# Patient Record
Sex: FEMALE | Race: WHITE | NOT HISPANIC OR LATINO | Employment: OTHER | URBAN - NONMETROPOLITAN AREA
[De-identification: names, ages, dates, MRNs, and addresses within clinical notes are randomized per-mention and may not be internally consistent; named-entity substitution may affect disease eponyms.]

---

## 2018-05-18 ENCOUNTER — LAB (OUTPATIENT)
Dept: LAB | Facility: HOSPITAL | Age: 53
End: 2018-05-18

## 2018-05-18 ENCOUNTER — TRANSCRIBE ORDERS (OUTPATIENT)
Dept: ADMINISTRATIVE | Facility: HOSPITAL | Age: 53
End: 2018-05-18

## 2018-05-18 DIAGNOSIS — R06.02 SHORTNESS OF BREATH: ICD-10-CM

## 2018-05-18 DIAGNOSIS — R06.02 SHORTNESS OF BREATH: Primary | ICD-10-CM

## 2018-05-18 LAB — BNP SERPL-MCNC: 60 PG/ML (ref 0–100)

## 2018-05-18 PROCEDURE — 36415 COLL VENOUS BLD VENIPUNCTURE: CPT

## 2018-05-18 PROCEDURE — 83880 ASSAY OF NATRIURETIC PEPTIDE: CPT

## 2018-12-28 ENCOUNTER — HOSPITAL ENCOUNTER (EMERGENCY)
Facility: HOSPITAL | Age: 53
Discharge: HOME | End: 2018-12-28
Admitting: FAMILY MEDICINE

## 2018-12-28 ENCOUNTER — APPOINTMENT (OUTPATIENT)
Dept: GENERAL RADIOLOGY | Facility: HOSPITAL | Age: 53
End: 2018-12-28

## 2018-12-28 VITALS
OXYGEN SATURATION: 95 % | RESPIRATION RATE: 16 BRPM | HEIGHT: 59 IN | BODY MASS INDEX: 43.34 KG/M2 | WEIGHT: 215 LBS | TEMPERATURE: 98.1 F | HEART RATE: 74 BPM | SYSTOLIC BLOOD PRESSURE: 105 MMHG | DIASTOLIC BLOOD PRESSURE: 73 MMHG

## 2018-12-28 DIAGNOSIS — J06.9 UPPER RESPIRATORY TRACT INFECTION, UNSPECIFIED TYPE: Primary | ICD-10-CM

## 2018-12-28 LAB
FLUAV AG NPH QL: NEGATIVE
FLUBV AG NPH QL IA: NEGATIVE

## 2018-12-28 PROCEDURE — 99283 EMERGENCY DEPT VISIT LOW MDM: CPT

## 2018-12-28 PROCEDURE — 87804 INFLUENZA ASSAY W/OPTIC: CPT | Performed by: NURSE PRACTITIONER

## 2018-12-28 PROCEDURE — 71046 X-RAY EXAM CHEST 2 VIEWS: CPT | Performed by: RADIOLOGY

## 2018-12-28 PROCEDURE — 71046 X-RAY EXAM CHEST 2 VIEWS: CPT

## 2018-12-28 RX ORDER — LISINOPRIL 20 MG/1
20 TABLET ORAL DAILY
Status: ON HOLD | COMMUNITY
End: 2021-07-17

## 2018-12-28 RX ORDER — QUETIAPINE FUMARATE 50 MG/1
50 TABLET, FILM COATED ORAL NIGHTLY
COMMUNITY
End: 2021-08-05 | Stop reason: HOSPADM

## 2018-12-28 RX ORDER — POTASSIUM CHLORIDE 750 MG/1
10 TABLET, FILM COATED, EXTENDED RELEASE ORAL 4 TIMES DAILY
Status: ON HOLD | COMMUNITY
End: 2021-07-18 | Stop reason: DRUGHIGH

## 2018-12-28 RX ORDER — FUROSEMIDE 40 MG/1
40 TABLET ORAL DAILY
COMMUNITY
End: 2023-02-08 | Stop reason: ALTCHOICE

## 2018-12-28 RX ORDER — BENZONATATE 200 MG/1
200 CAPSULE ORAL 3 TIMES DAILY PRN
Qty: 20 CAPSULE | Refills: 0 | OUTPATIENT
Start: 2018-12-28 | End: 2021-04-26

## 2018-12-28 RX ORDER — DOXYCYCLINE 100 MG/1
100 CAPSULE ORAL 2 TIMES DAILY
Qty: 20 CAPSULE | Refills: 0 | Status: ON HOLD | OUTPATIENT
Start: 2018-12-28 | End: 2021-07-17

## 2018-12-28 RX ORDER — SUCRALFATE 1 G/1
1 TABLET ORAL 4 TIMES DAILY
Status: ON HOLD | COMMUNITY
End: 2021-07-17

## 2018-12-28 RX ORDER — ASPIRIN 325 MG
325 TABLET ORAL DAILY
Status: ON HOLD | COMMUNITY
End: 2021-07-17

## 2018-12-28 RX ORDER — CHLORAL HYDRATE 500 MG
CAPSULE ORAL
Status: ON HOLD | COMMUNITY
End: 2021-07-17

## 2019-03-23 ENCOUNTER — HOSPITAL ENCOUNTER (EMERGENCY)
Facility: HOSPITAL | Age: 54
Discharge: HOME | End: 2019-03-24
Attending: FAMILY MEDICINE | Admitting: FAMILY MEDICINE

## 2019-03-23 ENCOUNTER — APPOINTMENT (OUTPATIENT)
Dept: GENERAL RADIOLOGY | Facility: HOSPITAL | Age: 54
End: 2019-03-23

## 2019-03-23 DIAGNOSIS — M25.562 ARTHRALGIA OF LEFT KNEE: Primary | ICD-10-CM

## 2019-03-23 PROCEDURE — 96372 THER/PROPH/DIAG INJ SC/IM: CPT

## 2019-03-23 PROCEDURE — 73562 X-RAY EXAM OF KNEE 3: CPT

## 2019-03-23 PROCEDURE — 25010000002 KETOROLAC TROMETHAMINE PER 15 MG: Performed by: PHYSICIAN ASSISTANT

## 2019-03-23 PROCEDURE — 99283 EMERGENCY DEPT VISIT LOW MDM: CPT

## 2019-03-23 PROCEDURE — 73562 X-RAY EXAM OF KNEE 3: CPT | Performed by: RADIOLOGY

## 2019-03-23 RX ORDER — LEVOTHYROXINE SODIUM 0.03 MG/1
25 TABLET ORAL DAILY
Status: ON HOLD | COMMUNITY
End: 2021-11-01

## 2019-03-23 RX ORDER — KETOROLAC TROMETHAMINE 30 MG/ML
30 INJECTION, SOLUTION INTRAMUSCULAR; INTRAVENOUS ONCE
Status: COMPLETED | OUTPATIENT
Start: 2019-03-23 | End: 2019-03-23

## 2019-03-23 RX ADMIN — KETOROLAC TROMETHAMINE 30 MG: 30 INJECTION, SOLUTION INTRAMUSCULAR; INTRAVENOUS at 23:50

## 2019-03-24 VITALS
SYSTOLIC BLOOD PRESSURE: 128 MMHG | HEIGHT: 59 IN | OXYGEN SATURATION: 97 % | BODY MASS INDEX: 44.35 KG/M2 | TEMPERATURE: 97.4 F | DIASTOLIC BLOOD PRESSURE: 70 MMHG | RESPIRATION RATE: 16 BRPM | WEIGHT: 220 LBS | HEART RATE: 79 BPM

## 2019-12-02 ENCOUNTER — HOSPITAL ENCOUNTER (EMERGENCY)
Facility: HOSPITAL | Age: 54
Discharge: HOME | End: 2019-12-02
Attending: EMERGENCY MEDICINE | Admitting: EMERGENCY MEDICINE

## 2019-12-02 VITALS
WEIGHT: 220 LBS | BODY MASS INDEX: 44.35 KG/M2 | DIASTOLIC BLOOD PRESSURE: 72 MMHG | OXYGEN SATURATION: 97 % | SYSTOLIC BLOOD PRESSURE: 163 MMHG | RESPIRATION RATE: 18 BRPM | HEART RATE: 71 BPM | TEMPERATURE: 98 F | HEIGHT: 59 IN

## 2019-12-02 DIAGNOSIS — N39.0 ACUTE UTI: Primary | ICD-10-CM

## 2019-12-02 LAB
BACTERIA UR QL AUTO: ABNORMAL /HPF
BILIRUB UR QL STRIP: NEGATIVE
CLARITY UR: CLEAR
COLOR UR: ABNORMAL
GLUCOSE UR STRIP-MCNC: NEGATIVE MG/DL
HGB UR QL STRIP.AUTO: NEGATIVE
HYALINE CASTS UR QL AUTO: ABNORMAL /LPF
KETONES UR QL STRIP: NEGATIVE
LEUKOCYTE ESTERASE UR QL STRIP.AUTO: ABNORMAL
NITRITE UR QL STRIP: NEGATIVE
PH UR STRIP.AUTO: <=5 [PH] (ref 5–8)
PROT UR QL STRIP: NEGATIVE
RBC # UR: ABNORMAL /HPF
REF LAB TEST METHOD: ABNORMAL
SP GR UR STRIP: 1.03 (ref 1–1.03)
SQUAMOUS #/AREA URNS HPF: ABNORMAL /HPF
UROBILINOGEN UR QL STRIP: ABNORMAL
WBC UR QL AUTO: ABNORMAL /HPF

## 2019-12-02 PROCEDURE — 87086 URINE CULTURE/COLONY COUNT: CPT | Performed by: PHYSICIAN ASSISTANT

## 2019-12-02 PROCEDURE — 99283 EMERGENCY DEPT VISIT LOW MDM: CPT

## 2019-12-02 PROCEDURE — 81001 URINALYSIS AUTO W/SCOPE: CPT | Performed by: PHYSICIAN ASSISTANT

## 2019-12-02 PROCEDURE — 96372 THER/PROPH/DIAG INJ SC/IM: CPT

## 2019-12-02 PROCEDURE — 25010000002 CEFTRIAXONE PER 250 MG: Performed by: PHYSICIAN ASSISTANT

## 2019-12-02 RX ORDER — CEFTRIAXONE 1 G/1
1000 INJECTION, POWDER, FOR SOLUTION INTRAMUSCULAR; INTRAVENOUS ONCE
Status: COMPLETED | OUTPATIENT
Start: 2019-12-02 | End: 2019-12-02

## 2019-12-02 RX ORDER — CEPHALEXIN 500 MG/1
500 CAPSULE ORAL 2 TIMES DAILY
Qty: 20 CAPSULE | Refills: 0 | Status: ON HOLD | OUTPATIENT
Start: 2019-12-02 | End: 2021-07-17

## 2019-12-02 RX ORDER — LIDOCAINE HYDROCHLORIDE 10 MG/ML
2.1 INJECTION, SOLUTION EPIDURAL; INFILTRATION; INTRACAUDAL; PERINEURAL ONCE
Status: COMPLETED | OUTPATIENT
Start: 2019-12-02 | End: 2019-12-02

## 2019-12-02 RX ORDER — PHENAZOPYRIDINE HYDROCHLORIDE 200 MG/1
200 TABLET, FILM COATED ORAL ONCE
Status: COMPLETED | OUTPATIENT
Start: 2019-12-02 | End: 2019-12-02

## 2019-12-02 RX ORDER — PHENAZOPYRIDINE HYDROCHLORIDE 200 MG/1
200 TABLET, FILM COATED ORAL 3 TIMES DAILY PRN
Qty: 21 TABLET | Refills: 0 | Status: ON HOLD | OUTPATIENT
Start: 2019-12-02 | End: 2021-07-17

## 2019-12-02 RX ADMIN — CEFTRIAXONE 1000 MG: 1 INJECTION, POWDER, FOR SOLUTION INTRAMUSCULAR; INTRAVENOUS at 22:56

## 2019-12-02 RX ADMIN — LIDOCAINE HYDROCHLORIDE 2.1 ML: 10 INJECTION, SOLUTION EPIDURAL; INFILTRATION; INTRACAUDAL; PERINEURAL at 22:56

## 2019-12-02 RX ADMIN — PHENAZOPYRIDINE 200 MG: 200 TABLET ORAL at 22:18

## 2019-12-03 NOTE — ED PROVIDER NOTES
Subjective   54-year-old female presents to the ER with complaints of dysuria and frequency.  Patient states symptoms have been present for the last 4 days.  Patient does admit to some suprapubic pain.            Review of Systems   Constitutional: Negative.  Negative for fever.   HENT: Negative.    Respiratory: Negative.    Cardiovascular: Negative.  Negative for chest pain.   Gastrointestinal: Negative.  Negative for abdominal pain.   Endocrine: Negative.    Genitourinary: Positive for dysuria and frequency.   Musculoskeletal: Positive for gait problem (recent knee surgery).   Skin: Negative.    Psychiatric/Behavioral: Negative.    All other systems reviewed and are negative.      Past Medical History:   Diagnosis Date   • Arthritis    • Back pain    • Coronary artery disease    • Depression    • Diabetes mellitus (CMS/HCC)    • Fatty liver    • Hypertension        Allergies   Allergen Reactions   • Codeine Itching   • Hydrocodone Hives   • Nuts Other (See Comments)     peanuts   • Latex Rash       Past Surgical History:   Procedure Laterality Date   • CARPAL TUNNEL RELEASE     •  SECTION     • CHOLECYSTECTOMY     • CORONARY ANGIOPLASTY WITH STENT PLACEMENT     • REPLACEMENT TOTAL KNEE Right    • TUBAL ABDOMINAL LIGATION         No family history on file.    Social History     Socioeconomic History   • Marital status: Legally      Spouse name: Not on file   • Number of children: Not on file   • Years of education: Not on file   • Highest education level: Not on file   Tobacco Use   • Smoking status: Never Smoker   Substance and Sexual Activity   • Alcohol use: Yes     Comment: occas   • Drug use: No           Objective   Physical Exam   Constitutional: She is oriented to person, place, and time. She appears well-developed and well-nourished. No distress.   HENT:   Head: Normocephalic and atraumatic.   Right Ear: External ear normal.   Left Ear: External ear normal.   Nose: Nose normal.   Eyes:  Conjunctivae are normal.   Neck: Normal range of motion. Neck supple. No JVD present. No tracheal deviation present.   Cardiovascular: Normal rate, regular rhythm and normal heart sounds.   No murmur heard.  Pulmonary/Chest: Effort normal and breath sounds normal. No respiratory distress. She has no wheezes.   Abdominal: Soft. Bowel sounds are normal. There is tenderness (suprapubic).   Musculoskeletal: Normal range of motion. She exhibits no edema or deformity.   Neurological: She is alert and oriented to person, place, and time. No cranial nerve deficit.   Skin: Skin is warm and dry. No rash noted. She is not diaphoretic. No erythema. No pallor.   Psychiatric: She has a normal mood and affect. Her behavior is normal. Thought content normal.   Nursing note and vitals reviewed.      Procedures           ED Course                  MDM  Number of Diagnoses or Management Options  Acute UTI: new and requires workup     Amount and/or Complexity of Data Reviewed  Clinical lab tests: ordered and reviewed    Risk of Complications, Morbidity, and/or Mortality  Presenting problems: low  Diagnostic procedures: low  Management options: low    Patient Progress  Patient progress: stable      Final diagnoses:   Acute UTI              Kermit Santizo PA  12/02/19 8136

## 2019-12-04 LAB — BACTERIA SPEC AEROBE CULT: NORMAL

## 2020-02-28 ENCOUNTER — APPOINTMENT (OUTPATIENT)
Dept: GENERAL RADIOLOGY | Facility: HOSPITAL | Age: 55
End: 2020-02-28

## 2020-02-28 ENCOUNTER — APPOINTMENT (OUTPATIENT)
Dept: CT IMAGING | Facility: HOSPITAL | Age: 55
End: 2020-02-28

## 2020-02-28 ENCOUNTER — HOSPITAL ENCOUNTER (EMERGENCY)
Facility: HOSPITAL | Age: 55
Discharge: HOME | End: 2020-02-29
Attending: EMERGENCY MEDICINE | Admitting: EMERGENCY MEDICINE

## 2020-02-28 DIAGNOSIS — R10.10 PAIN OF UPPER ABDOMEN: Primary | ICD-10-CM

## 2020-02-28 LAB
6-ACETYL MORPHINE: NEGATIVE
ALBUMIN SERPL-MCNC: 3.68 G/DL (ref 3.5–5.2)
ALBUMIN/GLOB SERPL: 0.9 G/DL
ALP SERPL-CCNC: 170 U/L (ref 39–117)
ALT SERPL W P-5'-P-CCNC: 30 U/L (ref 1–33)
AMPHET+METHAMPHET UR QL: NEGATIVE
AMYLASE SERPL-CCNC: 43 U/L (ref 28–100)
ANION GAP SERPL CALCULATED.3IONS-SCNC: 12.9 MMOL/L (ref 5–15)
AST SERPL-CCNC: 59 U/L (ref 1–32)
BACTERIA UR QL AUTO: ABNORMAL /HPF
BARBITURATES UR QL SCN: NEGATIVE
BASOPHILS # BLD AUTO: 0.06 10*3/MM3 (ref 0–0.2)
BASOPHILS NFR BLD AUTO: 0.8 % (ref 0–1.5)
BENZODIAZ UR QL SCN: NEGATIVE
BILIRUB SERPL-MCNC: 0.5 MG/DL (ref 0.2–1.2)
BILIRUB UR QL STRIP: NEGATIVE
BUN BLD-MCNC: 8 MG/DL (ref 6–20)
BUN/CREAT SERPL: 12.7 (ref 7–25)
BUPRENORPHINE SERPL-MCNC: NEGATIVE NG/ML
CALCIUM SPEC-SCNC: 9.7 MG/DL (ref 8.6–10.5)
CANNABINOIDS SERPL QL: NEGATIVE
CHLORIDE SERPL-SCNC: 101 MMOL/L (ref 98–107)
CLARITY UR: CLEAR
CO2 SERPL-SCNC: 26.1 MMOL/L (ref 22–29)
COCAINE UR QL: NEGATIVE
COLOR UR: YELLOW
CREAT BLD-MCNC: 0.63 MG/DL (ref 0.57–1)
D DIMER PPP FEU-MCNC: 1.98 MCGFEU/ML (ref 0–0.5)
DEPRECATED RDW RBC AUTO: 48.8 FL (ref 37–54)
EOSINOPHIL # BLD AUTO: 0.37 10*3/MM3 (ref 0–0.4)
EOSINOPHIL NFR BLD AUTO: 5 % (ref 0.3–6.2)
ERYTHROCYTE [DISTWIDTH] IN BLOOD BY AUTOMATED COUNT: 16 % (ref 12.3–15.4)
FLUAV AG NPH QL: NEGATIVE
FLUBV AG NPH QL IA: NEGATIVE
GFR SERPL CREATININE-BSD FRML MDRD: 98 ML/MIN/1.73
GLOBULIN UR ELPH-MCNC: 4.1 GM/DL
GLUCOSE BLD-MCNC: 144 MG/DL (ref 65–99)
GLUCOSE UR STRIP-MCNC: NEGATIVE MG/DL
HCT VFR BLD AUTO: 38.8 % (ref 34–46.6)
HGB BLD-MCNC: 12 G/DL (ref 12–15.9)
HGB UR QL STRIP.AUTO: NEGATIVE
HYALINE CASTS UR QL AUTO: ABNORMAL /LPF
IMM GRANULOCYTES # BLD AUTO: 0.02 10*3/MM3 (ref 0–0.05)
IMM GRANULOCYTES NFR BLD AUTO: 0.3 % (ref 0–0.5)
KETONES UR QL STRIP: NEGATIVE
LEUKOCYTE ESTERASE UR QL STRIP.AUTO: ABNORMAL
LIPASE SERPL-CCNC: 80 U/L (ref 13–60)
LYMPHOCYTES # BLD AUTO: 2.5 10*3/MM3 (ref 0.7–3.1)
LYMPHOCYTES NFR BLD AUTO: 33.7 % (ref 19.6–45.3)
MCH RBC QN AUTO: 26.1 PG (ref 26.6–33)
MCHC RBC AUTO-ENTMCNC: 30.9 G/DL (ref 31.5–35.7)
MCV RBC AUTO: 84.3 FL (ref 79–97)
METHADONE UR QL SCN: NEGATIVE
MONOCYTES # BLD AUTO: 0.54 10*3/MM3 (ref 0.1–0.9)
MONOCYTES NFR BLD AUTO: 7.3 % (ref 5–12)
NEUTROPHILS # BLD AUTO: 3.92 10*3/MM3 (ref 1.7–7)
NEUTROPHILS NFR BLD AUTO: 52.9 % (ref 42.7–76)
NITRITE UR QL STRIP: NEGATIVE
NRBC BLD AUTO-RTO: 0 /100 WBC (ref 0–0.2)
NT-PROBNP SERPL-MCNC: 205.9 PG/ML (ref 5–900)
OPIATES UR QL: NEGATIVE
OXYCODONE UR QL SCN: NEGATIVE
PCP UR QL SCN: NEGATIVE
PH UR STRIP.AUTO: 6 [PH] (ref 5–8)
PLATELET # BLD AUTO: 174 10*3/MM3 (ref 140–450)
PMV BLD AUTO: 11.4 FL (ref 6–12)
POTASSIUM BLD-SCNC: 3.4 MMOL/L (ref 3.5–5.2)
PROT SERPL-MCNC: 7.8 G/DL (ref 6–8.5)
PROT UR QL STRIP: NEGATIVE
RBC # BLD AUTO: 4.6 10*6/MM3 (ref 3.77–5.28)
RBC # UR: ABNORMAL /HPF
REF LAB TEST METHOD: ABNORMAL
SODIUM BLD-SCNC: 140 MMOL/L (ref 136–145)
SP GR UR STRIP: 1.01 (ref 1–1.03)
SQUAMOUS #/AREA URNS HPF: ABNORMAL /HPF
TROPONIN T SERPL-MCNC: <0.01 NG/ML (ref 0–0.03)
UROBILINOGEN UR QL STRIP: ABNORMAL
WBC NRBC COR # BLD: 7.41 10*3/MM3 (ref 3.4–10.8)
WBC UR QL AUTO: ABNORMAL /HPF

## 2020-02-28 PROCEDURE — 99284 EMERGENCY DEPT VISIT MOD MDM: CPT

## 2020-02-28 PROCEDURE — 82150 ASSAY OF AMYLASE: CPT | Performed by: PHYSICIAN ASSISTANT

## 2020-02-28 PROCEDURE — 71045 X-RAY EXAM CHEST 1 VIEW: CPT

## 2020-02-28 PROCEDURE — 80307 DRUG TEST PRSMV CHEM ANLYZR: CPT | Performed by: PHYSICIAN ASSISTANT

## 2020-02-28 PROCEDURE — 85025 COMPLETE CBC W/AUTO DIFF WBC: CPT | Performed by: PHYSICIAN ASSISTANT

## 2020-02-28 PROCEDURE — 71275 CT ANGIOGRAPHY CHEST: CPT

## 2020-02-28 PROCEDURE — 96374 THER/PROPH/DIAG INJ IV PUSH: CPT

## 2020-02-28 PROCEDURE — 36415 COLL VENOUS BLD VENIPUNCTURE: CPT

## 2020-02-28 PROCEDURE — 93005 ELECTROCARDIOGRAM TRACING: CPT | Performed by: PHYSICIAN ASSISTANT

## 2020-02-28 PROCEDURE — 84484 ASSAY OF TROPONIN QUANT: CPT | Performed by: PHYSICIAN ASSISTANT

## 2020-02-28 PROCEDURE — 83690 ASSAY OF LIPASE: CPT | Performed by: PHYSICIAN ASSISTANT

## 2020-02-28 PROCEDURE — 25010000002 ONDANSETRON PER 1 MG: Performed by: PHYSICIAN ASSISTANT

## 2020-02-28 PROCEDURE — 81001 URINALYSIS AUTO W/SCOPE: CPT | Performed by: PHYSICIAN ASSISTANT

## 2020-02-28 PROCEDURE — 0 IOPAMIDOL PER 1 ML: Performed by: EMERGENCY MEDICINE

## 2020-02-28 PROCEDURE — 25010000002 MORPHINE PER 10 MG: Performed by: EMERGENCY MEDICINE

## 2020-02-28 PROCEDURE — 87804 INFLUENZA ASSAY W/OPTIC: CPT | Performed by: PHYSICIAN ASSISTANT

## 2020-02-28 PROCEDURE — 96375 TX/PRO/DX INJ NEW DRUG ADDON: CPT

## 2020-02-28 PROCEDURE — 80053 COMPREHEN METABOLIC PANEL: CPT | Performed by: PHYSICIAN ASSISTANT

## 2020-02-28 PROCEDURE — 93010 ELECTROCARDIOGRAM REPORT: CPT | Performed by: INTERNAL MEDICINE

## 2020-02-28 PROCEDURE — 85379 FIBRIN DEGRADATION QUANT: CPT | Performed by: PHYSICIAN ASSISTANT

## 2020-02-28 PROCEDURE — 83880 ASSAY OF NATRIURETIC PEPTIDE: CPT | Performed by: PHYSICIAN ASSISTANT

## 2020-02-28 PROCEDURE — 74177 CT ABD & PELVIS W/CONTRAST: CPT

## 2020-02-28 RX ORDER — MORPHINE SULFATE 2 MG/ML
2 INJECTION, SOLUTION INTRAMUSCULAR; INTRAVENOUS ONCE
Status: COMPLETED | OUTPATIENT
Start: 2020-02-28 | End: 2020-02-28

## 2020-02-28 RX ORDER — SODIUM CHLORIDE 0.9 % (FLUSH) 0.9 %
10 SYRINGE (ML) INJECTION AS NEEDED
Status: DISCONTINUED | OUTPATIENT
Start: 2020-02-28 | End: 2020-02-29 | Stop reason: HOSPADM

## 2020-02-28 RX ORDER — ONDANSETRON 2 MG/ML
4 INJECTION INTRAMUSCULAR; INTRAVENOUS ONCE
Status: COMPLETED | OUTPATIENT
Start: 2020-02-28 | End: 2020-02-28

## 2020-02-28 RX ADMIN — IOPAMIDOL 90 ML: 755 INJECTION, SOLUTION INTRAVENOUS at 23:26

## 2020-02-28 RX ADMIN — MORPHINE SULFATE 2 MG: 2 INJECTION, SOLUTION INTRAMUSCULAR; INTRAVENOUS at 21:47

## 2020-02-28 RX ADMIN — ONDANSETRON 4 MG: 2 INJECTION INTRAMUSCULAR; INTRAVENOUS at 23:09

## 2020-02-29 VITALS
SYSTOLIC BLOOD PRESSURE: 172 MMHG | DIASTOLIC BLOOD PRESSURE: 83 MMHG | BODY MASS INDEX: 43.14 KG/M2 | RESPIRATION RATE: 18 BRPM | WEIGHT: 214 LBS | OXYGEN SATURATION: 92 % | HEART RATE: 89 BPM | HEIGHT: 59 IN | TEMPERATURE: 98.4 F

## 2020-02-29 LAB — TROPONIN T SERPL-MCNC: <0.01 NG/ML (ref 0–0.03)

## 2021-01-14 ENCOUNTER — TRANSCRIBE ORDERS (OUTPATIENT)
Dept: ADMINISTRATIVE | Facility: HOSPITAL | Age: 56
End: 2021-01-14

## 2021-01-14 DIAGNOSIS — R60.9 EDEMA, UNSPECIFIED TYPE: Primary | ICD-10-CM

## 2021-01-21 ENCOUNTER — HOSPITAL ENCOUNTER (OUTPATIENT)
Dept: ULTRASOUND IMAGING | Facility: HOSPITAL | Age: 56
Discharge: HOME | End: 2021-01-21
Admitting: NURSE PRACTITIONER

## 2021-01-21 DIAGNOSIS — R60.9 EDEMA, UNSPECIFIED TYPE: ICD-10-CM

## 2021-01-21 PROCEDURE — 93922 UPR/L XTREMITY ART 2 LEVELS: CPT

## 2021-01-21 PROCEDURE — 93922 UPR/L XTREMITY ART 2 LEVELS: CPT | Performed by: RADIOLOGY

## 2021-03-15 ENCOUNTER — BULK ORDERING (OUTPATIENT)
Dept: CASE MANAGEMENT | Facility: OTHER | Age: 56
End: 2021-03-15

## 2021-03-15 DIAGNOSIS — Z23 IMMUNIZATION DUE: ICD-10-CM

## 2021-04-26 ENCOUNTER — APPOINTMENT (OUTPATIENT)
Dept: GENERAL RADIOLOGY | Facility: HOSPITAL | Age: 56
End: 2021-04-26

## 2021-04-26 ENCOUNTER — HOSPITAL ENCOUNTER (EMERGENCY)
Facility: HOSPITAL | Age: 56
Discharge: HOME | End: 2021-04-26
Attending: FAMILY MEDICINE | Admitting: FAMILY MEDICINE

## 2021-04-26 VITALS
HEIGHT: 59 IN | SYSTOLIC BLOOD PRESSURE: 184 MMHG | OXYGEN SATURATION: 95 % | TEMPERATURE: 97.3 F | HEART RATE: 78 BPM | DIASTOLIC BLOOD PRESSURE: 96 MMHG | WEIGHT: 237 LBS | BODY MASS INDEX: 47.78 KG/M2 | RESPIRATION RATE: 20 BRPM

## 2021-04-26 DIAGNOSIS — S82.891A CLOSED FRACTURE OF RIGHT ANKLE, INITIAL ENCOUNTER: Primary | ICD-10-CM

## 2021-04-26 PROCEDURE — 73630 X-RAY EXAM OF FOOT: CPT | Performed by: RADIOLOGY

## 2021-04-26 PROCEDURE — 73610 X-RAY EXAM OF ANKLE: CPT | Performed by: RADIOLOGY

## 2021-04-26 PROCEDURE — 73562 X-RAY EXAM OF KNEE 3: CPT | Performed by: RADIOLOGY

## 2021-04-26 PROCEDURE — 73630 X-RAY EXAM OF FOOT: CPT

## 2021-04-26 PROCEDURE — 73562 X-RAY EXAM OF KNEE 3: CPT

## 2021-04-26 PROCEDURE — 99283 EMERGENCY DEPT VISIT LOW MDM: CPT

## 2021-04-26 PROCEDURE — 73610 X-RAY EXAM OF ANKLE: CPT

## 2021-04-26 RX ORDER — HYDROCODONE BITARTRATE AND ACETAMINOPHEN 10; 325 MG/1; MG/1
1 TABLET ORAL ONCE
Status: COMPLETED | OUTPATIENT
Start: 2021-04-26 | End: 2021-04-26

## 2021-04-26 RX ADMIN — HYDROCODONE BITARTRATE AND ACETAMINOPHEN 1 TABLET: 10; 325 TABLET ORAL at 13:18

## 2021-04-26 NOTE — ED PROVIDER NOTES
Subjective   This is a 55 year old female patient who presents to the ER with chief complaint of right leg injury. Patient said that earlier today, her right knee gave out on her and she tripped falling on the right leg. She is now having pain in the right knee, right ankle and right foot. Swelling noted in right ankle and foot. No numbness or tingling noted. Patient does have a history of a right TKA.           Review of Systems   Constitutional: Negative.  Negative for fever.   HENT: Negative.    Respiratory: Negative.    Cardiovascular: Negative.  Negative for chest pain.   Gastrointestinal: Negative.  Negative for abdominal pain.   Endocrine: Negative.    Genitourinary: Negative.  Negative for dysuria.   Musculoskeletal: Negative for arthralgias, back pain, gait problem, joint swelling, myalgias, neck pain and neck stiffness.        Right knee, ankle and foot pain    Skin: Negative.    Neurological: Negative.    Psychiatric/Behavioral: Negative.    All other systems reviewed and are negative.      Past Medical History:   Diagnosis Date   • Arthritis    • Back pain    • Coronary artery disease    • Depression    • Diabetes mellitus (CMS/HCC)    • Fatty liver    • Hypertension        Allergies   Allergen Reactions   • Codeine Itching   • Nuts Other (See Comments)     peanuts   • Latex Rash       Past Surgical History:   Procedure Laterality Date   • CARPAL TUNNEL RELEASE     •  SECTION     • CHOLECYSTECTOMY     • CORONARY ANGIOPLASTY WITH STENT PLACEMENT     • REPLACEMENT TOTAL KNEE Right    • TUBAL ABDOMINAL LIGATION         No family history on file.    Social History     Socioeconomic History   • Marital status:      Spouse name: Not on file   • Number of children: Not on file   • Years of education: Not on file   • Highest education level: Not on file   Tobacco Use   • Smoking status: Never Smoker   Substance and Sexual Activity   • Alcohol use: Yes     Comment: occas   • Drug use: No            Objective   Physical Exam  Vitals and nursing note reviewed.   Constitutional:       General: She is not in acute distress.     Appearance: She is well-developed. She is not diaphoretic.   HENT:      Head: Normocephalic and atraumatic.      Right Ear: External ear normal.      Left Ear: External ear normal.      Nose: Nose normal.   Eyes:      Conjunctiva/sclera: Conjunctivae normal.      Pupils: Pupils are equal, round, and reactive to light.   Neck:      Vascular: No JVD.      Trachea: No tracheal deviation.   Cardiovascular:      Rate and Rhythm: Normal rate and regular rhythm.      Heart sounds: Normal heart sounds. No murmur heard.     Pulmonary:      Effort: Pulmonary effort is normal. No respiratory distress.      Breath sounds: Normal breath sounds. No wheezing.   Abdominal:      General: Bowel sounds are normal.      Palpations: Abdomen is soft.      Tenderness: There is no abdominal tenderness.   Musculoskeletal:         General: Swelling, tenderness and signs of injury present. No deformity. Normal range of motion.      Cervical back: Normal range of motion and neck supple.      Comments: Right knee, ankle and foot skin intact with no bruising or abrasion. Edema and tenderness to palpation noted to the right lateral ankle. ROM of RLE is full. Neurovascular status and sensation RLE intact.    Skin:     General: Skin is warm and dry.      Coloration: Skin is not pale.      Findings: No erythema or rash.   Neurological:      Mental Status: She is alert and oriented to person, place, and time.      Cranial Nerves: No cranial nerve deficit.   Psychiatric:         Behavior: Behavior normal.         Thought Content: Thought content normal.         Splint - Cast - Strapping    Date/Time: 4/26/2021 2:20 PM  Performed by: Shantelle Oh PA  Authorized by: Pb Garcia MD     Consent:     Consent obtained:  Verbal    Consent given by:  Patient    Risks discussed:  Discoloration, pain, swelling  and numbness    Alternatives discussed:  No treatment, alternative treatment, delayed treatment, observation and referral  Pre-procedure details:     Sensation:  Normal    Skin color:  Normal   Procedure details:     Laterality:  Right    Location:  Ankle    Ankle:  R ankle    Strapping: no      Splint type:  CAM walker boot    Supplies:  Prefabricated splint  Post-procedure details:     Pain:  Improved    Sensation:  Normal    Skin color:  Normal     Patient tolerance of procedure:  Tolerated well, no immediate complications               ED Course  ED Course as of Apr 26 1420   Mon Apr 26, 2021   1347 IMPRESSION:    No acute findings in the right foot.     This report was finalized on 4/26/2021 1:42 PM by Dr. Christiano Aceves MD.   XR Foot 3+ View Right [MM]   1347 IMPRESSION:  1.  Lateral soft tissue swelling noted.  2.  Tiny fragmentation adjacent to the lateral malleolus that could  represent avulsion injury.     This report was finalized on 4/26/2021 1:42 PM by Dr. Christiano Aceves MD.   XR Ankle 3+ View Right [MM]   1352 IMPRESSION:    No acute findings in the right knee.     This report was finalized on 4/26/2021 1:43 PM by Dr. Christiano Aceves MD.   XR Knee 3 View Right [MM]   1417 Patient diagnosed with right ankle fracture. Will be d/c home in boot and on crutches and instructed for rest, no weight bearing and to take her pain medication that she is already prescribed. Will f/u with ortho or return to ER if symptoms worsen.     [MM]      ED Course User Index  [MM] Shantelle Oh PA                                           MDM  Number of Diagnoses or Management Options     Amount and/or Complexity of Data Reviewed  Tests in the radiology section of CPT®: ordered and reviewed  Discuss the patient with other providers: yes        Final diagnoses:   Closed fracture of right ankle, initial encounter       ED Disposition  ED Disposition     ED Disposition Condition Comment    Discharge Stable           Johann Wright  MD Dewayne  [address] [place] [address] [address] [place] [address]  [phone]    In 2 days           Medication List      Stop    benzonatate 200 MG capsule  Commonly known as: Shantelle Sanchez PA  04/26/21 1423

## 2021-04-26 NOTE — DISCHARGE INSTRUCTIONS
Please utilize your boot, crutches, rest, ice, elevation and your pain medication. Please don't weight bear on the right boot. Please follow up with ortho or return to ER if symptoms worsen.

## 2021-04-26 NOTE — ED NOTES
Escorted patient to results pending area at this time, instructed patient on split flow process and patient's plan of care moving forward. Patient verbalized understanding, advised to notify staff of any further needs, oriented patient to call bell in results waiting area.       Collette, Ashley N, RN  04/26/21 3676

## 2021-05-11 ENCOUNTER — HOSPITAL ENCOUNTER (OUTPATIENT)
Dept: HOSPITAL 79 - KOH-I | Age: 56
End: 2021-05-11
Attending: PODIATRIST
Payer: COMMERCIAL

## 2021-05-11 DIAGNOSIS — S82.892A: Primary | ICD-10-CM

## 2021-05-11 DIAGNOSIS — S82.891A: ICD-10-CM

## 2021-05-17 ENCOUNTER — TREATMENT (OUTPATIENT)
Dept: PHYSICAL THERAPY | Facility: CLINIC | Age: 56
End: 2021-05-17

## 2021-05-17 DIAGNOSIS — S82.891D CLOSED AVULSION FRACTURE OF RIGHT ANKLE WITH ROUTINE HEALING, SUBSEQUENT ENCOUNTER: ICD-10-CM

## 2021-05-17 DIAGNOSIS — M65.271 CALCIFIC TENDONITIS OF RIGHT FOOT: Primary | ICD-10-CM

## 2021-05-17 PROCEDURE — 97162 PT EVAL MOD COMPLEX 30 MIN: CPT | Performed by: PHYSICAL THERAPIST

## 2021-05-18 ENCOUNTER — TRANSCRIBE ORDERS (OUTPATIENT)
Dept: PHYSICAL THERAPY | Facility: CLINIC | Age: 56
End: 2021-05-18

## 2021-05-18 DIAGNOSIS — M77.9 TENDONITIS: Primary | ICD-10-CM

## 2021-05-18 DIAGNOSIS — S82.891S: ICD-10-CM

## 2021-05-19 ENCOUNTER — TREATMENT (OUTPATIENT)
Dept: PHYSICAL THERAPY | Facility: CLINIC | Age: 56
End: 2021-05-19

## 2021-05-19 DIAGNOSIS — M65.271 CALCIFIC TENDONITIS OF RIGHT FOOT: Primary | ICD-10-CM

## 2021-05-19 DIAGNOSIS — S82.891D CLOSED AVULSION FRACTURE OF RIGHT ANKLE WITH ROUTINE HEALING, SUBSEQUENT ENCOUNTER: ICD-10-CM

## 2021-05-19 PROCEDURE — 97140 MANUAL THERAPY 1/> REGIONS: CPT | Performed by: PHYSICAL THERAPIST

## 2021-05-19 PROCEDURE — 97110 THERAPEUTIC EXERCISES: CPT | Performed by: PHYSICAL THERAPIST

## 2021-05-19 NOTE — PROGRESS NOTES
Physical Therapy Daily Treatment Note      Patient: Aidee Trinidad   : 1965  Referring practitioner: Rico Knight MD  Date of Initial Visit: Type: THERAPY  Noted: 2021  Today's Date: 2021  Patient seen for 2 sessions         Subjective:  Patient arrives to therapy w/ reports of 5/10 foot and right ankle pain.  Pt states she feels as though her ankle is swollen today.  Pt verbalizes compliance of initiating home program w/ no complaints.     Objective   See Exercise, Manual, and Modality Logs for complete treatment.       Assessment/Plan:  Patient tolerated treatment well today w/ reports of slight decrease in pain following, 2/10.  Session initiated w/ therex as listed for improved right ankle range of motion, improved LE strength, and intrinsic strength, as per protocol.  Pt required cues and demonstration while performing exercise for proper form and for max benefit w/ activities.  Manual STM/ retrograde performed to R) ankle to assist w/ reduced edema and improved mobility. Cryotherapy applied at conclusion.  Pt continues to benefit from therapy services and will be progressed as tolerated.  No distress or adverse reactions observed during and/ or following tx. Continue w/ PT's POC.     Visit Diagnoses:    ICD-10-CM ICD-9-CM   1. Calcific tendonitis of right foot  M65.271 727.82   2. Closed avulsion fracture of right ankle with routine healing, subsequent encounter  S82.891D V54.19       Progress per Plan of Care and Progress strengthening /stabilization /functional activity           Timed:  Manual Therapy:    13     mins  03986;  Therapeutic Exercise:     29    mins  01320;     Neuromuscular Daisy:        mins  19454;    Therapeutic Activity:          mins  85226;     Gait Training:           mins  12300;     Ultrasound:          mins  18283;    Electrical Stimulation:         mins  86128 ( );    Untimed:  Electrical Stimulation:         mins  96914 ( );  Mechanical  Traction:         mins  26056;     Timed Treatment:   42   mins   Total Treatment:    47    mins  Saadia Crump. DELMY Enriquez  Physical Therapist

## 2021-05-21 ENCOUNTER — TREATMENT (OUTPATIENT)
Dept: PHYSICAL THERAPY | Facility: CLINIC | Age: 56
End: 2021-05-21

## 2021-05-21 DIAGNOSIS — S82.891D CLOSED AVULSION FRACTURE OF RIGHT ANKLE WITH ROUTINE HEALING, SUBSEQUENT ENCOUNTER: ICD-10-CM

## 2021-05-21 DIAGNOSIS — M65.271 CALCIFIC TENDONITIS OF RIGHT FOOT: Primary | ICD-10-CM

## 2021-05-21 PROCEDURE — 97110 THERAPEUTIC EXERCISES: CPT | Performed by: PHYSICAL THERAPIST

## 2021-05-21 PROCEDURE — 97140 MANUAL THERAPY 1/> REGIONS: CPT | Performed by: PHYSICAL THERAPIST

## 2021-05-21 NOTE — PROGRESS NOTES
Physical Therapy Daily Treatment Note      Patient: Aidee Trinidad   : 1965  Referring practitioner: Rico Knight MD  Date of Initial Visit: Type: THERAPY  Noted: 2021  Today's Date: 2021  Patient seen for 3 sessions         Aidee Trinidad reports that she is having 4/10 pain in her right foot. Patient states that she is working on her home exercises. Patient reports that her right ankle is real weak and it doesn't have good stability.      Objective   See Exercise, Manual, and Modality Logs for complete treatment.       Assessment/Plan   Patient tolerated treatment session well with rest breaks taken as needed by the patient. Educated patient to perform therex per her tolerance, patient verbalized understanding. New therex added (SLR and seated AP) per the patient's tolerance, patient demonstrated and understood new therex with no increase in pain noted. Swelling noted in the right ankle especially at the lateral malleoli, retrograde massage performed to help decrease swelling. Called patient's MD in regards to awaiting MD recomdation on weight bearing status, MD is out of office until Monday. Will call MD on Monday. Mod A needed from supine to sit on edge of mat table. Continue per PT's POC, progress exercises per the patient's tolerance.       Visit Diagnoses:    ICD-10-CM ICD-9-CM   1. Calcific tendonitis of right foot  M65.271 727.82   2. Closed avulsion fracture of right ankle with routine healing, subsequent encounter  S82.891D V54.19       Progress strengthening /stabilization /functional activity           Timed:  Manual Therapy:    13     mins  61025;  Therapeutic Exercise:    30     mins  96902;     Neuromuscular Daisy:        mins  12665;    Therapeutic Activity:          mins  10250;     Gait Training:           mins  24084;     Ultrasound:          mins  94815;    Electrical Stimulation:         mins  19615 ( );    Untimed:  Electrical Stimulation:         mins  28015 (  );  Mechanical Traction:         mins  18268;     Timed Treatment:   43   mins   Total Treatment:     49   mins  Janet Plaza, DELMY

## 2021-05-24 ENCOUNTER — TREATMENT (OUTPATIENT)
Dept: PHYSICAL THERAPY | Facility: CLINIC | Age: 56
End: 2021-05-24

## 2021-05-24 DIAGNOSIS — M65.271 CALCIFIC TENDONITIS OF RIGHT FOOT: Primary | ICD-10-CM

## 2021-05-24 DIAGNOSIS — S82.891D CLOSED AVULSION FRACTURE OF RIGHT ANKLE WITH ROUTINE HEALING, SUBSEQUENT ENCOUNTER: ICD-10-CM

## 2021-05-24 PROCEDURE — 97110 THERAPEUTIC EXERCISES: CPT | Performed by: PHYSICAL THERAPIST

## 2021-05-24 PROCEDURE — 97140 MANUAL THERAPY 1/> REGIONS: CPT | Performed by: PHYSICAL THERAPIST

## 2021-05-24 NOTE — PROGRESS NOTES
"   Physical Therapy Daily Treatment Note      Patient: Aidee Trinidad   : 1965  Referring practitioner: Rico Knight MD  Date of Initial Visit: Type: THERAPY  Noted: 2021  Today's Date: 2021  Patient seen for 4 sessions           Subjective Evaluation    History of Present Illness    Subjective comment: Pt reports 3-4/10 right foot pain prior to today's session. She states, \"no certain thing makes it worse\".       Objective   See Exercise, Manual, and Modality Logs for complete treatment.       Assessment & Plan     Assessment  Assessment details: Messages were left by Saadia Enriquez PTA with the patient's MD regarding weight bearing status. We are currently awaiting a return call.  Retrograde massage was performed on the right ankle to address inflammation. No tenderness was reported with massage. Therapeutic exercises were performed to address impaired ROM and strength. Rest breaks were provided as needed, with no increase in pain reported.    Plan  Plan details: Progress as tolerated for improved function.        Visit Diagnoses:    ICD-10-CM ICD-9-CM   1. Calcific tendonitis of right foot  M65.271 727.82   2. Closed avulsion fracture of right ankle with routine healing, subsequent encounter  S82.891D V54.19       Progress per Plan of Care           Timed:  Manual Therapy:  14    mins  88309;  Therapeutic Exercise:   26   mins  88261;     Neuromuscular Daisy:        mins  27604;    Therapeutic Activity:          mins  13819;     Gait Training:           mins  94952;     Ultrasound:          mins  53173;    Electrical Stimulation:         mins  92547 ( );    Untimed:  Electrical Stimulation:         mins  97146 ( );  Mechanical Traction:         mins  11386;     Timed Treatment:  40    mins   Total Treatment:     40   mins    Ashley Claudene Dalton, SUKH  Physical Therapist                  "

## 2021-05-28 ENCOUNTER — TREATMENT (OUTPATIENT)
Dept: PHYSICAL THERAPY | Facility: CLINIC | Age: 56
End: 2021-05-28

## 2021-05-28 DIAGNOSIS — S82.891D CLOSED AVULSION FRACTURE OF RIGHT ANKLE WITH ROUTINE HEALING, SUBSEQUENT ENCOUNTER: ICD-10-CM

## 2021-05-28 DIAGNOSIS — M65.271 CALCIFIC TENDONITIS OF RIGHT FOOT: Primary | ICD-10-CM

## 2021-05-28 PROCEDURE — 97110 THERAPEUTIC EXERCISES: CPT | Performed by: PHYSICAL THERAPIST

## 2021-05-28 PROCEDURE — 97140 MANUAL THERAPY 1/> REGIONS: CPT | Performed by: PHYSICAL THERAPIST

## 2021-05-28 NOTE — PROGRESS NOTES
Physical Therapy Daily Treatment Note      Patient: Aidee Trinidad   : 1965  Referring practitioner: Rico Knight MD  Date of Initial Visit: Type: THERAPY  Noted: 2021  Today's Date: 2021  Patient seen for 5 sessions         Aidee Trinidad reports that she was laying in the bed Wednesday when she had a pain happen in her foot that felt like a knife stabbing her. Patient states that she is having tingling in the bottom of her foot since the pain. Patient reports that a bruise came up on the top of her right foot following the pain.      Objective   See Exercise, Manual, and Modality Logs for complete treatment.       Assessment/Plan   Spoke with marcus from [facility] in regards to weight bearing status, patient's weight bearing is WBAT on right foot. Patient tolerated treatment session well. Secondary to patient's reports and noticeable bruise on the top of the right foot below the second and third toe. Supervising PT Kelley Matias, PT assessed patient's right foot; tenderness noted right at bruise and on the pad of the right foot. PT educated patient and PTA to continue with session and make session conservative. Patient educated if pain increases or worsens to seek medical attention immediately, patient verbalized understanding. Educated patient to follow up with orthopedic if pain continues, patient verbalized understanding. No adverse reactions with modalities or treatment session. Continue per PT's POC, progress exercises per the patient's tolerance.    Visit Diagnoses:    ICD-10-CM ICD-9-CM   1. Calcific tendonitis of right foot  M65.271 727.82   2. Closed avulsion fracture of right ankle with routine healing, subsequent encounter  S82.891D V54.19       Progress strengthening /stabilization /functional activity           Timed:  Manual Therapy:    14     mins  83905;  Therapeutic Exercise:    26     mins  49025;     Neuromuscular Daisy:        mins  60254;    Therapeutic  Activity:          mins  70712;     Gait Training:           mins  70095;     Ultrasound:          mins  63052;    Electrical Stimulation:         mins  03073 ( );    Untimed:  Electrical Stimulation:         mins  11843 ( );  Mechanical Traction:         mins  69434;     Timed Treatment:   40   mins   Total Treatment:     40   mins  Janet Plaza PTA

## 2021-06-07 ENCOUNTER — TREATMENT (OUTPATIENT)
Dept: PHYSICAL THERAPY | Facility: CLINIC | Age: 56
End: 2021-06-07

## 2021-06-07 DIAGNOSIS — M65.271 CALCIFIC TENDONITIS OF RIGHT FOOT: Primary | ICD-10-CM

## 2021-06-07 DIAGNOSIS — S82.891D CLOSED AVULSION FRACTURE OF RIGHT ANKLE WITH ROUTINE HEALING, SUBSEQUENT ENCOUNTER: ICD-10-CM

## 2021-06-07 PROCEDURE — 97110 THERAPEUTIC EXERCISES: CPT | Performed by: PHYSICAL THERAPIST

## 2021-06-07 PROCEDURE — 97140 MANUAL THERAPY 1/> REGIONS: CPT | Performed by: PHYSICAL THERAPIST

## 2021-06-07 NOTE — PROGRESS NOTES
Patient: Aidee Trinidad   : 1965  Referring practitioner: Rico Knight MD  Date of Initial Visit: Type: THERAPY  Noted: 2021  Today's Date: 2021  Patient seen for 6 sessions           Subjective Evaluation    History of Present Illness    Subjective comment: Pt reports having 6/10 right ankle pain.       Objective   See Exercise, Manual, and Modality Logs for complete treatment.       Assessment & Plan     Assessment  Assessment details: Tx today consisted of there ex with increased reps to 25 followed by stm to right ankle and foot and ice.  Pt demonstrated good effort today with no distress. Pt responded well to added HS and hip abd with no complaints.  Pt reported 4/10 post pain.    Plan  Plan details: Will follow progressing ankle mobility and improved stability.        Visit Diagnoses:    ICD-10-CM ICD-9-CM   1. Calcific tendonitis of right foot  M65.271 727.82   2. Closed avulsion fracture of right ankle with routine healing, subsequent encounter  S82.891D V54.19       Progress strengthening /stabilization /functional activity           Timed:  Manual Therapy:    14     mins  05565;  Therapeutic Exercise:    28     mins  65694;     Neuromuscular Daisy:        mins  01946;    Therapeutic Activity:          mins  67786;     Gait Training:           mins  74247;     Ultrasound:          mins  55555;    Electrical Stimulation:         mins  30150 ( );    Untimed:  Electrical Stimulation:         mins  18439 ( );  Mechanical Traction:         mins  33151;     Timed Treatment:   42   mins   Total Treatment:     48   mins  Zenon Hernandez PT  Physical Therapist

## 2021-06-14 ENCOUNTER — TREATMENT (OUTPATIENT)
Dept: PHYSICAL THERAPY | Facility: CLINIC | Age: 56
End: 2021-06-14

## 2021-06-14 DIAGNOSIS — S82.891D CLOSED AVULSION FRACTURE OF RIGHT ANKLE WITH ROUTINE HEALING, SUBSEQUENT ENCOUNTER: ICD-10-CM

## 2021-06-14 DIAGNOSIS — M65.271 CALCIFIC TENDONITIS OF RIGHT FOOT: Primary | ICD-10-CM

## 2021-06-14 PROCEDURE — 97110 THERAPEUTIC EXERCISES: CPT | Performed by: PHYSICAL THERAPIST

## 2021-06-14 PROCEDURE — 97140 MANUAL THERAPY 1/> REGIONS: CPT | Performed by: PHYSICAL THERAPIST

## 2021-06-14 NOTE — PROGRESS NOTES
Physical Therapy Daily Treatment Note      Patient: Aidee Trinidad   : 1965  Referring practitioner: No ref. provider found  Date of Initial Visit: Type: THERAPY  Noted: 2021  Today's Date: 2021  Patient seen for 7 sessions         Subjective:  Patient states her foot is sore and stiff this evening, w/ reports of 7/10 pain prior to tx. Pt reports no significant change in symptoms w/ therapy.      Objective   See Exercise, Manual, and Modality Logs for complete treatment.       Assessment/Plan:  Patient completed today's session w/ no changes in pain noted following.  Treatment initiated w/ therex as listed f/b manual rx and conclusion of cryotherapy.  Therex performed w/ continued focus on improved right ankle ROM, improved ankle strength, and stability.  Additional exercises and stretches added to program to include gentle gastroc stretch w/ belt, and ankle tband initiated.  Pt observed w/ good tolerance, w/ no complaints stated.  Manual STM performed to R) foot/ ankle to assist w/ reduced edema and improved mobility.  Cryotherapy applied at conclusion.  Pt will be progressed w/ therapy as tolerated.  Will discuss pt's progress w/ supervising therapist, Kelley Matias, PT, DPT.  Continue w/ PT's POC.     Visit Diagnoses:    ICD-10-CM ICD-9-CM   1. Calcific tendonitis of right foot  M65.271 727.82   2. Closed avulsion fracture of right ankle with routine healing, subsequent encounter  S82.891D V54.19       Progress per Plan of Care and Progress strengthening /stabilization /functional activity           Timed:  Manual Therapy:    12     mins  82482;  Therapeutic Exercise:     39    mins  07116;     Neuromuscular Daisy:        mins  88864;    Therapeutic Activity:          mins  94409;     Gait Training:           mins  22589;     Ultrasound:          mins  32902;    Electrical Stimulation:         mins  66325 ( );    Untimed:  Electrical Stimulation:         mins  72996 (  );  Mechanical Traction:         mins  46806;     Timed Treatment:   51   mins   Total Treatment:    57    mins  Saadia Crump. DELMY Enriquez  Physical Therapist

## 2021-06-18 ENCOUNTER — TREATMENT (OUTPATIENT)
Dept: PHYSICAL THERAPY | Facility: CLINIC | Age: 56
End: 2021-06-18

## 2021-06-18 DIAGNOSIS — M65.271 CALCIFIC TENDONITIS OF RIGHT FOOT: Primary | ICD-10-CM

## 2021-06-18 DIAGNOSIS — S82.891D CLOSED AVULSION FRACTURE OF RIGHT ANKLE WITH ROUTINE HEALING, SUBSEQUENT ENCOUNTER: ICD-10-CM

## 2021-06-18 PROCEDURE — 97140 MANUAL THERAPY 1/> REGIONS: CPT | Performed by: PHYSICAL THERAPIST

## 2021-06-18 PROCEDURE — 97110 THERAPEUTIC EXERCISES: CPT | Performed by: PHYSICAL THERAPIST

## 2021-06-18 NOTE — PROGRESS NOTES
Progress Note      Patient: Aidee Trinidad   : 1965  Diagnosis/ICD-10 Code:  Calcific tendonitis of right foot [M65.271]  Referring practitioner: Rico Knight MD  Date of Initial Visit: Type: THERAPY  Noted: 2021  Today's Date: 2021  Patient seen for 8 sessions      Subjective:   Subjective Questionnaire: LEFS: =90% impaired  Clinical Progress: unchanged  Home Program Compliance: Yes    Subjective Evaluation    History of Present Illness    Subjective comment: Patient reports that she feels like she has been gaining mobility in her foot with therapy; she also notes that she feels like she is getting stronger in her leg.  Patient states that her foot feels very stiff, noting it is worse in the morning.  She goes for an MD follow-up on .Pain  Current pain ratin  At best pain ratin  At worst pain ratin         Objective          Active Range of Motion   Left Ankle/Foot   Dorsiflexion (ke): 3 degrees   Plantar flexion: 55 degrees   Inversion: 26 degrees   Eversion: 14 degrees     Right Ankle/Foot   Plantar flexion: 50 degrees   Inversion: 16 degrees   Eversion: 8 degrees     Additional Active Range of Motion Details  Dorsiflexion-lacking 3 degrees from neutral position      Strength/Myotome Testing     Left Hip   Planes of Motion   Flexion: 4-    Right Hip   Planes of Motion   Flexion: 3+    Left Knee   Flexion: 4-  Extension: 4-    Right Knee   Flexion: 4-  Extension: 4-    Left Ankle/Foot   Dorsiflexion: 4+  Plantar flexion: 4+  Inversion: 4+  Eversion: 4    Right Ankle/Foot   Dorsiflexion: 3+  Plantar flexion: 4-  Inversion: 3+  Eversion: 3+      Assessment & Plan     Assessment  Impairments: abnormal gait, abnormal muscle tone, abnormal or restricted ROM, activity intolerance, impaired balance, impaired physical strength, lacks appropriate home exercise program, pain with function, safety issue and weight-bearing intolerance  Assessment details: Patient has been attending  physical therapy for right foot tendonitis and right ankle avulsion fracture; she has attended therapy for a total of 8 sessions.  Patient continues to display decreased right ankle ROM and decreased right ankle strength.  Patient notes elevated pain levels, noting 7/10 pain at worst and 4/10 pain at best.  Patient currently reports a 90% functional mobility impairment, based on the patient's response to the LEFS.  Patient will continue to benefit from skilled PT, so that patient can achieve maximum level of function.     Prognosis: good  Functional Limitations: sleeping, walking, pulling, pushing, uncomfortable because of pain, moving in bed, standing, stooping and unable to perform repetitive tasks  Goals  Plan Goals: SHORT TERM GOALS:     4 weeks  1. Patient will be independent/compliant with HEP.   Met  2.right ankle ROM will improve by at least 5 degrees to allow for greater ease with ADLs.  Ongoing  3. Patient will demonstrate ability to ambulate in the clinic with equal weight shift.  Ongoing  4. Patient will report right ankle pain no greater than 3/10 when performing self-care activities.  Ongoing, progressing-up to 7/10    LONG TERM GOALS:   8 weeks  1. Patient will achieve 5 degrees right ankle dorsiflexion for improved toe clearance during swing phase.  Ongoing  2. Patient to demonstrate ability to ambulate 10 minutes with normal gait pattern with pain no greater than 3/10 for improved community involvement.  Ongoing  3. Patient will report at least 20/80 on LEFS to show improved functional mobility.  Ongoing  4. Right ankle strength will improve to at least 4/5 to prevent reinjury.  Ongoing, progressing    Plan  Therapy options: will be seen for skilled physical therapy services  Planned modality interventions: cryotherapy, electrical stimulation/Russian stimulation, TENS, ultrasound, thermotherapy (hydrocollator packs), dry needling and iontophoresis  Planned therapy interventions: manual therapy, ADL  retraining, balance/weight-bearing training, neuromuscular re-education, body mechanics training, postural training, soft tissue mobilization, flexibility, functional ROM exercises, strengthening, gait training, stretching, home exercise program, therapeutic activities, IADL retraining, transfer training and joint mobilization  Duration in visits: 12  Duration in weeks: 4  Treatment plan discussed with: patient  Plan details: Re-evaluation   63428    Therapeutic exercise  15011  Therapeutic activity    43209  Neuromuscular re-education   35205  Manual therapy   70519  Gait training  90912    Unattended e-stim (Medicaid/Medicare)     Moist heat/cryotherapy 54283   Ultrasound   21665  Iontophoresis   32902            Visit Diagnoses:    ICD-10-CM ICD-9-CM   1. Calcific tendonitis of right foot  M65.271 727.82   2. Closed avulsion fracture of right ankle with routine healing, subsequent encounter  S82.891D V54.19       Progress toward previous goals: Ongoing, progressing      Recommendations: Continue as planned  Timeframe: 1 month  Prognosis to achieve goals: good    PT Signature: Kelley Matias, PT      Based upon review of the patient's progress and continued therapy plan, it is my medical opinion that Aidee Trinidad should continue physical therapy treatment at [facility] PHYSICAL TH[facility]AP[facility]  [address]  [facility] [place] [address] [phone].    Signature: __________________________________  Rico Knight MD    Timed:  Manual Therapy:    13     mins  02963;  Therapeutic Exercise:  36       mins  08276;     Neuromuscular Daisy:        mins  07942;    Therapeutic Activity:          mins  84483;     Gait Training:           mins  40539;     Ultrasound:          mins  72784;    Electrical Stimulation:         mins  50854 ( );    Untimed:  Electrical Stimulation:         mins  20295 ( );  Mechanical Traction:         mins  05680;     Timed Treatment:    49  mins   Total Treatment:     49   mins

## 2021-06-28 ENCOUNTER — HOSPITAL ENCOUNTER (OUTPATIENT)
Dept: HOSPITAL 79 - KOH-I | Age: 56
End: 2021-06-28
Attending: PODIATRIST
Payer: COMMERCIAL

## 2021-06-28 DIAGNOSIS — S82.891A: Primary | ICD-10-CM

## 2021-07-07 ENCOUNTER — TREATMENT (OUTPATIENT)
Dept: PHYSICAL THERAPY | Facility: CLINIC | Age: 56
End: 2021-07-07

## 2021-07-07 DIAGNOSIS — M65.271 CALCIFIC TENDONITIS OF RIGHT FOOT: Primary | ICD-10-CM

## 2021-07-07 DIAGNOSIS — S82.891D CLOSED AVULSION FRACTURE OF RIGHT ANKLE WITH ROUTINE HEALING, SUBSEQUENT ENCOUNTER: ICD-10-CM

## 2021-07-07 PROCEDURE — 97140 MANUAL THERAPY 1/> REGIONS: CPT | Performed by: PHYSICAL THERAPIST

## 2021-07-07 PROCEDURE — 97035 APP MDLTY 1+ULTRASOUND EA 15: CPT | Performed by: PHYSICAL THERAPIST

## 2021-07-07 PROCEDURE — 97110 THERAPEUTIC EXERCISES: CPT | Performed by: PHYSICAL THERAPIST

## 2021-07-07 NOTE — PROGRESS NOTES
Patient: Aidee Trinidad   : 1965  Referring practitioner: Rico Knight MD  Date of Initial Visit: Type: THERAPY  Noted: 2021  Today's Date: 2021  Patient seen for 9 sessions           Subjective Evaluation    History of Present Illness    Subjective comment: Pt reports having 7/10 pain today.  Pt reports her ankle has healed and her pain may be from gout.  The patient also received two cortisone shots to the foot.        Objective   See Exercise, Manual, and Modality Logs for complete treatment.       Assessment & Plan     Assessment  Assessment details: Tx today consisted of there ex for improved ankle mobility and stability followed by manual stm to foot for decreased edema and pain and ended with US for decreased edema.  Pt responded well to tx with reports of 5/10 post pain.    Plan  Plan details: Will follow progressing ankle mobility and decreased pain.        Visit Diagnoses:    ICD-10-CM ICD-9-CM   1. Calcific tendonitis of right foot  M65.271 727.82   2. Closed avulsion fracture of right ankle with routine healing, subsequent encounter  S82.891D V54.19       Progress strengthening /stabilization /functional activity           Timed:  Manual Therapy:    14     mins  16029;  Therapeutic Exercise:    16     mins  48877;     Neuromuscular Daisy:        mins  86623;    Therapeutic Activity:          mins  81315;     Gait Training:           mins  64209;     Ultrasound:     8     mins  46501;    Electrical Stimulation:         mins  07370 ( );    Untimed:  Electrical Stimulation:         mins  12704 ( );  Mechanical Traction:         mins  64612;     Timed Treatment:   38   mins   Total Treatment:     38   mins  Zenon Hernandez PT  Physical Therapist

## 2021-07-15 ENCOUNTER — TREATMENT (OUTPATIENT)
Dept: PHYSICAL THERAPY | Facility: CLINIC | Age: 56
End: 2021-07-15

## 2021-07-15 DIAGNOSIS — S82.891D CLOSED AVULSION FRACTURE OF RIGHT ANKLE WITH ROUTINE HEALING, SUBSEQUENT ENCOUNTER: ICD-10-CM

## 2021-07-15 DIAGNOSIS — M65.271 CALCIFIC TENDONITIS OF RIGHT FOOT: Primary | ICD-10-CM

## 2021-07-15 PROCEDURE — 97110 THERAPEUTIC EXERCISES: CPT | Performed by: PHYSICAL THERAPIST

## 2021-07-15 PROCEDURE — 97140 MANUAL THERAPY 1/> REGIONS: CPT | Performed by: PHYSICAL THERAPIST

## 2021-07-15 PROCEDURE — 97035 APP MDLTY 1+ULTRASOUND EA 15: CPT | Performed by: PHYSICAL THERAPIST

## 2021-07-15 NOTE — PROGRESS NOTES
Physical Therapy Daily Treatment Note      Patient: Aidee Trinidad   : 1965  Referring practitioner: Rico Knight MD  Date of Initial Visit: Type: THERAPY  Noted: 2021  Today's Date: 7/15/2021  Patient seen for 10 sessions         Aidee Trinidad reports that she is having 4/10 pain in her right ankle. Patient states that is working on her home exercises.      Objective   See Exercise, Manual, and Modality Logs for complete treatment.       Assessment/Plan  Patient tolerated treatment session well with rest breaks taken as needed by the patient. Educated patient to perform therex per her tolerance, patient verbalized understanding. No adverse reactions with modalities or treatment session. Swelling noted in the right ankle; STM/retrograde performed to help decrease swelling in the area. Continue per PT's POC, progress exercises per the patient's tolerance.    Visit Diagnoses:    ICD-10-CM ICD-9-CM   1. Calcific tendonitis of right foot  M65.271 727.82   2. Closed avulsion fracture of right ankle with routine healing, subsequent encounter  S82.891D V54.19       Progress strengthening /stabilization /functional activity           Timed:  Manual Therapy:    15     mins  21106;  Therapeutic Exercise:    17     mins  02702;     Neuromuscular Daisy:        mins  48657;    Therapeutic Activity:          mins  26611;     Gait Training:           mins  65220;     Ultrasound:     8     mins  09542;    Electrical Stimulation:         mins  19349 ( );    Untimed:  Electrical Stimulation:         mins  15084 ( );  Mechanical Traction:         mins  63739;     Timed Treatment:   40   mins   Total Treatment:     40   mins  Janet Plaza PTA

## 2021-07-17 ENCOUNTER — APPOINTMENT (OUTPATIENT)
Dept: GENERAL RADIOLOGY | Facility: HOSPITAL | Age: 56
End: 2021-07-17

## 2021-07-17 ENCOUNTER — HOSPITAL ENCOUNTER (INPATIENT)
Facility: HOSPITAL | Age: 56
LOS: 19 days | Discharge: HOME HEALTH | End: 2021-08-05
Attending: FAMILY MEDICINE | Admitting: INTERNAL MEDICINE

## 2021-07-17 DIAGNOSIS — S72.001A CLOSED FRACTURE OF RIGHT HIP, INITIAL ENCOUNTER (HCC): Primary | ICD-10-CM

## 2021-07-17 DIAGNOSIS — R53.81 DEBILITY: ICD-10-CM

## 2021-07-17 PROBLEM — K76.0 FATTY LIVER: Chronic | Status: ACTIVE | Noted: 2021-07-17

## 2021-07-17 PROBLEM — I10 ESSENTIAL HYPERTENSION: Chronic | Status: ACTIVE | Noted: 2021-07-17

## 2021-07-17 PROBLEM — I25.10 CAD (CORONARY ARTERY DISEASE): Chronic | Status: ACTIVE | Noted: 2021-07-17

## 2021-07-17 LAB
ALBUMIN SERPL-MCNC: 3.74 G/DL (ref 3.5–5.2)
ALBUMIN/GLOB SERPL: 1 G/DL
ALP SERPL-CCNC: 188 U/L (ref 39–117)
ALT SERPL W P-5'-P-CCNC: 53 U/L (ref 1–33)
ANION GAP SERPL CALCULATED.3IONS-SCNC: 7.2 MMOL/L (ref 5–15)
AST SERPL-CCNC: 63 U/L (ref 1–32)
BASOPHILS # BLD AUTO: 0.09 10*3/MM3 (ref 0–0.2)
BASOPHILS NFR BLD AUTO: 1.2 % (ref 0–1.5)
BILIRUB SERPL-MCNC: 1.1 MG/DL (ref 0–1.2)
BUN SERPL-MCNC: 8 MG/DL (ref 6–20)
BUN/CREAT SERPL: 10.8 (ref 7–25)
CALCIUM SPEC-SCNC: 9.5 MG/DL (ref 8.6–10.5)
CHLORIDE SERPL-SCNC: 104 MMOL/L (ref 98–107)
CO2 SERPL-SCNC: 28.8 MMOL/L (ref 22–29)
CREAT SERPL-MCNC: 0.74 MG/DL (ref 0.57–1)
DEPRECATED RDW RBC AUTO: 50.1 FL (ref 37–54)
EOSINOPHIL # BLD AUTO: 0.23 10*3/MM3 (ref 0–0.4)
EOSINOPHIL NFR BLD AUTO: 3.1 % (ref 0.3–6.2)
ERYTHROCYTE [DISTWIDTH] IN BLOOD BY AUTOMATED COUNT: 14.2 % (ref 12.3–15.4)
ETHANOL BLD-MCNC: <10 MG/DL (ref 0–10)
ETHANOL UR QL: <0.01 %
FLUAV RNA RESP QL NAA+PROBE: NOT DETECTED
FLUBV RNA RESP QL NAA+PROBE: NOT DETECTED
GFR SERPL CREATININE-BSD FRML MDRD: 81 ML/MIN/1.73
GLOBULIN UR ELPH-MCNC: 3.8 GM/DL
GLUCOSE BLDC GLUCOMTR-MCNC: 119 MG/DL (ref 70–130)
GLUCOSE SERPL-MCNC: 84 MG/DL (ref 65–99)
HCT VFR BLD AUTO: 42 % (ref 34–46.6)
HGB BLD-MCNC: 13.8 G/DL (ref 12–15.9)
HOLD SPECIMEN: NORMAL
HOLD SPECIMEN: NORMAL
IMM GRANULOCYTES # BLD AUTO: 0.04 10*3/MM3 (ref 0–0.05)
IMM GRANULOCYTES NFR BLD AUTO: 0.5 % (ref 0–0.5)
LYMPHOCYTES # BLD AUTO: 2.37 10*3/MM3 (ref 0.7–3.1)
LYMPHOCYTES NFR BLD AUTO: 31.7 % (ref 19.6–45.3)
MCH RBC QN AUTO: 31.6 PG (ref 26.6–33)
MCHC RBC AUTO-ENTMCNC: 32.9 G/DL (ref 31.5–35.7)
MCV RBC AUTO: 96.1 FL (ref 79–97)
MONOCYTES # BLD AUTO: 0.57 10*3/MM3 (ref 0.1–0.9)
MONOCYTES NFR BLD AUTO: 7.6 % (ref 5–12)
NEUTROPHILS NFR BLD AUTO: 4.18 10*3/MM3 (ref 1.7–7)
NEUTROPHILS NFR BLD AUTO: 55.9 % (ref 42.7–76)
NRBC BLD AUTO-RTO: 0 /100 WBC (ref 0–0.2)
NT-PROBNP SERPL-MCNC: 130.4 PG/ML (ref 0–900)
PLATELET # BLD AUTO: 149 10*3/MM3 (ref 140–450)
PMV BLD AUTO: 11.2 FL (ref 6–12)
POTASSIUM SERPL-SCNC: 4.3 MMOL/L (ref 3.5–5.2)
PROT SERPL-MCNC: 7.5 G/DL (ref 6–8.5)
RBC # BLD AUTO: 4.37 10*6/MM3 (ref 3.77–5.28)
SARS-COV-2 RNA RESP QL NAA+PROBE: NOT DETECTED
SODIUM SERPL-SCNC: 140 MMOL/L (ref 136–145)
TROPONIN T SERPL-MCNC: <0.01 NG/ML (ref 0–0.03)
WBC # BLD AUTO: 7.48 10*3/MM3 (ref 3.4–10.8)
WHOLE BLOOD HOLD SPECIMEN: NORMAL

## 2021-07-17 PROCEDURE — 94799 UNLISTED PULMONARY SVC/PX: CPT

## 2021-07-17 PROCEDURE — 73502 X-RAY EXAM HIP UNI 2-3 VIEWS: CPT

## 2021-07-17 PROCEDURE — 85025 COMPLETE CBC W/AUTO DIFF WBC: CPT | Performed by: PHYSICIAN ASSISTANT

## 2021-07-17 PROCEDURE — 83880 ASSAY OF NATRIURETIC PEPTIDE: CPT | Performed by: PHYSICIAN ASSISTANT

## 2021-07-17 PROCEDURE — 82077 ASSAY SPEC XCP UR&BREATH IA: CPT | Performed by: PHYSICIAN ASSISTANT

## 2021-07-17 PROCEDURE — 87636 SARSCOV2 & INF A&B AMP PRB: CPT | Performed by: PHYSICIAN ASSISTANT

## 2021-07-17 PROCEDURE — 80074 ACUTE HEPATITIS PANEL: CPT | Performed by: PHYSICIAN ASSISTANT

## 2021-07-17 PROCEDURE — 99223 1ST HOSP IP/OBS HIGH 75: CPT | Performed by: PHYSICIAN ASSISTANT

## 2021-07-17 PROCEDURE — 82962 GLUCOSE BLOOD TEST: CPT

## 2021-07-17 PROCEDURE — 93005 ELECTROCARDIOGRAM TRACING: CPT | Performed by: PHYSICIAN ASSISTANT

## 2021-07-17 PROCEDURE — 84484 ASSAY OF TROPONIN QUANT: CPT | Performed by: PHYSICIAN ASSISTANT

## 2021-07-17 PROCEDURE — 80053 COMPREHEN METABOLIC PANEL: CPT | Performed by: PHYSICIAN ASSISTANT

## 2021-07-17 PROCEDURE — 99284 EMERGENCY DEPT VISIT MOD MDM: CPT

## 2021-07-17 PROCEDURE — 93010 ELECTROCARDIOGRAM REPORT: CPT | Performed by: SPECIALIST

## 2021-07-17 PROCEDURE — 25010000002 MORPHINE PER 10 MG: Performed by: INTERNAL MEDICINE

## 2021-07-17 PROCEDURE — 25010000002 MORPHINE PER 10 MG: Performed by: STUDENT IN AN ORGANIZED HEALTH CARE EDUCATION/TRAINING PROGRAM

## 2021-07-17 PROCEDURE — 71045 X-RAY EXAM CHEST 1 VIEW: CPT

## 2021-07-17 PROCEDURE — 25010000002 ONDANSETRON PER 1 MG: Performed by: STUDENT IN AN ORGANIZED HEALTH CARE EDUCATION/TRAINING PROGRAM

## 2021-07-17 RX ORDER — SODIUM CHLORIDE 0.9 % (FLUSH) 0.9 %
10 SYRINGE (ML) INJECTION EVERY 12 HOURS SCHEDULED
Status: DISCONTINUED | OUTPATIENT
Start: 2021-07-17 | End: 2021-08-05 | Stop reason: HOSPADM

## 2021-07-17 RX ORDER — CHOLECALCIFEROL (VITAMIN D3) 125 MCG
5 CAPSULE ORAL NIGHTLY PRN
Status: DISCONTINUED | OUTPATIENT
Start: 2021-07-17 | End: 2021-07-26

## 2021-07-17 RX ORDER — DEXTROSE MONOHYDRATE 25 G/50ML
25 INJECTION, SOLUTION INTRAVENOUS
Status: DISCONTINUED | OUTPATIENT
Start: 2021-07-17 | End: 2021-08-05 | Stop reason: HOSPADM

## 2021-07-17 RX ORDER — HYDROXYZINE HYDROCHLORIDE 25 MG/1
25 TABLET, FILM COATED ORAL 3 TIMES DAILY PRN
Status: DISCONTINUED | OUTPATIENT
Start: 2021-07-17 | End: 2021-08-05 | Stop reason: HOSPADM

## 2021-07-17 RX ORDER — SODIUM CHLORIDE 0.9 % (FLUSH) 0.9 %
10 SYRINGE (ML) INJECTION AS NEEDED
Status: DISCONTINUED | OUTPATIENT
Start: 2021-07-17 | End: 2021-08-05 | Stop reason: HOSPADM

## 2021-07-17 RX ORDER — NICOTINE POLACRILEX 4 MG
15 LOZENGE BUCCAL
Status: DISCONTINUED | OUTPATIENT
Start: 2021-07-17 | End: 2021-07-29

## 2021-07-17 RX ORDER — ACETAMINOPHEN 325 MG/1
650 TABLET ORAL EVERY 6 HOURS PRN
Status: DISCONTINUED | OUTPATIENT
Start: 2021-07-17 | End: 2021-07-20

## 2021-07-17 RX ORDER — MORPHINE SULFATE 2 MG/ML
2 INJECTION, SOLUTION INTRAMUSCULAR; INTRAVENOUS
Status: DISPENSED | OUTPATIENT
Start: 2021-07-17 | End: 2021-07-24

## 2021-07-17 RX ORDER — PROCHLORPERAZINE EDISYLATE 5 MG/ML
5 INJECTION INTRAMUSCULAR; INTRAVENOUS EVERY 6 HOURS PRN
Status: DISCONTINUED | OUTPATIENT
Start: 2021-07-17 | End: 2021-08-05 | Stop reason: HOSPADM

## 2021-07-17 RX ORDER — PANTOPRAZOLE SODIUM 40 MG/1
40 TABLET, DELAYED RELEASE ORAL
Status: DISCONTINUED | OUTPATIENT
Start: 2021-07-18 | End: 2021-08-05 | Stop reason: HOSPADM

## 2021-07-17 RX ORDER — NITROGLYCERIN 0.4 MG/1
0.4 TABLET SUBLINGUAL
Status: DISCONTINUED | OUTPATIENT
Start: 2021-07-17 | End: 2021-08-05 | Stop reason: HOSPADM

## 2021-07-17 RX ORDER — SODIUM CHLORIDE 9 MG/ML
75 INJECTION, SOLUTION INTRAVENOUS CONTINUOUS
Status: DISCONTINUED | OUTPATIENT
Start: 2021-07-17 | End: 2021-07-21

## 2021-07-17 RX ORDER — ONDANSETRON 2 MG/ML
4 INJECTION INTRAMUSCULAR; INTRAVENOUS ONCE
Status: COMPLETED | OUTPATIENT
Start: 2021-07-17 | End: 2021-07-17

## 2021-07-17 RX ORDER — DEXTROSE MONOHYDRATE 25 G/50ML
25 INJECTION, SOLUTION INTRAVENOUS
Status: DISCONTINUED | OUTPATIENT
Start: 2021-07-17 | End: 2021-07-29

## 2021-07-17 RX ORDER — MORPHINE SULFATE 2 MG/ML
2 INJECTION, SOLUTION INTRAMUSCULAR; INTRAVENOUS ONCE
Status: COMPLETED | OUTPATIENT
Start: 2021-07-17 | End: 2021-07-17

## 2021-07-17 RX ORDER — CALCIUM CARBONATE 200(500)MG
2 TABLET,CHEWABLE ORAL 3 TIMES DAILY PRN
Status: DISCONTINUED | OUTPATIENT
Start: 2021-07-17 | End: 2021-08-05 | Stop reason: HOSPADM

## 2021-07-17 RX ORDER — NICOTINE POLACRILEX 4 MG
15 LOZENGE BUCCAL
Status: DISCONTINUED | OUTPATIENT
Start: 2021-07-17 | End: 2021-08-05 | Stop reason: HOSPADM

## 2021-07-17 RX ADMIN — ONDANSETRON 4 MG: 2 INJECTION INTRAMUSCULAR; INTRAVENOUS at 19:03

## 2021-07-17 RX ADMIN — MORPHINE SULFATE 2 MG: 2 INJECTION, SOLUTION INTRAMUSCULAR; INTRAVENOUS at 23:24

## 2021-07-17 RX ADMIN — SODIUM CHLORIDE 50 ML/HR: 9 INJECTION, SOLUTION INTRAVENOUS at 23:25

## 2021-07-17 RX ADMIN — MORPHINE SULFATE 2 MG: 2 INJECTION, SOLUTION INTRAMUSCULAR; INTRAVENOUS at 19:04

## 2021-07-17 RX ADMIN — SODIUM CHLORIDE, PRESERVATIVE FREE 10 ML: 5 INJECTION INTRAVENOUS at 23:26

## 2021-07-18 ENCOUNTER — APPOINTMENT (OUTPATIENT)
Dept: GENERAL RADIOLOGY | Facility: HOSPITAL | Age: 56
End: 2021-07-18

## 2021-07-18 ENCOUNTER — ANESTHESIA (OUTPATIENT)
Dept: PERIOP | Facility: HOSPITAL | Age: 56
End: 2021-07-18

## 2021-07-18 ENCOUNTER — ANESTHESIA EVENT (OUTPATIENT)
Dept: PERIOP | Facility: HOSPITAL | Age: 56
End: 2021-07-18

## 2021-07-18 LAB
25(OH)D3 SERPL-MCNC: 41.2 NG/ML (ref 30–100)
ABO GROUP BLD: NORMAL
ABO GROUP BLD: NORMAL
ALBUMIN SERPL-MCNC: 3.58 G/DL (ref 3.5–5.2)
ALBUMIN/GLOB SERPL: 1 G/DL
ALP SERPL-CCNC: 167 U/L (ref 39–117)
ALT SERPL W P-5'-P-CCNC: 50 U/L (ref 1–33)
ANION GAP SERPL CALCULATED.3IONS-SCNC: 11.1 MMOL/L (ref 5–15)
AST SERPL-CCNC: 64 U/L (ref 1–32)
BACTERIA UR QL AUTO: ABNORMAL /HPF
BASOPHILS # BLD AUTO: 0.09 10*3/MM3 (ref 0–0.2)
BASOPHILS NFR BLD AUTO: 1 % (ref 0–1.5)
BILIRUB SERPL-MCNC: 1.2 MG/DL (ref 0–1.2)
BILIRUB UR QL STRIP: ABNORMAL
BLD GP AB SCN SERPL QL: NEGATIVE
BUN SERPL-MCNC: 10 MG/DL (ref 6–20)
BUN/CREAT SERPL: 11.6 (ref 7–25)
CALCIUM SPEC-SCNC: 9.2 MG/DL (ref 8.6–10.5)
CHLORIDE SERPL-SCNC: 104 MMOL/L (ref 98–107)
CLARITY UR: CLEAR
CO2 SERPL-SCNC: 26.9 MMOL/L (ref 22–29)
COLOR UR: ABNORMAL
CREAT SERPL-MCNC: 0.86 MG/DL (ref 0.57–1)
DEPRECATED RDW RBC AUTO: 51.9 FL (ref 37–54)
EOSINOPHIL # BLD AUTO: 0.15 10*3/MM3 (ref 0–0.4)
EOSINOPHIL NFR BLD AUTO: 1.6 % (ref 0.3–6.2)
ERYTHROCYTE [DISTWIDTH] IN BLOOD BY AUTOMATED COUNT: 14.3 % (ref 12.3–15.4)
GFR SERPL CREATININE-BSD FRML MDRD: 68 ML/MIN/1.73
GLOBULIN UR ELPH-MCNC: 3.7 GM/DL
GLUCOSE BLDC GLUCOMTR-MCNC: 114 MG/DL (ref 70–130)
GLUCOSE BLDC GLUCOMTR-MCNC: 114 MG/DL (ref 70–130)
GLUCOSE BLDC GLUCOMTR-MCNC: 115 MG/DL (ref 70–130)
GLUCOSE BLDC GLUCOMTR-MCNC: 133 MG/DL (ref 70–130)
GLUCOSE BLDC GLUCOMTR-MCNC: 147 MG/DL (ref 70–130)
GLUCOSE BLDC GLUCOMTR-MCNC: 164 MG/DL (ref 70–130)
GLUCOSE SERPL-MCNC: 156 MG/DL (ref 65–99)
GLUCOSE UR STRIP-MCNC: NEGATIVE MG/DL
HAV IGM SERPL QL IA: ABNORMAL
HBA1C MFR BLD: 6.1 % (ref 4.8–5.6)
HBV CORE IGM SERPL QL IA: REACTIVE
HBV SURFACE AG SERPL QL IA: ABNORMAL
HBV SURFACE AG SERPL QL IA: NORMAL
HCT VFR BLD AUTO: 40.5 % (ref 34–46.6)
HCV AB SER DONR QL: ABNORMAL
HGB BLD-MCNC: 13.1 G/DL (ref 12–15.9)
HGB UR QL STRIP.AUTO: NEGATIVE
HYALINE CASTS UR QL AUTO: ABNORMAL /LPF
IMM GRANULOCYTES # BLD AUTO: 0.02 10*3/MM3 (ref 0–0.05)
IMM GRANULOCYTES NFR BLD AUTO: 0.2 % (ref 0–0.5)
KETONES UR QL STRIP: ABNORMAL
LEUKOCYTE ESTERASE UR QL STRIP.AUTO: ABNORMAL
LYMPHOCYTES # BLD AUTO: 2.3 10*3/MM3 (ref 0.7–3.1)
LYMPHOCYTES NFR BLD AUTO: 24.9 % (ref 19.6–45.3)
MCH RBC QN AUTO: 31.5 PG (ref 26.6–33)
MCHC RBC AUTO-ENTMCNC: 32.3 G/DL (ref 31.5–35.7)
MCV RBC AUTO: 97.4 FL (ref 79–97)
MONOCYTES # BLD AUTO: 0.68 10*3/MM3 (ref 0.1–0.9)
MONOCYTES NFR BLD AUTO: 7.4 % (ref 5–12)
NEUTROPHILS NFR BLD AUTO: 5.99 10*3/MM3 (ref 1.7–7)
NEUTROPHILS NFR BLD AUTO: 64.9 % (ref 42.7–76)
NITRITE UR QL STRIP: NEGATIVE
NRBC BLD AUTO-RTO: 0 /100 WBC (ref 0–0.2)
PH UR STRIP.AUTO: 5.5 [PH] (ref 5–8)
PLATELET # BLD AUTO: 143 10*3/MM3 (ref 140–450)
PMV BLD AUTO: 10.7 FL (ref 6–12)
POTASSIUM SERPL-SCNC: 4.1 MMOL/L (ref 3.5–5.2)
PROT SERPL-MCNC: 7.3 G/DL (ref 6–8.5)
PROT UR QL STRIP: ABNORMAL
QT INTERVAL: 406 MS
QTC INTERVAL: 450 MS
RBC # BLD AUTO: 4.16 10*6/MM3 (ref 3.77–5.28)
RBC # UR: ABNORMAL /HPF
REF LAB TEST METHOD: ABNORMAL
RH BLD: NEGATIVE
RH BLD: NEGATIVE
SODIUM SERPL-SCNC: 142 MMOL/L (ref 136–145)
SP GR UR STRIP: >=1.03 (ref 1–1.03)
SQUAMOUS #/AREA URNS HPF: ABNORMAL /HPF
T&S EXPIRATION DATE: NORMAL
TROPONIN T SERPL-MCNC: <0.01 NG/ML (ref 0–0.03)
TROPONIN T SERPL-MCNC: <0.01 NG/ML (ref 0–0.03)
TSH SERPL DL<=0.05 MIU/L-ACNC: 2.28 UIU/ML (ref 0.27–4.2)
UROBILINOGEN UR QL STRIP: ABNORMAL
VIT B12 BLD-MCNC: 591 PG/ML (ref 211–946)
WBC # BLD AUTO: 9.23 10*3/MM3 (ref 3.4–10.8)
WBC UR QL AUTO: ABNORMAL /HPF

## 2021-07-18 PROCEDURE — 25010000002 MORPHINE PER 10 MG: Performed by: INTERNAL MEDICINE

## 2021-07-18 PROCEDURE — 25010000002 ONDANSETRON PER 1 MG: Performed by: NURSE ANESTHETIST, CERTIFIED REGISTERED

## 2021-07-18 PROCEDURE — 84484 ASSAY OF TROPONIN QUANT: CPT | Performed by: PHYSICIAN ASSISTANT

## 2021-07-18 PROCEDURE — 86901 BLOOD TYPING SEROLOGIC RH(D): CPT

## 2021-07-18 PROCEDURE — 87340 HEPATITIS B SURFACE AG IA: CPT | Performed by: INTERNAL MEDICINE

## 2021-07-18 PROCEDURE — 86901 BLOOD TYPING SEROLOGIC RH(D): CPT | Performed by: NURSE PRACTITIONER

## 2021-07-18 PROCEDURE — 0QH606Z INSERTION OF INTRAMEDULLARY INTERNAL FIXATION DEVICE INTO RIGHT UPPER FEMUR, OPEN APPROACH: ICD-10-PCS | Performed by: ORTHOPAEDIC SURGERY

## 2021-07-18 PROCEDURE — 99232 SBSQ HOSP IP/OBS MODERATE 35: CPT | Performed by: STUDENT IN AN ORGANIZED HEALTH CARE EDUCATION/TRAINING PROGRAM

## 2021-07-18 PROCEDURE — C1713 ANCHOR/SCREW BN/BN,TIS/BN: HCPCS | Performed by: ORTHOPAEDIC SURGERY

## 2021-07-18 PROCEDURE — 25010000003 CEFAZOLIN SODIUM-DEXTROSE 2-3 GM-%(50ML) RECONSTITUTED SOLUTION: Performed by: NURSE ANESTHETIST, CERTIFIED REGISTERED

## 2021-07-18 PROCEDURE — 83036 HEMOGLOBIN GLYCOSYLATED A1C: CPT | Performed by: PHYSICIAN ASSISTANT

## 2021-07-18 PROCEDURE — 82962 GLUCOSE BLOOD TEST: CPT

## 2021-07-18 PROCEDURE — 87350 HEPATITIS BE AG IA: CPT | Performed by: INTERNAL MEDICINE

## 2021-07-18 PROCEDURE — 25010000002 PROPOFOL 10 MG/ML EMULSION: Performed by: NURSE ANESTHETIST, CERTIFIED REGISTERED

## 2021-07-18 PROCEDURE — 82306 VITAMIN D 25 HYDROXY: CPT | Performed by: INTERNAL MEDICINE

## 2021-07-18 PROCEDURE — 25010000002 PROCHLORPERAZINE 10 MG/2ML SOLUTION: Performed by: ORTHOPAEDIC SURGERY

## 2021-07-18 PROCEDURE — 25010000002 HYDROMORPHONE PER 4 MG: Performed by: ORTHOPAEDIC SURGERY

## 2021-07-18 PROCEDURE — 86850 RBC ANTIBODY SCREEN: CPT | Performed by: NURSE PRACTITIONER

## 2021-07-18 PROCEDURE — 25010000002 NEOSTIGMINE 10 MG/10ML SOLUTION: Performed by: NURSE ANESTHETIST, CERTIFIED REGISTERED

## 2021-07-18 PROCEDURE — 80053 COMPREHEN METABOLIC PANEL: CPT | Performed by: INTERNAL MEDICINE

## 2021-07-18 PROCEDURE — 86707 HEPATITIS BE ANTIBODY: CPT | Performed by: INTERNAL MEDICINE

## 2021-07-18 PROCEDURE — 25010000002 FENTANYL CITRATE (PF) 50 MCG/ML SOLUTION: Performed by: NURSE ANESTHETIST, CERTIFIED REGISTERED

## 2021-07-18 PROCEDURE — 76000 FLUOROSCOPY <1 HR PHYS/QHP: CPT

## 2021-07-18 PROCEDURE — 25010000003 CEFAZOLIN PER 500 MG: Performed by: ORTHOPAEDIC SURGERY

## 2021-07-18 PROCEDURE — 85025 COMPLETE CBC W/AUTO DIFF WBC: CPT | Performed by: INTERNAL MEDICINE

## 2021-07-18 PROCEDURE — 82607 VITAMIN B-12: CPT | Performed by: INTERNAL MEDICINE

## 2021-07-18 PROCEDURE — 86900 BLOOD TYPING SEROLOGIC ABO: CPT

## 2021-07-18 PROCEDURE — 84443 ASSAY THYROID STIM HORMONE: CPT | Performed by: PHYSICIAN ASSISTANT

## 2021-07-18 PROCEDURE — 25010000002 HYDROMORPHONE PER 4 MG: Performed by: STUDENT IN AN ORGANIZED HEALTH CARE EDUCATION/TRAINING PROGRAM

## 2021-07-18 PROCEDURE — 94799 UNLISTED PULMONARY SVC/PX: CPT

## 2021-07-18 PROCEDURE — 76000 FLUOROSCOPY <1 HR PHYS/QHP: CPT | Performed by: RADIOLOGY

## 2021-07-18 PROCEDURE — 86900 BLOOD TYPING SEROLOGIC ABO: CPT | Performed by: NURSE PRACTITIONER

## 2021-07-18 PROCEDURE — 81001 URINALYSIS AUTO W/SCOPE: CPT | Performed by: INTERNAL MEDICINE

## 2021-07-18 DEVICE — NAIL FEM TFN ADV PROX 125D SHT 10X170MM STRL: Type: IMPLANTABLE DEVICE | Site: TROCHANTER | Status: FUNCTIONAL

## 2021-07-18 DEVICE — BLD FEM FIX HELI TFN ADV PERF 85MM STRL: Type: IMPLANTABLE DEVICE | Site: TROCHANTER | Status: FUNCTIONAL

## 2021-07-18 DEVICE — SCRW LK STRDRV TI 5X34MM STRL: Type: IMPLANTABLE DEVICE | Status: FUNCTIONAL

## 2021-07-18 RX ORDER — POTASSIUM CHLORIDE 20 MEQ/1
20 TABLET, EXTENDED RELEASE ORAL 2 TIMES DAILY
Status: ON HOLD | COMMUNITY
End: 2021-08-05 | Stop reason: SDUPTHER

## 2021-07-18 RX ORDER — LEVOTHYROXINE SODIUM 0.03 MG/1
25 TABLET ORAL DAILY
Status: DISCONTINUED | OUTPATIENT
Start: 2021-07-18 | End: 2021-08-05 | Stop reason: HOSPADM

## 2021-07-18 RX ORDER — CEFAZOLIN SODIUM 2 G/50ML
2 SOLUTION INTRAVENOUS ONCE
Status: DISCONTINUED | OUTPATIENT
Start: 2021-07-18 | End: 2021-07-18 | Stop reason: HOSPADM

## 2021-07-18 RX ORDER — GABAPENTIN 400 MG/1
400 CAPSULE ORAL 2 TIMES DAILY PRN
Status: ON HOLD | COMMUNITY

## 2021-07-18 RX ORDER — IPRATROPIUM BROMIDE AND ALBUTEROL SULFATE 2.5; .5 MG/3ML; MG/3ML
3 SOLUTION RESPIRATORY (INHALATION) ONCE AS NEEDED
Status: DISCONTINUED | OUTPATIENT
Start: 2021-07-18 | End: 2021-07-18 | Stop reason: HOSPADM

## 2021-07-18 RX ORDER — HYDROCODONE BITARTRATE AND ACETAMINOPHEN 5; 325 MG/1; MG/1
1 TABLET ORAL 2 TIMES DAILY PRN
Status: DISCONTINUED | OUTPATIENT
Start: 2021-07-18 | End: 2021-08-05 | Stop reason: HOSPADM

## 2021-07-18 RX ORDER — NEOSTIGMINE METHYLSULFATE 1 MG/ML
INJECTION, SOLUTION INTRAVENOUS AS NEEDED
Status: DISCONTINUED | OUTPATIENT
Start: 2021-07-18 | End: 2021-07-18 | Stop reason: SURG

## 2021-07-18 RX ORDER — CETIRIZINE HYDROCHLORIDE 10 MG/1
10 TABLET ORAL DAILY
Status: DISCONTINUED | OUTPATIENT
Start: 2021-07-18 | End: 2021-08-05 | Stop reason: HOSPADM

## 2021-07-18 RX ORDER — FAMOTIDINE 10 MG/ML
INJECTION, SOLUTION INTRAVENOUS AS NEEDED
Status: DISCONTINUED | OUTPATIENT
Start: 2021-07-18 | End: 2021-07-18 | Stop reason: SURG

## 2021-07-18 RX ORDER — ATORVASTATIN CALCIUM 20 MG/1
20 TABLET, FILM COATED ORAL NIGHTLY
COMMUNITY
End: 2021-11-03 | Stop reason: HOSPADM

## 2021-07-18 RX ORDER — POTASSIUM CHLORIDE 20 MEQ/1
20 TABLET, EXTENDED RELEASE ORAL 2 TIMES DAILY WITH MEALS
Status: CANCELLED | OUTPATIENT
Start: 2021-07-18

## 2021-07-18 RX ORDER — CEFAZOLIN SODIUM 2 G/50ML
SOLUTION INTRAVENOUS AS NEEDED
Status: DISCONTINUED | OUTPATIENT
Start: 2021-07-18 | End: 2021-07-18 | Stop reason: SURG

## 2021-07-18 RX ORDER — ATORVASTATIN CALCIUM 20 MG/1
20 TABLET, FILM COATED ORAL NIGHTLY
Status: DISCONTINUED | OUTPATIENT
Start: 2021-07-18 | End: 2021-07-20

## 2021-07-18 RX ORDER — SODIUM CHLORIDE 0.9 % (FLUSH) 0.9 %
10 SYRINGE (ML) INJECTION AS NEEDED
Status: DISCONTINUED | OUTPATIENT
Start: 2021-07-18 | End: 2021-07-29

## 2021-07-18 RX ORDER — CEFDINIR 300 MG/1
300 CAPSULE ORAL EVERY 12 HOURS SCHEDULED
Status: CANCELLED | OUTPATIENT
Start: 2021-07-18 | End: 2021-07-30

## 2021-07-18 RX ORDER — FENTANYL CITRATE 50 UG/ML
INJECTION, SOLUTION INTRAMUSCULAR; INTRAVENOUS AS NEEDED
Status: DISCONTINUED | OUTPATIENT
Start: 2021-07-18 | End: 2021-07-18 | Stop reason: SURG

## 2021-07-18 RX ORDER — SODIUM CHLORIDE 0.9 % (FLUSH) 0.9 %
3 SYRINGE (ML) INJECTION EVERY 12 HOURS SCHEDULED
Status: DISCONTINUED | OUTPATIENT
Start: 2021-07-18 | End: 2021-08-05 | Stop reason: HOSPADM

## 2021-07-18 RX ORDER — FERROUS SULFATE 325(65) MG
325 TABLET ORAL
Status: DISCONTINUED | OUTPATIENT
Start: 2021-07-19 | End: 2021-08-05 | Stop reason: HOSPADM

## 2021-07-18 RX ORDER — MIDAZOLAM HYDROCHLORIDE 1 MG/ML
1 INJECTION INTRAMUSCULAR; INTRAVENOUS
Status: DISCONTINUED | OUTPATIENT
Start: 2021-07-18 | End: 2021-07-18 | Stop reason: HOSPADM

## 2021-07-18 RX ORDER — PROPOFOL 10 MG/ML
VIAL (ML) INTRAVENOUS AS NEEDED
Status: DISCONTINUED | OUTPATIENT
Start: 2021-07-18 | End: 2021-07-18 | Stop reason: SURG

## 2021-07-18 RX ORDER — GLYCOPYRROLATE 0.2 MG/ML
INJECTION INTRAMUSCULAR; INTRAVENOUS AS NEEDED
Status: DISCONTINUED | OUTPATIENT
Start: 2021-07-18 | End: 2021-07-18 | Stop reason: SURG

## 2021-07-18 RX ORDER — DOCUSATE SODIUM 100 MG/1
100 CAPSULE, LIQUID FILLED ORAL 2 TIMES DAILY PRN
Status: DISCONTINUED | OUTPATIENT
Start: 2021-07-18 | End: 2021-07-22

## 2021-07-18 RX ORDER — HYDROCODONE BITARTRATE AND ACETAMINOPHEN 5; 325 MG/1; MG/1
1 TABLET ORAL 2 TIMES DAILY PRN
Status: ON HOLD | COMMUNITY

## 2021-07-18 RX ORDER — GABAPENTIN 400 MG/1
400 CAPSULE ORAL EVERY 12 HOURS
Status: DISCONTINUED | OUTPATIENT
Start: 2021-07-18 | End: 2021-08-05 | Stop reason: HOSPADM

## 2021-07-18 RX ORDER — SODIUM CHLORIDE, SODIUM LACTATE, POTASSIUM CHLORIDE, CALCIUM CHLORIDE 600; 310; 30; 20 MG/100ML; MG/100ML; MG/100ML; MG/100ML
125 INJECTION, SOLUTION INTRAVENOUS ONCE
Status: DISCONTINUED | OUTPATIENT
Start: 2021-07-18 | End: 2021-07-18 | Stop reason: HOSPADM

## 2021-07-18 RX ORDER — ONDANSETRON 2 MG/ML
4 INJECTION INTRAMUSCULAR; INTRAVENOUS AS NEEDED
Status: DISCONTINUED | OUTPATIENT
Start: 2021-07-18 | End: 2021-07-18 | Stop reason: HOSPADM

## 2021-07-18 RX ORDER — ONDANSETRON 2 MG/ML
INJECTION INTRAMUSCULAR; INTRAVENOUS AS NEEDED
Status: DISCONTINUED | OUTPATIENT
Start: 2021-07-18 | End: 2021-07-18 | Stop reason: SURG

## 2021-07-18 RX ORDER — HYDROCODONE BITARTRATE AND ACETAMINOPHEN 7.5; 325 MG/1; MG/1
1 TABLET ORAL EVERY 4 HOURS PRN
Status: DISPENSED | OUTPATIENT
Start: 2021-07-18 | End: 2021-07-25

## 2021-07-18 RX ORDER — HYDROCODONE BITARTRATE AND ACETAMINOPHEN 10; 325 MG/1; MG/1
1 TABLET ORAL EVERY 4 HOURS PRN
Status: DISPENSED | OUTPATIENT
Start: 2021-07-18 | End: 2021-07-25

## 2021-07-18 RX ORDER — ROCURONIUM BROMIDE 10 MG/ML
INJECTION, SOLUTION INTRAVENOUS AS NEEDED
Status: DISCONTINUED | OUTPATIENT
Start: 2021-07-18 | End: 2021-07-18 | Stop reason: SURG

## 2021-07-18 RX ORDER — SODIUM CHLORIDE 0.9 % (FLUSH) 0.9 %
10 SYRINGE (ML) INJECTION EVERY 12 HOURS SCHEDULED
Status: DISCONTINUED | OUTPATIENT
Start: 2021-07-18 | End: 2021-07-18 | Stop reason: HOSPADM

## 2021-07-18 RX ORDER — HYDROMORPHONE HYDROCHLORIDE 1 MG/ML
0.25 INJECTION, SOLUTION INTRAMUSCULAR; INTRAVENOUS; SUBCUTANEOUS
Status: DISPENSED | OUTPATIENT
Start: 2021-07-18 | End: 2021-07-25

## 2021-07-18 RX ORDER — SODIUM CHLORIDE 0.9 % (FLUSH) 0.9 %
10 SYRINGE (ML) INJECTION AS NEEDED
Status: DISCONTINUED | OUTPATIENT
Start: 2021-07-18 | End: 2021-07-18 | Stop reason: HOSPADM

## 2021-07-18 RX ORDER — FUROSEMIDE 40 MG/1
40 TABLET ORAL DAILY
Status: CANCELLED | OUTPATIENT
Start: 2021-07-18

## 2021-07-18 RX ORDER — SODIUM CHLORIDE, SODIUM LACTATE, POTASSIUM CHLORIDE, CALCIUM CHLORIDE 600; 310; 30; 20 MG/100ML; MG/100ML; MG/100ML; MG/100ML
INJECTION, SOLUTION INTRAVENOUS CONTINUOUS PRN
Status: DISCONTINUED | OUTPATIENT
Start: 2021-07-18 | End: 2021-07-18 | Stop reason: SURG

## 2021-07-18 RX ORDER — NALOXONE HCL 0.4 MG/ML
0.1 VIAL (ML) INJECTION
Status: DISCONTINUED | OUTPATIENT
Start: 2021-07-18 | End: 2021-08-05 | Stop reason: HOSPADM

## 2021-07-18 RX ORDER — QUETIAPINE FUMARATE 25 MG/1
50 TABLET, FILM COATED ORAL NIGHTLY
Status: DISCONTINUED | OUTPATIENT
Start: 2021-07-18 | End: 2021-07-22

## 2021-07-18 RX ORDER — HYDROMORPHONE HYDROCHLORIDE 1 MG/ML
0.5 INJECTION, SOLUTION INTRAMUSCULAR; INTRAVENOUS; SUBCUTANEOUS ONCE
Status: COMPLETED | OUTPATIENT
Start: 2021-07-18 | End: 2021-07-18

## 2021-07-18 RX ORDER — SODIUM CHLORIDE, SODIUM LACTATE, POTASSIUM CHLORIDE, CALCIUM CHLORIDE 600; 310; 30; 20 MG/100ML; MG/100ML; MG/100ML; MG/100ML
100 INJECTION, SOLUTION INTRAVENOUS ONCE AS NEEDED
Status: DISCONTINUED | OUTPATIENT
Start: 2021-07-18 | End: 2021-07-18 | Stop reason: HOSPADM

## 2021-07-18 RX ORDER — FENTANYL CITRATE 50 UG/ML
50 INJECTION, SOLUTION INTRAMUSCULAR; INTRAVENOUS
Status: DISCONTINUED | OUTPATIENT
Start: 2021-07-18 | End: 2021-07-18 | Stop reason: HOSPADM

## 2021-07-18 RX ORDER — MAGNESIUM HYDROXIDE 1200 MG/15ML
LIQUID ORAL AS NEEDED
Status: DISCONTINUED | OUTPATIENT
Start: 2021-07-18 | End: 2021-07-18 | Stop reason: HOSPADM

## 2021-07-18 RX ORDER — CEFDINIR 300 MG/1
300 CAPSULE ORAL 2 TIMES DAILY
COMMUNITY
Start: 2021-07-01 | End: 2021-08-05 | Stop reason: HOSPADM

## 2021-07-18 RX ADMIN — MORPHINE SULFATE 2 MG: 2 INJECTION, SOLUTION INTRAMUSCULAR; INTRAVENOUS at 05:44

## 2021-07-18 RX ADMIN — QUETIAPINE FUMARATE 50 MG: 25 TABLET, FILM COATED ORAL at 23:09

## 2021-07-18 RX ADMIN — GLYCOPYRROLATE 0.4 MG: 0.2 INJECTION, SOLUTION INTRAMUSCULAR; INTRAVENOUS at 14:31

## 2021-07-18 RX ADMIN — METOPROLOL TARTRATE 25 MG: 25 TABLET, FILM COATED ORAL at 20:59

## 2021-07-18 RX ADMIN — SODIUM CHLORIDE, PRESERVATIVE FREE 10 ML: 5 INJECTION INTRAVENOUS at 15:53

## 2021-07-18 RX ADMIN — ONDANSETRON 4 MG: 2 INJECTION INTRAMUSCULAR; INTRAVENOUS at 13:24

## 2021-07-18 RX ADMIN — SODIUM CHLORIDE, PRESERVATIVE FREE 3 ML: 5 INJECTION INTRAVENOUS at 23:09

## 2021-07-18 RX ADMIN — PROPOFOL 150 MG: 10 INJECTION, EMULSION INTRAVENOUS at 13:04

## 2021-07-18 RX ADMIN — METOPROLOL TARTRATE 25 MG: 25 TABLET, FILM COATED ORAL at 08:16

## 2021-07-18 RX ADMIN — SERTRALINE 50 MG: 50 TABLET, FILM COATED ORAL at 08:16

## 2021-07-18 RX ADMIN — SODIUM CHLORIDE 50 ML/HR: 9 INJECTION, SOLUTION INTRAVENOUS at 15:53

## 2021-07-18 RX ADMIN — GABAPENTIN 400 MG: 400 CAPSULE ORAL at 08:16

## 2021-07-18 RX ADMIN — LEVOTHYROXINE SODIUM 25 MCG: 25 TABLET ORAL at 08:16

## 2021-07-18 RX ADMIN — ATORVASTATIN CALCIUM 20 MG: 20 TABLET, FILM COATED ORAL at 23:09

## 2021-07-18 RX ADMIN — PROCHLORPERAZINE EDISYLATE 5 MG: 5 INJECTION INTRAMUSCULAR; INTRAVENOUS at 15:53

## 2021-07-18 RX ADMIN — NEOSTIGMINE 3 MG: 1 INJECTION INTRAVENOUS at 14:31

## 2021-07-18 RX ADMIN — CETIRIZINE HYDROCHLORIDE 10 MG: 10 TABLET, FILM COATED ORAL at 08:16

## 2021-07-18 RX ADMIN — SODIUM CHLORIDE, POTASSIUM CHLORIDE, SODIUM LACTATE AND CALCIUM CHLORIDE: 600; 310; 30; 20 INJECTION, SOLUTION INTRAVENOUS at 12:14

## 2021-07-18 RX ADMIN — SODIUM CHLORIDE, PRESERVATIVE FREE 10 ML: 5 INJECTION INTRAVENOUS at 23:09

## 2021-07-18 RX ADMIN — HYDROMORPHONE HYDROCHLORIDE 0.5 MG: 1 INJECTION, SOLUTION INTRAMUSCULAR; INTRAVENOUS; SUBCUTANEOUS at 18:06

## 2021-07-18 RX ADMIN — CEFAZOLIN 2 G: 1 INJECTION, POWDER, FOR SOLUTION INTRAVENOUS at 21:49

## 2021-07-18 RX ADMIN — GABAPENTIN 400 MG: 400 CAPSULE ORAL at 23:08

## 2021-07-18 RX ADMIN — ROCURONIUM BROMIDE 40 MG: 10 SOLUTION INTRAVENOUS at 13:04

## 2021-07-18 RX ADMIN — HYDROCODONE BITARTRATE AND ACETAMINOPHEN 1 TABLET: 5; 325 TABLET ORAL at 15:53

## 2021-07-18 RX ADMIN — CEFAZOLIN SODIUM 2 G: 2 SOLUTION INTRAVENOUS at 13:01

## 2021-07-18 RX ADMIN — FAMOTIDINE 20 MG: 10 INJECTION INTRAVENOUS at 13:24

## 2021-07-18 RX ADMIN — HYDROMORPHONE HYDROCHLORIDE 0.25 MG: 1 INJECTION, SOLUTION INTRAMUSCULAR; INTRAVENOUS; SUBCUTANEOUS at 16:49

## 2021-07-18 RX ADMIN — HYDROMORPHONE HYDROCHLORIDE 0.25 MG: 1 INJECTION, SOLUTION INTRAMUSCULAR; INTRAVENOUS; SUBCUTANEOUS at 23:07

## 2021-07-18 RX ADMIN — FENTANYL CITRATE 50 MCG: 50 INJECTION INTRAMUSCULAR; INTRAVENOUS at 13:24

## 2021-07-18 RX ADMIN — FENTANYL CITRATE 50 MCG: 50 INJECTION INTRAMUSCULAR; INTRAVENOUS at 13:50

## 2021-07-18 NOTE — ANESTHESIA POSTPROCEDURE EVALUATION
Patient: Aidee Trinidad    Procedure Summary     Date: 07/18/21 Room / Location: [facility] OR  /  [facility] OR    Anesthesia Start: 1300 Anesthesia Stop: 1452    Procedure: HIP TROCHANTERIC NAILING LONG WITH INTRAMEDULLARY HIP SCREW (Right Hip) Diagnosis:       Closed fracture of right hip, initial encounter (CMS/[facility])      (Closed fracture of right hip, initial encounter (CMS/[facility]) [S72.001A])    Surgeons: Oracio Ponce DO Provider: Rogelio Guzman MD    Anesthesia Type: general ASA Status: 3          Anesthesia Type: general    Vitals  Vitals Value Taken Time   /92 07/18/21 1524   Temp 97.5 °F (36.4 °C) 07/18/21 1524   Pulse 83 07/18/21 1524   Resp 17 07/18/21 1524   SpO2 92 % 07/18/21 1524           Post Anesthesia Care and Evaluation    Patient location during evaluation: PHASE II  Patient participation: complete - patient participated  Level of consciousness: awake and alert  Pain score: 1  Pain management: adequate  Airway patency: patent  Anesthetic complications: No anesthetic complications  PONV Status: controlled  Cardiovascular status: acceptable  Respiratory status: acceptable  Hydration status: acceptable

## 2021-07-19 LAB
ALBUMIN SERPL-MCNC: 3.29 G/DL (ref 3.5–5.2)
ALBUMIN/GLOB SERPL: 1 G/DL
ALP SERPL-CCNC: 137 U/L (ref 39–117)
ALT SERPL W P-5'-P-CCNC: 41 U/L (ref 1–33)
ANION GAP SERPL CALCULATED.3IONS-SCNC: 7.8 MMOL/L (ref 5–15)
ANION GAP SERPL CALCULATED.3IONS-SCNC: 9.9 MMOL/L (ref 5–15)
AST SERPL-CCNC: 59 U/L (ref 1–32)
BASOPHILS # BLD AUTO: 0.1 10*3/MM3 (ref 0–0.2)
BASOPHILS NFR BLD AUTO: 0.8 % (ref 0–1.5)
BILIRUB SERPL-MCNC: 1 MG/DL (ref 0–1.2)
BUN SERPL-MCNC: 11 MG/DL (ref 6–20)
BUN SERPL-MCNC: 9 MG/DL (ref 6–20)
BUN/CREAT SERPL: 15.7 (ref 7–25)
BUN/CREAT SERPL: 16.7 (ref 7–25)
CALCIUM SPEC-SCNC: 8.6 MG/DL (ref 8.6–10.5)
CALCIUM SPEC-SCNC: 9 MG/DL (ref 8.6–10.5)
CHLORIDE SERPL-SCNC: 103 MMOL/L (ref 98–107)
CHLORIDE SERPL-SCNC: 106 MMOL/L (ref 98–107)
CO2 SERPL-SCNC: 23.1 MMOL/L (ref 22–29)
CO2 SERPL-SCNC: 25.2 MMOL/L (ref 22–29)
CREAT SERPL-MCNC: 0.54 MG/DL (ref 0.57–1)
CREAT SERPL-MCNC: 0.7 MG/DL (ref 0.57–1)
DEPRECATED RDW RBC AUTO: 52.9 FL (ref 37–54)
EOSINOPHIL # BLD AUTO: 0.11 10*3/MM3 (ref 0–0.4)
EOSINOPHIL NFR BLD AUTO: 0.9 % (ref 0.3–6.2)
ERYTHROCYTE [DISTWIDTH] IN BLOOD BY AUTOMATED COUNT: 14.4 % (ref 12.3–15.4)
GFR SERPL CREATININE-BSD FRML MDRD: 117 ML/MIN/1.73
GFR SERPL CREATININE-BSD FRML MDRD: 87 ML/MIN/1.73
GLOBULIN UR ELPH-MCNC: 3.3 GM/DL
GLUCOSE BLDC GLUCOMTR-MCNC: 142 MG/DL (ref 70–130)
GLUCOSE BLDC GLUCOMTR-MCNC: 151 MG/DL (ref 70–130)
GLUCOSE BLDC GLUCOMTR-MCNC: 152 MG/DL (ref 70–130)
GLUCOSE BLDC GLUCOMTR-MCNC: 173 MG/DL (ref 70–130)
GLUCOSE SERPL-MCNC: 142 MG/DL (ref 65–99)
GLUCOSE SERPL-MCNC: 143 MG/DL (ref 65–99)
HCT VFR BLD AUTO: 36.6 % (ref 34–46.6)
HGB BLD-MCNC: 11.6 G/DL (ref 12–15.9)
IMM GRANULOCYTES # BLD AUTO: 0.06 10*3/MM3 (ref 0–0.05)
IMM GRANULOCYTES NFR BLD AUTO: 0.5 % (ref 0–0.5)
LYMPHOCYTES # BLD AUTO: 3.09 10*3/MM3 (ref 0.7–3.1)
LYMPHOCYTES NFR BLD AUTO: 25.8 % (ref 19.6–45.3)
MCH RBC QN AUTO: 31.4 PG (ref 26.6–33)
MCHC RBC AUTO-ENTMCNC: 31.7 G/DL (ref 31.5–35.7)
MCV RBC AUTO: 98.9 FL (ref 79–97)
MONOCYTES # BLD AUTO: 0.97 10*3/MM3 (ref 0.1–0.9)
MONOCYTES NFR BLD AUTO: 8.1 % (ref 5–12)
NEUTROPHILS NFR BLD AUTO: 63.9 % (ref 42.7–76)
NEUTROPHILS NFR BLD AUTO: 7.64 10*3/MM3 (ref 1.7–7)
NRBC BLD AUTO-RTO: 0 /100 WBC (ref 0–0.2)
PLATELET # BLD AUTO: 199 10*3/MM3 (ref 140–450)
PMV BLD AUTO: 11.2 FL (ref 6–12)
POTASSIUM SERPL-SCNC: 4.5 MMOL/L (ref 3.5–5.2)
POTASSIUM SERPL-SCNC: 4.9 MMOL/L (ref 3.5–5.2)
PROT SERPL-MCNC: 6.6 G/DL (ref 6–8.5)
RBC # BLD AUTO: 3.7 10*6/MM3 (ref 3.77–5.28)
SODIUM SERPL-SCNC: 136 MMOL/L (ref 136–145)
SODIUM SERPL-SCNC: 139 MMOL/L (ref 136–145)
WBC # BLD AUTO: 11.97 10*3/MM3 (ref 3.4–10.8)

## 2021-07-19 PROCEDURE — 97163 PT EVAL HIGH COMPLEX 45 MIN: CPT

## 2021-07-19 PROCEDURE — 99232 SBSQ HOSP IP/OBS MODERATE 35: CPT | Performed by: INTERNAL MEDICINE

## 2021-07-19 PROCEDURE — 82962 GLUCOSE BLOOD TEST: CPT

## 2021-07-19 PROCEDURE — 25010000003 CEFAZOLIN PER 500 MG: Performed by: ORTHOPAEDIC SURGERY

## 2021-07-19 PROCEDURE — 25010000002 HYDROMORPHONE PER 4 MG: Performed by: ORTHOPAEDIC SURGERY

## 2021-07-19 PROCEDURE — 85025 COMPLETE CBC W/AUTO DIFF WBC: CPT | Performed by: STUDENT IN AN ORGANIZED HEALTH CARE EDUCATION/TRAINING PROGRAM

## 2021-07-19 PROCEDURE — 80053 COMPREHEN METABOLIC PANEL: CPT | Performed by: STUDENT IN AN ORGANIZED HEALTH CARE EDUCATION/TRAINING PROGRAM

## 2021-07-19 PROCEDURE — 63710000001 INSULIN ASPART PER 5 UNITS: Performed by: ORTHOPAEDIC SURGERY

## 2021-07-19 RX ADMIN — CEFAZOLIN 2 G: 1 INJECTION, POWDER, FOR SOLUTION INTRAVENOUS at 05:09

## 2021-07-19 RX ADMIN — GABAPENTIN 400 MG: 400 CAPSULE ORAL at 20:43

## 2021-07-19 RX ADMIN — HYDROMORPHONE HYDROCHLORIDE 0.25 MG: 1 INJECTION, SOLUTION INTRAMUSCULAR; INTRAVENOUS; SUBCUTANEOUS at 05:09

## 2021-07-19 RX ADMIN — HYDROMORPHONE HYDROCHLORIDE 0.25 MG: 1 INJECTION, SOLUTION INTRAMUSCULAR; INTRAVENOUS; SUBCUTANEOUS at 13:08

## 2021-07-19 RX ADMIN — METOPROLOL TARTRATE 25 MG: 25 TABLET, FILM COATED ORAL at 20:42

## 2021-07-19 RX ADMIN — CETIRIZINE HYDROCHLORIDE 10 MG: 10 TABLET, FILM COATED ORAL at 08:18

## 2021-07-19 RX ADMIN — HYDROCODONE BITARTRATE AND ACETAMINOPHEN 1 TABLET: 10; 325 TABLET ORAL at 17:06

## 2021-07-19 RX ADMIN — FERROUS SULFATE TAB 325 MG (65 MG ELEMENTAL FE) 325 MG: 325 (65 FE) TAB at 08:18

## 2021-07-19 RX ADMIN — APIXABAN 2.5 MG: 2.5 TABLET, FILM COATED ORAL at 20:43

## 2021-07-19 RX ADMIN — HYDROMORPHONE HYDROCHLORIDE 0.25 MG: 1 INJECTION, SOLUTION INTRAMUSCULAR; INTRAVENOUS; SUBCUTANEOUS at 09:46

## 2021-07-19 RX ADMIN — PANTOPRAZOLE SODIUM 40 MG: 40 TABLET, DELAYED RELEASE ORAL at 05:09

## 2021-07-19 RX ADMIN — APIXABAN 2.5 MG: 2.5 TABLET, FILM COATED ORAL at 08:18

## 2021-07-19 RX ADMIN — GABAPENTIN 400 MG: 400 CAPSULE ORAL at 08:19

## 2021-07-19 RX ADMIN — INSULIN ASPART 2 UNITS: 100 INJECTION, SOLUTION INTRAVENOUS; SUBCUTANEOUS at 11:09

## 2021-07-19 RX ADMIN — ATORVASTATIN CALCIUM 20 MG: 20 TABLET, FILM COATED ORAL at 20:42

## 2021-07-19 RX ADMIN — SODIUM CHLORIDE 50 ML/HR: 9 INJECTION, SOLUTION INTRAVENOUS at 14:56

## 2021-07-19 RX ADMIN — SODIUM CHLORIDE, PRESERVATIVE FREE 10 ML: 5 INJECTION INTRAVENOUS at 20:43

## 2021-07-19 RX ADMIN — SERTRALINE 50 MG: 50 TABLET, FILM COATED ORAL at 08:18

## 2021-07-19 RX ADMIN — INSULIN ASPART 2 UNITS: 100 INJECTION, SOLUTION INTRAVENOUS; SUBCUTANEOUS at 08:19

## 2021-07-19 RX ADMIN — SODIUM CHLORIDE 500 ML: 9 INJECTION, SOLUTION INTRAVENOUS at 17:06

## 2021-07-19 RX ADMIN — LEVOTHYROXINE SODIUM 25 MCG: 25 TABLET ORAL at 08:18

## 2021-07-19 RX ADMIN — QUETIAPINE FUMARATE 50 MG: 25 TABLET, FILM COATED ORAL at 20:43

## 2021-07-19 RX ADMIN — METOPROLOL TARTRATE 25 MG: 25 TABLET, FILM COATED ORAL at 08:18

## 2021-07-19 RX ADMIN — SODIUM CHLORIDE, PRESERVATIVE FREE 3 ML: 5 INJECTION INTRAVENOUS at 20:43

## 2021-07-19 RX ADMIN — SODIUM CHLORIDE, PRESERVATIVE FREE 3 ML: 5 INJECTION INTRAVENOUS at 08:18

## 2021-07-20 ENCOUNTER — APPOINTMENT (OUTPATIENT)
Dept: ULTRASOUND IMAGING | Facility: HOSPITAL | Age: 56
End: 2021-07-20

## 2021-07-20 LAB
A-A DO2: 43.7 MMHG (ref 0–300)
ALBUMIN SERPL-MCNC: 2.88 G/DL (ref 3.5–5.2)
ALBUMIN/GLOB SERPL: 0.9 G/DL
ALP SERPL-CCNC: 114 U/L (ref 39–117)
ALT SERPL W P-5'-P-CCNC: 43 U/L (ref 1–33)
ANION GAP SERPL CALCULATED.3IONS-SCNC: 7.8 MMOL/L (ref 5–15)
ARTERIAL PATENCY WRIST A: POSITIVE
AST SERPL-CCNC: 148 U/L (ref 1–32)
ATMOSPHERIC PRESS: 727 MMHG
BASE EXCESS BLDA CALC-SCNC: 6.3 MMOL/L (ref 0–2)
BASOPHILS # BLD AUTO: 0.07 10*3/MM3 (ref 0–0.2)
BASOPHILS NFR BLD AUTO: 0.7 % (ref 0–1.5)
BDY SITE: ABNORMAL
BILIRUB SERPL-MCNC: 1 MG/DL (ref 0–1.2)
BODY TEMPERATURE: 0 C
BUN SERPL-MCNC: 8 MG/DL (ref 6–20)
BUN/CREAT SERPL: 17.8 (ref 7–25)
CALCIUM SPEC-SCNC: 8.8 MG/DL (ref 8.6–10.5)
CHLORIDE SERPL-SCNC: 104 MMOL/L (ref 98–107)
CO2 BLDA-SCNC: 33.9 MMOL/L (ref 22–33)
CO2 SERPL-SCNC: 28.2 MMOL/L (ref 22–29)
COHGB MFR BLD: 1.5 % (ref 0–5)
CREAT SERPL-MCNC: 0.45 MG/DL (ref 0.57–1)
DEPRECATED RDW RBC AUTO: 51.9 FL (ref 37–54)
EOSINOPHIL # BLD AUTO: 0.22 10*3/MM3 (ref 0–0.4)
EOSINOPHIL NFR BLD AUTO: 2.1 % (ref 0.3–6.2)
ERYTHROCYTE [DISTWIDTH] IN BLOOD BY AUTOMATED COUNT: 14.4 % (ref 12.3–15.4)
GAS FLOW AIRWAY: 2 LPM
GFR SERPL CREATININE-BSD FRML MDRD: 144 ML/MIN/1.73
GLOBULIN UR ELPH-MCNC: 3.1 GM/DL
GLUCOSE BLDC GLUCOMTR-MCNC: 130 MG/DL (ref 70–130)
GLUCOSE BLDC GLUCOMTR-MCNC: 139 MG/DL (ref 70–130)
GLUCOSE BLDC GLUCOMTR-MCNC: 139 MG/DL (ref 70–130)
GLUCOSE BLDC GLUCOMTR-MCNC: 140 MG/DL (ref 70–130)
GLUCOSE SERPL-MCNC: 139 MG/DL (ref 65–99)
HAV IGM SERPL QL IA: NORMAL
HBV CORE IGM SERPL QL IA: NORMAL
HBV E AB SERPL QL IA: POSITIVE
HBV E AG SERPL QL IA: NEGATIVE
HBV SURFACE AG SERPL QL IA: NORMAL
HCO3 BLDA-SCNC: 32.3 MMOL/L (ref 20–26)
HCT VFR BLD AUTO: 28.1 % (ref 34–46.6)
HCT VFR BLD AUTO: 29.7 % (ref 34–46.6)
HCT VFR BLD CALC: 30.3 % (ref 38–51)
HCV AB SER DONR QL: NORMAL
HGB BLD-MCNC: 9.3 G/DL (ref 12–15.9)
HGB BLD-MCNC: 9.6 G/DL (ref 12–15.9)
HGB BLDA-MCNC: 9.9 G/DL (ref 13.5–17.5)
IMM GRANULOCYTES # BLD AUTO: 0.04 10*3/MM3 (ref 0–0.05)
IMM GRANULOCYTES NFR BLD AUTO: 0.4 % (ref 0–0.5)
INHALED O2 CONCENTRATION: 28 %
LYMPHOCYTES # BLD AUTO: 3 10*3/MM3 (ref 0.7–3.1)
LYMPHOCYTES NFR BLD AUTO: 28.2 % (ref 19.6–45.3)
Lab: ABNORMAL
MCH RBC QN AUTO: 31.7 PG (ref 26.6–33)
MCHC RBC AUTO-ENTMCNC: 32.3 G/DL (ref 31.5–35.7)
MCV RBC AUTO: 98 FL (ref 79–97)
METHGB BLD QL: <1 % (ref 0–3)
MODALITY: ABNORMAL
MONOCYTES # BLD AUTO: 0.94 10*3/MM3 (ref 0.1–0.9)
MONOCYTES NFR BLD AUTO: 8.8 % (ref 5–12)
NEUTROPHILS NFR BLD AUTO: 59.8 % (ref 42.7–76)
NEUTROPHILS NFR BLD AUTO: 6.37 10*3/MM3 (ref 1.7–7)
NOTE: ABNORMAL
NOTIFIED WHO: ABNORMAL
NRBC BLD AUTO-RTO: 0 /100 WBC (ref 0–0.2)
OXYHGB MFR BLDV: 95.7 % (ref 94–99)
PCO2 BLDA: 53.6 MM HG (ref 35–45)
PCO2 TEMP ADJ BLD: ABNORMAL MM[HG]
PH BLDA: 7.39 PH UNITS (ref 7.35–7.45)
PH, TEMP CORRECTED: ABNORMAL
PLATELET # BLD AUTO: 141 10*3/MM3 (ref 140–450)
PMV BLD AUTO: 11 FL (ref 6–12)
PO2 BLDA: 86.9 MM HG (ref 83–108)
PO2 TEMP ADJ BLD: ABNORMAL MM[HG]
POTASSIUM SERPL-SCNC: 4.3 MMOL/L (ref 3.5–5.2)
PROT SERPL-MCNC: 6 G/DL (ref 6–8.5)
RBC # BLD AUTO: 3.03 10*6/MM3 (ref 3.77–5.28)
SAO2 % BLDCOA: 97.4 % (ref 94–99)
SODIUM SERPL-SCNC: 140 MMOL/L (ref 136–145)
VENTILATOR MODE: ABNORMAL
WBC # BLD AUTO: 10.64 10*3/MM3 (ref 3.4–10.8)

## 2021-07-20 PROCEDURE — 97110 THERAPEUTIC EXERCISES: CPT

## 2021-07-20 PROCEDURE — 97116 GAIT TRAINING THERAPY: CPT

## 2021-07-20 PROCEDURE — 25010000002 HYDROMORPHONE PER 4 MG: Performed by: ORTHOPAEDIC SURGERY

## 2021-07-20 PROCEDURE — 99232 SBSQ HOSP IP/OBS MODERATE 35: CPT | Performed by: INTERNAL MEDICINE

## 2021-07-20 PROCEDURE — 82375 ASSAY CARBOXYHB QUANT: CPT

## 2021-07-20 PROCEDURE — 86704 HEP B CORE ANTIBODY TOTAL: CPT | Performed by: INTERNAL MEDICINE

## 2021-07-20 PROCEDURE — 36600 WITHDRAWAL OF ARTERIAL BLOOD: CPT

## 2021-07-20 PROCEDURE — 80074 ACUTE HEPATITIS PANEL: CPT | Performed by: INTERNAL MEDICINE

## 2021-07-20 PROCEDURE — 83050 HGB METHEMOGLOBIN QUAN: CPT

## 2021-07-20 PROCEDURE — 97167 OT EVAL HIGH COMPLEX 60 MIN: CPT

## 2021-07-20 PROCEDURE — 76705 ECHO EXAM OF ABDOMEN: CPT | Performed by: RADIOLOGY

## 2021-07-20 PROCEDURE — 86705 HEP B CORE ANTIBODY IGM: CPT | Performed by: INTERNAL MEDICINE

## 2021-07-20 PROCEDURE — 82962 GLUCOSE BLOOD TEST: CPT

## 2021-07-20 PROCEDURE — 80053 COMPREHEN METABOLIC PANEL: CPT | Performed by: INTERNAL MEDICINE

## 2021-07-20 PROCEDURE — 85018 HEMOGLOBIN: CPT | Performed by: INTERNAL MEDICINE

## 2021-07-20 PROCEDURE — 85025 COMPLETE CBC W/AUTO DIFF WBC: CPT | Performed by: INTERNAL MEDICINE

## 2021-07-20 PROCEDURE — 76705 ECHO EXAM OF ABDOMEN: CPT

## 2021-07-20 PROCEDURE — 94799 UNLISTED PULMONARY SVC/PX: CPT

## 2021-07-20 PROCEDURE — 85014 HEMATOCRIT: CPT | Performed by: INTERNAL MEDICINE

## 2021-07-20 PROCEDURE — 82805 BLOOD GASES W/O2 SATURATION: CPT

## 2021-07-20 RX ADMIN — QUETIAPINE FUMARATE 50 MG: 25 TABLET, FILM COATED ORAL at 21:11

## 2021-07-20 RX ADMIN — PANTOPRAZOLE SODIUM 40 MG: 40 TABLET, DELAYED RELEASE ORAL at 05:16

## 2021-07-20 RX ADMIN — CETIRIZINE HYDROCHLORIDE 10 MG: 10 TABLET, FILM COATED ORAL at 07:59

## 2021-07-20 RX ADMIN — SODIUM CHLORIDE, PRESERVATIVE FREE 10 ML: 5 INJECTION INTRAVENOUS at 21:11

## 2021-07-20 RX ADMIN — SERTRALINE 50 MG: 50 TABLET, FILM COATED ORAL at 07:59

## 2021-07-20 RX ADMIN — LEVOTHYROXINE SODIUM 25 MCG: 25 TABLET ORAL at 07:59

## 2021-07-20 RX ADMIN — HYDROMORPHONE HYDROCHLORIDE 0.25 MG: 1 INJECTION, SOLUTION INTRAMUSCULAR; INTRAVENOUS; SUBCUTANEOUS at 21:11

## 2021-07-20 RX ADMIN — METOPROLOL TARTRATE 25 MG: 25 TABLET, FILM COATED ORAL at 21:11

## 2021-07-20 RX ADMIN — GABAPENTIN 400 MG: 400 CAPSULE ORAL at 07:59

## 2021-07-20 RX ADMIN — FERROUS SULFATE TAB 325 MG (65 MG ELEMENTAL FE) 325 MG: 325 (65 FE) TAB at 07:59

## 2021-07-20 RX ADMIN — HYDROMORPHONE HYDROCHLORIDE 0.25 MG: 1 INJECTION, SOLUTION INTRAMUSCULAR; INTRAVENOUS; SUBCUTANEOUS at 00:24

## 2021-07-20 RX ADMIN — HYDROMORPHONE HYDROCHLORIDE 0.25 MG: 1 INJECTION, SOLUTION INTRAMUSCULAR; INTRAVENOUS; SUBCUTANEOUS at 05:25

## 2021-07-20 RX ADMIN — APIXABAN 2.5 MG: 2.5 TABLET, FILM COATED ORAL at 07:59

## 2021-07-20 RX ADMIN — APIXABAN 2.5 MG: 2.5 TABLET, FILM COATED ORAL at 21:11

## 2021-07-20 RX ADMIN — SODIUM CHLORIDE 75 ML/HR: 9 INJECTION, SOLUTION INTRAVENOUS at 01:46

## 2021-07-20 RX ADMIN — GABAPENTIN 400 MG: 400 CAPSULE ORAL at 21:11

## 2021-07-21 ENCOUNTER — APPOINTMENT (OUTPATIENT)
Dept: CT IMAGING | Facility: HOSPITAL | Age: 56
End: 2021-07-21

## 2021-07-21 LAB
ALBUMIN SERPL-MCNC: 3.09 G/DL (ref 3.5–5.2)
ALBUMIN/GLOB SERPL: 1.2 G/DL
ALP SERPL-CCNC: 109 U/L (ref 39–117)
ALT SERPL W P-5'-P-CCNC: 54 U/L (ref 1–33)
ANION GAP SERPL CALCULATED.3IONS-SCNC: 7.8 MMOL/L (ref 5–15)
AST SERPL-CCNC: 232 U/L (ref 1–32)
BASOPHILS # BLD AUTO: 0.05 10*3/MM3 (ref 0–0.2)
BASOPHILS NFR BLD AUTO: 0.6 % (ref 0–1.5)
BILIRUB SERPL-MCNC: 1 MG/DL (ref 0–1.2)
BUN SERPL-MCNC: 7 MG/DL (ref 6–20)
BUN/CREAT SERPL: 14 (ref 7–25)
CALCIUM SPEC-SCNC: 8.4 MG/DL (ref 8.6–10.5)
CHLORIDE SERPL-SCNC: 105 MMOL/L (ref 98–107)
CK SERPL-CCNC: 4954 U/L (ref 20–180)
CO2 SERPL-SCNC: 28.2 MMOL/L (ref 22–29)
CREAT SERPL-MCNC: 0.5 MG/DL (ref 0.57–1)
DEPRECATED RDW RBC AUTO: 47.3 FL (ref 37–54)
EOSINOPHIL # BLD AUTO: 0.26 10*3/MM3 (ref 0–0.4)
EOSINOPHIL NFR BLD AUTO: 3 % (ref 0.3–6.2)
ERYTHROCYTE [DISTWIDTH] IN BLOOD BY AUTOMATED COUNT: 14 % (ref 12.3–15.4)
GFR SERPL CREATININE-BSD FRML MDRD: 128 ML/MIN/1.73
GLOBULIN UR ELPH-MCNC: 2.5 GM/DL
GLUCOSE BLDC GLUCOMTR-MCNC: 121 MG/DL (ref 70–130)
GLUCOSE BLDC GLUCOMTR-MCNC: 142 MG/DL (ref 70–130)
GLUCOSE BLDC GLUCOMTR-MCNC: 143 MG/DL (ref 70–130)
GLUCOSE BLDC GLUCOMTR-MCNC: 179 MG/DL (ref 70–130)
GLUCOSE SERPL-MCNC: 123 MG/DL (ref 65–99)
HBV CORE AB SERPL QL IA: POSITIVE
HBV CORE IGM SERPL QL IA: POSITIVE
HCT VFR BLD AUTO: 28.5 % (ref 34–46.6)
HGB BLD-MCNC: 9.7 G/DL (ref 12–15.9)
IMM GRANULOCYTES # BLD AUTO: 0.06 10*3/MM3 (ref 0–0.05)
IMM GRANULOCYTES NFR BLD AUTO: 0.7 % (ref 0–0.5)
LYMPHOCYTES # BLD AUTO: 2.7 10*3/MM3 (ref 0.7–3.1)
LYMPHOCYTES NFR BLD AUTO: 31 % (ref 19.6–45.3)
MCH RBC QN AUTO: 31.9 PG (ref 26.6–33)
MCHC RBC AUTO-ENTMCNC: 34 G/DL (ref 31.5–35.7)
MCV RBC AUTO: 93.8 FL (ref 79–97)
MONOCYTES # BLD AUTO: 0.78 10*3/MM3 (ref 0.1–0.9)
MONOCYTES NFR BLD AUTO: 9 % (ref 5–12)
NEUTROPHILS NFR BLD AUTO: 4.85 10*3/MM3 (ref 1.7–7)
NEUTROPHILS NFR BLD AUTO: 55.7 % (ref 42.7–76)
NRBC BLD AUTO-RTO: 0 /100 WBC (ref 0–0.2)
PLATELET # BLD AUTO: 123 10*3/MM3 (ref 140–450)
PMV BLD AUTO: 11.5 FL (ref 6–12)
POTASSIUM SERPL-SCNC: 4.3 MMOL/L (ref 3.5–5.2)
PROT SERPL-MCNC: 5.6 G/DL (ref 6–8.5)
RBC # BLD AUTO: 3.04 10*6/MM3 (ref 3.77–5.28)
SODIUM SERPL-SCNC: 141 MMOL/L (ref 136–145)
WBC # BLD AUTO: 8.7 10*3/MM3 (ref 3.4–10.8)

## 2021-07-21 PROCEDURE — 63710000001 INSULIN ASPART PER 5 UNITS: Performed by: ORTHOPAEDIC SURGERY

## 2021-07-21 PROCEDURE — 25010000002 HYDROMORPHONE PER 4 MG: Performed by: ORTHOPAEDIC SURGERY

## 2021-07-21 PROCEDURE — 85025 COMPLETE CBC W/AUTO DIFF WBC: CPT | Performed by: INTERNAL MEDICINE

## 2021-07-21 PROCEDURE — 99232 SBSQ HOSP IP/OBS MODERATE 35: CPT | Performed by: INTERNAL MEDICINE

## 2021-07-21 PROCEDURE — 97116 GAIT TRAINING THERAPY: CPT

## 2021-07-21 PROCEDURE — 97110 THERAPEUTIC EXERCISES: CPT

## 2021-07-21 PROCEDURE — 70450 CT HEAD/BRAIN W/O DYE: CPT

## 2021-07-21 PROCEDURE — 80053 COMPREHEN METABOLIC PANEL: CPT | Performed by: INTERNAL MEDICINE

## 2021-07-21 PROCEDURE — 82962 GLUCOSE BLOOD TEST: CPT

## 2021-07-21 PROCEDURE — 70450 CT HEAD/BRAIN W/O DYE: CPT | Performed by: RADIOLOGY

## 2021-07-21 PROCEDURE — 82550 ASSAY OF CK (CPK): CPT | Performed by: INTERNAL MEDICINE

## 2021-07-21 RX ORDER — SODIUM CHLORIDE 9 MG/ML
75 INJECTION, SOLUTION INTRAVENOUS CONTINUOUS
Status: DISCONTINUED | OUTPATIENT
Start: 2021-07-21 | End: 2021-07-24

## 2021-07-21 RX ORDER — LACTULOSE 10 G/15ML
20 SOLUTION ORAL ONCE
Status: COMPLETED | OUTPATIENT
Start: 2021-07-21 | End: 2021-07-21

## 2021-07-21 RX ORDER — ASPIRIN 81 MG/1
81 TABLET ORAL DAILY
Status: DISCONTINUED | OUTPATIENT
Start: 2021-07-21 | End: 2021-08-05 | Stop reason: HOSPADM

## 2021-07-21 RX ADMIN — QUETIAPINE FUMARATE 50 MG: 25 TABLET, FILM COATED ORAL at 20:52

## 2021-07-21 RX ADMIN — GABAPENTIN 400 MG: 400 CAPSULE ORAL at 08:24

## 2021-07-21 RX ADMIN — GABAPENTIN 400 MG: 400 CAPSULE ORAL at 20:52

## 2021-07-21 RX ADMIN — HYDROCODONE BITARTRATE AND ACETAMINOPHEN 1 TABLET: 10; 325 TABLET ORAL at 20:52

## 2021-07-21 RX ADMIN — SERTRALINE 50 MG: 50 TABLET, FILM COATED ORAL at 08:24

## 2021-07-21 RX ADMIN — APIXABAN 2.5 MG: 2.5 TABLET, FILM COATED ORAL at 20:52

## 2021-07-21 RX ADMIN — LACTULOSE 20 G: 10 SOLUTION ORAL at 14:01

## 2021-07-21 RX ADMIN — ASPIRIN 81 MG: 81 TABLET, COATED ORAL at 14:01

## 2021-07-21 RX ADMIN — HYDROMORPHONE HYDROCHLORIDE 0.25 MG: 1 INJECTION, SOLUTION INTRAMUSCULAR; INTRAVENOUS; SUBCUTANEOUS at 03:48

## 2021-07-21 RX ADMIN — CETIRIZINE HYDROCHLORIDE 10 MG: 10 TABLET, FILM COATED ORAL at 08:24

## 2021-07-21 RX ADMIN — FERROUS SULFATE TAB 325 MG (65 MG ELEMENTAL FE) 325 MG: 325 (65 FE) TAB at 08:24

## 2021-07-21 RX ADMIN — METOPROLOL TARTRATE 25 MG: 25 TABLET, FILM COATED ORAL at 20:52

## 2021-07-21 RX ADMIN — INSULIN ASPART 2 UNITS: 100 INJECTION, SOLUTION INTRAVENOUS; SUBCUTANEOUS at 10:37

## 2021-07-21 RX ADMIN — PANTOPRAZOLE SODIUM 40 MG: 40 TABLET, DELAYED RELEASE ORAL at 05:12

## 2021-07-21 RX ADMIN — LEVOTHYROXINE SODIUM 25 MCG: 25 TABLET ORAL at 08:24

## 2021-07-21 RX ADMIN — SODIUM CHLORIDE 75 ML/HR: 9 INJECTION, SOLUTION INTRAVENOUS at 14:02

## 2021-07-21 RX ADMIN — METOPROLOL TARTRATE 25 MG: 25 TABLET, FILM COATED ORAL at 08:24

## 2021-07-21 RX ADMIN — HYDROCODONE BITARTRATE AND ACETAMINOPHEN 1 TABLET: 10; 325 TABLET ORAL at 12:31

## 2021-07-21 RX ADMIN — APIXABAN 2.5 MG: 2.5 TABLET, FILM COATED ORAL at 08:24

## 2021-07-22 ENCOUNTER — TRANSCRIBE ORDERS (OUTPATIENT)
Dept: ADMINISTRATIVE | Facility: HOSPITAL | Age: 56
End: 2021-07-22

## 2021-07-22 DIAGNOSIS — J32.4 CHRONIC PANSINUSITIS: Primary | ICD-10-CM

## 2021-07-22 LAB
ALBUMIN SERPL-MCNC: 2.47 G/DL (ref 3.5–5.2)
ALBUMIN/GLOB SERPL: 0.8 G/DL
ALP SERPL-CCNC: 104 U/L (ref 39–117)
ALT SERPL W P-5'-P-CCNC: 53 U/L (ref 1–33)
ANION GAP SERPL CALCULATED.3IONS-SCNC: 8.3 MMOL/L (ref 5–15)
AST SERPL-CCNC: 235 U/L (ref 1–32)
BASOPHILS # BLD AUTO: 0.03 10*3/MM3 (ref 0–0.2)
BASOPHILS NFR BLD AUTO: 0.5 % (ref 0–1.5)
BILIRUB SERPL-MCNC: 1.3 MG/DL (ref 0–1.2)
BUN SERPL-MCNC: 7 MG/DL (ref 6–20)
BUN/CREAT SERPL: 14.3 (ref 7–25)
CALCIUM SPEC-SCNC: 8 MG/DL (ref 8.6–10.5)
CHLORIDE SERPL-SCNC: 105 MMOL/L (ref 98–107)
CK SERPL-CCNC: 3888 U/L (ref 20–180)
CO2 SERPL-SCNC: 24.7 MMOL/L (ref 22–29)
CREAT SERPL-MCNC: 0.49 MG/DL (ref 0.57–1)
DEPRECATED RDW RBC AUTO: 48.3 FL (ref 37–54)
EOSINOPHIL # BLD AUTO: 0.2 10*3/MM3 (ref 0–0.4)
EOSINOPHIL NFR BLD AUTO: 3.3 % (ref 0.3–6.2)
ERYTHROCYTE [DISTWIDTH] IN BLOOD BY AUTOMATED COUNT: 14.1 % (ref 12.3–15.4)
GFR SERPL CREATININE-BSD FRML MDRD: 131 ML/MIN/1.73
GLOBULIN UR ELPH-MCNC: 2.9 GM/DL
GLUCOSE BLDC GLUCOMTR-MCNC: 108 MG/DL (ref 70–130)
GLUCOSE BLDC GLUCOMTR-MCNC: 109 MG/DL (ref 70–130)
GLUCOSE BLDC GLUCOMTR-MCNC: 134 MG/DL (ref 70–130)
GLUCOSE BLDC GLUCOMTR-MCNC: 159 MG/DL (ref 70–130)
GLUCOSE SERPL-MCNC: 128 MG/DL (ref 65–99)
HCT VFR BLD AUTO: 25.3 % (ref 34–46.6)
HCT VFR BLD AUTO: 25.5 % (ref 34–46.6)
HGB BLD-MCNC: 8.4 G/DL (ref 12–15.9)
HGB BLD-MCNC: 8.5 G/DL (ref 12–15.9)
IMM GRANULOCYTES # BLD AUTO: 0.04 10*3/MM3 (ref 0–0.05)
IMM GRANULOCYTES NFR BLD AUTO: 0.7 % (ref 0–0.5)
LYMPHOCYTES # BLD AUTO: 2.17 10*3/MM3 (ref 0.7–3.1)
LYMPHOCYTES NFR BLD AUTO: 35.3 % (ref 19.6–45.3)
MCH RBC QN AUTO: 32.1 PG (ref 26.6–33)
MCHC RBC AUTO-ENTMCNC: 33.6 G/DL (ref 31.5–35.7)
MCV RBC AUTO: 95.5 FL (ref 79–97)
MONOCYTES # BLD AUTO: 0.59 10*3/MM3 (ref 0.1–0.9)
MONOCYTES NFR BLD AUTO: 9.6 % (ref 5–12)
NEUTROPHILS NFR BLD AUTO: 3.12 10*3/MM3 (ref 1.7–7)
NEUTROPHILS NFR BLD AUTO: 50.6 % (ref 42.7–76)
NRBC BLD AUTO-RTO: 0 /100 WBC (ref 0–0.2)
PLATELET # BLD AUTO: 112 10*3/MM3 (ref 140–450)
PMV BLD AUTO: 11.2 FL (ref 6–12)
POTASSIUM SERPL-SCNC: 3.6 MMOL/L (ref 3.5–5.2)
PROT SERPL-MCNC: 5.4 G/DL (ref 6–8.5)
RBC # BLD AUTO: 2.65 10*6/MM3 (ref 3.77–5.28)
SODIUM SERPL-SCNC: 138 MMOL/L (ref 136–145)
TROPONIN T SERPL-MCNC: <0.01 NG/ML (ref 0–0.03)
WBC # BLD AUTO: 6.15 10*3/MM3 (ref 3.4–10.8)

## 2021-07-22 PROCEDURE — 97110 THERAPEUTIC EXERCISES: CPT

## 2021-07-22 PROCEDURE — 82962 GLUCOSE BLOOD TEST: CPT

## 2021-07-22 PROCEDURE — 63710000001 INSULIN ASPART PER 5 UNITS: Performed by: ORTHOPAEDIC SURGERY

## 2021-07-22 PROCEDURE — 85014 HEMATOCRIT: CPT | Performed by: PHYSICIAN ASSISTANT

## 2021-07-22 PROCEDURE — 25010000002 PROCHLORPERAZINE 10 MG/2ML SOLUTION: Performed by: ORTHOPAEDIC SURGERY

## 2021-07-22 PROCEDURE — 82550 ASSAY OF CK (CPK): CPT | Performed by: INTERNAL MEDICINE

## 2021-07-22 PROCEDURE — 80053 COMPREHEN METABOLIC PANEL: CPT | Performed by: INTERNAL MEDICINE

## 2021-07-22 PROCEDURE — 99233 SBSQ HOSP IP/OBS HIGH 50: CPT | Performed by: PHYSICIAN ASSISTANT

## 2021-07-22 PROCEDURE — 85025 COMPLETE CBC W/AUTO DIFF WBC: CPT | Performed by: INTERNAL MEDICINE

## 2021-07-22 PROCEDURE — 25010000002 HYDROMORPHONE PER 4 MG: Performed by: ORTHOPAEDIC SURGERY

## 2021-07-22 PROCEDURE — 97530 THERAPEUTIC ACTIVITIES: CPT

## 2021-07-22 PROCEDURE — 85018 HEMOGLOBIN: CPT | Performed by: PHYSICIAN ASSISTANT

## 2021-07-22 PROCEDURE — 84484 ASSAY OF TROPONIN QUANT: CPT | Performed by: INTERNAL MEDICINE

## 2021-07-22 PROCEDURE — 97116 GAIT TRAINING THERAPY: CPT

## 2021-07-22 RX ORDER — MAGNESIUM CARB/ALUMINUM HYDROX 105-160MG
150 TABLET,CHEWABLE ORAL ONCE
Status: COMPLETED | OUTPATIENT
Start: 2021-07-22 | End: 2021-07-22

## 2021-07-22 RX ORDER — QUETIAPINE FUMARATE 25 MG/1
25 TABLET, FILM COATED ORAL NIGHTLY
Status: DISCONTINUED | OUTPATIENT
Start: 2021-07-22 | End: 2021-08-05 | Stop reason: HOSPADM

## 2021-07-22 RX ORDER — DOCUSATE SODIUM 100 MG/1
100 CAPSULE, LIQUID FILLED ORAL 2 TIMES DAILY
Status: DISCONTINUED | OUTPATIENT
Start: 2021-07-22 | End: 2021-07-23

## 2021-07-22 RX ADMIN — CITROMA MAGNESIUM CITRATE 150 ML: 1.75 LIQUID ORAL at 12:22

## 2021-07-22 RX ADMIN — METOPROLOL TARTRATE 25 MG: 25 TABLET, FILM COATED ORAL at 20:15

## 2021-07-22 RX ADMIN — LEVOTHYROXINE SODIUM 25 MCG: 25 TABLET ORAL at 09:11

## 2021-07-22 RX ADMIN — SODIUM CHLORIDE, PRESERVATIVE FREE 10 ML: 5 INJECTION INTRAVENOUS at 20:15

## 2021-07-22 RX ADMIN — HYDROMORPHONE HYDROCHLORIDE 0.25 MG: 1 INJECTION, SOLUTION INTRAMUSCULAR; INTRAVENOUS; SUBCUTANEOUS at 09:39

## 2021-07-22 RX ADMIN — DOCUSATE SODIUM 100 MG: 100 CAPSULE, LIQUID FILLED ORAL at 20:14

## 2021-07-22 RX ADMIN — HYDROCODONE BITARTRATE AND ACETAMINOPHEN 1 TABLET: 10; 325 TABLET ORAL at 15:12

## 2021-07-22 RX ADMIN — APIXABAN 2.5 MG: 2.5 TABLET, FILM COATED ORAL at 09:11

## 2021-07-22 RX ADMIN — FERROUS SULFATE TAB 325 MG (65 MG ELEMENTAL FE) 325 MG: 325 (65 FE) TAB at 09:11

## 2021-07-22 RX ADMIN — QUETIAPINE FUMARATE 25 MG: 25 TABLET, FILM COATED ORAL at 20:14

## 2021-07-22 RX ADMIN — SERTRALINE 50 MG: 50 TABLET, FILM COATED ORAL at 09:10

## 2021-07-22 RX ADMIN — PROCHLORPERAZINE EDISYLATE 5 MG: 5 INJECTION INTRAMUSCULAR; INTRAVENOUS at 14:21

## 2021-07-22 RX ADMIN — INSULIN ASPART 2 UNITS: 100 INJECTION, SOLUTION INTRAVENOUS; SUBCUTANEOUS at 17:05

## 2021-07-22 RX ADMIN — HYDROCODONE BITARTRATE AND ACETAMINOPHEN 1 TABLET: 10; 325 TABLET ORAL at 21:39

## 2021-07-22 RX ADMIN — PANTOPRAZOLE SODIUM 40 MG: 40 TABLET, DELAYED RELEASE ORAL at 05:27

## 2021-07-22 RX ADMIN — CETIRIZINE HYDROCHLORIDE 10 MG: 10 TABLET, FILM COATED ORAL at 09:10

## 2021-07-22 RX ADMIN — ASPIRIN 81 MG: 81 TABLET, COATED ORAL at 09:10

## 2021-07-22 RX ADMIN — GABAPENTIN 400 MG: 400 CAPSULE ORAL at 09:15

## 2021-07-22 RX ADMIN — DOCUSATE SODIUM 100 MG: 100 CAPSULE, LIQUID FILLED ORAL at 12:22

## 2021-07-22 RX ADMIN — APIXABAN 2.5 MG: 2.5 TABLET, FILM COATED ORAL at 20:14

## 2021-07-22 RX ADMIN — GABAPENTIN 400 MG: 400 CAPSULE ORAL at 20:15

## 2021-07-23 ENCOUNTER — APPOINTMENT (OUTPATIENT)
Dept: CARDIOLOGY | Facility: HOSPITAL | Age: 56
End: 2021-07-23

## 2021-07-23 ENCOUNTER — APPOINTMENT (OUTPATIENT)
Dept: GENERAL RADIOLOGY | Facility: HOSPITAL | Age: 56
End: 2021-07-23

## 2021-07-23 ENCOUNTER — APPOINTMENT (OUTPATIENT)
Dept: ULTRASOUND IMAGING | Facility: HOSPITAL | Age: 56
End: 2021-07-23

## 2021-07-23 LAB
ALBUMIN SERPL-MCNC: 2.38 G/DL (ref 3.5–5.2)
ALBUMIN/GLOB SERPL: 0.8 G/DL
ALP SERPL-CCNC: 99 U/L (ref 39–117)
ALT SERPL W P-5'-P-CCNC: 54 U/L (ref 1–33)
ANION GAP SERPL CALCULATED.3IONS-SCNC: 7.2 MMOL/L (ref 5–15)
AST SERPL-CCNC: 186 U/L (ref 1–32)
BACTERIA UR QL AUTO: ABNORMAL /HPF
BASOPHILS # BLD AUTO: 0.04 10*3/MM3 (ref 0–0.2)
BASOPHILS # BLD AUTO: 0.04 10*3/MM3 (ref 0–0.2)
BASOPHILS NFR BLD AUTO: 0.7 % (ref 0–1.5)
BASOPHILS NFR BLD AUTO: 0.8 % (ref 0–1.5)
BH CV ECHO MEAS - ACS: 1.6 CM
BH CV ECHO MEAS - AO ROOT AREA (BSA CORRECTED): 1.2
BH CV ECHO MEAS - AO ROOT AREA: 4.2 CM^2
BH CV ECHO MEAS - AO ROOT DIAM: 2.3 CM
BH CV ECHO MEAS - BSA(HAYCOCK): 2.2 M^2
BH CV ECHO MEAS - BSA: 2 M^2
BH CV ECHO MEAS - BZI_BMI: 53 KILOGRAMS/M^2
BH CV ECHO MEAS - BZI_METRIC_HEIGHT: 144.8 CM
BH CV ECHO MEAS - BZI_METRIC_WEIGHT: 111.1 KG
BH CV ECHO MEAS - EDV(CUBED): 103.8 ML
BH CV ECHO MEAS - EDV(MOD-SP2): 57.6 ML
BH CV ECHO MEAS - EDV(MOD-SP4): 101 ML
BH CV ECHO MEAS - EDV(TEICH): 102.4 ML
BH CV ECHO MEAS - EF(CUBED): 68.4 %
BH CV ECHO MEAS - EF(MOD-SP2): 61.5 %
BH CV ECHO MEAS - EF(MOD-SP4): 64.3 %
BH CV ECHO MEAS - EF(TEICH): 60 %
BH CV ECHO MEAS - ESV(CUBED): 32.8 ML
BH CV ECHO MEAS - ESV(MOD-SP2): 22.2 ML
BH CV ECHO MEAS - ESV(MOD-SP4): 36.1 ML
BH CV ECHO MEAS - ESV(TEICH): 41 ML
BH CV ECHO MEAS - FS: 31.9 %
BH CV ECHO MEAS - IVS/LVPW: 0.91
BH CV ECHO MEAS - IVSD: 1 CM
BH CV ECHO MEAS - LA DIMENSION: 4.2 CM
BH CV ECHO MEAS - LA/AO: 1.8
BH CV ECHO MEAS - LV DIASTOLIC VOL/BSA (35-75): 51.5 ML/M^2
BH CV ECHO MEAS - LV MASS(C)D: 175.8 GRAMS
BH CV ECHO MEAS - LV MASS(C)DI: 89.7 GRAMS/M^2
BH CV ECHO MEAS - LV SYSTOLIC VOL/BSA (12-30): 18.4 ML/M^2
BH CV ECHO MEAS - LVIDD: 4.7 CM
BH CV ECHO MEAS - LVIDS: 3.2 CM
BH CV ECHO MEAS - LVLD AP2: 6.6 CM
BH CV ECHO MEAS - LVLD AP4: 7.6 CM
BH CV ECHO MEAS - LVLS AP2: 5.7 CM
BH CV ECHO MEAS - LVLS AP4: 6.4 CM
BH CV ECHO MEAS - LVOT AREA (M): 2.8 CM^2
BH CV ECHO MEAS - LVOT AREA: 2.8 CM^2
BH CV ECHO MEAS - LVOT DIAM: 1.9 CM
BH CV ECHO MEAS - LVPWD: 1.1 CM
BH CV ECHO MEAS - MV A MAX VEL: 77.1 CM/SEC
BH CV ECHO MEAS - MV E MAX VEL: 138 CM/SEC
BH CV ECHO MEAS - MV E/A: 1.8
BH CV ECHO MEAS - PA ACC TIME: 0.13 SEC
BH CV ECHO MEAS - PA PR(ACCEL): 21.9 MMHG
BH CV ECHO MEAS - RAP SYSTOLE: 10 MMHG
BH CV ECHO MEAS - RVSP: 51.7 MMHG
BH CV ECHO MEAS - SI(CUBED): 36.2 ML/M^2
BH CV ECHO MEAS - SI(MOD-SP2): 18.1 ML/M^2
BH CV ECHO MEAS - SI(MOD-SP4): 33.1 ML/M^2
BH CV ECHO MEAS - SI(TEICH): 31.3 ML/M^2
BH CV ECHO MEAS - SV(CUBED): 71.1 ML
BH CV ECHO MEAS - SV(MOD-SP2): 35.4 ML
BH CV ECHO MEAS - SV(MOD-SP4): 64.9 ML
BH CV ECHO MEAS - SV(TEICH): 61.4 ML
BH CV ECHO MEAS - TR MAX VEL: 323 CM/SEC
BILIRUB SERPL-MCNC: 1.5 MG/DL (ref 0–1.2)
BILIRUB UR QL STRIP: ABNORMAL
BUN SERPL-MCNC: 6 MG/DL (ref 6–20)
BUN/CREAT SERPL: 12 (ref 7–25)
CALCIUM SPEC-SCNC: 8.1 MG/DL (ref 8.6–10.5)
CHLORIDE SERPL-SCNC: 105 MMOL/L (ref 98–107)
CK SERPL-CCNC: 2170 U/L (ref 20–180)
CLARITY UR: CLEAR
CO2 SERPL-SCNC: 28.8 MMOL/L (ref 22–29)
COLOR UR: ABNORMAL
CREAT SERPL-MCNC: 0.5 MG/DL (ref 0.57–1)
DEPRECATED RDW RBC AUTO: 49.6 FL (ref 37–54)
DEPRECATED RDW RBC AUTO: 51.8 FL (ref 37–54)
EOSINOPHIL # BLD AUTO: 0.26 10*3/MM3 (ref 0–0.4)
EOSINOPHIL # BLD AUTO: 0.27 10*3/MM3 (ref 0–0.4)
EOSINOPHIL NFR BLD AUTO: 5 % (ref 0.3–6.2)
EOSINOPHIL NFR BLD AUTO: 5.2 % (ref 0.3–6.2)
ERYTHROCYTE [DISTWIDTH] IN BLOOD BY AUTOMATED COUNT: 14.3 % (ref 12.3–15.4)
ERYTHROCYTE [DISTWIDTH] IN BLOOD BY AUTOMATED COUNT: 14.5 % (ref 12.3–15.4)
GFR SERPL CREATININE-BSD FRML MDRD: 128 ML/MIN/1.73
GLOBULIN UR ELPH-MCNC: 2.9 GM/DL
GLUCOSE BLDC GLUCOMTR-MCNC: 114 MG/DL (ref 70–130)
GLUCOSE BLDC GLUCOMTR-MCNC: 117 MG/DL (ref 70–130)
GLUCOSE BLDC GLUCOMTR-MCNC: 137 MG/DL (ref 70–130)
GLUCOSE BLDC GLUCOMTR-MCNC: 151 MG/DL (ref 70–130)
GLUCOSE BLDC GLUCOMTR-MCNC: 179 MG/DL (ref 70–130)
GLUCOSE SERPL-MCNC: 105 MG/DL (ref 65–99)
GLUCOSE UR STRIP-MCNC: NEGATIVE MG/DL
HCT VFR BLD AUTO: 25.1 % (ref 34–46.6)
HCT VFR BLD AUTO: 25.8 % (ref 34–46.6)
HGB BLD-MCNC: 8.1 G/DL (ref 12–15.9)
HGB BLD-MCNC: 8.4 G/DL (ref 12–15.9)
HGB UR QL STRIP.AUTO: NEGATIVE
HYALINE CASTS UR QL AUTO: ABNORMAL /LPF
IMM GRANULOCYTES # BLD AUTO: 0.02 10*3/MM3 (ref 0–0.05)
IMM GRANULOCYTES # BLD AUTO: 0.03 10*3/MM3 (ref 0–0.05)
IMM GRANULOCYTES NFR BLD AUTO: 0.4 % (ref 0–0.5)
IMM GRANULOCYTES NFR BLD AUTO: 0.6 % (ref 0–0.5)
KETONES UR QL STRIP: NEGATIVE
LEUKOCYTE ESTERASE UR QL STRIP.AUTO: ABNORMAL
LYMPHOCYTES # BLD AUTO: 1.72 10*3/MM3 (ref 0.7–3.1)
LYMPHOCYTES # BLD AUTO: 2.13 10*3/MM3 (ref 0.7–3.1)
LYMPHOCYTES NFR BLD AUTO: 34.3 % (ref 19.6–45.3)
LYMPHOCYTES NFR BLD AUTO: 39.2 % (ref 19.6–45.3)
MAXIMAL PREDICTED HEART RATE: 164 BPM
MCH RBC QN AUTO: 31.2 PG (ref 26.6–33)
MCH RBC QN AUTO: 32.3 PG (ref 26.6–33)
MCHC RBC AUTO-ENTMCNC: 32.3 G/DL (ref 31.5–35.7)
MCHC RBC AUTO-ENTMCNC: 32.6 G/DL (ref 31.5–35.7)
MCV RBC AUTO: 96.5 FL (ref 79–97)
MCV RBC AUTO: 99.2 FL (ref 79–97)
MONOCYTES # BLD AUTO: 0.52 10*3/MM3 (ref 0.1–0.9)
MONOCYTES # BLD AUTO: 0.55 10*3/MM3 (ref 0.1–0.9)
MONOCYTES NFR BLD AUTO: 10.1 % (ref 5–12)
MONOCYTES NFR BLD AUTO: 10.4 % (ref 5–12)
NEUTROPHILS NFR BLD AUTO: 2.41 10*3/MM3 (ref 1.7–7)
NEUTROPHILS NFR BLD AUTO: 2.45 10*3/MM3 (ref 1.7–7)
NEUTROPHILS NFR BLD AUTO: 44.4 % (ref 42.7–76)
NEUTROPHILS NFR BLD AUTO: 48.9 % (ref 42.7–76)
NITRITE UR QL STRIP: NEGATIVE
NRBC BLD AUTO-RTO: 0 /100 WBC (ref 0–0.2)
NRBC BLD AUTO-RTO: 0.4 /100 WBC (ref 0–0.2)
PH UR STRIP.AUTO: 5.5 [PH] (ref 5–8)
PLATELET # BLD AUTO: 127 10*3/MM3 (ref 140–450)
PLATELET # BLD AUTO: 137 10*3/MM3 (ref 140–450)
PMV BLD AUTO: 10.8 FL (ref 6–12)
PMV BLD AUTO: 11 FL (ref 6–12)
POTASSIUM SERPL-SCNC: 3.5 MMOL/L (ref 3.5–5.2)
PROT SERPL-MCNC: 5.3 G/DL (ref 6–8.5)
PROT UR QL STRIP: NEGATIVE
RBC # BLD AUTO: 2.6 10*6/MM3 (ref 3.77–5.28)
RBC # BLD AUTO: 2.6 10*6/MM3 (ref 3.77–5.28)
RBC # UR: ABNORMAL /HPF
REF LAB TEST METHOD: ABNORMAL
SODIUM SERPL-SCNC: 141 MMOL/L (ref 136–145)
SP GR UR STRIP: 1.02 (ref 1–1.03)
SQUAMOUS #/AREA URNS HPF: ABNORMAL /HPF
STRESS TARGET HR: 139 BPM
UROBILINOGEN UR QL STRIP: ABNORMAL
WBC # BLD AUTO: 5.01 10*3/MM3 (ref 3.4–10.8)
WBC # BLD AUTO: 5.43 10*3/MM3 (ref 3.4–10.8)
WBC UR QL AUTO: ABNORMAL /HPF
YEAST URNS QL MICRO: ABNORMAL /HPF

## 2021-07-23 PROCEDURE — 63710000001 INSULIN ASPART PER 5 UNITS: Performed by: ORTHOPAEDIC SURGERY

## 2021-07-23 PROCEDURE — 99233 SBSQ HOSP IP/OBS HIGH 50: CPT | Performed by: PHYSICIAN ASSISTANT

## 2021-07-23 PROCEDURE — 85025 COMPLETE CBC W/AUTO DIFF WBC: CPT | Performed by: INTERNAL MEDICINE

## 2021-07-23 PROCEDURE — 85025 COMPLETE CBC W/AUTO DIFF WBC: CPT | Performed by: PHYSICIAN ASSISTANT

## 2021-07-23 PROCEDURE — 74018 RADEX ABDOMEN 1 VIEW: CPT | Performed by: RADIOLOGY

## 2021-07-23 PROCEDURE — 93306 TTE W/DOPPLER COMPLETE: CPT

## 2021-07-23 PROCEDURE — 76705 ECHO EXAM OF ABDOMEN: CPT

## 2021-07-23 PROCEDURE — 80053 COMPREHEN METABOLIC PANEL: CPT | Performed by: INTERNAL MEDICINE

## 2021-07-23 PROCEDURE — 82962 GLUCOSE BLOOD TEST: CPT

## 2021-07-23 PROCEDURE — 76705 ECHO EXAM OF ABDOMEN: CPT | Performed by: RADIOLOGY

## 2021-07-23 PROCEDURE — 74018 RADEX ABDOMEN 1 VIEW: CPT

## 2021-07-23 PROCEDURE — 81001 URINALYSIS AUTO W/SCOPE: CPT | Performed by: PHYSICIAN ASSISTANT

## 2021-07-23 PROCEDURE — 87086 URINE CULTURE/COLONY COUNT: CPT | Performed by: PHYSICIAN ASSISTANT

## 2021-07-23 PROCEDURE — 82550 ASSAY OF CK (CPK): CPT | Performed by: INTERNAL MEDICINE

## 2021-07-23 PROCEDURE — 94799 UNLISTED PULMONARY SVC/PX: CPT

## 2021-07-23 RX ORDER — POLYETHYLENE GLYCOL 3350 17 G/17G
17 POWDER, FOR SOLUTION ORAL DAILY
Status: DISCONTINUED | OUTPATIENT
Start: 2021-07-23 | End: 2021-08-03

## 2021-07-23 RX ORDER — LACTULOSE 10 G/15ML
30 SOLUTION ORAL ONCE
Status: COMPLETED | OUTPATIENT
Start: 2021-07-23 | End: 2021-07-23

## 2021-07-23 RX ORDER — AMOXICILLIN 250 MG
2 CAPSULE ORAL 2 TIMES DAILY
Status: DISCONTINUED | OUTPATIENT
Start: 2021-07-23 | End: 2021-08-03

## 2021-07-23 RX ADMIN — CETIRIZINE HYDROCHLORIDE 10 MG: 10 TABLET, FILM COATED ORAL at 08:43

## 2021-07-23 RX ADMIN — APIXABAN 2.5 MG: 2.5 TABLET, FILM COATED ORAL at 08:43

## 2021-07-23 RX ADMIN — SODIUM CHLORIDE 75 ML/HR: 9 INJECTION, SOLUTION INTRAVENOUS at 06:37

## 2021-07-23 RX ADMIN — SERTRALINE 50 MG: 50 TABLET, FILM COATED ORAL at 08:43

## 2021-07-23 RX ADMIN — METOPROLOL TARTRATE 25 MG: 25 TABLET, FILM COATED ORAL at 20:30

## 2021-07-23 RX ADMIN — HYDROCODONE BITARTRATE AND ACETAMINOPHEN 1 TABLET: 10; 325 TABLET ORAL at 13:56

## 2021-07-23 RX ADMIN — DOCUSATE SODIUM 50 MG AND SENNOSIDES 8.6 MG 2 TABLET: 8.6; 5 TABLET, FILM COATED ORAL at 11:24

## 2021-07-23 RX ADMIN — OFLOXACIN 50000 UNITS: 300 TABLET, COATED ORAL at 11:24

## 2021-07-23 RX ADMIN — DOCUSATE SODIUM 50 MG AND SENNOSIDES 8.6 MG 2 TABLET: 8.6; 5 TABLET, FILM COATED ORAL at 20:30

## 2021-07-23 RX ADMIN — LEVOTHYROXINE SODIUM 25 MCG: 25 TABLET ORAL at 08:43

## 2021-07-23 RX ADMIN — QUETIAPINE FUMARATE 25 MG: 25 TABLET, FILM COATED ORAL at 20:30

## 2021-07-23 RX ADMIN — APIXABAN 2.5 MG: 2.5 TABLET, FILM COATED ORAL at 20:30

## 2021-07-23 RX ADMIN — INSULIN ASPART 2 UNITS: 100 INJECTION, SOLUTION INTRAVENOUS; SUBCUTANEOUS at 11:23

## 2021-07-23 RX ADMIN — HYDROCODONE BITARTRATE AND ACETAMINOPHEN 1 TABLET: 5; 325 TABLET ORAL at 05:11

## 2021-07-23 RX ADMIN — SODIUM CHLORIDE 75 ML/HR: 9 INJECTION, SOLUTION INTRAVENOUS at 21:19

## 2021-07-23 RX ADMIN — GABAPENTIN 400 MG: 400 CAPSULE ORAL at 20:30

## 2021-07-23 RX ADMIN — FERROUS SULFATE TAB 325 MG (65 MG ELEMENTAL FE) 325 MG: 325 (65 FE) TAB at 08:43

## 2021-07-23 RX ADMIN — DOCUSATE SODIUM 100 MG: 100 CAPSULE, LIQUID FILLED ORAL at 08:43

## 2021-07-23 RX ADMIN — METOPROLOL TARTRATE 25 MG: 25 TABLET, FILM COATED ORAL at 08:43

## 2021-07-23 RX ADMIN — GABAPENTIN 400 MG: 400 CAPSULE ORAL at 08:49

## 2021-07-23 RX ADMIN — ASPIRIN 81 MG: 81 TABLET, COATED ORAL at 08:43

## 2021-07-23 RX ADMIN — POLYETHYLENE GLYCOL (3350) 17 G: 17 POWDER, FOR SOLUTION ORAL at 11:24

## 2021-07-23 RX ADMIN — LACTULOSE 30 G: 10 SOLUTION ORAL at 11:27

## 2021-07-23 RX ADMIN — PANTOPRAZOLE SODIUM 40 MG: 40 TABLET, DELAYED RELEASE ORAL at 05:11

## 2021-07-23 RX ADMIN — SODIUM CHLORIDE, PRESERVATIVE FREE 10 ML: 5 INJECTION INTRAVENOUS at 20:30

## 2021-07-24 LAB
ALBUMIN SERPL-MCNC: 2.49 G/DL (ref 3.5–5.2)
ALBUMIN/GLOB SERPL: 0.8 G/DL
ALP SERPL-CCNC: 120 U/L (ref 39–117)
ALT SERPL W P-5'-P-CCNC: 53 U/L (ref 1–33)
ANION GAP SERPL CALCULATED.3IONS-SCNC: 8.4 MMOL/L (ref 5–15)
AST SERPL-CCNC: 138 U/L (ref 1–32)
BACTERIA SPEC AEROBE CULT: ABNORMAL
BACTERIA SPEC AEROBE CULT: ABNORMAL
BASOPHILS # BLD AUTO: 0.05 10*3/MM3 (ref 0–0.2)
BASOPHILS NFR BLD AUTO: 0.7 % (ref 0–1.5)
BILIRUB SERPL-MCNC: 1.3 MG/DL (ref 0–1.2)
BUN SERPL-MCNC: 5 MG/DL (ref 6–20)
BUN/CREAT SERPL: 10.4 (ref 7–25)
CALCIUM SPEC-SCNC: 8.4 MG/DL (ref 8.6–10.5)
CHLORIDE SERPL-SCNC: 102 MMOL/L (ref 98–107)
CK SERPL-CCNC: 1112 U/L (ref 20–180)
CO2 SERPL-SCNC: 27.6 MMOL/L (ref 22–29)
CREAT SERPL-MCNC: 0.48 MG/DL (ref 0.57–1)
DEPRECATED RDW RBC AUTO: 51 FL (ref 37–54)
EOSINOPHIL # BLD AUTO: 0.3 10*3/MM3 (ref 0–0.4)
EOSINOPHIL NFR BLD AUTO: 4.4 % (ref 0.3–6.2)
ERYTHROCYTE [DISTWIDTH] IN BLOOD BY AUTOMATED COUNT: 14.6 % (ref 12.3–15.4)
GFR SERPL CREATININE-BSD FRML MDRD: 134 ML/MIN/1.73
GLOBULIN UR ELPH-MCNC: 3 GM/DL
GLUCOSE BLDC GLUCOMTR-MCNC: 110 MG/DL (ref 70–130)
GLUCOSE BLDC GLUCOMTR-MCNC: 133 MG/DL (ref 70–130)
GLUCOSE BLDC GLUCOMTR-MCNC: 140 MG/DL (ref 70–130)
GLUCOSE BLDC GLUCOMTR-MCNC: 162 MG/DL (ref 70–130)
GLUCOSE BLDC GLUCOMTR-MCNC: 174 MG/DL (ref 70–130)
GLUCOSE BLDC GLUCOMTR-MCNC: 96 MG/DL (ref 70–130)
GLUCOSE SERPL-MCNC: 92 MG/DL (ref 65–99)
HCT VFR BLD AUTO: 26.3 % (ref 34–46.6)
HGB BLD-MCNC: 8.6 G/DL (ref 12–15.9)
IMM GRANULOCYTES # BLD AUTO: 0.04 10*3/MM3 (ref 0–0.05)
IMM GRANULOCYTES NFR BLD AUTO: 0.6 % (ref 0–0.5)
LYMPHOCYTES # BLD AUTO: 2.24 10*3/MM3 (ref 0.7–3.1)
LYMPHOCYTES NFR BLD AUTO: 32.6 % (ref 19.6–45.3)
MCH RBC QN AUTO: 32 PG (ref 26.6–33)
MCHC RBC AUTO-ENTMCNC: 32.7 G/DL (ref 31.5–35.7)
MCV RBC AUTO: 97.8 FL (ref 79–97)
MONOCYTES # BLD AUTO: 0.77 10*3/MM3 (ref 0.1–0.9)
MONOCYTES NFR BLD AUTO: 11.2 % (ref 5–12)
NEUTROPHILS NFR BLD AUTO: 3.47 10*3/MM3 (ref 1.7–7)
NEUTROPHILS NFR BLD AUTO: 50.5 % (ref 42.7–76)
NRBC BLD AUTO-RTO: 0.6 /100 WBC (ref 0–0.2)
PLATELET # BLD AUTO: 156 10*3/MM3 (ref 140–450)
PMV BLD AUTO: 11 FL (ref 6–12)
POTASSIUM SERPL-SCNC: 3.2 MMOL/L (ref 3.5–5.2)
POTASSIUM SERPL-SCNC: 3.6 MMOL/L (ref 3.5–5.2)
PROT SERPL-MCNC: 5.5 G/DL (ref 6–8.5)
RBC # BLD AUTO: 2.69 10*6/MM3 (ref 3.77–5.28)
SODIUM SERPL-SCNC: 138 MMOL/L (ref 136–145)
WBC # BLD AUTO: 6.87 10*3/MM3 (ref 3.4–10.8)

## 2021-07-24 PROCEDURE — 85025 COMPLETE CBC W/AUTO DIFF WBC: CPT | Performed by: PHYSICIAN ASSISTANT

## 2021-07-24 PROCEDURE — 84132 ASSAY OF SERUM POTASSIUM: CPT | Performed by: INTERNAL MEDICINE

## 2021-07-24 PROCEDURE — 82550 ASSAY OF CK (CPK): CPT | Performed by: PHYSICIAN ASSISTANT

## 2021-07-24 PROCEDURE — 80053 COMPREHEN METABOLIC PANEL: CPT | Performed by: PHYSICIAN ASSISTANT

## 2021-07-24 PROCEDURE — 97530 THERAPEUTIC ACTIVITIES: CPT

## 2021-07-24 PROCEDURE — 94799 UNLISTED PULMONARY SVC/PX: CPT

## 2021-07-24 PROCEDURE — 82962 GLUCOSE BLOOD TEST: CPT

## 2021-07-24 PROCEDURE — 99233 SBSQ HOSP IP/OBS HIGH 50: CPT | Performed by: PHYSICIAN ASSISTANT

## 2021-07-24 RX ORDER — POTASSIUM CHLORIDE 20 MEQ/1
40 TABLET, EXTENDED RELEASE ORAL EVERY 4 HOURS
Status: COMPLETED | OUTPATIENT
Start: 2021-07-24 | End: 2021-07-24

## 2021-07-24 RX ORDER — LACTULOSE 10 G/15ML
30 SOLUTION ORAL ONCE
Status: COMPLETED | OUTPATIENT
Start: 2021-07-24 | End: 2021-07-24

## 2021-07-24 RX ORDER — MAGNESIUM SULFATE HEPTAHYDRATE 40 MG/ML
4 INJECTION, SOLUTION INTRAVENOUS AS NEEDED
Status: DISCONTINUED | OUTPATIENT
Start: 2021-07-24 | End: 2021-08-05 | Stop reason: HOSPADM

## 2021-07-24 RX ORDER — POTASSIUM CHLORIDE 7.45 MG/ML
10 INJECTION INTRAVENOUS
Status: DISCONTINUED | OUTPATIENT
Start: 2021-07-24 | End: 2021-08-05 | Stop reason: HOSPADM

## 2021-07-24 RX ORDER — POTASSIUM CHLORIDE 20 MEQ/1
40 TABLET, EXTENDED RELEASE ORAL AS NEEDED
Status: DISCONTINUED | OUTPATIENT
Start: 2021-07-24 | End: 2021-08-05 | Stop reason: HOSPADM

## 2021-07-24 RX ORDER — BISACODYL 10 MG
10 SUPPOSITORY, RECTAL RECTAL DAILY PRN
Status: DISCONTINUED | OUTPATIENT
Start: 2021-07-24 | End: 2021-08-05 | Stop reason: HOSPADM

## 2021-07-24 RX ORDER — MAGNESIUM SULFATE HEPTAHYDRATE 40 MG/ML
2 INJECTION, SOLUTION INTRAVENOUS AS NEEDED
Status: DISCONTINUED | OUTPATIENT
Start: 2021-07-24 | End: 2021-08-05 | Stop reason: HOSPADM

## 2021-07-24 RX ORDER — POTASSIUM CHLORIDE 1.5 G/1.77G
40 POWDER, FOR SOLUTION ORAL AS NEEDED
Status: DISCONTINUED | OUTPATIENT
Start: 2021-07-24 | End: 2021-08-05 | Stop reason: HOSPADM

## 2021-07-24 RX ADMIN — HYDROCODONE BITARTRATE AND ACETAMINOPHEN 1 TABLET: 5; 325 TABLET ORAL at 18:32

## 2021-07-24 RX ADMIN — LEVOTHYROXINE SODIUM 25 MCG: 25 TABLET ORAL at 08:46

## 2021-07-24 RX ADMIN — SODIUM CHLORIDE, PRESERVATIVE FREE 10 ML: 5 INJECTION INTRAVENOUS at 08:45

## 2021-07-24 RX ADMIN — DOCUSATE SODIUM 50 MG AND SENNOSIDES 8.6 MG 2 TABLET: 8.6; 5 TABLET, FILM COATED ORAL at 08:46

## 2021-07-24 RX ADMIN — GABAPENTIN 400 MG: 400 CAPSULE ORAL at 08:45

## 2021-07-24 RX ADMIN — BISACODYL 10 MG: 10 SUPPOSITORY RECTAL at 15:40

## 2021-07-24 RX ADMIN — POLYETHYLENE GLYCOL (3350) 17 G: 17 POWDER, FOR SOLUTION ORAL at 08:46

## 2021-07-24 RX ADMIN — APIXABAN 2.5 MG: 2.5 TABLET, FILM COATED ORAL at 22:22

## 2021-07-24 RX ADMIN — METOPROLOL TARTRATE 25 MG: 25 TABLET, FILM COATED ORAL at 22:22

## 2021-07-24 RX ADMIN — HYDROCODONE BITARTRATE AND ACETAMINOPHEN 1 TABLET: 7.5; 325 TABLET ORAL at 22:24

## 2021-07-24 RX ADMIN — FERROUS SULFATE TAB 325 MG (65 MG ELEMENTAL FE) 325 MG: 325 (65 FE) TAB at 08:46

## 2021-07-24 RX ADMIN — HYDROCODONE BITARTRATE AND ACETAMINOPHEN 1 TABLET: 7.5; 325 TABLET ORAL at 12:46

## 2021-07-24 RX ADMIN — POTASSIUM CHLORIDE 40 MEQ: 20 TABLET, EXTENDED RELEASE ORAL at 17:25

## 2021-07-24 RX ADMIN — SERTRALINE 50 MG: 50 TABLET, FILM COATED ORAL at 08:46

## 2021-07-24 RX ADMIN — METOPROLOL TARTRATE 25 MG: 25 TABLET, FILM COATED ORAL at 08:46

## 2021-07-24 RX ADMIN — PANTOPRAZOLE SODIUM 40 MG: 40 TABLET, DELAYED RELEASE ORAL at 05:37

## 2021-07-24 RX ADMIN — GABAPENTIN 400 MG: 400 CAPSULE ORAL at 22:22

## 2021-07-24 RX ADMIN — ASPIRIN 81 MG: 81 TABLET, COATED ORAL at 08:46

## 2021-07-24 RX ADMIN — APIXABAN 2.5 MG: 2.5 TABLET, FILM COATED ORAL at 08:46

## 2021-07-24 RX ADMIN — CETIRIZINE HYDROCHLORIDE 10 MG: 10 TABLET, FILM COATED ORAL at 08:46

## 2021-07-24 RX ADMIN — POTASSIUM CHLORIDE 40 MEQ: 20 TABLET, EXTENDED RELEASE ORAL at 11:43

## 2021-07-24 RX ADMIN — LACTULOSE 30 G: 10 SOLUTION ORAL at 11:42

## 2021-07-24 RX ADMIN — QUETIAPINE FUMARATE 25 MG: 25 TABLET, FILM COATED ORAL at 22:21

## 2021-07-24 RX ADMIN — HYDROCODONE BITARTRATE AND ACETAMINOPHEN 1 TABLET: 5; 325 TABLET ORAL at 03:55

## 2021-07-25 LAB
ANION GAP SERPL CALCULATED.3IONS-SCNC: 7.4 MMOL/L (ref 5–15)
BASOPHILS # BLD AUTO: 0.06 10*3/MM3 (ref 0–0.2)
BASOPHILS NFR BLD AUTO: 0.9 % (ref 0–1.5)
BUN SERPL-MCNC: 5 MG/DL (ref 6–20)
BUN/CREAT SERPL: 9.8 (ref 7–25)
CALCIUM SPEC-SCNC: 8.5 MG/DL (ref 8.6–10.5)
CHLORIDE SERPL-SCNC: 107 MMOL/L (ref 98–107)
CO2 SERPL-SCNC: 26.6 MMOL/L (ref 22–29)
CREAT SERPL-MCNC: 0.51 MG/DL (ref 0.57–1)
DEPRECATED RDW RBC AUTO: 53.2 FL (ref 37–54)
EOSINOPHIL # BLD AUTO: 0.38 10*3/MM3 (ref 0–0.4)
EOSINOPHIL NFR BLD AUTO: 5.4 % (ref 0.3–6.2)
ERYTHROCYTE [DISTWIDTH] IN BLOOD BY AUTOMATED COUNT: 14.7 % (ref 12.3–15.4)
GFR SERPL CREATININE-BSD FRML MDRD: 125 ML/MIN/1.73
GLUCOSE BLDC GLUCOMTR-MCNC: 100 MG/DL (ref 70–130)
GLUCOSE BLDC GLUCOMTR-MCNC: 144 MG/DL (ref 70–130)
GLUCOSE BLDC GLUCOMTR-MCNC: 195 MG/DL (ref 70–130)
GLUCOSE BLDC GLUCOMTR-MCNC: 204 MG/DL (ref 70–130)
GLUCOSE SERPL-MCNC: 95 MG/DL (ref 65–99)
HCT VFR BLD AUTO: 28.1 % (ref 34–46.6)
HGB BLD-MCNC: 9 G/DL (ref 12–15.9)
IMM GRANULOCYTES # BLD AUTO: 0.06 10*3/MM3 (ref 0–0.05)
IMM GRANULOCYTES NFR BLD AUTO: 0.9 % (ref 0–0.5)
LYMPHOCYTES # BLD AUTO: 2.58 10*3/MM3 (ref 0.7–3.1)
LYMPHOCYTES NFR BLD AUTO: 36.9 % (ref 19.6–45.3)
MCH RBC QN AUTO: 31.9 PG (ref 26.6–33)
MCHC RBC AUTO-ENTMCNC: 32 G/DL (ref 31.5–35.7)
MCV RBC AUTO: 99.6 FL (ref 79–97)
MONOCYTES # BLD AUTO: 0.76 10*3/MM3 (ref 0.1–0.9)
MONOCYTES NFR BLD AUTO: 10.9 % (ref 5–12)
NEUTROPHILS NFR BLD AUTO: 3.16 10*3/MM3 (ref 1.7–7)
NEUTROPHILS NFR BLD AUTO: 45 % (ref 42.7–76)
NRBC BLD AUTO-RTO: 0.4 /100 WBC (ref 0–0.2)
PLATELET # BLD AUTO: 171 10*3/MM3 (ref 140–450)
PMV BLD AUTO: 10.9 FL (ref 6–12)
POTASSIUM SERPL-SCNC: 3.7 MMOL/L (ref 3.5–5.2)
RBC # BLD AUTO: 2.82 10*6/MM3 (ref 3.77–5.28)
SODIUM SERPL-SCNC: 141 MMOL/L (ref 136–145)
WBC # BLD AUTO: 7 10*3/MM3 (ref 3.4–10.8)

## 2021-07-25 PROCEDURE — 85025 COMPLETE CBC W/AUTO DIFF WBC: CPT | Performed by: PHYSICIAN ASSISTANT

## 2021-07-25 PROCEDURE — 94799 UNLISTED PULMONARY SVC/PX: CPT

## 2021-07-25 PROCEDURE — 82962 GLUCOSE BLOOD TEST: CPT

## 2021-07-25 PROCEDURE — 99232 SBSQ HOSP IP/OBS MODERATE 35: CPT | Performed by: PHYSICIAN ASSISTANT

## 2021-07-25 PROCEDURE — 80048 BASIC METABOLIC PNL TOTAL CA: CPT | Performed by: PHYSICIAN ASSISTANT

## 2021-07-25 PROCEDURE — 63710000001 INSULIN ASPART PER 5 UNITS: Performed by: ORTHOPAEDIC SURGERY

## 2021-07-25 RX ORDER — POTASSIUM CHLORIDE 20 MEQ/1
40 TABLET, EXTENDED RELEASE ORAL EVERY 4 HOURS
Status: DISPENSED | OUTPATIENT
Start: 2021-07-25 | End: 2021-07-25

## 2021-07-25 RX ADMIN — METOPROLOL TARTRATE 25 MG: 25 TABLET, FILM COATED ORAL at 08:27

## 2021-07-25 RX ADMIN — INSULIN ASPART 3 UNITS: 100 INJECTION, SOLUTION INTRAVENOUS; SUBCUTANEOUS at 10:55

## 2021-07-25 RX ADMIN — HYDROCODONE BITARTRATE AND ACETAMINOPHEN 1 TABLET: 7.5; 325 TABLET ORAL at 10:55

## 2021-07-25 RX ADMIN — FERROUS SULFATE TAB 325 MG (65 MG ELEMENTAL FE) 325 MG: 325 (65 FE) TAB at 08:26

## 2021-07-25 RX ADMIN — APIXABAN 2.5 MG: 2.5 TABLET, FILM COATED ORAL at 20:26

## 2021-07-25 RX ADMIN — HYDROCODONE BITARTRATE AND ACETAMINOPHEN 1 TABLET: 5; 325 TABLET ORAL at 20:26

## 2021-07-25 RX ADMIN — QUETIAPINE FUMARATE 25 MG: 25 TABLET, FILM COATED ORAL at 20:27

## 2021-07-25 RX ADMIN — LEVOTHYROXINE SODIUM 25 MCG: 25 TABLET ORAL at 08:27

## 2021-07-25 RX ADMIN — SERTRALINE 50 MG: 50 TABLET, FILM COATED ORAL at 08:26

## 2021-07-25 RX ADMIN — DOCUSATE SODIUM 50 MG AND SENNOSIDES 8.6 MG 2 TABLET: 8.6; 5 TABLET, FILM COATED ORAL at 20:27

## 2021-07-25 RX ADMIN — APIXABAN 2.5 MG: 2.5 TABLET, FILM COATED ORAL at 20:28

## 2021-07-25 RX ADMIN — QUETIAPINE FUMARATE 25 MG: 25 TABLET, FILM COATED ORAL at 20:26

## 2021-07-25 RX ADMIN — DOCUSATE SODIUM 50 MG AND SENNOSIDES 8.6 MG 2 TABLET: 8.6; 5 TABLET, FILM COATED ORAL at 20:26

## 2021-07-25 RX ADMIN — POTASSIUM CHLORIDE 40 MEQ: 20 TABLET, EXTENDED RELEASE ORAL at 05:31

## 2021-07-25 RX ADMIN — PANTOPRAZOLE SODIUM 40 MG: 40 TABLET, DELAYED RELEASE ORAL at 05:31

## 2021-07-25 RX ADMIN — GABAPENTIN 400 MG: 400 CAPSULE ORAL at 20:26

## 2021-07-25 RX ADMIN — SODIUM CHLORIDE, PRESERVATIVE FREE 10 ML: 5 INJECTION INTRAVENOUS at 08:28

## 2021-07-25 RX ADMIN — CETIRIZINE HYDROCHLORIDE 10 MG: 10 TABLET, FILM COATED ORAL at 08:27

## 2021-07-25 RX ADMIN — METOPROLOL TARTRATE 25 MG: 25 TABLET, FILM COATED ORAL at 22:56

## 2021-07-25 RX ADMIN — GABAPENTIN 400 MG: 400 CAPSULE ORAL at 08:27

## 2021-07-25 RX ADMIN — APIXABAN 2.5 MG: 2.5 TABLET, FILM COATED ORAL at 08:27

## 2021-07-25 RX ADMIN — ASPIRIN 81 MG: 81 TABLET, COATED ORAL at 08:26

## 2021-07-26 ENCOUNTER — APPOINTMENT (OUTPATIENT)
Dept: MRI IMAGING | Facility: HOSPITAL | Age: 56
End: 2021-07-26

## 2021-07-26 LAB
ALBUMIN SERPL-MCNC: 2.51 G/DL (ref 3.5–5.2)
ALBUMIN/GLOB SERPL: 0.8 G/DL
ALP SERPL-CCNC: 136 U/L (ref 39–117)
ALT SERPL W P-5'-P-CCNC: 38 U/L (ref 1–33)
ANION GAP SERPL CALCULATED.3IONS-SCNC: 8.1 MMOL/L (ref 5–15)
AST SERPL-CCNC: 67 U/L (ref 1–32)
BASOPHILS # BLD AUTO: 0.07 10*3/MM3 (ref 0–0.2)
BASOPHILS NFR BLD AUTO: 1.1 % (ref 0–1.5)
BILIRUB SERPL-MCNC: 1.3 MG/DL (ref 0–1.2)
BUN SERPL-MCNC: 6 MG/DL (ref 6–20)
BUN/CREAT SERPL: 12 (ref 7–25)
CALCIUM SPEC-SCNC: 8.6 MG/DL (ref 8.6–10.5)
CHLORIDE SERPL-SCNC: 106 MMOL/L (ref 98–107)
CO2 SERPL-SCNC: 28.9 MMOL/L (ref 22–29)
CREAT SERPL-MCNC: 0.5 MG/DL (ref 0.57–1)
DEPRECATED RDW RBC AUTO: 53.8 FL (ref 37–54)
EOSINOPHIL # BLD AUTO: 0.38 10*3/MM3 (ref 0–0.4)
EOSINOPHIL NFR BLD AUTO: 5.9 % (ref 0.3–6.2)
ERYTHROCYTE [DISTWIDTH] IN BLOOD BY AUTOMATED COUNT: 15.2 % (ref 12.3–15.4)
GFR SERPL CREATININE-BSD FRML MDRD: 128 ML/MIN/1.73
GLOBULIN UR ELPH-MCNC: 3.1 GM/DL
GLUCOSE BLDC GLUCOMTR-MCNC: 107 MG/DL (ref 70–130)
GLUCOSE BLDC GLUCOMTR-MCNC: 110 MG/DL (ref 70–130)
GLUCOSE BLDC GLUCOMTR-MCNC: 160 MG/DL (ref 70–130)
GLUCOSE BLDC GLUCOMTR-MCNC: 184 MG/DL (ref 70–130)
GLUCOSE SERPL-MCNC: 111 MG/DL (ref 65–99)
HCT VFR BLD AUTO: 27.4 % (ref 34–46.6)
HGB BLD-MCNC: 8.7 G/DL (ref 12–15.9)
IMM GRANULOCYTES # BLD AUTO: 0.08 10*3/MM3 (ref 0–0.05)
IMM GRANULOCYTES NFR BLD AUTO: 1.2 % (ref 0–0.5)
LYMPHOCYTES # BLD AUTO: 2.25 10*3/MM3 (ref 0.7–3.1)
LYMPHOCYTES NFR BLD AUTO: 35 % (ref 19.6–45.3)
MCH RBC QN AUTO: 31.4 PG (ref 26.6–33)
MCHC RBC AUTO-ENTMCNC: 31.8 G/DL (ref 31.5–35.7)
MCV RBC AUTO: 98.9 FL (ref 79–97)
MONOCYTES # BLD AUTO: 0.71 10*3/MM3 (ref 0.1–0.9)
MONOCYTES NFR BLD AUTO: 11 % (ref 5–12)
NEUTROPHILS NFR BLD AUTO: 2.94 10*3/MM3 (ref 1.7–7)
NEUTROPHILS NFR BLD AUTO: 45.8 % (ref 42.7–76)
NRBC BLD AUTO-RTO: 0.3 /100 WBC (ref 0–0.2)
PLATELET # BLD AUTO: 192 10*3/MM3 (ref 140–450)
PMV BLD AUTO: 10.8 FL (ref 6–12)
POTASSIUM SERPL-SCNC: 4.1 MMOL/L (ref 3.5–5.2)
PROT SERPL-MCNC: 5.6 G/DL (ref 6–8.5)
RBC # BLD AUTO: 2.77 10*6/MM3 (ref 3.77–5.28)
SODIUM SERPL-SCNC: 143 MMOL/L (ref 136–145)
WBC # BLD AUTO: 6.43 10*3/MM3 (ref 3.4–10.8)

## 2021-07-26 PROCEDURE — 72148 MRI LUMBAR SPINE W/O DYE: CPT

## 2021-07-26 PROCEDURE — 85025 COMPLETE CBC W/AUTO DIFF WBC: CPT | Performed by: PHYSICIAN ASSISTANT

## 2021-07-26 PROCEDURE — 25010000002 FUROSEMIDE PER 20 MG: Performed by: PHYSICIAN ASSISTANT

## 2021-07-26 PROCEDURE — 63710000001 INSULIN ASPART PER 5 UNITS: Performed by: ORTHOPAEDIC SURGERY

## 2021-07-26 PROCEDURE — 82962 GLUCOSE BLOOD TEST: CPT

## 2021-07-26 PROCEDURE — 72148 MRI LUMBAR SPINE W/O DYE: CPT | Performed by: RADIOLOGY

## 2021-07-26 PROCEDURE — 97110 THERAPEUTIC EXERCISES: CPT

## 2021-07-26 PROCEDURE — 97116 GAIT TRAINING THERAPY: CPT

## 2021-07-26 PROCEDURE — 80053 COMPREHEN METABOLIC PANEL: CPT | Performed by: PHYSICIAN ASSISTANT

## 2021-07-26 PROCEDURE — 99233 SBSQ HOSP IP/OBS HIGH 50: CPT | Performed by: PHYSICIAN ASSISTANT

## 2021-07-26 PROCEDURE — 94799 UNLISTED PULMONARY SVC/PX: CPT

## 2021-07-26 RX ORDER — FUROSEMIDE 10 MG/ML
40 INJECTION INTRAMUSCULAR; INTRAVENOUS ONCE
Status: COMPLETED | OUTPATIENT
Start: 2021-07-26 | End: 2021-07-26

## 2021-07-26 RX ORDER — ACETAMINOPHEN 325 MG/1
650 TABLET ORAL EVERY 6 HOURS PRN
Status: DISCONTINUED | OUTPATIENT
Start: 2021-07-26 | End: 2021-08-05 | Stop reason: HOSPADM

## 2021-07-26 RX ADMIN — METOPROLOL TARTRATE 25 MG: 25 TABLET, FILM COATED ORAL at 20:26

## 2021-07-26 RX ADMIN — HYDROCODONE BITARTRATE AND ACETAMINOPHEN 1 TABLET: 5; 325 TABLET ORAL at 06:52

## 2021-07-26 RX ADMIN — ASPIRIN 81 MG: 81 TABLET, COATED ORAL at 08:27

## 2021-07-26 RX ADMIN — PANTOPRAZOLE SODIUM 40 MG: 40 TABLET, DELAYED RELEASE ORAL at 06:03

## 2021-07-26 RX ADMIN — GABAPENTIN 400 MG: 400 CAPSULE ORAL at 08:27

## 2021-07-26 RX ADMIN — ACETAMINOPHEN 650 MG: 325 TABLET ORAL at 13:44

## 2021-07-26 RX ADMIN — DOCUSATE SODIUM 50 MG AND SENNOSIDES 8.6 MG 2 TABLET: 8.6; 5 TABLET, FILM COATED ORAL at 20:26

## 2021-07-26 RX ADMIN — APIXABAN 2.5 MG: 2.5 TABLET, FILM COATED ORAL at 20:26

## 2021-07-26 RX ADMIN — SERTRALINE 50 MG: 50 TABLET, FILM COATED ORAL at 08:27

## 2021-07-26 RX ADMIN — METOPROLOL TARTRATE 25 MG: 25 TABLET, FILM COATED ORAL at 08:27

## 2021-07-26 RX ADMIN — LEVOTHYROXINE SODIUM 25 MCG: 25 TABLET ORAL at 08:27

## 2021-07-26 RX ADMIN — SODIUM CHLORIDE, PRESERVATIVE FREE 10 ML: 5 INJECTION INTRAVENOUS at 08:28

## 2021-07-26 RX ADMIN — APIXABAN 2.5 MG: 2.5 TABLET, FILM COATED ORAL at 08:27

## 2021-07-26 RX ADMIN — QUETIAPINE FUMARATE 25 MG: 25 TABLET, FILM COATED ORAL at 20:26

## 2021-07-26 RX ADMIN — FUROSEMIDE 40 MG: 10 INJECTION, SOLUTION INTRAMUSCULAR; INTRAVENOUS at 13:44

## 2021-07-26 RX ADMIN — INSULIN ASPART 2 UNITS: 100 INJECTION, SOLUTION INTRAVENOUS; SUBCUTANEOUS at 11:32

## 2021-07-26 RX ADMIN — GABAPENTIN 400 MG: 400 CAPSULE ORAL at 20:26

## 2021-07-26 RX ADMIN — FERROUS SULFATE TAB 325 MG (65 MG ELEMENTAL FE) 325 MG: 325 (65 FE) TAB at 08:27

## 2021-07-26 RX ADMIN — HYDROCODONE BITARTRATE AND ACETAMINOPHEN 1 TABLET: 5; 325 TABLET ORAL at 20:25

## 2021-07-26 RX ADMIN — CETIRIZINE HYDROCHLORIDE 10 MG: 10 TABLET, FILM COATED ORAL at 08:27

## 2021-07-27 LAB
ANION GAP SERPL CALCULATED.3IONS-SCNC: 9.8 MMOL/L (ref 5–15)
BASOPHILS # BLD AUTO: 0.06 10*3/MM3 (ref 0–0.2)
BASOPHILS NFR BLD AUTO: 0.9 % (ref 0–1.5)
BUN SERPL-MCNC: 7 MG/DL (ref 6–20)
BUN/CREAT SERPL: 14.9 (ref 7–25)
CALCIUM SPEC-SCNC: 8.7 MG/DL (ref 8.6–10.5)
CHLORIDE SERPL-SCNC: 102 MMOL/L (ref 98–107)
CO2 SERPL-SCNC: 28.2 MMOL/L (ref 22–29)
CREAT SERPL-MCNC: 0.47 MG/DL (ref 0.57–1)
DEPRECATED RDW RBC AUTO: 53.8 FL (ref 37–54)
EOSINOPHIL # BLD AUTO: 0.34 10*3/MM3 (ref 0–0.4)
EOSINOPHIL NFR BLD AUTO: 5 % (ref 0.3–6.2)
ERYTHROCYTE [DISTWIDTH] IN BLOOD BY AUTOMATED COUNT: 15.1 % (ref 12.3–15.4)
GFR SERPL CREATININE-BSD FRML MDRD: 137 ML/MIN/1.73
GLUCOSE BLDC GLUCOMTR-MCNC: 102 MG/DL (ref 70–130)
GLUCOSE BLDC GLUCOMTR-MCNC: 103 MG/DL (ref 70–130)
GLUCOSE BLDC GLUCOMTR-MCNC: 136 MG/DL (ref 70–130)
GLUCOSE BLDC GLUCOMTR-MCNC: 250 MG/DL (ref 70–130)
GLUCOSE SERPL-MCNC: 107 MG/DL (ref 65–99)
HCT VFR BLD AUTO: 27.7 % (ref 34–46.6)
HGB BLD-MCNC: 8.8 G/DL (ref 12–15.9)
IMM GRANULOCYTES # BLD AUTO: 0.07 10*3/MM3 (ref 0–0.05)
IMM GRANULOCYTES NFR BLD AUTO: 1 % (ref 0–0.5)
LYMPHOCYTES # BLD AUTO: 2.12 10*3/MM3 (ref 0.7–3.1)
LYMPHOCYTES NFR BLD AUTO: 31.1 % (ref 19.6–45.3)
MCH RBC QN AUTO: 31.2 PG (ref 26.6–33)
MCHC RBC AUTO-ENTMCNC: 31.8 G/DL (ref 31.5–35.7)
MCV RBC AUTO: 98.2 FL (ref 79–97)
MONOCYTES # BLD AUTO: 0.67 10*3/MM3 (ref 0.1–0.9)
MONOCYTES NFR BLD AUTO: 9.8 % (ref 5–12)
NEUTROPHILS NFR BLD AUTO: 3.55 10*3/MM3 (ref 1.7–7)
NEUTROPHILS NFR BLD AUTO: 52.2 % (ref 42.7–76)
NRBC BLD AUTO-RTO: 0.4 /100 WBC (ref 0–0.2)
PLATELET # BLD AUTO: 205 10*3/MM3 (ref 140–450)
PMV BLD AUTO: 10.6 FL (ref 6–12)
POTASSIUM SERPL-SCNC: 3.6 MMOL/L (ref 3.5–5.2)
RBC # BLD AUTO: 2.82 10*6/MM3 (ref 3.77–5.28)
SODIUM SERPL-SCNC: 140 MMOL/L (ref 136–145)
WBC # BLD AUTO: 6.81 10*3/MM3 (ref 3.4–10.8)

## 2021-07-27 PROCEDURE — 80048 BASIC METABOLIC PNL TOTAL CA: CPT | Performed by: PHYSICIAN ASSISTANT

## 2021-07-27 PROCEDURE — 85025 COMPLETE CBC W/AUTO DIFF WBC: CPT | Performed by: PHYSICIAN ASSISTANT

## 2021-07-27 PROCEDURE — 99232 SBSQ HOSP IP/OBS MODERATE 35: CPT | Performed by: PHYSICIAN ASSISTANT

## 2021-07-27 PROCEDURE — 82962 GLUCOSE BLOOD TEST: CPT

## 2021-07-27 PROCEDURE — 97530 THERAPEUTIC ACTIVITIES: CPT

## 2021-07-27 PROCEDURE — 25010000002 FUROSEMIDE PER 20 MG: Performed by: PHYSICIAN ASSISTANT

## 2021-07-27 PROCEDURE — 97110 THERAPEUTIC EXERCISES: CPT

## 2021-07-27 PROCEDURE — 63710000001 INSULIN ASPART PER 5 UNITS: Performed by: ORTHOPAEDIC SURGERY

## 2021-07-27 RX ORDER — FUROSEMIDE 10 MG/ML
40 INJECTION INTRAMUSCULAR; INTRAVENOUS ONCE
Status: COMPLETED | OUTPATIENT
Start: 2021-07-27 | End: 2021-07-27

## 2021-07-27 RX ADMIN — GABAPENTIN 400 MG: 400 CAPSULE ORAL at 08:41

## 2021-07-27 RX ADMIN — ACETAMINOPHEN 650 MG: 325 TABLET ORAL at 13:39

## 2021-07-27 RX ADMIN — PANTOPRAZOLE SODIUM 40 MG: 40 TABLET, DELAYED RELEASE ORAL at 05:47

## 2021-07-27 RX ADMIN — HYDROCODONE BITARTRATE AND ACETAMINOPHEN 1 TABLET: 5; 325 TABLET ORAL at 18:12

## 2021-07-27 RX ADMIN — APIXABAN 2.5 MG: 2.5 TABLET, FILM COATED ORAL at 08:38

## 2021-07-27 RX ADMIN — INSULIN ASPART 4 UNITS: 100 INJECTION, SOLUTION INTRAVENOUS; SUBCUTANEOUS at 11:23

## 2021-07-27 RX ADMIN — FUROSEMIDE 40 MG: 10 INJECTION, SOLUTION INTRAMUSCULAR; INTRAVENOUS at 10:49

## 2021-07-27 RX ADMIN — SODIUM CHLORIDE, PRESERVATIVE FREE 3 ML: 5 INJECTION INTRAVENOUS at 08:38

## 2021-07-27 RX ADMIN — DOCUSATE SODIUM 50 MG AND SENNOSIDES 8.6 MG 2 TABLET: 8.6; 5 TABLET, FILM COATED ORAL at 08:37

## 2021-07-27 RX ADMIN — METOPROLOL TARTRATE 25 MG: 25 TABLET, FILM COATED ORAL at 20:10

## 2021-07-27 RX ADMIN — CETIRIZINE HYDROCHLORIDE 10 MG: 10 TABLET, FILM COATED ORAL at 08:38

## 2021-07-27 RX ADMIN — SODIUM CHLORIDE, PRESERVATIVE FREE 10 ML: 5 INJECTION INTRAVENOUS at 08:38

## 2021-07-27 RX ADMIN — HYDROCODONE BITARTRATE AND ACETAMINOPHEN 1 TABLET: 5; 325 TABLET ORAL at 06:17

## 2021-07-27 RX ADMIN — SERTRALINE 50 MG: 50 TABLET, FILM COATED ORAL at 08:38

## 2021-07-27 RX ADMIN — FERROUS SULFATE TAB 325 MG (65 MG ELEMENTAL FE) 325 MG: 325 (65 FE) TAB at 08:37

## 2021-07-27 RX ADMIN — METOPROLOL TARTRATE 25 MG: 25 TABLET, FILM COATED ORAL at 08:38

## 2021-07-27 RX ADMIN — ASPIRIN 81 MG: 81 TABLET, COATED ORAL at 08:37

## 2021-07-27 RX ADMIN — DOCUSATE SODIUM 50 MG AND SENNOSIDES 8.6 MG 2 TABLET: 8.6; 5 TABLET, FILM COATED ORAL at 20:10

## 2021-07-27 RX ADMIN — QUETIAPINE FUMARATE 25 MG: 25 TABLET, FILM COATED ORAL at 20:10

## 2021-07-27 RX ADMIN — APIXABAN 2.5 MG: 2.5 TABLET, FILM COATED ORAL at 20:10

## 2021-07-27 RX ADMIN — GABAPENTIN 400 MG: 400 CAPSULE ORAL at 20:10

## 2021-07-27 RX ADMIN — LEVOTHYROXINE SODIUM 25 MCG: 25 TABLET ORAL at 08:38

## 2021-07-28 LAB
ANION GAP SERPL CALCULATED.3IONS-SCNC: 6.7 MMOL/L (ref 5–15)
BUN SERPL-MCNC: 6 MG/DL (ref 6–20)
BUN/CREAT SERPL: 10 (ref 7–25)
CALCIUM SPEC-SCNC: 8.7 MG/DL (ref 8.6–10.5)
CHLORIDE SERPL-SCNC: 101 MMOL/L (ref 98–107)
CK SERPL-CCNC: 205 U/L (ref 20–180)
CO2 SERPL-SCNC: 31.3 MMOL/L (ref 22–29)
CREAT SERPL-MCNC: 0.6 MG/DL (ref 0.57–1)
DEPRECATED RDW RBC AUTO: 54.4 FL (ref 37–54)
ERYTHROCYTE [DISTWIDTH] IN BLOOD BY AUTOMATED COUNT: 15.1 % (ref 12.3–15.4)
GFR SERPL CREATININE-BSD FRML MDRD: 103 ML/MIN/1.73
GLUCOSE BLDC GLUCOMTR-MCNC: 103 MG/DL (ref 70–130)
GLUCOSE BLDC GLUCOMTR-MCNC: 109 MG/DL (ref 70–130)
GLUCOSE BLDC GLUCOMTR-MCNC: 115 MG/DL (ref 70–130)
GLUCOSE BLDC GLUCOMTR-MCNC: 165 MG/DL (ref 70–130)
GLUCOSE SERPL-MCNC: 154 MG/DL (ref 65–99)
HCT VFR BLD AUTO: 28.9 % (ref 34–46.6)
HGB BLD-MCNC: 9 G/DL (ref 12–15.9)
MCH RBC QN AUTO: 30.8 PG (ref 26.6–33)
MCHC RBC AUTO-ENTMCNC: 31.1 G/DL (ref 31.5–35.7)
MCV RBC AUTO: 99 FL (ref 79–97)
PLATELET # BLD AUTO: 232 10*3/MM3 (ref 140–450)
PMV BLD AUTO: 10.6 FL (ref 6–12)
POTASSIUM SERPL-SCNC: 3.3 MMOL/L (ref 3.5–5.2)
RBC # BLD AUTO: 2.92 10*6/MM3 (ref 3.77–5.28)
SODIUM SERPL-SCNC: 139 MMOL/L (ref 136–145)
WBC # BLD AUTO: 6.78 10*3/MM3 (ref 3.4–10.8)

## 2021-07-28 PROCEDURE — 97110 THERAPEUTIC EXERCISES: CPT

## 2021-07-28 PROCEDURE — 85027 COMPLETE CBC AUTOMATED: CPT | Performed by: PHYSICIAN ASSISTANT

## 2021-07-28 PROCEDURE — 82550 ASSAY OF CK (CPK): CPT | Performed by: PHYSICIAN ASSISTANT

## 2021-07-28 PROCEDURE — 82962 GLUCOSE BLOOD TEST: CPT

## 2021-07-28 PROCEDURE — 99233 SBSQ HOSP IP/OBS HIGH 50: CPT | Performed by: PHYSICIAN ASSISTANT

## 2021-07-28 PROCEDURE — 80048 BASIC METABOLIC PNL TOTAL CA: CPT | Performed by: PHYSICIAN ASSISTANT

## 2021-07-28 PROCEDURE — 97530 THERAPEUTIC ACTIVITIES: CPT

## 2021-07-28 PROCEDURE — 63710000001 INSULIN ASPART PER 5 UNITS: Performed by: ORTHOPAEDIC SURGERY

## 2021-07-28 RX ORDER — POTASSIUM CHLORIDE 20 MEQ/1
40 TABLET, EXTENDED RELEASE ORAL EVERY 4 HOURS
Status: COMPLETED | OUTPATIENT
Start: 2021-07-28 | End: 2021-07-28

## 2021-07-28 RX ADMIN — SODIUM CHLORIDE, PRESERVATIVE FREE 10 ML: 5 INJECTION INTRAVENOUS at 08:17

## 2021-07-28 RX ADMIN — FERROUS SULFATE TAB 325 MG (65 MG ELEMENTAL FE) 325 MG: 325 (65 FE) TAB at 08:16

## 2021-07-28 RX ADMIN — SODIUM CHLORIDE, PRESERVATIVE FREE 10 ML: 5 INJECTION INTRAVENOUS at 22:43

## 2021-07-28 RX ADMIN — DOCUSATE SODIUM 50 MG AND SENNOSIDES 8.6 MG 2 TABLET: 8.6; 5 TABLET, FILM COATED ORAL at 08:16

## 2021-07-28 RX ADMIN — POTASSIUM CHLORIDE 40 MEQ: 20 TABLET, EXTENDED RELEASE ORAL at 16:15

## 2021-07-28 RX ADMIN — PANTOPRAZOLE SODIUM 40 MG: 40 TABLET, DELAYED RELEASE ORAL at 03:39

## 2021-07-28 RX ADMIN — HYDROCODONE BITARTRATE AND ACETAMINOPHEN 1 TABLET: 5; 325 TABLET ORAL at 18:04

## 2021-07-28 RX ADMIN — INSULIN ASPART 2 UNITS: 100 INJECTION, SOLUTION INTRAVENOUS; SUBCUTANEOUS at 11:17

## 2021-07-28 RX ADMIN — ASPIRIN 81 MG: 81 TABLET, COATED ORAL at 08:16

## 2021-07-28 RX ADMIN — HYDROCODONE BITARTRATE AND ACETAMINOPHEN 1 TABLET: 5; 325 TABLET ORAL at 03:39

## 2021-07-28 RX ADMIN — APIXABAN 2.5 MG: 2.5 TABLET, FILM COATED ORAL at 22:42

## 2021-07-28 RX ADMIN — GABAPENTIN 400 MG: 400 CAPSULE ORAL at 08:16

## 2021-07-28 RX ADMIN — LEVOTHYROXINE SODIUM 25 MCG: 25 TABLET ORAL at 08:16

## 2021-07-28 RX ADMIN — ACETAMINOPHEN 650 MG: 325 TABLET ORAL at 10:13

## 2021-07-28 RX ADMIN — SODIUM CHLORIDE, PRESERVATIVE FREE 3 ML: 5 INJECTION INTRAVENOUS at 22:52

## 2021-07-28 RX ADMIN — DOCUSATE SODIUM 50 MG AND SENNOSIDES 8.6 MG 2 TABLET: 8.6; 5 TABLET, FILM COATED ORAL at 22:42

## 2021-07-28 RX ADMIN — METOPROLOL TARTRATE 25 MG: 25 TABLET, FILM COATED ORAL at 22:42

## 2021-07-28 RX ADMIN — SODIUM CHLORIDE, PRESERVATIVE FREE 3 ML: 5 INJECTION INTRAVENOUS at 08:17

## 2021-07-28 RX ADMIN — APIXABAN 2.5 MG: 2.5 TABLET, FILM COATED ORAL at 08:16

## 2021-07-28 RX ADMIN — QUETIAPINE FUMARATE 25 MG: 25 TABLET, FILM COATED ORAL at 22:42

## 2021-07-28 RX ADMIN — GABAPENTIN 400 MG: 400 CAPSULE ORAL at 22:43

## 2021-07-28 RX ADMIN — SODIUM CHLORIDE, PRESERVATIVE FREE 10 ML: 5 INJECTION INTRAVENOUS at 22:36

## 2021-07-28 RX ADMIN — CETIRIZINE HYDROCHLORIDE 10 MG: 10 TABLET, FILM COATED ORAL at 08:16

## 2021-07-28 RX ADMIN — POTASSIUM CHLORIDE 40 MEQ: 20 TABLET, EXTENDED RELEASE ORAL at 18:04

## 2021-07-28 RX ADMIN — SERTRALINE 50 MG: 50 TABLET, FILM COATED ORAL at 08:16

## 2021-07-28 RX ADMIN — METOPROLOL TARTRATE 25 MG: 25 TABLET, FILM COATED ORAL at 08:16

## 2021-07-29 ENCOUNTER — APPOINTMENT (OUTPATIENT)
Dept: GENERAL RADIOLOGY | Facility: HOSPITAL | Age: 56
End: 2021-07-29

## 2021-07-29 LAB
ALBUMIN SERPL-MCNC: 2.59 G/DL (ref 3.5–5.2)
ALBUMIN/GLOB SERPL: 0.8 G/DL
ALP SERPL-CCNC: 175 U/L (ref 39–117)
ALT SERPL W P-5'-P-CCNC: 26 U/L (ref 1–33)
ANION GAP SERPL CALCULATED.3IONS-SCNC: 8.7 MMOL/L (ref 5–15)
AST SERPL-CCNC: 41 U/L (ref 1–32)
BILIRUB SERPL-MCNC: 1 MG/DL (ref 0–1.2)
BUN SERPL-MCNC: 7 MG/DL (ref 6–20)
BUN/CREAT SERPL: 11.7 (ref 7–25)
CALCIUM SPEC-SCNC: 8.8 MG/DL (ref 8.6–10.5)
CHLORIDE SERPL-SCNC: 105 MMOL/L (ref 98–107)
CO2 SERPL-SCNC: 27.3 MMOL/L (ref 22–29)
CREAT SERPL-MCNC: 0.6 MG/DL (ref 0.57–1)
FERRITIN SERPL-MCNC: 104.6 NG/ML (ref 13–150)
FOLATE SERPL-MCNC: 18.2 NG/ML (ref 4.78–24.2)
GFR SERPL CREATININE-BSD FRML MDRD: 103 ML/MIN/1.73
GLOBULIN UR ELPH-MCNC: 3.2 GM/DL
GLUCOSE BLDC GLUCOMTR-MCNC: 110 MG/DL (ref 70–130)
GLUCOSE BLDC GLUCOMTR-MCNC: 112 MG/DL (ref 70–130)
GLUCOSE BLDC GLUCOMTR-MCNC: 113 MG/DL (ref 70–130)
GLUCOSE BLDC GLUCOMTR-MCNC: 138 MG/DL (ref 70–130)
GLUCOSE BLDC GLUCOMTR-MCNC: 99 MG/DL (ref 70–130)
GLUCOSE SERPL-MCNC: 153 MG/DL (ref 65–99)
IRON 24H UR-MRATE: 32 MCG/DL (ref 37–145)
IRON SATN MFR SERPL: 9 % (ref 20–50)
POTASSIUM SERPL-SCNC: 4.3 MMOL/L (ref 3.5–5.2)
PROT SERPL-MCNC: 5.8 G/DL (ref 6–8.5)
SODIUM SERPL-SCNC: 141 MMOL/L (ref 136–145)
TIBC SERPL-MCNC: 347 MCG/DL (ref 298–536)
TRANSFERRIN SERPL-MCNC: 233 MG/DL (ref 200–360)

## 2021-07-29 PROCEDURE — 82962 GLUCOSE BLOOD TEST: CPT

## 2021-07-29 PROCEDURE — 97530 THERAPEUTIC ACTIVITIES: CPT

## 2021-07-29 PROCEDURE — 84466 ASSAY OF TRANSFERRIN: CPT | Performed by: PHYSICIAN ASSISTANT

## 2021-07-29 PROCEDURE — 73620 X-RAY EXAM OF FOOT: CPT

## 2021-07-29 PROCEDURE — 99233 SBSQ HOSP IP/OBS HIGH 50: CPT | Performed by: PHYSICIAN ASSISTANT

## 2021-07-29 PROCEDURE — 73620 X-RAY EXAM OF FOOT: CPT | Performed by: RADIOLOGY

## 2021-07-29 PROCEDURE — 82728 ASSAY OF FERRITIN: CPT | Performed by: PHYSICIAN ASSISTANT

## 2021-07-29 PROCEDURE — 82746 ASSAY OF FOLIC ACID SERUM: CPT | Performed by: PHYSICIAN ASSISTANT

## 2021-07-29 PROCEDURE — 80053 COMPREHEN METABOLIC PANEL: CPT | Performed by: PHYSICIAN ASSISTANT

## 2021-07-29 PROCEDURE — 83540 ASSAY OF IRON: CPT | Performed by: PHYSICIAN ASSISTANT

## 2021-07-29 RX ADMIN — METOPROLOL TARTRATE 25 MG: 25 TABLET, FILM COATED ORAL at 08:05

## 2021-07-29 RX ADMIN — LEVOTHYROXINE SODIUM 25 MCG: 25 TABLET ORAL at 08:05

## 2021-07-29 RX ADMIN — APIXABAN 2.5 MG: 2.5 TABLET, FILM COATED ORAL at 08:05

## 2021-07-29 RX ADMIN — APIXABAN 2.5 MG: 2.5 TABLET, FILM COATED ORAL at 20:33

## 2021-07-29 RX ADMIN — SODIUM CHLORIDE, PRESERVATIVE FREE 3 ML: 5 INJECTION INTRAVENOUS at 20:33

## 2021-07-29 RX ADMIN — DOCUSATE SODIUM 50 MG AND SENNOSIDES 8.6 MG 2 TABLET: 8.6; 5 TABLET, FILM COATED ORAL at 08:05

## 2021-07-29 RX ADMIN — METOPROLOL TARTRATE 25 MG: 25 TABLET, FILM COATED ORAL at 20:32

## 2021-07-29 RX ADMIN — QUETIAPINE FUMARATE 25 MG: 25 TABLET, FILM COATED ORAL at 20:33

## 2021-07-29 RX ADMIN — SODIUM CHLORIDE, PRESERVATIVE FREE 3 ML: 5 INJECTION INTRAVENOUS at 08:06

## 2021-07-29 RX ADMIN — HYDROCODONE BITARTRATE AND ACETAMINOPHEN 1 TABLET: 5; 325 TABLET ORAL at 06:15

## 2021-07-29 RX ADMIN — GABAPENTIN 400 MG: 400 CAPSULE ORAL at 08:05

## 2021-07-29 RX ADMIN — FERROUS SULFATE TAB 325 MG (65 MG ELEMENTAL FE) 325 MG: 325 (65 FE) TAB at 08:06

## 2021-07-29 RX ADMIN — CETIRIZINE HYDROCHLORIDE 10 MG: 10 TABLET, FILM COATED ORAL at 08:05

## 2021-07-29 RX ADMIN — PANTOPRAZOLE SODIUM 40 MG: 40 TABLET, DELAYED RELEASE ORAL at 06:10

## 2021-07-29 RX ADMIN — GABAPENTIN 400 MG: 400 CAPSULE ORAL at 20:32

## 2021-07-29 RX ADMIN — HYDROCODONE BITARTRATE AND ACETAMINOPHEN 1 TABLET: 5; 325 TABLET ORAL at 20:34

## 2021-07-29 RX ADMIN — DOCUSATE SODIUM 50 MG AND SENNOSIDES 8.6 MG 2 TABLET: 8.6; 5 TABLET, FILM COATED ORAL at 20:34

## 2021-07-29 RX ADMIN — ASPIRIN 81 MG: 81 TABLET, COATED ORAL at 08:05

## 2021-07-29 RX ADMIN — SERTRALINE 50 MG: 50 TABLET, FILM COATED ORAL at 08:05

## 2021-07-29 RX ADMIN — SODIUM CHLORIDE, PRESERVATIVE FREE 10 ML: 5 INJECTION INTRAVENOUS at 08:05

## 2021-07-30 LAB
ANION GAP SERPL CALCULATED.3IONS-SCNC: 9.1 MMOL/L (ref 5–15)
BUN SERPL-MCNC: 8 MG/DL (ref 6–20)
BUN/CREAT SERPL: 13.3 (ref 7–25)
CALCIUM SPEC-SCNC: 8.8 MG/DL (ref 8.6–10.5)
CHLORIDE SERPL-SCNC: 102 MMOL/L (ref 98–107)
CO2 SERPL-SCNC: 27.9 MMOL/L (ref 22–29)
CREAT SERPL-MCNC: 0.6 MG/DL (ref 0.57–1)
DEPRECATED RDW RBC AUTO: 56.6 FL (ref 37–54)
ERYTHROCYTE [DISTWIDTH] IN BLOOD BY AUTOMATED COUNT: 15.3 % (ref 12.3–15.4)
GFR SERPL CREATININE-BSD FRML MDRD: 103 ML/MIN/1.73
GLUCOSE BLDC GLUCOMTR-MCNC: 120 MG/DL (ref 70–130)
GLUCOSE BLDC GLUCOMTR-MCNC: 123 MG/DL (ref 70–130)
GLUCOSE BLDC GLUCOMTR-MCNC: 131 MG/DL (ref 70–130)
GLUCOSE BLDC GLUCOMTR-MCNC: 147 MG/DL (ref 70–130)
GLUCOSE SERPL-MCNC: 152 MG/DL (ref 65–99)
HCT VFR BLD AUTO: 27.9 % (ref 34–46.6)
HGB BLD-MCNC: 8.7 G/DL (ref 12–15.9)
MAGNESIUM SERPL-MCNC: 1.7 MG/DL (ref 1.6–2.6)
MCH RBC QN AUTO: 31.8 PG (ref 26.6–33)
MCHC RBC AUTO-ENTMCNC: 31.2 G/DL (ref 31.5–35.7)
MCV RBC AUTO: 101.8 FL (ref 79–97)
PLATELET # BLD AUTO: 232 10*3/MM3 (ref 140–450)
PMV BLD AUTO: 10.4 FL (ref 6–12)
POTASSIUM SERPL-SCNC: 4 MMOL/L (ref 3.5–5.2)
RBC # BLD AUTO: 2.74 10*6/MM3 (ref 3.77–5.28)
SODIUM SERPL-SCNC: 139 MMOL/L (ref 136–145)
URATE SERPL-MCNC: 4.2 MG/DL (ref 2.4–5.7)
WBC # BLD AUTO: 7.02 10*3/MM3 (ref 3.4–10.8)

## 2021-07-30 PROCEDURE — 97110 THERAPEUTIC EXERCISES: CPT

## 2021-07-30 PROCEDURE — 82962 GLUCOSE BLOOD TEST: CPT

## 2021-07-30 PROCEDURE — 99232 SBSQ HOSP IP/OBS MODERATE 35: CPT | Performed by: PHYSICIAN ASSISTANT

## 2021-07-30 PROCEDURE — 84550 ASSAY OF BLOOD/URIC ACID: CPT | Performed by: PHYSICIAN ASSISTANT

## 2021-07-30 PROCEDURE — 25010000003 MAGNESIUM SULFATE 4 GM/100ML SOLUTION: Performed by: PHYSICIAN ASSISTANT

## 2021-07-30 PROCEDURE — 80048 BASIC METABOLIC PNL TOTAL CA: CPT | Performed by: PHYSICIAN ASSISTANT

## 2021-07-30 PROCEDURE — 94799 UNLISTED PULMONARY SVC/PX: CPT

## 2021-07-30 PROCEDURE — 85027 COMPLETE CBC AUTOMATED: CPT | Performed by: PHYSICIAN ASSISTANT

## 2021-07-30 PROCEDURE — 97530 THERAPEUTIC ACTIVITIES: CPT

## 2021-07-30 PROCEDURE — 83735 ASSAY OF MAGNESIUM: CPT | Performed by: PHYSICIAN ASSISTANT

## 2021-07-30 RX ORDER — MAGNESIUM SULFATE HEPTAHYDRATE 40 MG/ML
4 INJECTION, SOLUTION INTRAVENOUS ONCE
Status: COMPLETED | OUTPATIENT
Start: 2021-07-30 | End: 2021-07-30

## 2021-07-30 RX ADMIN — METOPROLOL TARTRATE 25 MG: 25 TABLET, FILM COATED ORAL at 08:25

## 2021-07-30 RX ADMIN — CETIRIZINE HYDROCHLORIDE 10 MG: 10 TABLET, FILM COATED ORAL at 08:25

## 2021-07-30 RX ADMIN — APIXABAN 2.5 MG: 2.5 TABLET, FILM COATED ORAL at 20:59

## 2021-07-30 RX ADMIN — OFLOXACIN 50000 UNITS: 300 TABLET, COATED ORAL at 08:26

## 2021-07-30 RX ADMIN — LEVOTHYROXINE SODIUM 25 MCG: 25 TABLET ORAL at 08:26

## 2021-07-30 RX ADMIN — APIXABAN 2.5 MG: 2.5 TABLET, FILM COATED ORAL at 08:26

## 2021-07-30 RX ADMIN — DOCUSATE SODIUM 50 MG AND SENNOSIDES 8.6 MG 2 TABLET: 8.6; 5 TABLET, FILM COATED ORAL at 08:26

## 2021-07-30 RX ADMIN — SERTRALINE 50 MG: 50 TABLET, FILM COATED ORAL at 08:25

## 2021-07-30 RX ADMIN — QUETIAPINE FUMARATE 25 MG: 25 TABLET, FILM COATED ORAL at 20:59

## 2021-07-30 RX ADMIN — GABAPENTIN 400 MG: 400 CAPSULE ORAL at 20:59

## 2021-07-30 RX ADMIN — METOPROLOL TARTRATE 25 MG: 25 TABLET, FILM COATED ORAL at 21:08

## 2021-07-30 RX ADMIN — DOCUSATE SODIUM 50 MG AND SENNOSIDES 8.6 MG 2 TABLET: 8.6; 5 TABLET, FILM COATED ORAL at 20:58

## 2021-07-30 RX ADMIN — FERROUS SULFATE TAB 325 MG (65 MG ELEMENTAL FE) 325 MG: 325 (65 FE) TAB at 08:26

## 2021-07-30 RX ADMIN — ASPIRIN 81 MG: 81 TABLET, COATED ORAL at 08:25

## 2021-07-30 RX ADMIN — GABAPENTIN 400 MG: 400 CAPSULE ORAL at 08:25

## 2021-07-30 RX ADMIN — SODIUM CHLORIDE, PRESERVATIVE FREE 10 ML: 5 INJECTION INTRAVENOUS at 08:25

## 2021-07-30 RX ADMIN — HYDROCODONE BITARTRATE AND ACETAMINOPHEN 1 TABLET: 5; 325 TABLET ORAL at 23:25

## 2021-07-30 RX ADMIN — MAGNESIUM SULFATE HEPTAHYDRATE 4 G: 40 INJECTION, SOLUTION INTRAVENOUS at 10:59

## 2021-07-30 RX ADMIN — PANTOPRAZOLE SODIUM 40 MG: 40 TABLET, DELAYED RELEASE ORAL at 06:09

## 2021-07-30 RX ADMIN — SODIUM CHLORIDE, PRESERVATIVE FREE 3 ML: 5 INJECTION INTRAVENOUS at 08:25

## 2021-07-30 RX ADMIN — HYDROCODONE BITARTRATE AND ACETAMINOPHEN 1 TABLET: 5; 325 TABLET ORAL at 11:54

## 2021-07-31 LAB
ANION GAP SERPL CALCULATED.3IONS-SCNC: 7.4 MMOL/L (ref 5–15)
BUN SERPL-MCNC: 8 MG/DL (ref 6–20)
BUN/CREAT SERPL: 14.3 (ref 7–25)
CALCIUM SPEC-SCNC: 8.4 MG/DL (ref 8.6–10.5)
CHLORIDE SERPL-SCNC: 105 MMOL/L (ref 98–107)
CO2 SERPL-SCNC: 25.6 MMOL/L (ref 22–29)
CREAT SERPL-MCNC: 0.56 MG/DL (ref 0.57–1)
GFR SERPL CREATININE-BSD FRML MDRD: 112 ML/MIN/1.73
GLUCOSE BLDC GLUCOMTR-MCNC: 124 MG/DL (ref 70–130)
GLUCOSE BLDC GLUCOMTR-MCNC: 142 MG/DL (ref 70–130)
GLUCOSE BLDC GLUCOMTR-MCNC: 142 MG/DL (ref 70–130)
GLUCOSE BLDC GLUCOMTR-MCNC: 93 MG/DL (ref 70–130)
GLUCOSE SERPL-MCNC: 99 MG/DL (ref 65–99)
MAGNESIUM SERPL-MCNC: 1.8 MG/DL (ref 1.6–2.6)
POTASSIUM SERPL-SCNC: 4 MMOL/L (ref 3.5–5.2)
QT INTERVAL: 426 MS
QTC INTERVAL: 460 MS
SODIUM SERPL-SCNC: 138 MMOL/L (ref 136–145)

## 2021-07-31 PROCEDURE — 97530 THERAPEUTIC ACTIVITIES: CPT

## 2021-07-31 PROCEDURE — 80048 BASIC METABOLIC PNL TOTAL CA: CPT | Performed by: PHYSICIAN ASSISTANT

## 2021-07-31 PROCEDURE — 97110 THERAPEUTIC EXERCISES: CPT

## 2021-07-31 PROCEDURE — 83735 ASSAY OF MAGNESIUM: CPT | Performed by: PHYSICIAN ASSISTANT

## 2021-07-31 PROCEDURE — 82962 GLUCOSE BLOOD TEST: CPT

## 2021-07-31 PROCEDURE — 25010000002 PROCHLORPERAZINE 10 MG/2ML SOLUTION: Performed by: ORTHOPAEDIC SURGERY

## 2021-07-31 PROCEDURE — 93005 ELECTROCARDIOGRAM TRACING: CPT | Performed by: PHYSICIAN ASSISTANT

## 2021-07-31 PROCEDURE — 94799 UNLISTED PULMONARY SVC/PX: CPT

## 2021-07-31 PROCEDURE — 93010 ELECTROCARDIOGRAM REPORT: CPT | Performed by: INTERNAL MEDICINE

## 2021-07-31 PROCEDURE — 99232 SBSQ HOSP IP/OBS MODERATE 35: CPT | Performed by: PHYSICIAN ASSISTANT

## 2021-07-31 RX ADMIN — SODIUM CHLORIDE, PRESERVATIVE FREE 3 ML: 5 INJECTION INTRAVENOUS at 09:13

## 2021-07-31 RX ADMIN — APIXABAN 2.5 MG: 2.5 TABLET, FILM COATED ORAL at 21:32

## 2021-07-31 RX ADMIN — SODIUM CHLORIDE, PRESERVATIVE FREE 10 ML: 5 INJECTION INTRAVENOUS at 09:11

## 2021-07-31 RX ADMIN — QUETIAPINE FUMARATE 25 MG: 25 TABLET, FILM COATED ORAL at 21:32

## 2021-07-31 RX ADMIN — SERTRALINE 50 MG: 50 TABLET, FILM COATED ORAL at 09:12

## 2021-07-31 RX ADMIN — ACETAMINOPHEN 650 MG: 325 TABLET ORAL at 10:47

## 2021-07-31 RX ADMIN — DOCUSATE SODIUM 50 MG AND SENNOSIDES 8.6 MG 2 TABLET: 8.6; 5 TABLET, FILM COATED ORAL at 21:32

## 2021-07-31 RX ADMIN — DOCUSATE SODIUM 50 MG AND SENNOSIDES 8.6 MG 2 TABLET: 8.6; 5 TABLET, FILM COATED ORAL at 09:12

## 2021-07-31 RX ADMIN — GABAPENTIN 400 MG: 400 CAPSULE ORAL at 21:31

## 2021-07-31 RX ADMIN — LEVOTHYROXINE SODIUM 25 MCG: 25 TABLET ORAL at 09:12

## 2021-07-31 RX ADMIN — FERROUS SULFATE TAB 325 MG (65 MG ELEMENTAL FE) 325 MG: 325 (65 FE) TAB at 09:12

## 2021-07-31 RX ADMIN — ASPIRIN 81 MG: 81 TABLET, COATED ORAL at 09:12

## 2021-07-31 RX ADMIN — GABAPENTIN 400 MG: 400 CAPSULE ORAL at 09:11

## 2021-07-31 RX ADMIN — CETIRIZINE HYDROCHLORIDE 10 MG: 10 TABLET, FILM COATED ORAL at 09:12

## 2021-07-31 RX ADMIN — HYDROCODONE BITARTRATE AND ACETAMINOPHEN 1 TABLET: 5; 325 TABLET ORAL at 23:36

## 2021-07-31 RX ADMIN — METOPROLOL TARTRATE 25 MG: 25 TABLET, FILM COATED ORAL at 09:12

## 2021-07-31 RX ADMIN — METOPROLOL TARTRATE 25 MG: 25 TABLET, FILM COATED ORAL at 21:32

## 2021-07-31 RX ADMIN — HYDROCODONE BITARTRATE AND ACETAMINOPHEN 1 TABLET: 5; 325 TABLET ORAL at 11:56

## 2021-07-31 RX ADMIN — PROCHLORPERAZINE EDISYLATE 5 MG: 5 INJECTION INTRAMUSCULAR; INTRAVENOUS at 10:48

## 2021-07-31 RX ADMIN — PANTOPRAZOLE SODIUM 40 MG: 40 TABLET, DELAYED RELEASE ORAL at 05:46

## 2021-07-31 RX ADMIN — APIXABAN 2.5 MG: 2.5 TABLET, FILM COATED ORAL at 09:12

## 2021-07-31 RX ADMIN — HYDROXYZINE HYDROCHLORIDE 25 MG: 25 TABLET, FILM COATED ORAL at 13:25

## 2021-08-01 LAB
GLUCOSE BLDC GLUCOMTR-MCNC: 108 MG/DL (ref 70–130)
GLUCOSE BLDC GLUCOMTR-MCNC: 109 MG/DL (ref 70–130)
GLUCOSE BLDC GLUCOMTR-MCNC: 127 MG/DL (ref 70–130)
GLUCOSE BLDC GLUCOMTR-MCNC: 129 MG/DL (ref 70–130)

## 2021-08-01 PROCEDURE — 99232 SBSQ HOSP IP/OBS MODERATE 35: CPT | Performed by: PHYSICIAN ASSISTANT

## 2021-08-01 PROCEDURE — 82962 GLUCOSE BLOOD TEST: CPT

## 2021-08-01 PROCEDURE — 94799 UNLISTED PULMONARY SVC/PX: CPT

## 2021-08-01 RX ADMIN — METOPROLOL TARTRATE 25 MG: 25 TABLET, FILM COATED ORAL at 20:24

## 2021-08-01 RX ADMIN — HYDROXYZINE HYDROCHLORIDE 25 MG: 25 TABLET, FILM COATED ORAL at 09:02

## 2021-08-01 RX ADMIN — QUETIAPINE FUMARATE 25 MG: 25 TABLET, FILM COATED ORAL at 20:24

## 2021-08-01 RX ADMIN — SERTRALINE 50 MG: 50 TABLET, FILM COATED ORAL at 09:02

## 2021-08-01 RX ADMIN — GABAPENTIN 400 MG: 400 CAPSULE ORAL at 20:24

## 2021-08-01 RX ADMIN — APIXABAN 2.5 MG: 2.5 TABLET, FILM COATED ORAL at 09:02

## 2021-08-01 RX ADMIN — DOCUSATE SODIUM 50 MG AND SENNOSIDES 8.6 MG 2 TABLET: 8.6; 5 TABLET, FILM COATED ORAL at 09:02

## 2021-08-01 RX ADMIN — HYDROCODONE BITARTRATE AND ACETAMINOPHEN 1 TABLET: 5; 325 TABLET ORAL at 13:34

## 2021-08-01 RX ADMIN — PANTOPRAZOLE SODIUM 40 MG: 40 TABLET, DELAYED RELEASE ORAL at 05:43

## 2021-08-01 RX ADMIN — DOCUSATE SODIUM 50 MG AND SENNOSIDES 8.6 MG 2 TABLET: 8.6; 5 TABLET, FILM COATED ORAL at 20:24

## 2021-08-01 RX ADMIN — SODIUM CHLORIDE, PRESERVATIVE FREE 10 ML: 5 INJECTION INTRAVENOUS at 09:01

## 2021-08-01 RX ADMIN — METOPROLOL TARTRATE 25 MG: 25 TABLET, FILM COATED ORAL at 09:02

## 2021-08-01 RX ADMIN — LEVOTHYROXINE SODIUM 25 MCG: 25 TABLET ORAL at 09:02

## 2021-08-01 RX ADMIN — ASPIRIN 81 MG: 81 TABLET, COATED ORAL at 09:02

## 2021-08-01 RX ADMIN — GABAPENTIN 400 MG: 400 CAPSULE ORAL at 09:01

## 2021-08-01 RX ADMIN — CETIRIZINE HYDROCHLORIDE 10 MG: 10 TABLET, FILM COATED ORAL at 09:02

## 2021-08-01 RX ADMIN — APIXABAN 2.5 MG: 2.5 TABLET, FILM COATED ORAL at 20:24

## 2021-08-01 RX ADMIN — FERROUS SULFATE TAB 325 MG (65 MG ELEMENTAL FE) 325 MG: 325 (65 FE) TAB at 09:02

## 2021-08-01 NOTE — PLAN OF CARE
Goal Outcome Evaluation:  Plan of Care Reviewed With: patient           Outcome Summary: Patient has done well this shift, right hip dressing was changed. No further complaints at this time.

## 2021-08-01 NOTE — PROGRESS NOTES
[facility] Medicine Services  PROGRESS NOTE     Patient Identification:  Name:  Aidee Trinidad  Age:  56 y.o.  Sex:  female  :  1965  MRN:  [id number]  Visit Number:  [id number]  Primary Care Provider:  Azalea Burnett APRN    Length of stay:  15    ----------------------------------------------------------------------------------------------------------------------  Subjective     Chief Complaint:  Follow up for hip fracture, urinary retention, constipation      History of Presenting Illness/Hospital Course:  Patient is a 56-year-old female with past medical history significant for coronary artery disease status post CABG in past, essential hypertension, hyperlipidemia, diastolic dysfunction, noninsulin-dependent type 2 diabetes mellitus, referral neuropathy, hypothyroidism, depression, and morbid obesity that presented to the [facility] emergency department on 2021 for evaluation of right hip pain status post mechanical fall.  Please see admitting history and physical for further details.  Patient was found to have acute minimally displaced right proximal intertrochanteric femoral fracture.  Patient was admitted to the medical surgical floor for further evaluation and treatment.  Orthopedic surgery did evaluate patient, patient ultimately underwent right hip cephalomedullary nailing with TFNA nail per Dr. Ponce with orthopedic surgery.  Patient tolerated procedure well.  Postoperative setting complicated by patient having difficulty ambulating and ongoing weakness throughout her body.  As such, she is currently pending insurance approval for inpatient rehab for prolonged therapy.    It is of note that during hospitalization, patient developed urinary retention that required a Velazquez catheter to be anchored on 2021.  Urinalysis obtained revealed 25,000 CFU yeast and less than 10,000 CFU gram-positive cocci.  Not felt to be an  acute urinary tract infection, thus was not treated.  Catheter has since been removed and she has been voiding on her own. Due to her symptoms of weakness, significant constipation noted on imaging, as well as urinary retention and lumbar spine MRI was obtained with no acute abnormalities noted.  She has continued to work with physical therapy for rehabilitation.    Subjective:  Today, the patient was lying in bed per my evaluation, no acute distress noted. Sleeping, easily aroused. No family present at bedside. No overnight events or issues reported per AM DWIGHT Garcia. Velazquez catheter remains out, voiding on own. Patient does refuse to get up with nursing staff to bedside, will only get up with PT to bedside chair. Using bed pan. Reports pain is moderately controlled in right hip. Cont to report neuropathy in right foot, no acute worsening. No bowel or bladder incontinence. Distal pulses intact, vascularly intact. Good motor function bilateral lower extremities. No other complaints at this time. Cont to work with PT. Pending inpatient rehab.     Present during exam: N/A   ----------------------------------------------------------------------------------------------------------------------  Objective     Consults:  • Orthopedic surgery     Procedures:  • 7/18/2021: Right hip cephalo-medullary nailing with TFNA nail -- Dr. Ponce, orthopedic surgery   • 7/23/2021: Transthoracic echocardiogram   · Normal left ventricular cavity size and wall thickness noted.  · Left ventricular ejection fraction appears to be 56 - 60%. Left ventricular systolic function is normal.  · Left ventricular diastolic function is consistent with (grade II w/high LAP) pseudonormalization.  · Normal cardiac chamber dimensions.  · The aortic valve is structurally normal. Doppler interrogation was not done across the aortic valve.  · The mitral valve is structurally normal with no regurgitation or significant stenosis present  · Mild tricuspid  valve regurgitation is present.  · Estimated right ventricular systolic pressure from tricuspid regurgitation is mildly elevated (35-45 mmHg). Mild pulmonary hypertension is present.  · The pulmonic valve is not well visualized.  · There is a trivial pericardial effusion.  • 7/23/2021: Velazquez catheter insertion d/t urinary retention   • 7/29/2021: Velazquez catheter removal     Current Hospital Meds:  apixaban, 2.5 mg, Oral, Q12H  aspirin, 81 mg, Oral, Daily  cetirizine, 10 mg, Oral, Daily  cholecalciferol, 50,000 Units, Oral, Q7 Days  ferrous sulfate, 325 mg, Oral, Daily With Breakfast  gabapentin, 400 mg, Oral, Q12H  insulin aspart, 0-7 Units, Subcutaneous, TID AC  levothyroxine, 25 mcg, Oral, Daily  metoprolol tartrate, 25 mg, Oral, Q12H  pantoprazole, 40 mg, Oral, Q AM  polyethylene glycol, 17 g, Oral, Daily  QUEtiapine, 25 mg, Oral, Nightly  senna-docusate sodium, 2 tablet, Oral, BID  sertraline, 50 mg, Oral, Daily  sodium chloride, 10 mL, Intravenous, Q12H  sodium chloride, 3 mL, Intravenous, Q12H         ----------------------------------------------------------------------------------------------------------------------  Vital Signs:  Temp:  [97.5 °F (36.4 °C)-97.8 °F (36.6 °C)] 97.5 °F (36.4 °C)  Heart Rate:  [64-77] 64  Resp:  [16-18] 18  BP: (112-131)/(58-62) 120/59    SpO2 Percentage    07/31/21 1900 08/01/21 0741 08/01/21 0801   SpO2: 99% 99% 94%     SpO2:  [94 %-99 %] 94 %  on  Flow (L/min):  [2] 2;   Device (Oxygen Therapy): room air    Body mass index is 53.06 kg/m².  Wt Readings from Last 3 Encounters:   07/18/21 111 kg (245 lb 3.2 oz)   04/26/21 108 kg (237 lb)   02/28/20 97.1 kg (214 lb)        Intake/Output Summary (Last 24 hours) at 8/1/2021 1030  Last data filed at 8/1/2021 1012  Gross per 24 hour   Intake 720 ml   Output --   Net 720 ml     Diet Regular; Consistent  Carbohydrate  ----------------------------------------------------------------------------------------------------------------------  Physical exam:  Physical Exam  Nursing note reviewed.   Constitutional:       General: She is sleeping. She is not in acute distress.     Appearance: She is well-developed. She is morbidly obese. She is not toxic-appearing.      Comments: Lying in bed, no acute distress noted. No family present at bedside. On room air.    HENT:      Head: Normocephalic and atraumatic.      Mouth/Throat:      Mouth: Mucous membranes are moist.   Eyes:      Conjunctiva/sclera: Conjunctivae normal.      Pupils: Pupils are equal, round, and reactive to light.   Cardiovascular:      Rate and Rhythm: Normal rate and regular rhythm.      Pulses:           Dorsalis pedis pulses are 2+ on the right side and 2+ on the left side.        Posterior tibial pulses are 2+ on the right side and 2+ on the left side.      Heart sounds: Normal heart sounds. No murmur heard.     Pulmonary:      Effort: Pulmonary effort is normal.      Breath sounds: Normal breath sounds and air entry. No wheezing, rhonchi or rales.      Comments: Speaks in full sentences without dyspnea. On room air.   Abdominal:      General: Bowel sounds are normal. There is no distension.      Palpations: Abdomen is soft.      Tenderness: There is no abdominal tenderness. There is no guarding or rebound.   Genitourinary:     Comments: No troy catheter in place.  Musculoskeletal:      Cervical back: Neck supple.      Right hip: Deformity (Post op incision clean and dry, mild tender, mild edema as expected post op ) present. Decreased range of motion (2/2 pain).      Right lower leg: No edema.      Left lower leg: No edema.      Comments: Patient with active dorsiflexion and plantar flexion bilateral lower extremities.  Left lower extremity strength 5/5.  Right lower extremity strength 2/5 as she cannot lift her leg against gravity but does have active  plantar/dorsiflexion.  strength equal and intact bilateral upper extremities, upper extremities 5/5 on strength testing. Sensation intact bilateral upper and LLE, however patient reports no sensation to RLE. She states this has been going on since surgery, no acute worsening. She does have chronic peripheral neuropathy for which she is treated for outpatient. Distal pulses intact in all extremities.    Skin:     General: Skin is warm and dry.      Capillary Refill: Capillary refill takes less than 2 seconds.      Findings: No lesion.   Neurological:      Mental Status: She is oriented to person, place, and time and easily aroused.      GCS: GCS eye subscore is 4. GCS verbal subscore is 5. GCS motor subscore is 6.      Cranial Nerves: No dysarthria or facial asymmetry.      Sensory: Sensory deficit (See MSK exam ) present.      Motor: Weakness (RLE > LLE, see MSK exam  ) present. No abnormal muscle tone.      Comments: Easily aroused, sleeping. Follows commands. Answers questions appropriately.     Psychiatric:         Mood and Affect: Mood normal.         Speech: Speech normal.         Behavior: Behavior is cooperative.      PE unchanged on exam today   ----------------------------------------------------------------------------------------------------------------------  Tele:    Sinus 60s     I have personally reviewed the EKG/Telemetry strips   ----------------------------------------------------------------------------------------------------------------------  Results from last 7 days   Lab Units 07/28/21  1041   CK TOTAL U/L 205*           Results from last 7 days   Lab Units 07/30/21  0820 07/28/21  1041 07/27/21  0737   WBC 10*3/mm3 7.02 6.78 6.81   HEMOGLOBIN g/dL 8.7* 9.0* 8.8*   HEMATOCRIT % 27.9* 28.9* 27.7*   MCV fL 101.8* 99.0* 98.2*   MCHC g/dL 31.2* 31.1* 31.8   PLATELETS 10*3/mm3 232 232 205     Results from last 7 days   Lab Units 07/31/21  0703 07/30/21  0820 07/29/21  0912 07/27/21  0737  07/26/21  0431   SODIUM mmol/L 138 139 141   < > 143   POTASSIUM mmol/L 4.0 4.0 4.3   < > 4.1   MAGNESIUM mg/dL 1.8 1.7  --   --   --    CHLORIDE mmol/L 105 102 105   < > 106   CO2 mmol/L 25.6 27.9 27.3   < > 28.9   BUN mg/dL 8 8 7   < > 6   CREATININE mg/dL 0.56* 0.60 0.60   < > 0.50*   EGFR IF NONAFRICN AM mL/min/1.73 112 103 103   < > 128   CALCIUM mg/dL 8.4* 8.8 8.8   < > 8.6   GLUCOSE mg/dL 99 152* 153*   < > 111*   ALBUMIN g/dL  --   --  2.59*  --  2.51*   BILIRUBIN mg/dL  --   --  1.0  --  1.3*   ALK PHOS U/L  --   --  175*  --  136*   AST (SGOT) U/L  --   --  41*  --  67*   ALT (SGPT) U/L  --   --  26  --  38*    < > = values in this interval not displayed.   Estimated Creatinine Clearance: 127 mL/min (A) (by C-G formula based on SCr of 0.56 mg/dL (L)).    Glucose   Date/Time Value Ref Range Status   08/01/2021 0716 108 70 - 130 mg/dL Final     Comment:     Meter: [id number] : [id number] DARLING CARSON   07/31/2021 2140 142 (H) 70 - 130 mg/dL Final     Comment:     Meter: [id number] : [id number] melissa crodylon   07/31/2021 1648 124 70 - 130 mg/dL Final     Comment:     Meter: [id number] : [id number] Eric Garcia   07/31/2021 1054 142 (H) 70 - 130 mg/dL Final     Comment:     Meter: [id number] : [id number] Eric Garcia   07/31/2021 0641 93 70 - 130 mg/dL Final     Comment:     Meter: [id number] : [id number] CHIQUITA MCGOVERN   07/30/2021 2104 131 (H) 70 - 130 mg/dL Final     Comment:     Meter: [id number] : [id number] adenike espinozaney   07/30/2021 1628 120 70 - 130 mg/dL Final     Comment:     Meter: [id number] : [id number] Eric Garcia   07/30/2021 1052 147 (H) 70 - 130 mg/dL Final     Comment:     Meter: [id number] : [id number] Eric Garcia     Lab Results   Component Value Date    HGBA1C 6.10 (H) 07/18/2021     Lab Results   Component Value Date    TSH 2.280 07/18/2021     Microbiology Results (last 10 days)     Procedure Component Value - Date/Time    Urine  Culture - Urine, Urine, Catheter [[id number]]  (Abnormal) Collected: 07/23/21 0519    Lab Status: Final result Specimen: Urine, Catheter Updated: 07/24/21 9568     Urine Culture 25,000 CFU/mL Yeast isolated      <10,000 CFU/mL Gram Positive Cocci    Narrative:      No further workup on the yeast is being performed.  Call if further workup is needed on the Gram Positive Cocci.         Pain Management Panel     Pain Management Panel Latest Ref Rng & Units 2/28/2020    AMPHETAMINES SCREEN, URINE Negative Negative    BARBITURATES SCREEN Negative Negative    BENZODIAZEPINE SCREEN, URINE Negative Negative    BUPRENORPHINEUR Negative Negative    COCAINE SCREEN, URINE Negative Negative    METHADONE SCREEN, URINE Negative Negative        I have personally reviewed the above laboratory results.   ----------------------------------------------------------------------------------------------------------------------  Imaging Results (Last 24 Hours)     ** No results found for the last 24 hours. **        I have personally reviewed the above radiology results.   ----------------------------------------------------------------------------------------------------------------------  Assessment/Plan     -Acute, minimally displaced right proximal intertrochanteric femur fracture s/p mechanical fall at home, s/p repair 7/18/2021  -History of right lateral malleolus avulsion fracture with tiny fragmentation 4/2021  -History of TKA right in past   -Generalized weakness, debility, inability to complete ADLs currently due to acute fracture   • Pt is s/p ORIF 7/18  • Cont PRN pain medication with holding parameters   • PT on board, pending inpatient rehab as she has had prolonged recovery and requires assistance ambulating and ADLs   • Patient does report neuropathy in RLE, CT lumbar spine with no acute abnormalities. She has good motor function on exam   • Supportive care   • WBAT   • Eliquis on board for DVT proph  • Vitamin D adequate     • NV checks   • Ortho input/assistance is appreciated.     RLE > LLE weakness, neuropathy   · No focal deficits    · Head CT previously unremarkable 7/21  · MRI lumbar spine 7/23 with no acute abnormalities   · Cont Neurontin   · ?outpatient neuro referral   · Discussed with attending Dr. Taylor     -Right foot pain   · Pt with history of right lateral malleolus avulsion fx 4/2021  · Reports pain in right foot similar to gout episode in past, more pain towards her great toe on right foot   · XR unremarkable, uric acid level not elevated   · Supportive care   · PRN pain medication     -Hypokalemia   -Borderline hypomagnesemia   · Resolved with supplementation   · Replace electrolytes per protocol as necessary   · Repeat BMP and mag level in AM    -Constipation, resolved   · Continue bowel regimen  · BMs becoming more regular   · Monitor closely     -Urinary retention, resolved   · Patient with urinary retention earlier in hospitalization requiring anchroing of troy catheter   · Patient also with generalized weakness and constipation, as such MRI of lumbar spine obtained previously with no acute findings.   · Urinalysis with 25K CFU yeast and <10K CFU gram positive cocci . No treatment warranted at this time, discussed with Dr. Taylor (attending physician)  · Suspect that urine retention will improve with mobilization.   · Bladder training completed, troy catheter previously removed. She is now voiding on her own.     -Macrocytic anemia   -Thrombocytopenia, resolved   · Likely ABLA post op  · Hemoglobin stable   · Platelets stable, ultrasound of spleen previously unremarkable  · No active bleeding noted or reported   · Vitamin B12 and folate WNL. Iron profile with slightly low iron and iron sat but stable ferritin. Pt previously started on PO iron, will continue.   · Monitor closely, repeat CBC in AM    -Transaminitis, felt to be 2/2 CPK elevation   -Hyperbilirubinemia, stable   -Hypoalbuminemia  -Hepatitis B  IgM/IgG  · Abdomen non tender   · LFTs stable   · US Liver with marked cirrhosis said to be advanced with nodular liver margins.   · US spleen unremarkable.  · CPK improved  · Avoid nephrotoxins as much as possible   · Repeat chem panel in AM    -Grade II diastolic dysfunction   · ECHO reviewed   · Appears grossly compensated   · Monitor volume status closely     -Essential hypertension  • BP appears well controlled   • Continue home metoprolol with appropriate holding parameters to prevent hypotension and/or bradycardia   • Home lasix on hold currently   • Closely monitor BP per hospital protocol, titrate medications as necessary    -History of coronary artery disease s/p CABG in past   -Cardiomegaly   · Patient denies any chest pain   · Statin held previously due to elevated CPK level. CPK still elevated, but significantly improved.  · TTE obtained with preserved EF and grade II diastolic dysfunction   · Eliquis on board for VTE proph, cont daily aspirin  · Home lasix on hold   · Monitor closely on telemetry     -Type II diabetes mellitus, non insulin dependent  -Peripheral neuropathy   • HgbA1C 6.1, well controlled. BG levels controlled.   • Continue home Metformin for now   • Continue low dose SSI, titrate dosage as necessary   • Closely monitor blood glucose levels with accuchecks QAC and QHS  • Hypoglycemia protocol in place should it be necessary  • Continue home Neurontin for neuropathy     -Hypothyroidism   · TSH adequately replaced   · Continue home dose of levothyroxine     -Depression   -Anxiety   · Supportive care   · Continue home Zoloft     -Morbid obesity, BMI 53.06  · Supportive care   · Affecting all aspects of care     --------------------------------------------------  DVT Prophylaxis: Eliquis per ortho   GI Prophylaxis: PPI   FEN: No IV fluids. Replace electrolytes per protocol as necessary. Consistent carbohydrate diet.  Activity: Up with assistance as tolerated, PT on board, pt is WBAT    --------------------------------------------------  Disposition:  • Pending insurance approval for inpatient rehab  --------------------------------------------------    The patient is high risk due to the following diagnoses/reasons: Hip fracture, weakness, debility, constipation, urinary retention, hx of CAD with CABG, DM, morbid obesity, elevated LFTs, CK elevation, anemia    I have discussed the patient's assessment and plan with the patient, Radha QUINTANILLA, and attending physician Jose Alfredo Pearson,*    Ignacia Solitario PA-C  Hospitalist Service -- [facility]   Pager: [phone]    08/01/21  10:30 EDT    Attending Physician: Jose Alfredo Taylor,*    ----------------------------------------------------------------------------------------------------------------------

## 2021-08-01 NOTE — PLAN OF CARE
Goal Outcome Evaluation:           Progress: no change  Outcome Summary: Pt anxiety resolved tonight. Pt rested well. VS stable. pt refused to ambulate. no acute changes

## 2021-08-02 LAB
ANION GAP SERPL CALCULATED.3IONS-SCNC: 9.1 MMOL/L (ref 5–15)
BUN SERPL-MCNC: 9 MG/DL (ref 6–20)
BUN/CREAT SERPL: 16.1 (ref 7–25)
CALCIUM SPEC-SCNC: 8.8 MG/DL (ref 8.6–10.5)
CHLORIDE SERPL-SCNC: 104 MMOL/L (ref 98–107)
CO2 SERPL-SCNC: 24.9 MMOL/L (ref 22–29)
CREAT SERPL-MCNC: 0.56 MG/DL (ref 0.57–1)
DEPRECATED RDW RBC AUTO: 54.5 FL (ref 37–54)
ERYTHROCYTE [DISTWIDTH] IN BLOOD BY AUTOMATED COUNT: 14.9 % (ref 12.3–15.4)
GFR SERPL CREATININE-BSD FRML MDRD: 112 ML/MIN/1.73
GLUCOSE BLDC GLUCOMTR-MCNC: 148 MG/DL (ref 70–130)
GLUCOSE BLDC GLUCOMTR-MCNC: 155 MG/DL (ref 70–130)
GLUCOSE BLDC GLUCOMTR-MCNC: 91 MG/DL (ref 70–130)
GLUCOSE BLDC GLUCOMTR-MCNC: 95 MG/DL (ref 70–130)
GLUCOSE SERPL-MCNC: 130 MG/DL (ref 65–99)
HCT VFR BLD AUTO: 30 % (ref 34–46.6)
HGB BLD-MCNC: 9.2 G/DL (ref 12–15.9)
MAGNESIUM SERPL-MCNC: 1.6 MG/DL (ref 1.6–2.6)
MCH RBC QN AUTO: 30.7 PG (ref 26.6–33)
MCHC RBC AUTO-ENTMCNC: 30.7 G/DL (ref 31.5–35.7)
MCV RBC AUTO: 100 FL (ref 79–97)
PLATELET # BLD AUTO: 260 10*3/MM3 (ref 140–450)
PMV BLD AUTO: 10.1 FL (ref 6–12)
POTASSIUM SERPL-SCNC: 4 MMOL/L (ref 3.5–5.2)
RBC # BLD AUTO: 3 10*6/MM3 (ref 3.77–5.28)
SODIUM SERPL-SCNC: 138 MMOL/L (ref 136–145)
WBC # BLD AUTO: 7.48 10*3/MM3 (ref 3.4–10.8)

## 2021-08-02 PROCEDURE — 25010000003 MAGNESIUM SULFATE 4 GM/100ML SOLUTION: Performed by: INTERNAL MEDICINE

## 2021-08-02 PROCEDURE — 80048 BASIC METABOLIC PNL TOTAL CA: CPT | Performed by: PHYSICIAN ASSISTANT

## 2021-08-02 PROCEDURE — 82962 GLUCOSE BLOOD TEST: CPT

## 2021-08-02 PROCEDURE — 99233 SBSQ HOSP IP/OBS HIGH 50: CPT | Performed by: PHYSICIAN ASSISTANT

## 2021-08-02 PROCEDURE — 97116 GAIT TRAINING THERAPY: CPT

## 2021-08-02 PROCEDURE — 97530 THERAPEUTIC ACTIVITIES: CPT

## 2021-08-02 PROCEDURE — 97535 SELF CARE MNGMENT TRAINING: CPT

## 2021-08-02 PROCEDURE — 85027 COMPLETE CBC AUTOMATED: CPT | Performed by: PHYSICIAN ASSISTANT

## 2021-08-02 PROCEDURE — 83735 ASSAY OF MAGNESIUM: CPT | Performed by: PHYSICIAN ASSISTANT

## 2021-08-02 RX ORDER — FUROSEMIDE 40 MG/1
40 TABLET ORAL DAILY
Status: DISCONTINUED | OUTPATIENT
Start: 2021-08-02 | End: 2021-08-05 | Stop reason: HOSPADM

## 2021-08-02 RX ORDER — MAGNESIUM SULFATE HEPTAHYDRATE 40 MG/ML
4 INJECTION, SOLUTION INTRAVENOUS ONCE
Status: COMPLETED | OUTPATIENT
Start: 2021-08-02 | End: 2021-08-02

## 2021-08-02 RX ADMIN — ASPIRIN 81 MG: 81 TABLET, COATED ORAL at 08:40

## 2021-08-02 RX ADMIN — DOCUSATE SODIUM 50 MG AND SENNOSIDES 8.6 MG 2 TABLET: 8.6; 5 TABLET, FILM COATED ORAL at 08:39

## 2021-08-02 RX ADMIN — METOPROLOL TARTRATE 25 MG: 25 TABLET, FILM COATED ORAL at 08:40

## 2021-08-02 RX ADMIN — METOPROLOL TARTRATE 25 MG: 25 TABLET, FILM COATED ORAL at 22:01

## 2021-08-02 RX ADMIN — LEVOTHYROXINE SODIUM 25 MCG: 25 TABLET ORAL at 08:40

## 2021-08-02 RX ADMIN — GABAPENTIN 400 MG: 400 CAPSULE ORAL at 22:01

## 2021-08-02 RX ADMIN — SODIUM CHLORIDE, PRESERVATIVE FREE 3 ML: 5 INJECTION INTRAVENOUS at 22:01

## 2021-08-02 RX ADMIN — DOCUSATE SODIUM 50 MG AND SENNOSIDES 8.6 MG 2 TABLET: 8.6; 5 TABLET, FILM COATED ORAL at 22:01

## 2021-08-02 RX ADMIN — SODIUM CHLORIDE, PRESERVATIVE FREE 3 ML: 5 INJECTION INTRAVENOUS at 08:41

## 2021-08-02 RX ADMIN — APIXABAN 2.5 MG: 2.5 TABLET, FILM COATED ORAL at 08:40

## 2021-08-02 RX ADMIN — HYDROCODONE BITARTRATE AND ACETAMINOPHEN 1 TABLET: 5; 325 TABLET ORAL at 00:58

## 2021-08-02 RX ADMIN — CETIRIZINE HYDROCHLORIDE 10 MG: 10 TABLET, FILM COATED ORAL at 08:40

## 2021-08-02 RX ADMIN — SODIUM CHLORIDE, PRESERVATIVE FREE 10 ML: 5 INJECTION INTRAVENOUS at 08:40

## 2021-08-02 RX ADMIN — SERTRALINE 50 MG: 50 TABLET, FILM COATED ORAL at 08:40

## 2021-08-02 RX ADMIN — SODIUM CHLORIDE, PRESERVATIVE FREE 10 ML: 5 INJECTION INTRAVENOUS at 22:02

## 2021-08-02 RX ADMIN — MAGNESIUM SULFATE HEPTAHYDRATE 4 G: 40 INJECTION, SOLUTION INTRAVENOUS at 06:25

## 2021-08-02 RX ADMIN — FERROUS SULFATE TAB 325 MG (65 MG ELEMENTAL FE) 325 MG: 325 (65 FE) TAB at 08:40

## 2021-08-02 RX ADMIN — PANTOPRAZOLE SODIUM 40 MG: 40 TABLET, DELAYED RELEASE ORAL at 04:44

## 2021-08-02 RX ADMIN — HYDROCODONE BITARTRATE AND ACETAMINOPHEN 1 TABLET: 5; 325 TABLET ORAL at 13:17

## 2021-08-02 RX ADMIN — QUETIAPINE FUMARATE 25 MG: 25 TABLET, FILM COATED ORAL at 22:01

## 2021-08-02 RX ADMIN — FUROSEMIDE 40 MG: 40 TABLET ORAL at 11:33

## 2021-08-02 RX ADMIN — APIXABAN 2.5 MG: 2.5 TABLET, FILM COATED ORAL at 22:01

## 2021-08-02 RX ADMIN — GABAPENTIN 400 MG: 400 CAPSULE ORAL at 08:39

## 2021-08-02 NOTE — PROGRESS NOTES
[facility] Medicine Services  PROGRESS NOTE     Patient Identification:  Name:  Aidee Trinidad  Age:  56 y.o.  Sex:  female  :  1965  MRN:  [id number]  Visit Number:  [id number]  Primary Care Provider:  Azalea Burnett APRN    Length of stay:  16    ----------------------------------------------------------------------------------------------------------------------  Subjective     Chief Complaint:  Follow up for hip fracture, urinary retention, constipation      History of Presenting Illness/Hospital Course:  Patient is a 56-year-old female with past medical history significant for coronary artery disease status post CABG in past, essential hypertension, hyperlipidemia, diastolic dysfunction, noninsulin-dependent type 2 diabetes mellitus, referral neuropathy, hypothyroidism, depression, and morbid obesity that presented to the [facility] emergency department on 2021 for evaluation of right hip pain status post mechanical fall.  Please see admitting history and physical for further details.  Patient was found to have acute minimally displaced right proximal intertrochanteric femoral fracture.  Patient was admitted to the medical surgical floor for further evaluation and treatment.  Orthopedic surgery did evaluate patient, patient ultimately underwent right hip cephalomedullary nailing with TFNA nail per Dr. Ponce with orthopedic surgery.  Patient tolerated procedure well.  Postoperative setting complicated by patient having difficulty ambulating and ongoing weakness throughout her body.  As such, she is currently pending insurance approval for inpatient rehab for prolonged therapy.    It is of note that during hospitalization, patient developed urinary retention that required a Velazquez catheter to be anchored on 2021.  Urinalysis obtained revealed 25,000 CFU yeast and less than 10,000 CFU gram-positive cocci.  Not felt to be an  acute urinary tract infection, thus was not treated.  Catheter has since been removed and she has been voiding on her own. Due to her symptoms of weakness, significant constipation noted on imaging, as well as urinary retention and lumbar spine MRI was obtained with no acute abnormalities noted.  She has continued to work with physical therapy for rehabilitation.    Subjective:  Today, the patient was sitting up in bed per my evaluation this morning, no acute distress noted.  No overnight events reported per AM RN Katya Callahan.  No family present at bedside.  Patient with no new complaints.  Continues to be less mobile.  Continues to void on her own.  She does report having some bloating in her abdomen, states she takes p.o. Lasix at home daily and has not had since she has been here.  She continues to report pain in right hip and lower extremity, stable with no acute worsening. No bowel or bladder incontinence. Distal pulses intact, vascularly intact. Good motor function bilateral lower extremities. No other complaints at this time. Cont to work with PT. Pending inpatient rehab.     Present during exam: N/A   ----------------------------------------------------------------------------------------------------------------------  Objective     Consults:  • Orthopedic surgery     Procedures:  • 7/18/2021: Right hip cephalo-medullary nailing with TFNA nail -- Dr. Ponce, orthopedic surgery   • 7/23/2021: Transthoracic echocardiogram   · Normal left ventricular cavity size and wall thickness noted.  · Left ventricular ejection fraction appears to be 56 - 60%. Left ventricular systolic function is normal.  · Left ventricular diastolic function is consistent with (grade II w/high LAP) pseudonormalization.  · Normal cardiac chamber dimensions.  · The aortic valve is structurally normal. Doppler interrogation was not done across the aortic valve.  · The mitral valve is structurally normal with no regurgitation or significant  stenosis present  · Mild tricuspid valve regurgitation is present.  · Estimated right ventricular systolic pressure from tricuspid regurgitation is mildly elevated (35-45 mmHg). Mild pulmonary hypertension is present.  · The pulmonic valve is not well visualized.  · There is a trivial pericardial effusion.  • 7/23/2021: Velazquez catheter insertion d/t urinary retention   • 7/29/2021: Velazquez catheter removal     Current [facility] Meds:  apixaban, 2.5 mg, Oral, Q12H  aspirin, 81 mg, Oral, Daily  cetirizine, 10 mg, Oral, Daily  cholecalciferol, 50,000 Units, Oral, Q7 Days  ferrous sulfate, 325 mg, Oral, Daily With Breakfast  gabapentin, 400 mg, Oral, Q12H  insulin aspart, 0-7 Units, Subcutaneous, TID AC  levothyroxine, 25 mcg, Oral, Daily  magnesium sulfate, 4 g, Intravenous, Once  metoprolol tartrate, 25 mg, Oral, Q12H  pantoprazole, 40 mg, Oral, Q AM  polyethylene glycol, 17 g, Oral, Daily  QUEtiapine, 25 mg, Oral, Nightly  senna-docusate sodium, 2 tablet, Oral, BID  sertraline, 50 mg, Oral, Daily  sodium chloride, 10 mL, Intravenous, Q12H  sodium chloride, 3 mL, Intravenous, Q12H         ----------------------------------------------------------------------------------------------------------------------  Vital Signs:  Temp:  [97.6 °F (36.4 °C)-99.2 °F (37.3 °C)] 97.6 °F (36.4 °C)  Heart Rate:  [65-86] 80  Resp:  [18-20] 18  BP: (110-148)/(45-86) 134/86    SpO2 Percentage    08/01/21 2221 08/02/21 0225 08/02/21 0600   SpO2: 96% 92% 93%     SpO2:  [92 %-96 %] 93 %  on   ;   Device (Oxygen Therapy): room air    Body mass index is 53.06 kg/m².  Wt Readings from Last 3 Encounters:   07/18/21 111 kg (245 lb 3.2 oz)   04/26/21 108 kg (237 lb)   02/28/20 97.1 kg (214 lb)        Intake/Output Summary (Last 24 hours) at 8/2/2021 0903  Last data filed at 8/1/2021 1757  Gross per 24 hour   Intake 840 ml   Output --   Net 840 ml     Diet Regular; Consistent  Carbohydrate  ----------------------------------------------------------------------------------------------------------------------  Physical exam:  Physical Exam  Nursing note reviewed.   Constitutional:       General: She is sleeping. She is not in acute distress.     Appearance: She is well-developed. She is morbidly obese. She is not toxic-appearing.      Comments: Sitting up in bed, no acute distress noted. No family present at bedside. On room air.    HENT:      Head: Normocephalic and atraumatic.      Mouth/Throat:      Mouth: Mucous membranes are moist.   Eyes:      Conjunctiva/sclera: Conjunctivae normal.      Pupils: Pupils are equal, round, and reactive to light.   Cardiovascular:      Rate and Rhythm: Normal rate and regular rhythm.      Pulses:           Dorsalis pedis pulses are 2+ on the right side and 2+ on the left side.        Posterior tibial pulses are 2+ on the right side and 2+ on the left side.      Heart sounds: Normal heart sounds. No murmur heard.     Pulmonary:      Effort: Pulmonary effort is normal.      Breath sounds: Normal breath sounds and air entry. No wheezing, rhonchi or rales.      Comments: Speaks in full sentences without dyspnea. On room air.   Abdominal:      General: Bowel sounds are normal. There is no distension.      Palpations: Abdomen is soft.      Tenderness: There is no abdominal tenderness. There is no guarding or rebound.   Genitourinary:     Comments: No troy catheter in place.  Musculoskeletal:      Cervical back: Neck supple.      Right hip: Deformity (Post op incision clean and dry, mild tender, mild edema as expected post op ) present. Decreased range of motion (2/2 pain).      Right lower leg: No edema.      Left lower leg: No edema.      Comments: Patient with active dorsiflexion and plantar flexion bilateral lower extremities.  Left lower extremity strength 5/5.  Right lower extremity strength 2/5 as she cannot lift her leg against gravity but does have  active plantar/dorsiflexion.  strength equal and intact bilateral upper extremities, upper extremities 5/5 on strength testing. Sensation intact bilateral upper and LLE, however patient reports no sensation to RLE. She states this has been going on since surgery, no acute worsening. She does have chronic peripheral neuropathy for which she is treated for outpatient. Distal pulses intact in all extremities.    Skin:     General: Skin is warm and dry.      Capillary Refill: Capillary refill takes less than 2 seconds.      Findings: No lesion.   Neurological:      Mental Status: She is oriented to person, place, and time and easily aroused.      GCS: GCS eye subscore is 4. GCS verbal subscore is 5. GCS motor subscore is 6.      Cranial Nerves: No dysarthria or facial asymmetry.      Sensory: Sensory deficit (See MSK exam ) present.      Motor: Weakness (RLE > LLE, see MSK exam  ) present. No abnormal muscle tone.      Comments: Easily aroused, sleeping. Follows commands. Answers questions appropriately.     Psychiatric:         Mood and Affect: Mood normal.         Speech: Speech normal.         Behavior: Behavior is cooperative.      PE unchanged on exam today   ----------------------------------------------------------------------------------------------------------------------  Tele:    Sinus 90s     I have personally reviewed the EKG/Telemetry strips   ----------------------------------------------------------------------------------------------------------------------  Results from last 7 days   Lab Units 07/28/21  1041   CK TOTAL U/L 205*           Results from last 7 days   Lab Units 08/02/21  0404 07/30/21  0820 07/28/21  1041   WBC 10*3/mm3 7.48 7.02 6.78   HEMOGLOBIN g/dL 9.2* 8.7* 9.0*   HEMATOCRIT % 30.0* 27.9* 28.9*   MCV fL 100.0* 101.8* 99.0*   MCHC g/dL 30.7* 31.2* 31.1*   PLATELETS 10*3/mm3 260 232 232     Results from last 7 days   Lab Units 08/02/21  0404 07/31/21  0703 07/30/21  0820  07/29/21  0912 07/29/21  0912   SODIUM mmol/L 138 138 139   < > 141   POTASSIUM mmol/L 4.0 4.0 4.0   < > 4.3   MAGNESIUM mg/dL 1.6 1.8 1.7  --   --    CHLORIDE mmol/L 104 105 102   < > 105   CO2 mmol/L 24.9 25.6 27.9   < > 27.3   BUN mg/dL 9 8 8   < > 7   CREATININE mg/dL 0.56* 0.56* 0.60   < > 0.60   EGFR IF NONAFRICN AM mL/min/1.73 112 112 103   < > 103   CALCIUM mg/dL 8.8 8.4* 8.8   < > 8.8   GLUCOSE mg/dL 130* 99 152*   < > 153*   ALBUMIN g/dL  --   --   --   --  2.59*   BILIRUBIN mg/dL  --   --   --   --  1.0   ALK PHOS U/L  --   --   --   --  175*   AST (SGOT) U/L  --   --   --   --  41*   ALT (SGPT) U/L  --   --   --   --  26    < > = values in this interval not displayed.   Estimated Creatinine Clearance: 127 mL/min (A) (by C-G formula based on SCr of 0.56 mg/dL (L)).    Glucose   Date/Time Value Ref Range Status   08/02/2021 0656 91 70 - 130 mg/dL Final     Comment:     Meter: [id number] : [id number] carynbeulah [email or web address]   08/01/2021 2028 109 70 - 130 mg/dL Final     Comment:     Meter: [id number] : [id number] adenike anderson   08/01/2021 1629 129 70 - 130 mg/dL Final     Comment:     Meter: [id number] : [id number] Eric Garcia   08/01/2021 1040 127 70 - 130 mg/dL Final     Comment:     Meter: [id number] : [id number] Eric Garcia   08/01/2021 0716 108 70 - 130 mg/dL Final     Comment:     Meter: [id number] : [id number] HASTINGS ALLI   07/31/2021 2140 142 (H) 70 - 130 mg/dL Final     Comment:     Meter: [id number] : [id number] melissa collier   07/31/2021 1648 124 70 - 130 mg/dL Final     Comment:     Meter: [id number] : [id number] Eric Garcia   07/31/2021 1054 142 (H) 70 - 130 mg/dL Final     Comment:     Meter: [id number] : [id number] Eric Garcia     Lab Results   Component Value Date    HGBA1C 6.10 (H) 07/18/2021     Lab Results   Component Value Date    TSH 2.280 07/18/2021     Microbiology Results (last 10 days)     ** No results found for the last 240  hours. **         Pain Management Panel     Pain Management Panel Latest Ref Rng & Units 2/28/2020    AMPHETAMINES SCREEN, URINE Negative Negative    BARBITURATES SCREEN Negative Negative    BENZODIAZEPINE SCREEN, URINE Negative Negative    BUPRENORPHINEUR Negative Negative    COCAINE SCREEN, URINE Negative Negative    METHADONE SCREEN, URINE Negative Negative        I have personally reviewed the above laboratory results.   ----------------------------------------------------------------------------------------------------------------------  Imaging Results (Last 24 Hours)     ** No results found for the last 24 hours. **        I have personally reviewed the above radiology results.   ----------------------------------------------------------------------------------------------------------------------  Assessment/Plan     -Acute, minimally displaced right proximal intertrochanteric femur fracture s/p mechanical fall at home, s/p repair 7/18/2021  -History of right lateral malleolus avulsion fracture with tiny fragmentation 4/2021  -History of TKA right in past   -Generalized weakness, debility, inability to complete ADLs currently due to acute fracture   • Pt is s/p ORIF 7/18  • Cont PRN pain medication with holding parameters   • PT on board, pending inpatient rehab as she has had prolonged recovery and requires assistance ambulating and ADLs   • Patient does report neuropathy in RLE, CT lumbar spine with no acute abnormalities. She has good motor function on exam   • Supportive care   • WBAT   • Eliquis on board for DVT proph  • Vitamin D adequate    • NV checks   • Ortho input/assistance is appreciated.     RLE > LLE weakness, neuropathy   · No focal deficits    · Head CT previously unremarkable 7/21  · MRI lumbar spine 7/23 with no acute abnormalities   · Cont Neurontin   · ?outpatient neuro referral     -Right foot pain   · Pt with history of right lateral malleolus avulsion fx 4/2021  · Reports pain in right  foot similar to gout episode in past, more pain towards her great toe on right foot   · XR unremarkable, uric acid level not elevated   · Supportive care   · PRN pain medication     -Hypokalemia   -Borderline hypomagnesemia   · Resolved with supplementation   · Replace electrolytes per protocol as necessary   · Repeat BMP and mag level in AM    -Constipation, resolved   · Continue bowel regimen    -Urinary retention, resolved   · Patient with urinary retention earlier in hospitalization requiring anchroing of troy catheter   · Patient also with generalized weakness and constipation, as such MRI of lumbar spine obtained previously with no acute findings.   · Urinalysis with 25K CFU yeast and <10K CFU gram positive cocci . No treatment warranted at this time, discussed with Dr. Taylor (attending physician)  · Suspect that urine retention will improve with mobilization.   · Bladder training completed, troy catheter previously removed. She is now voiding on her own.     -Macrocytic anemia   -Thrombocytopenia, resolved   · Likely ABLA post op  · Hemoglobin stable   · Platelets stable, ultrasound of spleen previously unremarkable  · No active bleeding noted or reported   · Vitamin B12 and folate WNL. Iron profile with slightly low iron and iron sat but stable ferritin. Pt previously started on PO iron, will continue.   · Monitor closely, repeat CBC in AM    -Transaminitis, felt to be 2/2 CPK elevation   -Hyperbilirubinemia, stable   -Hypoalbuminemia  -Hepatitis B IgM/IgG  · Abdomen non tender   · LFTs stable   · US Liver with marked cirrhosis said to be advanced with nodular liver margins.   · US spleen unremarkable.  · CPK improved  · Avoid nephrotoxins as much as possible   · Repeat chem panel in AM    -Grade II diastolic dysfunction   · ECHO reviewed   · Appears grossly compensated   · Monitor volume status closely     -Essential hypertension  • BP appears well controlled   • Continue home metoprolol with  appropriate holding parameters to prevent hypotension and/or bradycardia   • Home lasix previously held.  Blood pressure and renal function stable, as such we will resume home Lasix dose.  • Closely monitor BP per hospital protocol, titrate medications as necessary    -History of coronary artery disease s/p CABG in past   -Cardiomegaly   · Patient denies any chest pain   · Statin held previously due to elevated CPK level. CPK still elevated, but significantly improved.  · TTE obtained with preserved EF and grade II diastolic dysfunction   · Eliquis on board for VTE proph, cont daily aspirin  · Home lasix on hold   · Monitor closely on telemetry     -Type II diabetes mellitus, non insulin dependent  -Peripheral neuropathy   • HgbA1C 6.1, well controlled. BG levels controlled.   • Continue home Metformin for now   • Continue low dose SSI, titrate dosage as necessary   • Closely monitor blood glucose levels with accuchecks QAC and QHS  • Hypoglycemia protocol in place should it be necessary  • Continue home Neurontin for neuropathy     -Hypothyroidism   · TSH adequately replaced   · Continue home dose of levothyroxine     -Depression   -Anxiety   · Supportive care   · Continue home Zoloft     -Morbid obesity, BMI 53.06  · Supportive care   · Affecting all aspects of care     --------------------------------------------------  DVT Prophylaxis: Eliquis per ortho   GI Prophylaxis: PPI   FEN: No IV fluids. Replace electrolytes per protocol as necessary. Consistent carbohydrate diet.  Activity: Up with assistance as tolerated, PT on board, pt is WBAT   --------------------------------------------------  Disposition:  • Pending insurance approval for inpatient rehab  --------------------------------------------------    The patient is high risk due to the following diagnoses/reasons: Hip fracture, weakness, debility, constipation, urinary retention, hx of CAD with CABG, DM, morbid obesity, elevated LFTs, CK elevation,  anemia    I have discussed the patient's assessment and plan with the patient, Katya Callahan RN, and attending physician Jeff Bruno MD Allyson O'Kuma, PA-C  [facility] Service -- [facility]   Pager: [phone]    08/02/21  09:03 EDT    Attending Physician: Jeff Graves MD    ----------------------------------------------------------------------------------------------------------------------

## 2021-08-02 NOTE — PLAN OF CARE
Goal Outcome Evaluation:           Progress: no change  Outcome Summary: Pt rested well tonight. pain pill given x1. no acute changes. VS stable

## 2021-08-02 NOTE — CASE MANAGEMENT/SOCIAL WORK
Discharge Planning Assessment  [facility]     Patient Name: Aidee Trinidad  MRN: [id number]  Today's Date: 8/2/2021    Admit Date: 7/17/2021      Discharge Plan     Row Name 08/02/21 1957       Plan    Plan SS contacted [facility] inpatient rehab per Bisi who states pre-auth is still pending. SS to follow.        Continued Care and Services - Admitted Since 7/17/2021     Destination     Service Provider Request Status Selected Services Address Phone Fax Patient Preferred    [facility]  Pending - No Request Sent N/A [address] [place] [address] [phone] --    [facility]  Declined N/A [address] [phone] [phone] --              RAYNA Daniels

## 2021-08-02 NOTE — THERAPY TREATMENT NOTE
Acute Care - Physical Therapy Treatment Note   Christophe     Patient Name: Aidee Trinidad  : 1965  MRN: [id number]  Today's Date: 2021   Onset of Illness/Injury or Date of Surgery: 21  Visit Dx:     ICD-10-CM ICD-9-CM   1. Closed fracture of right hip, initial encounter (CMS/[facility])  S72.001A 820.8     Patient Active Problem List   Diagnosis   • Closed fracture of right hip (CMS/[facility])   • CAD (coronary artery disease)   • Essential hypertension   • Fatty liver     Past Medical History:   Diagnosis Date   • Arthritis    • Chronic back pain    • Coronary artery disease    • Depression    • Diabetes mellitus (CMS/[facility])    • Fatty liver    • Hypertension      Past Surgical History:   Procedure Laterality Date   • CARPAL TUNNEL RELEASE     •  SECTION     • CHOLECYSTECTOMY     • CORONARY ANGIOPLASTY WITH STENT PLACEMENT     • HIP TROCHANTERIC NAILING WITH INTRAMEDULLARY HIP SCREW Right 2021    Procedure: HIP TROCHANTERIC NAILING LONG WITH INTRAMEDULLARY HIP SCREW;  Surgeon: Oracio Ponce DO;  Location: [facility];  Service: Orthopedics;  Laterality: Right;   • REPLACEMENT TOTAL KNEE Right    • TUBAL ABDOMINAL LIGATION          PT Assessment (last 12 hours)      PT Evaluation and Treatment     Row Name 21 1551          Physical Therapy Time and Intention    Subjective Information  complains of;fatigue;pain  -CW     Document Type  therapy note (daily note)  -CW     Mode of Treatment  physical therapy  -CW     Patient Effort  adequate  -CW     Symptoms Noted During/After Treatment  fatigue;increased pain  -CW     Comment  Patient agreeable to physical therapy treatment this date. She reports that she did not sleep well and is tired today. She inquires about her status for admission to [facility].  -CW     Row Name 21 1553          General Information    Patient Profile Reviewed  yes  -CW     Patient Observations  alert;cooperative;agree to therapy  -CW     Row Name 21 1553           Cognition    Affect/Mental Status (Cognitive)  L  -     Orientation Status (Cognition)  oriented x 3  -CW     Follows Commands (Cognition)  WFL  -     Row Name 08/02/21 5771          Pain Scale: Word Pre/Post-Treatment    Pre/Posttreatment Pain Comment  Patient continues to report some RLE pain consistent with recent hip fracture, but also reports R ankle pain that she grades higher than her hip pain. It is exacerbated with mobility/weight bearing.  -     Pain Intervention(s)  Ambulation/increased activity;Repositioned  -     Row Name 08/02/21 1551          Mobility    Right Lower Extremity (Weight-bearing Status)  weight-bearing as tolerated (WBAT)  -     Row Name 08/02/21 1551          Bed Mobility    Bed Mobility  bed mobility (all) activities;sit-supine;supine-sit;scooting/bridging  -     Supine-Sit [place] (Bed Mobility)  nonverbal cues (demo/gesture);verbal cues;moderate assist (50% patient effort)  -     Bed Mobility, Safety Issues  decreased use of arms for pushing/pulling;decreased use of legs for bridging/pushing  -     Assistive Device (Bed Mobility)  bed rails;head of bed elevated  -     Row Name 08/02/21 7581          Transfers    Transfers  sit-stand transfer;stand-sit transfer;bed-chair transfer;chair-bed transfer  -     Bed-Chair [place] (Transfers)  moderate assist (50% patient effort);minimum assist (75% patient effort);verbal cues;nonverbal cues (demo/gesture) x 2  -CW     Assistive Device (Bed-Chair Transfers)  walker, front-wheeled;other (see comments) Bariatric  -CW     Sit-Stand [place] (Transfers)  moderate assist (50% patient effort);minimum assist (75% patient effort);verbal cues;nonverbal cues (demo/gesture) x 2  -CW     Stand-Sit [place] (Transfers)  minimum assist (75% patient effort);verbal cues;nonverbal cues (demo/gesture)  -     Row Name 08/02/21 1551          Sit-Stand Transfer    Assistive Device (Sit-Stand Transfers)  walker,  front-wheeled;other (see comments) Bariatric  -CW     Row Name 08/02/21 1551          Stand-Sit Transfer    Assistive Device (Stand-Sit Transfers)  walker, front-wheeled;other (see comments) Bariatric  -CW     Row Name 08/02/21 1551          Gait/Stairs (Locomotion)    [place] Level (Gait)  minimum assist (75% patient effort);verbal cues;nonverbal cues (demo/gesture)  -CW     Assistive Device (Gait)  walker, front-wheeled;other (see comments) Bariatric  -CW     Distance in Feet (Gait)  5  -CW     Pattern (Gait)  step-to  -CW     Deviations/Abnormal Patterns (Gait)  antalgic;base of support, wide;becky decreased;gait speed decreased;stride length decreased;weight shifting decreased  -CW     Bilateral Gait Deviations  forward flexed posture;heel strike decreased  -CW     Comment (Gait/Stairs)  With chair follow.  -CW     Row Name 08/02/21 1551          Safety Issues, Functional Mobility    Safety Issues Affecting Function (Mobility)  insight into deficits/self-awareness;judgment  -CW     Impairments Affecting Function (Mobility)  balance;endurance/activity tolerance;pain;postural/trunk control;strength  -CW     Row Name 08/02/21 1551          Motor Skills    Therapeutic Exercise  other (see comments) Seated LE strengthening/AROM interventions.  -CW     Row Name             Wound 07/18/21 1345 Right anterior thigh Incision    Wound - Properties Group Placement Date: 07/18/21  -CWA Placement Time: 1345  -CWA Present on Hospital Admission: N  -CWA Side: Right  -CWA Orientation: anterior  -CWA Location: thigh  -CWA Primary Wound Type: Incision  -CWA    Retired Wound - Properties Group Date first assessed: 07/18/21  -CWA Time first assessed: 1345  -CWA Present on Hospital Admission: N  -CWA Side: Right  -CWA Location: thigh  -CWA Primary Wound Type: Incision  -CWA    Row Name 08/02/21 1551          Coping    Observed Emotional State  calm;cooperative  -CW     Trust Relationship/Rapport  care explained;choices  provided;questions answered;thoughts/feelings acknowledged  -CW     Row Name 08/02/21 1551          Plan of Care Review    Plan of Care Reviewed With  patient  -CW     Row Name 08/02/21 1551          Positioning and Restraints    Pre-Treatment Position  in bed  -CW     Post Treatment Position  bsc  -CW     On BS commode  notified nsg;sitting;call light within reach;encouraged to call for assist  -CW     Row Name 08/02/21 1551          Progress Summary (PT)    Progress Toward Functional Goals (PT)  progress toward functional goals is fair  -CW     Daily Progress Summary (PT)  Patient demonstrates ability to ambulate this date with improved weight bearing on RLE and decreased report of pain with weight bearing. This may have been aided by chair follow this date. She is willing to begin use of a BSC as well instead of a bed pan.  -CW       User Key  (r) = Recorded By, (t) = Taken By, (c) = Cosigned By    Initials Name Provider Type    Mildred Herman, RN Registered Nurse    Ki Shah, PT Physical Therapist        Physical Therapy Education                 Title: PT OT SLP Therapies (Done)     Topic: Physical Therapy (Done)     Point: Mobility training (Done)     Learning Progress Summary           Patient Acceptance, E, VU,NR by  at 7/31/2021 1239    Comment: Pt will benefit from further education regarding safe mobility and exercises to improve independence.                   Point: Home exercise program (Done)     Learning Progress Summary           Patient Acceptance, E, VU,NR by  at 7/31/2021 1239    Comment: Pt will benefit from further education regarding safe mobility and exercises to improve independence.                   Point: Body mechanics (Done)     Learning Progress Summary           Patient Acceptance, E, VU,NR by  at 7/31/2021 1239    Comment: Pt will benefit from further education regarding safe mobility and exercises to improve independence.                   Point: Precautions  (Done)     Learning Progress Summary           Patient Acceptance, E, VU,NR by  at 7/31/2021 1914    Comment: Pt will benefit from further education regarding safe mobility and exercises to improve independence.                               User Key     Initials Effective Dates Name Provider Type Discipline     06/16/21 -  Lawanda Duran, PT Physical Therapist PT              PT Recommendation and Plan  Anticipated Discharge Disposition (PT): inpatient rehabilitation facility, skilled nursing facility  Progress Summary (PT)  Progress Toward Functional Goals (PT): progress toward functional goals is fair  Daily Progress Summary (PT): Patient demonstrates ability to ambulate this date with improved weight bearing on RLE and decreased report of pain with weight bearing. This may have been aided by chair follow this date. She is willing to begin use of a BSC as well instead of a bed pan.  Plan of Care Reviewed With: patient       Time Calculation:   PT Charges     Row Name 08/02/21 1557             Time Calculation    PT Received On  08/02/21  -CW         Time Calculation- PT    Total Timed Code Minutes- PT  45 minute(s)  -CW        User Key  (r) = Recorded By, (t) = Taken By, (c) = Cosigned By    Initials Name Provider Type    CW Ki Reyes, PT Physical Therapist        Therapy Charges for Today     Code Description Service Date Service Provider Modifiers Qty    61136856019 HC PT THERAPEUTIC ACT EA 15 MIN 8/2/2021 Ki Reyes, PT GP 2    11004986760 HC GAIT TRAINING EA 15 MIN 8/2/2021 Ki Reyes, PT GP 1               Ki Reyes PT  8/2/2021

## 2021-08-02 NOTE — PLAN OF CARE
Goal Outcome Evaluation:           Progress: no change  Outcome Summary: Patient resting in bed. Patient continues to refuse to get up to chair. PRN medications given. No compliants at this time.

## 2021-08-02 NOTE — THERAPY TREATMENT NOTE
Acute Care - Occupational Therapy Treatment Note   Christophe     Patient Name: Aidee Trinidad  : 1965  MRN: [id number]  Today's Date: 2021  Onset of Illness/Injury or Date of Surgery: 21     Referring Physician: Dr Taylor    Admit Date: 2021       ICD-10-CM ICD-9-CM   1. Closed fracture of right hip, initial encounter (CMS/[facility])  S72.001A 820.8     Patient Active Problem List   Diagnosis   • Closed fracture of right hip (CMS/[facility])   • CAD (coronary artery disease)   • Essential hypertension   • Fatty liver     Past Medical History:   Diagnosis Date   • Arthritis    • Chronic back pain    • Coronary artery disease    • Depression    • Diabetes mellitus (CMS/[facility])    • Fatty liver    • Hypertension      Past Surgical History:   Procedure Laterality Date   • CARPAL TUNNEL RELEASE     •  SECTION     • CHOLECYSTECTOMY     • CORONARY ANGIOPLASTY WITH STENT PLACEMENT     • HIP TROCHANTERIC NAILING WITH INTRAMEDULLARY HIP SCREW Right 2021    Procedure: HIP TROCHANTERIC NAILING LONG WITH INTRAMEDULLARY HIP SCREW;  Surgeon: Oracio Ponce DO;  Location: [facility];  Service: Orthopedics;  Laterality: Right;   • REPLACEMENT TOTAL KNEE Right    • TUBAL ABDOMINAL LIGATION              OT ASSESSMENT FLOWSHEET (last 12 hours)      OT Evaluation and Treatment     Row Name 21 1402                   OT Time and Intention    Document Type  therapy note (daily note)  -HB        Mode of Treatment  individual therapy;occupational therapy  -HB        Patient Effort  adequate  -HB        Symptoms Noted During/After Treatment  none  -HB        Comment  Patient treated this date to increase ADL/functional status.   -HB           General Information    Patient Profile Reviewed  yes  -HB        General Observations of Patient  Patient agreeable to OT tx this date   -HB        Existing Precautions/Restrictions  fall  -HB           Cognition    Affect/Mental Status (Cognitive)  WFL  -HB           Pain  Assessment    Additional Documentation  Pain Scale: Numbers Pre/Post-Treatment (Group)  -HB           Pain Scale: Numbers Pre/Post-Treatment    Pretreatment Pain Rating  3/10  -HB        Posttreatment Pain Rating  3/10  -HB        Pain Location - Side  Right  -HB        Pain Location  hip  -HB           Bed Mobility    Bed Mobility  rolling left;rolling right  -HB        Rolling Left [place] (Bed Mobility)  verbal cues;minimum assist (75% patient effort)  -HB        Rolling Right [place] (Bed Mobility)  verbal cues;minimum assist (75% patient effort)  -HB           Motor Skills    Motor Skills  therapeutic exercise  -HB        Therapeutic Exercise  shoulder  -HB        Additional Documentation  -- AROM BUE 3 sets 15 reps; rest breaks PRN   -HB           Toileting Assessment/Training    [place] Level (Toileting)  toileting skills;change pad/brief;perform perineal hygiene;maximum assist (25% patient effort)  -HB        Position (Toileting)  supine  -HB           Wound 07/18/21 1345 Right anterior thigh Incision    Wound - Properties Group Placement Date: 07/18/21  -CW Placement Time: 1345  -CW Present on Hospital Admission: N  -CW Side: Right  -CW Orientation: anterior  -CW Location: thigh  -CW Primary Wound Type: Incision  -CW    Retired Wound - Properties Group Date first assessed: 07/18/21  -CW Time first assessed: 1345  -CW Present on Hospital Admission: N  -CW Side: Right  -CW Location: thigh  -CW Primary Wound Type: Incision  -CW       Positioning and Restraints    Pre-Treatment Position  in bed  -HB        Post Treatment Position  bed  -HB        In Bed  call light within reach;encouraged to call for assist  -HB          User Key  (r) = Recorded By, (t) = Taken By, (c) = Cosigned By    Initials Name Effective Dates    CW Mildred Hurst RN 06/16/21 -     HB Trena Ambriz OT 05/25/21 -                  OT Recommendation and Plan              Time Calculation:   Time Calculation- OT     Row Name  08/02/21 1409             Time Calculation- OT    Total Timed Code Minutes- OT  25 minute(s)  -        User Key  (r) = Recorded By, (t) = Taken By, (c) = Cosigned By    Initials Name Provider Type     Trena Ambriz OT Occupational Therapist        Therapy Charges for Today     Code Description Service Date Service Provider Modifiers Qty    82438052498  OT SELF CARE/MGMT/TRAIN EA 15 MIN 8/2/2021 Trena Ambriz OT GO 1    [id number]  OT THERAPEUTIC ACT EA 15 MIN 8/2/2021 Trena Ambriz OT GO 1               Trena Ambriz OT  8/2/2021

## 2021-08-02 NOTE — PAYOR COMM NOTE
"CONTACT:  Zoya Muñoz RN    Utilization Management Dept.   [facility]  [address] [place] [address]    Phone:[phone]  Fax: [phone]    REQUEST FOR ADDITIONAL DAYS  REF # [id number]    :  FACILITY NPI: [id number]        Aidee Sanchez (56 y.o. Female)     Date of Birth Social Security Number Address Home Phone MRN    1965  [address] [place] [phone] [id number]    Mandaeism Marital Status          Jehovah's witness        Admission Date Admission Type Admitting Provider Attending Provider Department, Room/Bed    7/17/21 Emergency Gill Sanchez DO Heinss, Karl F, MD [address], 3350/2S    Discharge Date Discharge Disposition Discharge Destination                       Attending Provider: Jeff Graves MD    Allergies: Nuts, Codeine, Latex    Isolation: None   Infection: None   Code Status: CPR    Ht: 144.8 cm (57\")   Wt: 111 kg (245 lb 3.2 oz)    Admission Cmt: None   Principal Problem: Closed fracture of right hip (CMS/HCC) [S72.001A]                 Active Insurance as of 7/17/2021     Primary Coverage     Payor Plan Insurance Group Employer/Plan Group    [facility]      Payor Plan Address Payor Plan Phone Number Payor Plan Fax Number Effective Dates    [address] [address]   7/1/2015 - None Entered    [place] [address]       Subscriber Name Subscriber Birth Date Member ID       AIDEE SANCHEZ 1965 [id number]                 Emergency Contacts      (Rel.) Home Phone Work Phone Mobile Phone    NA CACERES (Daughter) [phone] -- --            Vital Signs (last day)     Date/Time   Temp   Temp src   Pulse   Resp   BP   Patient Position   SpO2    08/02/21 0600   97.6 (36.4)   Oral   80   18   134/86   Lying   93    08/02/21 0225   99.2 (37.3)   Oral   86   18   137/45   Lying   92    08/01/21 2221   98.2 (36.8)   Oral   78   18   148/52   Lying   96    " 08/01/21 1833   98.1 (36.7)   Oral   76   18   134/67   Lying   95    08/01/21 1500   97.9 (36.6)   Oral   70   20   110/54   Lying   --    08/01/21 1100   97.8 (36.6)   Oral   65   18   111/66   Lying   --    08/01/21 0801   --   --   --   --   --   --   94    08/01/21 0741   97.5 (36.4)   Oral   64   18   120/59   Lying   99    08/01/21 0300   97.8 (36.6)   Temporal   73   18   114/62   Lying   --              Current Facility-Administered Medications   Medication Dose Route Frequency Provider Last Rate Last Admin   • acetaminophen (TYLENOL) tablet 650 mg  650 mg Oral Q6H PRN Jose Alfredo Taylor DO   650 mg at 07/31/21 1047   • apixaban (ELIQUIS) tablet 2.5 mg  2.5 mg Oral Q12H Oracio Ponce DO   2.5 mg at 08/02/21 0840   • aspirin EC tablet 81 mg  81 mg Oral Daily Jeff Graves MD   81 mg at 08/02/21 0840   • bisacodyl (DULCOLAX) suppository 10 mg  10 mg Rectal Daily PRN Brittany Colon PA-C   10 mg at 07/24/21 1540   • calcium carbonate (TUMS) chewable tablet 500 mg (200 mg elemental)  2 tablet Oral TID PRN Oracio Ponce DO       • cetirizine (zyrTEC) tablet 10 mg  10 mg Oral Daily Oracio Ponce DO   10 mg at 08/02/21 0840   • cholecalciferol (VITAMIN D3) capsule 50,000 Units  50,000 Units Oral Q7 Days Oracio Ponce DO   50,000 Units at 07/30/21 0826   • dextrose (D50W) 25 g/ 50mL Intravenous Solution 25 g  25 g Intravenous Q15 Min PRN Oracio Ponce, DO       • dextrose (GLUTOSE) oral gel 15 g  15 g Oral Q15 Min PRN Oracio Ponce, DO       • Diclofenac Sodium (VOLTAREN) 1 % gel 4 g  4 g Topical 4x Daily PRN Oracio Ponce,        • ferrous sulfate tablet 325 mg  325 mg Oral Daily With Breakfast Oracio Ponce DO   325 mg at 08/02/21 0840   • furosemide (LASIX) tablet 40 mg  40 mg Oral Daily Ignacia Solitario PA       • gabapentin (NEURONTIN) capsule 400 mg  400 mg Oral Q12H Oracio Ponce DO   400 mg at 08/02/21 0839   • glucagon (human recombinant) (GLUCAGEN DIAGNOSTIC)  injection 1 mg  1 mg Subcutaneous Q15 Min PRN Oracio Ponce, DO       • HYDROcodone-acetaminophen (NORCO) 5-325 MG per tablet 1 tablet  1 tablet Oral BID PRN Ignacia Solitario PA   1 tablet at 08/02/21 0058   • hydrOXYzine (ATARAX) tablet 25 mg  25 mg Oral TID PRN Oracio Ponce, DO   25 mg at 08/01/21 0902   • insulin aspart (novoLOG) injection 0-7 Units  0-7 Units Subcutaneous TID AC Oracio Ponce, DO   2 Units at 07/28/21 1117   • levothyroxine (SYNTHROID, LEVOTHROID) tablet 25 mcg  25 mcg Oral Daily Oracio Ponce DO   25 mcg at 08/02/21 0840   • Magnesium Sulfate 2 gram Bolus, followed by 8 gram infusion (total Mg dose 10 grams)- Mg less than or equal to 1mg/dL  2 g Intravenous PRN Brittany Colon PA-C        Or   • Magnesium Sulfate 2 gram / 50mL Infusion (GIVE X 3 BAGS TO EQUAL 6GM TOTAL DOSE) - Mg 1.1 - 1.5 mg/dl  2 g Intravenous PRN Brittany Colon PA-C        Or   • Magnesium Sulfate 4 gram infusion- Mg 1.6-1.9 mg/dL  4 g Intravenous PRN Brittany Colon PA-C       • metoprolol tartrate (LOPRESSOR) tablet 25 mg  25 mg Oral Q12H Oracio Ponce, DO   25 mg at 08/02/21 0840   • naloxone (NARCAN) injection 0.1 mg  0.1 mg Intravenous Q5 Min PRN Oracio Ponce DO       • nitroglycerin (NITROSTAT) SL tablet 0.4 mg  0.4 mg Sublingual Q5 Min PRN Oracio Ponce, DO       • pantoprazole (PROTONIX) EC tablet 40 mg  40 mg Oral Q AM Oracio Ponce DO   40 mg at 08/02/21 0444   • polyethylene glycol (MIRALAX) packet 17 g  17 g Oral Daily Brittany Colon PA-C   17 g at 07/24/21 0846   • potassium chloride (K-DUR,KLOR-CON) CR tablet 40 mEq  40 mEq Oral PRN Brittany Colon PA-C   40 mEq at 07/25/21 0531    Or   • potassium chloride (KLOR-CON) packet 40 mEq  40 mEq Oral PRN Brittany Colon PA-C        Or   • potassium chloride 10 mEq in 100 mL IVPB  10 mEq Intravenous Q1H PRN Brittany Colon PA-C       • prochlorperazine (COMPAZINE) injection 5 mg  5 mg  Intravenous Q6H PRN Rosario Oracio K, DO   5 mg at 07/31/21 1048   • QUEtiapine (SEROquel) tablet 25 mg  25 mg Oral Nightly Brittany Colon PA-C   25 mg at 08/01/21 2024   • sennosides-docusate (PERICOLACE) 8.6-50 MG per tablet 2 tablet  2 tablet Oral BID Brittany Colon PA-C   2 tablet at 08/02/21 0839   • sertraline (ZOLOFT) tablet 50 mg  50 mg Oral Daily Oracio Ponce K, DO   50 mg at 08/02/21 0840   • sodium chloride 0.9 % flush 10 mL  10 mL Intravenous PRN Rosario Oracio K, DO       • sodium chloride 0.9 % flush 10 mL  10 mL Intravenous Q12H Rosario Oracio K, DO   10 mL at 08/02/21 0840   • sodium chloride 0.9 % flush 10 mL  10 mL Intravenous PRN Rosario Oracio K, DO       • sodium chloride 0.9 % flush 3 mL  3 mL Intravenous Q12H Rosario Oracio K, DO   3 mL at 08/02/21 0841     Lab Results (last 24 hours)     Procedure Component Value Units Date/Time    POC Glucose Once [[id number]]  (Abnormal) Collected: 08/02/21 1039    Specimen: Blood Updated: 08/02/21 1046     Glucose 148 mg/dL      Comment: Meter: [id number] : [id number] [employer]AG[employer]View       POC Glucose Once [[id number]]  (Normal) Collected: 08/02/21 0656    Specimen: Blood Updated: 08/02/21 0701     Glucose 91 mg/dL      Comment: Meter: [id number] : [id number] [facility]       Basic Metabolic Panel [[id number]]  (Abnormal) Collected: 08/02/21 0404    Specimen: Blood Updated: 08/02/21 0452     Glucose 130 mg/dL      BUN 9 mg/dL      Creatinine 0.56 mg/dL      Sodium 138 mmol/L      Potassium 4.0 mmol/L      Chloride 104 mmol/L      CO2 24.9 mmol/L      Calcium 8.8 mg/dL      eGFR Non African Amer 112 mL/min/1.73      BUN/Creatinine Ratio 16.1     Anion Gap 9.1 mmol/L     Narrative:      GFR Normal >60  Chronic Kidney Disease <60  Kidney Failure <15      Magnesium [[id number]]  (Normal) Collected: 08/02/21 0404    Specimen: Blood Updated: 08/02/21 0452     Magnesium 1.6 mg/dL     CBC (No Diff) [[id number]]  (Abnormal) Collected: 08/02/21 0404     Specimen: Blood Updated: 219     WBC 7.48 10*3/mm3      RBC 3.00 10*6/mm3      Hemoglobin 9.2 g/dL      Hematocrit 30.0 %      .0 fL      MCH 30.7 pg      MCHC 30.7 g/dL      RDW 14.9 %      RDW-SD 54.5 fl      MPV 10.1 fL      Platelets 260 10*3/mm3     POC Glucose Once [[id number]]  (Normal) Collected: 21    Specimen: Blood Updated: 21     Glucose 109 mg/dL      Comment: Meter: [id number] : [id number] adenike anderson       POC Glucose Once [[id number]]  (Normal) Collected: 21 162    Specimen: Blood Updated: 21 1639     Glucose 129 mg/dL      Comment: Meter: [id number] : [id number] Eric Garcia           Imaging Results (Last 24 Hours)     ** No results found for the last 24 hours. **        ECG/EMG Results (last 24 hours)     ** No results found for the last 24 hours. **           Physician Progress Notes (last 24 hours) (Notes from 21 1103 through 21 1103)      Ignacia Solitario PA at 21 0903                                    [facility] Medicine Services  PROGRESS NOTE     Patient Identification:  Name:  Aidee Trinidad  Age:  56 y.o.  Sex:  female  :  1965  MRN:  [id number]  Visit Number:  [id number]  Primary Care Provider:  Azalea Burnett APRN    Length of stay:  16    ----------------------------------------------------------------------------------------------------------------------  Subjective     Chief Complaint:  Follow up for hip fracture, urinary retention, constipation      History of Presenting Illness/Hospital Course:  Patient is a 56-year-old female with past medical history significant for coronary artery disease status post CABG in past, essential hypertension, hyperlipidemia, diastolic dysfunction, noninsulin-dependent type 2 diabetes mellitus, referral neuropathy, hypothyroidism, depression, and morbid obesity that presented to the [facility] emergency  department on 7/17/2021 for evaluation of right hip pain status post mechanical fall.  Please see admitting history and physical for further details.  Patient was found to have acute minimally displaced right proximal intertrochanteric femoral fracture.  Patient was admitted to the medical surgical floor for further evaluation and treatment.  Orthopedic surgery did evaluate patient, patient ultimately underwent right hip cephalomedullary nailing with TFNA nail per Dr. Ponce with orthopedic surgery.  Patient tolerated procedure well.  Postoperative setting complicated by patient having difficulty ambulating and ongoing weakness throughout her body.  As such, she is currently pending insurance approval for inpatient rehab for prolonged therapy.    It is of note that during hospitalization, patient developed urinary retention that required a Velazquez catheter to be anchored on 7/23/2021.  Urinalysis obtained revealed 25,000 CFU yeast and less than 10,000 CFU gram-positive cocci.  Not felt to be an acute urinary tract infection, thus was not treated.  Catheter has since been removed and she has been voiding on her own. Due to her symptoms of weakness, significant constipation noted on imaging, as well as urinary retention and lumbar spine MRI was obtained with no acute abnormalities noted.  She has continued to work with physical therapy for rehabilitation.    Subjective:  Today, the patient was sitting up in bed per my evaluation this morning, no acute distress noted.  No overnight events reported per AM RN Katya Callahan.  No family present at bedside.  Patient with no new complaints.  Continues to be less mobile.  Continues to void on her own.  She does report having some bloating in her abdomen, states she takes p.o. Lasix at home daily and has not had since she has been here.  She continues to report pain in right hip and lower extremity, stable with no acute worsening. No bowel or bladder incontinence. Distal pulses  intact, vascularly intact. Good motor function bilateral lower extremities. No other complaints at this time. Cont to work with PT. Pending inpatient rehab.     Present during exam: N/A   ----------------------------------------------------------------------------------------------------------------------  Objective     Consults:  • Orthopedic surgery     Procedures:  • 7/18/2021: Right hip cephalo-medullary nailing with TFNA nail -- Dr. Ponce, orthopedic surgery   • 7/23/2021: Transthoracic echocardiogram   · Normal left ventricular cavity size and wall thickness noted.  · Left ventricular ejection fraction appears to be 56 - 60%. Left ventricular systolic function is normal.  · Left ventricular diastolic function is consistent with (grade II w/high LAP) pseudonormalization.  · Normal cardiac chamber dimensions.  · The aortic valve is structurally normal. Doppler interrogation was not done across the aortic valve.  · The mitral valve is structurally normal with no regurgitation or significant stenosis present  · Mild tricuspid valve regurgitation is present.  · Estimated right ventricular systolic pressure from tricuspid regurgitation is mildly elevated (35-45 mmHg). Mild pulmonary hypertension is present.  · The pulmonic valve is not well visualized.  · There is a trivial pericardial effusion.  • 7/23/2021: Velazquez catheter insertion d/t urinary retention   • 7/29/2021: Velazquez catheter removal     Current Hospital Meds:  apixaban, 2.5 mg, Oral, Q12H  aspirin, 81 mg, Oral, Daily  cetirizine, 10 mg, Oral, Daily  cholecalciferol, 50,000 Units, Oral, Q7 Days  ferrous sulfate, 325 mg, Oral, Daily With Breakfast  gabapentin, 400 mg, Oral, Q12H  insulin aspart, 0-7 Units, Subcutaneous, TID AC  levothyroxine, 25 mcg, Oral, Daily  magnesium sulfate, 4 g, Intravenous, Once  metoprolol tartrate, 25 mg, Oral, Q12H  pantoprazole, 40 mg, Oral, Q AM  polyethylene glycol, 17 g, Oral, Daily  QUEtiapine, 25 mg, Oral,  Nightly  senna-docusate sodium, 2 tablet, Oral, BID  sertraline, 50 mg, Oral, Daily  sodium chloride, 10 mL, Intravenous, Q12H  sodium chloride, 3 mL, Intravenous, Q12H         ----------------------------------------------------------------------------------------------------------------------  Vital Signs:  Temp:  [97.6 °F (36.4 °C)-99.2 °F (37.3 °C)] 97.6 °F (36.4 °C)  Heart Rate:  [65-86] 80  Resp:  [18-20] 18  BP: (110-148)/(45-86) 134/86    SpO2 Percentage    08/01/21 2221 08/02/21 0225 08/02/21 0600   SpO2: 96% 92% 93%     SpO2:  [92 %-96 %] 93 %  on   ;   Device (Oxygen Therapy): room air    Body mass index is 53.06 kg/m².  Wt Readings from Last 3 Encounters:   07/18/21 111 kg (245 lb 3.2 oz)   04/26/21 108 kg (237 lb)   02/28/20 97.1 kg (214 lb)        Intake/Output Summary (Last 24 hours) at 8/2/2021 0903  Last data filed at 8/1/2021 1757  Gross per 24 hour   Intake 840 ml   Output --   Net 840 ml     Diet Regular; Consistent Carbohydrate  ----------------------------------------------------------------------------------------------------------------------  Physical exam:  Physical Exam  Nursing note reviewed.   Constitutional:       General: She is sleeping. She is not in acute distress.     Appearance: She is well-developed. She is morbidly obese. She is not toxic-appearing.      Comments: Sitting up in bed, no acute distress noted. No family present at bedside. On room air.    HENT:      Head: Normocephalic and atraumatic.      Mouth/Throat:      Mouth: Mucous membranes are moist.   Eyes:      Conjunctiva/sclera: Conjunctivae normal.      Pupils: Pupils are equal, round, and reactive to light.   Cardiovascular:      Rate and Rhythm: Normal rate and regular rhythm.      Pulses:           Dorsalis pedis pulses are 2+ on the right side and 2+ on the left side.        Posterior tibial pulses are 2+ on the right side and 2+ on the left side.      Heart sounds: Normal heart sounds. No murmur heard.      Pulmonary:      Effort: Pulmonary effort is normal.      Breath sounds: Normal breath sounds and air entry. No wheezing, rhonchi or rales.      Comments: Speaks in full sentences without dyspnea. On room air.   Abdominal:      General: Bowel sounds are normal. There is no distension.      Palpations: Abdomen is soft.      Tenderness: There is no abdominal tenderness. There is no guarding or rebound.   Genitourinary:     Comments: No troy catheter in place.  Musculoskeletal:      Cervical back: Neck supple.      Right hip: Deformity (Post op incision clean and dry, mild tender, mild edema as expected post op ) present. Decreased range of motion (2/2 pain).      Right lower leg: No edema.      Left lower leg: No edema.      Comments: Patient with active dorsiflexion and plantar flexion bilateral lower extremities.  Left lower extremity strength 5/5.  Right lower extremity strength 2/5 as she cannot lift her leg against gravity but does have active plantar/dorsiflexion.  strength equal and intact bilateral upper extremities, upper extremities 5/5 on strength testing. Sensation intact bilateral upper and LLE, however patient reports no sensation to RLE. She states this has been going on since surgery, no acute worsening. She does have chronic peripheral neuropathy for which she is treated for outpatient. Distal pulses intact in all extremities.    Skin:     General: Skin is warm and dry.      Capillary Refill: Capillary refill takes less than 2 seconds.      Findings: No lesion.   Neurological:      Mental Status: She is oriented to person, place, and time and easily aroused.      GCS: GCS eye subscore is 4. GCS verbal subscore is 5. GCS motor subscore is 6.      Cranial Nerves: No dysarthria or facial asymmetry.      Sensory: Sensory deficit (See MSK exam ) present.      Motor: Weakness (RLE > LLE, see MSK exam  ) present. No abnormal muscle tone.      Comments: Easily aroused, sleeping. Follows commands.  Answers questions appropriately.     Psychiatric:         Mood and Affect: Mood normal.         Speech: Speech normal.         Behavior: Behavior is cooperative.      PE unchanged on exam today   ----------------------------------------------------------------------------------------------------------------------  Tele:    Sinus 90s     I have personally reviewed the EKG/Telemetry strips   ----------------------------------------------------------------------------------------------------------------------  Results from last 7 days   Lab Units 07/28/21  1041   CK TOTAL U/L 205*           Results from last 7 days   Lab Units 08/02/21  0404 07/30/21  0820 07/28/21  1041   WBC 10*3/mm3 7.48 7.02 6.78   HEMOGLOBIN g/dL 9.2* 8.7* 9.0*   HEMATOCRIT % 30.0* 27.9* 28.9*   MCV fL 100.0* 101.8* 99.0*   MCHC g/dL 30.7* 31.2* 31.1*   PLATELETS 10*3/mm3 260 232 232     Results from last 7 days   Lab Units 08/02/21  0404 07/31/21  0703 07/30/21  0820 07/29/21  0912 07/29/21  0912   SODIUM mmol/L 138 138 139   < > 141   POTASSIUM mmol/L 4.0 4.0 4.0   < > 4.3   MAGNESIUM mg/dL 1.6 1.8 1.7  --   --    CHLORIDE mmol/L 104 105 102   < > 105   CO2 mmol/L 24.9 25.6 27.9   < > 27.3   BUN mg/dL 9 8 8   < > 7   CREATININE mg/dL 0.56* 0.56* 0.60   < > 0.60   EGFR IF NONAFRICN AM mL/min/1.73 112 112 103   < > 103   CALCIUM mg/dL 8.8 8.4* 8.8   < > 8.8   GLUCOSE mg/dL 130* 99 152*   < > 153*   ALBUMIN g/dL  --   --   --   --  2.59*   BILIRUBIN mg/dL  --   --   --   --  1.0   ALK PHOS U/L  --   --   --   --  175*   AST (SGOT) U/L  --   --   --   --  41*   ALT (SGPT) U/L  --   --   --   --  26    < > = values in this interval not displayed.   Estimated Creatinine Clearance: 127 mL/min (A) (by C-G formula based on SCr of 0.56 mg/dL (L)).    Glucose   Date/Time Value Ref Range Status   08/02/2021 0656 91 70 - 130 mg/dL Final     Comment:     Meter: [id number] : [id number] caryn dao   08/01/2021 2028 109 70 - 130 mg/dL Final     Comment:      Meter: [id number] : [id number] adenike anderson   08/01/2021 1629 129 70 - 130 mg/dL Final     Comment:     Meter: [id number] : [id number] Eric Garcia   08/01/2021 1040 127 70 - 130 mg/dL Final     Comment:     Meter: [id number] : [id number] Eric Garcia   08/01/2021 0716 108 70 - 130 mg/dL Final     Comment:     Meter: [id number] : [id number] DARLING CARSON   07/31/2021 2140 142 (H) 70 - 130 mg/dL Final     Comment:     Meter: [id number] : [id number] melissa collier   07/31/2021 1648 124 70 - 130 mg/dL Final     Comment:     Meter: [id number] : [id number] Eric Garcia   07/31/2021 1054 142 (H) 70 - 130 mg/dL Final     Comment:     Meter: [id number] : [id number] Eric Garcia     Lab Results   Component Value Date    HGBA1C 6.10 (H) 07/18/2021     Lab Results   Component Value Date    TSH 2.280 07/18/2021     Microbiology Results (last 10 days)     ** No results found for the last 240 hours. **         Pain Management Panel     Pain Management Panel Latest Ref Rng & Units 2/28/2020    AMPHETAMINES SCREEN, URINE Negative Negative    BARBITURATES SCREEN Negative Negative    BENZODIAZEPINE SCREEN, URINE Negative Negative    BUPRENORPHINEUR Negative Negative    COCAINE SCREEN, URINE Negative Negative    METHADONE SCREEN, URINE Negative Negative        I have personally reviewed the above laboratory results.   ----------------------------------------------------------------------------------------------------------------------  Imaging Results (Last 24 Hours)     ** No results found for the last 24 hours. **        I have personally reviewed the above radiology results.   ----------------------------------------------------------------------------------------------------------------------  Assessment/Plan     -Acute, minimally displaced right proximal intertrochanteric femur fracture s/p mechanical fall at home, s/p repair 7/18/2021  -History of right lateral malleolus  avulsion fracture with tiny fragmentation 4/2021  -History of TKA right in past   -Generalized weakness, debility, inability to complete ADLs currently due to acute fracture   • Pt is s/p ORIF 7/18  • Cont PRN pain medication with holding parameters   • PT on board, pending inpatient rehab as she has had prolonged recovery and requires assistance ambulating and ADLs   • Patient does report neuropathy in RLE, CT lumbar spine with no acute abnormalities. She has good motor function on exam   • Supportive care   • WBAT   • Eliquis on board for DVT proph  • Vitamin D adequate    • NV checks   • Ortho input/assistance is appreciated.     RLE > LLE weakness, neuropathy   · No focal deficits    · Head CT previously unremarkable 7/21  · MRI lumbar spine 7/23 with no acute abnormalities   · Cont Neurontin   · ?outpatient neuro referral     -Right foot pain   · Pt with history of right lateral malleolus avulsion fx 4/2021  · Reports pain in right foot similar to gout episode in past, more pain towards her great toe on right foot   · XR unremarkable, uric acid level not elevated   · Supportive care   · PRN pain medication     -Hypokalemia   -Borderline hypomagnesemia   · Resolved with supplementation   · Replace electrolytes per protocol as necessary   · Repeat BMP and mag level in AM    -Constipation, resolved   · Continue bowel regimen    -Urinary retention, resolved   · Patient with urinary retention earlier in hospitalization requiring anchroing of troy catheter   · Patient also with generalized weakness and constipation, as such MRI of lumbar spine obtained previously with no acute findings.   · Urinalysis with 25K CFU yeast and <10K CFU gram positive cocci . No treatment warranted at this time, discussed with Dr. Taylor (attending physician)  · Suspect that urine retention will improve with mobilization.   · Bladder training completed, troy catheter previously removed. She is now voiding on her own.     -Macrocytic  anemia   -Thrombocytopenia, resolved   · Likely ABLA post op  · Hemoglobin stable   · Platelets stable, ultrasound of spleen previously unremarkable  · No active bleeding noted or reported   · Vitamin B12 and folate WNL. Iron profile with slightly low iron and iron sat but stable ferritin. Pt previously started on PO iron, will continue.   · Monitor closely, repeat CBC in AM    -Transaminitis, felt to be 2/2 CPK elevation   -Hyperbilirubinemia, stable   -Hypoalbuminemia  -Hepatitis B IgM/IgG  · Abdomen non tender   · LFTs stable   · US Liver with marked cirrhosis said to be advanced with nodular liver margins.   · US spleen unremarkable.  · CPK improved  · Avoid nephrotoxins as much as possible   · Repeat chem panel in AM    -Grade II diastolic dysfunction   · ECHO reviewed   · Appears grossly compensated   · Monitor volume status closely     -Essential hypertension  • BP appears well controlled   • Continue home metoprolol with appropriate holding parameters to prevent hypotension and/or bradycardia   • Home lasix previously held.  Blood pressure and renal function stable, as such we will resume home Lasix dose.  • Closely monitor BP per hospital protocol, titrate medications as necessary    -History of coronary artery disease s/p CABG in past   -Cardiomegaly   · Patient denies any chest pain   · Statin held previously due to elevated CPK level. CPK still elevated, but significantly improved.  · TTE obtained with preserved EF and grade II diastolic dysfunction   · Eliquis on board for VTE proph, cont daily aspirin  · Home lasix on hold   · Monitor closely on telemetry     -Type II diabetes mellitus, non insulin dependent  -Peripheral neuropathy   • HgbA1C 6.1, well controlled. BG levels controlled.   • Continue home Metformin for now   • Continue low dose SSI, titrate dosage as necessary   • Closely monitor blood glucose levels with accuchecks QAC and QHS  • Hypoglycemia protocol in place should it be  necessary  • Continue home Neurontin for neuropathy     -Hypothyroidism   · TSH adequately replaced   · Continue home dose of levothyroxine     -Depression   -Anxiety   · Supportive care   · Continue home Zoloft     -Morbid obesity, BMI 53.06  · Supportive care   · Affecting all aspects of care     --------------------------------------------------  DVT Prophylaxis: Eliquis per ortho   GI Prophylaxis: PPI   FEN: No IV fluids. Replace electrolytes per protocol as necessary. Consistent carbohydrate diet.  Activity: Up with assistance as tolerated, PT on board, pt is WBAT   --------------------------------------------------  Disposition:  • Pending insurance approval for inpatient rehab  --------------------------------------------------    The patient is high risk due to the following diagnoses/reasons: Hip fracture, weakness, debility, constipation, urinary retention, hx of CAD with CABG, DM, morbid obesity, elevated LFTs, CK elevation, anemia    I have discussed the patient's assessment and plan with the patient, Katya Callahan RN, and attending physician Jeff Bruno MD Allyson O'Kuma, PA-C  Hospitalist Service -- [facility]   Pager: [phone]    08/02/21  09:03 EDT    Attending Physician: Jeff Graves MD    ----------------------------------------------------------------------------------------------------------------------        Electronically signed by Ignacia Solitario PA at 08/02/21 0906

## 2021-08-03 LAB
ALBUMIN SERPL-MCNC: 2.89 G/DL (ref 3.5–5.2)
ALBUMIN/GLOB SERPL: 0.8 G/DL
ALP SERPL-CCNC: 284 U/L (ref 39–117)
ALT SERPL W P-5'-P-CCNC: 22 U/L (ref 1–33)
ANION GAP SERPL CALCULATED.3IONS-SCNC: 8.2 MMOL/L (ref 5–15)
AST SERPL-CCNC: 43 U/L (ref 1–32)
BASOPHILS # BLD AUTO: 0.1 10*3/MM3 (ref 0–0.2)
BASOPHILS NFR BLD AUTO: 1.3 % (ref 0–1.5)
BILIRUB SERPL-MCNC: 1 MG/DL (ref 0–1.2)
BUN SERPL-MCNC: 8 MG/DL (ref 6–20)
BUN/CREAT SERPL: 15.7 (ref 7–25)
CALCIUM SPEC-SCNC: 9 MG/DL (ref 8.6–10.5)
CHLORIDE SERPL-SCNC: 105 MMOL/L (ref 98–107)
CO2 SERPL-SCNC: 25.8 MMOL/L (ref 22–29)
CREAT SERPL-MCNC: 0.51 MG/DL (ref 0.57–1)
DEPRECATED RDW RBC AUTO: 56.9 FL (ref 37–54)
EOSINOPHIL # BLD AUTO: 0.32 10*3/MM3 (ref 0–0.4)
EOSINOPHIL NFR BLD AUTO: 4.2 % (ref 0.3–6.2)
ERYTHROCYTE [DISTWIDTH] IN BLOOD BY AUTOMATED COUNT: 15.3 % (ref 12.3–15.4)
GFR SERPL CREATININE-BSD FRML MDRD: 125 ML/MIN/1.73
GLOBULIN UR ELPH-MCNC: 3.6 GM/DL
GLUCOSE BLDC GLUCOMTR-MCNC: 101 MG/DL (ref 70–130)
GLUCOSE BLDC GLUCOMTR-MCNC: 135 MG/DL (ref 70–130)
GLUCOSE BLDC GLUCOMTR-MCNC: 150 MG/DL (ref 70–130)
GLUCOSE BLDC GLUCOMTR-MCNC: 163 MG/DL (ref 70–130)
GLUCOSE SERPL-MCNC: 99 MG/DL (ref 65–99)
HCT VFR BLD AUTO: 32.1 % (ref 34–46.6)
HGB BLD-MCNC: 9.7 G/DL (ref 12–15.9)
IMM GRANULOCYTES # BLD AUTO: 0.02 10*3/MM3 (ref 0–0.05)
IMM GRANULOCYTES NFR BLD AUTO: 0.3 % (ref 0–0.5)
LYMPHOCYTES # BLD AUTO: 2.92 10*3/MM3 (ref 0.7–3.1)
LYMPHOCYTES NFR BLD AUTO: 37.9 % (ref 19.6–45.3)
MAGNESIUM SERPL-MCNC: 2 MG/DL (ref 1.6–2.6)
MCH RBC QN AUTO: 30.6 PG (ref 26.6–33)
MCHC RBC AUTO-ENTMCNC: 30.2 G/DL (ref 31.5–35.7)
MCV RBC AUTO: 101.3 FL (ref 79–97)
MONOCYTES # BLD AUTO: 0.82 10*3/MM3 (ref 0.1–0.9)
MONOCYTES NFR BLD AUTO: 10.6 % (ref 5–12)
NEUTROPHILS NFR BLD AUTO: 3.52 10*3/MM3 (ref 1.7–7)
NEUTROPHILS NFR BLD AUTO: 45.7 % (ref 42.7–76)
NRBC BLD AUTO-RTO: 0 /100 WBC (ref 0–0.2)
PLATELET # BLD AUTO: 271 10*3/MM3 (ref 140–450)
PMV BLD AUTO: 10.2 FL (ref 6–12)
POTASSIUM SERPL-SCNC: 3.8 MMOL/L (ref 3.5–5.2)
PROT SERPL-MCNC: 6.5 G/DL (ref 6–8.5)
RBC # BLD AUTO: 3.17 10*6/MM3 (ref 3.77–5.28)
SODIUM SERPL-SCNC: 139 MMOL/L (ref 136–145)
WBC # BLD AUTO: 7.7 10*3/MM3 (ref 3.4–10.8)

## 2021-08-03 PROCEDURE — 97116 GAIT TRAINING THERAPY: CPT

## 2021-08-03 PROCEDURE — 83735 ASSAY OF MAGNESIUM: CPT | Performed by: PHYSICIAN ASSISTANT

## 2021-08-03 PROCEDURE — 85025 COMPLETE CBC W/AUTO DIFF WBC: CPT | Performed by: INTERNAL MEDICINE

## 2021-08-03 PROCEDURE — 99232 SBSQ HOSP IP/OBS MODERATE 35: CPT | Performed by: PHYSICIAN ASSISTANT

## 2021-08-03 PROCEDURE — 63710000001 INSULIN ASPART PER 5 UNITS: Performed by: ORTHOPAEDIC SURGERY

## 2021-08-03 PROCEDURE — 82962 GLUCOSE BLOOD TEST: CPT

## 2021-08-03 PROCEDURE — 80053 COMPREHEN METABOLIC PANEL: CPT | Performed by: PHYSICIAN ASSISTANT

## 2021-08-03 PROCEDURE — 97530 THERAPEUTIC ACTIVITIES: CPT

## 2021-08-03 RX ORDER — AMOXICILLIN 250 MG
2 CAPSULE ORAL 2 TIMES DAILY PRN
Status: DISCONTINUED | OUTPATIENT
Start: 2021-08-03 | End: 2021-08-05 | Stop reason: HOSPADM

## 2021-08-03 RX ORDER — POLYETHYLENE GLYCOL 3350 17 G/17G
17 POWDER, FOR SOLUTION ORAL DAILY PRN
Status: DISCONTINUED | OUTPATIENT
Start: 2021-08-03 | End: 2021-08-05 | Stop reason: HOSPADM

## 2021-08-03 RX ADMIN — METOPROLOL TARTRATE 25 MG: 25 TABLET, FILM COATED ORAL at 20:42

## 2021-08-03 RX ADMIN — HYDROCODONE BITARTRATE AND ACETAMINOPHEN 1 TABLET: 5; 325 TABLET ORAL at 13:51

## 2021-08-03 RX ADMIN — SODIUM CHLORIDE, PRESERVATIVE FREE 10 ML: 5 INJECTION INTRAVENOUS at 20:40

## 2021-08-03 RX ADMIN — SODIUM CHLORIDE, PRESERVATIVE FREE 3 ML: 5 INJECTION INTRAVENOUS at 20:41

## 2021-08-03 RX ADMIN — METOPROLOL TARTRATE 25 MG: 25 TABLET, FILM COATED ORAL at 08:58

## 2021-08-03 RX ADMIN — SODIUM CHLORIDE, PRESERVATIVE FREE 10 ML: 5 INJECTION INTRAVENOUS at 08:59

## 2021-08-03 RX ADMIN — FUROSEMIDE 40 MG: 40 TABLET ORAL at 08:59

## 2021-08-03 RX ADMIN — SERTRALINE 50 MG: 50 TABLET, FILM COATED ORAL at 08:59

## 2021-08-03 RX ADMIN — GABAPENTIN 400 MG: 400 CAPSULE ORAL at 08:58

## 2021-08-03 RX ADMIN — APIXABAN 2.5 MG: 2.5 TABLET, FILM COATED ORAL at 20:42

## 2021-08-03 RX ADMIN — APIXABAN 2.5 MG: 2.5 TABLET, FILM COATED ORAL at 08:58

## 2021-08-03 RX ADMIN — QUETIAPINE FUMARATE 25 MG: 25 TABLET, FILM COATED ORAL at 20:42

## 2021-08-03 RX ADMIN — INSULIN ASPART 2 UNITS: 100 INJECTION, SOLUTION INTRAVENOUS; SUBCUTANEOUS at 16:42

## 2021-08-03 RX ADMIN — GABAPENTIN 400 MG: 400 CAPSULE ORAL at 20:42

## 2021-08-03 RX ADMIN — DOCUSATE SODIUM 50 MG AND SENNOSIDES 8.6 MG 2 TABLET: 8.6; 5 TABLET, FILM COATED ORAL at 08:58

## 2021-08-03 RX ADMIN — ASPIRIN 81 MG: 81 TABLET, COATED ORAL at 08:58

## 2021-08-03 RX ADMIN — SODIUM CHLORIDE, PRESERVATIVE FREE 3 ML: 5 INJECTION INTRAVENOUS at 08:59

## 2021-08-03 RX ADMIN — CETIRIZINE HYDROCHLORIDE 10 MG: 10 TABLET, FILM COATED ORAL at 08:58

## 2021-08-03 RX ADMIN — FERROUS SULFATE TAB 325 MG (65 MG ELEMENTAL FE) 325 MG: 325 (65 FE) TAB at 08:58

## 2021-08-03 RX ADMIN — PANTOPRAZOLE SODIUM 40 MG: 40 TABLET, DELAYED RELEASE ORAL at 05:48

## 2021-08-03 RX ADMIN — LEVOTHYROXINE SODIUM 25 MCG: 25 TABLET ORAL at 08:58

## 2021-08-03 NOTE — PLAN OF CARE
Goal Outcome Evaluation:           Progress: no change  Outcome Summary: Pt resting in bed at this time. No complaints or acute changes. Pt refused to ambulate stating she already got up today and did not want to again. Will continue to monitor.

## 2021-08-03 NOTE — PROGRESS NOTES
[facility] Medicine Services  PROGRESS NOTE     Patient Identification:  Name:  Aidee Trinidad  Age:  56 y.o.  Sex:  female  :  1965  MRN:  [id number]  Visit Number:  [id number]  Primary Care Provider:  Azalea Burnett APRN    Length of stay:  17    ----------------------------------------------------------------------------------------------------------------------  Subjective     Chief Complaint:  Follow up for hip fracture, urinary retention, constipation      History of Presenting Illness/Hospital Course:  Patient is a 56-year-old female with past medical history significant for coronary artery disease status post CABG in past, essential hypertension, hyperlipidemia, diastolic dysfunction, noninsulin-dependent type 2 diabetes mellitus, referral neuropathy, hypothyroidism, depression, and morbid obesity that presented to the [facility] emergency department on 2021 for evaluation of right hip pain status post mechanical fall.  Please see admitting history and physical for further details.  Patient was found to have acute minimally displaced right proximal intertrochanteric femoral fracture.  Patient was admitted to the medical surgical floor for further evaluation and treatment.  Orthopedic surgery did evaluate patient, patient ultimately underwent right hip cephalomedullary nailing with TFNA nail per Dr. Ponce with orthopedic surgery.  Patient tolerated procedure well.  Postoperative setting complicated by patient having difficulty ambulating and ongoing weakness throughout her body.  As such, she is currently pending insurance approval for inpatient rehab for prolonged therapy.    It is of note that during hospitalization, patient developed urinary retention that required a Velazquez catheter to be anchored on 2021.  Urinalysis obtained revealed 25,000 CFU yeast and less than 10,000 CFU gram-positive cocci.  Not felt to be an  acute urinary tract infection, thus was not treated.  Catheter has since been removed and she has been voiding on her own. Due to her symptoms of weakness, significant constipation noted on imaging, as well as urinary retention and lumbar spine MRI was obtained with no acute abnormalities noted.  She has continued to work with physical therapy for rehabilitation.    Subjective:  Today, the patient was sitting up in bed per my evaluation this AM, no acute distress noted. No family present at bedside. Patient continues to work with PT during day, but has been resistant to getting up with nursing staff, or to bedside commode. She has been using bed pan. Discussed in detail with patient that she will need to start being more mobile and use bedside commode rather than bedside pan for her overall rehabilitative process. She has been participating with therapy, but discussed with patient that she will need to work with nursing staff and get up more in order to progress overall and to be able to participate in lengthy therapy/rehab. Discussed that in order to be functional at home she needs to be more active. RN also discussed this with patient. Patient verbalized understanding.     She continues to report pain near her right great toe, work up negative. She does also report some nausea and abdominal cramping, laxatives will be changed to PRN. She denies any other complaints at this time. Denies any fevers or chills, no chest pain or dyspnea.    Awaiting inpatient rehab placement.    Present during exam: N/A   ----------------------------------------------------------------------------------------------------------------------  Objective     Consults:  • Orthopedic surgery     Procedures:  • 7/18/2021: Right hip cephalo-medullary nailing with TFNA nail -- Dr. Ponce, orthopedic surgery   • 7/23/2021: Transthoracic echocardiogram   · Normal left ventricular cavity size and wall thickness noted.  · Left ventricular ejection  fraction appears to be 56 - 60%. Left ventricular systolic function is normal.  · Left ventricular diastolic function is consistent with (grade II w/high LAP) pseudonormalization.  · Normal cardiac chamber dimensions.  · The aortic valve is structurally normal. Doppler interrogation was not done across the aortic valve.  · The mitral valve is structurally normal with no regurgitation or significant stenosis present  · Mild tricuspid valve regurgitation is present.  · Estimated right ventricular systolic pressure from tricuspid regurgitation is mildly elevated (35-45 mmHg). Mild pulmonary hypertension is present.  · The pulmonic valve is not well visualized.  · There is a trivial pericardial effusion.  • 7/23/2021: Velazquez catheter insertion d/t urinary retention   • 7/29/2021: Velazquez catheter removal     Current Hospital Meds:  apixaban, 2.5 mg, Oral, Q12H  aspirin, 81 mg, Oral, Daily  cetirizine, 10 mg, Oral, Daily  cholecalciferol, 50,000 Units, Oral, Q7 Days  ferrous sulfate, 325 mg, Oral, Daily With Breakfast  furosemide, 40 mg, Oral, Daily  gabapentin, 400 mg, Oral, Q12H  insulin aspart, 0-7 Units, Subcutaneous, TID AC  levothyroxine, 25 mcg, Oral, Daily  metoprolol tartrate, 25 mg, Oral, Q12H  pantoprazole, 40 mg, Oral, Q AM  polyethylene glycol, 17 g, Oral, Daily  QUEtiapine, 25 mg, Oral, Nightly  senna-docusate sodium, 2 tablet, Oral, BID  sertraline, 50 mg, Oral, Daily  sodium chloride, 10 mL, Intravenous, Q12H  sodium chloride, 3 mL, Intravenous, Q12H         ----------------------------------------------------------------------------------------------------------------------  Vital Signs:  Temp:  [97.8 °F (36.6 °C)-98.6 °F (37 °C)] 98.1 °F (36.7 °C)  Heart Rate:  [69-79] 69  Resp:  [18] 18  BP: (116-134)/(53-60) 116/59    SpO2 Percentage    08/02/21 1411 08/02/21 1800 08/03/21 0300   SpO2: 94% 94% 96%     SpO2:  [94 %-96 %] 96 %  on  Flow (L/min):  [2] 2;   Device (Oxygen Therapy): nasal cannula    Body mass  index is 53.06 kg/m².  Wt Readings from Last 3 Encounters:   07/18/21 111 kg (245 lb 3.2 oz)   04/26/21 108 kg (237 lb)   02/28/20 97.1 kg (214 lb)        Intake/Output Summary (Last 24 hours) at 8/3/2021 0820  Last data filed at 8/3/2021 0810  Gross per 24 hour   Intake 1200 ml   Output 98.1 ml   Net 1101.9 ml     Diet Regular; Consistent Carbohydrate  ----------------------------------------------------------------------------------------------------------------------  Physical exam:  Physical Exam  Nursing note reviewed.   Constitutional:       General: She is sleeping. She is not in acute distress.     Appearance: She is well-developed. She is morbidly obese. She is not toxic-appearing.      Comments: Sitting up in bed, no acute distress noted. No family present at bedside. On room air.    HENT:      Head: Normocephalic and atraumatic.      Mouth/Throat:      Mouth: Mucous membranes are moist.   Eyes:      Conjunctiva/sclera: Conjunctivae normal.      Pupils: Pupils are equal, round, and reactive to light.   Cardiovascular:      Rate and Rhythm: Normal rate and regular rhythm.      Pulses:           Dorsalis pedis pulses are 2+ on the right side and 2+ on the left side.        Posterior tibial pulses are 2+ on the right side and 2+ on the left side.      Heart sounds: Normal heart sounds. No murmur heard.     Pulmonary:      Effort: Pulmonary effort is normal.      Breath sounds: Normal breath sounds and air entry. No wheezing, rhonchi or rales.      Comments: Speaks in full sentences without dyspnea. On room air.   Abdominal:      General: Bowel sounds are normal. There is no distension.      Palpations: Abdomen is soft.      Tenderness: There is no abdominal tenderness. There is no guarding or rebound.   Genitourinary:     Comments: No troy catheter in place.  Musculoskeletal:      Cervical back: Neck supple.      Right hip: Deformity (Post op incision clean and dry, mild tender, mild edema as expected post  op ) present. Decreased range of motion (2/2 pain).      Right lower leg: No edema.      Left lower leg: No edema.      Comments: Patient with active dorsiflexion and plantar flexion bilateral lower extremities.  Left lower extremity strength 5/5.  Right lower extremity strength 2/5 as she cannot lift her leg against gravity but does have active plantar/dorsiflexion.  strength equal and intact bilateral upper extremities, upper extremities 5/5 on strength testing. Sensation intact bilateral upper and LLE, however patient reports no sensation to RLE. She states this has been going on since surgery, no acute worsening. She does have chronic peripheral neuropathy for which she is treated for outpatient. Distal pulses intact in all extremities.    Skin:     General: Skin is warm and dry.      Capillary Refill: Capillary refill takes less than 2 seconds.      Findings: No lesion.   Neurological:      Mental Status: She is oriented to person, place, and time and easily aroused.      GCS: GCS eye subscore is 4. GCS verbal subscore is 5. GCS motor subscore is 6.      Cranial Nerves: No dysarthria or facial asymmetry.      Sensory: Sensory deficit (See MSK exam ) present.      Motor: Weakness (RLE > LLE, see MSK exam  ) present. No abnormal muscle tone.      Comments: Easily aroused, sleeping. Follows commands. Answers questions appropriately.     Psychiatric:         Mood and Affect: Mood normal.         Speech: Speech normal.         Behavior: Behavior is cooperative.      PE unchanged on exam today   ----------------------------------------------------------------------------------------------------------------------  Tele:    Sinus 70s    I have personally reviewed the EKG/Telemetry strips   ----------------------------------------------------------------------------------------------------------------------  Results from last 7 days   Lab Units 07/28/21  1041   CK TOTAL U/L 205*           Results from last 7 days    Lab Units 08/03/21 0443 08/02/21  0404 07/30/21  0820   WBC 10*3/mm3 7.70 7.48 7.02   HEMOGLOBIN g/dL 9.7* 9.2* 8.7*   HEMATOCRIT % 32.1* 30.0* 27.9*   MCV fL 101.3* 100.0* 101.8*   MCHC g/dL 30.2* 30.7* 31.2*   PLATELETS 10*3/mm3 271 260 232     Results from last 7 days   Lab Units 08/03/21 0443 08/02/21  0404 07/31/21  0703 07/30/21  0820 07/29/21  0912   SODIUM mmol/L 139 138 138   < > 141   POTASSIUM mmol/L 3.8 4.0 4.0   < > 4.3   MAGNESIUM mg/dL 2.0 1.6 1.8   < >  --    CHLORIDE mmol/L 105 104 105   < > 105   CO2 mmol/L 25.8 24.9 25.6   < > 27.3   BUN mg/dL 8 9 8   < > 7   CREATININE mg/dL 0.51* 0.56* 0.56*   < > 0.60   EGFR IF NONAFRICN AM mL/min/1.73 125 112 112   < > 103   CALCIUM mg/dL 9.0 8.8 8.4*   < > 8.8   GLUCOSE mg/dL 99 130* 99   < > 153*   ALBUMIN g/dL 2.89*  --   --   --  2.59*   BILIRUBIN mg/dL 1.0  --   --   --  1.0   ALK PHOS U/L 284*  --   --   --  175*   AST (SGOT) U/L 43*  --   --   --  41*   ALT (SGPT) U/L 22  --   --   --  26    < > = values in this interval not displayed.   Estimated Creatinine Clearance: 139.4 mL/min (A) (by C-G formula based on SCr of 0.51 mg/dL (L)).    Glucose   Date/Time Value Ref Range Status   08/03/2021 0633 135 (H) 70 - 130 mg/dL Final     Comment:     Meter: [id number] : [id number] Jennifer Tomas   08/02/2021 2206 155 (H) 70 - 130 mg/dL Final     Comment:     Meter: [id number] : [id number] SCAR RICEERT   08/02/2021 1638 95 70 - 130 mg/dL Final     Comment:     Meter: [id number] : [id number] PARAG LERNER   08/02/2021 1039 148 (H) 70 - 130 mg/dL Final     Comment:     Meter: [id number] : [id number] PARAG LERNER   08/02/2021 0656 91 70 - 130 mg/dL Final     Comment:     Meter: [id number] : [id number] caryn dao   08/01/2021 2028 109 70 - 130 mg/dL Final     Comment:     Meter: [id number] : [id number] adenike anderson   08/01/2021 1629 129 70 - 130 mg/dL Final     Comment:     Meter: [id number] : [id number] Eric Garcia    08/01/2021 1040 127 70 - 130 mg/dL Final     Comment:     Meter: [id number] : [id number] Eric Garcia     Lab Results   Component Value Date    HGBA1C 6.10 (H) 07/18/2021     Lab Results   Component Value Date    TSH 2.280 07/18/2021     Microbiology Results (last 10 days)     ** No results found for the last 240 hours. **         Pain Management Panel     Pain Management Panel Latest Ref Rng & Units 2/28/2020    AMPHETAMINES SCREEN, URINE Negative Negative    BARBITURATES SCREEN Negative Negative    BENZODIAZEPINE SCREEN, URINE Negative Negative    BUPRENORPHINEUR Negative Negative    COCAINE SCREEN, URINE Negative Negative    METHADONE SCREEN, URINE Negative Negative        I have personally reviewed the above laboratory results.   ----------------------------------------------------------------------------------------------------------------------  Imaging Results (Last 24 Hours)     ** No results found for the last 24 hours. **        I have personally reviewed the above radiology results.   ----------------------------------------------------------------------------------------------------------------------  Assessment/Plan     -Acute, minimally displaced right proximal intertrochanteric femur fracture s/p mechanical fall at home, s/p repair 7/18/2021  -History of right lateral malleolus avulsion fracture with tiny fragmentation 4/2021  -History of TKA right in past   -Generalized weakness, debility, inability to complete ADLs currently due to acute fracture   • Pt is s/p ORIF 7/18  • Cont PRN pain medication with holding parameters   • PT on board, pending inpatient rehab as she has had prolonged recovery and requires assistance ambulating and ADLs   • Patient does report neuropathy in RLE, CT lumbar spine with no acute abnormalities. She has good motor function on exam   • Supportive care   • WBAT   • Eliquis on board for DVT proph  • Vitamin D adequate    • NV checks   • Ortho input/assistance is  appreciated.     RLE > LLE weakness, neuropathy   · No focal deficits    · Head CT previously unremarkable 7/21  · MRI lumbar spine 7/23 with no acute abnormalities   · Cont Neurontin   · ?outpatient neuro referral     -Right foot pain   · Pt with history of right lateral malleolus avulsion fx 4/2021  · Reports pain in right foot similar to gout episode in past, more pain towards her great toe on right foot   · XR unremarkable, uric acid level not elevated, no podagra on exam  · Supportive care   · PRN pain medication     -Hypokalemia   -Borderline hypomagnesemia   · Resolved with supplementation   · Replace electrolytes per protocol as necessary   · Repeat BMP and mag level in AM    -Constipation, resolved   · Continue bowel regimen, change to PRN as pt complains of abdominal cramping and nausea     -Urinary retention, resolved   · Patient with urinary retention earlier in hospitalization requiring anchroing of troy catheter   · Patient also with generalized weakness and constipation, as such MRI of lumbar spine obtained previously with no acute findings.   · Urinalysis with 25K CFU yeast and <10K CFU gram positive cocci . No treatment warranted at this time, discussed with Dr. Taylor (attending physician)  · Suspect that urine retention will improve with mobilization.   · Bladder training completed, troy catheter previously removed. She is now voiding on her own.     -Macrocytic anemia   -Thrombocytopenia, resolved   · Likely ABLA post op  · Hemoglobin stable   · Platelets stable, ultrasound of spleen previously unremarkable  · No active bleeding noted or reported   · Vitamin B12 and folate WNL. Iron profile with slightly low iron and iron sat but stable ferritin. Pt previously started on PO iron, will continue.   · Monitor closely, repeat CBC in AM    -Transaminitis, felt to be 2/2 CPK elevation   -Hyperbilirubinemia, stable   -Hypoalbuminemia  -Hepatitis B IgM/IgG  · Abdomen non tender   · LFTs stable   · US  Liver with marked cirrhosis said to be advanced with nodular liver margins.   · US spleen unremarkable.  · CPK improved  · Avoid nephrotoxins as much as possible   · Repeat chem panel in AM    -Grade II diastolic dysfunction   · ECHO reviewed   · Appears grossly compensated   · Monitor volume status closely     -Essential hypertension  • BP appears well controlled   • Continue home metoprolol with appropriate holding parameters to prevent hypotension and/or bradycardia   • Home lasix previously held.  Blood pressure and renal function stable, as such we will resume home Lasix dose.  • Closely monitor BP per hospital protocol, titrate medications as necessary    -History of coronary artery disease s/p CABG in past   -Cardiomegaly   · Patient denies any chest pain   · Statin held previously due to elevated CPK level. CPK still elevated, but significantly improved.  · TTE obtained with preserved EF and grade II diastolic dysfunction   · Eliquis on board for VTE proph, cont daily aspirin  · Home lasix on hold   · Monitor closely on telemetry     -Type II diabetes mellitus, non insulin dependent  -Peripheral neuropathy   • HgbA1C 6.1, well controlled. BG levels controlled.   • Continue home Metformin for now   • Continue low dose SSI, titrate dosage as necessary   • Closely monitor blood glucose levels with accuchecks QAC and QHS  • Hypoglycemia protocol in place should it be necessary  • Continue home Neurontin for neuropathy     -Hypothyroidism   · TSH adequately replaced   · Continue home dose of levothyroxine     -Depression   -Anxiety   · Supportive care   · Continue home Zoloft     -Morbid obesity, BMI 53.06  · Supportive care   · Affecting all aspects of care     --------------------------------------------------  DVT Prophylaxis: Eliquis per ortho   GI Prophylaxis: PPI   FEN: No IV fluids. Replace electrolytes per protocol as necessary. Consistent carbohydrate diet.  Activity: Up with assistance as tolerated, PT  on board, pt is WBAT   --------------------------------------------------  Disposition:  • Pending insurance approval for inpatient rehab  --------------------------------------------------    The patient is high risk due to the following diagnoses/reasons: Hip fracture, weakness, debility, constipation, urinary retention, hx of CAD with CABG, DM, morbid obesity, elevated LFTs, CK elevation, anemia    I have discussed the patient's assessment and plan with the patient, Katya Callahan RN, and attending physician Jeff Bruno MD Allyson O'Kuma, PA-C  [facility]   Pager: [phone]    08/03/21  08:20 EDT    Attending Physician: Jeff Graves MD    ----------------------------------------------------------------------------------------------------------------------    Addendum: Patient more ambulatory after initial eval this AM. Has taken more steps, up in bedside chart. Patient now wanting to go home. Discussed with attending physician. Planning on home tomorrow after one more PT visit. SS to be updated of plan.  -- Ignacia Solitario PA-C [id number]

## 2021-08-03 NOTE — NURSING NOTE
Insurance denial, patient would need lower level of care. Peer to peer # [phone] option 3 then option 4. The medical director to set up appointment with is Brien Starkey.  Thanks Caty

## 2021-08-03 NOTE — NURSING NOTE
Spoke with tena at [facility]. Once again she said they need referral re-faxed. I told her I had faxed 7/29/2021 and to the number she named. I could send through [employer] but she preferred it to be re-faxed. New notes pulled for therapy and re-faxed. Thanks Caty

## 2021-08-03 NOTE — PLAN OF CARE
Goal Outcome Evaluation:           Progress: no change  Outcome Summary: Patient resting in bed. No complaints or acute changes. Pt got up to chair with PT today.

## 2021-08-03 NOTE — THERAPY TREATMENT NOTE
Acute Care - Physical Therapy Treatment Note   Christophe     Patient Name: Aidee Trinidad  : 1965  MRN: [id number]  Today's Date: 8/3/2021   Onset of Illness/Injury or Date of Surgery: 21  Visit Dx:     ICD-10-CM ICD-9-CM   1. Closed fracture of right hip, initial encounter (CMS/[facility])  S72.001A 820.8     Patient Active Problem List   Diagnosis   • Closed fracture of right hip (CMS/[facility])   • CAD (coronary artery disease)   • Essential hypertension   • Fatty liver     Past Medical History:   Diagnosis Date   • Arthritis    • Chronic back pain    • Coronary artery disease    • Depression    • Diabetes mellitus (CMS/[facility])    • Fatty liver    • Hypertension      Past Surgical History:   Procedure Laterality Date   • CARPAL TUNNEL RELEASE     •  SECTION     • CHOLECYSTECTOMY     • CORONARY ANGIOPLASTY WITH STENT PLACEMENT     • HIP TROCHANTERIC NAILING WITH INTRAMEDULLARY HIP SCREW Right 2021    Procedure: HIP TROCHANTERIC NAILING LONG WITH INTRAMEDULLARY HIP SCREW;  Surgeon: Oracio Ponce DO;  Location: [facility];  Service: Orthopedics;  Laterality: Right;   • REPLACEMENT TOTAL KNEE Right    • TUBAL ABDOMINAL LIGATION          PT Assessment (last 12 hours)      PT Evaluation and Treatment     Row Name 21 1609          Physical Therapy Time and Intention    Subjective Information  complains of;fatigue  -CW     Document Type  therapy note (daily note)  -CW     Mode of Treatment  physical therapy  -CW     Patient Effort  adequate  -CW     Symptoms Noted During/After Treatment  none  -CW     Comment  Patient agreeable to physical therapy treatment this date. She reports that she now wishes to return home instead of attending [facility].  -CW     Row Name 21 1609          General Information    Patient Profile Reviewed  yes  -CW     Patient Observations  alert;cooperative;agree to therapy  -CW     Row Name 21 160          Cognition    Affect/Mental Status (Cognitive)  WFL  -CW      Orientation Status (Cognition)  oriented x 3  -CW     Follows Commands (Cognition)  WFL  -CW     Row Name 08/03/21 1609          Pain Scale: Word Pre/Post-Treatment    Pre/Posttreatment Pain Comment  Patient reports her RLE pain related to her ankle is reduced and her pain related to her hip fracture is now her primary pain.  -     Pain Intervention(s)  Ambulation/increased activity;Repositioned  -     Row Name 08/03/21 1609          Mobility    Right Lower Extremity (Weight-bearing Status)  weight-bearing as tolerated (WBAT)  -     Row Name 08/03/21 1609          Bed Mobility    Bed Mobility  bed mobility (all) activities;sit-supine;supine-sit;scooting/bridging  -     Supine-Sit [place] (Bed Mobility)  nonverbal cues (demo/gesture);verbal cues;moderate assist (50% patient effort)  -     Bed Mobility, Safety Issues  decreased use of arms for pushing/pulling;decreased use of legs for bridging/pushing  -     Assistive Device (Bed Mobility)  bed rails;head of bed elevated  -     Row Name 08/03/21 1609          Transfers    Transfers  sit-stand transfer;stand-sit transfer;bed-chair transfer;chair-bed transfer  -     Sit-Stand [place] (Transfers)  moderate assist (50% patient effort);minimum assist (75% patient effort);verbal cues;nonverbal cues (demo/gesture) x 2  -CW     Stand-Sit [place] (Transfers)  minimum assist (75% patient effort);verbal cues;nonverbal cues (demo/gesture)  -     Row Name 08/03/21 1609          Sit-Stand Transfer    Assistive Device (Sit-Stand Transfers)  walker, front-wheeled;other (see comments) Bariatric  -CW     Row Name 08/03/21 1609          Stand-Sit Transfer    Assistive Device (Stand-Sit Transfers)  walker, front-wheeled;other (see comments) Bariatric  -CW     Row Name 08/03/21 1609          Gait/Stairs (Locomotion)    [place] Level (Gait)  minimum assist (75% patient effort);verbal cues;nonverbal cues (demo/gesture)  -     Assistive Device (Gait)   walker, front-wheeled;other (see comments) Bariatric  -CW     Distance in Feet (Gait)  10  -CW     Pattern (Gait)  step-to  -CW     Deviations/Abnormal Patterns (Gait)  antalgic;base of support, wide;becky decreased;gait speed decreased;stride length decreased;weight shifting decreased  -CW     Bilateral Gait Deviations  forward flexed posture;heel strike decreased  -CW     Comment (Gait/Stairs)  Ambulation included two full turns.  -CW     Row Name 08/03/21 1609          Safety Issues, Functional Mobility    Safety Issues Affecting Function (Mobility)  insight into deficits/self-awareness  -CW     Impairments Affecting Function (Mobility)  balance;endurance/activity tolerance;pain;strength  -CW     Row Name 08/03/21 1609          Motor Skills    Therapeutic Exercise  other (see comments) Seated LE strengthening/AROM interventions.  -CW     Row Name             Wound 07/18/21 1345 Right anterior thigh Incision    Wound - Properties Group Placement Date: 07/18/21  -CWA Placement Time: 1345  -CWA Present on Hospital Admission: N  -CWA Side: Right  -CWA Orientation: anterior  -CWA Location: thigh  -CWA Primary Wound Type: Incision  -CWA    Retired Wound - Properties Group Date first assessed: 07/18/21  -CWA Time first assessed: 1345  -CWA Present on Hospital Admission: N  -CWA Side: Right  -CWA Location: thigh  -CWA Primary Wound Type: Incision  -CWA    Row Name 08/03/21 1609          Coping    Observed Emotional State  calm;cooperative  -CW     Trust Relationship/Rapport  care explained;choices provided;questions answered;thoughts/feelings acknowledged  -CW     Row Name 08/03/21 1609          Plan of Care Review    Plan of Care Reviewed With  patient  -CW     Row Name 08/03/21 1609          Positioning and Restraints    Pre-Treatment Position  in bed  -CW     Post Treatment Position  chair  -CW     In Chair  notified nsg;sitting;call light within reach;encouraged to call for assist  -CW     Row Name 08/03/21 1601           Progress Summary (PT)    Progress Toward Functional Goals (PT)  progress toward functional goals is good  -CW     Daily Progress Summary (PT)  Patient demonstrates improved ambulation ability and tolerance this date. With her expressed desire to go home and continued improvement with functional mobility. She is appropriate for DC home with assist along with either home health or outpatient physical therapy.  -CW       User Key  (r) = Recorded By, (t) = Taken By, (c) = Cosigned By    Initials Name Provider Type    CWMildred King RN Registered Nurse    Ki Shah, SUKH Physical Therapist        Physical Therapy Education                 Title: PT OT SLP Therapies (Done)     Topic: Physical Therapy (Done)     Point: Mobility training (Done)     Learning Progress Summary           Patient Acceptance, E, VU,NR by  at 7/31/2021 1239    Comment: Pt will benefit from further education regarding safe mobility and exercises to improve independence.                   Point: Home exercise program (Done)     Learning Progress Summary           Patient Acceptance, E, VU,NR by  at 7/31/2021 1239    Comment: Pt will benefit from further education regarding safe mobility and exercises to improve independence.                   Point: Body mechanics (Done)     Learning Progress Summary           Patient Acceptance, E, VU,NR by  at 7/31/2021 1239    Comment: Pt will benefit from further education regarding safe mobility and exercises to improve independence.                   Point: Precautions (Done)     Learning Progress Summary           Patient Acceptance, E, VU,NR by  at 7/31/2021 1239    Comment: Pt will benefit from further education regarding safe mobility and exercises to improve independence.                               User Key     Initials Effective Dates Name Provider Type Discipline     06/16/21 -  Lawanda Duran, PT Physical Therapist PT              PT Recommendation and Plan  Anticipated  Discharge Disposition (PT): home with assist, home with home health, home with outpatient therapy services  Progress Summary (PT)  Progress Toward Functional Goals (PT): progress toward functional goals is good  Daily Progress Summary (PT): Patient demonstrates improved ambulation ability and tolerance this date. With her expressed desire to go home and continued improvement with functional mobility. She is appropriate for DC home with assist along with either home health or outpatient physical therapy.  Plan of Care Reviewed With: patient       Time Calculation:   PT Charges     Row Name 08/03/21 1614             Time Calculation    PT Received On  08/03/21  -CW         Time Calculation- PT    Total Timed Code Minutes- PT  35 minute(s)  -CW        User Key  (r) = Recorded By, (t) = Taken By, (c) = Cosigned By    Initials Name Provider Type    CW Ki Reyes, PT Physical Therapist        Therapy Charges for Today     Code Description Service Date Service Provider Modifiers Qty    52569296950 HC PT THERAPEUTIC ACT EA 15 MIN 8/2/2021 Ki Reyes, PT GP 2    36732398953 HC GAIT TRAINING EA 15 MIN 8/2/2021 Ki Reyes, PT GP 1    39400342978 HC GAIT TRAINING EA 15 MIN 8/3/2021 Ki Reyes, PT GP 1    80008889028 HC PT THERAPEUTIC ACT EA 15 MIN 8/3/2021 Ki Reyes, PT GP 1               Ki Reyes PT  8/3/2021

## 2021-08-03 NOTE — CASE MANAGEMENT/SOCIAL WORK
Discharge Planning Assessment  [facility]     Patient Name: Aidee Trinidad  MRN: [id number]  Today's Date: 8/3/2021    Admit Date: 7/17/2021      Discharge Plan     Row Name 08/03/21 1716       Plan    Plan SS received call from [facility] inpatient rehab per Caty who states pt was denied by [facility]. Pt was discussed in [facility] today and Dr. Graves states inpatient rehab is no longer needed and pt wants to return home at discharge. Dr. Graves anticipates pt will be ready for discharge on Wednesday, 8/4/21. SS to follow.     Continued Care and Services - Admitted Since 7/17/2021     Destination     Service Provider Request Status Selected Services Address Phone Fax Patient Preferred    [facility]  Declined N/A [address] [address] [phone] --     [facility]  Declined N/A [address] [facility] [place] [id number] [phone] --            RAYNA Daniels

## 2021-08-04 LAB
ANION GAP SERPL CALCULATED.3IONS-SCNC: 9.4 MMOL/L (ref 5–15)
BUN SERPL-MCNC: 10 MG/DL (ref 6–20)
BUN/CREAT SERPL: 15.4 (ref 7–25)
CALCIUM SPEC-SCNC: 9 MG/DL (ref 8.6–10.5)
CHLORIDE SERPL-SCNC: 104 MMOL/L (ref 98–107)
CO2 SERPL-SCNC: 24.6 MMOL/L (ref 22–29)
CREAT SERPL-MCNC: 0.65 MG/DL (ref 0.57–1)
DEPRECATED RDW RBC AUTO: 55.8 FL (ref 37–54)
ERYTHROCYTE [DISTWIDTH] IN BLOOD BY AUTOMATED COUNT: 15 % (ref 12.3–15.4)
GFR SERPL CREATININE-BSD FRML MDRD: 94 ML/MIN/1.73
GLUCOSE BLDC GLUCOMTR-MCNC: 130 MG/DL (ref 70–130)
GLUCOSE BLDC GLUCOMTR-MCNC: 133 MG/DL (ref 70–130)
GLUCOSE BLDC GLUCOMTR-MCNC: 137 MG/DL (ref 70–130)
GLUCOSE BLDC GLUCOMTR-MCNC: 149 MG/DL (ref 70–130)
GLUCOSE SERPL-MCNC: 117 MG/DL (ref 65–99)
HCT VFR BLD AUTO: 32.8 % (ref 34–46.6)
HGB BLD-MCNC: 9.8 G/DL (ref 12–15.9)
MAGNESIUM SERPL-MCNC: 1.7 MG/DL (ref 1.6–2.6)
MCH RBC QN AUTO: 30.8 PG (ref 26.6–33)
MCHC RBC AUTO-ENTMCNC: 29.9 G/DL (ref 31.5–35.7)
MCV RBC AUTO: 103.1 FL (ref 79–97)
PLATELET # BLD AUTO: 279 10*3/MM3 (ref 140–450)
PMV BLD AUTO: 10.1 FL (ref 6–12)
POTASSIUM SERPL-SCNC: 4 MMOL/L (ref 3.5–5.2)
RBC # BLD AUTO: 3.18 10*6/MM3 (ref 3.77–5.28)
SODIUM SERPL-SCNC: 138 MMOL/L (ref 136–145)
WBC # BLD AUTO: 8.45 10*3/MM3 (ref 3.4–10.8)

## 2021-08-04 PROCEDURE — 97530 THERAPEUTIC ACTIVITIES: CPT

## 2021-08-04 PROCEDURE — 99233 SBSQ HOSP IP/OBS HIGH 50: CPT | Performed by: PHYSICIAN ASSISTANT

## 2021-08-04 PROCEDURE — 80048 BASIC METABOLIC PNL TOTAL CA: CPT | Performed by: PHYSICIAN ASSISTANT

## 2021-08-04 PROCEDURE — 85027 COMPLETE CBC AUTOMATED: CPT | Performed by: PHYSICIAN ASSISTANT

## 2021-08-04 PROCEDURE — 97116 GAIT TRAINING THERAPY: CPT

## 2021-08-04 PROCEDURE — 82962 GLUCOSE BLOOD TEST: CPT

## 2021-08-04 PROCEDURE — 25010000003 MAGNESIUM SULFATE 4 GM/100ML SOLUTION: Performed by: INTERNAL MEDICINE

## 2021-08-04 PROCEDURE — 83735 ASSAY OF MAGNESIUM: CPT | Performed by: PHYSICIAN ASSISTANT

## 2021-08-04 RX ORDER — MAGNESIUM SULFATE HEPTAHYDRATE 40 MG/ML
4 INJECTION, SOLUTION INTRAVENOUS ONCE
Status: COMPLETED | OUTPATIENT
Start: 2021-08-04 | End: 2021-08-04

## 2021-08-04 RX ADMIN — ASPIRIN 81 MG: 81 TABLET, COATED ORAL at 08:36

## 2021-08-04 RX ADMIN — QUETIAPINE FUMARATE 25 MG: 25 TABLET, FILM COATED ORAL at 20:47

## 2021-08-04 RX ADMIN — APIXABAN 2.5 MG: 2.5 TABLET, FILM COATED ORAL at 08:35

## 2021-08-04 RX ADMIN — SODIUM CHLORIDE, PRESERVATIVE FREE 3 ML: 5 INJECTION INTRAVENOUS at 09:20

## 2021-08-04 RX ADMIN — FUROSEMIDE 40 MG: 40 TABLET ORAL at 08:35

## 2021-08-04 RX ADMIN — APIXABAN 2.5 MG: 2.5 TABLET, FILM COATED ORAL at 20:47

## 2021-08-04 RX ADMIN — CETIRIZINE HYDROCHLORIDE 10 MG: 10 TABLET, FILM COATED ORAL at 08:36

## 2021-08-04 RX ADMIN — SODIUM CHLORIDE, PRESERVATIVE FREE 10 ML: 5 INJECTION INTRAVENOUS at 09:20

## 2021-08-04 RX ADMIN — METOPROLOL TARTRATE 25 MG: 25 TABLET, FILM COATED ORAL at 20:47

## 2021-08-04 RX ADMIN — LEVOTHYROXINE SODIUM 25 MCG: 25 TABLET ORAL at 08:35

## 2021-08-04 RX ADMIN — HYDROCODONE BITARTRATE AND ACETAMINOPHEN 1 TABLET: 5; 325 TABLET ORAL at 14:49

## 2021-08-04 RX ADMIN — SODIUM CHLORIDE, PRESERVATIVE FREE 10 ML: 5 INJECTION INTRAVENOUS at 20:48

## 2021-08-04 RX ADMIN — SERTRALINE 50 MG: 50 TABLET, FILM COATED ORAL at 08:36

## 2021-08-04 RX ADMIN — HYDROCODONE BITARTRATE AND ACETAMINOPHEN 1 TABLET: 5; 325 TABLET ORAL at 01:57

## 2021-08-04 RX ADMIN — MAGNESIUM SULFATE HEPTAHYDRATE 4 G: 40 INJECTION, SOLUTION INTRAVENOUS at 04:13

## 2021-08-04 RX ADMIN — GABAPENTIN 400 MG: 400 CAPSULE ORAL at 08:38

## 2021-08-04 RX ADMIN — PANTOPRAZOLE SODIUM 40 MG: 40 TABLET, DELAYED RELEASE ORAL at 05:20

## 2021-08-04 RX ADMIN — SODIUM CHLORIDE, PRESERVATIVE FREE 3 ML: 5 INJECTION INTRAVENOUS at 20:48

## 2021-08-04 RX ADMIN — FERROUS SULFATE TAB 325 MG (65 MG ELEMENTAL FE) 325 MG: 325 (65 FE) TAB at 08:36

## 2021-08-04 RX ADMIN — GABAPENTIN 400 MG: 400 CAPSULE ORAL at 20:47

## 2021-08-04 NOTE — PROGRESS NOTES
Patient Identification:  Name:  Aidee Trinidad  Age:  56 y.o.  Sex:  female  :  1965  MRN:  [id number]  Visit Number:  [id number]  Primary Care Provider:  Azalea Burnett APRN    Length of stay:  18    Subjective/Interval History/Consultants/Procedures     Chief complaint: Follow-up hip fracture, weakness    HPI/Hospital course:     Aidee Trinidad is a 56-year-old female with PMH significant for known fatty liver disease, diabetes, depression, coronary artery disease, hypertension.  She presented to urgency department at [facility] on 2021 for further evaluation of right-sided hip pain after sustaining a fall from the shower when standing up from her shower chair when turning water off.  She landed on her right side and was wedged between chair and while the shower.  Her son was reportedly home and called an ambulance.  She was brought to the emergency department at [facility] for further evaluation and treatment.  Of note, she did also break her right ankle around 1 month ago.     Mrs. Trinidad underwent orthopedic surgery with right hip cephalo-medullary nailing with FNA nail.  She continued to have difficulty ambulating following this with reports of ongoing weakness throughout her body.   She underwent MRI of her L-spine withouth known significant source of weakness.     She developed urinary retention while hospitalized and required troy catheter to be anchored on 21 AM.  She later underwent bladder training with troy catheter removal. Urinalysis obtained revealed 25,000 CFU yeast and less than 10,000 CFU gram-positive cocci.  Not felt to be an acute urinary tract infection, thus was not treated.    Given prior right knee surgery and now right hip fracture with prior right lower extremity weakness, Mrs. Trinidad has had some slowed progression with ambulatory abilities.  She was denied inpatient rehab while hospitalized.   She initially thought she may go  home with her daughter.  She has continued to work with physical therapy.       Consultants:   Consults     Date and Time Order Name Status Description    8/4/2021 11:06 AM Inpatient Orthopedic Surgery Consult      7/17/2021  7:52 PM Ortho (on-call MD unless specified) Completed             Procedures:   · 7/18/2021: Right hip cephalo-medullary nailing with TFNA nail -- Dr. Ponce, orthopedic surgery   · 7/23/2021: Transthoracic echocardiogram   · Normal left ventricular cavity size and wall thickness noted.  · Left ventricular ejection fraction appears to be 56 - 60%. Left ventricular systolic function is normal.  · Left ventricular diastolic function is consistent with (grade II w/high LAP) pseudonormalization.  · Normal cardiac chamber dimensions.  · The aortic valve is structurally normal. Doppler interrogation was not done across the aortic valve.  · The mitral valve is structurally normal with no regurgitation or significant stenosis present  · Mild tricuspid valve regurgitation is present.  · Estimated right ventricular systolic pressure from tricuspid regurgitation is mildly elevated (35-45 mmHg). Mild pulmonary hypertension is present.  · The pulmonic valve is not well visualized.  · There is a trivial pericardial effusion.  · 7/23/2021: Velazquez catheter insertion d/t urinary retention   · 7/29/2021: Velazquez catheter removal        Subjective:      Today we discuss improved ambulation with walker to bedside commode.  She reports her daughter does not want her to come home, as she continues to feel her legs are too weak.   She denies chest pain,dyspnea, cough, and wheeze.       Discussed with AM DWIGHT Callahan without known significant events overnight or this morning.     ----------------------------------------------------------------------------------------------------------------------  Current Hospital Meds:  apixaban, 2.5 mg, Oral, Q12H  aspirin, 81 mg, Oral, Daily  cetirizine, 10 mg, Oral,  Daily  cholecalciferol, 50,000 Units, Oral, Q7 Days  ferrous sulfate, 325 mg, Oral, Daily With Breakfast  furosemide, 40 mg, Oral, Daily  gabapentin, 400 mg, Oral, Q12H  insulin aspart, 0-7 Units, Subcutaneous, TID AC  levothyroxine, 25 mcg, Oral, Daily  metoprolol tartrate, 25 mg, Oral, Q12H  pantoprazole, 40 mg, Oral, Q AM  QUEtiapine, 25 mg, Oral, Nightly  sertraline, 50 mg, Oral, Daily  sodium chloride, 10 mL, Intravenous, Q12H  sodium chloride, 3 mL, Intravenous, Q12H         ----------------------------------------------------------------------------------------------------------------------      Objective     Vital Signs:  Temp:  [97.8 °F (36.6 °C)-98.2 °F (36.8 °C)] 97.9 °F (36.6 °C)  Heart Rate:  [66-78] 71  Resp:  [16-18] 18  BP: ()/(50-71) 124/52      07/17/21  1849 07/17/21  2224 07/18/21  0500   Weight: 107 kg (235 lb) 111 kg (245 lb 3.2 oz) 111 kg (245 lb 3.2 oz)     Body mass index is 53.06 kg/m².    Intake/Output Summary (Last 24 hours) at 8/4/2021 1109  Last data filed at 8/4/2021 1027  Gross per 24 hour   Intake 1440 ml   Output --   Net 1440 ml     I/O this shift:  In: 360 [P.O.:360]  Out: -   Diet Regular; Consistent Carbohydrate  ----------------------------------------------------------------------------------------------------------------------    Physical Exam  Vitals and nursing note reviewed.   Constitutional:       Appearance: Normal appearance.   HENT:      Head: Normocephalic and atraumatic.   Eyes:      General: Lids are normal.      Extraocular Movements: Extraocular movements intact.      Pupils: Pupils are equal, round, and reactive to light.   Cardiovascular:      Rate and Rhythm: Normal rate and regular rhythm.      Heart sounds: S1 normal and S2 normal.   Pulmonary:      Effort: No tachypnea or bradypnea.      Breath sounds: No decreased breath sounds, wheezing, rhonchi or rales.   Abdominal:      General: Bowel sounds are normal.      Palpations: Abdomen is soft.       Tenderness: There is no abdominal tenderness.   Musculoskeletal:      Cervical back: Full passive range of motion without pain.   Skin:     General: Skin is warm and dry.   Neurological:      Mental Status: She is alert and oriented to person, place, and time.   Psychiatric:         Attention and Perception: Attention normal.         Mood and Affect: Mood normal.         Behavior: Behavior normal.              ----------------------------------------------------------------------------------------------------------------------  Tele:        ----------------------------------------------------------------------------------------------------------------------      Results from last 7 days   Lab Units 08/04/21 0150 08/03/21 0443 08/02/21  0404   WBC 10*3/mm3 8.45 7.70 7.48   HEMOGLOBIN g/dL 9.8* 9.7* 9.2*   HEMATOCRIT % 32.8* 32.1* 30.0*   MCV fL 103.1* 101.3* 100.0*   MCHC g/dL 29.9* 30.2* 30.7*   PLATELETS 10*3/mm3 279 271 260         Results from last 7 days   Lab Units 08/04/21 0150 08/03/21 0443 08/02/21  0404 07/30/21  0820 07/29/21  0912   SODIUM mmol/L 138 139 138   < > 141   POTASSIUM mmol/L 4.0 3.8 4.0   < > 4.3   MAGNESIUM mg/dL 1.7 2.0 1.6   < >  --    CHLORIDE mmol/L 104 105 104   < > 105   CO2 mmol/L 24.6 25.8 24.9   < > 27.3   BUN mg/dL 10 8 9   < > 7   CREATININE mg/dL 0.65 0.51* 0.56*   < > 0.60   EGFR IF NONAFRICN AM mL/min/1.73 94 125 112   < > 103   CALCIUM mg/dL 9.0 9.0 8.8   < > 8.8   GLUCOSE mg/dL 117* 99 130*   < > 153*   ALBUMIN g/dL  --  2.89*  --   --  2.59*   BILIRUBIN mg/dL  --  1.0  --   --  1.0   ALK PHOS U/L  --  284*  --   --  175*   AST (SGOT) U/L  --  43*  --   --  41*   ALT (SGPT) U/L  --  22  --   --  26    < > = values in this interval not displayed.   Estimated Creatinine Clearance: 109.4 mL/min (by C-G formula based on SCr of 0.65 mg/dL).  No results found for: AMMONIA      No results found for: BLOODCX  No results found for: URINECX  No results found for: WOUNDCX  No results  found for: STOOLCX  ----------------------------------------------------------------------------------------------------------------------  Imaging Results (Last 24 Hours)     ** No results found for the last 24 hours. **        ----------------------------------------------------------------------------------------------------------------------   I have reviewed the above laboratory values for 08/04/21    Assessment/Plan     Active Hospital Problems    Diagnosis  POA   • **Closed fracture of right hip (CMS/[facility]) [S72.001A]  Yes   • CAD (coronary artery disease) [I25.10]  Yes   • Essential hypertension [I10]  Yes   • Fatty liver [K76.0]  Yes         ASSESSMENT/PLAN:  Acute, minimally displaced right proximal intertrochanteric femur fracture s/p mechanical fall at home, s/p repair 7/18/2021  -History of right lateral malleolus avulsion fracture with tiny fragmentation 4/2021  -History of TKA right in past   -Generalized weakness, debility, inability to complete ADLs currently due to acute fracture   · Pt is s/p ORIF 7/18; Supposed to have two week follow-up with orthopedics.  This time has now passed given extended hospitalization.   · Will ask ortho to see for 2-week follow-up.   · Cont PRN pain medication with holding parameters   · PT on board,rehab declined.   · Swing bed consult placed.   · Patient does report neuropathy in RLE, CT lumbar spine with no acute abnormalities. She has good motor function on exam   · MRI L spine without known acute source of weakness.    · Supportive care   · WBAT   · Eliquis on board for DVT proph for 35 days post-op.   · Vitamin D adequate    · NV checks   · Ortho input/assistance is appreciated.      RLE > LLE weakness, neuropathy   · No focal deficits    · Head CT previously unremarkable 7/21  · MRI lumbar spine 7/23 with no acute abnormalities   · Cont Neurontin   · ?outpatient neuro referral      -Right foot pain   · Pt with history of right lateral malleolus avulsion fx  4/2021  · Reports pain in right foot similar to gout episode in past, more pain towards her great toe on right foot   · XR unremarkable, uric acid level not elevated  · Supportive care   · PRN pain medication      -Hypokalemia   -Borderline hypomagnesemia   · Resolved with supplementation   · Replace electrolytes per protocol as necessary   · Repeat BMP and mag level in AM     -Constipation, resolved   · Continue bowel regimen, change to PRN as pt complains of abdominal cramping and nausea      -Urinary retention, resolved   · Patient with urinary retention earlier in hospitalization requiring anchoring of troy catheter   · Patient also with generalized weakness and constipation, as such MRI of lumbar spine obtained previously with no acute findings.   · Urinalysis with 25K CFU yeast and <10K CFU gram positive cocci . No treatment warranted at this time, discussed with Dr. Taylor (attending physician)  · Suspect that urine retention will improve with mobilization.   · Bladder training completed, troy catheter previously removed. She is now voiding on her own.      -Macrocytic anemia   -Thrombocytopenia, resolved   · Likely ABLA post op  · Hemoglobin stable   · Platelets stable, ultrasound of spleen previously unremarkable  · No active bleeding noted or reported   · Vitamin B12 and folate WNL. Iron profile with slightly low iron and iron sat but stable ferritin. Pt previously started on PO iron, will continue.   · Monitor closely, repeat CBC in AM     -Transaminitis, felt to be 2/2 CPK elevation   -Hyperbilirubinemia, stable   -Hypoalbuminemia  -Hepatitis B IgM/IgG  · Abdomen non tender   · LFTs stable   · US Liver with marked cirrhosis said to be advanced with nodular liver margins.   · US spleen unremarkable.  · CPK improved  · Avoid nephrotoxins as much as possible   · Repeat chem panel in AM     -Grade II diastolic dysfunction   · ECHO reviewed   · Appears grossly compensated   · Monitor volume status closely    · Restart home Lasix on 8/2/21     -Essential hypertension  · BP appears well controlled   · Continue home metoprolol with appropriate holding parameters to prevent hypotension and/or bradycardia   · Home lasix previously held.  Blood pressure and renal function stable, as such we will resume home Lasix dose.  · Closely monitor BP per hospital protocol, titrate medications as necessary     -History of coronary artery disease s/p CABG in past   -Cardiomegaly   · Patient denies any chest pain   · Statin held previously due to elevated CPK level. CPK still elevated, but significantly improved.  · TTE obtained with preserved EF and grade II diastolic dysfunction   · Eliquis on board for VTE proph, cont daily aspirin  · Home lasix restarted  · Monitor closely on telemetry      -Type II diabetes mellitus, non insulin dependent  -Peripheral neuropathy   · HgbA1C 6.1, well controlled. BG levels controlled.   · Continue home Metformin for now   · Continue low dose SSI, titrate dosage as necessary   · Closely monitor blood glucose levels with accuchecks QAC and QHS  · Hypoglycemia protocol in place should it be necessary  · Continue home Neurontin for neuropathy      -Hypothyroidism   · TSH adequately replaced   · Continue home dose of levothyroxine      -Depression   -Anxiety   · Supportive care   · Continue home Zoloft      -Morbid obesity, BMI 53.06  · Supportive care   · Affecting all aspects of care       -----------  -DVT prophylaxis: Eliquis 2.5mg BID  -GI prophylaxis: PPI  -Activity: WBAT  -Disposition:SS on board. Insurance declined rehab. Swing bed consulted.  Discussed with SS.   -Diet:   Dietary Orders (From admission, onward)     Start     Ordered    07/28/21 1112  Diet Regular; Consistent Carbohydrate  Diet Effective Now     Question Answer Comment   Diet Texture / Consistency Regular    Common Modifiers Consistent Carbohydrate        07/28/21 1111                    The patient is high risk due to the  following diagnoses/reasons:  Recent right hip fracture s/p repair, difficulty mobilizing, Essential HTN,         Brittany Colon PA-C  08/04/21  11:09 EDT  Pager # [phone]

## 2021-08-04 NOTE — CASE MANAGEMENT/SOCIAL WORK
Discharge Planning Assessment  [facility]     Patient Name: Aidee Trinidad  MRN: [id number]  Today's Date: 8/4/2021    Admit Date: 7/17/2021    Discharge Plan     Row Name 08/04/21 1755       Plan    Plan SS was notified by Brittany SALOMON that pt is reluctant to return home at this time. PA ordered a swing bed consult. Pt has Medicaid only, therefore she is not a swing bed candidate. SS notified Brittany SALOMON. SS spoke to pt and pt voices agreement to return home at discharge. Pt request SS to contact her daughter to discuss disposition. SS contacted dtr, Puja Montgomery [phone] who request peer to peer review to be completed with pt's [facility] [place] for inpatient rehab. SS to f/u with inpatient rehab on Thursday, 8/5/21 regarding peer to peer review. SS educated pt's dtr about discharge options including private pay SNF placement, home health, and private paid caregivers for home. SS to follow.     Continued Care and Services - Admitted Since 7/17/2021     Destination     Service Provider Request Status Selected Services Address Phone Fax Patient Preferred    [facility]  Declined N/A [address] [id number] [phone] --    [facility]  Declined N/A [address] [place] [id number] [phone] --              RAYNA Daniels

## 2021-08-04 NOTE — PLAN OF CARE
Goal Outcome Evaluation:           Progress: improving  Outcome Summary: Pt resting in bed at this time. Up to BSC multiple times. Complaints of pain, PRN pain meds given. Mg beign replaced per orders. Will continue to monitor.

## 2021-08-04 NOTE — THERAPY TREATMENT NOTE
Acute Care - Physical Therapy Treatment Note   Christophe     Patient Name: Aidee Trinidad  : 1965  MRN: [id number]  Today's Date: 2021   Onset of Illness/Injury or Date of Surgery: 21  Visit Dx:     ICD-10-CM ICD-9-CM   1. Closed fracture of right hip, initial encounter (CMS/[facility])  S72.001A 820.8     Patient Active Problem List   Diagnosis   • Closed fracture of right hip (CMS/[facility])   • CAD (coronary artery disease)   • Essential hypertension   • Fatty liver     Past Medical History:   Diagnosis Date   • Arthritis    • Chronic back pain    • Coronary artery disease    • Depression    • Diabetes mellitus (CMS/[facility])    • Fatty liver    • Hypertension      Past Surgical History:   Procedure Laterality Date   • CARPAL TUNNEL RELEASE     •  SECTION     • CHOLECYSTECTOMY     • CORONARY ANGIOPLASTY WITH STENT PLACEMENT     • HIP TROCHANTERIC NAILING WITH INTRAMEDULLARY HIP SCREW Right 2021    Procedure: HIP TROCHANTERIC NAILING LONG WITH INTRAMEDULLARY HIP SCREW;  Surgeon: Oracio Ponce DO;  Location: [facility];  Service: Orthopedics;  Laterality: Right;   • REPLACEMENT TOTAL KNEE Right    • TUBAL ABDOMINAL LIGATION          PT Assessment (last 12 hours)      PT Evaluation and Treatment     Row Name 21 1525          Physical Therapy Time and Intention    Subjective Information  complains of;fatigue;pain  -CW     Document Type  therapy note (daily note)  -CW     Mode of Treatment  physical therapy  -CW     Patient Effort  adequate  -CW     Symptoms Noted During/After Treatment  fatigue;increased pain  -CW     Comment  Patient agreeable to physical therapy treatment this date. She reports that she did not get much sleep last night. She states today that she spoke with her daughter and decided she would like to go to rehabilitation before going home. She expresses consent for SNF rehabilitation after being informed she was denied [facility] [facility]. She reports she got [place] multiple times [place] [facility]  [place].  -     Row Name 08/04/21 1525          General Information    Patient Profile Reviewed  yes  -CW     Patient Observations  alert;cooperative;agree to therapy  -     Row Name 08/04/21 1525          Cognition    Affect/Mental Status (Cognitive)  WFL  -     Orientation Status (Cognition)  oriented x 3  -CW     Follows Commands (Cognition)  WFL  -     Row Name 08/04/21 1525          Pain Scale: Word Pre/Post-Treatment    Pre/Posttreatment Pain Comment  Patient reports worsened RLE pain consistent with recent fall/fracture/surgical repair.  -     Pain Intervention(s)  Repositioned;Ambulation/increased activity  -     Row Name 08/04/21 1525          Mobility    Right Lower Extremity (Weight-bearing Status)  weight-bearing as tolerated (WBAT)  -     Row Name 08/04/21 1525          Bed Mobility    Bed Mobility  bed mobility (all) activities;sit-supine;supine-sit;scooting/bridging  -     Supine-Sit [place] (Bed Mobility)  nonverbal cues (demo/gesture);verbal cues;moderate assist (50% patient effort)  -     Bed Mobility, Safety Issues  decreased use of arms for pushing/pulling;decreased use of legs for bridging/pushing  -     Assistive Device (Bed Mobility)  bed rails;head of bed elevated  -     Row Name 08/04/21 1525          Transfers    Transfers  sit-stand transfer;stand-sit transfer;bed-chair transfer;chair-bed transfer  -     Sit-Stand [place] (Transfers)  moderate assist (50% patient effort);minimum assist (75% patient effort);verbal cues;nonverbal cues (demo/gesture) x 2  -CW     Stand-Sit [place] (Transfers)  minimum assist (75% patient effort);verbal cues;nonverbal cues (demo/gesture)  -     Row Name 08/04/21 1525          Sit-Stand Transfer    Assistive Device (Sit-Stand Transfers)  walker, front-wheeled;bariatric  -     Row Name 08/04/21 1525          Stand-Sit Transfer    Assistive Device (Stand-Sit Transfers)  walker, front-wheeled;bariatric  -     Row Name  08/04/21 1525          Gait/Stairs (Locomotion)    [place] Level (Gait)  minimum assist (75% patient effort);verbal cues;nonverbal cues (demo/gesture)  -CW     Assistive Device (Gait)  walker, front-wheeled;bariatric equipment  -CW     Pattern (Gait)  step-to  -CW     Deviations/Abnormal Patterns (Gait)  antalgic;base of support, wide;becky decreased;gait speed decreased;stride length decreased;weight shifting decreased  -CW     Bilateral Gait Deviations  forward flexed posture;heel strike decreased  -CW     Row Name 08/04/21 1525          Safety Issues, Functional Mobility    Safety Issues Affecting Function (Mobility)  insight into deficits/self-awareness  -CW     Impairments Affecting Function (Mobility)  balance;endurance/activity tolerance;pain;strength  -CW     Row Name 08/04/21 1525          Motor Skills    Therapeutic Exercise  other (see comments) Seated LE strengthening/AROM interventions  -CW     Row Name             Wound 07/18/21 1345 Right anterior thigh Incision    Wound - Properties Group Placement Date: 07/18/21  -CWA Placement Time: 1345  -CWA Present on Hospital Admission: N  -CWA Side: Right  -CWA Orientation: anterior  -CWA Location: thigh  -CWA Primary Wound Type: Incision  -CWA    Retired Wound - Properties Group Date first assessed: 07/18/21  -CWA Time first assessed: 1345  -CWA Present on Hospital Admission: N  -CWA Side: Right  -CWA Location: thigh  -CWA Primary Wound Type: Incision  -CWA    Row Name 08/04/21 1525          Coping    Observed Emotional State  calm;cooperative  -CW     Trust Relationship/Rapport  care explained;choices provided;questions answered;thoughts/feelings acknowledged  -CW     Row Name 08/04/21 1525          Plan of Care Review    Plan of Care Reviewed With  patient  -CW     Row Name 08/04/21 1525          Positioning and Restraints    Pre-Treatment Position  in bed  -CW     In Bed  supine;call light within reach;encouraged to call for assist  -CW     Row Name  08/04/21 1525          Progress Summary (PT)    Progress Toward Functional Goals (PT)  progress toward functional goals is fair  -CW     Daily Progress Summary (PT)  Patient demonstrates continued ability for ambulation this date despite report of increased fatigue/pain this date.  -CW       User Key  (r) = Recorded By, (t) = Taken By, (c) = Cosigned By    Initials Name Provider Type    CWMildred King, RN Registered Nurse    Ki Shah, PT Physical Therapist        Physical Therapy Education                 Title: PT OT SLP Therapies (Done)     Topic: Physical Therapy (Done)     Point: Mobility training (Done)     Learning Progress Summary           Patient Acceptance, E, VU,NR by  at 7/31/2021 1239    Comment: Pt will benefit from further education regarding safe mobility and exercises to improve independence.                   Point: Home exercise program (Done)     Learning Progress Summary           Patient Acceptance, E, VU,NR by  at 7/31/2021 1239    Comment: Pt will benefit from further education regarding safe mobility and exercises to improve independence.                   Point: Body mechanics (Done)     Learning Progress Summary           Patient Acceptance, E, VU,NR by  at 7/31/2021 1239    Comment: Pt will benefit from further education regarding safe mobility and exercises to improve independence.                   Point: Precautions (Done)     Learning Progress Summary           Patient Acceptance, E, VU,NR by  at 7/31/2021 1239    Comment: Pt will benefit from further education regarding safe mobility and exercises to improve independence.                               User Key     Initials Effective Dates Name Provider Type Discipline     06/16/21 -  Lawanda Duran, PT Physical Therapist PT              PT Recommendation and Plan  Anticipated Discharge Disposition (PT): home with assist, home with home health, home with outpatient therapy services  Progress Summary  (PT)  Progress Toward Functional Goals (PT): progress toward functional goals is fair  Daily Progress Summary (PT): Patient demonstrates continued ability for ambulation this date despite report of increased fatigue/pain this date.  Plan of Care Reviewed With: patient       Time Calculation:   PT Charges     Row Name 08/04/21 1534             Time Calculation    PT Received On  08/04/21  -CW         Time Calculation- PT    Total Timed Code Minutes- PT  32 minute(s)  -CW        User Key  (r) = Recorded By, (t) = Taken By, (c) = Cosigned By    Initials Name Provider Type    CW Ki Reyes, PT Physical Therapist        Therapy Charges for Today     Code Description Service Date Service Provider Modifiers Qty    95216157196 HC GAIT TRAINING EA 15 MIN 8/3/2021 Ki Reyes, PT GP 1    06024699821 HC PT THERAPEUTIC ACT EA 15 MIN 8/3/2021 Ki Reyes, PT GP 1    48489725754 HC GAIT TRAINING EA 15 MIN 8/4/2021 Ki Reyes, PT GP 1    16009326200 HC PT THERAPEUTIC ACT EA 15 MIN 8/4/2021 Ki Reyes, PT GP 1               Ki Reyes PT  8/4/2021

## 2021-08-04 NOTE — PLAN OF CARE
Goal Outcome Evaluation:           Progress: improving  Outcome Summary: Patient resting in bed.  PRN medications given. No acute chages. No complaints at this time.

## 2021-08-05 ENCOUNTER — APPOINTMENT (OUTPATIENT)
Dept: GENERAL RADIOLOGY | Facility: HOSPITAL | Age: 56
End: 2021-08-05

## 2021-08-05 VITALS
TEMPERATURE: 98 F | WEIGHT: 245.2 LBS | RESPIRATION RATE: 20 BRPM | HEART RATE: 77 BPM | OXYGEN SATURATION: 99 % | SYSTOLIC BLOOD PRESSURE: 133 MMHG | BODY MASS INDEX: 52.9 KG/M2 | HEIGHT: 57 IN | DIASTOLIC BLOOD PRESSURE: 70 MMHG

## 2021-08-05 LAB
GLUCOSE BLDC GLUCOMTR-MCNC: 104 MG/DL (ref 70–130)
GLUCOSE BLDC GLUCOMTR-MCNC: 120 MG/DL (ref 70–130)
GLUCOSE BLDC GLUCOMTR-MCNC: 157 MG/DL (ref 70–130)
MAGNESIUM SERPL-MCNC: 2 MG/DL (ref 1.6–2.6)

## 2021-08-05 PROCEDURE — 73501 X-RAY EXAM HIP UNI 1 VIEW: CPT | Performed by: RADIOLOGY

## 2021-08-05 PROCEDURE — 82962 GLUCOSE BLOOD TEST: CPT

## 2021-08-05 PROCEDURE — 99239 HOSP IP/OBS DSCHRG MGMT >30: CPT | Performed by: PHYSICIAN ASSISTANT

## 2021-08-05 PROCEDURE — 73501 X-RAY EXAM HIP UNI 1 VIEW: CPT

## 2021-08-05 PROCEDURE — 97164 PT RE-EVAL EST PLAN CARE: CPT

## 2021-08-05 PROCEDURE — 63710000001 INSULIN ASPART PER 5 UNITS: Performed by: ORTHOPAEDIC SURGERY

## 2021-08-05 PROCEDURE — 83735 ASSAY OF MAGNESIUM: CPT | Performed by: INTERNAL MEDICINE

## 2021-08-05 PROCEDURE — 97110 THERAPEUTIC EXERCISES: CPT

## 2021-08-05 RX ORDER — BISACODYL 10 MG
10 SUPPOSITORY, RECTAL RECTAL DAILY PRN
Qty: 8 EACH | Refills: 0 | Status: SHIPPED | OUTPATIENT
Start: 2021-08-05 | End: 2021-08-13

## 2021-08-05 RX ORDER — ASPIRIN 81 MG/1
81 TABLET ORAL DAILY
Qty: 30 TABLET | Refills: 0 | Status: SHIPPED | OUTPATIENT
Start: 2021-08-06 | End: 2021-09-05

## 2021-08-05 RX ORDER — POLYETHYLENE GLYCOL 3350 17 G/17G
17 POWDER, FOR SOLUTION ORAL DAILY PRN
Qty: 510 G | Refills: 0 | Status: ON HOLD | OUTPATIENT
Start: 2021-08-05 | End: 2021-11-01

## 2021-08-05 RX ORDER — POTASSIUM CHLORIDE 20 MEQ/1
20 TABLET, EXTENDED RELEASE ORAL DAILY
Status: ON HOLD
Start: 2021-08-05 | End: 2021-11-01

## 2021-08-05 RX ORDER — QUETIAPINE FUMARATE 25 MG/1
25 TABLET, FILM COATED ORAL NIGHTLY
Qty: 30 TABLET | Refills: 0 | Status: ON HOLD | OUTPATIENT
Start: 2021-08-05 | End: 2021-11-01

## 2021-08-05 RX ORDER — PANTOPRAZOLE SODIUM 40 MG/1
40 TABLET, DELAYED RELEASE ORAL
Qty: 30 TABLET | Refills: 0 | Status: SHIPPED | OUTPATIENT
Start: 2021-08-06 | End: 2021-09-05

## 2021-08-05 RX ORDER — FERROUS SULFATE 325(65) MG
325 TABLET ORAL
Qty: 30 TABLET | Refills: 0 | Status: SHIPPED | OUTPATIENT
Start: 2021-08-06 | End: 2021-09-05

## 2021-08-05 RX ADMIN — LEVOTHYROXINE SODIUM 25 MCG: 25 TABLET ORAL at 08:41

## 2021-08-05 RX ADMIN — GABAPENTIN 400 MG: 400 CAPSULE ORAL at 08:41

## 2021-08-05 RX ADMIN — APIXABAN 2.5 MG: 2.5 TABLET, FILM COATED ORAL at 08:41

## 2021-08-05 RX ADMIN — METOPROLOL TARTRATE 25 MG: 25 TABLET, FILM COATED ORAL at 08:41

## 2021-08-05 RX ADMIN — SODIUM CHLORIDE, PRESERVATIVE FREE 10 ML: 5 INJECTION INTRAVENOUS at 08:41

## 2021-08-05 RX ADMIN — CETIRIZINE HYDROCHLORIDE 10 MG: 10 TABLET, FILM COATED ORAL at 08:41

## 2021-08-05 RX ADMIN — ASPIRIN 81 MG: 81 TABLET, COATED ORAL at 08:41

## 2021-08-05 RX ADMIN — HYDROCODONE BITARTRATE AND ACETAMINOPHEN 1 TABLET: 5; 325 TABLET ORAL at 03:13

## 2021-08-05 RX ADMIN — SERTRALINE 50 MG: 50 TABLET, FILM COATED ORAL at 08:41

## 2021-08-05 RX ADMIN — PANTOPRAZOLE SODIUM 40 MG: 40 TABLET, DELAYED RELEASE ORAL at 05:43

## 2021-08-05 RX ADMIN — FUROSEMIDE 40 MG: 40 TABLET ORAL at 08:41

## 2021-08-05 RX ADMIN — FERROUS SULFATE TAB 325 MG (65 MG ELEMENTAL FE) 325 MG: 325 (65 FE) TAB at 08:41

## 2021-08-05 RX ADMIN — INSULIN ASPART 2 UNITS: 100 INJECTION, SOLUTION INTRAVENOUS; SUBCUTANEOUS at 17:24

## 2021-08-05 RX ADMIN — HYDROCODONE BITARTRATE AND ACETAMINOPHEN 1 TABLET: 5; 325 TABLET ORAL at 15:15

## 2021-08-05 NOTE — DISCHARGE SUMMARY
[facility] Medicine Services  DISCHARGE SUMMARY    Date of Admission: 7/17/2021    Date of Discharge:  8/5/2021    PCP: Azalea Burnett APRN    Discharging Provider: Brittany Colon PA-C  Attending Physician on day of DC: Dr. Jeff Graves    Admission Diagnosis:   Please see admission H&P    Discharge Diagnosis:   · Acute minimally displaced right proximal intertrochanteric femur fracture  · History of right lateral malleolus avulsion fracture  · History of right TKA in the past  · Generalized weakness and debility  · Right lower extremity greater than left lower extremity weakness with underlying neuropathy  · Foot pain with unremarkable work-up thus far  · Hypokalemia, improved  · Constipation, resolved  · Urinary retention, resolved  · Rhinitis with underlying cirrhosis  · Grade 2 diastolic dysfunction  · Essential hypertension  · Peripheral neuropathy  · Hypothyroidism  · Depression  · Anxiety  · Debility secondary to recent fracture status post repair in addition to underlying morbid obesity      Procedures Performed:  · 7/18/2021: Right hip cephalo-medullary nailing with TFNA nail -- Dr. Ponce, orthopedic surgery   · 7/23/2021: Transthoracic echocardiogram   · Normal left ventricular cavity size and wall thickness noted.  · Left ventricular ejection fraction appears to be 56 - 60%. Left ventricular systolic function is normal.  · Left ventricular diastolic function is consistent with (grade II w/high LAP) pseudonormalization.  · Normal cardiac chamber dimensions.  · The aortic valve is structurally normal. Doppler interrogation was not done across the aortic valve.  · The mitral valve is structurally normal with no regurgitation or significant stenosis present  · Mild tricuspid valve regurgitation is present.  · Estimated right ventricular systolic pressure from tricuspid regurgitation is mildly elevated (35-45 mmHg). Mild pulmonary hypertension is present.  · The  pulmonic valve is not well visualized.  · There is a trivial pericardial effusion.  · 7/23/2021: Troy catheter insertion d/t urinary retention   · 7/29/2021: Troy catheter removal      Consults:   Consults     Date and Time Order Name Status Description    8/4/2021 11:06 AM Inpatient Orthopedic Surgery Consult      7/17/2021  7:52 PM Ortho (on-call MD unless specified) Completed           HPI     History of Present Illness:  Aidee Trinidad is a 56 y.o. female  with PMH significant for known fatty liver disease, diabetes, depression, coronary artery disease, hypertension.  She presented to urgency department at [facility] on July 17, 2021 for further evaluation of right-sided hip pain after sustaining a fall from the shower when standing up from her shower chair when turning water off.  She landed on her right side and was wedged between chair and while the shower.  Her son was reportedly home and called an ambulance.  She was brought to the emergency department at [facility] for further evaluation and treatment.  Of note, she did also break her right ankle around 1 month ago.       Hospital Course     Hospital Course  Aidee Trinidad was admitted as outlined in above HPI.     Mrs. Trinidad underwent orthopedic surgery with right hip cephalo-medullary nailing with FNA nail.  She continued to have difficulty ambulating following this with reports of ongoing weakness throughout her body.   She underwent MRI of her L-spine withouth known significant source of weakness.      She developed urinary retention while hospitalized and required troy catheter to be anchored on 07/23/21 AM.  She later underwent bladder training with troy catheter removal. Urinalysis obtained revealed 25,000 CFU yeast and less than 10,000 CFU gram-positive cocci.  Not felt to be an acute urinary tract infection, thus was not treated.     Given prior right knee surgery and now right hip fracture with prior right lower  extremity weakness, Mrs. Trinidad has had some slowed progression with ambulatory abilities.  She was denied inpatient rehab while hospitalized.   She initially thought she may go home with her daughter.  She has continued to work with physical therapy. Many avenues of therapy were explored with assistance of .  She was offered SNF placement with declination as she did not qualify due to not qualifying for Medicaid pending admission due having [employer] . Discharge options for further therapies were discussed and explored at length, though she did progress with PT with eventual ability to transition to bedside commode with walker and some assistance.   Mrs. Trinidad was ultimately discharged home on 08/05 with her daughter with bedside commode and hospital bed with trapeze bar in addition to home health and home PT/OT.      BMP recommended in 5 days with results sent to PCP.     Prior to discharge, orthopedics did evaluate on 08/05 with unremarkable repeat hip xray.   Betadine dressing changes recommended and ordered daily with one week follow-up with Dr. Ponce given further evaluation of some drainage from hip incision. Given previously ordered Eliquis for 35 days post-op, remaining 18 day supply was ordered at discharge for VTE prophylaxis.          Pertinent Laboratory and Radiology Results     Pertinent Test Results:          Results from last 7 days   Lab Units 08/04/21  0150 08/03/21  0443 08/02/21  0404 07/31/21  0703 07/30/21  0820   WBC 10*3/mm3 8.45 7.70 7.48  --  7.02   HEMOGLOBIN g/dL 9.8* 9.7* 9.2*  --  8.7*   HEMATOCRIT % 32.8* 32.1* 30.0*  --  27.9*   PLATELETS 10*3/mm3 279 271 260  --  232   MCV fL 103.1* 101.3* 100.0*  --  101.8*   SODIUM mmol/L 138 139 138 138 139   POTASSIUM mmol/L 4.0 3.8 4.0 4.0 4.0   CHLORIDE mmol/L 104 105 104 105 102   CO2 mmol/L 24.6 25.8 24.9 25.6 27.9   BUN mg/dL 10 8 9 8 8   CREATININE mg/dL 0.65 0.51* 0.56* 0.56* 0.60   GLUCOSE mg/dL 117* 99 130* 99 152*    CALCIUM mg/dL 9.0 9.0 8.8 8.4* 8.8          Results from last 7 days   Lab Units 08/04/21  0150 08/03/21  0443 08/02/21  0404 07/30/21  0820   WBC 10*3/mm3 8.45 7.70 7.48 7.02     Results from last 7 days   Lab Units 08/03/21  0443   BILIRUBIN mg/dL 1.0   ALK PHOS U/L 284*   ALT (SGPT) U/L 22   AST (SGOT) U/L 43*           Invalid input(s): TG, LDLCALC, LDLREALC      Brief Urine Lab Results  (Last result in the past 365 days)      Color   Clarity   Blood   Leuk Est   Nitrite   Protein   CREAT   Urine HCG        07/23/21 0519 Dark Yellow Clear Negative Trace Negative Negative                       Results for orders placed during the hospital encounter of 07/17/21    Adult Transthoracic Echo Complete W/ Cont if Necessary Per Protocol    Interpretation Summary  · Normal left ventricular cavity size and wall thickness noted.  · Left ventricular ejection fraction appears to be 56 - 60%. Left ventricular systolic function is normal.  · Left ventricular diastolic function is consistent with (grade II w/high LAP) pseudonormalization.  · Normal cardiac chamber dimensions.  · The aortic valve is structurally normal. Doppler interrogation was not done across the aortic valve.  · The mitral valve is structurally normal with no regurgitation or significant stenosis present  · Mild tricuspid valve regurgitation is present.  · Estimated right ventricular systolic pressure from tricuspid regurgitation is mildly elevated (35-45 mmHg). Mild pulmonary hypertension is present.  · The pulmonic valve is not well visualized.  · There is a trivial pericardial effusion.            ----------------------------------------------------------------------------------------------------------------------  XR Foot 2 View Right    Result Date: 7/29/2021  No acute bony abnormality  This report was finalized on 7/29/2021 2:51 PM by Dr. John Duarte MD.      CT Head Without Contrast    Result Date: 7/21/2021    Unremarkable exam demonstrating no CT  evidence of acute intracranial findings.  This report was finalized on 7/21/2021 2:29 PM by Dr. Christiano Aceves MD.      MRI Lumbar Spine Without Contrast    Result Date: 7/26/2021   1. Mildly shortened pedicles congenitally 2. No focal disc herniation. 3. Mild facet arthropathy.  This report was finalized on 7/26/2021 4:49 PM by Dr. John Duarte MD.      US Spleen    Result Date: 7/23/2021    No acute findings in the visualized abdomen.  This report was finalized on 7/23/2021 5:01 PM by Dr. Christiano Aceves MD.      US Liver    Result Date: 7/20/2021  Marked liver cirrhosis noted. No focal liver lesion identified.  This report was finalized on 7/20/2021 5:06 PM by Dr. Vikram Strong MD.      XR Chest 1 View    Result Date: 7/17/2021  Cardiomegaly with pulmonary vascular congestion in this patient post CABG. This could represent mild fluid overload or early CHF. No effusions or edema. Signer Name: MARIANNA Montgomery MD  Signed: 7/17/2021 8:18 PM  Workstation Name: [facility]  Radiology [facility]    XR Abdomen KUB    Result Date: 7/23/2021    Abundant right colonic stool.  This report was finalized on 7/23/2021 11:03 AM by Dr. Christiano Aceves MD.      FL Surgery Fluoro    Result Date: 7/18/2021  As above.  This report was finalized on 7/18/2021 3:52 PM by Dr. Vikram Strong MD.      XR Hip With or Without Pelvis 1 View Right    Result Date: 8/5/2021    Postsurgical changes from right hip fixation. No acute findings identified.  This report was finalized on 8/5/2021 4:07 PM by Dr. Vikram Strong MD.      XR Hip With or Without Pelvis 2 - 3 View Right    Result Date: 7/17/2021  Minimally displaced right proximal intertrochanteric femur fracture. Signer Name: MARIANNA Montgomery MD  Signed: 7/17/2021 7:45 PM  Workstation Name: [facility]  Radiology [facility]        Microbiology Results (last 10 days)     ** No results found for the last 240 hours. **          Labs above have been reviewed on  the day of discharge.  Radiology images from prior 30 days were reviewed prior to discharge as incorporated into this document.     Discharge Vitals and Physical Examination       Vital Signs  Temp:  [97.8 °F (36.6 °C)-98.2 °F (36.8 °C)] 98 °F (36.7 °C)  Heart Rate:  [67-77] 77  Resp:  [16-20] 20  BP: (106-133)/(51-70) 133/70     PHYSICAL EXAMINATION:   Physical Exam  Vitals and nursing note reviewed.   Constitutional:       Appearance: Normal appearance.   HENT:      Head: Normocephalic and atraumatic.      Mouth/Throat:      Mouth: Mucous membranes are dry.   Cardiovascular:      Rate and Rhythm: Normal rate and regular rhythm.      Pulses: Normal pulses.      Heart sounds: Normal heart sounds.   Pulmonary:      Breath sounds: Normal breath sounds.   Abdominal:      General: There is no distension.      Palpations: Abdomen is soft.   Musculoskeletal:         General: No swelling or tenderness.   Skin:     General: Skin is warm and dry.   Neurological:      Mental Status: She is alert and oriented to person, place, and time.           Discharge Disposition, Discharge Medications, and Discharge Appointments     Discharge Disposition:   home    Condition on Discharge:  Fair    Discharge Medications:     Discharge Medications      New Medications      Instructions Start Date   apixaban 2.5 MG tablet tablet  Commonly known as: ELIQUIS   2.5 mg, Oral, Every 12 Hours Scheduled      aspirin 81 MG EC tablet   81 mg, Oral, Daily   Start Date: August 6, 2021     bisacodyl 10 MG suppository  Commonly known as: DULCOLAX   10 mg, Rectal, Daily PRN      ferrous sulfate 325 (65 FE) MG tablet   325 mg, Oral, Daily With Breakfast   Start Date: August 6, 2021     pantoprazole 40 MG EC tablet  Commonly known as: PROTONIX   40 mg, Oral, Every Early Morning   Start Date: August 6, 2021     polyethylene glycol 17 g packet  Commonly known as: MIRALAX   17 g, Oral, Daily PRN         Changes to Medications      Instructions Start Date    potassium chloride 20 MEQ CR tablet  Commonly known as: K-DUR,KLOR-CON  What changed: when to take this   20 mEq, Oral, Daily      QUEtiapine 25 MG tablet  Commonly known as: SEROquel  What changed:   · medication strength  · how much to take   25 mg, Oral, Nightly         Continue These Medications      Instructions Start Date   atorvastatin 20 MG tablet  Commonly known as: LIPITOR   20 mg, Oral, Nightly      cholecalciferol 1.25 MG (16566 UT) capsule  Commonly known as: VITAMIN D3   50,000 Units, Oral, Every 7 Days, Prior to [facility], Patient was on: Pt takes each Friday      Diclofenac Sodium 1 % gel gel  Commonly known as: VOLTAREN   4 g, Topical, 4 Times Daily PRN      furosemide 40 MG tablet  Commonly known as: LASIX   40 mg, Oral, Daily      gabapentin 400 MG capsule  Commonly known as: NEURONTIN   400 mg, Oral, Every 12 Hours      HYDROcodone-acetaminophen 5-325 MG per tablet  Commonly known as: NORCO   1 tablet, Oral, 2 Times Daily PRN      levothyroxine 25 MCG tablet  Commonly known as: SYNTHROID, LEVOTHROID   25 mcg, Oral, Daily      metFORMIN 500 MG tablet  Commonly known as: GLUCOPHAGE   500 mg, Oral, 2 Times Daily With Meals      metoprolol tartrate 25 MG tablet  Commonly known as: LOPRESSOR   25 mg, Oral, 2 Times Daily      sertraline 50 MG tablet  Commonly known as: ZOLOFT   50 mg, Oral, Daily      ZyrTEC Allergy 10 MG capsule  Generic drug: Cetirizine HCl   10 mg, Oral, Daily         Stop These Medications    cefdinir 300 MG capsule  Commonly known as: OMNICEF            Discharged medication regimen discussed with attending physician prior to discharge.     Discharge Diet:     Dietary Orders (From admission, onward)     Start     Ordered    07/28/21 1112  Diet Regular; Consistent Carbohydrate  Diet Effective Now     Question Answer Comment   Diet Texture / Consistency Regular    Common Modifiers Consistent Carbohydrate        07/28/21 1111                Activity at Discharge:  activity  as tolerated         Discharge Disposition:    Home or Self Care        Follow-up Appointments:      Additional Instructions for the Follow-ups that You Need to Schedule     Ambulatory Referral to Home Health   As directed      right hip surgical incision   Wound Care Instructions Apply betadine to staples and surgical incision, apply dry dressing overlay.    Order Comments: right hip surgical incision  Wound Care Instructions Apply betadine to staples and surgical incision, apply dry dressing overlay.     Face to Face Visit Date: 8/5/2021    Follow-up provider for Plan of Care?: I treated the patient in an acute care facility and will not continue treatment after discharge.    Follow-up provider: AZALEA LITTLE [[id number]]    Reason/Clinical Findings: Debility secondary to morbid obesity and recent right hip fracture with multiple prior right side injuries and weakness    Describe mobility limitations that make leaving home difficult: Debility 2/2 right hip fracture with decreased ambulatory status limiting ambility to mobilize for therapy and appointments    Nursing/Therapeutic Services Requested: Skilled Nursing Physical Therapy Occupational Therapy    Skilled nursing orders: Medication education Wound care dressing/changes    PT orders: Total joint pathway Therapeutic exercise Gait Training Strengthening    Weight Bearing Status: As Tolerated    Occupational orders: Activities of daily living Strengthening    Frequency: Other (3x per week)         Discharge Follow-up with PCP   As directed       Currently Documented PCP:    Azalea Little APRN    PCP Phone Number:    [phone]     Follow Up Details: One week hospital follow-up for hip facture, debility, UTI, Cirrhosis         Discharge Follow-up with Specified Provider: Dr. Ponce; 1 Week   As directed      To: Dr. Ponce    Follow Up: 1 Week    Follow Up Details: One week hospital follow-up         Discharge Follow-up with Specified  Provider: Referal to GI clinic for Cirrhosis; 1 Month   As directed      To: Referal to GI clinic for Cirrhosis    Follow Up: 1 Month         Basic Metabolic Panel    Aug 10, 2021 (Approximate)      Send to PCP. To be performed by home health    Order Comments: Send to PCP. To be performed by home health     Release to patient: Immediate           Follow-up Information     Oracio Ponce, DO Follow up in 10 day(s).    Specialty: Orthopedic Surgery  Contact information:  [address] [facility] [id number] [phone]             Azalea Little, APRN .    Specialty: Family Medicine  Why: One week hospital follow-up for hip facture, debility, UTI, Cirrhosis  Contact information:  [address]  [facility] [id number] [phone]                   Additional Instructions for the Follow-ups that You Need to Schedule     Ambulatory Referral to Home Health   As directed      right hip surgical incision   Wound Care Instructions Apply betadine to staples and surgical incision, apply dry dressing overlay.    Order Comments: right hip surgical incision  Wound Care Instructions Apply betadine to staples and surgical incision, apply dry dressing overlay.     Face to Face Visit Date: 8/5/2021    Follow-up provider for Plan of Care?: I treated the patient in an acute care facility and will not continue treatment after discharge.    Follow-up provider: AZALEA LITTLE [[id number]]    Reason/Clinical Findings: Debility secondary to morbid obesity and recent right hip fracture with multiple prior right side injuries and weakness    Describe mobility limitations that make leaving home difficult: Debility 2/2 right hip fracture with decreased ambulatory status limiting ambility to mobilize for therapy and appointments    Nursing/Therapeutic Services Requested: Skilled Nursing Physical Therapy Occupational Therapy    Skilled nursing orders: Medication education Wound care dressing/changes    PT orders: Total  joint pathway Therapeutic exercise Gait Training Strengthening    Weight Bearing Status: As Tolerated    Occupational orders: Activities of daily living Strengthening    Frequency: Other (3x per week)         Discharge Follow-up with PCP   As directed       Currently Documented PCP:    Azalea Burnett APRN    PCP Phone Number:    [phone]     Follow Up Details: One week hospital follow-up for hip facture, debility, UTI, Cirrhosis         Discharge Follow-up with Specified Provider: Dr. Ponce; 1 Week   As directed      To: Dr. Ponce    Follow Up: 1 Week    Follow Up Details: One week hospital follow-up         Discharge Follow-up with Specified Provider: Referal to GI clinic for Cirrhosis; 1 Month   As directed      To: Referal to GI clinic for Cirrhosis    Follow Up: 1 Month         Basic Metabolic Panel    Aug 10, 2021 (Approximate)      Send to PCP. To be performed by home health    Order Comments: Send to PCP. To be performed by home health     Release to patient: Immediate               Test Results Pending at Discharge:           Brittany Colon PA-C  [facility] Medicine Team  08/05/21  16:36 EDT      Time: Greater than 30 minutes spent on this discharge.  I spent 45 minutes on this discharge activity which included:  Face-to-face encounter with the patient, discussing plan with attending physician, reviewing the data in the system, coordination of the care with the nursing staff as well as consultations, documentation, and entering orders.

## 2021-08-05 NOTE — PROGRESS NOTES
Inpatient Progress Note    Aidee Trinidad  Date: 08/05/21  MRN: [id number]      Subjective:   Patient is a 56 year old female who is status post right hip fracture that underwent right hip TFNA by Dr. Ponce on 7/18/21.  Patient remains inpatient due to discharge issues. Patient has been limited in mobility post op.     She has been having drainage to distal surgical incision per nursing, orthopedic surgery was consulted for 2 week post op visit yesterday.  During exam nursing notes drainage Unsure how long has been present.     Objective:    Vitals:    08/04/21 2300 08/05/21 0300 08/05/21 0710 08/05/21 1100   BP: 106/51 128/69 119/59 113/58   BP Location: Right arm Right arm Right arm Right arm   Patient Position: Lying Lying Lying Lying   Pulse: 72 68 67 70   Resp: 18 16 20 20   Temp: 98.2 °F (36.8 °C) 98 °F (36.7 °C) 97.8 °F (36.6 °C) 98.2 °F (36.8 °C)   TempSrc: Oral Oral Oral Oral   SpO2: 97% 99% 99%    Weight:       Height:              Physical Exam:  Constitutional:  Well-developed and well-nourished.  No respiratory distress.      Musculoskeletal: Right hip surgical incision x2 noted laterally. Proximal incision, clean dry and intact, no active drainage. Distal incision intact with staples, macerated periwound noted with serous/blood tinged drainage noted, no purulent drainage. No erythema noted. No indication infection.  Neurological:  Alert and oriented.  Skin:  Skin is warm and dry.   Psychiatric:  Normal mood and affect.    Labs:    Results from last 7 days   Lab Units 08/04/21  0150 08/03/21  0443 08/02/21  0404   WBC 10*3/mm3 8.45 7.70 7.48   HEMOGLOBIN g/dL 9.8* 9.7* 9.2*   HEMATOCRIT % 32.8* 32.1* 30.0*   MCV fL 103.1* 101.3* 100.0*   MCHC g/dL 29.9* 30.2* 30.7*   PLATELETS 10*3/mm3 279 271 260         Results from last 7 days   Lab Units 08/05/21  0053 08/04/21  0150 08/03/21  0443 08/02/21 0404 08/02/21 0404   SODIUM mmol/L  --  138 139  --  138   POTASSIUM mmol/L  --  4.0 3.8  --  4.0    MAGNESIUM mg/dL 2.0 1.7 2.0   < > 1.6   CHLORIDE mmol/L  --  104 105  --  104   CO2 mmol/L  --  24.6 25.8  --  24.9   BUN mg/dL  --  10 8  --  9   CREATININE mg/dL  --  0.65 0.51*  --  0.56*   EGFR IF NONAFRICN AM mL/min/1.73  --  94 125  --  112   CALCIUM mg/dL  --  9.0 9.0  --  8.8   GLUCOSE mg/dL  --  117* 99  --  130*   ALBUMIN g/dL  --   --  2.89*  --   --    BILIRUBIN mg/dL  --   --  1.0  --   --    ALK PHOS U/L  --   --  284*  --   --    AST (SGOT) U/L  --   --  43*  --   --    ALT (SGPT) U/L  --   --  22  --   --     < > = values in this interval not displayed.   Estimated Creatinine Clearance: 109.4 mL/min (by C-G formula based on SCr of 0.65 mg/dL).  No results found for: AMMONIA          No results found for: HGBA1C  Lab Results   Component Value Date    TSH 2.280 07/18/2021         Pain Management Panel     Pain Management Panel Latest Ref Rng & Units 2/28/2020    AMPHETAMINES SCREEN, URINE Negative Negative    BARBITURATES SCREEN Negative Negative    BENZODIAZEPINE SCREEN, URINE Negative Negative    BUPRENORPHINEUR Negative Negative    COCAINE SCREEN, URINE Negative Negative    METHADONE SCREEN, URINE Negative Negative              Radiology:  Imaging Results (Last 72 Hours)     ** No results found for the last 72 hours. **        Assessment:   1. Right hip acute fracture, s/p TFNA nailing    Plan:   Obtain right hip x-rays today.  Begin betadine dressing changes to surgical incision twice daily.  Staples will remain in place.   Continue PT/OT.  Continue Eliquis as previous prescribed.     LAILA Ortez  08/05/21  12:21 EDT

## 2021-08-05 NOTE — DISCHARGE PLACEMENT REQUEST
"Aidee Sanchez (56 y.o. Female)     Date of Birth Social Security Number Address Home Phone MRN    1965  [address] [facility] [address] [phone] [id number]    Buddhist Marital Status          Buddhist        Admission Date Admission Type Admitting Provider Attending Provider Department, Room/Bed    21 Emergency Gill Sanchez DO Heinss, Karl F, MD [address], 3350/2S    Discharge Date Discharge Disposition Discharge Destination         Home or Self Care              Attending Provider: Jeff Graves MD    Allergies: Nuts, Codeine, Latex    Isolation: None   Infection: None   Code Status: CPR    Ht: 144.8 cm (57\")   Wt: 111 kg (245 lb 3.2 oz)    Admission Cmt: None   Principal Problem: Closed fracture of right hip (CMS/[facility]) [S72.001A]                 Active Insurance as of 2021     Primary Coverage     Payor Plan Insurance Group Employer/Plan Group    [facility]      Payor Plan Address Payor Plan Phone Number Payor Plan Fax Number Effective Dates    [address] [address]   2015 - None Entered    [place] [address]       Subscriber Name Subscriber Birth Date Member ID       AIDEE SANCHEZ 1965 [id number]                 Emergency Contacts      (Rel.) Home Phone Work Phone Mobile Phone    NA CACERES (Daughter) [phone] -- --        [address] [address]  Phone:  [phone]  Fax:  [phone] Date: Aug 5, 2021      Ambulatory Referral to Home Health     Patient:  Aidee Sanchez MRN:  [id number]   [address] [facility] [address] :  1965  SSN:    Phone: [phone] Sex:  F      INSURANCE PAYOR PLAN GROUP # SUBSCRIBER ID   [facility] [place] [id number]   [id number]      Referring Provider Information:  POLO PALOMINO Phone: [phone] Fax: [phone]      Referral Information:   # " Visits:  999 Referral Type: Home Health [42]   Urgency:  Routine Referral Reason: Specialty Services Required   Start Date: Aug 5, 2021 End Date:  To be determined by Insurer   Diagnosis: Closed fracture of right hip, initial encounter (CMS/[facility]) (S72.001A [ICD-10-CM] 820.8 [ICD-9-CM])      Refer to Dept:   Refer to Provider:   Refer to Facility:    right hip surgical incision      Wound Care Instructions    Apply betadine to staples and surgical incision, apply dry dressing overlay.  Face to Face Visit Date: 8/5/2021  Follow-up provider for Plan of Care? I treated the patient in an acute care facility and will not continue treatment after discharge.  Follow-up provider: IVIS LITTLE [[id number]]  Reason/Clinical Findings: Debility secondary to morbid obesity and recent right hip fracture with multiple prior right side injuries and weakness  Describe mobility limitations that make leaving home difficult: Debility 2/2 right hip fracture with decreased ambulatory status limiting ambility to mobilize for therapy and appointments  Nursing/Therapeutic Services Requested: Skilled Nursing  Nursing/Therapeutic Services Requested: Physical Therapy  Nursing/Therapeutic Services Requested: Occupational Therapy  Skilled nursing orders: Medication education  Skilled nursing orders: Wound care dressing/changes  PT orders: Total joint pathway  PT orders: Therapeutic exercise  PT orders: Gait Training  PT orders: Strengthening  Weight Bearing Status: As Tolerated  Occupational orders: Activities of daily living  Occupational orders: Strengthening  Frequency: Other (3x per week)     This document serves as a request of services and does not constitute Insurance authorization or approval of services.  To determine eligibility, please contact the members Insurance carrier to verify and review coverage.     If you have medical questions regarding this request for services. Please contact [address] at  [phone] during normal business hours.       Authorizing Provider:Brittany Colon PA-C  Authorizing Provider's NPI: [id number]  Order Entered By: Brittany Colon PA-C 2021  4:24 PM     Electronically signed by: Brittany Colon PA-C 2021  4:24 PM               History & Physical      Charlene, Kelley Cerrato PA-C at 21 [phone]     Attestation signed by Gill Sanchez DO at 21 0609    I have read the documentation below and agree.  Patient seen and examined at bedside.  She is currently resting comfortably and states that the as needed morphine is assisting with pain control.  Patient denies any known injury to the right side but states that since about January she has had increased weakness in the RUE/RLE. She denies any recent chest pains or pressure. She reports occasional dyspnea with exertion that she relays to deconditioning; proBNP normal at 130.4.    Patient's Hep B Core IgM was reactive; patient denies any known history of previous Hepatitis B infection. No travel. She states that she had a blood transfusion at birth and then another one in about .     Continue with NPO and as needed morphine. Patient felt to be acceptable risk to proceed with surgical repair of the right hip. Will obtain a Vitamin D level. I have also requested a Vitamin B12 level and further Hepatitis B testing.                            [facility]  HISTORY & PHYSICAL    Patient Identification:  Name:  Aidee Trinidad  Age:  56 y.o.  Sex:  female  :  1965  MRN:  [id number]   Visit Number:  [id number]  Admit Date: 2021   Primary Care Physician:  Azalea Burnett APRN     Subjective     Chief complaint:   Chief Complaint   Patient presents with   • Hip Pain     History of presenting illness:   Patient is a 56 y.o. female with past medical history significant for coronary artery disease status post CABG, essential hypertension,  "hypothyroidism, type 2 diabetes mellitus, fatty liver disease, that presented to the [facility] emergency department for evaluation of right hip pain.    The patient reports that she was in the shower this evening and stood up from her shower chair to turn the water off and fell.  She reports that she landed on her right side and was wedged between her chair and the wall of the shower.  Her son was home and called an ambulance.  The patient reports that she was unable to get up by herself due to the pain.  She did not hit her head and further denies any loss of consciousness, wounds, dizziness, lightheadedness, or numbness/tingling.  She denies any recent falls but reports that she did break her right ankle around 1 month ago that did not require any surgical intervention.  She has been going to physical therapy for treatment and uses a Rollator walker for ambulation.  Furthermore, the patient admits to chronic right-sided weakness in her upper and lower extremities for \"quite some time.\"  She denies any known history of CVAs and further denies any facial drooping or slurring of speech.  She states that the weakness does not affect her activities of daily living it just feels \"not as strong\" as the left side.    Upon arrival to the ED, vitals were temperature 97.5 °F, pulse 74, respirations 20, /65, SPO2 96% on room air.  Troponin T negative x1.  CMP with alkaline phosphatase 188, ALT 53, AST 63.  CBC unremarkable.  Chest x-ray with cardiomegaly with pulmonary vascular congestion in this patient post CABG; could represent mild fluid overload or early CHF, no effusions or edema.  X-ray of right hip shows a minimally displaced right proximal intertrochanteric femur fracture EKG with normal sinus rhythm, possible left atrial enlargement, poor R wave progression, heart rate 74, QTc 450 AMS.  COVID-19 negative.    In the emergency department the patient received IV morphine 2 mg, IV Zofran 4 " mg    Patient has been admitted to the medical/surgical floor for further evaluation and treatment  ---------------------------------------------------------------------------------------------------------------------   Review of Systems   Constitutional: Negative for appetite change, chills, diaphoresis, fatigue and fever.   HENT: Negative for congestion, sinus pain and sore throat.    Eyes: Negative for photophobia and visual disturbance.   Respiratory: Negative for cough, shortness of breath and wheezing.    Cardiovascular: Negative for chest pain, palpitations and leg swelling.   Gastrointestinal: Negative for abdominal pain, constipation, diarrhea, nausea and vomiting.   Genitourinary: Negative for dysuria, frequency and urgency.   Musculoskeletal: Positive for arthralgias (right hip ), back pain (chronic ) and gait problem (recent fall ). Negative for myalgias.   Skin: Negative for rash and wound.   Neurological: Positive for weakness (right upper and lower extremity   ). Negative for dizziness, tremors, seizures, syncope, facial asymmetry, speech difficulty, light-headedness, numbness and headaches.   Psychiatric/Behavioral: Negative for confusion. The patient is not nervous/anxious.       ---------------------------------------------------------------------------------------------------------------------   Past Medical History:   Diagnosis Date   • Arthritis    • Back pain    • Coronary artery disease    • Depression    • Diabetes mellitus (CMS/HCC)    • Fatty liver    • Hypertension      Past Surgical History:   Procedure Laterality Date   • CARPAL TUNNEL RELEASE     •  SECTION     • CHOLECYSTECTOMY     • CORONARY ANGIOPLASTY WITH STENT PLACEMENT     • REPLACEMENT TOTAL KNEE Right    • TUBAL ABDOMINAL LIGATION       History reviewed. No pertinent family history.  Social History     Socioeconomic History   • Marital status:      Spouse name: Not on file   • Number of children: Not on file   •  Years of education: Not on file   • Highest education level: Not on file   Tobacco Use   • Smoking status: Never Smoker   Substance and Sexual Activity   • Alcohol use: Yes     Comment: occas   • Drug use: No   • Sexual activity: Defer     ---------------------------------------------------------------------------------------------------------------------   Allergies:  Codeine, Nuts, and Latex  ---------------------------------------------------------------------------------------------------------------------   Medications below are reported home medications pulling from within the system; at this time, these medications have not been reconciled unless otherwise specified and are in the verification process for further verifcation as current home medications    ---------------------------------------------------------------------------------------------------------------------    Objective     Hospital Scheduled Meds:  sodium chloride, 10 mL, Intravenous, Q12H      sodium chloride, 50 mL/hr        Current listed hospital scheduled medications may not yet reflect those currently placed in orders that are signed and held, awaiting patient's arrival to floor/unit.    ---------------------------------------------------------------------------------------------------------------------   Vital Signs:  Temp:  [97.5 °F (36.4 °C)-98 °F (36.7 °C)] 98 °F (36.7 °C)  Heart Rate:  [74-83] 83  Resp:  [18-20] 18  BP: (105-165)/(65-84) 165/78  Mean Arterial Pressure (Non-Invasive) for the past 24 hrs (Last 3 readings):   Noninvasive MAP (mmHg)   07/17/21 1946 89     SpO2 Percentage    07/17/21 1849 07/17/21 1946   SpO2: 96% 98%     SpO2:  [96 %-98 %] 98 %  on   ;   Device (Oxygen Therapy): room air    Body mass index is 53.06 kg/m².  Wt Readings from Last 3 Encounters:   07/17/21 111 kg (245 lb 3.2 oz)   04/26/21 108 kg (237 lb)   02/28/20 97.1 kg (214 lb)      ---------------------------------------------------------------------------------------------------------------------   Physical Exam:  Physical Exam  Constitutional:       General: She is awake.      Appearance: Normal appearance. She is well-developed. She is morbidly obese.   HENT:      Head: Normocephalic and atraumatic.   Eyes:      General: Lids are normal.         Right eye: No discharge.         Left eye: No discharge.      Extraocular Movements: Extraocular movements intact.      Right eye: Normal extraocular motion.      Left eye: Normal extraocular motion.   Cardiovascular:      Rate and Rhythm: Normal rate and regular rhythm.      Pulses: Normal pulses. No decreased pulses.           Dorsalis pedis pulses are 2+ on the right side and 2+ on the left side.        Posterior tibial pulses are 2+ on the right side and 2+ on the left side.      Heart sounds: Normal heart sounds. No murmur heard.   No friction rub. No gallop.    Pulmonary:      Effort: Pulmonary effort is normal. No tachypnea or bradypnea.      Breath sounds: Normal breath sounds. No decreased breath sounds, wheezing, rhonchi or rales.   Abdominal:      General: Bowel sounds are normal.      Palpations: Abdomen is soft.      Tenderness: There is no abdominal tenderness.   Genitourinary:     Comments: Pure wick in place; no urine in canister at this time  Musculoskeletal:      Right hand: Normal strength.      Left hand: Normal strength.      Cervical back: Full passive range of motion without pain. No pain with movement. Normal range of motion.      Right hip: Tenderness present. Decreased range of motion.      Left hip: No tenderness. Normal range of motion.      Right lower leg: No edema.      Left lower leg: No edema.      Comments: Able to plantar flex and dorsiflex bilateral feet without difficulty    Skin:     Findings: No abrasion, ecchymosis or erythema.   Neurological:      General: No focal deficit present.      Mental Status: She is  alert and oriented to person, place, and time. Mental status is at baseline.      Cranial Nerves: No dysarthria or facial asymmetry.      Sensory: No sensory deficit.      Motor: Motor function is intact. No weakness or tremor.   Psychiatric:         Speech: Speech normal.         Behavior: Behavior is cooperative.         Cognition and Memory: Cognition normal.       ---------------------------------------------------------------------------------------------------------------------  EKG:    Pending cardiology read.  Per my evaluation, normal sinus rhythm with poor R wave progression, possible left atrial enlargement, heart rate 74, QTc 450 MS.    Telemetry:    Patient is not currently on telemetry    I have personally reviewed the EKG  ---------------------------------------------------------------------------------------------------------------------   Results from last 7 days   Lab Units 07/17/21 1907   TROPONIN T ng/mL <0.010     Results from last 7 days   Lab Units 07/17/21 1907   PROBNP pg/mL 130.4         Results from last 7 days   Lab Units 07/17/21 1907   WBC 10*3/mm3 7.48   HEMOGLOBIN g/dL 13.8   HEMATOCRIT % 42.0   MCV fL 96.1   MCHC g/dL 32.9   PLATELETS 10*3/mm3 149     Results from last 7 days   Lab Units 07/17/21 1907   SODIUM mmol/L 140   POTASSIUM mmol/L 4.3   CHLORIDE mmol/L 104   CO2 mmol/L 28.8   BUN mg/dL 8   CREATININE mg/dL 0.74   EGFR IF NONAFRICN AM mL/min/1.73 81   CALCIUM mg/dL 9.5   GLUCOSE mg/dL 84   ALBUMIN g/dL 3.74   BILIRUBIN mg/dL 1.1   ALK PHOS U/L 188*   AST (SGOT) U/L 63*   ALT (SGPT) U/L 53*   Estimated Creatinine Clearance: 96.1 mL/min (by C-G formula based on SCr of 0.74 mg/dL).    Pain Management Panel     Pain Management Panel Latest Ref Rng & Units 2/28/2020    AMPHETAMINES SCREEN, URINE Negative Negative    BARBITURATES SCREEN Negative Negative    BENZODIAZEPINE SCREEN, URINE Negative Negative    BUPRENORPHINEUR Negative Negative    COCAINE SCREEN, URINE Negative  Negative    METHADONE SCREEN, URINE Negative Negative        I have personally reviewed the above laboratory results.   ---------------------------------------------------------------------------------------------------------------------  Imaging Results (Last 7 Days)     Procedure Component Value Units Date/Time    XR Chest 1 View [[id number]] Collected: 07/17/21 2018     Updated: 07/17/21 2020    Narrative:      CR Chest 1 Vw    INDICATION:   Hypertension. Preop.     COMPARISON:    None available.    FINDINGS:  Single portable AP view(s) of the chest.  Cardiomegaly with mild pulmonary vascular congestion. No interstitial or alveolar edema. Post median sternotomy and apparent CABG. No effusions or pneumothorax.      Impression:      Cardiomegaly with pulmonary vascular congestion in this patient post CABG. This could represent mild fluid overload or early CHF. No effusions or edema.    Signer Name: MARIANNA Montgomery MD   Signed: 7/17/2021 8:18 PM   Workstation [facility]: [facility]    [facility]    XR Hip With or Without Pelvis 2 - 3 View Right [[id number]] Collected: 07/17/21 1945     Updated: 07/17/21 1947    Narrative:      CR Hip Uni Comp Min 2 Vws RT    INDICATION:   Right hip pain after a fall.    COMPARISON:   None.    FINDINGS:  AP and frog-leg lateral view(s) of the right hip.  Minimally displaced intertrochanteric fracture proximal right femur. No significant angulation. No significant hip arthropathy. Visualized pelvis and left hip unremarkable. No bone erosion or  destruction.        Impression:      Minimally displaced right proximal intertrochanteric femur fracture.    Signer Name: MARIANNA Montgomery MD   Signed: 7/17/2021 7:45 PM   Workstation [facility]: [facility]    [facility]        I have personally reviewed the above radiology results.    ---------------------------------------------------------------------------------------------------------------------    Assessment & Plan      Active Hospital Problems    Diagnosis  POA   • **Closed fracture of right hip (CMS/[facility]) [S72.001A]  Yes   • CAD (coronary artery disease) [I25.10]  Yes   • Essential hypertension [I10]  Yes   • Fatty liver [K76.0]  Yes     #Acute minimally displaced right proximal intertrochanteric femur fracture POA  -X-ray of right hip reviewed  -Orthopedic surgery was contacted in the emergency department and agreed to see patient in consult (Dr. Ponce); input/assistance is much appreciated  -Patient is to be strict bedrest for now; SCDS to left leg for DVT prophylaxis; n.p.o. after midnight anticipation for possible surgical intervention in a.m.  -Obtain neurovascular checks every 4 hours  -IV normal saline 50 mL/h  -IV morphine 2 mg as needed every 3 hours for pain   -PT/OT consulted for rehabilitation post surgery  -Obtain vitamin D level    #Reported right-sided upper and lower extremity weakness  -No acute focal neurological deficits on exam; alert and oriented x3  -Neuro checks q4 hours   -Should symptoms progress, consider obtaining noncontrast CT of the head    #Transaminitis  #Fatty liver disease  -AST 63, ALT 53  -Acute hepatitis panel and blood ethanol levels ordered  -Repeat a.m. CMP and monitor liver function closely    #Coronary artery disease status post CABG  #Cardiomegaly  -Currently chest pain-free  -Troponin T negative x1; continue to trend  -EKG without acute ischemic changes  -Review medications once available; plan to continue aspirin, Lopressor, and niacin  -Monitor closely on telemetry    #Type 2 diabetes mellitus  -Hemoglobin A1c ordered  -Hold home oral hypoglycemics to prevent hypoglycemia  -Will add SSI for now. Accu-cheks qAC and qhs. Titrate insulin therapy as necessary.     #Essential hypertension  -Reviewed her medications once reconciled with  pharmacy  -Plan to continue home antihypertensive agents with appropriate holding parameters    #Hypothyroidism  -TSH ordered  -Continue home Synthroid    #Anxiety  -Review home medications once reconciled per pharmacy  -Atarax available as needed    #Obesity  -BMI 53.06 kg/m²  -Complicates all aspects of patient care    F/E/N: IV normal saline 50 mL/h.  Replace electrolytes as necessary.  NPO.  ---------------------------------------------------  DVT Prophylaxis: SCDS to left leg  GI Prophylaxis: Protonix  Activity: Strict bedrest    The patient is considered to be a high risk patient due to: Acute minimally displaced right proximal intertrochanteric femur fracture    INPATIENT status due to the need for care which can only be reasonably provided in an hospital setting such as aggressive/expedited ancillary services and/or consultation services, the necessity for IV medications, close physician monitoring and/or the possible need for procedures.  In such, I feel patient’s risk for adverse outcomes and need for care warrant INPATIENT evaluation and predict the patient’s care encounter to likely last beyond 2 midnights    Code Status: FULL CODE     I have discussed the patient's assessment and plan with the patient, nursing staff, and attending physician      Kelley Chang PA-C  Hospitalist Service -- [facility]       07/17/21  22:51 EDT    Attending Physician: Gill Sanchez DO        Electronically signed by Gill Sanchez DO at 07/18/21 0609       Vital Signs (last day)     Date/Time   Temp   Temp src   Pulse   Resp   BP   Patient Position   SpO2    08/05/21 1400   98 (36.7)   Oral   77   20   133/70   Lying   --    08/05/21 1100   98.2 (36.8)   Oral   70   20   113/58   Lying   --    08/05/21 0710   97.8 (36.6)   Oral   67   20   119/59   Lying   99    08/05/21 0300   98 (36.7)   Oral   68   16   128/69   Lying   99    08/04/21 2300   98.2 (36.8)   Oral   72   18   106/51   Lying    97    08/04/21 1900   97.8 (36.6)   Oral   77   16   124/56   Lying   97    08/04/21 1424   97.9 (36.6)   Oral   77   18   128/64   Lying   95    08/04/21 1027   97.9 (36.6)   Oral   71   18   124/52   Lying   --    08/04/21 0835   --   --   78   --   117/71   --   --    08/04/21 0635   97.8 (36.6)   Oral   66   18   98/50   Lying   96    08/04/21 0300   98.2 (36.8)   Oral   72   18   106/59   Lying   97    08/04/21 0156   --   --   71   --   135/61   Lying   --              Intake & Output (last day)       08/04 0701 - 08/05 0700 08/05 0701 - 08/06 0700    P.O. 1080 480    Total Intake(mL/kg) 1080 (9.7) 480 (4.3)    Net +1080 +480          Urine Unmeasured Occurrence 5 x 3 x    Stool Unmeasured Occurrence  1 x          Current Facility-Administered Medications   Medication Dose Route Frequency Provider Last Rate Last Admin   • acetaminophen (TYLENOL) tablet 650 mg  650 mg Oral Q6H PRN Jose Alfredo Taylor DO   650 mg at 07/31/21 1047   • apixaban (ELIQUIS) tablet 2.5 mg  2.5 mg Oral Q12H Oracio Ponce DO   2.5 mg at 08/05/21 0841   • aspirin EC tablet 81 mg  81 mg Oral Daily Jeff Graves MD   81 mg at 08/05/21 0841   • bisacodyl (DULCOLAX) suppository 10 mg  10 mg Rectal Daily PRN Brittany Colon PA-C   10 mg at 07/24/21 1540   • calcium carbonate (TUMS) chewable tablet 500 mg (200 mg elemental)  2 tablet Oral TID PRN Oracio Ponce DO       • cetirizine (zyrTEC) tablet 10 mg  10 mg Oral Daily Oracio Ponce DO   10 mg at 08/05/21 0841   • cholecalciferol (VITAMIN D3) capsule 50,000 Units  50,000 Units Oral Q7 Days Oracio Ponce DO   50,000 Units at 07/30/21 0826   • dextrose (D50W) 25 g/ 50mL Intravenous Solution 25 g  25 g Intravenous Q15 Min PRN Oracio Ponce K, DO       • dextrose (GLUTOSE) oral gel 15 g  15 g Oral Q15 Min PRN Oracio Ponce, DO       • Diclofenac Sodium (VOLTAREN) 1 % gel 4 g  4 g Topical 4x Daily PRN Oracio Ponce, DO       • ferrous sulfate tablet 325 mg  325 mg  Oral Daily With Breakfast Oracio Ponce, DO   325 mg at 08/05/21 0841   • furosemide (LASIX) tablet 40 mg  40 mg Oral Daily Ignacia Solitario PA   40 mg at 08/05/21 0841   • gabapentin (NEURONTIN) capsule 400 mg  400 mg Oral Q12H Oracio Ponce, DO   400 mg at 08/05/21 0841   • glucagon (human recombinant) (GLUCAGEN DIAGNOSTIC) injection 1 mg  1 mg Subcutaneous Q15 Min PRN Oracio Ponce, DO       • HYDROcodone-acetaminophen (NORCO) 5-325 MG per tablet 1 tablet  1 tablet Oral BID PRN Ignacia Solitario PA   1 tablet at 08/05/21 1515   • hydrOXYzine (ATARAX) tablet 25 mg  25 mg Oral TID PRN Oracio Ponce, DO   25 mg at 08/01/21 0902   • insulin aspart (novoLOG) injection 0-7 Units  0-7 Units Subcutaneous TID AC Oracio Ponce DO   2 Units at 08/03/21 1642   • levothyroxine (SYNTHROID, LEVOTHROID) tablet 25 mcg  25 mcg Oral Daily Oracio Ponce, DO   25 mcg at 08/05/21 0841   • Magnesium Sulfate 2 gram Bolus, followed by 8 gram infusion (total Mg dose 10 grams)- Mg less than or equal to 1mg/dL  2 g Intravenous PRN Brittany Colon PA-C        Or   • Magnesium Sulfate 2 gram / 50mL Infusion (GIVE X 3 BAGS TO EQUAL 6GM TOTAL DOSE) - Mg 1.1 - 1.5 mg/dl  2 g Intravenous PRN Brittany Colon PA-C        Or   • Magnesium Sulfate 4 gram infusion- Mg 1.6-1.9 mg/dL  4 g Intravenous PRN Brittany Colon PA-C       • metoprolol tartrate (LOPRESSOR) tablet 25 mg  25 mg Oral Q12H Oracio Ponce, DO   25 mg at 08/05/21 0841   • naloxone (NARCAN) injection 0.1 mg  0.1 mg Intravenous Q5 Min PRN Oracio Ponce, DO       • nitroglycerin (NITROSTAT) SL tablet 0.4 mg  0.4 mg Sublingual Q5 Min PRN Franson, Oracio K, DO       • pantoprazole (PROTONIX) EC tablet 40 mg  40 mg Oral Q AM Oracio Ponce DO   40 mg at 08/05/21 0543   • polyethylene glycol (MIRALAX) packet 17 g  17 g Oral Daily PRN Ignacia Solitario PA       • potassium chloride (K-DUR,KLOR-CON) CR tablet 40 mEq  40 mEq Oral PRN Brittany Colon  YUMIKO Tijerina   40 mEq at 07/25/21 0531    Or   • potassium chloride (KLOR-CON) packet 40 mEq  40 mEq Oral PRN Brittany Colon PA-C        Or   • potassium chloride 10 mEq in 100 mL IVPB  10 mEq Intravenous Q1H PRN Brittany Colon PA-C       • prochlorperazine (COMPAZINE) injection 5 mg  5 mg Intravenous Q6H PRN Oracio Ponce, DO   5 mg at 07/31/21 1048   • QUEtiapine (SEROquel) tablet 25 mg  25 mg Oral Nightly Brittany Colon PA-C   25 mg at 08/04/21 2047   • sennosides-docusate (PERICOLACE) 8.6-50 MG per tablet 2 tablet  2 tablet Oral BID PRN Ignacia Solitario PA       • sertraline (ZOLOFT) tablet 50 mg  50 mg Oral Daily Oracio Ponce K, DO   50 mg at 08/05/21 0841   • sodium chloride 0.9 % flush 10 mL  10 mL Intravenous PRN Rosario Oracio K, DO       • sodium chloride 0.9 % flush 10 mL  10 mL Intravenous Q12H Rosario Oracio K, DO   10 mL at 08/05/21 0841   • sodium chloride 0.9 % flush 10 mL  10 mL Intravenous PRN Rosario Oracio K, DO       • sodium chloride 0.9 % flush 3 mL  3 mL Intravenous Q12H Rosario Oracio K, DO   3 mL at 08/04/21 2048     Lab Results (most recent)     Procedure Component Value Units Date/Time    POC Glucose Once [[id number]]  (Normal) Collected: 08/05/21 1037    Specimen: Blood Updated: 08/05/21 1043     Glucose 120 mg/dL      Comment: Meter: [id number] : [id number] Jose Garcia       POC Glucose Once [[id number]]  (Normal) Collected: 08/05/21 0658    Specimen: Blood Updated: 08/05/21 0704     Glucose 104 mg/dL      Comment: Meter: [id number] : [id number] DARLING CARSON       Magnesium [[id number]]  (Normal) Collected: 08/05/21 0053    Specimen: Blood Updated: 08/05/21 0206     Magnesium 2.0 mg/dL     Basic Metabolic Panel [[id number]]  (Abnormal) Collected: 08/04/21 0150    Specimen: Blood Updated: 08/04/21 0244     Glucose 117 mg/dL      BUN 10 mg/dL      Creatinine 0.65 mg/dL      Sodium 138 mmol/L      Potassium 4.0 mmol/L      Comment: Slight hemolysis detected  by analyzer. Results may be affected.        Chloride 104 mmol/L      CO2 24.6 mmol/L      Calcium 9.0 mg/dL      eGFR Non African Amer 94 mL/min/1.73      BUN/Creatinine Ratio 15.4     Anion Gap 9.4 mmol/L     Narrative:      GFR Normal >60  Chronic Kidney Disease <60  Kidney Failure <15      Magnesium [[id number]]  (Normal) Collected: 08/04/21 0150    Specimen: Blood Updated: 08/04/21 0244     Magnesium 1.7 mg/dL     CBC (No Diff) [[id number]]  (Abnormal) Collected: 08/04/21 0150    Specimen: Blood Updated: 08/04/21 0213     WBC 8.45 10*3/mm3      RBC 3.18 10*6/mm3      Hemoglobin 9.8 g/dL      Hematocrit 32.8 %      .1 fL      MCH 30.8 pg      MCHC 29.9 g/dL      RDW 15.0 %      RDW-SD 55.8 fl      MPV 10.1 fL      Platelets 279 10*3/mm3     Comprehensive Metabolic Panel [[id number]]  (Abnormal) Collected: 08/03/21 0443    Specimen: Blood Updated: 08/03/21 0556     Glucose 99 mg/dL      BUN 8 mg/dL      Creatinine 0.51 mg/dL      Sodium 139 mmol/L      Potassium 3.8 mmol/L      Chloride 105 mmol/L      CO2 25.8 mmol/L      Calcium 9.0 mg/dL      Total Protein 6.5 g/dL      Albumin 2.89 g/dL      ALT (SGPT) 22 U/L      AST (SGOT) 43 U/L      Alkaline Phosphatase 284 U/L      Total Bilirubin 1.0 mg/dL      eGFR Non African Amer 125 mL/min/1.73      Globulin 3.6 gm/dL      A/G Ratio 0.8 g/dL      BUN/Creatinine Ratio 15.7     Anion Gap 8.2 mmol/L     Narrative:      GFR Normal >60  Chronic Kidney Disease <60  Kidney Failure <15      CBC & Differential [[id number]]  (Abnormal) Collected: 08/03/21 0443    Specimen: Blood Updated: 08/03/21 0529    Narrative:      The following orders were created for panel order CBC & Differential.  Procedure                               Abnormality         Status                     ---------                               -----------         ------                     CBC Auto Differential[[id number]]        Abnormal            Final result                 Please view results for  these tests on the individual orders.    CBC Auto Differential [[id number]]  (Abnormal) Collected: 08/03/21 0443    Specimen: Blood Updated: 08/03/21 0529     WBC 7.70 10*3/mm3      RBC 3.17 10*6/mm3      Hemoglobin 9.7 g/dL      Hematocrit 32.1 %      .3 fL      MCH 30.6 pg      MCHC 30.2 g/dL      RDW 15.3 %      RDW-SD 56.9 fl      MPV 10.2 fL      Platelets 271 10*3/mm3      Neutrophil % 45.7 %      Lymphocyte % 37.9 %      Monocyte % 10.6 %      Eosinophil % 4.2 %      Basophil % 1.3 %      Immature Grans % 0.3 %      Neutrophils, Absolute 3.52 10*3/mm3      Lymphocytes, Absolute 2.92 10*3/mm3      Monocytes, Absolute 0.82 10*3/mm3      Eosinophils, Absolute 0.32 10*3/mm3      Basophils, Absolute 0.10 10*3/mm3      Immature Grans, Absolute 0.02 10*3/mm3      nRBC 0.0 /100 WBC     Basic Metabolic Panel [[id number]]  (Abnormal) Collected: 08/02/21 0404    Specimen: Blood Updated: 08/02/21 0452     Glucose 130 mg/dL      BUN 9 mg/dL      Creatinine 0.56 mg/dL      Sodium 138 mmol/L      Potassium 4.0 mmol/L      Chloride 104 mmol/L      CO2 24.9 mmol/L      Calcium 8.8 mg/dL      eGFR Non African Amer 112 mL/min/1.73      BUN/Creatinine Ratio 16.1     Anion Gap 9.1 mmol/L     Narrative:      GFR Normal >60  Chronic Kidney Disease <60  Kidney Failure <15      CBC (No Diff) [[id number]]  (Abnormal) Collected: 08/02/21 0404    Specimen: Blood Updated: 08/02/21 0429     WBC 7.48 10*3/mm3      RBC 3.00 10*6/mm3      Hemoglobin 9.2 g/dL      Hematocrit 30.0 %      .0 fL      MCH 30.7 pg      MCHC 30.7 g/dL      RDW 14.9 %      RDW-SD 54.5 fl      MPV 10.1 fL      Platelets 260 10*3/mm3     Uric Acid [[id number]]  (Normal) Collected: 07/30/21 0820    Specimen: Blood Updated: 07/30/21 0919     Uric Acid 4.2 mg/dL     Folate [[id number]]  (Normal) Collected: 07/29/21 0430    Specimen: Blood Updated: 07/29/21 1234     Folate 18.20 ng/mL     Narrative:      Results may be falsely increased if patient taking  Biotin.      Comprehensive Metabolic Panel [[id number]]  (Abnormal) Collected: 07/29/21 0912    Specimen: Blood Updated: 07/29/21 1003     Glucose 153 mg/dL      BUN 7 mg/dL      Creatinine 0.60 mg/dL      Sodium 141 mmol/L      Potassium 4.3 mmol/L      Chloride 105 mmol/L      CO2 27.3 mmol/L      Calcium 8.8 mg/dL      Total Protein 5.8 g/dL      Albumin 2.59 g/dL      ALT (SGPT) 26 U/L      AST (SGOT) 41 U/L      Alkaline Phosphatase 175 U/L      Total Bilirubin 1.0 mg/dL      eGFR Non African Amer 103 mL/min/1.73      Globulin 3.2 gm/dL      A/G Ratio 0.8 g/dL      BUN/Creatinine Ratio 11.7     Anion Gap 8.7 mmol/L     Narrative:      GFR Normal >60  Chronic Kidney Disease <60  Kidney Failure <15      Ferritin [[id number]]  (Normal) Collected: 07/29/21 0430    Specimen: Blood Updated: 07/29/21 0556     Ferritin 104.60 ng/mL     Narrative:      Results may be falsely decreased if patient taking Biotin.      Iron Profile [[id number]]  (Abnormal) Collected: 07/29/21 0430    Specimen: Blood Updated: 07/29/21 0551     Iron 32 mcg/dL      Iron Saturation 9 %      Transferrin 233 mg/dL      TIBC 347 mcg/dL     CK [[id number]]  (Abnormal) Collected: 07/28/21 1041    Specimen: Blood Updated: 07/28/21 1131     Creatine Kinase 205 U/L     CBC & Differential [[id number]]  (Abnormal) Collected: 07/27/21 0737    Specimen: Blood Updated: 07/27/21 0821    Narrative:      The following orders were created for panel order CBC & Differential.  Procedure                               Abnormality         Status                     ---------                               -----------         ------                     CBC Auto Differential[[id number]]        Abnormal            Final result                 Please view results for these tests on the individual orders.    CBC Auto Differential [[id number]]  (Abnormal) Collected: 07/27/21 0737    Specimen: Blood Updated: 07/27/21 0821     WBC 6.81 10*3/mm3      RBC 2.82 10*6/mm3       Hemoglobin 8.8 g/dL      Hematocrit 27.7 %      MCV 98.2 fL      MCH 31.2 pg      MCHC 31.8 g/dL      RDW 15.1 %      RDW-SD 53.8 fl      MPV 10.6 fL      Platelets 205 10*3/mm3      Neutrophil % 52.2 %      Lymphocyte % 31.1 %      Monocyte % 9.8 %      Eosinophil % 5.0 %      Basophil % 0.9 %      Immature Grans % 1.0 %      Neutrophils, Absolute 3.55 10*3/mm3      Lymphocytes, Absolute 2.12 10*3/mm3      Monocytes, Absolute 0.67 10*3/mm3      Eosinophils, Absolute 0.34 10*3/mm3      Basophils, Absolute 0.06 10*3/mm3      Immature Grans, Absolute 0.07 10*3/mm3      nRBC 0.4 /100 WBC     Potassium [[id number]]  (Normal) Collected: 07/24/21 2011    Specimen: Blood Updated: 07/24/21 2046     Potassium 3.6 mmol/L     Urine Culture - Urine, Urine, Catheter [[id number]]  (Abnormal) Collected: 07/23/21 0519    Specimen: Urine, Catheter Updated: 07/24/21 1433     Urine Culture 25,000 CFU/mL Yeast isolated      <10,000 CFU/mL Gram Positive Cocci    Narrative:      No further workup on the yeast is being performed.  Call if further workup is needed on the Gram Positive Cocci.    CK [[id number]]  (Abnormal) Collected: 07/24/21 0230    Specimen: Blood Updated: 07/24/21 0329     Creatine Kinase 1,112 U/L     Urinalysis, Microscopic Only - Urine, Catheter [[id number]]  (Abnormal) Collected: 07/23/21 0519    Specimen: Urine, Catheter Updated: 07/23/21 0555     RBC, UA 3-5 /HPF      WBC, UA 6-12 /HPF      Bacteria, UA Trace /HPF      Squamous Epithelial Cells, UA 0-2 /HPF      Yeast, UA       Small/1+ Budding Yeast w/Hyphae     /HPF     Hyaline Casts, UA 0-2 /LPF      Methodology Manual Light Microscopy    Urinalysis With Microscopic If Indicated (No Culture) - Urine, Catheter [[id number]]  (Abnormal) Collected: 07/23/21 0519    Specimen: Urine, Catheter Updated: 07/23/21 0555     Color, UA Dark Yellow     Appearance, UA Clear     pH, UA 5.5     Specific Gravity, UA 1.023     Glucose, UA Negative     Ketones, UA Negative      Bilirubin, UA Small (1+)     Blood, UA Negative     Protein, UA Negative     Leuk Esterase, UA Trace     Nitrite, UA Negative     Urobilinogen, UA 1.0 E.U./dL    Hemoglobin & Hematocrit, Blood [[id number]]  (Abnormal) Collected: 07/22/21 1254    Specimen: Blood Updated: 07/22/21 1321     Hemoglobin 8.4 g/dL      Hematocrit 25.5 %     Troponin [[id number]]  (Normal) Collected: 07/22/21 0032    Specimen: Blood Updated: 07/22/21 0146     Troponin T <0.010 ng/mL     Narrative:      Troponin T Reference Range:  <= 0.03 ng/mL-   Negative for AMI  >0.03 ng/mL-     Abnormal for myocardial necrosis.  Clinicians would have to utilize clinical acumen, EKG, Troponin and serial changes to determine if it is an Acute Myocardial Infarction or myocardial injury due to an underlying chronic condition.       Results may be falsely decreased if patient taking Biotin.      HBV Core Antibody, IgG / IgM Diff [[id number]]  (Abnormal) Collected: 07/20/21 1256    Specimen: Blood Updated: 07/21/21 0712     Hep B Core IgM Positive     Hep B Core Total Ab Positive    Narrative:      Performed at:  [address] [place] [address]  [id number]  : Mars Thomas PhD, Phone:  [phone]    Hepatitis Panel, Acute [[id number]]  (Normal) Collected: 07/20/21 1256    Specimen: Blood Updated: 07/20/21 1629     Hepatitis B Surface Ag Non-Reactive     Hep A IgM Non-Reactive     Hep B C IgM Non-Reactive     Hepatitis C Ab Non-Reactive    Narrative:      Results may be falsely decreased if patient taking Biotin.     HBeAb+Ag [[id number]]  (Abnormal) Collected: 07/18/21 0741    Specimen: Blood Updated: 07/20/21 1623     Hep B E Ag Negative     Hep B E Ab Positive    Narrative:      Performed at:  [facility]  [id number]  : Mars Thomas PhD, Phone:  [phone]    Blood Gas, Arterial With Co-Ox [[id number]]  (Abnormal) Collected: 07/20/21 1357    Specimen: Arterial Blood Updated:  07/20/21 1402     Site Left Radial     Richardson's Test Positive     pH, Arterial 7.389 pH units      pCO2, Arterial 53.6 mm Hg      Comment: 83 Value above reference range        pO2, Arterial 86.9 mm Hg      HCO3, Arterial 32.3 mmol/L      Comment: 83 Value above reference range        Base Excess, Arterial 6.3 mmol/L      O2 Saturation, Arterial 97.4 %      Hemoglobin, Blood Gas 9.9 g/dL      Comment: 84 Value below reference range        Hematocrit, Blood Gas 30.3 %      Comment: 84 Value below reference range        Oxyhemoglobin 95.7 %      Methemoglobin <1.00 %      Comment: 94 Value below reportable range < 1.0        Carboxyhemoglobin 1.5 %      A-a Gradiant 43.7 mmHg      CO2 Content 33.9 mmol/L      Temperature 0.0 C      Barometric Pressure for Blood Gas 727 mmHg      Modality Nasal Cannula     FIO2 28 %      Flow Rate 2.0 lpm      Ventilator Mode NA     Note --     Notified Who [facility]     Collected by [id number]     Comment: Meter: [id number]     :  [id number]        pH, Temp Corrected --     pCO2, Temperature Corrected --     pO2, Temperature Corrected --    Hemoglobin & Hematocrit, Blood [[id number]]  (Abnormal) Collected: 07/20/21 1256    Specimen: Blood Updated: 07/20/21 1302     Hemoglobin 9.3 g/dL      Hematocrit 28.1 %     Vitamin B12 [[id number]]  (Normal) Collected: 07/18/21 0741    Specimen: Blood Updated: 07/18/21 2141     Vitamin B-12 591 pg/mL     Narrative:      Results may be falsely increased if patient taking Biotin.      Vitamin D 25 Hydroxy [[id number]]  (Normal) Collected: 07/18/21 0001    Specimen: Blood Updated: 07/18/21 1248     25 Hydroxy, Vitamin D 41.2 ng/ml     Narrative:      Reference Range for Total Vitamin D 25(OH)     Deficiency <20.0 ng/mL   Insufficiency 21-29 ng/mL   Sufficiency  ng/mL  Toxicity >100 ng/ml    Results may be falsely increased if patient taking Biotin.      Troponin [[id number]]  (Normal) Collected: 07/18/21 0934    Specimen: Blood Updated:  07/18/21 1038     Troponin T <0.010 ng/mL     Narrative:      Troponin T Reference Range:  <= 0.03 ng/mL-   Negative for AMI  >0.03 ng/mL-     Abnormal for myocardial necrosis.  Clinicians would have to utilize clinical acumen, EKG, Troponin and serial changes to determine if it is an Acute Myocardial Infarction or myocardial injury due to an underlying chronic condition.       Results may be falsely decreased if patient taking Biotin.      Hepatitis B Surface Antigen [[id number]]  (Normal) Collected: 07/18/21 0741    Specimen: Blood Updated: 07/18/21 0833     Hepatitis B Surface Ag Non-Reactive    Narrative:      Results may be falsely decreased if patient taking Biotin.      Urinalysis With Microscopic If Indicated (No Culture) - Urine, Clean Catch [[id number]]  (Abnormal) Collected: 07/18/21 0547    Specimen: Urine, Clean Catch Updated: 07/18/21 0558     Color, UA Dark Yellow     Appearance, UA Clear     pH, UA 5.5     Specific Gravity, UA >=1.030     Glucose, UA Negative     Ketones, UA Trace     Bilirubin, UA Small (1+)     Blood, UA Negative     Protein, UA Trace     Leuk Esterase, UA Trace     Nitrite, UA Negative     Urobilinogen, UA 1.0 E.U./dL    Urinalysis, Microscopic Only - Urine, Clean Catch [[id number]]  (Abnormal) Collected: 07/18/21 0547    Specimen: Urine, Clean Catch Updated: 07/18/21 0558     RBC, UA 13-20 /HPF      WBC, UA 3-5 /HPF      Bacteria, UA None Seen /HPF      Squamous Epithelial Cells, UA 3-6 /HPF      Hyaline Casts, UA None Seen /LPF      Methodology Automated Microscopy    TSH [[id number]]  (Normal) Collected: 07/18/21 0001    Specimen: Blood Updated: 07/18/21 0034     TSH 2.280 uIU/mL     Hemoglobin A1c [[id number]]  (Abnormal) Collected: 07/18/21 0001    Specimen: Blood Updated: 07/18/21 0026     Hemoglobin A1C 6.10 %     Narrative:      Hemoglobin A1C Ranges:    Increased Risk for Diabetes  5.7% to 6.4%  Diabetes                     >= 6.5%  Diabetic Goal                < 7.0%     Hepatitis Panel, Acute [[id number]]  (Abnormal) Collected: 07/17/21 1907    Specimen: Blood from Arm, Right Updated: 07/18/21 0014     Hepatitis B Surface Ag Non-Reactive     Hep A IgM Non-Reactive     Hep B C IgM Reactive     Hepatitis C Ab Non-Reactive    Narrative:      Results may be falsely decreased if patient taking Biotin.     Ethanol [[id number]] Collected: 07/17/21 1907    Specimen: Blood from Arm, Right Updated: 07/17/21 2351     Ethanol <10 mg/dL      Ethanol % <0.010 %     Narrative:      >/= 80.0 legally intoxicated    COVID-19 and FLU A/B PCR - Swab, Nasopharynx [[id number]]  (Normal) Collected: 07/17/21 2007    Specimen: Swab from Nasopharynx Updated: 07/17/21 2034     COVID19 Not Detected     Influenza A PCR Not Detected     Influenza B PCR Not Detected    Narrative:      Fact sheet for providers: https://www.fda.gov/media/994233/download    Fact sheet for patients: https://www.fda.gov/media/176543/download    Test performed by PCR.    [place] Draw [[id number]] Collected: 07/17/21 1907    Specimen: Blood from Arm, Right Updated: 07/17/21 2016    Narrative:      The following orders were created for panel order [place] Draw.  Procedure                               Abnormality         Status                     ---------                               -----------         ------                     Green Top (Gel)[[id number]]                                  Final result               Lavender Top[[id number]]                                     Final result               Gold Top - SST[[id number]]                                   Final result                 Please view results for these tests on the individual orders.    Green Top (Gel) [[id number]] Collected: 07/17/21 1907    Specimen: Blood from Arm, Right Updated: 07/17/21 2016     Extra Tube Hold for add-ons.     Comment: Auto resulted.       Gold Top - SST [[id number]] Collected: 07/17/21 1907    Specimen: Blood from Arm, Right Updated: 07/17/21 2016      Extra Tube Hold for add-ons.     Comment: Auto resulted.       Lavender Top [[id number]] Collected: 07/17/21 1907    Specimen: Blood from Arm, Right Updated: 07/17/21 2016     Extra Tube hold for add-on     Comment: Auto resulted       BNP [[id number]]  (Normal) Collected: 07/17/21 1907    Specimen: Blood from Arm, Right Updated: 07/17/21 1953     proBNP 130.4 pg/mL     Narrative:      Among patients with dyspnea, NT-proBNP is highly sensitive for the detection of acute congestive heart failure. In addition NT-proBNP of <300 pg/ml effectively rules out acute congestive heart failure with 99% negative predictive value.    Results may be falsely decreased if patient taking Biotin.            Imaging Results (Most Recent)     Procedure Component Value Units Date/Time    XR Hip With or Without Pelvis 1 View Right [[id number]] Collected: 08/05/21 1607     Updated: 08/05/21 1610    Narrative:      EXAM:    XR Right Hip With Pelvis When Performed, 1 View     EXAM DATE:    8/5/2021 4:03 PM     CLINICAL HISTORY:    right hip fracture s/p TFNA; S72.001A-Fracture of unspecified part of  neck of right femur, initial encounter for closed fracture     TECHNIQUE:    Frontal view of the right hip with pelvis when performed.     COMPARISON:    No relevant prior studies available.     FINDINGS:    Bones/joints:  Fixation of the right hip is noted. Hardware appears  intact with preserved alignment.  No acute fracture.    Soft tissues:  Postsurgical changes within the soft tissues.       Impression:        Postsurgical changes from right hip fixation. No acute findings  identified.     This report was finalized on 8/5/2021 4:07 PM by Dr. Vikram Strong MD.       XR Foot 2 View Right [[id number]] Collected: 07/29/21 1451     Updated: 07/29/21 1504    Narrative:      EXAMINATION: XR FOOT 2 VW RIGHT-      CLINICAL INDICATION: foot pain; S72.001A-Fracture of unspecified part of  neck of right femur, initial encounter for closed fracture         COMPARISON: 04/26/2021     FINDINGS: 2 views of the right foot show no fracture or dislocation     There are no blastic or lytic lesions.       Impression:      No acute bony abnormality      This report was finalized on 7/29/2021 2:51 PM by Dr. John Duarte MD.       MRI Lumbar Spine Without Contrast [[id number]] Collected: 07/26/21 1629     Updated: 07/26/21 1652    Narrative:      EXAMINATION: MRI LUMBAR SPINE WO CONTRAST-      CLINICAL INDICATION: Paresthesias and urinary retention;  S72.001A-Fracture of unspecified part of neck of right femur, initial  encounter for closed fracture     COMPARISON: None      TECHNIQUE: Multiplanar, multisequence MR imaging performed through the  lumbar spine WITHOUT contrast.     FINDINGS:      HARDWARE: No MRI evidence of hardware.     NUMBERING/ALIGNMENT:?   5 lumbar vertebral body segments.   Intact vertebral body alignment.     SPINAL CORD:?   Conus terminates at the?L1-2 level.      BONES:   Mildly shortened pedicles congenitally      POSTERIOR ELEMENTS:  Posterior elements are intact.     SOFT TISSUES:   The visualized paraspinal soft tissues are unremarkable.       T12-L1:    No disk bulge or protrusion.  No central canal or neuroforaminal  stenosis.     L1/2:  No disk bulge or protrusion.  No central canal or neuroforaminal  stenosis.     L2/3:  No disk bulge or protrusion.  No central canal or neuroforaminal  stenosis.     L3/4:  No disk bulge or protrusion.  No central canal or neuroforaminal  stenosis.     L4/5:  No disk bulge or protrusion.  No central canal or neuroforaminal  stenosis.     L5/S1:  No disk bulge or protrusion.  No central canal or neuroforaminal  stenosis.     OTHER:   No additional remarkable findings.        Impression:         1. Mildly shortened pedicles congenitally  2. No focal disc herniation.  3. Mild facet arthropathy.     This report was finalized on 7/26/2021 4:49 PM by Dr. John Duarte MD.       US Spleen [[id number]] Collected:  07/23/21 1701     Updated: 07/23/21 1703    Narrative:      EXAM:    US Abdomen Limited     EXAM DATE:    7/23/2021 3:22 PM     CLINICAL HISTORY:    thrombocytopenia; S72.001A-Fracture of unspecified part of neck of  right femur, initial encounter for closed fracture     TECHNIQUE:    Real-time ultrasound of the abdomen with image documentation.     COMPARISON:    No relevant prior studies available.     FINDINGS:    Spleen:  Spleen measures 12.59 cm.       Impression:        No acute findings in the visualized abdomen.     This report was finalized on 7/23/2021 5:01 PM by Dr. Christiano Aceves MD.       XR Abdomen KUB [[id number]] Collected: 07/23/21 1103     Updated: 07/23/21 1109    Narrative:      EXAM:    XR Abdomen, 1 View     EXAM DATE:    7/23/2021 10:05 AM     CLINICAL HISTORY:    Constipation; S72.001A-Fracture of unspecified part of neck of right  femur, initial encounter for closed fracture     TECHNIQUE:    Frontal supine view of the abdomen/pelvis.     COMPARISON:    No relevant prior studies available.     FINDINGS:    GASTROINTESTINAL TRACT:  Abundant right colonic stool.  No dilation.    BONES/JOINTS:  Unremarkable.       Impression:        Abundant right colonic stool.     This report was finalized on 7/23/2021 11:03 AM by Dr. Christiano Aceves MD.       CT Head Without Contrast [[id number]] Collected: 07/21/21 1428     Updated: 07/21/21 1431    Narrative:      EXAM:    CT Head Without Intravenous Contrast     EXAM DATE:    7/21/2021 2:21 PM     CLINICAL HISTORY:    leg numbness; S72.001A-Fracture of unspecified part of neck of right  femur, initial encounter for closed fracture     TECHNIQUE:    Axial computed tomography images of the head/brain without intravenous  contrast.  Sagittal and coronal reformatted images were created and  reviewed.  This CT exam was performed using one or more of the following  dose reduction techniques:  automated exposure control, adjustment of  the mA and/or kV according to  patient size, and/or use of iterative  reconstruction technique.     COMPARISON:    No relevant prior studies available.     FINDINGS:    BRAIN:  Unremarkable.  No hemorrhage.  No significant white matter  disease.  No edema.    VENTRICLES:  Unremarkable.  No ventriculomegaly.    BONES/JOINTS:  Unremarkable.  No acute fracture.    SOFT TISSUES:  Unremarkable.    SINUSES:  Unremarkable as visualized.  No acute sinusitis.    MASTOID AIR CELLS:  Unremarkable as visualized.  No mastoid effusion.       Impression:        Unremarkable exam demonstrating no CT evidence of acute intracranial  findings.     This report was finalized on 7/21/2021 2:29 PM by Dr. Christiano Aceves MD.       US Liver [[id number]] Collected: 07/20/21 1705     Updated: 07/20/21 1708    Narrative:      EXAMINATION: US LIVER-      CLINICAL INDICATION:transaminitis; S72.001A-Fracture of unspecified part  of neck of right femur, initial encounter for closed fracture     COMPARISON: None      TECHNIQUE: Sonographic imaging obtained in transverse and sagittal  planes of the liver. Color Doppler implemented.     FINDINGS:   Advanced liver cirrhosis. Nodular liver margins. Small liver size is  noted.  No focal lesion identified.  No intrahepatic biliary dilatation noted.     No perihepatic ascites identified.  Common bile duct is within normal limits and is:2 mm.          Impression:      Marked liver cirrhosis noted. No focal liver lesion identified.     This report was finalized on 7/20/2021 5:06 PM by Dr. Vikram Strong MD.       FL Surgery Fluoro [[id number]] Collected: 07/18/21 1552     Updated: 07/18/21 1554    Narrative:      EXAMINATION: FL SURGERY FLUORO-      CLINICAL INDICATION:     RT HIP NAILING ; S72.001A-Fracture of  unspecified part of neck of right femur, initial encounter for closed  fracture     TECHNIQUE:  FL SURGERY FLUORO-      FLUOROSCOPY TIME: 105.9 seconds     FINDINGS:   Fluoroscopy images from right hip fixation.        Impression:       As above.     This report was finalized on 7/18/2021 3:52 PM by Dr. Vikram Strong MD.       XR Chest 1 View [[id number]] Collected: 07/17/21 2018     Updated: 07/17/21 2020    Narrative:      CR Chest 1 Vw    INDICATION:   Hypertension. Preop.     COMPARISON:    None available.    FINDINGS:  Single portable AP view(s) of the chest.  Cardiomegaly with mild pulmonary vascular congestion. No interstitial or alveolar edema. Post median sternotomy and apparent CABG. No effusions or pneumothorax.      Impression:      Cardiomegaly with pulmonary vascular congestion in this patient post CABG. This could represent mild fluid overload or early CHF. No effusions or edema.    Signer Name: MARIANNA Montgomery MD   Signed: 7/17/2021 8:18 PM   Workstation Name: [facility]    [facility]    XR Hip With or Without Pelvis 2 - 3 View Right [[id number]] Collected: 07/17/21 1945     Updated: 07/17/21 1947    Narrative:      CR Hip Uni Comp Min 2 Vws RT    INDICATION:   Right hip pain after a fall.    COMPARISON:   None.    FINDINGS:  AP and frog-leg lateral view(s) of the right hip.  Minimally displaced intertrochanteric fracture proximal right femur. No significant angulation. No significant hip arthropathy. Visualized pelvis and left hip unremarkable. No bone erosion or  destruction.        Impression:      Minimally displaced right proximal intertrochanteric femur fracture.    Signer Name: MARIANNA Montgomery MD   Signed: 7/17/2021 7:45 PM   Workstation Name: [facility]    Radiology [facility]           Operative/Procedure Notes (most recent note)      Oracio Ponce DO at 07/18/21 1240        Aidee Trinidad  [id number]  1965     Date of Procedure: 07/18/21    Preoperative Diagnosis: Right intertrochanteric femur fracture    Postoperative Diagnosis: Right intertrochanteric femur fracture    Procedure: Right hip cephalo-medullary nailing with a TFNA nail    Indications for Surgery:  This patient had sustained a fall and fractured their right hip. X-ray showed evidence of displacement at the fracture site, at the base of the femoral neck and involving the intertrochanteric region. I discussed with them and also with the family the benefits of surgical treatment which include return to previous level of function, as well as pain relief. I also explained to them the risks associated with this procedure which include but not limited to: Scar, nerve damage, infection, blood loss, pain, incomplete relief of pain, DVT, and the need for further surgery. They understood these risks and would like to proceed with operative treatment.    Surgeon(s): Oracio Ponce DO     Anesthesia: The anesthesia staff utilized a(n) ET for airway management during this procedure.    EBL: 150 mL    Preoperative Antibiotic: 2 g of Ancef was administered by the anesthesia staff prior to incision     Specimens: None    Complications: None    Disposition: Stable to the recovery room    Implant List: A TFNA nail was utilized. This was 125° and the length was 170 mm, and the diameter was 10 mm. A 85 mm fenestrated helical blade was used, with a 34 mm x 5 mm distal interlock screw.    Operative Procedure: The patient was taken to the operating room and placed supine on the operating room table. A timeout was performed to verify the appropriate location, patient name, and intended procedure.  2 g of Ancef was administered by the anesthesia staff for preoperative prophylactic antibiotic therapy. All bony prominences were well-padded. Anesthesia used an LMA for airway management during this procedure.    The patient was placed on the fracture table. The right leg was placed in the traction boot, and the left leg was placed in the erick-lithotomy position. All bony prominences were well-padded. With the assistance of the large C-arm, a closed reduction was then performed with manual traction and rotation. The large C-arm showed  appropriate reduction of the fracture prior to surgical sterile preparation.    The right hip was then prepped with ChloraPrep and draped sterilely. An Ioban shower curtain drape was utilized to cover the surgical site.    With the assistance of the large C-arm, a small incision was made proximal to the tip of the greater trochanter. Blunt dissection was carried down to the iliotibial band. The iliotibial band was incised in line with the skin incision. The Callahan elevator was utilized to split the abductor muscles. A guidepin was then placed at the tip of the greater trochanter. The large C-arm was utilized to verify that this was placed appropriately at the tip of the greater trochanter prior to advancing it further into the femur and down into the femoral canal. This was gently tapped in place with a mallet. I was able to palpate the cortex of the femur as this passed distally. This was verified on both the AP and the lateral views to ensure that it was appropriately placed.    The opening reamer was then utilized to open the proximal portion of the femur. The guidepin was then removed, and the ball-tipped guidewire was then placed down into the femoral canal.  I was able to palpate the medullary canal as the ball-tipped guidewire was placed and advanced distally.  The 11.5 mm reamer was then passed to open the canal.  The intermediate size 10 nail was then placed by hand through this opening and down into the femoral canal. This was gently tapped in place. No distraction at the fracture site was noted.    The targeting device was utilized to rosemary out the planned incision along the lateral aspect of the proximal femur to allow for passage of the lag and compression screws. A 10 blade knife was utilized to make this incision. Blunt dissection was carried down to the iliotibial band. This was incised in line with the skin incision, slightly proximal to allow for the angled drill sleeve. The drill sleeve was then  placed through the targeting device and locked in place. A guidewire was placed through the proximal hole of this drill sleeve and through the lateral femoral cortex and up into the femoral neck and then head. The placement of this guidepin was verified on both AP and lateral views. Slight adjustments were made to place this centrally within the femoral neck on the lateral view, and low and inferior in the femoral neck on the AP view. This was then measured, and I selected a 85 mm fenestrated helical blade.      The lateral cortex was then opened with the drill to allow for passage and placement of this helical blade.  The helical blade was then advanced with the assistance of the  and the mallet.  A small amount of compression was able to be obtained.      The drill sleeve was then removed, and images were taken both in AP and lateral views to ensure appropriate reduction at the fracture site, and appropriate placement of the helical blade.    Attention was then turned to the distal portion of the stephany to allow for placement of a distal interlocking screw. A small incision was made along the lateral aspect of the proximal femur. A similar incision was made through the iliotibial band, and the drill was then placed at this distal interlock hole. With the assistance of the fluoroscopy, this was then advanced through both the lateral and medial cortices. This was measured, and I selected a 34 mm x 5 mm screw. The drill was removed, and the screw was then placed by hand. Final images were taken both on AP and lateral views to ensure that the screw was indeed placed through the distal portion of the nail.    The wounds were thoroughly irrigated with normal saline. The iliotibial band was approximated and closed at the proximal incision with #1 Vicryl. The skin was then approximated with all the remaining incisions with 2-0 Monocryl and skin staples. The skin was cleansed of any debris after which sterile dressings  were placed over these incisions.    She will be placed on Eliquis for DVT prophylaxis postoperatively.  She is okay to weight-bear as tolerated with this right lower extremity.    We will have her work with physical therapy tomorrow, and determine if she is able to plan on being discharged home or if she is going to need skilled nursing facility admission.      Oracio Ponce DO  07/18/21  12:40 EDT      Inpatient Progress Note    Aidee Trinidad  Date: 08/05/21  MRN: [id number]      Subjective:   Patient is a 56 year old female who is status post right hip fracture that underwent right hip TFNA by Dr. Ponce on 7/18/21.  Patient remains inpatient due to discharge issues. Patient has been limited in mobility post op.     She has been having drainage to distal surgical incision per nursing, orthopedic surgery was consulted for 2 week post op visit yesterday.  During exam nursing notes drainage Unsure how long has been present.     Objective:    Vitals:    08/04/21 2300 08/05/21 0300 08/05/21 0710 08/05/21 1100   BP: 106/51 128/69 119/59 113/58   BP Location: Right arm Right arm Right arm Right arm   Patient Position: Lying Lying Lying Lying   Pulse: 72 68 67 70   Resp: 18 16 20 20   Temp: 98.2 °F (36.8 °C) 98 °F (36.7 °C) 97.8 °F (36.6 °C) 98.2 °F (36.8 °C)   TempSrc: Oral Oral Oral Oral   SpO2: 97% 99% 99%    Weight:       Height:              Physical Exam:  Constitutional:  Well-developed and well-nourished.  No respiratory distress.      Musculoskeletal: Right hip surgical incision x2 noted laterally. Proximal incision, clean dry and intact, no active drainage. Distal incision intact with staples, macerated periwound noted with serous/blood tinged drainage noted, no purulent drainage. No erythema noted. No indication infection.  Neurological:  Alert and oriented.  Skin:  Skin is warm and dry.   Psychiatric:  Normal mood and affect.    Labs:    Results from last 7 days   Lab Units 08/04/21  0150 08/03/21  6733  08/02/21  0404   WBC 10*3/mm3 8.45 7.70 7.48   HEMOGLOBIN g/dL 9.8* 9.7* 9.2*   HEMATOCRIT % 32.8* 32.1* 30.0*   MCV fL 103.1* 101.3* 100.0*   MCHC g/dL 29.9* 30.2* 30.7*   PLATELETS 10*3/mm3 279 271 260         Results from last 7 days   Lab Units 08/05/21  0053 08/04/21  0150 08/03/21  0443 08/02/21  0404 08/02/21  0404   SODIUM mmol/L  --  138 139  --  138   POTASSIUM mmol/L  --  4.0 3.8  --  4.0   MAGNESIUM mg/dL 2.0 1.7 2.0   < > 1.6   CHLORIDE mmol/L  --  104 105  --  104   CO2 mmol/L  --  24.6 25.8  --  24.9   BUN mg/dL  --  10 8  --  9   CREATININE mg/dL  --  0.65 0.51*  --  0.56*   EGFR IF NONAFRICN AM mL/min/1.73  --  94 125  --  112   CALCIUM mg/dL  --  9.0 9.0  --  8.8   GLUCOSE mg/dL  --  117* 99  --  130*   ALBUMIN g/dL  --   --  2.89*  --   --    BILIRUBIN mg/dL  --   --  1.0  --   --    ALK PHOS U/L  --   --  284*  --   --    AST (SGOT) U/L  --   --  43*  --   --    ALT (SGPT) U/L  --   --  22  --   --     < > = values in this interval not displayed.   Estimated Creatinine Clearance: 109.4 mL/min (by C-G formula based on SCr of 0.65 mg/dL).  No results found for: AMMONIA          No results found for: HGBA1C  Lab Results   Component Value Date    TSH 2.280 07/18/2021     Radiology:  Imaging Results (Last 72 Hours)     ** No results found for the last 72 hours. **        Assessment:   1. Right hip acute fracture, s/p TFNA nailing    Plan:   Obtain right hip x-rays today.  Begin betadine dressing changes to surgical incision twice daily.  Staples will remain in place.   Continue PT/OT.  Continue Eliquis as previous prescribed.     LAILA Ortez  08/05/21  12:21 EDT    Electronically signed by Josefina Gaytan APRN at 08/05/21 1237          Consult Notes (most recent note)      Josefina Gaytan APRN at 07/18/21 0819      Consult Orders    1. Ortho (on-call MD unless specified) [[id number]] ordered by Gill Sanchez DO          Attestation signed by Oracio Ponce DO at 07/18/21 1222     I have reviewed this documentation and agree.                    Inpatient Orthopedic Surgery Consult  Consult performed by: Josefina Gaytan APRN  Consult ordered by: Gill Sanchez DO          Patient Identification:  Name:  Aidee Trinidad  Age:  56 y.o.  Sex:  female  :  1965  MRN:  [id number]  Visit Number:  [id number]  Primary care provider:  Azalea Burnett APRN    History of presenting illness:   Patient is a 56 year old female who was admitted yesterday secondary to acute right hip fracture. Patient states she was at home in the shower and stood up from her shower chair to turn the water off and fell landing on the right side. Her family was able to call an ambulance after the fall secondary to patient being unable to get up by herself and weight bear on the right leg due to pain.     At baseline patient is currently in outpatient physical therapy and ambulates with a Rollator walker. Patient has history of chronic right sided weakness without specific reason. She has a past medical history significant for CAD, essential hypertension, hypothyroidism, DM II and fatty liver disease.     Patient was noted to have acute intertrochanteric femur fracture of the right side in the emergency department. She was admitted to hospitalist service. She has been NPO after midnight in anticipation for surgery.     Review of Systems:     Constitution: No weight gain  Eyes:  No loss of vision  ENT:  no epistaxis  Respiratory:  no hemoptysis  Cardiovascular: no CP, no SOB.  Gastrointestinal: No abdominal pain  Genitourinary: No hematuria  Integument: No skin rash  Hematologic / Lymphatic: No petechiae  Musculoskeletal: See HPI  Neurological: No seizures   Endocrine: No tremors    Allergies:  Nuts, Codeine, and Latex    Hospital Meds:  atorvastatin, 20 mg, Oral, Nightly  cetirizine, 10 mg, Oral, Daily  [START ON 2021] cholecalciferol, 50,000 Units, Oral, Q7 Days  gabapentin, 400 mg, Oral,  Q12H  insulin aspart, 0-7 Units, Subcutaneous, TID AC  levothyroxine, 25 mcg, Oral, Daily  metoprolol tartrate, 25 mg, Oral, Q12H  pantoprazole, 40 mg, Oral, Q AM  QUEtiapine, 50 mg, Oral, Nightly  sertraline, 50 mg, Oral, Daily  sodium chloride, 10 mL, Intravenous, Q12H      sodium chloride, 50 mL/hr, Last Rate: 50 mL/hr (07/17/21 2325)        Vital Signs:  Temp:  [97.5 °F (36.4 °C)-98 °F (36.7 °C)] 98 °F (36.7 °C)  Heart Rate:  [74-98] 81  Resp:  [18-20] 18  BP: (105-166)/(65-84) 166/81      07/17/21  1849 07/17/21  2224 07/18/21  0500   Weight: 107 kg (235 lb) 111 kg (245 lb 3.2 oz) 111 kg (245 lb 3.2 oz)     Body mass index is 53.06 kg/m².     Physical exam:  Constitutional:  Well-developed and well-nourished.  No respiratory distress.      HENT:  Head: Normocephalic and atraumatic. Bilateral PERRLA.   Neck:  Neck supple. No lymphadenopathy.   Cardiovascular:  Normal rate  Pulmonary/Chest:  No respiratory distress  Abdominal:  Soft, no tenderness.   Musculoskeletal:  Right hip pain noted with flexion or abduction, no significant effusion, discoloration or open wounds. + pedal pulses noted. Mild effusion RLE. Cap refill <3 seconds. Neurovascularly intact RLE to light touch sensation.   Neurological:  Alert and oriented to person, place, and time.   Skin:  Skin is warm and dry  Psychiatric:  Normal mood and affect  Peripheral vascular:  No edema     Results from last 7 days   Lab Units 07/18/21  0001 07/17/21 1907   WBC 10*3/mm3 9.23 7.48   HEMOGLOBIN g/dL 13.1 13.8   HEMATOCRIT % 40.5 42.0   MCV fL 97.4* 96.1   MCHC g/dL 32.3 32.9   PLATELETS 10*3/mm3 143 149         Results from last 7 days   Lab Units 07/18/21  0001 07/17/21 1907   SODIUM mmol/L 142 140   POTASSIUM mmol/L 4.1 4.3   CHLORIDE mmol/L 104 104   CO2 mmol/L 26.9 28.8   BUN mg/dL 10 8   CREATININE mg/dL 0.86 0.74   EGFR IF NONAFRICN AM mL/min/1.73 68 81   CALCIUM mg/dL 9.2 9.5   GLUCOSE mg/dL 156* 84   ALBUMIN g/dL 3.58 3.74   BILIRUBIN mg/dL 1.2  1.1   ALK PHOS U/L 167* 188*   AST (SGOT) U/L 64* 63*   ALT (SGPT) U/L 50* 53*   Estimated Creatinine Clearance: 82.7 mL/min (by C-G formula based on SCr of 0.86 mg/dL).  No results found for: AMMONIA  Results from last 7 days   Lab Units 07/17/21  1907   TROPONIN T ng/mL <0.010     Results from last 7 days   Lab Units 07/17/21  1907   PROBNP pg/mL 130.4     Lab Results   Component Value Date    HGBA1C 6.10 (H) 07/18/2021     Lab Results   Component Value Date    TSH 2.280 07/18/2021     No results found for: PREGTESTUR, PREGSERUM, HCG, HCGQUANT  Pain Management Panel     Pain Management Panel Latest Ref Rng & Units 2/28/2020    AMPHETAMINES SCREEN, URINE Negative Negative    BARBITURATES SCREEN Negative Negative    BENZODIAZEPINE SCREEN, URINE Negative Negative    BUPRENORPHINEUR Negative Negative    COCAINE SCREEN, URINE Negative Negative    METHADONE SCREEN, URINE Negative Negative              Imaging Results (Last 7 Days)     Procedure Component Value Units Date/Time    XR Chest 1 View [[id number]] Collected: 07/17/21 2018     Updated: 07/17/21 2020    Narrative:      CR Chest 1 Vw    INDICATION:   Hypertension. Preop.     COMPARISON:    None available.    FINDINGS:  Single portable AP view(s) of the chest.  Cardiomegaly with mild pulmonary vascular congestion. No interstitial or alveolar edema. Post median sternotomy and apparent CABG. No effusions or pneumothorax.      Impression:      Cardiomegaly with pulmonary vascular congestion in this patient post CABG. This could represent mild fluid overload or early CHF. No effusions or edema.    Signer Name: MARIANNA Montgomery MD   Signed: 7/17/2021 8:18 PM   Workstation Name: [email or web address]    [facility]    XR Hip With or Without Pelvis 2 - 3 View Right [[id number]] Collected: 07/17/21 1945     Updated: 07/17/21 1947    Narrative:      CR Hip Uni Comp Min 2 Vws RT    INDICATION:   Right hip pain after a fall.    COMPARISON:    None.    FINDINGS:  AP and frog-leg lateral view(s) of the right hip.  Minimally displaced intertrochanteric fracture proximal right femur. No significant angulation. No significant hip arthropathy. Visualized pelvis and left hip unremarkable. No bone erosion or  destruction.        Impression:      Minimally displaced right proximal intertrochanteric femur fracture.    Signer Name: MARIANNA Montgomery MD   Signed: 2021 7:45 PM   Workstation Name: [facility]    [facility]        Assessment and Plan:   1. Right hip intertrochanteric femur fracture, closed     Plan operative fixation today at approximately 1 pm by Dr. Ponce for right hip right hip intertrochanteric nailing with intermedullary hip screw. Patient has been NPO since midnight. Type and screen ordered. See orders for additional details.     Patient was verbally informed on the nature of the procedure as well as risks and benefits to include but not limited to infection, hemorrhage, pneumothorax, neurovascular injury, anesthesia complications, etc. Patient feels that they have a good understanding of the treatment plan and would like to proceed with surgical intervention.     Thank you for the consult.      LAILA Ortez  21  08:20 EDT    Electronically signed by Oracio Ponce DO at 21 1222       Nutrition Notes (most recent note)    No notes exist for this encounter.            Physical Therapy Notes (most recent note)      Ki Reyes PT at 21 1307  Version 1 of 1         Acute Care - Physical Therapy Re-Evaluation   Christophe     Patient Name: Aidee Trinidad  : 1965  MRN: [id number]  Today's Date: 2021   Onset of Illness/Injury or Date of Surgery: 21  Visit Dx:     ICD-10-CM ICD-9-CM   1. Closed fracture of right hip, initial encounter (CMS/[facility])  S72.001A 820.8     Patient Active Problem List   Diagnosis   • Closed fracture of right hip (CMS/[facility])   • CAD (coronary  artery disease)   • Essential hypertension   • Fatty liver     Past Medical History:   Diagnosis Date   • Arthritis    • Chronic back pain    • Coronary artery disease    • Depression    • Diabetes mellitus (CMS/HCC)    • Fatty liver    • Hypertension      Past Surgical History:   Procedure Laterality Date   • CARPAL TUNNEL RELEASE     •  SECTION     • CHOLECYSTECTOMY     • CORONARY ANGIOPLASTY WITH STENT PLACEMENT     • HIP TROCHANTERIC NAILING WITH INTRAMEDULLARY HIP SCREW Right 2021    Procedure: HIP TROCHANTERIC NAILING LONG WITH INTRAMEDULLARY HIP SCREW;  Surgeon: Oracio Ponce DO;  Location: [facility];  Service: Orthopedics;  Laterality: Right;   • REPLACEMENT TOTAL KNEE Right    • TUBAL ABDOMINAL LIGATION          PT Assessment (last 12 hours)      PT Evaluation and Treatment     Row Name 21 1241          Physical Therapy Time and Intention    Subjective Information  complains of;pain  -CW     Document Type  therapy note (daily note)  -CW     Mode of Treatment  physical therapy  -CW     Patient Effort  adequate  -CW     Symptoms Noted During/After Treatment  none  -CW     Comment  Patient agreeable to physical therapy treatment this date. She reports that her daughter has been in contact with  and has decided patient will go home instead of awaiting peer to peer result for IPR. Patient reports that she believes she will be fine as long as she has help, and asks therapist about DME.  -CW     Row Name 21 1241          General Information    Patient Profile Reviewed  yes  -CW     Patient Observations  alert;cooperative;agree to therapy  -CW     Row Name 21 1241          Cognition    Affect/Mental Status (Cognitive)  WFL  -CW     Orientation Status (Cognition)  oriented x 3  -CW     Follows Commands (Cognition)  WFL  -CW     Row Name 21 1241          Pain Scale: Word Pre/Post-Treatment    Pre/Posttreatment Pain Comment  Patient reports improved RLE pain  consistent with recent fall/fracture/surgical repair compared to yesterday.  -     Pain Intervention(s)  Repositioned;Ambulation/increased activity  -     Row Name 08/05/21 1241          Range of Motion Comprehensive    Comment, General Range of Motion  RLE AAROM grossly WFL though with pain, AROM limited approximately 50% with pain. Otherwise, AROM grossly WFL, though limited by soft tissue approximation.  -     Row Name 08/05/21 1241          Strength Comprehensive (MMT)    Comment, General Manual Muscle Testing (MMT) Assessment  RLE grossly 2+/5, LLE grossly 4-/5  -     Row Name 08/05/21 1241          Mobility    Right Lower Extremity (Weight-bearing Status)  weight-bearing as tolerated (WBAT)  -     Row Name 08/05/21 1241          Bed Mobility    Bed Mobility  bed mobility (all) activities;sit-supine;supine-sit;scooting/bridging  -     All Activities, [place] (Bed Mobility)  moderate assist (50% patient effort);minimum assist (75% patient effort)  -     Supine-Sit [place] (Bed Mobility)  --  -     Bed Mobility, Safety Issues  decreased use of arms for pushing/pulling;decreased use of legs for bridging/pushing  -     Assistive Device (Bed Mobility)  bed rails;head of bed elevated  -     Row Name 08/05/21 1241          Transfers    Transfers  sit-stand transfer;stand-sit transfer;bed-chair transfer;chair-bed transfer  -     Bed-Chair [place] (Transfers)  minimum assist (75% patient effort);verbal cues  -     Assistive Device (Bed-Chair Transfers)  bariatric;walker, front-wheeled  -     Sit-Stand [place] (Transfers)  minimum assist (75% patient effort);contact guard x 2  -     Stand-Sit [place] (Transfers)  contact guard  -     Row Name 08/05/21 1241          Sit-Stand Transfer    Assistive Device (Sit-Stand Transfers)  bariatric;walker, front-wheeled  -     Row Name 08/05/21 1241          Stand-Sit Transfer    Assistive Device (Stand-Sit Transfers)   bariatric;walker, front-wheeled  -CW     Row Name 08/05/21 1241          Gait/Stairs (Locomotion)    [place] Level (Gait)  minimum assist (75% patient effort);verbal cues;nonverbal cues (demo/gesture)  -CW     Assistive Device (Gait)  bariatric equipment;walker, front-wheeled  -CW     Pattern (Gait)  step-to  -CW     Deviations/Abnormal Patterns (Gait)  antalgic;base of support, wide;becky decreased;gait speed decreased;stride length decreased;weight shifting decreased  -CW     Bilateral Gait Deviations  forward flexed posture;heel strike decreased  -CW     Comment (Gait/Stairs)  Did not ambulate this date.  -CW     Row Name 08/05/21 1241          Safety Issues, Functional Mobility    Safety Issues Affecting Function (Mobility)  insight into deficits/self-awareness  -CW     Impairments Affecting Function (Mobility)  balance;endurance/activity tolerance;pain;strength  -CW     Row Name 08/05/21 1241          Balance    Static Sitting Balance  WFL Somewhat limited by patient's height.  -CW     Static Standing Balance  supported;mild impairment  -CW     Row Name 08/05/21 1241          Motor Skills    Therapeutic Exercise  other (see comments) Seated LE strengthening/AROM interventions  -CW     Row Name             Wound 07/18/21 1345 Right anterior thigh Incision    Wound - Properties Group Placement Date: 07/18/21  -CWA Placement Time: 1345  -CWA Present on Hospital Admission: N  -CWA Side: Right  -CWA Orientation: anterior  -CWA Location: thigh  -CWA Primary Wound Type: Incision  -CWA    Retired Wound - Properties Group Date first assessed: 07/18/21  -CWA Time first assessed: 1345  -CWA Present on Hospital Admission: N  -CWA Side: Right  -CWA Location: thigh  -CWA Primary Wound Type: Incision  -CWA    Row Name 08/05/21 1241          Coping    Observed Emotional State  calm;cooperative  -CW     Trust Relationship/Rapport  care explained;choices provided;questions answered;thoughts/feelings  acknowledged;reassurance provided  -CW     Row Name 08/05/21 1241          Plan of Care Review    Plan of Care Reviewed With  patient  -CW     Row Name 08/05/21 1241          Bed Mobility Goal 1 (PT)    Activity/Assistive Device (Bed Mobility Goal 1, PT)  bed mobility activities, all  -CW     [place] Level/Cues Needed (Bed Mobility Goal 1, PT)  minimum assist (75% or more patient effort)  -CW     Time Frame (Bed Mobility Goal 1, PT)  by discharge  -CW     Progress/Outcomes (Bed Mobility Goal 1, PT)  continuing progress toward goal  -CW     Row Name 08/05/21 1241          Transfer Goal 1 (PT)    Activity/Assistive Device (Transfer Goal 1, PT)  transfers, all  -CW     [place] Level/Cues Needed (Transfer Goal 1, PT)  minimum assist (75% or more patient effort)  -CW     Time Frame (Transfer Goal 1, PT)  by discharge  -CW     Progress/Outcome (Transfer Goal 1, PT)  goal met  -CW     Row Name 08/05/21 1241          Gait Training Goal 1 (PT)    Activity/Assistive Device (Gait Training Goal 1, PT)  gait (walking locomotion)  -CW     [place] Level (Gait Training Goal 1, PT)  minimum assist (75% or more patient effort)  -CW     Distance (Gait Training Goal 1, PT)  20 ft  -CW     Time Frame (Gait Training Goal 1, PT)  by discharge  -CW     Progress/Outcome (Gait Training Goal 1, PT)  good progress toward goal  -CW     Row Name 08/05/21 1241          Positioning and Restraints    Pre-Treatment Position  in bed  -CW     In Chair  notified nsg;sitting;call light within reach;encouraged to call for assist  -CW     Row Name 08/05/21 1241          Therapy Assessment/Plan (PT)    Patient/Family Therapy Goals Statement (PT)  Patient reports she is okay with going home since she has assistance, but requests home health and appropriate DME.  -CW     Functional Level at Time of Evaluation (PT)  Patient has made slower than expected, but steady progress with functional mobility. She is nearing completion of all her initial  goals. She is appropriate for discharge home with assist and continued HH physical therapy and likely nursing for wound care as well. She would benefit from short term rehabilitation, but it appears that placement has been difficult.  -CW     PT Diagnosis (PT)  s/p R femur fracture with intramedullary nailing  -CW     Rehab Potential (PT)  fair, will monitor progress closely  -CW     Criteria for Skilled Interventions Met (PT)  yes;meets criteria;skilled treatment is necessary  -CW     Row Name 08/05/21 1241          Therapy Plan Review/Discharge Plan (PT)    Therapy Plan Review (PT)  evaluation/treatment results reviewed;care plan/treatment goals reviewed  -CW       User Key  (r) = Recorded By, (t) = Taken By, (c) = Cosigned By    Initials Name Provider Type    Mildred Herman, RN Registered Nurse    Ki Shah, PT Physical Therapist        Physical Therapy Education                 Title: PT OT SLP Therapies (Done)     Topic: Physical Therapy (Done)     Point: Mobility training (Done)     Learning Progress Summary           Patient Acceptance, E,TB, VU by FRANCISCO at 8/5/2021 0908    Acceptance, E, VU,NR by  at 7/31/2021 1239    Comment: Pt will benefit from further education regarding safe mobility and exercises to improve independence.                   Point: Home exercise program (Done)     Learning Progress Summary           Patient Acceptance, E,TB, VU by FRANCISCO at 8/5/2021 0908    Acceptance, E, VU,NR by  at 7/31/2021 1239    Comment: Pt will benefit from further education regarding safe mobility and exercises to improve independence.                   Point: Body mechanics (Done)     Learning Progress Summary           Patient Acceptance, E,TB, VU by FRANCISCO at 8/5/2021 0908    Acceptance, E, VU,NR by  at 7/31/2021 1239    Comment: Pt will benefit from further education regarding safe mobility and exercises to improve independence.                   Point: Precautions (Done)     Learning Progress Summary            Patient Acceptance, E,TB, VU by FRANCISCO at 8/5/2021 0908    Acceptance, E, VU,NR by  at 7/31/2021 1239    Comment: Pt will benefit from further education regarding safe mobility and exercises to improve independence.                               User Key     Initials Effective Dates Name Provider Type Discipline     06/16/21 -  Radha Garcia, RN Registered Nurse Nurse     06/16/21 -  Lawanda Duran, SUKH Physical Therapist PT              PT Recommendation and Plan  Anticipated Discharge Disposition (PT): home with assist, home with home health, home with outpatient therapy services  Therapy Frequency (PT): 6 times/wk  Progress Summary (PT)  Progress Toward Functional Goals (PT): progress toward functional goals is fair  Daily Progress Summary (PT): Patient demonstrates continued ability for ambulation this date despite report of increased fatigue/pain this date.  Plan of Care Reviewed With: patient       Time Calculation:   PT Charges     Row Name 08/05/21 1249             Time Calculation    PT Received On  08/05/21  -CW      PT Goal Re-Cert Due Date  08/19/21  -CW         Time Calculation- PT    Total Timed Code Minutes- PT  40 minute(s)  -CW        User Key  (r) = Recorded By, (t) = Taken By, (c) = Cosigned By    Initials Name Provider Type    CW Ki Reyes, SUKH Physical Therapist        Therapy Charges for Today     Code Description Service Date Service Provider Modifiers Qty    11182432915 HC GAIT TRAINING EA 15 MIN 8/4/2021 Ki Reyes, PT GP 1    89483924674 HC PT THERAPEUTIC ACT EA 15 MIN 8/4/2021 Ki Reyes, PT GP 1    31680249952 HC PT RE-EVAL ESTABLISHED PLAN 2 8/5/2021 Ki Reyes, PT GP 1    43802081828 HC PT THER PROC EA 15 MIN 8/5/2021 Ki Reyes, PT GP 1               Ki Reyes PT  8/5/2021      Electronically signed by Ki Reyes PT at 08/05/21 1307          Occupational Therapy Notes (most recent note)      Trena Ambriz, OT at 08/02/21 1410           Acute Care - Occupational Therapy Treatment Note   [place]     Patient Name: Aidee Trinidad  : 1965  MRN: [id number]  Today's Date: 2021  Onset of Illness/Injury or Date of Surgery: 21     Referring Physician: Dr Taylor    Admit Date: 2021       ICD-10-CM ICD-9-CM   1. Closed fracture of right hip, initial encounter (CMS/[facility])  S72.001A 820.8     Patient Active Problem List   Diagnosis   • Closed fracture of right hip (CMS/[facility])   • CAD (coronary artery disease)   • Essential hypertension   • Fatty liver     Past Medical History:   Diagnosis Date   • Arthritis    • Chronic back pain    • Coronary artery disease    • Depression    • Diabetes mellitus (CMS/[facility])    • Fatty liver    • Hypertension      Past Surgical History:   Procedure Laterality Date   • CARPAL TUNNEL RELEASE     •  SECTION     • CHOLECYSTECTOMY     • CORONARY ANGIOPLASTY WITH STENT PLACEMENT     • HIP TROCHANTERIC NAILING WITH INTRAMEDULLARY HIP SCREW Right 2021    Procedure: HIP TROCHANTERIC NAILING LONG WITH INTRAMEDULLARY HIP SCREW;  Surgeon: Oracio Ponce DO;  [facility]: [facility];  Service: Orthopedics;  Laterality: Right;   • REPLACEMENT TOTAL KNEE Right    • TUBAL ABDOMINAL LIGATION              OT ASSESSMENT FLOWSHEET (last 12 hours)      OT Evaluation and Treatment     Row Name 21 1402                   OT Time and Intention    Document Type  therapy note (daily note)  -HB        Mode of Treatment  individual therapy;occupational therapy  -HB        Patient Effort  adequate  -HB        Symptoms Noted During/After Treatment  none  -HB        Comment  Patient treated this date to increase ADL/functional status.   -HB           General Information    Patient Profile Reviewed  yes  -HB        General Observations of Patient  Patient agreeable to OT tx this date   -HB        Existing Precautions/Restrictions  fall  -HB           Cognition    Affect/Mental Status (Cognitive)  WFL  -HB           Pain  Assessment    Additional Documentation  Pain Scale: Numbers Pre/Post-Treatment (Group)  -HB           Pain Scale: Numbers Pre/Post-Treatment    Pretreatment Pain Rating  3/10  -HB        Posttreatment Pain Rating  3/10  -HB        Pain Location - Side  Right  -HB        Pain Location  hip  -HB           Bed Mobility    Bed Mobility  rolling left;rolling right  -HB        Rolling Left [place] (Bed Mobility)  verbal cues;minimum assist (75% patient effort)  -HB        Rolling Right [place] (Bed Mobility)  verbal cues;minimum assist (75% patient effort)  -HB           Motor Skills    Motor Skills  therapeutic exercise  -HB        Therapeutic Exercise  shoulder  -HB        Additional Documentation  -- AROM BUE 3 sets 15 reps; rest breaks PRN   -HB           Toileting Assessment/Training    [place] Level (Toileting)  toileting skills;change pad/brief;perform perineal hygiene;maximum assist (25% patient effort)  -HB        Position (Toileting)  supine  -HB           Wound 07/18/21 1345 Right anterior thigh Incision    Wound - Properties Group Placement Date: 07/18/21  -CW Placement Time: 1345  -CW Present on Hospital Admission: N  -CW Side: Right  -CW Orientation: anterior  -CW Location: thigh  -CW Primary Wound Type: Incision  -CW    Retired Wound - Properties Group Date first assessed: 07/18/21  -CW Time first assessed: 1345  -CW Present on Hospital Admission: N  -CW Side: Right  -CW Location: thigh  -CW Primary Wound Type: Incision  -CW       Positioning and Restraints    Pre-Treatment Position  in bed  -HB        Post Treatment Position  bed  -HB        In Bed  call light within reach;encouraged to call for assist  -HB          User Key  (r) = Recorded By, (t) = Taken By, (c) = Cosigned By    Initials Name Effective Dates    CW Mildred Hurst RN 06/16/21 -     HB Trena Ambriz OT 05/25/21 -         OT Recommendation and Plan     Time Calculation:   Time Calculation- OT     Row Name 08/02/21 6116              Time Calculation- OT    Total Timed Code Minutes- OT  25 minute(s)  -        User Key  (r) = Recorded By, (t) = Taken By, (c) = Cosigned By    Initials Name Provider Type     Trena Ambriz OT Occupational Therapist        Therapy Charges for Today     Code Description Service Date Service Provider Modifiers Qty    13866166159  OT SELF CARE/MGMT/TRAIN EA 15 MIN 8/2/2021 Trena Ambriz OT GO 1    [id number]  OT THERAPEUTIC ACT EA 15 MIN 8/2/2021 Trena Ambriz OT GO 1          ADL Documentation (most recent)      Most Recent Value   Transferring  3 - assistive equipment and person   Toileting  3 - assistive equipment and person   Bathing  2 - assistive person   Dressing  2 - assistive person   Eating  0 - independent   Communication  0 - understands/communicates without difficulty   Swallowing  0 - swallows foods/liquids without difficulty   Equipment Currently Used at Home  rollator, cpap, shower chair             Discharge Summary      Brittany Colon PA-C at 08/05/21 1624              [facility] Medicine Services  DISCHARGE SUMMARY    Date of Admission: 7/17/2021    Date of Discharge:  8/5/2021    PCP: Azalea Burnett APRN    Discharging Provider: Brittany Colon PA-C  Attending Physician on day of DC: Dr. Jeff Graves    Admission Diagnosis:   Please see admission H&P    Discharge Diagnosis:   · Acute minimally displaced right proximal intertrochanteric femur fracture  · History of right lateral malleolus avulsion fracture  · History of right TKA in the past  · Generalized weakness and debility  · Right lower extremity greater than left lower extremity weakness with underlying neuropathy  · Foot pain with unremarkable work-up thus far  · Hypokalemia, improved  · Constipation, resolved  · Urinary retention, resolved  · Rhinitis with underlying cirrhosis  · Grade 2 diastolic dysfunction  · Essential hypertension  · Peripheral  neuropathy  · Hypothyroidism  · Depression  · Anxiety  · Debility secondary to recent fracture status post repair in addition to underlying morbid obesity      Procedures Performed:  · 7/18/2021: Right hip cephalo-medullary nailing with TFNA nail -- Dr. Ponce, orthopedic surgery   · 7/23/2021: Transthoracic echocardiogram   · Normal left ventricular cavity size and wall thickness noted.  · Left ventricular ejection fraction appears to be 56 - 60%. Left ventricular systolic function is normal.  · Left ventricular diastolic function is consistent with (grade II w/high LAP) pseudonormalization.  · Normal cardiac chamber dimensions.  · The aortic valve is structurally normal. Doppler interrogation was not done across the aortic valve.  · The mitral valve is structurally normal with no regurgitation or significant stenosis present  · Mild tricuspid valve regurgitation is present.  · Estimated right ventricular systolic pressure from tricuspid regurgitation is mildly elevated (35-45 mmHg). Mild pulmonary hypertension is present.  · The pulmonic valve is not well visualized.  · There is a trivial pericardial effusion.  · 7/23/2021: Velazquez catheter insertion d/t urinary retention   · 7/29/2021: Velazquez catheter removal      Consults:   Consults     Date and Time Order Name Status Description    8/4/2021 11:06 AM Inpatient Orthopedic Surgery Consult      7/17/2021  7:52 PM Ortho (on-call MD unless specified) Completed           HPI     History of Present Illness:  Aidee Trinidad is a 56 y.o. female  with PMH significant for known fatty liver disease, diabetes, depression, coronary artery disease, hypertension.  She presented to urgency department at [facility] on July 17, 2021 for further evaluation of right-sided hip pain after sustaining a fall from the shower when standing up from her shower chair when turning water off.  She landed on her right side and was wedged between chair and while the shower.  Her son  was reportedly home and called an ambulance.  She was brought to the emergency department at [facility] for further evaluation and treatment.  Of note, she did also break her right ankle around 1 month ago.       Hospital Course     Hospital Course  Aidee Trinidad was admitted as outlined in above HPI.     Mrs. Trinidad underwent orthopedic surgery with right hip cephalo-medullary nailing with FNA nail.  She continued to have difficulty ambulating following this with reports of ongoing weakness throughout her body.   She underwent MRI of her L-spine withouth known significant source of weakness.      She developed urinary retention while hospitalized and required troy catheter to be anchored on 07/23/21 AM.  She later underwent bladder training with troy catheter removal. Urinalysis obtained revealed 25,000 CFU yeast and less than 10,000 CFU gram-positive cocci.  Not felt to be an acute urinary tract infection, thus was not treated.     Given prior right knee surgery and now right hip fracture with prior right lower extremity weakness, Mrs. Trinidad has had some slowed progression with ambulatory abilities.  She was denied inpatient rehab while hospitalized.   She initially thought she may go home with her daughter.  She has continued to work with physical therapy. Many avenues of therapy were explored with assistance of .  She was offered SNF placement with declination as she did not qualify due to not qualifying for Medicaid pending admission due having [employer] . Discharge options for further therapies were discussed and explored at length, though she did progress with PT with eventual ability to transition to bedside commode with walker and some assistance.   Mrs. Trinidad was ultimately discharged home on 08/05 with her daughter with bedside commode and hospital bed with trapeze bar in addition to home health and home PT/OT.      BMP recommended in 5 days with results sent to PCP.     Prior  to discharge, orthopedics did evaluate on 08/05 with unremarkable repeat hip xray.   Betadine dressing changes recommended and ordered daily with one week follow-up with Dr. Ponce given further evaluation of some drainage from hip incision. Given previously ordered Eliquis for 35 days post-op, remaining 18 day supply was ordered at discharge for VTE prophylaxis.          Pertinent Laboratory and Radiology Results     Pertinent Test Results:          Results from last 7 days   Lab Units 08/04/21  0150 08/03/21  0443 08/02/21  0404 07/31/21  0703 07/30/21  0820   WBC 10*3/mm3 8.45 7.70 7.48  --  7.02   HEMOGLOBIN g/dL 9.8* 9.7* 9.2*  --  8.7*   HEMATOCRIT % 32.8* 32.1* 30.0*  --  27.9*   PLATELETS 10*3/mm3 279 271 260  --  232   MCV fL 103.1* 101.3* 100.0*  --  101.8*   SODIUM mmol/L 138 139 138 138 139   POTASSIUM mmol/L 4.0 3.8 4.0 4.0 4.0   CHLORIDE mmol/L 104 105 104 105 102   CO2 mmol/L 24.6 25.8 24.9 25.6 27.9   BUN mg/dL 10 8 9 8 8   CREATININE mg/dL 0.65 0.51* 0.56* 0.56* 0.60   GLUCOSE mg/dL 117* 99 130* 99 152*   CALCIUM mg/dL 9.0 9.0 8.8 8.4* 8.8          Results from last 7 days   Lab Units 08/04/21  0150 08/03/21  0443 08/02/21  0404 07/30/21  0820   WBC 10*3/mm3 8.45 7.70 7.48 7.02     Results from last 7 days   Lab Units 08/03/21  0443   BILIRUBIN mg/dL 1.0   ALK PHOS U/L 284*   ALT (SGPT) U/L 22   AST (SGOT) U/L 43*           Invalid input(s): TG, LDLCALC, LDLREALC      Brief Urine Lab Results  (Last result in the past 365 days)      Color   Clarity   Blood   Leuk Est   Nitrite   Protein   CREAT   Urine HCG        07/23/21 0519 Dark Yellow Clear Negative Trace Negative Negative                       Results for orders placed during the hospital encounter of 07/17/21    Adult Transthoracic Echo Complete W/ Cont if Necessary Per Protocol    Interpretation Summary  · Normal left ventricular cavity size and wall thickness noted.  · Left ventricular ejection fraction appears to be 56 - 60%. Left  ventricular systolic function is normal.  · Left ventricular diastolic function is consistent with (grade II w/high LAP) pseudonormalization.  · Normal cardiac chamber dimensions.  · The aortic valve is structurally normal. Doppler interrogation was not done across the aortic valve.  · The mitral valve is structurally normal with no regurgitation or significant stenosis present  · Mild tricuspid valve regurgitation is present.  · Estimated right ventricular systolic pressure from tricuspid regurgitation is mildly elevated (35-45 mmHg). Mild pulmonary hypertension is present.  · The pulmonic valve is not well visualized.  · There is a trivial pericardial effusion.            ----------------------------------------------------------------------------------------------------------------------  XR Foot 2 View Right    Result Date: 7/29/2021  No acute bony abnormality  This report was finalized on 7/29/2021 2:51 PM by Dr. John Duarte MD.      CT Head Without Contrast    Result Date: 7/21/2021    Unremarkable exam demonstrating no CT evidence of acute intracranial findings.  This report was finalized on 7/21/2021 2:29 PM by Dr. Christiano Aceves MD.      MRI Lumbar Spine Without Contrast    Result Date: 7/26/2021   1. Mildly shortened pedicles congenitally 2. No focal disc herniation. 3. Mild facet arthropathy.  This report was finalized on 7/26/2021 4:49 PM by Dr. John Duarte MD.      US Spleen    Result Date: 7/23/2021    No acute findings in the visualized abdomen.  This report was finalized on 7/23/2021 5:01 PM by Dr. Christiano Aceves MD.      US Liver    Result Date: 7/20/2021  Marked liver cirrhosis noted. No focal liver lesion identified.  This report was finalized on 7/20/2021 5:06 PM by Dr. Vikram Strong MD.      XR Chest 1 View    Result Date: 7/17/2021  Cardiomegaly with pulmonary vascular congestion in this patient post CABG. This could represent mild fluid overload or early CHF. No effusions or edema. [place]  Name: MARIANNA Montgomery MD  Signed: 7/17/2021 8:18 PM  Workstation Name: [facility]  [facility]    XR Abdomen KUB    Result Date: 7/23/2021    Abundant right colonic stool.  This report was finalized on 7/23/2021 11:03 AM by Dr. Christiano Aceves MD.      FL Surgery Fluoro    Result Date: 7/18/2021  As above.  This report was finalized on 7/18/2021 3:52 PM by Dr. Vikram Strong MD.      XR Hip With or Without Pelvis 1 View Right    Result Date: 8/5/2021    Postsurgical changes from right hip fixation. No acute findings identified.  This report was finalized on 8/5/2021 4:07 PM by Dr. Vikram Strong MD.      XR Hip With or Without Pelvis 2 - 3 View Right    Result Date: 7/17/2021  Minimally displaced right proximal intertrochanteric femur fracture. Signer Name: MARIANNA Montgomery MD  Signed: 7/17/2021 7:45 PM  Workstation Name: [facility]  [facility]    Labs above have been reviewed on the day of discharge.  Radiology images from prior 30 days were reviewed prior to discharge as incorporated into this document.     Discharge Vitals and Physical Examination       Vital Signs  Temp:  [97.8 °F (36.6 °C)-98.2 °F (36.8 °C)] 98 °F (36.7 °C)  Heart Rate:  [67-77] 77  Resp:  [16-20] 20  BP: (106-133)/(51-70) 133/70     PHYSICAL EXAMINATION:   Physical Exam  Vitals and nursing note reviewed.   Constitutional:       Appearance: Normal appearance.   HENT:      Head: Normocephalic and atraumatic.      Mouth/Throat:      Mouth: Mucous membranes are dry.   Cardiovascular:      Rate and Rhythm: Normal rate and regular rhythm.      Pulses: Normal pulses.      Heart sounds: Normal heart sounds.   Pulmonary:      Breath sounds: Normal breath sounds.   Abdominal:      General: There is no distension.      Palpations: Abdomen is soft.   Musculoskeletal:         General: No swelling or tenderness.   Skin:     General: Skin is warm and dry.   Neurological:      Mental Status: She is alert  and oriented to person, place, and time.           Discharge Disposition, Discharge Medications, and Discharge Appointments     Discharge Disposition:   home    Condition on Discharge:  Fair    Discharge Medications:     Discharge Medications      New Medications      Instructions Start Date   apixaban 2.5 MG tablet tablet  Commonly known as: ELIQUIS   2.5 mg, Oral, Every 12 Hours Scheduled      aspirin 81 MG EC tablet   81 mg, Oral, Daily   Start Date: August 6, 2021     bisacodyl 10 MG suppository  Commonly known as: DULCOLAX   10 mg, Rectal, Daily PRN      ferrous sulfate 325 (65 FE) MG tablet   325 mg, Oral, Daily With Breakfast   Start Date: August 6, 2021     pantoprazole 40 MG EC tablet  Commonly known as: PROTONIX   40 mg, Oral, Every Early Morning   Start Date: August 6, 2021     polyethylene glycol 17 g packet  Commonly known as: MIRALAX   17 g, Oral, Daily PRN         Changes to Medications      Instructions Start Date   potassium chloride 20 MEQ CR tablet  Commonly known as: K-DUR,KLOR-CON  What changed: when to take this   20 mEq, Oral, Daily      QUEtiapine 25 MG tablet  Commonly known as: SEROquel  What changed:   · medication strength  · how much to take   25 mg, Oral, Nightly         Continue These Medications      Instructions Start Date   atorvastatin 20 MG tablet  Commonly known as: LIPITOR   20 mg, Oral, Nightly      cholecalciferol 1.25 MG (19028 UT) capsule  Commonly known as: VITAMIN D3   50,000 Units, Oral, Every 7 Days, Prior to [facility] Admission, Patient was on: Pt takes each Friday      Diclofenac Sodium 1 % gel gel  Commonly known as: VOLTAREN   4 g, Topical, 4 Times Daily PRN      furosemide 40 MG tablet  Commonly known as: LASIX   40 mg, Oral, Daily      gabapentin 400 MG capsule  Commonly known as: NEURONTIN   400 mg, Oral, Every 12 Hours      HYDROcodone-acetaminophen 5-325 MG per tablet  Commonly known as: NORCO   1 tablet, Oral, 2 Times Daily PRN      levothyroxine 25 MCG  tablet  Commonly known as: SYNTHROID, LEVOTHROID   25 mcg, Oral, Daily      metFORMIN 500 MG tablet  Commonly known as: GLUCOPHAGE   500 mg, Oral, 2 Times Daily With Meals      metoprolol tartrate 25 MG tablet  Commonly known as: LOPRESSOR   25 mg, Oral, 2 Times Daily      sertraline 50 MG tablet  Commonly known as: ZOLOFT   50 mg, Oral, Daily      ZyrTEC Allergy 10 MG capsule  Generic drug: Cetirizine HCl   10 mg, Oral, Daily         Stop These Medications    cefdinir 300 MG capsule  Commonly known as: OMNICEF            Discharged medication regimen discussed with attending physician prior to discharge.     Discharge Diet:     Dietary Orders (From admission, onward)     Start     Ordered    07/28/21 1112  Diet Regular; Consistent Carbohydrate  Diet Effective Now     Question Answer Comment   Diet Texture / Consistency Regular    Common Modifiers Consistent Carbohydrate        07/28/21 1111                Activity at Discharge:  activity as tolerated         Discharge Disposition:    Home or Self Care        Follow-up Appointments:      Additional Instructions for the Follow-ups that You Need to Schedule     Ambulatory Referral to Home Health   As directed      right hip surgical incision   Wound Care Instructions Apply betadine to staples and surgical incision, apply dry dressing overlay.    Order Comments: right hip surgical incision  Wound Care Instructions Apply betadine to staples and surgical incision, apply dry dressing overlay.     Face to Face Visit Date: 8/5/2021    Follow-up provider for Plan of Care?: I treated the patient in an acute care facility and will not continue treatment after discharge.    Follow-up provider: IVIS LITTLE [[id number]]    Reason/Clinical Findings: Debility secondary to morbid obesity and recent right hip fracture with multiple prior right side injuries and weakness    Describe mobility limitations that make leaving home difficult: Debility 2/2 right hip fracture with  decreased ambulatory status limiting ambility to mobilize for therapy and appointments    Nursing/Therapeutic Services Requested: Skilled Nursing Physical Therapy Occupational Therapy    Skilled nursing orders: Medication education Wound care dressing/changes    PT orders: Total joint pathway Therapeutic exercise Gait Training Strengthening    Weight Bearing Status: As Tolerated    Occupational orders: Activities of daily living Strengthening    Frequency: Other (3x per week)         Discharge Follow-up with PCP   As directed       Currently Documented PCP:    Azalea Burnett APRN    PCP Phone Number:    [phone]     Follow Up Details: One week hospital follow-up for hip facture, debility, UTI, Cirrhosis         Discharge Follow-up with Specified Provider: Dr. Ponce; 1 Week   As directed      To: Dr. Ponce    Follow Up: 1 Week    Follow Up Details: One week hospital follow-up         Discharge Follow-up with Specified Provider: Referal to GI clinic for Cirrhosis; 1 Month   As directed      To: Referal to GI clinic for Cirrhosis    Follow Up: 1 Month         Basic Metabolic Panel    Aug 10, 2021 (Approximate)      Send to PCP. To be performed by home health    Order Comments: Send to PCP. To be performed by home health     Release to patient: Immediate           Follow-up Information     Oracio Ponce, DO Follow up in 10 day(s).    Specialty: Orthopedic Surgery  Contact information:  [address] [id number] [phone]             Azalea Burnett APRN .    Specialty: Family Medicine  Why: One week hospital follow-up for hip facture, debility, UTI, Cirrhosis  Contact information:  [address]  [facility] [place] [address] [phone]                   Additional Instructions for the Follow-ups that You Need to Schedule     Ambulatory Referral to Home Health   As directed      right hip surgical incision   Wound Care Instructions Apply betadine to staples and surgical  incision, apply dry dressing overlay.    Order Comments: right hip surgical incision  Wound Care Instructions Apply betadine to staples and surgical incision, apply dry dressing overlay.     Face to Face Visit Date: 8/5/2021    Follow-up provider for Plan of Care?: I treated the patient in an acute care facility and will not continue treatment after discharge.    Follow-up provider: AZALEA LITTLE [[id number]]    Reason/Clinical Findings: Debility secondary to morbid obesity and recent right hip fracture with multiple prior right side injuries and weakness    Describe mobility limitations that make leaving home difficult: Debility 2/2 right hip fracture with decreased ambulatory status limiting ambility to mobilize for therapy and appointments    Nursing/Therapeutic Services Requested: Skilled Nursing Physical Therapy Occupational Therapy    Skilled nursing orders: Medication education Wound care dressing/changes    PT orders: Total joint pathway Therapeutic exercise Gait Training Strengthening    Weight Bearing Status: As Tolerated    Occupational orders: Activities of daily living Strengthening    Frequency: Other (3x per week)         Discharge Follow-up with PCP   As directed       Currently Documented PCP:    Azalea Little APRN    PCP Phone Number:    [phone]     Follow Up Details: One week hospital follow-up for hip facture, debility, UTI, Cirrhosis         Discharge Follow-up with Specified Provider: Dr. Ponce; 1 Week   As directed      To: Dr. Ponce    Follow Up: 1 Week    Follow Up Details: One week hospital follow-up         Discharge Follow-up with Specified Provider: Referal to GI clinic for Cirrhosis; 1 Month   As directed      To: Referal to GI clinic for Cirrhosis    Follow Up: 1 Month         Basic Metabolic Panel    Aug 10, 2021 (Approximate)      Send to PCP. To be performed by home health    Order Comments: Send to PCP. To be performed by home health     Release to  patient: Immediate               Test Results Pending at Discharge:           Brittany Colon PA-C  [facility] Medicine Team  08/05/21  16:36 EDT      Time: Greater than 30 minutes spent on this discharge.  I spent 45 minutes on this discharge activity which included:  Face-to-face encounter with the patient, discussing plan with attending physician, reviewing the data in the system, coordination of the care with the nursing staff as well as consultations, documentation, and entering orders.               Electronically signed by Brittany Colon PA-C at 08/05/21 1636       Discharge Order (From admission, onward)     Start     Ordered    08/05/21 1614  Discharge patient  Once     Expected Discharge Date: 08/05/21    Discharge Disposition: Home or Self Care    Physician of Record for Attribution - Please select from Treatment Team: OMKAR KRUSE [[id number]]    Review needed by CMO to determine Physician of Record: No       Question Answer Comment   Physician of Record for Attribution - Please select from Treatment Team OMKAR KRUSE    Review needed by CMO to determine Physician of Record No        08/05/21 9075

## 2021-08-05 NOTE — DISCHARGE PLACEMENT REQUEST
"Aidee Sanchez (56 y.o. Female)     Date of Birth Social Security Number Address Home Phone MRN    1965  [address] [place] [id number] [phone] [id number]    Restorationist Marital Status          Mandaen        Admission Date Admission Type Admitting Provider Attending Provider Department, Room/Bed    21 Emergency Gill Sanchez DO Heinss, Karl F, MD [address], 3350/2S    Discharge Date Discharge Disposition Discharge Destination         Home or Self Care              Attending Provider: Jeff Graves MD    Allergies: Nuts, Codeine, Latex    Isolation: None   Infection: None   Code Status: CPR    Ht: 144.8 cm (57\")   Wt: 111 kg (245 lb 3.2 oz)    Admission Cmt: None   Principal Problem: Closed fracture of right hip (CMS/HCC) [S72.001A]                 Active Insurance as of 2021     Primary Coverage     Payor Plan Insurance Group Employer/Plan Group    [facility]      Payor Plan Address Payor Plan Phone Number Payor Plan Fax Number Effective Dates    [address] [address]   2015 - None Entered    [place] [address]       Subscriber Name Subscriber Birth Date Member ID       AIDEE SANCHEZ 1965 [id number]                 Emergency Contacts      (Rel.) Home Phone Work Phone Mobile Phone    NA CACERES (Daughter) [phone] -- --        [address] [address]  Dept. Phone:  [phone]  Dept. Fax:  [phone] Date Ordered: Aug 5, 2021         Patient:  Aidee Sanchez MRN:  [id number]   [address] [facility] [address] :  1965  SSN:    Phone: [phone] Sex:  F     Weight: 111 kg (245 lb 3.2 oz)         Ht Readings from Last 1 Encounters:   21 144.8 cm (57\")         Hospital Bed  (Order ID: [id number])    Diagnosis:  Closed fracture of right hip, initial encounter (CMS/HCC) (S72.001A [ICD-10-CM] 820.8 " "[ICD-9-CM])   Quantity:  1     Equipment:  Hospital Bed, Semi-Electirc w/ Mattress & w/ Rails  Accessories:  Trapeze Bar, Attached to Bed, w/ Grab Bar  The face to face evaluation was performed on: 2021  Additional providers who completed a face to face evaluation of the patient: OMKAR KRUSE [[id number]]  Length of Need (99 Months = Lifetime): 12 Months        Authorizing Provider's Phone: [phone]   Authorizing Provider:Brittany Colon PA-C  Authorizing Provider's NPI: [id number]  Order Entered By: Brittany Colon PA-C 2021  4:24 PM     Electronically signed by: Brittany Colon PA-C 2021  4:24 PM        [address] [address]  Dept. Phone:  [phone]  Dept. Fax:  [phone] Date Ordered: Aug 5, 2021         Patient:  Aidee Trinidad MRN:  [id number]   [address] [facility] [address] :  1965  SSN:    Phone: [phone] Sex:  F     Weight: 111 kg (245 lb 3.2 oz)         Ht Readings from Last 1 Encounters:   21 144.8 cm (57\")         Commode Chair          (Order ID: [id number])    Diagnosis:  Closed fracture of right hip, initial encounter (CMS/[facility]) (S72.001A [ICD-10-CM] 820.8 [ICD-9-CM])   Quantity:  1     Equipment:  Bedside Commode Chair w/Fixed Arms  Length of Need (99 Months = Lifetime): 12 Months        Authorizing Provider's Phone: [phone]   Authorizing Provider:Brittany Colon PA-C  Authorizing Provider's NPI: [id number]  Order Entered By: Brittany Colon PA-C 2021  4:24 PM     Electronically signed by: Brittany Colon PA-C 2021  4:24 PM               History & Physical      Charlene, Kelley Cerrato PA-C at 21 2997     Attestation signed by Gill Sanchez DO at 21 0609    I have read the documentation below and agree.  Patient seen and examined at bedside.  She is currently resting comfortably and states that the as needed " morphine is assisting with pain control.  Patient denies any known injury to the right side but states that since about January she has had increased weakness in the RUE/RLE. She denies any recent chest pains or pressure. She reports occasional dyspnea with exertion that she relays to deconditioning; proBNP normal at 130.4.    Patient's Hep B Core IgM was reactive; patient denies any known history of previous Hepatitis B infection. No travel. She states that she had a blood transfusion at birth and then another one in about .     Continue with NPO and as needed morphine. Patient felt to be acceptable risk to proceed with surgical repair of the right hip. Will obtain a Vitamin D level. I have also requested a Vitamin B12 level and further Hepatitis B testing.                            [facility] Medicine Services  HISTORY & PHYSICAL    Patient Identification:  Name:  Aidee Trinidad  Age:  56 y.o.  Sex:  female  :  1965  MRN:  [id number]   Visit Number:  [id number]  Admit Date: 2021   Primary Care Physician:  Azalea Burnett APRN     Subjective     Chief complaint:   Chief Complaint   Patient presents with   • Hip Pain     History of presenting illness:   Patient is a 56 y.o. female with past medical history significant for coronary artery disease status post CABG, essential hypertension, hypothyroidism, type 2 diabetes mellitus, fatty liver disease, that presented to the [facility] emergency department for evaluation of right hip pain.    The patient reports that she was in the shower this evening and stood up from her shower chair to turn the water off and fell.  She reports that she landed on her right side and was wedged between her chair and the wall of the shower.  Her son was home and called an ambulance.  The patient reports that she was unable to get up by herself due to the pain.  She did not hit her head and further denies any loss of  "consciousness, wounds, dizziness, lightheadedness, or numbness/tingling.  She denies any recent falls but reports that she did break her right ankle around 1 month ago that did not require any surgical intervention.  She has been going to physical therapy for treatment and uses a Rollator walker for ambulation.  Furthermore, the patient admits to chronic right-sided weakness in her upper and lower extremities for \"quite some time.\"  She denies any known history of CVAs and further denies any facial drooping or slurring of speech.  She states that the weakness does not affect her activities of daily living it just feels \"not as strong\" as the left side.    Upon arrival to the ED, vitals were temperature 97.5 °F, pulse 74, respirations 20, /65, SPO2 96% on room air.  Troponin T negative x1.  CMP with alkaline phosphatase 188, ALT 53, AST 63.  CBC unremarkable.  Chest x-ray with cardiomegaly with pulmonary vascular congestion in this patient post CABG; could represent mild fluid overload or early CHF, no effusions or edema.  X-ray of right hip shows a minimally displaced right proximal intertrochanteric femur fracture EKG with normal sinus rhythm, possible left atrial enlargement, poor R wave progression, heart rate 74, QTc 450 AMS.  COVID-19 negative.    In the emergency department the patient received IV morphine 2 mg, IV Zofran 4 mg    Patient has been admitted to the medical/surgical floor for further evaluation and treatment  ---------------------------------------------------------------------------------------------------------------------   Review of Systems   Constitutional: Negative for appetite change, chills, diaphoresis, fatigue and fever.   HENT: Negative for congestion, sinus pain and sore throat.    Eyes: Negative for photophobia and visual disturbance.   Respiratory: Negative for cough, shortness of breath and wheezing.    Cardiovascular: Negative for chest pain, palpitations and leg swelling. "   Gastrointestinal: Negative for abdominal pain, constipation, diarrhea, nausea and vomiting.   Genitourinary: Negative for dysuria, frequency and urgency.   Musculoskeletal: Positive for arthralgias (right hip ), back pain (chronic ) and gait problem (recent fall ). Negative for myalgias.   Skin: Negative for rash and wound.   Neurological: Positive for weakness (right upper and lower extremity   ). Negative for dizziness, tremors, seizures, syncope, facial asymmetry, speech difficulty, light-headedness, numbness and headaches.   Psychiatric/Behavioral: Negative for confusion. The patient is not nervous/anxious.       ---------------------------------------------------------------------------------------------------------------------   Past Medical History:   Diagnosis Date   • Arthritis    • Back pain    • Coronary artery disease    • Depression    • Diabetes mellitus (CMS/HCC)    • Fatty liver    • Hypertension      Past Surgical History:   Procedure Laterality Date   • CARPAL TUNNEL RELEASE     •  SECTION     • CHOLECYSTECTOMY     • CORONARY ANGIOPLASTY WITH STENT PLACEMENT     • REPLACEMENT TOTAL KNEE Right    • TUBAL ABDOMINAL LIGATION       History reviewed. No pertinent family history.  Social History     Socioeconomic History   • Marital status:      Spouse name: Not on file   • Number of children: Not on file   • Years of education: Not on file   • Highest education level: Not on file   Tobacco Use   • Smoking status: Never Smoker   Substance and Sexual Activity   • Alcohol use: Yes     Comment: occas   • Drug use: No   • Sexual activity: Defer     ---------------------------------------------------------------------------------------------------------------------   Allergies:  Codeine, Nuts, and Latex  ---------------------------------------------------------------------------------------------------------------------   Medications below are reported home medications pulling from within the  system; at this time, these medications have not been reconciled unless otherwise specified and are in the verification process for further verifcation as current home medications.    Prior to Admission Medications     Prescriptions Last Dose Informant Patient Reported? Taking?    aspirin 325 MG tablet   Yes No    Take 325 mg by mouth Daily.    cephalexin (KEFLEX) 500 MG capsule   No No    Take 1 capsule by mouth 2 (Two) Times a Day.    Cetirizine HCl (ZYRTEC ALLERGY) 10 MG capsule 7/17/2021  Yes Yes    Take  by mouth.    Cholecalciferol (VITAMIN D3) 5000 units capsule capsule 7/16/2021  Yes Yes    Take 5,000 Units by mouth Daily.    doxycycline (MONODOX) 100 MG capsule   No No    Take 1 capsule by mouth 2 (Two) Times a Day.    furosemide (LASIX) 40 MG tablet 7/17/2021  Yes Yes    Take 40 mg by mouth Daily.    levothyroxine (SYNTHROID, LEVOTHROID) 25 MCG tablet 7/17/2021  Yes Yes    Take 25 mcg by mouth Daily.    lisinopril (PRINIVIL,ZESTRIL) 20 MG tablet   Yes No    Take 20 mg by mouth Daily.    metFORMIN (GLUCOPHAGE) 500 MG tablet 7/17/2021  Yes Yes    Take 500 mg by mouth 2 (Two) Times a Day With Meals.    metoprolol tartrate (LOPRESSOR) 25 MG tablet Past Month  Yes Yes    Take 25 mg by mouth 2 (Two) Times a Day.    NIACIN-50 PO   Yes No    Take 50 mg by mouth.    Omega-3 Fatty Acids (FISH OIL) 1000 MG capsule capsule   Yes No    Take  by mouth 3 (Three) Times a Day With Meals.    phenazopyridine (PYRIDIUM) 200 MG tablet   No No    Take 1 tablet by mouth 3 (Three) Times a Day As Needed for Bladder Spasms.    potassium chloride (K-DUR) 10 MEQ CR tablet 7/17/2021  Yes Yes    Take 10 mEq by mouth 4 (Four) Times a Day.    QUEtiapine (SEROquel) 50 MG tablet 7/16/2021  Yes Yes    Take 50 mg by mouth Every Night.    sertraline (ZOLOFT) 50 MG tablet 7/17/2021  Yes Yes    Take 50 mg by mouth Daily.    sucralfate (CARAFATE) 1 g tablet   Yes No    Take 1 g by mouth 4 (Four) Times a Day.         ---------------------------------------------------------------------------------------------------------------------    Objective     Hospital Scheduled Meds:  sodium chloride, 10 mL, Intravenous, Q12H      sodium chloride, 50 mL/hr        Current listed hospital scheduled medications may not yet reflect those currently placed in orders that are signed and held, awaiting patient's arrival to floor/unit.    ---------------------------------------------------------------------------------------------------------------------   Vital Signs:  Temp:  [97.5 °F (36.4 °C)-98 °F (36.7 °C)] 98 °F (36.7 °C)  Heart Rate:  [74-83] 83  Resp:  [18-20] 18  BP: (105-165)/(65-84) 165/78  Mean Arterial Pressure (Non-Invasive) for the past 24 hrs (Last 3 readings):   Noninvasive MAP (mmHg)   07/17/21 1946 89     SpO2 Percentage    07/17/21 1849 07/17/21 1946   SpO2: 96% 98%     SpO2:  [96 %-98 %] 98 %  on   ;   Device (Oxygen Therapy): room air    Body mass index is 53.06 kg/m².  Wt Readings from Last 3 Encounters:   07/17/21 111 kg (245 lb 3.2 oz)   04/26/21 108 kg (237 lb)   02/28/20 97.1 kg (214 lb)     ---------------------------------------------------------------------------------------------------------------------   Physical Exam:  Physical Exam  Constitutional:       General: She is awake.      Appearance: Normal appearance. She is well-developed. She is morbidly obese.   HENT:      Head: Normocephalic and atraumatic.   Eyes:      General: Lids are normal.         Right eye: No discharge.         Left eye: No discharge.      Extraocular Movements: Extraocular movements intact.      Right eye: Normal extraocular motion.      Left eye: Normal extraocular motion.   Cardiovascular:      Rate and Rhythm: Normal rate and regular rhythm.      Pulses: Normal pulses. No decreased pulses.           Dorsalis pedis pulses are 2+ on the right side and 2+ on the left side.        Posterior tibial pulses are 2+ on the right side and 2+  on the left side.      Heart sounds: Normal heart sounds. No murmur heard.   No friction rub. No gallop.    Pulmonary:      Effort: Pulmonary effort is normal. No tachypnea or bradypnea.      Breath sounds: Normal breath sounds. No decreased breath sounds, wheezing, rhonchi or rales.   Abdominal:      General: Bowel sounds are normal.      Palpations: Abdomen is soft.      Tenderness: There is no abdominal tenderness.   Genitourinary:     Comments: Pure wick in place; no urine in canister at this time  Musculoskeletal:      Right hand: Normal strength.      Left hand: Normal strength.      Cervical back: Full passive range of motion without pain. No pain with movement. Normal range of motion.      Right hip: Tenderness present. Decreased range of motion.      Left hip: No tenderness. Normal range of motion.      Right lower leg: No edema.      Left lower leg: No edema.      Comments: Able to plantar flex and dorsiflex bilateral feet without difficulty    Skin:     Findings: No abrasion, ecchymosis or erythema.   Neurological:      General: No focal deficit present.      Mental Status: She is alert and oriented to person, place, and time. Mental status is at baseline.      Cranial Nerves: No dysarthria or facial asymmetry.      Sensory: No sensory deficit.      Motor: Motor function is intact. No weakness or tremor.   Psychiatric:         Speech: Speech normal.         Behavior: Behavior is cooperative.         Cognition and Memory: Cognition normal.       ---------------------------------------------------------------------------------------------------------------------  EKG:    Pending cardiology read.  Per my evaluation, normal sinus rhythm with poor R wave progression, possible left atrial enlargement, heart rate 74, QTc 450 MS.    Telemetry:    Patient is not currently on telemetry    I have personally reviewed the  EKG  ---------------------------------------------------------------------------------------------------------------------   Results from last 7 days   Lab Units 07/17/21 1907   TROPONIN T ng/mL <0.010     Results from last 7 days   Lab Units 07/17/21 1907   PROBNP pg/mL 130.4         Results from last 7 days   Lab Units 07/17/21 1907   WBC 10*3/mm3 7.48   HEMOGLOBIN g/dL 13.8   HEMATOCRIT % 42.0   MCV fL 96.1   MCHC g/dL 32.9   PLATELETS 10*3/mm3 149     Results from last 7 days   Lab Units 07/17/21 1907   SODIUM mmol/L 140   POTASSIUM mmol/L 4.3   CHLORIDE mmol/L 104   CO2 mmol/L 28.8   BUN mg/dL 8   CREATININE mg/dL 0.74   EGFR IF NONAFRICN AM mL/min/1.73 81   CALCIUM mg/dL 9.5   GLUCOSE mg/dL 84   ALBUMIN g/dL 3.74   BILIRUBIN mg/dL 1.1   ALK PHOS U/L 188*   AST (SGOT) U/L 63*   ALT (SGPT) U/L 53*   Estimated Creatinine Clearance: 96.1 mL/min (by C-G formula based on SCr of 0.74 mg/dL).    Pain Management Panel     Pain Management Panel Latest Ref Rng & Units 2/28/2020    AMPHETAMINES SCREEN, URINE Negative Negative    BARBITURATES SCREEN Negative Negative    BENZODIAZEPINE SCREEN, URINE Negative Negative    BUPRENORPHINEUR Negative Negative    COCAINE SCREEN, URINE Negative Negative    METHADONE SCREEN, URINE Negative Negative        I have personally reviewed the above laboratory results.   ---------------------------------------------------------------------------------------------------------------------  Imaging Results (Last 7 Days)     Procedure Component Value Units Date/Time    XR Chest 1 View [[id number]] Collected: 07/17/21 2018     Updated: 07/17/21 2020    Narrative:      CR Chest 1 Vw    INDICATION:   Hypertension. Preop.     COMPARISON:    None available.    FINDINGS:  Single portable AP view(s) of the chest.  Cardiomegaly with mild pulmonary vascular congestion. No interstitial or alveolar edema. Post median sternotomy and apparent CABG. No effusions or pneumothorax.      Impression:       Cardiomegaly with pulmonary vascular congestion in this patient post CABG. This could represent mild fluid overload or early CHF. No effusions or edema.    Signer Name: MARIANNA Montgomery MD   Signed: 7/17/2021 8:18 PM   Workstation [facility]    [facility]    XR Hip With or Without Pelvis 2 - 3 View Right [[id number]] Collected: 07/17/21 1945     Updated: 07/17/21 1947    Narrative:      CR Hip Uni Comp Min 2 Vws RT    INDICATION:   Right hip pain after a fall.    COMPARISON:   None.    FINDINGS:  AP and frog-leg lateral view(s) of the right hip.  Minimally displaced intertrochanteric fracture proximal right femur. No significant angulation. No significant hip arthropathy. Visualized pelvis and left hip unremarkable. No bone erosion or  destruction.        Impression:      Minimally displaced right proximal intertrochanteric femur fracture.    Signer Name: MARIANNA Montgomery MD   Signed: 7/17/2021 7:45 PM   Workstation Name: [facility]    [facility] [place]        I have personally reviewed the above radiology results.   ---------------------------------------------------------------------------------------------------------------------    Assessment & Plan      Active Hospital Problems    Diagnosis  POA   • **Closed fracture of right hip (CMS/HCC) [S72.001A]  Yes   • CAD (coronary artery disease) [I25.10]  Yes   • Essential hypertension [I10]  Yes   • Fatty liver [K76.0]  Yes     #Acute minimally displaced right proximal intertrochanteric femur fracture POA  -X-ray of right hip reviewed  -Orthopedic surgery was contacted in the emergency department and agreed to see patient in consult (Dr. Ponce); input/assistance is much appreciated  -Patient is to be strict bedrest for now; SCDS to left leg for DVT prophylaxis; n.p.o. after midnight anticipation for possible surgical intervention in a.m.  -Obtain neurovascular checks every 4 hours  -IV normal saline 50 mL/h  -IV  morphine 2 mg as needed every 3 hours for pain   -PT/OT consulted for rehabilitation post surgery  -Obtain vitamin D level    #Reported right-sided upper and lower extremity weakness  -No acute focal neurological deficits on exam; alert and oriented x3  -Neuro checks q4 hours   -Should symptoms progress, consider obtaining noncontrast CT of the head    #Transaminitis  #Fatty liver disease  -AST 63, ALT 53  -Acute hepatitis panel and blood ethanol levels ordered  -Repeat a.m. CMP and monitor liver function closely    #Coronary artery disease status post CABG  #Cardiomegaly  -Currently chest pain-free  -Troponin T negative x1; continue to trend  -EKG without acute ischemic changes  -Review medications once available; plan to continue aspirin, Lopressor, and niacin  -Monitor closely on telemetry    #Type 2 diabetes mellitus  -Hemoglobin A1c ordered  -Hold home oral hypoglycemics to prevent hypoglycemia  -Will add SSI for now. Accu-cheks qAC and qhs. Titrate insulin therapy as necessary.     #Essential hypertension  -Reviewed her medications once reconciled with pharmacy  -Plan to continue home antihypertensive agents with appropriate holding parameters    #Hypothyroidism  -TSH ordered  -Continue home Synthroid    #Anxiety  -Review home medications once reconciled per pharmacy  -Atarax available as needed    #Obesity  -BMI 53.06 kg/m²  -Complicates all aspects of patient care    F/E/N: IV normal saline 50 mL/h.  Replace electrolytes as necessary.  NPO.  ---------------------------------------------------  DVT Prophylaxis: SCDS to left leg  GI Prophylaxis: Protonix  Activity: Strict bedrest    The patient is considered to be a high risk patient due to: Acute minimally displaced right proximal intertrochanteric femur fracture    INPATIENT status due to the need for care which can only be reasonably provided in an hospital setting such as aggressive/expedited ancillary services and/or consultation services, the necessity  for IV medications, close physician monitoring and/or the possible need for procedures.  In such, I feel patient’s risk for adverse outcomes and need for care warrant INPATIENT evaluation and predict the patient’s care encounter to likely last beyond 2 midnights    Code Status: FULL CODE     I have discussed the patient's assessment and plan with the patient, nursing staff, and attending physician      Kelley Chang PA-C  Hospitalist Service -- [facility]       07/17/21  22:51 EDT    Attending Physician: Gill Sanchez DO        Electronically signed by Gill Sanchez DO at 07/18/21 0609       Lab Results (most recent)     Procedure Component Value Units Date/Time    POC Glucose Once [[id number]]  (Normal) Collected: 08/05/21 1037    Specimen: Blood Updated: 08/05/21 1043     Glucose 120 mg/dL      Comment: Meter: [id number] : [id number] Jose Garcia       POC Glucose Once [[id number]]  (Normal) Collected: 08/05/21 0658    Specimen: Blood Updated: 08/05/21 0704     Glucose 104 mg/dL      Comment: Meter: [id number] : [id number] DARLING CARSON       Magnesium [[id number]]  (Normal) Collected: 08/05/21 0053    Specimen: Blood Updated: 08/05/21 0206     Magnesium 2.0 mg/dL     Basic Metabolic Panel [[id number]]  (Abnormal) Collected: 08/04/21 0150    Specimen: Blood Updated: 08/04/21 0244     Glucose 117 mg/dL      BUN 10 mg/dL      Creatinine 0.65 mg/dL      Sodium 138 mmol/L      Potassium 4.0 mmol/L      Comment: Slight hemolysis detected by analyzer. Results may be affected.        Chloride 104 mmol/L      CO2 24.6 mmol/L      Calcium 9.0 mg/dL      eGFR Non African Amer 94 mL/min/1.73      BUN/Creatinine Ratio 15.4     Anion Gap 9.4 mmol/L     Narrative:      GFR Normal >60  Chronic Kidney Disease <60  Kidney Failure <15      Magnesium [[id number]]  (Normal) Collected: 08/04/21 0150    Specimen: Blood Updated: 08/04/21 0244     Magnesium 1.7 mg/dL     CBC (No Diff) [[id number]]   (Abnormal) Collected: 08/04/21 0150    Specimen: Blood Updated: 08/04/21 0213     WBC 8.45 10*3/mm3      RBC 3.18 10*6/mm3      Hemoglobin 9.8 g/dL      Hematocrit 32.8 %      .1 fL      MCH 30.8 pg      MCHC 29.9 g/dL      RDW 15.0 %      RDW-SD 55.8 fl      MPV 10.1 fL      Platelets 279 10*3/mm3     Comprehensive Metabolic Panel [[id number]]  (Abnormal) Collected: 08/03/21 0443    Specimen: Blood Updated: 08/03/21 0556     Glucose 99 mg/dL      BUN 8 mg/dL      Creatinine 0.51 mg/dL      Sodium 139 mmol/L      Potassium 3.8 mmol/L      Chloride 105 mmol/L      CO2 25.8 mmol/L      Calcium 9.0 mg/dL      Total Protein 6.5 g/dL      Albumin 2.89 g/dL      ALT (SGPT) 22 U/L      AST (SGOT) 43 U/L      Alkaline Phosphatase 284 U/L      Total Bilirubin 1.0 mg/dL      eGFR Non African Amer 125 mL/min/1.73      Globulin 3.6 gm/dL      A/G Ratio 0.8 g/dL      BUN/Creatinine Ratio 15.7     Anion Gap 8.2 mmol/L     Narrative:      GFR Normal >60  Chronic Kidney Disease <60  Kidney Failure <15      CBC & Differential [[id number]]  (Abnormal) Collected: 08/03/21 0443    Specimen: Blood Updated: 08/03/21 0529    Narrative:      The following orders were created for panel order CBC & Differential.  Procedure                               Abnormality         Status                     ---------                               -----------         ------                     CBC Auto Differential[[id number]]        Abnormal            Final result                 Please view results for these tests on the individual orders.    CBC Auto Differential [[id number]]  (Abnormal) Collected: 08/03/21 0443    Specimen: Blood Updated: 08/03/21 0529     WBC 7.70 10*3/mm3      RBC 3.17 10*6/mm3      Hemoglobin 9.7 g/dL      Hematocrit 32.1 %      .3 fL      MCH 30.6 pg      MCHC 30.2 g/dL      RDW 15.3 %      RDW-SD 56.9 fl      MPV 10.2 fL      Platelets 271 10*3/mm3      Neutrophil % 45.7 %      Lymphocyte % 37.9 %      Monocyte %  10.6 %      Eosinophil % 4.2 %      Basophil % 1.3 %      Immature Grans % 0.3 %      Neutrophils, Absolute 3.52 10*3/mm3      Lymphocytes, Absolute 2.92 10*3/mm3      Monocytes, Absolute 0.82 10*3/mm3      Eosinophils, Absolute 0.32 10*3/mm3      Basophils, Absolute 0.10 10*3/mm3      Immature Grans, Absolute 0.02 10*3/mm3      nRBC 0.0 /100 WBC     Basic Metabolic Panel [[id number]]  (Abnormal) Collected: 08/02/21 0404    Specimen: Blood Updated: 08/02/21 0452     Glucose 130 mg/dL      BUN 9 mg/dL      Creatinine 0.56 mg/dL      Sodium 138 mmol/L      Potassium 4.0 mmol/L      Chloride 104 mmol/L      CO2 24.9 mmol/L      Calcium 8.8 mg/dL      eGFR Non African Amer 112 mL/min/1.73      BUN/Creatinine Ratio 16.1     Anion Gap 9.1 mmol/L     Narrative:      GFR Normal >60  Chronic Kidney Disease <60  Kidney Failure <15      CBC (No Diff) [[id number]]  (Abnormal) Collected: 08/02/21 0404    Specimen: Blood Updated: 08/02/21 0429     WBC 7.48 10*3/mm3      RBC 3.00 10*6/mm3      Hemoglobin 9.2 g/dL      Hematocrit 30.0 %      .0 fL      MCH 30.7 pg      MCHC 30.7 g/dL      RDW 14.9 %      RDW-SD 54.5 fl      MPV 10.1 fL      Platelets 260 10*3/mm3     Uric Acid [[id number]]  (Normal) Collected: 07/30/21 0820    Specimen: Blood Updated: 07/30/21 0919     Uric Acid 4.2 mg/dL     Folate [[id number]]  (Normal) Collected: 07/29/21 0430    Specimen: Blood Updated: 07/29/21 1234     Folate 18.20 ng/mL     Narrative:      Results may be falsely increased if patient taking Biotin.      Comprehensive Metabolic Panel [[id number]]  (Abnormal) Collected: 07/29/21 0912    Specimen: Blood Updated: 07/29/21 1003     Glucose 153 mg/dL      BUN 7 mg/dL      Creatinine 0.60 mg/dL      Sodium 141 mmol/L      Potassium 4.3 mmol/L      Chloride 105 mmol/L      CO2 27.3 mmol/L      Calcium 8.8 mg/dL      Total Protein 5.8 g/dL      Albumin 2.59 g/dL      ALT (SGPT) 26 U/L      AST (SGOT) 41 U/L      Alkaline Phosphatase 175 U/L       Total Bilirubin 1.0 mg/dL      eGFR Non African Amer 103 mL/min/1.73      Globulin 3.2 gm/dL      A/G Ratio 0.8 g/dL      BUN/Creatinine Ratio 11.7     Anion Gap 8.7 mmol/L     Narrative:      GFR Normal >60  Chronic Kidney Disease <60  Kidney Failure <15      Ferritin [[id number]]  (Normal) Collected: 07/29/21 0430    Specimen: Blood Updated: 07/29/21 0556     Ferritin 104.60 ng/mL     Narrative:      Results may be falsely decreased if patient taking Biotin.      Iron Profile [[id number]]  (Abnormal) Collected: 07/29/21 0430    Specimen: Blood Updated: 07/29/21 0551     Iron 32 mcg/dL      Iron Saturation 9 %      Transferrin 233 mg/dL      TIBC 347 mcg/dL     CK [[id number]]  (Abnormal) Collected: 07/28/21 1041    Specimen: Blood Updated: 07/28/21 1131     Creatine Kinase 205 U/L     CBC & Differential [[id number]]  (Abnormal) Collected: 07/27/21 0737    Specimen: Blood Updated: 07/27/21 0821    Narrative:      The following orders were created for panel order CBC & Differential.  Procedure                               Abnormality         Status                     ---------                               -----------         ------                     CBC Auto Differential[[id number]]        Abnormal            Final result                 Please view results for these tests on the individual orders.    CBC Auto Differential [[id number]]  (Abnormal) Collected: 07/27/21 0737    Specimen: Blood Updated: 07/27/21 0821     WBC 6.81 10*3/mm3      RBC 2.82 10*6/mm3      Hemoglobin 8.8 g/dL      Hematocrit 27.7 %      MCV 98.2 fL      MCH 31.2 pg      MCHC 31.8 g/dL      RDW 15.1 %      RDW-SD 53.8 fl      MPV 10.6 fL      Platelets 205 10*3/mm3      Neutrophil % 52.2 %      Lymphocyte % 31.1 %      Monocyte % 9.8 %      Eosinophil % 5.0 %      Basophil % 0.9 %      Immature Grans % 1.0 %      Neutrophils, Absolute 3.55 10*3/mm3      Lymphocytes, Absolute 2.12 10*3/mm3      Monocytes, Absolute 0.67 10*3/mm3      Eosinophils,  Absolute 0.34 10*3/mm3      Basophils, Absolute 0.06 10*3/mm3      Immature Grans, Absolute 0.07 10*3/mm3      nRBC 0.4 /100 WBC     Potassium [[id number]]  (Normal) Collected: 07/24/21 2011    Specimen: Blood Updated: 07/24/21 2046     Potassium 3.6 mmol/L     Urine Culture - Urine, Urine, Catheter [[id number]]  (Abnormal) Collected: 07/23/21 0519    Specimen: Urine, Catheter Updated: 07/24/21 1433     Urine Culture 25,000 CFU/mL Yeast isolated      <10,000 CFU/mL Gram Positive Cocci    Narrative:      No further workup on the yeast is being performed.  Call if further workup is needed on the Gram Positive Cocci.    CK [[id number]]  (Abnormal) Collected: 07/24/21 0230    Specimen: Blood Updated: 07/24/21 0329     Creatine Kinase 1,112 U/L     Urinalysis, Microscopic Only - Urine, Catheter [[id number]]  (Abnormal) Collected: 07/23/21 0519    Specimen: Urine, Catheter Updated: 07/23/21 0555     RBC, UA 3-5 /HPF      WBC, UA 6-12 /HPF      Bacteria, UA Trace /HPF      Squamous Epithelial Cells, UA 0-2 /HPF      Yeast, UA       Small/1+ Budding Yeast w/Hyphae     /HPF     Hyaline Casts, UA 0-2 /LPF      Methodology Manual Light Microscopy    Urinalysis With Microscopic If Indicated (No Culture) - Urine, Catheter [[id number]]  (Abnormal) Collected: 07/23/21 0519    Specimen: Urine, Catheter Updated: 07/23/21 0555     Color, UA Dark Yellow     Appearance, UA Clear     pH, UA 5.5     Specific Gravity, UA 1.023     Glucose, UA Negative     Ketones, UA Negative     Bilirubin, UA Small (1+)     Blood, UA Negative     Protein, UA Negative     Leuk Esterase, UA Trace     Nitrite, UA Negative     Urobilinogen, UA 1.0 E.U./dL    Hemoglobin & Hematocrit, Blood [[id number]]  (Abnormal) Collected: 07/22/21 1254    Specimen: Blood Updated: 07/22/21 1321     Hemoglobin 8.4 g/dL      Hematocrit 25.5 %     Troponin [[id number]]  (Normal) Collected: 07/22/21 0032    Specimen: Blood Updated: 07/22/21 0146     Troponin T <0.010 ng/mL      Narrative:      Troponin T Reference Range:  <= 0.03 ng/mL-   Negative for AMI  >0.03 ng/mL-     Abnormal for myocardial necrosis.  Clinicians would have to utilize clinical acumen, EKG, Troponin and serial changes to determine if it is an Acute Myocardial Infarction or myocardial injury due to an underlying chronic condition.       Results may be falsely decreased if patient taking Biotin.      HBV Core Antibody, IgG / IgM Diff [[id number]]  (Abnormal) Collected: 07/20/21 1256    Specimen: Blood Updated: 07/21/21 0712     Hep B Core IgM Positive     Hep B Core Total Ab Positive    Narrative:      Performed at:  01 - [facility] [address]  [id number]  : Mars Thomas PhD, Phone:  [phone]    Hepatitis Panel, Acute [[id number]]  (Normal) Collected: 07/20/21 1256    Specimen: Blood Updated: 07/20/21 1629     Hepatitis B Surface Ag Non-Reactive     Hep A IgM Non-Reactive     Hep B C IgM Non-Reactive     Hepatitis C Ab Non-Reactive    Narrative:      Results may be falsely decreased if patient taking Biotin.     HBeAb+Ag [[id number]]  (Abnormal) Collected: 07/18/21 0741    Specimen: Blood Updated: 07/20/21 1623     Hep B E Ag Negative     Hep B E Ab Positive    Narrative:      Performed at:  01 - [facility] [address]  [id number]  : Mars Thomas PhD, Phone:  [phone]    Blood Gas, Arterial With Co-Ox [[id number]]  (Abnormal) Collected: 07/20/21 1357    Specimen: Arterial Blood Updated: 07/20/21 1402     Site Left Radial     Richardson's Test Positive     pH, Arterial 7.389 pH units      pCO2, Arterial 53.6 mm Hg      Comment: 83 Value above reference range        pO2, Arterial 86.9 mm Hg      HCO3, Arterial 32.3 mmol/L      Comment: 83 Value above reference range        Base Excess, Arterial 6.3 mmol/L      O2 Saturation, Arterial 97.4 %      Hemoglobin, Blood Gas 9.9 g/dL      Comment: 84 Value below reference range        Hematocrit, Blood Gas  30.3 %      Comment: 84 Value below reference range        Oxyhemoglobin 95.7 %      Methemoglobin <1.00 %      Comment: 94 Value below reportable range < 1.0        Carboxyhemoglobin 1.5 %      A-a Gradiant 43.7 mmHg      CO2 Content 33.9 mmol/L      Temperature 0.0 C      Barometric Pressure for Blood Gas 727 mmHg      Modality Nasal Cannula     FIO2 28 %      Flow Rate 2.0 lpm      Ventilator Mode NA     Note --     Notified Who [place] rrt     Collected by [id number]     Comment: Meter: [id number]     :  [id number]        pH, Temp Corrected --     pCO2, Temperature Corrected --     pO2, Temperature Corrected --    Hemoglobin & Hematocrit, Blood [[id number]]  (Abnormal) Collected: 07/20/21 1256    Specimen: Blood Updated: 07/20/21 1302     Hemoglobin 9.3 g/dL      Hematocrit 28.1 %     Vitamin B12 [[id number]]  (Normal) Collected: 07/18/21 0741    Specimen: Blood Updated: 07/18/21 2141     Vitamin B-12 591 pg/mL     Narrative:      Results may be falsely increased if patient taking Biotin.      Vitamin D 25 Hydroxy [[id number]]  (Normal) Collected: 07/18/21 0001    Specimen: Blood Updated: 07/18/21 1248     25 Hydroxy, Vitamin D 41.2 ng/ml     Narrative:      Reference Range for Total Vitamin D 25(OH)     Deficiency <20.0 ng/mL   Insufficiency 21-29 ng/mL   Sufficiency  ng/mL  Toxicity >100 ng/ml    Results may be falsely increased if patient taking Biotin.      Troponin [[id number]]  (Normal) Collected: 07/18/21 0934    Specimen: Blood Updated: 07/18/21 1038     Troponin T <0.010 ng/mL     Narrative:      Troponin T Reference Range:  <= 0.03 ng/mL-   Negative for AMI  >0.03 ng/mL-     Abnormal for myocardial necrosis.  Clinicians would have to utilize clinical acumen, EKG, Troponin and serial changes to determine if it is an Acute Myocardial Infarction or myocardial injury due to an underlying chronic condition.       Results may be falsely decreased if patient taking Biotin.      Hepatitis B Surface  Antigen [[id number]]  (Normal) Collected: 07/18/21 0741    Specimen: Blood Updated: 07/18/21 0833     Hepatitis B Surface Ag Non-Reactive    Narrative:      Results may be falsely decreased if patient taking Biotin.      Urinalysis With Microscopic If Indicated (No Culture) - Urine, Clean Catch [[id number]]  (Abnormal) Collected: 07/18/21 0547    Specimen: Urine, Clean Catch Updated: 07/18/21 0558     Color, UA Dark Yellow     Appearance, UA Clear     pH, UA 5.5     Specific Gravity, UA >=1.030     Glucose, UA Negative     Ketones, UA Trace     Bilirubin, UA Small (1+)     Blood, UA Negative     Protein, UA Trace     Leuk Esterase, UA Trace     Nitrite, UA Negative     Urobilinogen, UA 1.0 E.U./dL    Urinalysis, Microscopic Only - Urine, Clean Catch [[id number]]  (Abnormal) Collected: 07/18/21 0547    Specimen: Urine, Clean Catch Updated: 07/18/21 0558     RBC, UA 13-20 /HPF      WBC, UA 3-5 /HPF      Bacteria, UA None Seen /HPF      Squamous Epithelial Cells, UA 3-6 /HPF      Hyaline Casts, UA None Seen /LPF      Methodology Automated Microscopy    TSH [[id number]]  (Normal) Collected: 07/18/21 0001    Specimen: Blood Updated: 07/18/21 0034     TSH 2.280 uIU/mL     Hemoglobin A1c [[id number]]  (Abnormal) Collected: 07/18/21 0001    Specimen: Blood Updated: 07/18/21 0026     Hemoglobin A1C 6.10 %     Narrative:      Hemoglobin A1C Ranges:    Increased Risk for Diabetes  5.7% to 6.4%  Diabetes                     >= 6.5%  Diabetic Goal                < 7.0%    Hepatitis Panel, Acute [[id number]]  (Abnormal) Collected: 07/17/21 1907    Specimen: Blood from Arm, Right Updated: 07/18/21 0014     Hepatitis B Surface Ag Non-Reactive     Hep A IgM Non-Reactive     Hep B C IgM Reactive     Hepatitis C Ab Non-Reactive    Narrative:      Results may be falsely decreased if patient taking Biotin.     Ethanol [[id number]] Collected: 07/17/21 1907    Specimen: Blood from Arm, Right Updated: 07/17/21 2351     Ethanol <10 mg/dL       Ethanol % <0.010 %     Narrative:      >/= 80.0 legally intoxicated    COVID-19 and FLU A/B PCR - Swab, Nasopharynx [[id number]]  (Normal) Collected: 07/17/21 2007    Specimen: Swab from Nasopharynx Updated: 07/17/21 2034     COVID19 Not Detected     Influenza A PCR Not Detected     Influenza B PCR Not Detected    Narrative:      Fact sheet for providers: https://www.fda.gov/media/355911/download    Fact sheet for patients: https://www.fda.gov/media/493586/download    Test performed by PCR.    [place] Draw [[id number]] Collected: 07/17/21 1907    Specimen: Blood from Arm, Right Updated: 07/17/21 2016    Narrative:      The following orders were created for panel order [place] Draw.  Procedure                               Abnormality         Status                     ---------                               -----------         ------                     Green Top (Gel)[[id number]]                                  Final result               Lavender Top[[id number]]                                     Final result               Gold Top - SST[[id number]]                                   Final result                 Please view results for these tests on the individual orders.    Green Top (Gel) [[id number]] Collected: 07/17/21 1907    Specimen: Blood from Arm, Right Updated: 07/17/21 2016     Extra Tube Hold for add-ons.     Comment: Auto resulted.       Gold Top - SST [[id number]] Collected: 07/17/21 1907    Specimen: Blood from Arm, Right Updated: 07/17/21 2016     Extra Tube Hold for add-ons.     Comment: Auto resulted.       Lavender Top [[id number]] Collected: 07/17/21 1907    Specimen: Blood from Arm, Right Updated: 07/17/21 2016     Extra Tube hold for add-on     Comment: Auto resulted       BNP [[id number]]  (Normal) Collected: 07/17/21 1907    Specimen: Blood from Arm, Right Updated: 07/17/21 1953     proBNP 130.4 pg/mL     Narrative:      Among patients with dyspnea, NT-proBNP is highly sensitive for the detection  of acute congestive heart failure. In addition NT-proBNP of <300 pg/ml effectively rules out acute congestive heart failure with 99% negative predictive value.    Results may be falsely decreased if patient taking Biotin.            Imaging Results (Most Recent)     Procedure Component Value Units Date/Time    XR Hip With or Without Pelvis 1 View Right [[id number]] Collected: 08/05/21 1607     Updated: 08/05/21 1610    Narrative:      EXAM:    XR Right Hip With Pelvis When Performed, 1 View     EXAM DATE:    8/5/2021 4:03 PM     CLINICAL HISTORY:    right hip fracture s/p TFNA; S72.001A-Fracture of unspecified part of  neck of right femur, initial encounter for closed fracture     TECHNIQUE:    Frontal view of the right hip with pelvis when performed.     COMPARISON:    No relevant prior studies available.     FINDINGS:    Bones/joints:  Fixation of the right hip is noted. Hardware appears  intact with preserved alignment.  No acute fracture.    Soft tissues:  Postsurgical changes within the soft tissues.       Impression:        Postsurgical changes from right hip fixation. No acute findings  identified.     This report was finalized on 8/5/2021 4:07 PM by Dr. Vikram Strong MD.       XR Foot 2 View Right [[id number]] Collected: 07/29/21 1451     Updated: 07/29/21 1504    Narrative:      EXAMINATION: XR FOOT 2 VW RIGHT-      CLINICAL INDICATION: foot pain; S72.001A-Fracture of unspecified part of  neck of right femur, initial encounter for closed fracture        COMPARISON: 04/26/2021     FINDINGS: 2 views of the right foot show no fracture or dislocation     There are no blastic or lytic lesions.       Impression:      No acute bony abnormality      This report was finalized on 7/29/2021 2:51 PM by Dr. John Duarte MD.       MRI Lumbar Spine Without Contrast [[id number]] Collected: 07/26/21 1629     Updated: 07/26/21 1652    Narrative:      EXAMINATION: MRI LUMBAR SPINE WO CONTRAST-      CLINICAL INDICATION:  Paresthesias and urinary retention;  S72.001A-Fracture of unspecified part of neck of right femur, initial  encounter for closed fracture     COMPARISON: None      TECHNIQUE: Multiplanar, multisequence MR imaging performed through the  lumbar spine WITHOUT contrast.     FINDINGS:      HARDWARE: No MRI evidence of hardware.     NUMBERING/ALIGNMENT:?   5 lumbar vertebral body segments.   Intact vertebral body alignment.     SPINAL CORD:?   Conus terminates at the?L1-2 level.      BONES:   Mildly shortened pedicles congenitally      POSTERIOR ELEMENTS:  Posterior elements are intact.     SOFT TISSUES:   The visualized paraspinal soft tissues are unremarkable.       T12-L1:    No disk bulge or protrusion.  No central canal or neuroforaminal  stenosis.     L1/2:  No disk bulge or protrusion.  No central canal or neuroforaminal  stenosis.     L2/3:  No disk bulge or protrusion.  No central canal or neuroforaminal  stenosis.     L3/4:  No disk bulge or protrusion.  No central canal or neuroforaminal  stenosis.     L4/5:  No disk bulge or protrusion.  No central canal or neuroforaminal  stenosis.     L5/S1:  No disk bulge or protrusion.  No central canal or neuroforaminal  stenosis.     OTHER:   No additional remarkable findings.        Impression:         1. Mildly shortened pedicles congenitally  2. No focal disc herniation.  3. Mild facet arthropathy.     This report was finalized on 7/26/2021 4:49 PM by Dr. John Duarte MD.       US Spleen [[id number]] Collected: 07/23/21 1701     Updated: 07/23/21 1703    Narrative:      EXAM:    US Abdomen Limited     EXAM DATE:    7/23/2021 3:22 PM     CLINICAL HISTORY:    thrombocytopenia; S72.001A-Fracture of unspecified part of neck of  right femur, initial encounter for closed fracture     TECHNIQUE:    Real-time ultrasound of the abdomen with image documentation.     COMPARISON:    No relevant prior studies available.     FINDINGS:    Spleen:  Spleen measures 12.59 cm.        Impression:        No acute findings in the visualized abdomen.     This report was finalized on 7/23/2021 5:01 PM by Dr. Christiano Aceves MD.       XR Abdomen KUB [[id number]] Collected: 07/23/21 1103     Updated: 07/23/21 1109    Narrative:      EXAM:    XR Abdomen, 1 View     EXAM DATE:    7/23/2021 10:05 AM     CLINICAL HISTORY:    Constipation; S72.001A-Fracture of unspecified part of neck of right  femur, initial encounter for closed fracture     TECHNIQUE:    Frontal supine view of the abdomen/pelvis.     COMPARISON:    No relevant prior studies available.     FINDINGS:    GASTROINTESTINAL TRACT:  Abundant right colonic stool.  No dilation.    BONES/JOINTS:  Unremarkable.       Impression:        Abundant right colonic stool.     This report was finalized on 7/23/2021 11:03 AM by Dr. Christiano Aceves MD.       CT Head Without Contrast [[id number]] Collected: 07/21/21 1428     Updated: 07/21/21 1431    Narrative:      EXAM:    CT Head Without Intravenous Contrast     EXAM DATE:    7/21/2021 2:21 PM     CLINICAL HISTORY:    leg numbness; S72.001A-Fracture of unspecified part of neck of right  femur, initial encounter for closed fracture     TECHNIQUE:    Axial computed tomography images of the head/brain without intravenous  contrast.  Sagittal and coronal reformatted images were created and  reviewed.  This CT exam was performed using one or more of the following  dose reduction techniques:  automated exposure control, adjustment of  the mA and/or kV according to patient size, and/or use of iterative  reconstruction technique.     COMPARISON:    No relevant prior studies available.     FINDINGS:    BRAIN:  Unremarkable.  No hemorrhage.  No significant white matter  disease.  No edema.    VENTRICLES:  Unremarkable.  No ventriculomegaly.    BONES/JOINTS:  Unremarkable.  No acute fracture.    SOFT TISSUES:  Unremarkable.    SINUSES:  Unremarkable as visualized.  No acute sinusitis.    MASTOID AIR CELLS:  Unremarkable as  visualized.  No mastoid effusion.       Impression:        Unremarkable exam demonstrating no CT evidence of acute intracranial  findings.     This report was finalized on 7/21/2021 2:29 PM by Dr. Christiano Aceves MD.       US Liver [[id number]] Collected: 07/20/21 1705     Updated: 07/20/21 1708    Narrative:      EXAMINATION: US LIVER-      CLINICAL INDICATION:transaminitis; S72.001A-Fracture of unspecified part  of neck of right femur, initial encounter for closed fracture     COMPARISON: None      TECHNIQUE: Sonographic imaging obtained in transverse and sagittal  planes of the liver. Color Doppler implemented.     FINDINGS:   Advanced liver cirrhosis. Nodular liver margins. Small liver size is  noted.  No focal lesion identified.  No intrahepatic biliary dilatation noted.     No perihepatic ascites identified.  Common bile duct is within normal limits and is:2 mm.          Impression:      Marked liver cirrhosis noted. No focal liver lesion identified.     This report was finalized on 7/20/2021 5:06 PM by Dr. Vikram Strong MD.       FL Surgery Fluoro [[id number]] Collected: 07/18/21 1552     Updated: 07/18/21 1554    Narrative:      EXAMINATION: FL SURGERY FLUORO-      CLINICAL INDICATION:     RT HIP NAILING ; S72.001A-Fracture of  unspecified part of neck of right femur, initial encounter for closed  fracture     TECHNIQUE:  FL SURGERY FLUORO-      FLUOROSCOPY TIME: 105.9 seconds     FINDINGS:   Fluoroscopy images from right hip fixation.        Impression:      As above.     This report was finalized on 7/18/2021 3:52 PM by Dr. Vikram Strong MD.       XR Chest 1 View [[id number]] Collected: 07/17/21 2018     Updated: 07/17/21 2020    Narrative:      CR Chest 1 Vw    INDICATION:   Hypertension. Preop.     COMPARISON:    None available.    FINDINGS:  Single portable AP view(s) of the chest.  Cardiomegaly with mild pulmonary vascular congestion. No interstitial or alveolar edema. Post median sternotomy and apparent  CABG. No effusions or pneumothorax.      Impression:      Cardiomegaly with pulmonary vascular congestion in this patient post CABG. This could represent mild fluid overload or early CHF. No effusions or edema.    Signer Name: MARIANNA Montgomery MD   Signed: 7/17/2021 8:18 PM   Workstation Name: [facility]    [facility]    XR Hip With or Without Pelvis 2 - 3 View Right [[id number]] Collected: 07/17/21 1945     Updated: 07/17/21 1947    Narrative:      CR Hip Uni Comp Min 2 Vws RT    INDICATION:   Right hip pain after a fall.    COMPARISON:   None.    FINDINGS:  AP and frog-leg lateral view(s) of the right hip.  Minimally displaced intertrochanteric fracture proximal right femur. No significant angulation. No significant hip arthropathy. Visualized pelvis and left hip unremarkable. No bone erosion or  destruction.        Impression:      Minimally displaced right proximal intertrochanteric femur fracture.    Signer Name: MARIANNA Montgomery MD   Signed: 7/17/2021 7:45 PM   Workstation Name: [facility]    Radiology [facility]           Operative/Procedure Notes (most recent note)      Oracio Ponce, DO at 07/18/21 1240        Aidee Trinidad  [id number]  1965     Date of Procedure: 07/18/21    Preoperative Diagnosis: Right intertrochanteric femur fracture    Postoperative Diagnosis: Right intertrochanteric femur fracture    Procedure: Right hip cephalo-medullary nailing with a TFNA nail    Indications for Surgery: This patient had sustained a fall and fractured their right hip. X-ray showed evidence of displacement at the fracture site, at the base of the femoral neck and involving the intertrochanteric region. I discussed with them and also with the family the benefits of surgical treatment which include return to previous level of function, as well as pain relief. I also explained to them the risks associated with this procedure which include but not limited to: Scar,  nerve damage, infection, blood loss, pain, incomplete relief of pain, DVT, and the need for further surgery. They understood these risks and would like to proceed with operative treatment.    Surgeon(s): Oracio Ponce DO     Anesthesia: The anesthesia staff utilized a(n) ET for airway management during this procedure.    EBL: 150 mL    Preoperative Antibiotic: 2 g of Ancef was administered by the anesthesia staff prior to incision     Specimens: None    Complications: None    Disposition: Stable to the recovery room    Implant List: A TFNA nail was utilized. This was 125° and the length was 170 mm, and the diameter was 10 mm. A 85 mm fenestrated helical blade was used, with a 34 mm x 5 mm distal interlock screw.    Operative Procedure: The patient was taken to the operating room and placed supine on the operating room table. A timeout was performed to verify the appropriate location, patient name, and intended procedure.  2 g of Ancef was administered by the anesthesia staff for preoperative prophylactic antibiotic therapy. All bony prominences were well-padded. Anesthesia used an LMA for airway management during this procedure.    The patient was placed on the fracture table. The right leg was placed in the traction boot, and the left leg was placed in the erick-lithotomy position. All bony prominences were well-padded. With the assistance of the large C-arm, a closed reduction was then performed with manual traction and rotation. The large C-arm showed appropriate reduction of the fracture prior to surgical sterile preparation.    The right hip was then prepped with ChloraPrep and draped sterilely. An [place] shower curtain drape was utilized to cover the surgical site.    With the assistance of the large C-arm, a small incision was made proximal to the tip of the greater trochanter. Blunt dissection was carried down to the iliotibial band. The iliotibial band was incised in line with the skin incision. The [place]  elevator was utilized to split the abductor muscles. A guidepin was then placed at the tip of the greater trochanter. The large C-arm was utilized to verify that this was placed appropriately at the tip of the greater trochanter prior to advancing it further into the femur and down into the femoral canal. This was gently tapped in place with a mallet. I was able to palpate the cortex of the femur as this passed distally. This was verified on both the AP and the lateral views to ensure that it was appropriately placed.    The opening reamer was then utilized to open the proximal portion of the femur. The guidepin was then removed, and the ball-tipped guidewire was then placed down into the femoral canal.  I was able to palpate the medullary canal as the ball-tipped guidewire was placed and advanced distally.  The 11.5 mm reamer was then passed to open the canal.  The intermediate size 10 nail was then placed by hand through this opening and down into the femoral canal. This was gently tapped in place. No distraction at the fracture site was noted.    The targeting device was utilized to rosemary out the planned incision along the lateral aspect of the proximal femur to allow for passage of the lag and compression screws. A 10 blade knife was utilized to make this incision. Blunt dissection was carried down to the iliotibial band. This was incised in line with the skin incision, slightly proximal to allow for the angled drill sleeve. The drill sleeve was then placed through the targeting device and locked in place. A guidewire was placed through the proximal hole of this drill sleeve and through the lateral femoral cortex and up into the femoral neck and then head. The placement of this guidepin was verified on both AP and lateral views. Slight adjustments were made to place this centrally within the femoral neck on the lateral view, and low and inferior in the femoral neck on the AP view. This was then measured, and I  selected a 85 mm fenestrated helical blade.      The lateral cortex was then opened with the drill to allow for passage and placement of this helical blade.  The helical blade was then advanced with the assistance of the  and the mallet.  A small amount of compression was able to be obtained.      The drill sleeve was then removed, and images were taken both in AP and lateral views to ensure appropriate reduction at the fracture site, and appropriate placement of the helical blade.    Attention was then turned to the distal portion of the stephany to allow for placement of a distal interlocking screw. A small incision was made along the lateral aspect of the proximal femur. A similar incision was made through the iliotibial band, and the drill was then placed at this distal interlock hole. With the assistance of the fluoroscopy, this was then advanced through both the lateral and medial cortices. This was measured, and I selected a 34 mm x 5 mm screw. The drill was removed, and the screw was then placed by hand. Final images were taken both on AP and lateral views to ensure that the screw was indeed placed through the distal portion of the nail.    The wounds were thoroughly irrigated with normal saline. The iliotibial band was approximated and closed at the proximal incision with #1 Vicryl. The skin was then approximated with all the remaining incisions with 2-0 Monocryl and skin staples. The skin was cleansed of any debris after which sterile dressings were placed over these incisions.    She will be placed on Eliquis for DVT prophylaxis postoperatively.  She is okay to weight-bear as tolerated with this right lower extremity.    We will have her work with physical therapy tomorrow, and determine if she is able to plan on being discharged home or if she is going to need skilled nursing facility admission.      Oracio Ponce DO  07/18/21  12:40 EDT          Electronically signed by Oracio Ponce DO at  21 1432          Physical Therapy Notes (most recent note)      Ki Reyes, PT at 21 1307  Version 1 of 1         Acute Care - Physical Therapy Re-Evaluation   Christophe     Patient Name: Aidee Trinidad  : 1965  MRN: [id number]  Today's Date: 2021   Onset of Illness/Injury or Date of Surgery: 21  Visit Dx:     ICD-10-CM ICD-9-CM   1. Closed fracture of right hip, initial encounter (CMS/[facility])  S72.001A 820.8     Patient Active Problem List   Diagnosis   • Closed fracture of right hip (CMS/[facility])   • CAD (coronary artery disease)   • Essential hypertension   • Fatty liver     Past Medical History:   Diagnosis Date   • Arthritis    • Chronic back pain    • Coronary artery disease    • Depression    • Diabetes mellitus (CMS/[facility])    • Fatty liver    • Hypertension      Past Surgical History:   Procedure Laterality Date   • CARPAL TUNNEL RELEASE     •  SECTION     • CHOLECYSTECTOMY     • CORONARY ANGIOPLASTY WITH STENT PLACEMENT     • HIP TROCHANTERIC NAILING WITH INTRAMEDULLARY HIP SCREW Right 2021    Procedure: HIP TROCHANTERIC NAILING LONG WITH INTRAMEDULLARY HIP SCREW;  Surgeon: Oracio Ponce DO;  Location: [facility];  Service: Orthopedics;  Laterality: Right;   • REPLACEMENT TOTAL KNEE Right    • TUBAL ABDOMINAL LIGATION          PT Assessment (last 12 hours)      PT Evaluation and Treatment     Row Name 21 1241          Physical Therapy Time and Intention    Subjective Information  complains of;pain  -CW     Document Type  therapy note (daily note)  -CW     Mode of Treatment  physical therapy  -CW     Patient Effort  adequate  -CW     Symptoms Noted During/After Treatment  none  -CW     Comment  Patient agreeable to physical therapy treatment this date. She reports that her daughter has been in contact with  and has decided patient will go home instead of awaiting peer to peer result for IPR. Patient reports that she believes she will be fine as  long as she has help, and asks therapist about DME.  -     Row Name 08/05/21 1241          General Information    Patient Profile Reviewed  yes  -CW     Patient Observations  alert;cooperative;agree to therapy  -     Row Name 08/05/21 1241          Cognition    Affect/Mental Status (Cognitive)  WFL  -CW     Orientation Status (Cognition)  oriented x 3  -CW     Follows Commands (Cognition)  WFL  -[facility] Name 08/05/21 1241          Pain Scale: Word Pre/Post-Treatment    Pre/Posttreatment Pain Comment  Patient reports improved RLE pain consistent with recent fall/fracture/surgical repair compared to yesterday.  -     Pain Intervention(s)  Repositioned;Ambulation/increased activity  -     Row Name 08/05/21 1241          Range of Motion Comprehensive    Comment, General Range of Motion  RLE AAROM grossly WFL though with pain, AROM limited approximately 50% with pain. Otherwise, AROM grossly WFL, though limited by soft tissue approximation.  -[facility] Name 08/05/21 1241          Strength Comprehensive (MMT)    Comment, General Manual Muscle Testing (MMT) Assessment  RLE grossly 2+/5, LLE grossly 4-/5  -[facility] Name 08/05/21 1241          Mobility    Right Lower Extremity (Weight-bearing Status)  weight-bearing as tolerated (WBAT)  -     Row Name 08/05/21 1241          Bed Mobility    Bed Mobility  bed mobility (all) activities;sit-supine;supine-sit;scooting/bridging  -     All Activities, [place] (Bed Mobility)  moderate assist (50% patient effort);minimum assist (75% patient effort)  -     Supine-Sit [place] (Bed Mobility)  --  -     Bed Mobility, Safety Issues  decreased use of arms for pushing/pulling;decreased use of legs for bridging/pushing  -     Assistive Device (Bed Mobility)  bed rails;head of bed elevated  -     Row Name 08/05/21 1241          Transfers    Transfers  sit-stand transfer;stand-sit transfer;bed-chair transfer;chair-bed transfer  -     Bed-Chair [place]  (Transfers)  minimum assist (75% patient effort);verbal cues  -CW     Assistive Device (Bed-Chair Transfers)  bariatric;walker, front-wheeled  -CW     Sit-Stand [place] (Transfers)  minimum assist (75% patient effort);contact guard x 2  -CW     Stand-Sit [place] (Transfers)  contact guard  -CW     Row Name 08/05/21 1241          Sit-Stand Transfer    Assistive Device (Sit-Stand Transfers)  bariatric;walker, front-wheeled  -CW     Row Name 08/05/21 1241          Stand-Sit Transfer    Assistive Device (Stand-Sit Transfers)  bariatric;walker, front-wheeled  -CW     Row Name 08/05/21 1241          Gait/Stairs (Locomotion)    [place] Level (Gait)  minimum assist (75% patient effort);verbal cues;nonverbal cues (demo/gesture)  -CW     Assistive Device (Gait)  bariatric equipment;walker, front-wheeled  -CW     Pattern (Gait)  step-to  -CW     Deviations/Abnormal Patterns (Gait)  antalgic;base of support, wide;becky decreased;gait speed decreased;stride length decreased;weight shifting decreased  -CW     Bilateral Gait Deviations  forward flexed posture;heel strike decreased  -CW     Comment (Gait/Stairs)  Did not ambulate this date.  -CW     Row Name 08/05/21 1241          Safety Issues, Functional Mobility    Safety Issues Affecting Function (Mobility)  insight into deficits/self-awareness  -CW     Impairments Affecting Function (Mobility)  balance;endurance/activity tolerance;pain;strength  -CW     Row Name 08/05/21 1241          Balance    Static Sitting Balance  WFL Somewhat limited by patient's height.  -CW     Static Standing Balance  supported;mild impairment  -CW     Row Name 08/05/21 1241          Motor Skills    Therapeutic Exercise  other (see comments) Seated LE strengthening/AROM interventions  -     Row Name             Wound 07/18/21 1345 Right anterior thigh Incision    Wound - Properties Group Placement Date: 07/18/21  -CWA Placement Time: 1345  -CWA Present on Hospital Admission: N  -CWA  Side: Right  -CWA Orientation: anterior  -CWA Location: thigh  -CWA Primary Wound Type: Incision  -CWA    Retired Wound - Properties Group Date first assessed: 07/18/21  -CWA Time first assessed: 1345  -CWA Present on Hospital Admission: N  -CWA Side: Right  -CWA Location: thigh  -CWA Primary Wound Type: Incision  -CWA    Row Name 08/05/21 1241          Coping    Observed Emotional State  calm;cooperative  -CW     Trust Relationship/Rapport  care explained;choices provided;questions answered;thoughts/feelings acknowledged;reassurance provided  -CW     Row Name 08/05/21 1241          Plan of Care Review    Plan of Care Reviewed With  patient  -CW     Row Name 08/05/21 1241          Bed Mobility Goal 1 (PT)    Activity/Assistive Device (Bed Mobility Goal 1, PT)  bed mobility activities, all  -CW     [place] Level/Cues Needed (Bed Mobility Goal 1, PT)  minimum assist (75% or more patient effort)  -CW     Time Frame (Bed Mobility Goal 1, PT)  by discharge  -CW     Progress/Outcomes (Bed Mobility Goal 1, PT)  continuing progress toward goal  -CW     Row Name 08/05/21 1241          Transfer Goal 1 (PT)    Activity/Assistive Device (Transfer Goal 1, PT)  transfers, all  -CW     [place] Level/Cues Needed (Transfer Goal 1, PT)  minimum assist (75% or more patient effort)  -CW     Time Frame (Transfer Goal 1, PT)  by discharge  -CW     Progress/Outcome (Transfer Goal 1, PT)  goal met  -CW     Row Name 08/05/21 1241          Gait Training Goal 1 (PT)    Activity/Assistive Device (Gait Training Goal 1, PT)  gait (walking locomotion)  -CW     [place] Level (Gait Training Goal 1, PT)  minimum assist (75% or more patient effort)  -CW     Distance (Gait Training Goal 1, PT)  20 ft  -CW     Time Frame (Gait Training Goal 1, PT)  by discharge  -CW     Progress/Outcome (Gait Training Goal 1, PT)  good progress toward goal  -CW     Row Name 08/05/21 1241          Positioning and Restraints    Pre-Treatment Position  in  bed  -CW     In Chair  notified nsg;sitting;call light within reach;encouraged to call for assist  -CW     Row Name 08/05/21 1241          Therapy Assessment/Plan (PT)    Patient/Family Therapy Goals Statement (PT)  Patient reports she is okay with going home since she has assistance, but requests home health and appropriate DME.  -CW     Functional Level at Time of Evaluation (PT)  Patient has made slower than expected, but steady progress with functional mobility. She is nearing completion of all her initial goals. She is appropriate for discharge home with assist and continued  physical therapy and likely nursing for wound care as well. She would benefit from short term rehabilitation, but it appears that placement has been difficult.  -CW     PT Diagnosis (PT)  s/p R femur fracture with intramedullary nailing  -CW     Rehab Potential (PT)  fair, will monitor progress closely  -CW     Criteria for Skilled Interventions Met (PT)  yes;meets criteria;skilled treatment is necessary  -CW     Row Name 08/05/21 1241          Therapy Plan Review/Discharge Plan (PT)    Therapy Plan Review (PT)  evaluation/treatment results reviewed;care plan/treatment goals reviewed  -CW       User Key  (r) = Recorded By, (t) = Taken By, (c) = Cosigned By    Initials Name Provider Type    Mildred Herman, RN Registered Nurse    Ki Shah, PT Physical Therapist        Physical Therapy Education                 Title: PT OT SLP Therapies (Done)     Topic: Physical Therapy (Done)     Point: Mobility training (Done)     Learning Progress Summary           Patient Acceptance, E,TB, VU by FRANCISCO at 8/5/2021 0908    Acceptance, E, VU,NR by BEV at 7/31/2021 1239    Comment: Pt will benefit from further education regarding safe mobility and exercises to improve independence.                   Point: Home exercise program (Done)     Learning Progress Summary           Patient Acceptance, E,TB, VU by FRANCISCO at 8/5/2021 0908    Acceptance, E,  VU,NR by  at 7/31/2021 1239    Comment: Pt will benefit from further education regarding safe mobility and exercises to improve independence.                   Point: Body mechanics (Done)     Learning Progress Summary           Patient Acceptance, E,TB, VU by  at 8/5/2021 0908    Acceptance, E, VU,NR by  at 7/31/2021 1239    Comment: Pt will benefit from further education regarding safe mobility and exercises to improve independence.                   Point: Precautions (Done)     Learning Progress Summary           Patient Acceptance, E,TB, VU by  at 8/5/2021 0908    Acceptance, E, VU,NR by  at 7/31/2021 1239    Comment: Pt will benefit from further education regarding safe mobility and exercises to improve independence.                               User Key     Initials Effective Dates Name Provider Type Discipline     06/16/21 -  Radha Garcia, RN Registered Nurse Nurse     06/16/21 -  Lawanda Duran, SUKH Physical Therapist PT              PT Recommendation and Plan  Anticipated Discharge Disposition (PT): home with assist, home with home health, home with outpatient therapy services  Therapy Frequency (PT): 6 times/wk  Progress Summary (PT)  Progress Toward Functional Goals (PT): progress toward functional goals is fair  Daily Progress Summary (PT): Patient demonstrates continued ability for ambulation this date despite report of increased fatigue/pain this date.  Plan of Care Reviewed With: patient       Time Calculation:   PT Charges     Row Name 08/05/21 1249             Time Calculation    PT Received On  08/05/21  -CW      PT Goal Re-Cert Due Date  08/19/21  -CW         Time Calculation- PT    Total Timed Code Minutes- PT  40 minute(s)  -CW        User Key  (r) = Recorded By, (t) = Taken By, (c) = Cosigned By    Initials Name Provider Type    CW Ki Reyes, PT Physical Therapist        Therapy Charges for Today     Code Description Service Date Service Provider Modifiers Qty     30934385706  GAIT TRAINING EA 15 MIN 8/4/2021 Ki Reyes, PT GP 1    44575020707 HC PT THERAPEUTIC ACT EA 15 MIN 8/4/2021 Ki Reyes, PT GP 1    58453025382 HC PT RE-EVAL ESTABLISHED PLAN 2 8/5/2021 Ki Reyes, PT GP 1    98887675920 HC PT THER PROC EA 15 MIN 8/5/2021 Ki Reyes, PT GP 1               Ki Reyes PT  8/5/2021      Electronically signed by Ki Reyes PT at 08/05/21 1307       ADL Documentation (most recent)      Most Recent Value   Transferring  3 - assistive equipment and person   Toileting  3 - assistive equipment and person   Bathing  2 - assistive person   Dressing  2 - assistive person   Eating  0 - independent   Communication  0 - understands/communicates without difficulty   Swallowing  0 - swallows foods/liquids without difficulty   Equipment Currently Used at Home  rollator, cpap, shower chair          Discharge Summary    No notes of this type exist for this encounter.         Discharge Order (From admission, onward)     Start     Ordered    08/05/21 1614  Discharge patient  Once     Expected Discharge Date: 08/05/21    Discharge Disposition: Home or Self Care    Physician of Record for Attribution - Please select from Treatment Team: OMKAR KRUES [[id number]]    Review needed by CMO to determine Physician of Record: No       Question Answer Comment   Physician of Record for Attribution - Please select from Treatment Team OMKAR KRUSE    Review needed by CMO to determine Physician of Record No        08/05/21 7920

## 2021-08-05 NOTE — THERAPY RE-EVALUATION
Acute Care - Physical Therapy Re-Evaluation   [place]     Patient Name: Aidee Trinidad  : 1965  MRN: [id number]  Today's Date: 2021   Onset of Illness/Injury or Date of Surgery: 21  Visit Dx:     ICD-10-CM ICD-9-CM   1. Closed fracture of right hip, initial encounter (CMS/[facility])  S72.001A 820.8     Patient Active Problem List   Diagnosis   • Closed fracture of right hip (CMS/[facility])   • CAD (coronary artery disease)   • Essential hypertension   • Fatty liver     Past Medical History:   Diagnosis Date   • Arthritis    • Chronic back pain    • Coronary artery disease    • Depression    • Diabetes mellitus (CMS/[facility])    • Fatty liver    • Hypertension      Past Surgical History:   Procedure Laterality Date   • CARPAL TUNNEL RELEASE     •  SECTION     • CHOLECYSTECTOMY     • CORONARY ANGIOPLASTY WITH STENT PLACEMENT     • HIP TROCHANTERIC NAILING WITH INTRAMEDULLARY HIP SCREW Right 2021    Procedure: HIP TROCHANTERIC NAILING LONG WITH INTRAMEDULLARY HIP SCREW;  Surgeon: Oracio Ponce DO;  Location: [facility];  Service: Orthopedics;  Laterality: Right;   • REPLACEMENT TOTAL KNEE Right    • TUBAL ABDOMINAL LIGATION          PT Assessment (last 12 hours)      PT Evaluation and Treatment     Row Name 21 1241          Physical Therapy Time and Intention    Subjective Information  complains of;pain  -CW     Document Type  therapy note (daily note)  -CW     Mode of Treatment  physical therapy  -CW     Patient Effort  adequate  -CW     Symptoms Noted During/After Treatment  none  -CW     Comment  Patient agreeable to physical therapy treatment this date. She reports that her daughter has been in contact with  and has decided patient will go home instead of awaiting peer to peer result for IPR. Patient reports that she believes she will be fine as long as she has help, and asks therapist about DME.  -CW     Row Name 21 1241          General Information    Patient Profile  Reviewed  yes  -CW     Patient Observations  alert;cooperative;agree to therapy  -     Row Name 08/05/21 1241          Cognition    Affect/Mental Status (Cognitive)  WFL  -     Orientation Status (Cognition)  oriented x 3  -CW     Follows Commands (Cognition)  WFL  -     Row Name 08/05/21 1241          Pain Scale: Word Pre/Post-Treatment    Pre/Posttreatment Pain Comment  Patient reports improved RLE pain consistent with recent fall/fracture/surgical repair compared to yesterday.  -     Pain Intervention(s)  Repositioned;Ambulation/increased activity  -     Row Name 08/05/21 1241          Range of Motion Comprehensive    Comment, General Range of Motion  RLE AAROM grossly WFL though with pain, AROM limited approximately 50% with pain. Otherwise, AROM grossly WFL, though limited by soft tissue approximation.  -     Row Name 08/05/21 1241          Strength Comprehensive (MMT)    Comment, General Manual Muscle Testing (MMT) Assessment  RLE grossly 2+/5, LLE grossly 4-/5  -     Row Name 08/05/21 1241          Mobility    Right Lower Extremity (Weight-bearing Status)  weight-bearing as tolerated (WBAT)  -     Row Name 08/05/21 1241          Bed Mobility    Bed Mobility  bed mobility (all) activities;sit-supine;supine-sit;scooting/bridging  -     All Activities, [place] (Bed Mobility)  moderate assist (50% patient effort);minimum assist (75% patient effort)  -     Supine-Sit [place] (Bed Mobility)  --  -CW     Bed Mobility, Safety Issues  decreased use of arms for pushing/pulling;decreased use of legs for bridging/pushing  -     Assistive Device (Bed Mobility)  bed rails;head of bed elevated  -     Row Name 08/05/21 1241          Transfers    Transfers  sit-stand transfer;stand-sit transfer;bed-chair transfer;chair-bed transfer  -     Bed-Chair [place] (Transfers)  minimum assist (75% patient effort);verbal cues  -     Assistive Device (Bed-Chair Transfers)  bariatric;walker,  front-wheeled  -CW     Sit-Stand [place] (Transfers)  minimum assist (75% patient effort);contact guard x 2  -CW     Stand-Sit [place] (Transfers)  contact guard  -CW     Row Name 08/05/21 1241          Sit-Stand Transfer    Assistive Device (Sit-Stand Transfers)  bariatric;walker, front-wheeled  -CW     Row Name 08/05/21 1241          Stand-Sit Transfer    Assistive Device (Stand-Sit Transfers)  bariatric;walker, front-wheeled  -CW     Row Name 08/05/21 1241          Gait/Stairs (Locomotion)    [place] Level (Gait)  minimum assist (75% patient effort);verbal cues;nonverbal cues (demo/gesture)  -CW     Assistive Device (Gait)  bariatric equipment;walker, front-wheeled  -CW     Pattern (Gait)  step-to  -CW     Deviations/Abnormal Patterns (Gait)  antalgic;base of support, wide;becky decreased;gait speed decreased;stride length decreased;weight shifting decreased  -CW     Bilateral Gait Deviations  forward flexed posture;heel strike decreased  -CW     Comment (Gait/Stairs)  Did not ambulate this date.  -CW     Row Name 08/05/21 1241          Safety Issues, Functional Mobility    Safety Issues Affecting Function (Mobility)  insight into deficits/self-awareness  -CW     Impairments Affecting Function (Mobility)  balance;endurance/activity tolerance;pain;strength  -CW     Row Name 08/05/21 1241          Balance    Static Sitting Balance  WFL Somewhat limited by patient's height.  -CW     Static Standing Balance  supported;mild impairment  -CW     Row Name 08/05/21 1241          Motor Skills    Therapeutic Exercise  other (see comments) Seated LE strengthening/AROM interventions  -CW     Row Name             Wound 07/18/21 1345 Right anterior thigh Incision    Wound - Properties Group Placement Date: 07/18/21  -CWA Placement Time: 1345  -CWA Present on Hospital Admission: N  -CWA Side: Right  -CWA Orientation: anterior  -CWA Location: thigh  -CWA Primary Wound Type: Incision  -CWA    Retired Wound -  Properties Group Date first assessed: 07/18/21  -CWA Time first assessed: 1345  -CWA Present on Hospital Admission: N  -CWA Side: Right  -CWA Location: thigh  -CWA Primary Wound Type: Incision  -CWA    Row Name 08/05/21 1241          Coping    Observed Emotional State  calm;cooperative  -CW     Trust Relationship/Rapport  care explained;choices provided;questions answered;thoughts/feelings acknowledged;reassurance provided  -CW     Row Name 08/05/21 1241          Plan of Care Review    Plan of Care Reviewed With  patient  -CW     Row Name 08/05/21 1241          Bed Mobility Goal 1 (PT)    Activity/Assistive Device (Bed Mobility Goal 1, PT)  bed mobility activities, all  -CW     [place] Level/Cues Needed (Bed Mobility Goal 1, PT)  minimum assist (75% or more patient effort)  -CW     Time Frame (Bed Mobility Goal 1, PT)  by discharge  -CW     Progress/Outcomes (Bed Mobility Goal 1, PT)  continuing progress toward goal  -CW     Row Name 08/05/21 1241          Transfer Goal 1 (PT)    Activity/Assistive Device (Transfer Goal 1, PT)  transfers, all  -CW     [place] Level/Cues Needed (Transfer Goal 1, PT)  minimum assist (75% or more patient effort)  -CW     Time Frame (Transfer Goal 1, PT)  by discharge  -CW     Progress/Outcome (Transfer Goal 1, PT)  goal met  -CW     Row Name 08/05/21 1241          Gait Training Goal 1 (PT)    Activity/Assistive Device (Gait Training Goal 1, PT)  gait (walking locomotion)  -CW     [place] Level (Gait Training Goal 1, PT)  minimum assist (75% or more patient effort)  -CW     Distance (Gait Training Goal 1, PT)  20 ft  -CW     Time Frame (Gait Training Goal 1, PT)  by discharge  -CW     Progress/Outcome (Gait Training Goal 1, PT)  good progress toward goal  -CW     Row Name 08/05/21 1241          Positioning and Restraints    Pre-Treatment Position  in bed  -CW     In Chair  notified nsg;sitting;call light within reach;encouraged to call for assist  -CW     Row Name  08/05/21 1241          Therapy Assessment/Plan (PT)    Patient/Family Therapy Goals Statement (PT)  Patient reports she is okay with going home since she has assistance, but requests home health and appropriate DME.  -CW     Functional Level at Time of Evaluation (PT)  Patient has made slower than expected, but steady progress with functional mobility. She is nearing completion of all her initial goals. She is appropriate for discharge home with assist and continued  physical therapy and likely nursing for wound care as well. She would benefit from short term rehabilitation, but it appears that placement has been difficult.  -CW     PT Diagnosis (PT)  s/p R femur fracture with intramedullary nailing  -CW     Rehab Potential (PT)  fair, will monitor progress closely  -CW     Criteria for Skilled Interventions Met (PT)  yes;meets criteria;skilled treatment is necessary  -CW     Row Name 08/05/21 1241          Therapy Plan Review/Discharge Plan (PT)    Therapy Plan Review (PT)  evaluation/treatment results reviewed;care plan/treatment goals reviewed  -CW       User Key  (r) = Recorded By, (t) = Taken By, (c) = Cosigned By    Initials Name Provider Type    Mildred Herman, RN Registered Nurse    Ki Shah, PT Physical Therapist        Physical Therapy Education                 Title: PT OT SLP Therapies (Done)     Topic: Physical Therapy (Done)     Point: Mobility training (Done)     Learning Progress Summary           Patient Acceptance, E,TB, VU by FRANCISCO at 8/5/2021 0908    Acceptance, E, VU,NR by  at 7/31/2021 1239    Comment: Pt will benefit from further education regarding safe mobility and exercises to improve independence.                   Point: Home exercise program (Done)     Learning Progress Summary           Patient Acceptance, E,TB, VU by FRANCISCO at 8/5/2021 0908    Acceptance, E, VU,NR by  at 7/31/2021 1239    Comment: Pt will benefit from further education regarding safe mobility and exercises  to improve independence.                   Point: Body mechanics (Done)     Learning Progress Summary           Patient Acceptance, E,TB, VU by  at 8/5/2021 0908    Acceptance, E, VU,NR by  at 7/31/2021 1239    Comment: Pt will benefit from further education regarding safe mobility and exercises to improve independence.                   Point: Precautions (Done)     Learning Progress Summary           Patient Acceptance, E,TB, VU by  at 8/5/2021 0908    Acceptance, E, VU,NR by  at 7/31/2021 1239    Comment: Pt will benefit from further education regarding safe mobility and exercises to improve independence.                               User Key     Initials Effective Dates Name Provider Type Discipline     06/16/21 -  Radha Garcia, RN Registered Nurse Nurse     06/16/21 -  Lawanda Duran, SUKH Physical Therapist PT              PT Recommendation and Plan  Anticipated Discharge Disposition (PT): home with assist, home with home health, home with outpatient therapy services  Therapy Frequency (PT): 6 times/wk  Progress Summary (PT)  Progress Toward Functional Goals (PT): progress toward functional goals is fair  Daily Progress Summary (PT): Patient demonstrates continued ability for ambulation this date despite report of increased fatigue/pain this date.  Plan of Care Reviewed With: patient       Time Calculation:   PT Charges     Row Name 08/05/21 1249             Time Calculation    PT Received On  08/05/21  -CW      PT Goal Re-Cert Due Date  08/19/21  -CW         Time Calculation- PT    Total Timed Code Minutes- PT  40 minute(s)  -CW        User Key  (r) = Recorded By, (t) = Taken By, (c) = Cosigned By    Initials Name Provider Type    CW Ki Reyes PT Physical Therapist        Therapy Charges for Today     Code Description Service Date Service Provider Modifiers Qty    65103354926 HC GAIT TRAINING EA 15 MIN 8/4/2021 Ki Reyes, PT GP 1    26310843863 HC PT THERAPEUTIC ACT EA 15 MIN  8/4/2021 Ki Reyes, PT GP 1    09880580253 HC PT RE-EVAL ESTABLISHED PLAN 2 8/5/2021 Ki Reyes, PT GP 1    86642119703 HC PT THER PROC EA 15 MIN 8/5/2021 Ki Reyes, PT GP 1               Ki Reyes, PT  8/5/2021

## 2021-08-05 NOTE — CASE MANAGEMENT/SOCIAL WORK
Discharge Planning Assessment   [place]     Patient Name: Aidee Trinidad  MRN: [id number]  Today's Date: 8/5/2021    Admit Date: 7/17/2021    Discharge Plan     Row Name 08/05/21 1641       Plan    Final Discharge Disposition Code  06 - home with home health care    Final Note Pt is being discharged home on this date. SS faxed DME orders for bedside commode and hospital bed to [facility] [phone] and confirmed order with [employer] [phone]. DME is to be delivered to pt's home. SS faxed  referral to [facility] [phone]. SS to provide RN with number to call report. SS to fax AVS to [facility] when available. SS to arrange [facility] for transportation and notify pt's daughter of discharge. No other needs identified.            Durable Medical Equipment Coordination complete.    Service Provider Request Status Selected Services Address Phone Fax Patient Preferred    [facility]   Selected Durable Medical Equipment [id number] [place] [address] [phone] --          Home Medical Care     Service Provider Request Status Selected Services Address Phone Fax Patient Preferred    [facility]  Pending - No Request Sent N/A [address] [place] [address] [place] [id number] [phone] --              RAYNA Yu

## 2021-08-05 NOTE — CASE MANAGEMENT/SOCIAL WORK
Discharge Planning Assessment   [place]     Patient Name: Aidee Trinidad  MRN: [id number]  Today's Date: 8/5/2021    Admit Date: 7/17/2021      Discharge Plan     Row Name 08/05/21 1044       Plan    Plan SS discussed pt with PA, Brittany Colon, Dr. Graves and Exectutive Director, Yessy Hoang. SS spoke to admission RN, Bisi Palmer at [facility] inpatient rehab and she states pt does not meet criteria for admission. SS contacted daughter, Puja Montgomery at [phone]. SS informed pt's dtr that pt does not meet criteria for [facility] inpatient rehab admission. Pt's dtr has been educated about discharge options including private pay SNF placement, private pay caregivers for home, and home health services. Pt's dtr declines SNF placement. [facility] who states pt would not qualify for Medicaid pending admission due to already having [employer] [facility] [place]. SS discussed DME and transportation with pt's dtr. SS recommends a hospital bed with trapeze bar and bedside commode to be arranged. Pt's dtr voices no preference for home health agency or DME company. Pt's dtr voices being unable transport pt home via private auto. SS informed pt's dtr that EMS can be arranged. SS spoke to Dr. Graves and made him aware of home needs, including home health services, hospital bed with trapeze bar and bedside commode. SS to follow.        Continued Care and Services - Admitted Since 7/17/2021     Destination     Service Provider Request Status Selected Services Address Phone Fax Patient Preferred    [facility]  Declined N/A [address] [id number] [phone] --     [facility]  [facility] N[facility] [address] [facility] [place] [id number] [phone] --              RAYNA Daniels

## 2021-08-05 NOTE — PLAN OF CARE
Goal Outcome Evaluation:        Patient resting in bed, complaints of pain relived with pain medication.  Patient got up to chair with PT today but only stayed about 45 minutes.  Ortho reevaluated right hip incision due to drainage.; dressing changes ordered and xray obtained.  Awaiting results of xray.  No other issues noted at this time.

## 2021-08-05 NOTE — DISCHARGE INSTR - APPOINTMENTS
Follow-up appointment scheduled with Primary Care Provider, LAILA Galvan on 8/11/21 at 2:30 pm.  You can reach the office at .    Your Primary Care Provider will be doing a referral/ appointment for GI Clinic to see you in one month.  You can reach the office at .    Follow-up appointment scheduled with Ortho, Dr Ponce on 8/23/21 at 1:00 pm.  Office number is [phone].    [facility] has been arranged for home services.  You can reach the office at .  [facility] to do labs (BMP) on August 10, 2021 with results to LAILA Galvan.

## 2021-08-05 NOTE — PROGRESS NOTES
Patient Identification:  Name:  Aidee Trinidad  Age:  56 y.o.  Sex:  female  :  1965  MRN:  [id number]  Visit Number:  [id number]  Primary Care Provider:  Azalea Burnett APRN    Length of stay:  19    Subjective/Interval History/Consultants/Procedures     Chief complaint: Follow-up hip fracture, weakness    HPI/Hospital course:     Aidee Trinidad is a 56-year-old female with PMH significant for known fatty liver disease, diabetes, depression, coronary artery disease, hypertension.  She presented to urgency department at [facility] on 2021 for further evaluation of right-sided hip pain after sustaining a fall from the shower when standing up from her shower chair when turning water off.  She landed on her right side and was wedged between chair and while the shower.  Her son was reportedly home and called an ambulance.  She was brought to the emergency department at [facility] for further evaluation and treatment.  Of note, she did also break her right ankle around 1 month ago.     Mrs. Trinidad underwent orthopedic surgery with right hip cephalo-medullary nailing with FNA nail.  She continued to have difficulty ambulating following this with reports of ongoing weakness throughout her body.   She underwent MRI of her L-spine withouth known significant source of weakness.     She developed urinary retention while hospitalized and required troy catheter to be anchored on 21 AM.  She later underwent bladder training with troy catheter removal. Urinalysis obtained revealed 25,000 CFU yeast and less than 10,000 CFU gram-positive cocci.  Not felt to be an acute urinary tract infection, thus was not treated.    Given prior right knee surgery and now right hip fracture with prior right lower extremity weakness, Mrs. Trinidad has had some slowed progression with ambulatory abilities.  She was denied inpatient rehab while hospitalized.   She initially thought she may go  home with her daughter.  She has continued to work with physical therapy.       Consultants:   Consults     Date and Time Order Name Status Description    8/4/2021 11:06 AM Inpatient Orthopedic Surgery Consult      7/17/2021  7:52 PM Ortho (on-call MD unless specified) Completed             Procedures:   · 7/18/2021: Right hip cephalo-medullary nailing with TFNA nail -- Dr. Ponce, orthopedic surgery   · 7/23/2021: Transthoracic echocardiogram   · Normal left ventricular cavity size and wall thickness noted.  · Left ventricular ejection fraction appears to be 56 - 60%. Left ventricular systolic function is normal.  · Left ventricular diastolic function is consistent with (grade II w/high LAP) pseudonormalization.  · Normal cardiac chamber dimensions.  · The aortic valve is structurally normal. Doppler interrogation was not done across the aortic valve.  · The mitral valve is structurally normal with no regurgitation or significant stenosis present  · Mild tricuspid valve regurgitation is present.  · Estimated right ventricular systolic pressure from tricuspid regurgitation is mildly elevated (35-45 mmHg). Mild pulmonary hypertension is present.  · The pulmonic valve is not well visualized.  · There is a trivial pericardial effusion.  · 7/23/2021: Velazquez catheter insertion d/t urinary retention   · 7/29/2021: Velazquez catheter removal        Subjective:      Mrs. Trinidad is resting in bed.  She reports she feels she is progressing with therapies aside from ongoing right lower extremity weakness that has been present throughout hospitalization with recent hip fracture, right  ankle fracture, and prior right knee replacement procedures.  She denies chest pain, dyspnea, cough, and wheeze.          ----------------------------------------------------------------------------------------------------------------------  Current [facility] Meds:  apixaban, 2.5 mg, Oral, Q12H  aspirin, 81 mg, Oral, Daily  cetirizine, 10 mg, Oral,  Daily  cholecalciferol, 50,000 Units, Oral, Q7 Days  ferrous sulfate, 325 mg, Oral, Daily With Breakfast  furosemide, 40 mg, Oral, Daily  gabapentin, 400 mg, Oral, Q12H  insulin aspart, 0-7 Units, Subcutaneous, TID AC  levothyroxine, 25 mcg, Oral, Daily  metoprolol tartrate, 25 mg, Oral, Q12H  pantoprazole, 40 mg, Oral, Q AM  QUEtiapine, 25 mg, Oral, Nightly  sertraline, 50 mg, Oral, Daily  sodium chloride, 10 mL, Intravenous, Q12H  sodium chloride, 3 mL, Intravenous, Q12H         ----------------------------------------------------------------------------------------------------------------------      Objective     Vital Signs:  Temp:  [97.8 °F (36.6 °C)-98.2 °F (36.8 °C)] 97.8 °F (36.6 °C)  Heart Rate:  [67-77] 67  Resp:  [16-20] 20  BP: (106-128)/(51-69) 119/59      07/17/21  1849 07/17/21  2224 07/18/21  0500   Weight: 107 kg (235 lb) 111 kg (245 lb 3.2 oz) 111 kg (245 lb 3.2 oz)     Body mass index is 53.06 kg/m².    Intake/Output Summary (Last 24 hours) at 8/5/2021 1012  Last data filed at 8/4/2021 1739  Gross per 24 hour   Intake 1080 ml   Output --   Net 1080 ml     No intake/output data recorded.  Diet Regular; Consistent Carbohydrate  ----------------------------------------------------------------------------------------------------------------------    Physical Exam  Vitals and nursing note reviewed.   Constitutional:       Appearance: Normal appearance.   HENT:      Head: Normocephalic and atraumatic.   Eyes:      General: Lids are normal.      Extraocular Movements: Extraocular movements intact.      Pupils: Pupils are equal, round, and reactive to light.   Cardiovascular:      Rate and Rhythm: Normal rate and regular rhythm.      Heart sounds: S1 normal and S2 normal.   Pulmonary:      Effort: No tachypnea or bradypnea.      Breath sounds: No decreased breath sounds, wheezing, rhonchi or rales.   Abdominal:      General: Bowel sounds are normal.      Palpations: Abdomen is soft.      Tenderness: There  is no abdominal tenderness.   Musculoskeletal:      Cervical back: Full passive range of motion without pain.   Skin:     General: Skin is warm and dry.   Neurological:      Mental Status: She is alert and oriented to person, place, and time.   Psychiatric:         Attention and Perception: Attention normal.         Mood and Affect: Mood normal.         Behavior: Behavior normal.            ----------------------------------------------------------------------------------------------------------------------  Tele:            ----------------------------------------------------------------------------------------------------------------------      Results from last 7 days   Lab Units 08/04/21 0150 08/03/21 0443 08/02/21  0404   WBC 10*3/mm3 8.45 7.70 7.48   HEMOGLOBIN g/dL 9.8* 9.7* 9.2*   HEMATOCRIT % 32.8* 32.1* 30.0*   MCV fL 103.1* 101.3* 100.0*   MCHC g/dL 29.9* 30.2* 30.7*   PLATELETS 10*3/mm3 279 271 260         Results from last 7 days   Lab Units 08/05/21  0053 08/04/21  0150 08/03/21 0443 08/02/21 0404 08/02/21 0404   SODIUM mmol/L  --  138 139  --  138   POTASSIUM mmol/L  --  4.0 3.8  --  4.0   MAGNESIUM mg/dL 2.0 1.7 2.0   < > 1.6   CHLORIDE mmol/L  --  104 105  --  104   CO2 mmol/L  --  24.6 25.8  --  24.9   BUN mg/dL  --  10 8  --  9   CREATININE mg/dL  --  0.65 0.51*  --  0.56*   EGFR IF NONAFRICN AM mL/min/1.73  --  94 125  --  112   CALCIUM mg/dL  --  9.0 9.0  --  8.8   GLUCOSE mg/dL  --  117* 99  --  130*   ALBUMIN g/dL  --   --  2.89*  --   --    BILIRUBIN mg/dL  --   --  1.0  --   --    ALK PHOS U/L  --   --  284*  --   --    AST (SGOT) U/L  --   --  43*  --   --    ALT (SGPT) U/L  --   --  22  --   --     < > = values in this interval not displayed.   Estimated Creatinine Clearance: 109.4 mL/min (by C-G formula based on SCr of 0.65 mg/dL).  No results found for: AMMONIA      No results found for: BLOODCX  No results found for: URINECX  No results found for: WOUNDCX  No results found for:  STOOLCX  ----------------------------------------------------------------------------------------------------------------------  Imaging Results (Last 24 Hours)     ** No results found for the last 24 hours. **        ----------------------------------------------------------------------------------------------------------------------   I have reviewed the above laboratory values for 08/05/21    Assessment/Plan     Active Hospital Problems    Diagnosis  POA   • **Closed fracture of right hip (CMS/[facility]) [S72.001A]  Yes   • CAD (coronary artery disease) [I25.10]  Yes   • Essential hypertension [I10]  Yes   • Fatty liver [K76.0]  Yes         ASSESSMENT/PLAN:  Acute, minimally displaced right proximal intertrochanteric femur fracture s/p mechanical fall at home, s/p repair 7/18/2021  -History of right lateral malleolus avulsion fracture with tiny fragmentation 4/2021  -History of TKA right in past   -Generalized weakness, debility, inability to complete ADLs currently due to acute fracture   · Pt is s/p ORIF 7/18; Supposed to have two week follow-up with orthopedics.  This time has now passed given extended hospitalization.   · Ortho consult pending at present for two week follow-up as she remains hospitalized given hurdles with dispo planning with prior insurance decline by rehab, slow mobilization with no swing bed coverage, and prior denial by SNF with no SNF coverage.  Discussed at length with SS regarding dispo planning.    · PT on board,rehab declined.   · Swing bed consult placed.   · Patient does report neuropathy in RLE, CT lumbar spine with no acute abnormalities. She has good motor function on exam   · MRI L spine without known acute source of weakness.    · Supportive care   · WBAT   · Eliquis on board for DVT proph for 35 days post-op.   · Vitamin D adequate    · NV checks   · Ortho input/assistance is appreciated.      RLE > LLE weakness, neuropathy   · No focal deficits    · Head CT previously unremarkable  7/21  · MRI lumbar spine 7/23 with no acute abnormalities   · Cont Neurontin   · ?outpatient neuro referral      -Right foot pain   · Pt with history of right lateral malleolus avulsion fx 4/2021  · Reports pain in right foot similar to gout episode in past, more pain towards her great toe on right foot   · XR unremarkable, uric acid level not elevated  · Supportive care   · PRN pain medication      -Hypokalemia   -Borderline hypomagnesemia   · Resolved with supplementation   · Replace electrolytes per protocol as necessary   · Repeat BMP and mag level in AM     -Constipation, resolved   · Continue bowel regimen, change to PRN as pt complains of abdominal cramping and nausea      -Urinary retention, resolved   · Patient with urinary retention earlier in hospitalization requiring anchoring of troy catheter   · Patient also with generalized weakness and constipation, as such MRI of lumbar spine obtained previously with no acute findings.   · Urinalysis with 25K CFU yeast and <10K CFU gram positive cocci . No treatment warranted at this time, discussed with Dr. Taylor (attending physician)  · Suspect that urine retention will improve with mobilization.   · Bladder training completed, troy catheter previously removed. She is now voiding on her own.      -Macrocytic anemia   -Thrombocytopenia, resolved   · Likely ABLA post op  · Hemoglobin stable   · Platelets stable, ultrasound of spleen previously unremarkable  · No active bleeding noted or reported   · Vitamin B12 and folate WNL. Iron profile with slightly low iron and iron sat but stable ferritin. Pt previously started on PO iron, will continue.   · Monitor closely, repeat CBC in AM     -Transaminitis, felt to be 2/2 CPK elevation   -Hyperbilirubinemia, stable   -Hypoalbuminemia  -Hepatitis B IgM/IgG  · Abdomen non tender   · LFTs stable   · US Liver with marked cirrhosis said to be advanced with nodular liver margins.   · US spleen unremarkable.  · CPK  improved  · Avoid nephrotoxins as much as possible   · Repeat chem panel in AM     -Grade II diastolic dysfunction   · ECHO reviewed   · Appears grossly compensated   · Monitor volume status closely   · Restart home Lasix on 8/2/21     -Essential hypertension  · BP appears well controlled   · Continue home metoprolol with appropriate holding parameters to prevent hypotension and/or bradycardia   · Home lasix previously held.  Blood pressure and renal function stable, as such we will resume home Lasix dose.  · Closely monitor BP per hospital protocol, titrate medications as necessary     -History of coronary artery disease s/p CABG in past   -Cardiomegaly   · Patient denies any chest pain   · Statin held previously due to elevated CPK level. CPK still elevated, but significantly improved.  · TTE obtained with preserved EF and grade II diastolic dysfunction   · Eliquis on board for VTE proph, cont daily aspirin  · Home lasix restarted  · Monitor closely on telemetry      -Type II diabetes mellitus, non insulin dependent  -Peripheral neuropathy   · HgbA1C 6.1, well controlled. BG levels controlled.   · Continue home Metformin for now   · Continue low dose SSI, titrate dosage as necessary   · Closely monitor blood glucose levels with accuchecks QAC and QHS  · Hypoglycemia protocol in place should it be necessary  · Continue home Neurontin for neuropathy      -Hypothyroidism   · TSH adequately replaced   · Continue home dose of levothyroxine      -Depression   -Anxiety   · Supportive care   · Continue home Zoloft      -Morbid obesity, BMI 53.06  · Supportive care   · Affecting all aspects of care       -----------  -DVT prophylaxis: Eliquis 2.5mg BID  -GI prophylaxis: PPI  -Activity: WBAT  -Disposition:SS on board. Insurance declined rehab. Swing bed consulted.  Discussed with SS.   -Diet:   Dietary Orders (From admission, onward)     Start     Ordered    07/28/21 1112  Diet Regular; Consistent Carbohydrate  Diet  Effective Now     Question Answer Comment   Diet Texture / Consistency Regular    Common Modifiers Consistent Carbohydrate        07/28/21 1111                    The patient is high risk due to the following diagnoses/reasons:  Recent right hip fracture s/p repair, difficulty mobilizing, Essential HTN,         Brittany Colon PA-C  08/05/21  10:12 EDT  Pager # [phone]

## 2021-08-05 NOTE — PAYOR COMM NOTE
"CONTACT:  Zoya Muñoz RN    Utilization Management Dept.   [facility]  [address] [place] [address]    Phone:[phone]  Fax: [phone]    REQUEST FOR ADDITIONAL DAYS    REF # [id number]        Aidee Sancehz (56 y.o. Female)     Date of Birth Social Security Number Address Home Phone MRN    1965  [address] [id number]    Adventism Marital Status          Hinduism        Admission Date Admission Type Admitting Provider Attending Provider Department, Room/Bed    7/17/21 Emergency Gill Sanchez DO Heinss, Karl F, MD [address], [address]50[address]    Discharge Date Discharge Disposition Discharge Destination                       Attending Provider: Jeff Graves MD    Allergies: Nuts, Codeine, Latex    Isolation: None   Infection: None   Code Status: CPR    Ht: 144.8 cm (57\")   Wt: 111 kg (245 lb 3.2 oz)    Admission Cmt: None   Principal Problem: Closed fracture of right hip (CMS/HCC) [S72.001A]                 Active Insurance as of 7/17/2021     Primary Coverage     Payor Plan Insurance Group Employer/Plan Group    [facility]      Payor Plan Address Payor Plan Phone Number Payor Plan Fax Number Effective Dates    [address] [address]   7/1/2015 - None Entered    [place] [address]       Subscriber Name Subscriber Birth Date Member ID       AIDEE SANCHEZ 1965 [id number]                 Emergency Contacts      (Rel.) Home Phone Work Phone Mobile Phone    NA CACERES (Daughter) [phone] -- --            Vital Signs (last day)     Date/Time   Temp   Temp src   Pulse   Resp   BP   Patient Position   SpO2    08/05/21 0710   97.8 (36.6)   Oral   67   20   119/59   Lying   99    08/05/21 0300   98 (36.7)   Oral   68   16   128/69   Lying   99    08/04/21 2300   98.2 (36.8)   Oral   72   18   106/51   Lying   97    08/04/21 1900   97.8 (36.6)   Oral   " 77   16   124/56   Lying   97    08/04/21 1424   97.9 (36.6)   Oral   77   18   128/64   Lying   95    08/04/21 1027   97.9 (36.6)   Oral   71   18   124/52   Lying   --    08/04/21 0835   --   --   78   --   117/71   --   --    08/04/21 0635   97.8 (36.6)   Oral   66   18   98/50   Lying   96    08/04/21 0300   98.2 (36.8)   Oral   72   18   106/59   Lying   97    08/04/21 0156   --   --   71   --   135/61   Lying   --              Current Facility-Administered Medications   Medication Dose Route Frequency Provider Last Rate Last Admin   • acetaminophen (TYLENOL) tablet 650 mg  650 mg Oral Q6H PRN Jose Alfredo Taylor DO   650 mg at 07/31/21 1047   • apixaban (ELIQUIS) tablet 2.5 mg  2.5 mg Oral Q12H Oracio Ponce DO   2.5 mg at 08/05/21 0841   • aspirin EC tablet 81 mg  81 mg Oral Daily Jeff Graves MD   81 mg at 08/05/21 0841   • bisacodyl (DULCOLAX) suppository 10 mg  10 mg Rectal Daily PRN Brittany Colon PA-C   10 mg at 07/24/21 1540   • calcium carbonate (TUMS) chewable tablet 500 mg (200 mg elemental)  2 tablet Oral TID PRN Oracio Ponce DO       • cetirizine (zyrTEC) tablet 10 mg  10 mg Oral Daily Oracio Ponce DO   10 mg at 08/05/21 0841   • cholecalciferol (VITAMIN D3) capsule 50,000 Units  50,000 Units Oral Q7 Days Oracio Ponce DO   50,000 Units at 07/30/21 0826   • dextrose (D50W) 25 g/ 50mL Intravenous Solution 25 g  25 g Intravenous Q15 Min PRN Oracio Ponce, DO       • dextrose (GLUTOSE) oral gel 15 g  15 g Oral Q15 Min PRN Oracio Ponce, DO       • Diclofenac Sodium (VOLTAREN) 1 % gel 4 g  4 g Topical 4x Daily PRN Oracio Ponce, DO       • ferrous sulfate tablet 325 mg  325 mg Oral Daily With Breakfast Oracio Ponce,    325 mg at 08/05/21 0841   • furosemide (LASIX) tablet 40 mg  40 mg Oral Daily Ignacia Solitario PA   40 mg at 08/05/21 0841   • gabapentin (NEURONTIN) capsule 400 mg  400 mg Oral Q12H Oracio Ponce,    400 mg at 08/05/21 0841   • glucagon  (human recombinant) (GLUCAGEN DIAGNOSTIC) injection 1 mg  1 mg Subcutaneous Q15 Min PRN Oracio Ponce, DO       • HYDROcodone-acetaminophen (NORCO) 5-325 MG per tablet 1 tablet  1 tablet Oral BID PRN Ignacia Solitario PA   1 tablet at 08/05/21 0313   • hydrOXYzine (ATARAX) tablet 25 mg  25 mg Oral TID PRN Oracio Ponce, DO   25 mg at 08/01/21 0902   • insulin aspart (novoLOG) injection 0-7 Units  0-7 Units Subcutaneous TID AC Oracio Ponce, DO   2 Units at 08/03/21 1642   • levothyroxine (SYNTHROID, LEVOTHROID) tablet 25 mcg  25 mcg Oral Daily Oracio Ponce DO   25 mcg at 08/05/21 0841   • Magnesium Sulfate 2 gram Bolus, followed by 8 gram infusion (total Mg dose 10 grams)- Mg less than or equal to 1mg/dL  2 g Intravenous PRN Brittany Colon PA-C        Or   • Magnesium Sulfate 2 gram / 50mL Infusion (GIVE X 3 BAGS TO EQUAL 6GM TOTAL DOSE) - Mg 1.1 - 1.5 mg/dl  2 g Intravenous PRN Brittany Colon PA-C        Or   • Magnesium Sulfate 4 gram infusion- Mg 1.6-1.9 mg/dL  4 g Intravenous PRN Brittany Colon PA-C       • metoprolol tartrate (LOPRESSOR) tablet 25 mg  25 mg Oral Q12H Oracio Ponce, DO   25 mg at 08/05/21 0841   • naloxone (NARCAN) injection 0.1 mg  0.1 mg Intravenous Q5 Min PRN Oracio Ponce DO       • nitroglycerin (NITROSTAT) SL tablet 0.4 mg  0.4 mg Sublingual Q5 Min PRN Oracio Ponce, DO       • pantoprazole (PROTONIX) EC tablet 40 mg  40 mg Oral Q AM Oracio Ponce, DO   40 mg at 08/05/21 0543   • polyethylene glycol (MIRALAX) packet 17 g  17 g Oral Daily PRN Ignacia Solitario PA       • potassium chloride (K-DUR,KLOR-CON) CR tablet 40 mEq  40 mEq Oral PRN Brittany Colon PA-C   40 mEq at 07/25/21 0531    Or   • potassium chloride (KLOR-CON) packet 40 mEq  40 mEq Oral PRN Brittany Colon PA-C        Or   • potassium chloride 10 mEq in 100 mL IVPB  10 mEq Intravenous Q1H PRN Brittany Colon PA-C       • prochlorperazine (COMPAZINE) injection 5  mg  5 mg Intravenous Q6H PRN Rosario, Oracio K, DO   5 mg at 07/31/21 1048   • QUEtiapine (SEROquel) tablet 25 mg  25 mg Oral Nightly Brittany Colon PA-C   25 mg at 08/04/21 2047   • sennosides-docusate (PERICOLACE) 8.6-50 MG per tablet 2 tablet  2 tablet Oral BID PRN Ignacia Solitario PA       • sertraline (ZOLOFT) tablet 50 mg  50 mg Oral Daily Virgienson, Oracio K, DO   50 mg at 08/05/21 0841   • sodium chloride 0.9 % flush 10 mL  10 mL Intravenous PRN Virgienson, Oracio K, DO       • sodium chloride 0.9 % flush 10 mL  10 mL Intravenous Q12H Franson, Oracio K, DO   10 mL at 08/05/21 0841   • sodium chloride 0.9 % flush 10 mL  10 mL Intravenous PRN Virgienson, Oracio K, DO       • sodium chloride 0.9 % flush 3 mL  3 mL Intravenous Q12H Franson, Oracio K, DO   3 mL at 08/04/21 2048     Lab Results (last 24 hours)     Procedure Component Value Units Date/Time    POC Glucose Once [[id number]]  (Normal) Collected: 08/05/21 1037    Specimen: Blood Updated: 08/05/21 1043     Glucose 120 mg/dL      Comment: Meter: [id number] : [id number] Jose Garcia       POC Glucose Once [[id number]]  (Normal) Collected: 08/05/21 0658    Specimen: Blood Updated: 08/05/21 0704     Glucose 104 mg/dL      Comment: Meter: [id number] : [id number] HASTINGS ALLI       Magnesium [[id number]]  (Normal) Collected: 08/05/21 0053    Specimen: Blood Updated: 08/05/21 0206     Magnesium 2.0 mg/dL     POC Glucose Once [[id number]]  (Abnormal) Collected: 08/04/21 2050    Specimen: Blood Updated: 08/04/21 2056     Glucose 137 mg/dL      Comment: Meter: [id number] : [id number] SCAR ENRIQUE       POC Glucose Once [[id number]]  (Abnormal) Collected: 08/04/21 1654    Specimen: Blood Updated: 08/04/21 1701     Glucose 149 mg/dL      Comment: Meter: [id number] : [id number] [employer]           Imaging Results (Last 24 Hours)     ** No results found for the last 24 hours. **        ECG/EMG Results (last 24 hours)     ** No results found for the last 24  hours. **           Physician Progress Notes (most recent note)      Brittany Colon PA-C at 21 0712          Patient Identification:  Name:  Aidee Trinidad  Age:  56 y.o.  Sex:  female  :  1965  MRN:  [id number]  Visit Number:  [id number]  Primary Care Provider:  Azalea Burnett APRN    Length of stay:  19    Subjective/Interval History/Consultants/Procedures     Chief complaint: Follow-up hip fracture, weakness    HPI/Hospital course:     Aidee Trinidad is a 56-year-old female with PMH significant for known fatty liver disease, diabetes, depression, coronary artery disease, hypertension.  She presented to urgency department at [facility] on 2021 for further evaluation of right-sided hip pain after sustaining a fall from the shower when standing up from her shower chair when turning water off.  She landed on her right side and was wedged between chair and while the shower.  Her son was reportedly home and called an ambulance.  She was brought to the emergency department at [facility] for further evaluation and treatment.  Of note, she did also break her right ankle around 1 month ago.     Mrs. Trinidad underwent orthopedic surgery with right hip cephalo-medullary nailing with FNA nail.  She continued to have difficulty ambulating following this with reports of ongoing weakness throughout her body.   She underwent MRI of her L-spine withouth known significant source of weakness.     She developed urinary retention while hospitalized and required troy catheter to be anchored on 21 AM.  She later underwent bladder training with troy catheter removal. Urinalysis obtained revealed 25,000 CFU yeast and less than 10,000 CFU gram-positive cocci.  Not felt to be an acute urinary tract infection, thus was not treated.    Given prior right knee surgery and now right hip fracture with prior right lower extremity weakness, Mrs. Trinidad has had some slowed  progression with ambulatory abilities.  She was denied inpatient rehab while hospitalized.   She initially thought she may go home with her daughter.  She has continued to work with physical therapy.       Consultants:   Consults     Date and Time Order Name Status Description    8/4/2021 11:06 AM Inpatient Orthopedic Surgery Consult      7/17/2021  7:52 PM Ortho (on-call MD unless specified) Completed             Procedures:   · 7/18/2021: Right hip cephalo-medullary nailing with TFNA nail -- Dr. Ponce, orthopedic surgery   · 7/23/2021: Transthoracic echocardiogram   · Normal left ventricular cavity size and wall thickness noted.  · Left ventricular ejection fraction appears to be 56 - 60%. Left ventricular systolic function is normal.  · Left ventricular diastolic function is consistent with (grade II w/high LAP) pseudonormalization.  · Normal cardiac chamber dimensions.  · The aortic valve is structurally normal. Doppler interrogation was not done across the aortic valve.  · The mitral valve is structurally normal with no regurgitation or significant stenosis present  · Mild tricuspid valve regurgitation is present.  · Estimated right ventricular systolic pressure from tricuspid regurgitation is mildly elevated (35-45 mmHg). Mild pulmonary hypertension is present.  · The pulmonic valve is not well visualized.  · There is a trivial pericardial effusion.  · 7/23/2021: Velazquez catheter insertion d/t urinary retention   · 7/29/2021: Velazquez catheter removal        Subjective:      Mrs. Trinidad is resting in bed.  She reports she feels she is progressing with therapies aside from ongoing right lower extremity weakness that has been present throughout hospitalization with recent hip fracture, right  ankle fracture, and prior right knee replacement procedures.  She denies chest pain, dyspnea, cough, and wheeze.           ----------------------------------------------------------------------------------------------------------------------  Current Hospital Meds:  apixaban, 2.5 mg, Oral, Q12H  aspirin, 81 mg, Oral, Daily  cetirizine, 10 mg, Oral, Daily  cholecalciferol, 50,000 Units, Oral, Q7 Days  ferrous sulfate, 325 mg, Oral, Daily With Breakfast  furosemide, 40 mg, Oral, Daily  gabapentin, 400 mg, Oral, Q12H  insulin aspart, 0-7 Units, Subcutaneous, TID AC  levothyroxine, 25 mcg, Oral, Daily  metoprolol tartrate, 25 mg, Oral, Q12H  pantoprazole, 40 mg, Oral, Q AM  QUEtiapine, 25 mg, Oral, Nightly  sertraline, 50 mg, Oral, Daily  sodium chloride, 10 mL, Intravenous, Q12H  sodium chloride, 3 mL, Intravenous, Q12H         ----------------------------------------------------------------------------------------------------------------------      Objective     Vital Signs:  Temp:  [97.8 °F (36.6 °C)-98.2 °F (36.8 °C)] 97.8 °F (36.6 °C)  Heart Rate:  [67-77] 67  Resp:  [16-20] 20  BP: (106-128)/(51-69) 119/59      07/17/21  1849 07/17/21  2224 07/18/21  0500   Weight: 107 kg (235 lb) 111 kg (245 lb 3.2 oz) 111 kg (245 lb 3.2 oz)     Body mass index is 53.06 kg/m².    Intake/Output Summary (Last 24 hours) at 8/5/2021 1012  Last data filed at 8/4/2021 1739  Gross per 24 hour   Intake 1080 ml   Output --   Net 1080 ml     No intake/output data recorded.  Diet Regular; Consistent Carbohydrate  ----------------------------------------------------------------------------------------------------------------------    Physical Exam  Vitals and nursing note reviewed.   Constitutional:       Appearance: Normal appearance.   HENT:      Head: Normocephalic and atraumatic.   Eyes:      General: Lids are normal.      Extraocular Movements: Extraocular movements intact.      Pupils: Pupils are equal, round, and reactive to light.   Cardiovascular:      Rate and Rhythm: Normal rate and regular rhythm.      Heart sounds: S1 normal and S2 normal.    Pulmonary:      Effort: No tachypnea or bradypnea.      Breath sounds: No decreased breath sounds, wheezing, rhonchi or rales.   Abdominal:      General: Bowel sounds are normal.      Palpations: Abdomen is soft.      Tenderness: There is no abdominal tenderness.   Musculoskeletal:      Cervical back: Full passive range of motion without pain.   Skin:     General: Skin is warm and dry.   Neurological:      Mental Status: She is alert and oriented to person, place, and time.   Psychiatric:         Attention and Perception: Attention normal.         Mood and Affect: Mood normal.         Behavior: Behavior normal.            ----------------------------------------------------------------------------------------------------------------------  Tele:            ----------------------------------------------------------------------------------------------------------------------      Results from last 7 days   Lab Units 08/04/21  0150 08/03/21 0443 08/02/21  0404   WBC 10*3/mm3 8.45 7.70 7.48   HEMOGLOBIN g/dL 9.8* 9.7* 9.2*   HEMATOCRIT % 32.8* 32.1* 30.0*   MCV fL 103.1* 101.3* 100.0*   MCHC g/dL 29.9* 30.2* 30.7*   PLATELETS 10*3/mm3 279 271 260         Results from last 7 days   Lab Units 08/05/21  0053 08/04/21  0150 08/03/21 0443 08/02/21  0404 08/02/21  0404   SODIUM mmol/L  --  138 139  --  138   POTASSIUM mmol/L  --  4.0 3.8  --  4.0   MAGNESIUM mg/dL 2.0 1.7 2.0   < > 1.6   CHLORIDE mmol/L  --  104 105  --  104   CO2 mmol/L  --  24.6 25.8  --  24.9   BUN mg/dL  --  10 8  --  9   CREATININE mg/dL  --  0.65 0.51*  --  0.56*   EGFR IF NONAFRICN AM mL/min/1.73  --  94 125  --  112   CALCIUM mg/dL  --  9.0 9.0  --  8.8   GLUCOSE mg/dL  --  117* 99  --  130*   ALBUMIN g/dL  --   --  2.89*  --   --    BILIRUBIN mg/dL  --   --  1.0  --   --    ALK PHOS U/L  --   --  284*  --   --    AST (SGOT) U/L  --   --  43*  --   --    ALT (SGPT) U/L  --   --  22  --   --     < > = values in this interval not displayed.    Estimated Creatinine Clearance: 109.4 mL/min (by C-G formula based on SCr of 0.65 mg/dL).  No results found for: AMMONIA      No results found for: BLOODCX  No results found for: URINECX  No results found for: WOUNDCX  No results found for: STOOLCX  ----------------------------------------------------------------------------------------------------------------------  Imaging Results (Last 24 Hours)     ** No results found for the last 24 hours. **        ----------------------------------------------------------------------------------------------------------------------   I have reviewed the above laboratory values for 08/05/21    Assessment/Plan     Active Hospital Problems    Diagnosis  POA   • **Closed fracture of right hip (CMS/[facility]) [S72.001A]  Yes   • CAD (coronary artery disease) [I25.10]  Yes   • Essential hypertension [I10]  Yes   • Fatty liver [K76.0]  Yes         ASSESSMENT/PLAN:  Acute, minimally displaced right proximal intertrochanteric femur fracture s/p mechanical fall at home, s/p repair 7/18/2021  -History of right lateral malleolus avulsion fracture with tiny fragmentation 4/2021  -History of TKA right in past   -Generalized weakness, debility, inability to complete ADLs currently due to acute fracture   · Pt is s/p ORIF 7/18; Supposed to have two week follow-up with orthopedics.  This time has now passed given extended hospitalization.   · Ortho consult pending at present for two week follow-up as she remains hospitalized given hurdles with dispo planning with prior insurance decline by rehab, slow mobilization with no swing bed coverage, and prior denial by SNF with no SNF coverage.  Discussed at length with SS regarding dispo planning.    · PT on board,rehab declined.   · Swing bed consult placed.   · Patient does report neuropathy in RLE, CT lumbar spine with no acute abnormalities. She has good motor function on exam   · MRI L spine without known acute source of weakness.    · Supportive  care   · WBAT   · Eliquis on board for DVT proph for 35 days post-op.   · Vitamin D adequate    · NV checks   · Ortho input/assistance is appreciated.      RLE > LLE weakness, neuropathy   · No focal deficits    · Head CT previously unremarkable 7/21  · MRI lumbar spine 7/23 with no acute abnormalities   · Cont Neurontin   · ?outpatient neuro referral      -Right foot pain   · Pt with history of right lateral malleolus avulsion fx 4/2021  · Reports pain in right foot similar to gout episode in past, more pain towards her great toe on right foot   · XR unremarkable, uric acid level not elevated  · Supportive care   · PRN pain medication      -Hypokalemia   -Borderline hypomagnesemia   · Resolved with supplementation   · Replace electrolytes per protocol as necessary   · Repeat BMP and mag level in AM     -Constipation, resolved   · Continue bowel regimen, change to PRN as pt complains of abdominal cramping and nausea      -Urinary retention, resolved   · Patient with urinary retention earlier in hospitalization requiring anchoring of troy catheter   · Patient also with generalized weakness and constipation, as such MRI of lumbar spine obtained previously with no acute findings.   · Urinalysis with 25K CFU yeast and <10K CFU gram positive cocci . No treatment warranted at this time, discussed with Dr. Taylor (attending physician)  · Suspect that urine retention will improve with mobilization.   · Bladder training completed, troy catheter previously removed. She is now voiding on her own.      -Macrocytic anemia   -Thrombocytopenia, resolved   · Likely ABLA post op  · Hemoglobin stable   · Platelets stable, ultrasound of spleen previously unremarkable  · No active bleeding noted or reported   · Vitamin B12 and folate WNL. Iron profile with slightly low iron and iron sat but stable ferritin. Pt previously started on PO iron, will continue.   · Monitor closely, repeat CBC in AM     -Transaminitis, felt to be 2/2 CPK  elevation   -Hyperbilirubinemia, stable   -Hypoalbuminemia  -Hepatitis B IgM/IgG  · Abdomen non tender   · LFTs stable   · US Liver with marked cirrhosis said to be advanced with nodular liver margins.   · US spleen unremarkable.  · CPK improved  · Avoid nephrotoxins as much as possible   · Repeat chem panel in AM     -Grade II diastolic dysfunction   · ECHO reviewed   · Appears grossly compensated   · Monitor volume status closely   · Restart home Lasix on 8/2/21     -Essential hypertension  · BP appears well controlled   · Continue home metoprolol with appropriate holding parameters to prevent hypotension and/or bradycardia   · Home lasix previously held.  Blood pressure and renal function stable, as such we will resume home Lasix dose.  · Closely monitor BP per hospital protocol, titrate medications as necessary     -History of coronary artery disease s/p CABG in past   -Cardiomegaly   · Patient denies any chest pain   · Statin held previously due to elevated CPK level. CPK still elevated, but significantly improved.  · TTE obtained with preserved EF and grade II diastolic dysfunction   · Eliquis on board for VTE proph, cont daily aspirin  · Home lasix restarted  · Monitor closely on telemetry      -Type II diabetes mellitus, non insulin dependent  -Peripheral neuropathy   · HgbA1C 6.1, well controlled. BG levels controlled.   · Continue home Metformin for now   · Continue low dose SSI, titrate dosage as necessary   · Closely monitor blood glucose levels with accuchecks QAC and QHS  · Hypoglycemia protocol in place should it be necessary  · Continue home Neurontin for neuropathy      -Hypothyroidism   · TSH adequately replaced   · Continue home dose of levothyroxine      -Depression   -Anxiety   · Supportive care   · Continue home Zoloft      -Morbid obesity, BMI 53.06  · Supportive care   · Affecting all aspects of care       -----------  -DVT prophylaxis: Eliquis 2.5mg BID  -GI prophylaxis: PPI  -Activity:  WBAT  -Disposition:SS on board. Insurance declined rehab. Swing bed consulted.  Discussed with SS.   -Diet:   Dietary Orders (From admission, onward)     Start     Ordered    21 1112  Diet Regular; Consistent Carbohydrate  Diet Effective Now     Question Answer Comment   Diet Texture / Consistency Regular    Common Modifiers Consistent Carbohydrate        21 1111                    The patient is high risk due to the following diagnoses/reasons:  Recent right hip fracture s/p repair, difficulty mobilizing, Essential HTN,         Brittany Colon PA-C  21  10:12 EDT  Pager # [phone]      Electronically signed by Brittany Colon PA-C at 21 1012          Consult Notes (most recent note)      Josefina Gaytan APRN at 21 0819      Consult Orders    1. Ortho (on-call MD unless specified) [[id number]] ordered by Gill Sanchez DO          Attestation signed by Oracio Ponce DO at 21 1222    I have reviewed this documentation and agree.                    Inpatient Orthopedic Surgery Consult  Consult performed by: Josefina Gaytan APRN  Consult ordered by: Gill Sanchez DO          Patient Identification:  Name:  Aidee Trinidad  Age:  56 y.o.  Sex:  female  :  1965  MRN:  [id number]  Visit Number:  [id number]  Primary care provider:  Azalea Burnett APRN    History of presenting illness:   Patient is a 56 year old female who was admitted yesterday secondary to acute right hip fracture. Patient states she was at home in the shower and stood up from her shower chair to turn the water off and fell landing on the right side. Her family was able to call an ambulance after the fall secondary to patient being unable to get up by herself and weight bear on the right leg due to pain.     At baseline patient is currently in outpatient physical therapy and ambulates with a Rollator walker. Patient has history of chronic right sided weakness  without specific reason. She has a past medical history significant for CAD, essential hypertension, hypothyroidism, DM II and fatty liver disease.     Patient was noted to have acute intertrochanteric femur fracture of the right side in the emergency department. She was admitted to hospitalist service. She has been NPO after midnight in anticipation for surgery.     Review of Systems:     Constitution: No weight gain  Eyes:  No loss of vision  ENT:  no epistaxis  Respiratory:  no hemoptysis  Cardiovascular: no CP, no SOB.  Gastrointestinal: No abdominal pain  Genitourinary: No hematuria  Integument: No skin rash  Hematologic / Lymphatic: No petechiae  Musculoskeletal: See HPI  Neurological: No seizures   Endocrine: No tremors    Past History:  Family History   Problem Relation Age of Onset   • COPD Mother    • Stroke Mother    • Cancer Brother    • Diabetes Brother    • Hypertension Brother    • Heart disease Brother      Past Medical History:   Diagnosis Date   • Arthritis    • Chronic back pain    • Coronary artery disease    • Depression    • Diabetes mellitus (CMS/HCC)    • Fatty liver    • Hypertension      Past Surgical History:   Procedure Laterality Date   • CARPAL TUNNEL RELEASE     •  SECTION     • CHOLECYSTECTOMY     • CORONARY ANGIOPLASTY WITH STENT PLACEMENT     • REPLACEMENT TOTAL KNEE Right    • TUBAL ABDOMINAL LIGATION       Social History     Socioeconomic History   • Marital status:      Spouse name: Not on file   • Number of children: Not on file   • Years of education: Not on file   • Highest education level: Not on file   Tobacco Use   • Smoking status: Never Smoker   • Smokeless tobacco: Never Used   Substance and Sexual Activity   • Alcohol use: Yes     Comment: Once a year    • Drug use: No   • Sexual activity: Defer       Allergies:  Nuts, Codeine, and Latex    Prior to Admission Medications     Prescriptions Last Dose Informant Patient Reported? Taking?    atorvastatin  (LIPITOR) 20 MG tablet 7/16/2021 Self Yes Yes    Take 20 mg by mouth Every Night.    cefdinir (OMNICEF) 300 MG capsule 7/17/2021 Self Yes Yes    Take 300 mg by mouth 2 (Two) Times a Day.    Cetirizine HCl (ZYRTEC ALLERGY) 10 MG capsule 7/17/2021 Self Yes Yes    Take 10 mg by mouth Daily.    cholecalciferol (VITAMIN D3) 1.25 MG (65259 UT) capsule 7/16/2021 Self Yes Yes    Take 50,000 Units by mouth Every 7 (Seven) Days. Prior to [facility] Admission, Patient was on: Pt takes each Friday    Diclofenac Sodium (VOLTAREN) 1 % gel gel 7/17/2021 Self Yes Yes    Apply 4 g topically to the appropriate area as directed 4 (Four) Times a Day As Needed.    furosemide (LASIX) 40 MG tablet 7/17/2021 Self Yes Yes    Take 40 mg by mouth Daily.    gabapentin (NEURONTIN) 400 MG capsule 7/17/2021 Self Yes Yes    Take 400 mg by mouth Every 12 (Twelve) Hours.    HYDROcodone-acetaminophen (NORCO) 5-325 MG per tablet 7/17/2021 Self Yes Yes    Take 1 tablet by mouth 2 (Two) Times a Day As Needed for Moderate Pain .    levothyroxine (SYNTHROID, LEVOTHROID) 25 MCG tablet 7/17/2021 Self Yes Yes    Take 25 mcg by mouth Daily.    metFORMIN (GLUCOPHAGE) 500 MG tablet 7/17/2021 Self Yes Yes    Take 500 mg by mouth 2 (Two) Times a Day With Meals.    metoprolol tartrate (LOPRESSOR) 25 MG tablet 7/17/2021 Self Yes Yes    Take 25 mg by mouth 2 (Two) Times a Day.    potassium chloride (K-DUR,KLOR-CON) 20 MEQ CR tablet 7/17/2021 Self Yes Yes    Take 20 mEq by mouth 2 (Two) Times a Day.    QUEtiapine (SEROquel) 50 MG tablet 7/16/2021 Self Yes Yes    Take 50 mg by mouth Every Night.    sertraline (ZOLOFT) 50 MG tablet 7/17/2021 Self Yes Yes    Take 50 mg by mouth Daily.        Hospital Meds:  atorvastatin, 20 mg, Oral, Nightly  cetirizine, 10 mg, Oral, Daily  [START ON 7/23/2021] cholecalciferol, 50,000 Units, Oral, Q7 Days  gabapentin, 400 mg, Oral, Q12H  insulin aspart, 0-7 Units, Subcutaneous, TID AC  levothyroxine, 25 mcg, Oral, Daily  metoprolol  tartrate, 25 mg, Oral, Q12H  pantoprazole, 40 mg, Oral, Q AM  QUEtiapine, 50 mg, Oral, Nightly  sertraline, 50 mg, Oral, Daily  sodium chloride, 10 mL, Intravenous, Q12H      sodium chloride, 50 mL/hr, Last Rate: 50 mL/hr (07/17/21 2325)        Vital Signs:  Temp:  [97.5 °F (36.4 °C)-98 °F (36.7 °C)] 98 °F (36.7 °C)  Heart Rate:  [74-98] 81  Resp:  [18-20] 18  BP: (105-166)/(65-84) 166/81      07/17/21  1849 07/17/21  2224 07/18/21  0500   Weight: 107 kg (235 lb) 111 kg (245 lb 3.2 oz) 111 kg (245 lb 3.2 oz)     Body mass index is 53.06 kg/m².     Physical exam:  Constitutional:  Well-developed and well-nourished.  No respiratory distress.      HENT:  Head: Normocephalic and atraumatic. Bilateral PERRLA.   Neck:  Neck supple. No lymphadenopathy.   Cardiovascular:  Normal rate  Pulmonary/Chest:  No respiratory distress  Abdominal:  Soft, no tenderness.   Musculoskeletal:  Right hip pain noted with flexion or abduction, no significant effusion, discoloration or open wounds. + pedal pulses noted. Mild effusion RLE. Cap refill <3 seconds. Neurovascularly intact RLE to light touch sensation.   Neurological:  Alert and oriented to person, place, and time.   Skin:  Skin is warm and dry  Psychiatric:  Normal mood and affect  Peripheral vascular:  No edema     Results from last 7 days   Lab Units 07/18/21  0001 07/17/21 1907   WBC 10*3/mm3 9.23 7.48   HEMOGLOBIN g/dL 13.1 13.8   HEMATOCRIT % 40.5 42.0   MCV fL 97.4* 96.1   MCHC g/dL 32.3 32.9   PLATELETS 10*3/mm3 143 149         Results from last 7 days   Lab Units 07/18/21  0001 07/17/21 1907   SODIUM mmol/L 142 140   POTASSIUM mmol/L 4.1 4.3   CHLORIDE mmol/L 104 104   CO2 mmol/L 26.9 28.8   BUN mg/dL 10 8   CREATININE mg/dL 0.86 0.74   EGFR IF NONAFRICN AM mL/min/1.73 68 81   CALCIUM mg/dL 9.2 9.5   GLUCOSE mg/dL 156* 84   ALBUMIN g/dL 3.58 3.74   BILIRUBIN mg/dL 1.2 1.1   ALK PHOS U/L 167* 188*   AST (SGOT) U/L 64* 63*   ALT (SGPT) U/L 50* 53*   Estimated Creatinine  Clearance: 82.7 mL/min (by C-G formula based on SCr of 0.86 mg/dL).  No results found for: AMMONIA  Results from last 7 days   Lab Units 07/17/21 1907   TROPONIN T ng/mL <0.010     Results from last 7 days   Lab Units 07/17/21 1907   PROBNP pg/mL 130.4     Lab Results   Component Value Date    HGBA1C 6.10 (H) 07/18/2021     Lab Results   Component Value Date    TSH 2.280 07/18/2021     No results found for: PREGTESTUR, PREGSERUM, HCG, HCGQUANT  Pain Management Panel     Pain Management Panel Latest Ref Rng & Units 2/28/2020    AMPHETAMINES SCREEN, URINE Negative Negative    BARBITURATES SCREEN Negative Negative    BENZODIAZEPINE SCREEN, URINE Negative Negative    BUPRENORPHINEUR Negative Negative    COCAINE SCREEN, URINE Negative Negative    METHADONE SCREEN, URINE Negative Negative              Imaging Results (Last 7 Days)     Procedure Component Value Units Date/Time    XR Chest 1 View [[id number]] Collected: 07/17/21 2018     Updated: 07/17/21 2020    Narrative:      CR Chest 1 Vw    INDICATION:   Hypertension. Preop.     COMPARISON:    None available.    FINDINGS:  Single portable AP view(s) of the chest.  Cardiomegaly with mild pulmonary vascular congestion. No interstitial or alveolar edema. Post median sternotomy and apparent CABG. No effusions or pneumothorax.      Impression:      Cardiomegaly with pulmonary vascular congestion in this patient post CABG. This could represent mild fluid overload or early CHF. No effusions or edema.    Signer Name: MARIANNA Montgomery MD   Signed: 7/17/2021 8:18 PM   Work[email or web address]    [facility]    XR Hip With or Without Pelvis 2 - 3 View Right [[id number]] Collected: 07/17/21 1945     Updated: 07/17/21 1947    Narrative:      CR Hip Uni Comp Min 2 Vws RT    INDICATION:   Right hip pain after a fall.    COMPARISON:   None.    FINDINGS:  AP and frog-leg lateral view(s) of the right hip.  Minimally displaced intertrochanteric fracture  proximal right femur. No significant angulation. No significant hip arthropathy. Visualized pelvis and left hip unremarkable. No bone erosion or  destruction.        Impression:      Minimally displaced right proximal intertrochanteric femur fracture.    Signer Name: MARIANNA Montgomery MD   Signed: 7/17/2021 7:45 PM   Workstation Name: [email or web address]    [facility]        Assessment and Plan:   1. Right hip intertrochanteric femur fracture, closed     Plan operative fixation today at approximately 1 pm by Dr. Ponce for right hip right hip intertrochanteric nailing with intermedullary hip screw. Patient has been NPO since midnight. Type and screen ordered. See orders for additional details.     Patient was verbally informed on the nature of the procedure as well as risks and benefits to include but not limited to infection, hemorrhage, pneumothorax, neurovascular injury, anesthesia complications, etc. Patient feels that they have a good understanding of the treatment plan and would like to proceed with surgical intervention.     Thank you for the consult.      LAILA Ortez  07/18/21  08:20 EDT    Electronically signed by Oracio Ponce DO at 07/18/21 4735

## 2021-08-05 NOTE — PLAN OF CARE
Goal Outcome Evaluation:           Progress: improving  Outcome Summary: Pt rested in bed this shift. Complaints of pain, PRN pain medication given. VSS. No complaints or acute changes. Will continue to monitor.

## 2021-08-06 ENCOUNTER — READMISSION MANAGEMENT (OUTPATIENT)
Dept: CALL CENTER | Facility: HOSPITAL | Age: 56
End: 2021-08-06

## 2021-08-06 ENCOUNTER — APPOINTMENT (OUTPATIENT)
Dept: CT IMAGING | Facility: HOSPITAL | Age: 56
End: 2021-08-06

## 2021-08-06 NOTE — OUTREACH NOTE
Prep Survey      Responses   [facility] facility patient discharged from?  [facility]   Is LACE score < 7 ?  No   Emergency Room discharge w/ pulse ox?  No   Eligibility  Readm Mgmt   Discharge diagnosis  right hip nailing   Does the patient have one of the following disease processes/diagnoses(primary or secondary)?  General Surgery   Does the patient have Home health ordered?  Yes   What is the Home health agency?   [facility]   Is there a DME ordered?  Yes   What DME was ordered?  [facility], hospital bed   Medication alerts for this patient  eliquis, seroquel   Prep survey completed?  Yes          Ivonne Montgomery RN

## 2021-08-06 NOTE — PAYOR COMM NOTE
"CONTACT:   Zoya Muñoz RN  Phone: [phone]  Fax: [phone]    DISCHARGE NOTIFICATION    DISCHARGE TO: home with home health    REF # [id number]  PA DATE: 8/5/21    IF YOU NEED ANYTHING FURTHER PLEASE LET ME KNOW.   THANKS     Aidee Sanchez (56 y.o. Female)     Date of Birth Social Security Number Address Home Phone MRN    1965  [address] [address] [phone] [id number]    Jew Marital Status          [employer]        Admission Date Admission Type Admitting Provider Attending Provider Department, Room/Bed    7/17/21 Emergency Gill Sanchez [address], 3350/2S    Discharge Date Discharge Disposition Discharge Destination        8/5/2021 Home or Self Care              Attending Provider: (none)   Allergies: Nuts, Codeine, Latex    Isolation: None   Infection: None   Code Status: Prior    Ht: 144.8 cm (57\")   Wt: 111 kg (245 lb 3.2 oz)    Admission Cmt: None   Principal Problem: Closed fracture of right hip (CMS/HCC) [S72.001A]                 Active Insurance as of 7/17/2021     Primary Coverage     Payor Plan Insurance Group Employer/Plan Group    [facility] [place]      Payor Plan Address Payor Plan Phone Number Payor Plan Fax Number Effective Dates    [address] [address]   7/1/2015 - None Entered    [place] [address]       Subscriber Name Subscriber Birth Date Member ID       AIDEE SANCHEZ 1965 [id number]                 Emergency Contacts      (Rel.) Home Phone Work Phone Mobile Phone    NA CACERES (Daughter) [phone] -- --               Discharge Summary      Brittany Colon PA-C at 08/05/21 1624     Attestation signed by Jeff Graves MD at 08/05/21 4555    I have reviewed this documentation and agree.  Patient was seen with Radha QUINTANILLA, she was feeling good, she has oxygen at home, she is eager to get home.  Noted the wound had some drainage and was " seen by orthopedics, x-ray was obtained and I discussed with radiologist, alignment looked good.  No bony changes.  Patient will complete her Eliquis course at home.  I tried to call her daughter prior to the patient's discharge but went to voicemail, I did leave a message.  This patient's mobility status unfortunately is still not good but she is reached her maximum benefit for acute care, we attempted to get her into rehab but she was declined because she was not able to participate for 3 hours.  I think SNF would be the best option but patient would be self-pay which patient and family declined per .  We had no other choice but to discharge home with home health considering they declined private pay SNF.  She will get DME that was needed.  Home health, follow-up with orthopedics, Betadine dressing changes.  She was stable at time of discharge.                        [facility] Medicine Services  DISCHARGE SUMMARY    Date of Admission: 7/17/2021    Date of Discharge:  8/5/2021    PCP: Azalea Burnett APRN    Discharging Provider: Brittany Colon PA-C  Attending Physician on day of DC: Dr. Jeff Graves    Admission Diagnosis:   Please see admission H&P    Discharge Diagnosis:   · Acute minimally displaced right proximal intertrochanteric femur fracture  · History of right lateral malleolus avulsion fracture  · History of right TKA in the past  · Generalized weakness and debility  · Right lower extremity greater than left lower extremity weakness with underlying neuropathy  · Foot pain with unremarkable work-up thus far  · Hypokalemia, improved  · Constipation, resolved  · Urinary retention, resolved  · Rhinitis with underlying cirrhosis  · Grade 2 diastolic dysfunction  · Essential hypertension  · Peripheral neuropathy  · Hypothyroidism  · Depression  · Anxiety  · Debility secondary to recent fracture status post repair in addition to underlying morbid  obesity      Procedures Performed:  · 7/18/2021: Right hip cephalo-medullary nailing with TFNA nail -- Dr. Ponce, orthopedic surgery   · 7/23/2021: Transthoracic echocardiogram   · Normal left ventricular cavity size and wall thickness noted.  · Left ventricular ejection fraction appears to be 56 - 60%. Left ventricular systolic function is normal.  · Left ventricular diastolic function is consistent with (grade II w/high LAP) pseudonormalization.  · Normal cardiac chamber dimensions.  · The aortic valve is structurally normal. Doppler interrogation was not done across the aortic valve.  · The mitral valve is structurally normal with no regurgitation or significant stenosis present  · Mild tricuspid valve regurgitation is present.  · Estimated right ventricular systolic pressure from tricuspid regurgitation is mildly elevated (35-45 mmHg). Mild pulmonary hypertension is present.  · The pulmonic valve is not well visualized.  · There is a trivial pericardial effusion.  · 7/23/2021: Velazquez catheter insertion d/t urinary retention   · 7/29/2021: Velazquez catheter removal      Consults:   Consults     Date and Time Order Name Status Description    8/4/2021 11:06 AM Inpatient Orthopedic Surgery Consult      7/17/2021  7:52 PM Ortho (on-call MD unless specified) Completed           HPI     History of Present Illness:  Aidee Trinidad is a 56 y.o. female  with PMH significant for known fatty liver disease, diabetes, depression, coronary artery disease, hypertension.  She presented to urgency department at [facility] on July 17, 2021 for further evaluation of right-sided hip pain after sustaining a fall from the shower when standing up from her shower chair when turning water off.  She landed on her right side and was wedged between chair and while the shower.  Her son was reportedly home and called an ambulance.  She was brought to the emergency department at [facility] for further evaluation and  treatment.  Of note, she did also break her right ankle around 1 month ago.       Hospital Course     Hospital Course  Aidee Trinidad was admitted as outlined in above HPI.     Mrs. Trinidad underwent orthopedic surgery with right hip cephalo-medullary nailing with FNA nail.  She continued to have difficulty ambulating following this with reports of ongoing weakness throughout her body.   She underwent MRI of her L-spine withouth known significant source of weakness.      She developed urinary retention while hospitalized and required tryo catheter to be anchored on 07/23/21 AM.  She later underwent bladder training with troy catheter removal. Urinalysis obtained revealed 25,000 CFU yeast and less than 10,000 CFU gram-positive cocci.  Not felt to be an acute urinary tract infection, thus was not treated.     Given prior right knee surgery and now right hip fracture with prior right lower extremity weakness, Mrs. Trinidad has had some slowed progression with ambulatory abilities.  She was denied inpatient rehab while hospitalized.   She initially thought she may go home with her daughter.  She has continued to work with physical therapy. Many avenues of therapy were explored with assistance of .  She was offered SNF placement with declination as she did not qualify due to not qualifying for Medicaid pending admission due having [employer] . Discharge options for further therapies were discussed and explored at length, though she did progress with PT with eventual ability to transition to bedside commode with walker and some assistance.   Mrs. Trinidad was ultimately discharged home on 08/05 with her daughter with bedside commode and hospital bed with trapeze bar in addition to home health and home PT/OT.      BMP recommended in 5 days with results sent to PCP.     Prior to discharge, orthopedics did evaluate on 08/05 with unremarkable repeat hip xray.   Betadine dressing changes recommended and ordered daily  with one week follow-up with Dr. Ponce given further evaluation of some drainage from hip incision. Given previously ordered Eliquis for 35 days post-op, remaining 18 day supply was ordered at discharge for VTE prophylaxis.          Pertinent Laboratory and Radiology Results     Pertinent Test Results:          Results from last 7 days   Lab Units 08/04/21  0150 08/03/21  0443 08/02/21  0404 07/31/21  0703 07/30/21  0820   WBC 10*3/mm3 8.45 7.70 7.48  --  7.02   HEMOGLOBIN g/dL 9.8* 9.7* 9.2*  --  8.7*   HEMATOCRIT % 32.8* 32.1* 30.0*  --  27.9*   PLATELETS 10*3/mm3 279 271 260  --  232   MCV fL 103.1* 101.3* 100.0*  --  101.8*   SODIUM mmol/L 138 139 138 138 139   POTASSIUM mmol/L 4.0 3.8 4.0 4.0 4.0   CHLORIDE mmol/L 104 105 104 105 102   CO2 mmol/L 24.6 25.8 24.9 25.6 27.9   BUN mg/dL 10 8 9 8 8   CREATININE mg/dL 0.65 0.51* 0.56* 0.56* 0.60   GLUCOSE mg/dL 117* 99 130* 99 152*   CALCIUM mg/dL 9.0 9.0 8.8 8.4* 8.8          Results from last 7 days   Lab Units 08/04/21  0150 08/03/21  0443 08/02/21  0404 07/30/21  0820   WBC 10*3/mm3 8.45 7.70 7.48 7.02     Results from last 7 days   Lab Units 08/03/21  0443   BILIRUBIN mg/dL 1.0   ALK PHOS U/L 284*   ALT (SGPT) U/L 22   AST (SGOT) U/L 43*           Invalid input(s): TG, LDLCALC, LDLREALC      Brief Urine Lab Results  (Last result in the past 365 days)      Color   Clarity   Blood   Leuk Est   Nitrite   Protein   CREAT   Urine HCG        07/23/21 0519 Dark Yellow Clear Negative Trace Negative Negative                       Results for orders placed during the hospital encounter of 07/17/21    Adult Transthoracic Echo Complete W/ Cont if Necessary Per Protocol    Interpretation Summary  · Normal left ventricular cavity size and wall thickness noted.  · Left ventricular ejection fraction appears to be 56 - 60%. Left ventricular systolic function is normal.  · Left ventricular diastolic function is consistent with (grade II w/high LAP) pseudonormalization.  ·  Normal cardiac chamber dimensions.  · The aortic valve is structurally normal. Doppler interrogation was not done across the aortic valve.  · The mitral valve is structurally normal with no regurgitation or significant stenosis present  · Mild tricuspid valve regurgitation is present.  · Estimated right ventricular systolic pressure from tricuspid regurgitation is mildly elevated (35-45 mmHg). Mild pulmonary hypertension is present.  · The pulmonic valve is not well visualized.  · There is a trivial pericardial effusion.            ----------------------------------------------------------------------------------------------------------------------  XR Foot 2 View Right    Result Date: 7/29/2021  No acute bony abnormality  This report was finalized on 7/29/2021 2:51 PM by Dr. John Duarte MD.      CT Head Without Contrast    Result Date: 7/21/2021    Unremarkable exam demonstrating no CT evidence of acute intracranial findings.  This report was finalized on 7/21/2021 2:29 PM by Dr. Christiano Aceves MD.      MRI Lumbar Spine Without Contrast    Result Date: 7/26/2021   1. Mildly shortened pedicles congenitally 2. No focal disc herniation. 3. Mild facet arthropathy.  This report was finalized on 7/26/2021 4:49 PM by Dr. John Duarte MD.      US Spleen    Result Date: 7/23/2021    No acute findings in the visualized abdomen.  This report was finalized on 7/23/2021 5:01 PM by Dr. Christiano Aceves MD.      US Liver    Result Date: 7/20/2021  Marked liver cirrhosis noted. No focal liver lesion identified.  This report was finalized on 7/20/2021 5:06 PM by Dr. Vikram Strong MD.      XR Chest 1 View    Result Date: 7/17/2021  Cardiomegaly with pulmonary vascular congestion in this patient post CABG. This could represent mild fluid overload or early CHF. No effusions or edema. Signer Name: MARIANNA Montgomery MD  Signed: 7/17/2021 8:18 PM  Workstation Name: [facility]  [facility]    XR Abdomen  KUB    Result Date: 7/23/2021    Abundant right colonic stool.  This report was finalized on 7/23/2021 11:03 AM by Dr. Christiano Aceves MD.      FL Surgery Fluoro    Result Date: 7/18/2021  As above.  This report was finalized on 7/18/2021 3:52 PM by Dr. Vikram Strong MD.      XR Hip With or Without Pelvis 1 View Right    Result Date: 8/5/2021    Postsurgical changes from right hip fixation. No acute findings identified.  This report was finalized on 8/5/2021 4:07 PM by Dr. Vikram Strong MD.      XR Hip With or Without Pelvis 2 - 3 View Right    Result Date: 7/17/2021  Minimally displaced right proximal intertrochanteric femur fracture. Signer Name: MARIANNA Montgomery MD  Signed: 7/17/2021 7:45 PM  Workstation Name: [facility]  [facility]        Microbiology Results (last 10 days)     ** No results found for the last 240 hours. **          Labs above have been reviewed on the day of discharge.  Radiology images from prior 30 days were reviewed prior to discharge as incorporated into this document.     Discharge Vitals and Physical Examination       Vital Signs  Temp:  [97.8 °F (36.6 °C)-98.2 °F (36.8 °C)] 98 °F (36.7 °C)  Heart Rate:  [67-77] 77  Resp:  [16-20] 20  BP: (106-133)/(51-70) 133/70     PHYSICAL EXAMINATION:   Physical Exam  Vitals and nursing note reviewed.   Constitutional:       Appearance: Normal appearance.   HENT:      Head: Normocephalic and atraumatic.      Mouth/Throat:      Mouth: Mucous membranes are dry.   Cardiovascular:      Rate and Rhythm: Normal rate and regular rhythm.      Pulses: Normal pulses.      Heart sounds: Normal heart sounds.   Pulmonary:      Breath sounds: Normal breath sounds.   Abdominal:      General: There is no distension.      Palpations: Abdomen is soft.   Musculoskeletal:         General: No swelling or tenderness.   Skin:     General: Skin is warm and dry.   Neurological:      Mental Status: She is alert and oriented to person, place, and  time.           Discharge Disposition, Discharge Medications, and Discharge Appointments     Discharge Disposition:   home    Condition on Discharge:  Fair    Discharge Medications:     Discharge Medications      New Medications      Instructions Start Date   apixaban 2.5 MG tablet tablet  Commonly known as: ELIQUIS   2.5 mg, Oral, Every 12 Hours Scheduled      aspirin 81 MG EC tablet   81 mg, Oral, Daily   Start Date: August 6, 2021     bisacodyl 10 MG suppository  Commonly known as: DULCOLAX   10 mg, Rectal, Daily PRN      ferrous sulfate 325 (65 FE) MG tablet   325 mg, Oral, Daily With Breakfast   Start Date: August 6, 2021     pantoprazole 40 MG EC tablet  Commonly known as: PROTONIX   40 mg, Oral, Every Early Morning   Start Date: August 6, 2021     polyethylene glycol 17 g packet  Commonly known as: MIRALAX   17 g, Oral, Daily PRN         Changes to Medications      Instructions Start Date   potassium chloride 20 MEQ CR tablet  Commonly known as: K-DUR,KLOR-CON  What changed: when to take this   20 mEq, Oral, Daily      QUEtiapine 25 MG tablet  Commonly known as: SEROquel  What changed:   · medication strength  · how much to take   25 mg, Oral, Nightly         Continue These Medications      Instructions Start Date   atorvastatin 20 MG tablet  Commonly known as: LIPITOR   20 mg, Oral, Nightly      cholecalciferol 1.25 MG (37056 UT) capsule  Commonly known as: VITAMIN D3   50,000 Units, Oral, Every 7 Days, Prior to [facility] Admission, Patient was on: Pt takes each Friday      Diclofenac Sodium 1 % gel gel  Commonly known as: VOLTAREN   4 g, Topical, 4 Times Daily PRN      furosemide 40 MG tablet  Commonly known as: LASIX   40 mg, Oral, Daily      gabapentin 400 MG capsule  Commonly known as: NEURONTIN   400 mg, Oral, Every 12 Hours      HYDROcodone-acetaminophen 5-325 MG per tablet  Commonly known as: NORCO   1 tablet, Oral, 2 Times Daily PRN      levothyroxine 25 MCG tablet  Commonly known as: SYNTHROID,  LEVOTHROID   25 mcg, Oral, Daily      metFORMIN 500 MG tablet  Commonly known as: GLUCOPHAGE   500 mg, Oral, 2 Times Daily With Meals      metoprolol tartrate 25 MG tablet  Commonly known as: LOPRESSOR   25 mg, Oral, 2 Times Daily      sertraline 50 MG tablet  Commonly known as: ZOLOFT   50 mg, Oral, Daily      ZyrTEC Allergy 10 MG capsule  Generic drug: Cetirizine HCl   10 mg, Oral, Daily         Stop These Medications    cefdinir 300 MG capsule  Commonly known as: OMNICEF            Discharged medication regimen discussed with attending physician prior to discharge.     Discharge Diet:     Dietary Orders (From admission, onward)     Start     Ordered    07/28/21 1112  Diet Regular; Consistent Carbohydrate  Diet Effective Now     Question Answer Comment   Diet Texture / Consistency Regular    Common Modifiers Consistent Carbohydrate        07/28/21 1111                Activity at Discharge:  activity as tolerated         Discharge Disposition:    Home or Self Care        Follow-up Appointments:      Additional Instructions for the Follow-ups that You Need to Schedule     Ambulatory Referral to Home Health   As directed      right hip surgical incision   Wound Care Instructions Apply betadine to staples and surgical incision, apply dry dressing overlay.    Order Comments: right hip surgical incision  Wound Care Instructions Apply betadine to staples and surgical incision, apply dry dressing overlay.     Face to Face Visit Date: 8/5/2021    Follow-up provider for Plan of Care?: I treated the patient in an acute care facility and will not continue treatment after discharge.    Follow-up provider: IVIS LITTLE [[id number]]    Reason/Clinical Findings: Debility secondary to morbid obesity and recent right hip fracture with multiple prior right side injuries and weakness    Describe mobility limitations that make leaving home difficult: Debility 2/2 right hip fracture with decreased ambulatory status limiting  ambility to mobilize for therapy and appointments    Nursing/Therapeutic Services Requested: Skilled Nursing Physical Therapy Occupational Therapy    Skilled nursing orders: Medication education Wound care dressing/changes    PT orders: Total joint pathway Therapeutic exercise Gait Training Strengthening    Weight Bearing Status: As Tolerated    Occupational orders: Activities of daily living Strengthening    Frequency: Other (3x per week)         Discharge Follow-up with PCP   As directed       Currently Documented PCP:    Azalea Burnett APRN    PCP Phone Number:    [phone]     Follow Up Details: One week hospital follow-up for hip facture, debility, UTI, Cirrhosis         Discharge Follow-up with Specified Provider: Dr. Ponce; 1 Week   As directed      To: Dr. Ponce    Follow Up: 1 Week    Follow Up Details: One week hospital follow-up         Discharge Follow-up with Specified Provider: Referal to GI clinic for Cirrhosis; 1 Month   As directed      To: Referal to GI clinic for Cirrhosis    Follow Up: 1 Month         Basic Metabolic Panel    Aug 10, 2021 (Approximate)      Send to PCP. To be performed by home health    Order Comments: Send to PCP. To be performed by home health     Release to patient: Immediate           Follow-up Information     Oracio Ponce, DO Follow up in 10 day(s).    Specialty: Orthopedic Surgery  Contact information:  [address] [facility] [id number] [phone]             Azalea Burnett APRN .    Specialty: Family Medicine  Why: One week hospital follow-up for hip facture, debility, UTI, Cirrhosis  Contact information:  [address]  [facility] [address] [phone]                   Additional Instructions for the Follow-ups that You Need to Schedule     Ambulatory Referral to Home Health   As directed      right hip surgical incision   Wound Care Instructions Apply betadine to staples and surgical incision, apply dry dressing overlay.     Order Comments: right hip surgical incision  Wound Care Instructions Apply betadine to staples and surgical incision, apply dry dressing overlay.     Face to Face Visit Date: 8/5/2021    Follow-up provider for Plan of Care?: I treated the patient in an acute care facility and will not continue treatment after discharge.    Follow-up provider: AZALEA LITTLE [[id number]]    Reason/Clinical Findings: Debility secondary to morbid obesity and recent right hip fracture with multiple prior right side injuries and weakness    Describe mobility limitations that make leaving home difficult: Debility 2/2 right hip fracture with decreased ambulatory status limiting ambility to mobilize for therapy and appointments    Nursing/Therapeutic Services Requested: Skilled Nursing Physical Therapy Occupational Therapy    Skilled nursing orders: Medication education Wound care dressing/changes    PT orders: Total joint pathway Therapeutic exercise Gait Training Strengthening    Weight Bearing Status: As Tolerated    Occupational orders: Activities of daily living Strengthening    Frequency: Other (3x per week)         Discharge Follow-up with PCP   As directed       Currently Documented PCP:    Azalea Little APRN    PCP Phone Number:    [phone]     Follow Up Details: One week hospital follow-up for hip facture, debility, UTI, Cirrhosis         Discharge Follow-up with Specified Provider: Dr. Ponce; 1 Week   As directed      To: Dr. Ponce    Follow Up: 1 Week    Follow Up Details: One week hospital follow-up         Discharge Follow-up with Specified Provider: Referal to GI clinic for Cirrhosis; 1 Month   As directed      To: Referal to GI clinic for Cirrhosis    Follow Up: 1 Month         Basic Metabolic Panel    Aug 10, 2021 (Approximate)      Send to PCP. To be performed by home health    Order Comments: Send to PCP. To be performed by home health     Release to patient: Immediate               Test Results  Pending at Discharge:           Brittany Colon PA-C  [facility] Medicine Team  08/05/21  16:36 EDT      Time: Greater than 30 minutes spent on this discharge.  I spent 45 minutes on this discharge activity which included:  Face-to-face encounter with the patient, discussing plan with attending physician, reviewing the data in the system, coordination of the care with the nursing staff as well as consultations, documentation, and entering orders.               Electronically signed by Jeff Graves MD at 08/05/21 0638

## 2021-08-08 ENCOUNTER — READMISSION MANAGEMENT (OUTPATIENT)
Dept: CALL CENTER | Facility: HOSPITAL | Age: 56
End: 2021-08-08

## 2021-08-08 NOTE — OUTREACH NOTE
General Surgery Week 1 Survey      Responses   [facility] patient discharged from?  [facility]   Does the patient have one of the following disease processes/diagnoses(primary or secondary)?  General Surgery   Week 1 attempt successful?  Yes   Call start time  1103   Call end time  1107   Is patient permission given to speak with other caregiver?  Yes   List who call center can speak with  felipe Pena reviewed with patient/caregiver?  Yes   Is the patient having any side effects they believe may be caused by any medication additions or changes?  No   Does the patient have all medications related to this admission filled (includes all antibiotics, pain medications, etc.)  Yes   Is the patient taking all medications as directed (includes completed medication regime)?  Yes   Does the patient have a follow up appointment scheduled with their surgeon?  Yes   Has the patient kept scheduled appointments due by today?  Yes   Comments  8/23 Surgeon. PCP appt. set, forget when   [facility]   Has home health visited the patient within 72 hours of discharge?  Yes   What DME was ordered?  BSC, hospital bed   Has all DME been delivered?  Yes   Psychosocial issues?  No   Comments  rollator, Hospital bed, oxygen, HH comes, bed side commode   Did the patient receive a copy of their discharge instructions?  Yes   Nursing interventions  Reviewed instructions with patient, Educated on MyChart   What is the patient's perception of their health status since discharge?  Improving   Nursing interventions  Nurse provided patient education   Is the patient /caregiver able to teach back basic post-op care?  Continue use of incentive spirometry at least 1 week post discharge, Practice 'cough and deep breath', Drive as instructed by MD in discharge instructions, Take showers only when approved by MD-sponge bathe until then, No tub bath, swimming, or hot tub until instructed by MD, Keep incision areas  clean,dry and protected, Do not remove steri-strips, Lifting as instructed by MD in discharge instructions   Is the patient/caregiver able to teach back signs and symptoms of incisional infection?  Increased redness, swelling or pain at the incisonal site, Increased drainage or bleeding, Incisional warmth, Pus or odor from incision, Fever   Is the patient/caregiver able to teach back steps to recovery at home?  Set small, achievable goals for return to baseline health, Practice good oral hygiene, Eat a well-balance diet, Make a list of questions for surgeon's appointment   If the patient is a current smoker, are they able to teach back resources for cessation?  Not a smoker   Is the patient/caregiver able to teach back the hierarchy of who to call/visit for symptoms/problems? PCP, Specialist, Home health nurse, Urgent Care, ED, 911  Yes   Additional teach back comments  no redness in incision, but draining from one of the staples serosang, no pus , HH will be here Monday    Week 1 call completed?  Yes   Wrap up additional comments  She denies questions,  says is doing well,           Nirali Mccann, RN

## 2021-08-10 ENCOUNTER — LAB REQUISITION (OUTPATIENT)
Dept: LAB | Facility: HOSPITAL | Age: 56
End: 2021-08-10

## 2021-08-10 DIAGNOSIS — I10 ESSENTIAL (PRIMARY) HYPERTENSION: ICD-10-CM

## 2021-08-10 LAB
ANION GAP SERPL CALCULATED.3IONS-SCNC: 10 MMOL/L (ref 5–15)
BUN SERPL-MCNC: 10 MG/DL (ref 6–20)
BUN/CREAT SERPL: 14.7 (ref 7–25)
CALCIUM SPEC-SCNC: 9.7 MG/DL (ref 8.6–10.5)
CHLORIDE SERPL-SCNC: 102 MMOL/L (ref 98–107)
CO2 SERPL-SCNC: 31 MMOL/L (ref 22–29)
CREAT SERPL-MCNC: 0.68 MG/DL (ref 0.57–1)
GFR SERPL CREATININE-BSD FRML MDRD: 90 ML/MIN/1.73
GLUCOSE SERPL-MCNC: 105 MG/DL (ref 65–99)
POTASSIUM SERPL-SCNC: 4 MMOL/L (ref 3.5–5.2)
SODIUM SERPL-SCNC: 143 MMOL/L (ref 136–145)

## 2021-08-10 PROCEDURE — 80048 BASIC METABOLIC PNL TOTAL CA: CPT | Performed by: NURSE PRACTITIONER

## 2021-08-17 ENCOUNTER — READMISSION MANAGEMENT (OUTPATIENT)
Dept: CALL CENTER | Facility: HOSPITAL | Age: 56
End: 2021-08-17

## 2021-08-17 NOTE — OUTREACH NOTE
General Surgery Week 2 Survey      Responses   [facility] patient discharged from?  [facility]   Does the patient have one of the following disease processes/diagnoses(primary or secondary)?  General Surgery   Week 2 attempt successful?  Yes   Call start time  1417   Call end time  1419   Discharge diagnosis  right hip nailing   Meds reviewed with patient/caregiver?  Yes   Is the patient taking all medications as directed (includes completed medication regime)?  Yes   Has the patient kept scheduled appointments due by today?  Yes   What is the patient's perception of their health status since discharge?  Improving   Week 2 call completed?  Yes   Wrap up additional comments  States that she is doing well.          Taylor Santizo, RN

## 2021-08-25 ENCOUNTER — READMISSION MANAGEMENT (OUTPATIENT)
Dept: CALL CENTER | Facility: HOSPITAL | Age: 56
End: 2021-08-25

## 2021-08-25 NOTE — OUTREACH NOTE
General Surgery Week 3 Survey      Responses   [facility] patient discharged from?  [facility]   Does the patient have one of the following disease processes/diagnoses(primary or secondary)?  General Surgery   Week 3 attempt successful?  Yes   Call start time  0832   Call end time  0835   Discharge diagnosis  right hip nailing   Meds reviewed with patient/caregiver?  Yes   Is the patient having any side effects they believe may be caused by any medication additions or changes?  No   Does the patient have all medications related to this admission filled (includes all antibiotics, pain medications, etc.)  Yes   Is the patient taking all medications as directed (includes completed medication regime)?  Yes   Does the patient have a follow up appointment scheduled with their surgeon?  Yes   Has the patient kept scheduled appointments due by today?  Yes   Comments  PATIENT HAS SEEN THE SURGEON, AND SHE HAS ANOTHER APPOINTMENT 9/8/21   What is the Home health agency?   [facility]   Has home health visited the patient within 72 hours of discharge?  Yes   What DME was ordered?  BSC, hospital bed   Has all DME been delivered?  Yes   Psychosocial issues?  No   Did the patient receive a copy of their discharge instructions?  Yes   Nursing interventions  Reviewed instructions with patient   What is the patient's perception of their health status since discharge?  Improving   Nursing interventions  Nurse provided patient education   Is the patient /caregiver able to teach back basic post-op care?  Drive as instructed by MD in discharge instructions, Take showers only when approved by MD-sponge bathe until then, Keep incision areas clean,dry and protected, Lifting as instructed by MD in discharge instructions   Is the patient/caregiver able to teach back signs and symptoms of incisional infection?  Increased redness, swelling or pain at the incisonal site, Increased drainage or bleeding, Incisional warmth, Pus or odor from incision,  Fever   Is the patient/caregiver able to teach back steps to recovery at home?  Set small, achievable goals for return to baseline health, Rest and rebuild strength, gradually increase activity, Make a list of questions for surgeon's appointment   If the patient is a current smoker, are they able to teach back resources for cessation?  Not a smoker   Is the patient/caregiver able to teach back the hierarchy of who to call/visit for symptoms/problems? PCP, Specialist, Home health nurse, Urgent Care, ED, 911  Yes   Additional teach back comments  PATIENT STATES THE SURGEON IS CONCERNED HE MAY HAVE TO REOPEN HER SURGICAL SITE. SHE GOES BACK FOR AN APPOINTMENT 9/8/21 AND THE DECISION WILL BE MADE AT THAT TIME.    Week 3 call completed?  Yes          Annie Rhoades LPN

## 2021-08-26 ENCOUNTER — HOSPITAL ENCOUNTER (OUTPATIENT)
Dept: HOSPITAL 79 - KOH-I | Age: 56
End: 2021-08-26
Attending: PODIATRIST
Payer: COMMERCIAL

## 2021-08-26 DIAGNOSIS — S92.901A: ICD-10-CM

## 2021-08-26 DIAGNOSIS — S82.401A: Primary | ICD-10-CM

## 2021-08-26 DIAGNOSIS — M19.071: ICD-10-CM

## 2021-09-02 ENCOUNTER — READMISSION MANAGEMENT (OUTPATIENT)
Dept: CALL CENTER | Facility: HOSPITAL | Age: 56
End: 2021-09-02

## 2021-09-02 NOTE — OUTREACH NOTE
General Surgery Week 4 Survey      Responses   [facility] patient discharged from?  [facility]   Does the patient have one of the following disease processes/diagnoses(primary or secondary)?  General Surgery   Week 4 attempt successful?  No          Meri Barajas RN

## 2021-10-14 ENCOUNTER — OFFICE VISIT (OUTPATIENT)
Dept: GASTROENTEROLOGY | Facility: CLINIC | Age: 56
End: 2021-10-14

## 2021-10-14 VITALS
HEART RATE: 68 BPM | WEIGHT: 240 LBS | BODY MASS INDEX: 51.78 KG/M2 | SYSTOLIC BLOOD PRESSURE: 122 MMHG | DIASTOLIC BLOOD PRESSURE: 68 MMHG | HEIGHT: 57 IN

## 2021-10-14 DIAGNOSIS — K74.60 CIRRHOSIS OF LIVER WITHOUT ASCITES, UNSPECIFIED HEPATIC CIRRHOSIS TYPE (HCC): Primary | ICD-10-CM

## 2021-10-14 LAB
INR PPP: 1.13 (ref 0.9–1.1)
PROTHROMBIN TIME: 15 SECONDS (ref 12.8–14.5)

## 2021-10-14 PROCEDURE — 82390 ASSAY OF CERULOPLASMIN: CPT | Performed by: INTERNAL MEDICINE

## 2021-10-14 PROCEDURE — 99204 OFFICE O/P NEW MOD 45 MIN: CPT | Performed by: INTERNAL MEDICINE

## 2021-10-14 PROCEDURE — 85610 PROTHROMBIN TIME: CPT | Performed by: INTERNAL MEDICINE

## 2021-10-14 PROCEDURE — 86255 FLUORESCENT ANTIBODY SCREEN: CPT | Performed by: INTERNAL MEDICINE

## 2021-10-14 PROCEDURE — 80053 COMPREHEN METABOLIC PANEL: CPT | Performed by: INTERNAL MEDICINE

## 2021-10-14 PROCEDURE — 84466 ASSAY OF TRANSFERRIN: CPT | Performed by: INTERNAL MEDICINE

## 2021-10-14 PROCEDURE — 82105 ALPHA-FETOPROTEIN SERUM: CPT | Performed by: INTERNAL MEDICINE

## 2021-10-14 PROCEDURE — 83516 IMMUNOASSAY NONANTIBODY: CPT | Performed by: INTERNAL MEDICINE

## 2021-10-14 PROCEDURE — 82728 ASSAY OF FERRITIN: CPT | Performed by: INTERNAL MEDICINE

## 2021-10-14 PROCEDURE — 83540 ASSAY OF IRON: CPT | Performed by: INTERNAL MEDICINE

## 2021-10-14 PROCEDURE — 80074 ACUTE HEPATITIS PANEL: CPT | Performed by: INTERNAL MEDICINE

## 2021-10-14 PROCEDURE — 82977 ASSAY OF GGT: CPT | Performed by: INTERNAL MEDICINE

## 2021-10-14 PROCEDURE — 86376 MICROSOMAL ANTIBODY EACH: CPT | Performed by: INTERNAL MEDICINE

## 2021-10-14 PROCEDURE — 82103 ALPHA-1-ANTITRYPSIN TOTAL: CPT | Performed by: INTERNAL MEDICINE

## 2021-10-14 RX ORDER — ASPIRIN 81 MG/1
81 TABLET, CHEWABLE ORAL DAILY
Status: ON HOLD | COMMUNITY
End: 2021-11-01

## 2021-10-14 RX ORDER — LORATADINE 10 MG/1
CAPSULE, LIQUID FILLED ORAL
Status: ON HOLD | COMMUNITY
End: 2021-11-01

## 2021-10-14 RX ORDER — GLIPIZIDE 5 MG/1
5 TABLET ORAL DAILY
COMMUNITY
End: 2023-03-18 | Stop reason: HOSPADM

## 2021-10-14 RX ORDER — LEVOCETIRIZINE DIHYDROCHLORIDE 5 MG/1
5 TABLET, FILM COATED ORAL EVERY EVENING
COMMUNITY
End: 2023-02-20

## 2021-10-14 NOTE — PROGRESS NOTES
Subjective     Aidee Trinidad is a 56 y.o. female who presents to the office today as a consultation from Azalea Ayala for evaluation of Cirrhosis and Gas        History of Present Illness:  The patient presents for evaluation of cirrhosis. This was discovered incidentally while in the hospital for a broken hip.  She is diabetic since 2011.  She does not take insulin.  She also is hypertensive and has hyperlipidemia.  She has a triple bypass in 2011 for CAD.  She has had elevated liver enzymes in the past.  She was never a heavy drinker.  She would only drink on special occasions.  Generally, she would just have one drink.  She states she gained weight with her last child 29 years ago.  She gained about 60 lbs and has progressively put on more weight. She has never had ascites, no confusion or jaundice.  No hematemesis or melena.  She did have a colonoscopy in 2012 or 2013.  This was performed in [place].  She has never been followed by gastroenterologist for cirrhosis.  As stated above, this was found incidentally this past summer.      Review of Systems:  Review of Systems   Constitutional: Negative.    HENT: Negative for sore throat and trouble swallowing.    Eyes: Negative.    Respiratory: Negative for chest tightness.    Cardiovascular: Negative for chest pain.   Gastrointestinal: Positive for abdominal distention. Negative for abdominal pain, anal bleeding, blood in stool, constipation, diarrhea, nausea, rectal pain and vomiting.   Endocrine: Negative.    Musculoskeletal: Positive for back pain. Negative for neck pain.   Skin: Negative.    Allergic/Immunologic: Positive for food allergies. Negative for environmental allergies.   Neurological: Negative for dizziness and headaches.   Hematological: Bruises/bleeds easily.   Psychiatric/Behavioral: Positive for agitation and sleep disturbance. The patient is nervous/anxious.        Past Medical History:  Past Medical History:   Diagnosis Date   •  Arthritis    • Chronic back pain    • Coronary artery disease    • Depression    • Diabetes mellitus (HCC)    • Fatty liver    • Hypertension        Past Surgical History:  Past Surgical History:   Procedure Laterality Date   • CARPAL TUNNEL RELEASE     •  SECTION     • CHOLECYSTECTOMY     • COLONOSCOPY     • CORONARY ANGIOPLASTY WITH STENT PLACEMENT     • HIP TROCHANTERIC NAILING WITH INTRAMEDULLARY HIP SCREW Right 2021    Procedure: HIP TROCHANTERIC NAILING LONG WITH INTRAMEDULLARY HIP SCREW;  Surgeon: Oracio Ponce DO;  Location: [facility];  Service: Orthopedics;  Laterality: Right;   • REPLACEMENT TOTAL KNEE Right    • TUBAL ABDOMINAL LIGATION     • UPPER GASTROINTESTINAL ENDOSCOPY         Family History:  Family History   Problem Relation Age of Onset   • COPD Mother    • Stroke Mother    • Cancer Brother    • Diabetes Brother    • Hypertension Brother    • Heart disease Brother        Social History:  Social History     Socioeconomic History   • Marital status:    Tobacco Use   • Smoking status: Never Smoker   • Smokeless tobacco: Never Used   Substance and Sexual Activity   • Alcohol use: Yes     Comment: Once a year    • Drug use: No   • Sexual activity: Defer       Current Medication List:    Current Outpatient Medications:   •  aspirin 81 MG chewable tablet, Chew 81 mg Daily., Disp: , Rfl:   •  atorvastatin (LIPITOR) 20 MG tablet, Take 20 mg by mouth Every Night., Disp: , Rfl:   •  Cetirizine HCl (ZYRTEC ALLERGY) 10 MG capsule, Take 10 mg by mouth Daily., Disp: , Rfl:   •  cholecalciferol (VITAMIN D3) 1.25 MG (35230 UT) capsule, Take 50,000 Units by mouth Every 7 (Seven) Days. Prior to [facility] Admission, Patient was on: Pt takes each Friday, Disp: , Rfl:   •  Diclofenac Sodium (VOLTAREN) 1 % gel gel, Apply 4 g topically to the appropriate area as directed 4 (Four) Times a Day As Needed., Disp: , Rfl:   •  furosemide (LASIX) 40 MG tablet, Take 40 mg by mouth Daily., Disp: , Rfl:   •   "gabapentin (NEURONTIN) 400 MG capsule, Take 400 mg by mouth Every 12 (Twelve) Hours., Disp: , Rfl:   •  glipizide (GLUCOTROL) 5 MG tablet, Take 5 mg by mouth 2 (Two) Times a Day Before Meals., Disp: , Rfl:   •  HYDROcodone-acetaminophen (NORCO) 5-325 MG per tablet, Take 1 tablet by mouth 2 (Two) Times a Day As Needed for Moderate Pain ., Disp: , Rfl:   •  levocetirizine (XYZAL) 5 MG tablet, Take 5 mg by mouth Every Evening., Disp: , Rfl:   •  levothyroxine (SYNTHROID, LEVOTHROID) 25 MCG tablet, Take 25 mcg by mouth Daily., Disp: , Rfl:   •  Loratadine 10 MG capsule, Take  by mouth., Disp: , Rfl:   •  metFORMIN (GLUCOPHAGE) 500 MG tablet, Take 500 mg by mouth 2 (Two) Times a Day With Meals., Disp: , Rfl:   •  metoprolol tartrate (LOPRESSOR) 25 MG tablet, Take 25 mg by mouth 2 (Two) Times a Day., Disp: , Rfl:   •  polyethylene glycol (MIRALAX) 17 GM/SCOOP powder, Take 17 g by mouth Daily As Needed (Constipation) for up to 30 doses., Disp: 510 g, Rfl: 0  •  potassium chloride (K-DUR,KLOR-CON) 20 MEQ CR tablet, Take 1 tablet by mouth Daily., Disp: , Rfl:   •  sertraline (ZOLOFT) 50 MG tablet, Take 50 mg by mouth Daily., Disp: , Rfl:   •  QUEtiapine (SEROquel) 25 MG tablet, Take 1 tablet by mouth Every Night for 30 days., Disp: 30 tablet, Rfl: 0    Allergies:   Nuts, Codeine, and Latex    Vitals:  /68   Pulse 68   Ht 144.8 cm (57\")   Wt 109 kg (240 lb)   BMI 51.94 kg/m²     Physical Exam:  Physical Exam  Constitutional:       Appearance: She is obese.   HENT:      Head: Normocephalic and atraumatic.      Nose: Nose normal. No congestion or rhinorrhea.   Eyes:      General: No scleral icterus.     Extraocular Movements: Extraocular movements intact.      Conjunctiva/sclera: Conjunctivae normal.      Pupils: Pupils are equal, round, and reactive to light.   Cardiovascular:      Rate and Rhythm: Normal rate and regular rhythm.      Pulses: Normal pulses.      Heart sounds: Normal heart sounds.   Pulmonary:      " Effort: Pulmonary effort is normal.      Breath sounds: Normal breath sounds.   Abdominal:      General: Abdomen is flat. Bowel sounds are normal. There is no distension.      Palpations: Abdomen is soft. There is no shifting dullness, fluid wave, hepatomegaly, splenomegaly, mass or pulsatile mass.      Tenderness: There is no abdominal tenderness. There is no guarding or rebound.      Hernia: No hernia is present.   Musculoskeletal:         General: No swelling or tenderness.      Cervical back: Normal range of motion and neck supple.   Skin:     General: Skin is warm and dry.      Coloration: Skin is not jaundiced.   Neurological:      General: No focal deficit present.      Mental Status: She is alert and oriented to person, place, and time.   Psychiatric:         Mood and Affect: Mood normal.         Behavior: Behavior normal.         Results Review:  Lab Results:   Office Visit on 10/14/2021   Component Date Value Ref Range Status   • Protime 10/14/2021 15.0* 12.8 - 14.5 Seconds Final   • INR 10/14/2021 1.13* 0.90 - 1.10 Final       Assessment/Plan     Visit Diagnoses:    ICD-10-CM ICD-9-CM   1. Cirrhosis of liver without ascites, unspecified hepatic cirrhosis type (HCC)  K74.60 571.5       Plan:  No orders of the defined types were placed in this encounter.      I will do a work-up for autoimmune and viral causes of liver cirrhosis.  This is likely due to GONZALEZ.  The patient has associated diabetes, hyperlipidemia and hypertension.  She has a history of coronary artery disease.  At some point, I would like to proceed with EGD colonoscopy.  The patient is due for colonoscopy based on her report of having one about 10 years ago.  Would like to check for esophageal varices given her history of cirrhosis.  She will follow-up in 8 weeks.      MEDS ORDERED DURING VISIT:  No orders of the defined types were placed in this encounter.      Return in about 8 weeks (around 12/9/2021).             This document has been  electronically signed by Lizzeth Harmon MD   October 14, 2021 12:55 EDT        Part of this note may be an electronic transcription/translation of spoken language to printed text using the Dragon Dictation System.

## 2021-10-15 LAB
ACTIN IGG SERPL-ACNC: 3 UNITS (ref 0–19)
ALBUMIN SERPL-MCNC: 3.9 G/DL (ref 3.5–5.2)
ALBUMIN/GLOB SERPL: 1.2 G/DL
ALP SERPL-CCNC: 165 U/L (ref 39–117)
ALPHA-FETOPROTEIN: 5.03 NG/ML (ref 0–8.3)
ALPHA1 GLOB MFR UR ELPH: 172 MG/DL (ref 90–200)
ALT SERPL W P-5'-P-CCNC: 32 U/L (ref 1–33)
ANION GAP SERPL CALCULATED.3IONS-SCNC: 11.6 MMOL/L (ref 5–15)
AST SERPL-CCNC: 61 U/L (ref 1–32)
BILIRUB SERPL-MCNC: 0.7 MG/DL (ref 0–1.2)
BUN SERPL-MCNC: 8 MG/DL (ref 6–20)
BUN/CREAT SERPL: 12.5 (ref 7–25)
CALCIUM SPEC-SCNC: 9.4 MG/DL (ref 8.6–10.5)
CERULOPLASMIN SERPL-MCNC: 27 MG/DL (ref 19–39)
CHLORIDE SERPL-SCNC: 105 MMOL/L (ref 98–107)
CO2 SERPL-SCNC: 26.4 MMOL/L (ref 22–29)
CREAT SERPL-MCNC: 0.64 MG/DL (ref 0.57–1)
FERRITIN SERPL-MCNC: 42.8 NG/ML (ref 13–150)
GFR SERPL CREATININE-BSD FRML MDRD: 96 ML/MIN/1.73
GGT SERPL-CCNC: 79 U/L (ref 5–36)
GLOBULIN UR ELPH-MCNC: 3.3 GM/DL
GLUCOSE SERPL-MCNC: 65 MG/DL (ref 65–99)
HAV IGM SERPL QL IA: NORMAL
HBV CORE IGM SERPL QL IA: NORMAL
HBV SURFACE AG SERPL QL IA: NORMAL
HCV AB SER DONR QL: NORMAL
IRON 24H UR-MRATE: 56 MCG/DL (ref 37–145)
IRON SATN MFR SERPL: 11 % (ref 20–50)
LKM-1 AB SER-ACNC: 1.7 UNITS (ref 0–20)
MITOCHONDRIA M2 IGG SER-ACNC: <20 UNITS (ref 0–20)
POTASSIUM SERPL-SCNC: 4.1 MMOL/L (ref 3.5–5.2)
PROT SERPL-MCNC: 7.2 G/DL (ref 6–8.5)
SODIUM SERPL-SCNC: 143 MMOL/L (ref 136–145)
TIBC SERPL-MCNC: 529 MCG/DL (ref 298–536)
TRANSFERRIN SERPL-MCNC: 355 MG/DL (ref 200–360)

## 2021-10-19 NOTE — PROGRESS NOTES
Your labs do not show up viral or an autoimmune cause for liver disease.  I did note that at 1 point your hep B core IgM was positive.  I am not sure what this means as you also had a negative on the very same day.  Your INR was mildly elevated.  This measures synthetic liver function.  Please keep your follow-up appointments for liver cirrhosis.

## 2021-10-20 LAB
ENDOMYSIAL ANTIBODY TITER IGA: NORMAL TITER
ENDOMYSIAL ANTIBODY TITER IGG: NORMAL TITER

## 2021-10-30 ENCOUNTER — APPOINTMENT (OUTPATIENT)
Dept: CT IMAGING | Facility: HOSPITAL | Age: 56
End: 2021-10-30

## 2021-10-30 ENCOUNTER — HOSPITAL ENCOUNTER (INPATIENT)
Facility: HOSPITAL | Age: 56
LOS: 4 days | Discharge: HOME | End: 2021-11-03
Attending: STUDENT IN AN ORGANIZED HEALTH CARE EDUCATION/TRAINING PROGRAM | Admitting: HOSPITALIST

## 2021-10-30 DIAGNOSIS — H05.011: Primary | ICD-10-CM

## 2021-10-30 PROBLEM — E03.9 HYPOTHYROIDISM (ACQUIRED): Chronic | Status: ACTIVE | Noted: 2021-10-30

## 2021-10-30 PROBLEM — E11.9 TYPE 2 DIABETES MELLITUS, WITHOUT LONG-TERM CURRENT USE OF INSULIN: Chronic | Status: ACTIVE | Noted: 2021-10-30

## 2021-10-30 LAB
ALBUMIN SERPL-MCNC: 3.94 G/DL (ref 3.5–5.2)
ALBUMIN/GLOB SERPL: 0.9 G/DL
ALP SERPL-CCNC: 177 U/L (ref 39–117)
ALT SERPL W P-5'-P-CCNC: 27 U/L (ref 1–33)
ANION GAP SERPL CALCULATED.3IONS-SCNC: 10.4 MMOL/L (ref 5–15)
AST SERPL-CCNC: 47 U/L (ref 1–32)
BASOPHILS # BLD AUTO: 0.07 10*3/MM3 (ref 0–0.2)
BASOPHILS NFR BLD AUTO: 0.7 % (ref 0–1.5)
BILIRUB SERPL-MCNC: 1.3 MG/DL (ref 0–1.2)
BUN SERPL-MCNC: 7 MG/DL (ref 6–20)
BUN/CREAT SERPL: 8 (ref 7–25)
CALCIUM SPEC-SCNC: 9 MG/DL (ref 8.6–10.5)
CHLORIDE SERPL-SCNC: 100 MMOL/L (ref 98–107)
CO2 SERPL-SCNC: 28.6 MMOL/L (ref 22–29)
CREAT SERPL-MCNC: 0.88 MG/DL (ref 0.57–1)
CRP SERPL-MCNC: 4.9 MG/DL (ref 0–0.5)
D-LACTATE SERPL-SCNC: 1 MMOL/L (ref 0.5–2)
D-LACTATE SERPL-SCNC: 2.1 MMOL/L (ref 0.5–2)
DEPRECATED RDW RBC AUTO: 48.8 FL (ref 37–54)
EOSINOPHIL # BLD AUTO: 0.19 10*3/MM3 (ref 0–0.4)
EOSINOPHIL NFR BLD AUTO: 1.9 % (ref 0.3–6.2)
ERYTHROCYTE [DISTWIDTH] IN BLOOD BY AUTOMATED COUNT: 14.3 % (ref 12.3–15.4)
ERYTHROCYTE [SEDIMENTATION RATE] IN BLOOD: 54 MM/HR (ref 0–30)
FLUAV RNA RESP QL NAA+PROBE: NOT DETECTED
FLUBV RNA RESP QL NAA+PROBE: NOT DETECTED
GFR SERPL CREATININE-BSD FRML MDRD: 66 ML/MIN/1.73
GLOBULIN UR ELPH-MCNC: 4.2 GM/DL
GLUCOSE SERPL-MCNC: 142 MG/DL (ref 65–99)
HBA1C MFR BLD: 5.8 % (ref 4.8–5.6)
HCT VFR BLD AUTO: 42.5 % (ref 34–46.6)
HGB BLD-MCNC: 13 G/DL (ref 12–15.9)
IMM GRANULOCYTES # BLD AUTO: 0.03 10*3/MM3 (ref 0–0.05)
IMM GRANULOCYTES NFR BLD AUTO: 0.3 % (ref 0–0.5)
LYMPHOCYTES # BLD AUTO: 2.61 10*3/MM3 (ref 0.7–3.1)
LYMPHOCYTES NFR BLD AUTO: 26.7 % (ref 19.6–45.3)
MCH RBC QN AUTO: 28.5 PG (ref 26.6–33)
MCHC RBC AUTO-ENTMCNC: 30.6 G/DL (ref 31.5–35.7)
MCV RBC AUTO: 93.2 FL (ref 79–97)
MONOCYTES # BLD AUTO: 0.89 10*3/MM3 (ref 0.1–0.9)
MONOCYTES NFR BLD AUTO: 9.1 % (ref 5–12)
NEUTROPHILS NFR BLD AUTO: 6 10*3/MM3 (ref 1.7–7)
NEUTROPHILS NFR BLD AUTO: 61.3 % (ref 42.7–76)
NRBC BLD AUTO-RTO: 0 /100 WBC (ref 0–0.2)
PLATELET # BLD AUTO: 162 10*3/MM3 (ref 140–450)
PMV BLD AUTO: 10.7 FL (ref 6–12)
POTASSIUM SERPL-SCNC: 3.9 MMOL/L (ref 3.5–5.2)
PROT SERPL-MCNC: 8.1 G/DL (ref 6–8.5)
RBC # BLD AUTO: 4.56 10*6/MM3 (ref 3.77–5.28)
SARS-COV-2 RNA RESP QL NAA+PROBE: NOT DETECTED
SODIUM SERPL-SCNC: 139 MMOL/L (ref 136–145)
WBC # BLD AUTO: 9.79 10*3/MM3 (ref 3.4–10.8)

## 2021-10-30 PROCEDURE — 85652 RBC SED RATE AUTOMATED: CPT | Performed by: PHYSICIAN ASSISTANT

## 2021-10-30 PROCEDURE — 83036 HEMOGLOBIN GLYCOSYLATED A1C: CPT | Performed by: HOSPITALIST

## 2021-10-30 PROCEDURE — 99222 1ST HOSP IP/OBS MODERATE 55: CPT | Performed by: PHYSICIAN ASSISTANT

## 2021-10-30 PROCEDURE — 25010000002 VANCOMYCIN 5 G RECONSTITUTED SOLUTION: Performed by: PHYSICIAN ASSISTANT

## 2021-10-30 PROCEDURE — 80053 COMPREHEN METABOLIC PANEL: CPT | Performed by: PHYSICIAN ASSISTANT

## 2021-10-30 PROCEDURE — 86140 C-REACTIVE PROTEIN: CPT | Performed by: PHYSICIAN ASSISTANT

## 2021-10-30 PROCEDURE — 70487 CT MAXILLOFACIAL W/DYE: CPT

## 2021-10-30 PROCEDURE — 25010000002 PIPERACILLIN SOD-TAZOBACTAM PER 1 G: Performed by: PHYSICIAN ASSISTANT

## 2021-10-30 PROCEDURE — 85025 COMPLETE CBC W/AUTO DIFF WBC: CPT | Performed by: PHYSICIAN ASSISTANT

## 2021-10-30 PROCEDURE — 25010000002 IOPAMIDOL 61 % SOLUTION: Performed by: STUDENT IN AN ORGANIZED HEALTH CARE EDUCATION/TRAINING PROGRAM

## 2021-10-30 PROCEDURE — 25010000002 MORPHINE PER 10 MG: Performed by: STUDENT IN AN ORGANIZED HEALTH CARE EDUCATION/TRAINING PROGRAM

## 2021-10-30 PROCEDURE — 83605 ASSAY OF LACTIC ACID: CPT | Performed by: PHYSICIAN ASSISTANT

## 2021-10-30 PROCEDURE — 87040 BLOOD CULTURE FOR BACTERIA: CPT | Performed by: PHYSICIAN ASSISTANT

## 2021-10-30 PROCEDURE — 99285 EMERGENCY DEPT VISIT HI MDM: CPT

## 2021-10-30 PROCEDURE — 87636 SARSCOV2 & INF A&B AMP PRB: CPT | Performed by: STUDENT IN AN ORGANIZED HEALTH CARE EDUCATION/TRAINING PROGRAM

## 2021-10-30 PROCEDURE — 25010000002 ONDANSETRON PER 1 MG: Performed by: PHYSICIAN ASSISTANT

## 2021-10-30 RX ORDER — DEXTROSE MONOHYDRATE 25 G/50ML
25 INJECTION, SOLUTION INTRAVENOUS
Status: DISCONTINUED | OUTPATIENT
Start: 2021-10-30 | End: 2021-11-03 | Stop reason: HOSPADM

## 2021-10-30 RX ORDER — SODIUM CHLORIDE 0.9 % (FLUSH) 0.9 %
10 SYRINGE (ML) INJECTION AS NEEDED
Status: DISCONTINUED | OUTPATIENT
Start: 2021-10-30 | End: 2021-11-03 | Stop reason: HOSPADM

## 2021-10-30 RX ORDER — HYDRALAZINE HYDROCHLORIDE 20 MG/ML
10 INJECTION INTRAMUSCULAR; INTRAVENOUS EVERY 6 HOURS PRN
Status: DISCONTINUED | OUTPATIENT
Start: 2021-10-30 | End: 2021-11-01

## 2021-10-30 RX ORDER — ACETAMINOPHEN 500 MG
1000 TABLET ORAL ONCE
Status: COMPLETED | OUTPATIENT
Start: 2021-10-30 | End: 2021-10-30

## 2021-10-30 RX ORDER — L.ACID,PARA/B.BIFIDUM/S.THERM 8B CELL
1 CAPSULE ORAL DAILY
Status: DISCONTINUED | OUTPATIENT
Start: 2021-10-31 | End: 2021-11-03 | Stop reason: HOSPADM

## 2021-10-30 RX ORDER — HEPARIN SODIUM 5000 [USP'U]/ML
5000 INJECTION, SOLUTION INTRAVENOUS; SUBCUTANEOUS EVERY 12 HOURS SCHEDULED
Status: DISCONTINUED | OUTPATIENT
Start: 2021-10-31 | End: 2021-11-01

## 2021-10-30 RX ORDER — ACETAMINOPHEN 325 MG/1
650 TABLET ORAL EVERY 6 HOURS PRN
Status: DISCONTINUED | OUTPATIENT
Start: 2021-10-30 | End: 2021-11-03 | Stop reason: HOSPADM

## 2021-10-30 RX ORDER — SODIUM CHLORIDE 0.9 % (FLUSH) 0.9 %
10 SYRINGE (ML) INJECTION EVERY 12 HOURS SCHEDULED
Status: DISCONTINUED | OUTPATIENT
Start: 2021-10-30 | End: 2021-11-03 | Stop reason: HOSPADM

## 2021-10-30 RX ORDER — ACETAMINOPHEN 500 MG
TABLET ORAL
Status: DISPENSED
Start: 2021-10-30 | End: 2021-10-31

## 2021-10-30 RX ORDER — NICOTINE POLACRILEX 4 MG
15 LOZENGE BUCCAL
Status: DISCONTINUED | OUTPATIENT
Start: 2021-10-30 | End: 2021-11-03 | Stop reason: HOSPADM

## 2021-10-30 RX ORDER — ONDANSETRON 2 MG/ML
4 INJECTION INTRAMUSCULAR; INTRAVENOUS ONCE
Status: COMPLETED | OUTPATIENT
Start: 2021-10-30 | End: 2021-10-30

## 2021-10-30 RX ADMIN — PIPERACILLIN SODIUM AND TAZOBACTAM SODIUM 3.38 G: 3; .375 INJECTION, POWDER, LYOPHILIZED, FOR SOLUTION INTRAVENOUS at 19:20

## 2021-10-30 RX ADMIN — MORPHINE SULFATE 4 MG: 4 INJECTION INTRAVENOUS at 19:19

## 2021-10-30 RX ADMIN — IOPAMIDOL 90 ML: 612 INJECTION, SOLUTION INTRAVENOUS at 19:48

## 2021-10-30 RX ADMIN — ACETAMINOPHEN 1000 MG: 500 TABLET ORAL at 22:07

## 2021-10-30 RX ADMIN — SODIUM CHLORIDE 1000 ML: 9 INJECTION, SOLUTION INTRAVENOUS at 19:20

## 2021-10-30 RX ADMIN — VANCOMYCIN HYDROCHLORIDE 2000 MG: 5 INJECTION, POWDER, LYOPHILIZED, FOR SOLUTION INTRAVENOUS at 21:49

## 2021-10-30 RX ADMIN — ONDANSETRON 4 MG: 2 INJECTION INTRAMUSCULAR; INTRAVENOUS at 19:19

## 2021-10-31 ENCOUNTER — APPOINTMENT (OUTPATIENT)
Dept: ULTRASOUND IMAGING | Facility: HOSPITAL | Age: 56
End: 2021-10-31

## 2021-10-31 PROBLEM — A41.9 SEVERE SEPSIS: Status: ACTIVE | Noted: 2021-10-31

## 2021-10-31 PROBLEM — R65.20 SEVERE SEPSIS (HCC): Status: ACTIVE | Noted: 2021-10-31

## 2021-10-31 LAB
ALBUMIN SERPL-MCNC: 3.14 G/DL (ref 3.5–5.2)
ALBUMIN/GLOB SERPL: 0.8 G/DL
ALP SERPL-CCNC: 151 U/L (ref 39–117)
ALT SERPL W P-5'-P-CCNC: 23 U/L (ref 1–33)
ANION GAP SERPL CALCULATED.3IONS-SCNC: 11.2 MMOL/L (ref 5–15)
AST SERPL-CCNC: 42 U/L (ref 1–32)
BASOPHILS # BLD AUTO: 0.05 10*3/MM3 (ref 0–0.2)
BASOPHILS NFR BLD AUTO: 0.6 % (ref 0–1.5)
BILIRUB SERPL-MCNC: 1.2 MG/DL (ref 0–1.2)
BUN SERPL-MCNC: 7 MG/DL (ref 6–20)
BUN/CREAT SERPL: 9.3 (ref 7–25)
CALCIUM SPEC-SCNC: 8.2 MG/DL (ref 8.6–10.5)
CHLORIDE SERPL-SCNC: 104 MMOL/L (ref 98–107)
CO2 SERPL-SCNC: 22.8 MMOL/L (ref 22–29)
CREAT SERPL-MCNC: 0.75 MG/DL (ref 0.57–1)
CRP SERPL-MCNC: 4.69 MG/DL (ref 0–0.5)
DEPRECATED RDW RBC AUTO: 47.4 FL (ref 37–54)
EOSINOPHIL # BLD AUTO: 0.17 10*3/MM3 (ref 0–0.4)
EOSINOPHIL NFR BLD AUTO: 2.1 % (ref 0.3–6.2)
ERYTHROCYTE [DISTWIDTH] IN BLOOD BY AUTOMATED COUNT: 14.2 % (ref 12.3–15.4)
GFR SERPL CREATININE-BSD FRML MDRD: 80 ML/MIN/1.73
GLOBULIN UR ELPH-MCNC: 4 GM/DL
GLUCOSE BLDC GLUCOMTR-MCNC: 103 MG/DL (ref 70–130)
GLUCOSE BLDC GLUCOMTR-MCNC: 113 MG/DL (ref 70–130)
GLUCOSE BLDC GLUCOMTR-MCNC: 119 MG/DL (ref 70–130)
GLUCOSE BLDC GLUCOMTR-MCNC: 95 MG/DL (ref 70–130)
GLUCOSE SERPL-MCNC: 91 MG/DL (ref 65–99)
HAV IGM SERPL QL IA: NORMAL
HBV CORE IGM SERPL QL IA: NORMAL
HBV SURFACE AG SERPL QL IA: NORMAL
HCT VFR BLD AUTO: 35.1 % (ref 34–46.6)
HCV AB SER DONR QL: NORMAL
HGB BLD-MCNC: 11.1 G/DL (ref 12–15.9)
IMM GRANULOCYTES # BLD AUTO: 0.02 10*3/MM3 (ref 0–0.05)
IMM GRANULOCYTES NFR BLD AUTO: 0.3 % (ref 0–0.5)
LYMPHOCYTES # BLD AUTO: 2.31 10*3/MM3 (ref 0.7–3.1)
LYMPHOCYTES NFR BLD AUTO: 29 % (ref 19.6–45.3)
MCH RBC QN AUTO: 28.9 PG (ref 26.6–33)
MCHC RBC AUTO-ENTMCNC: 31.6 G/DL (ref 31.5–35.7)
MCV RBC AUTO: 91.4 FL (ref 79–97)
MONOCYTES # BLD AUTO: 0.75 10*3/MM3 (ref 0.1–0.9)
MONOCYTES NFR BLD AUTO: 9.4 % (ref 5–12)
NEUTROPHILS NFR BLD AUTO: 4.67 10*3/MM3 (ref 1.7–7)
NEUTROPHILS NFR BLD AUTO: 58.6 % (ref 42.7–76)
NRBC BLD AUTO-RTO: 0 /100 WBC (ref 0–0.2)
PLATELET # BLD AUTO: 131 10*3/MM3 (ref 140–450)
PMV BLD AUTO: 11.1 FL (ref 6–12)
POTASSIUM SERPL-SCNC: 3.6 MMOL/L (ref 3.5–5.2)
PROT SERPL-MCNC: 7.1 G/DL (ref 6–8.5)
QT INTERVAL: 432 MS
QTC INTERVAL: 488 MS
RBC # BLD AUTO: 3.84 10*6/MM3 (ref 3.77–5.28)
SODIUM SERPL-SCNC: 138 MMOL/L (ref 136–145)
TSH SERPL DL<=0.05 MIU/L-ACNC: 2.52 UIU/ML (ref 0.27–4.2)
WBC # BLD AUTO: 7.97 10*3/MM3 (ref 3.4–10.8)

## 2021-10-31 PROCEDURE — 85025 COMPLETE CBC W/AUTO DIFF WBC: CPT | Performed by: HOSPITALIST

## 2021-10-31 PROCEDURE — 82962 GLUCOSE BLOOD TEST: CPT

## 2021-10-31 PROCEDURE — 25010000002 HEPARIN (PORCINE) PER 1000 UNITS: Performed by: HOSPITALIST

## 2021-10-31 PROCEDURE — 93010 ELECTROCARDIOGRAM REPORT: CPT | Performed by: INTERNAL MEDICINE

## 2021-10-31 PROCEDURE — 99233 SBSQ HOSP IP/OBS HIGH 50: CPT | Performed by: INTERNAL MEDICINE

## 2021-10-31 PROCEDURE — 80074 ACUTE HEPATITIS PANEL: CPT | Performed by: PHYSICIAN ASSISTANT

## 2021-10-31 PROCEDURE — 90686 IIV4 VACC NO PRSV 0.5 ML IM: CPT | Performed by: HOSPITALIST

## 2021-10-31 PROCEDURE — 25010000002 PIPERACILLIN SOD-TAZOBACTAM PER 1 G: Performed by: HOSPITALIST

## 2021-10-31 PROCEDURE — 84443 ASSAY THYROID STIM HORMONE: CPT | Performed by: PHYSICIAN ASSISTANT

## 2021-10-31 PROCEDURE — 86140 C-REACTIVE PROTEIN: CPT | Performed by: HOSPITALIST

## 2021-10-31 PROCEDURE — 25010000002 INFLUENZA VAC SPLIT QUAD 0.5 ML SUSPENSION PREFILLED SYRINGE: Performed by: HOSPITALIST

## 2021-10-31 PROCEDURE — 0 AMPICILLIN-SULBACTAM PER 1.5 G: Performed by: INTERNAL MEDICINE

## 2021-10-31 PROCEDURE — 93005 ELECTROCARDIOGRAM TRACING: CPT | Performed by: PHYSICIAN ASSISTANT

## 2021-10-31 PROCEDURE — 80053 COMPREHEN METABOLIC PANEL: CPT | Performed by: HOSPITALIST

## 2021-10-31 PROCEDURE — 99253 IP/OBS CNSLTJ NEW/EST LOW 45: CPT | Performed by: SURGERY

## 2021-10-31 PROCEDURE — 25010000002 VANCOMYCIN 5 G RECONSTITUTED SOLUTION: Performed by: HOSPITALIST

## 2021-10-31 PROCEDURE — 93880 EXTRACRANIAL BILAT STUDY: CPT

## 2021-10-31 PROCEDURE — G0008 ADMIN INFLUENZA VIRUS VAC: HCPCS | Performed by: HOSPITALIST

## 2021-10-31 RX ORDER — GABAPENTIN 400 MG/1
400 CAPSULE ORAL EVERY 12 HOURS
Status: DISCONTINUED | OUTPATIENT
Start: 2021-10-31 | End: 2021-11-01

## 2021-10-31 RX ORDER — POTASSIUM CHLORIDE 20 MEQ/1
20 TABLET, EXTENDED RELEASE ORAL DAILY
Status: DISCONTINUED | OUTPATIENT
Start: 2021-10-31 | End: 2021-11-01

## 2021-10-31 RX ORDER — LEVOTHYROXINE SODIUM 0.03 MG/1
25 TABLET ORAL DAILY
Status: DISCONTINUED | OUTPATIENT
Start: 2021-10-31 | End: 2021-11-01

## 2021-10-31 RX ORDER — POLYETHYLENE GLYCOL 3350 17 G/17G
17 POWDER, FOR SOLUTION ORAL DAILY
Status: DISCONTINUED | OUTPATIENT
Start: 2021-10-31 | End: 2021-11-01

## 2021-10-31 RX ORDER — FUROSEMIDE 40 MG/1
40 TABLET ORAL DAILY
Status: DISCONTINUED | OUTPATIENT
Start: 2021-10-31 | End: 2021-11-03 | Stop reason: HOSPADM

## 2021-10-31 RX ORDER — CETIRIZINE HYDROCHLORIDE 10 MG/1
10 TABLET ORAL DAILY
Status: DISCONTINUED | OUTPATIENT
Start: 2021-10-31 | End: 2021-11-03 | Stop reason: HOSPADM

## 2021-10-31 RX ORDER — ATORVASTATIN CALCIUM 20 MG/1
20 TABLET, FILM COATED ORAL NIGHTLY
Status: DISCONTINUED | OUTPATIENT
Start: 2021-10-31 | End: 2021-11-01

## 2021-10-31 RX ORDER — ASPIRIN 81 MG/1
81 TABLET, CHEWABLE ORAL DAILY
Status: DISCONTINUED | OUTPATIENT
Start: 2021-10-31 | End: 2021-11-03 | Stop reason: HOSPADM

## 2021-10-31 RX ORDER — BISACODYL 5 MG/1
10 TABLET, DELAYED RELEASE ORAL DAILY PRN
Status: DISCONTINUED | OUTPATIENT
Start: 2021-10-31 | End: 2021-11-03 | Stop reason: HOSPADM

## 2021-10-31 RX ADMIN — HEPARIN SODIUM 5000 UNITS: 5000 INJECTION INTRAVENOUS; SUBCUTANEOUS at 08:28

## 2021-10-31 RX ADMIN — GABAPENTIN 400 MG: 400 CAPSULE ORAL at 15:55

## 2021-10-31 RX ADMIN — FUROSEMIDE 40 MG: 40 TABLET ORAL at 15:56

## 2021-10-31 RX ADMIN — Medication 1 CAPSULE: at 08:28

## 2021-10-31 RX ADMIN — CETIRIZINE HYDROCHLORIDE 10 MG: 10 TABLET, FILM COATED ORAL at 15:56

## 2021-10-31 RX ADMIN — SODIUM CHLORIDE, PRESERVATIVE FREE 10 ML: 5 INJECTION INTRAVENOUS at 21:03

## 2021-10-31 RX ADMIN — METOPROLOL TARTRATE 25 MG: 25 TABLET, FILM COATED ORAL at 15:55

## 2021-10-31 RX ADMIN — VANCOMYCIN HYDROCHLORIDE 750 MG: 5 INJECTION, POWDER, LYOPHILIZED, FOR SOLUTION INTRAVENOUS at 22:25

## 2021-10-31 RX ADMIN — POTASSIUM CHLORIDE 20 MEQ: 20 TABLET, EXTENDED RELEASE ORAL at 15:55

## 2021-10-31 RX ADMIN — AMPICILLIN SODIUM AND SULBACTAM SODIUM 3 G: 2; 1 INJECTION, POWDER, FOR SOLUTION INTRAMUSCULAR; INTRAVENOUS at 21:00

## 2021-10-31 RX ADMIN — LEVOTHYROXINE SODIUM 25 MCG: 25 TABLET ORAL at 15:56

## 2021-10-31 RX ADMIN — BISACODYL 10 MG: 5 TABLET, COATED ORAL at 22:25

## 2021-10-31 RX ADMIN — SODIUM CHLORIDE, PRESERVATIVE FREE 10 ML: 5 INJECTION INTRAVENOUS at 08:28

## 2021-10-31 RX ADMIN — SERTRALINE 50 MG: 50 TABLET, FILM COATED ORAL at 15:55

## 2021-10-31 RX ADMIN — PIPERACILLIN SODIUM AND TAZOBACTAM SODIUM 3.38 G: 3; .375 INJECTION, POWDER, LYOPHILIZED, FOR SOLUTION INTRAVENOUS at 00:49

## 2021-10-31 RX ADMIN — ASPIRIN 81 MG: 81 TABLET, CHEWABLE ORAL at 15:56

## 2021-10-31 RX ADMIN — ATORVASTATIN CALCIUM 20 MG: 20 TABLET, FILM COATED ORAL at 20:58

## 2021-10-31 RX ADMIN — VANCOMYCIN HYDROCHLORIDE 750 MG: 5 INJECTION, POWDER, LYOPHILIZED, FOR SOLUTION INTRAVENOUS at 10:57

## 2021-10-31 RX ADMIN — HEPARIN SODIUM 5000 UNITS: 5000 INJECTION INTRAVENOUS; SUBCUTANEOUS at 20:58

## 2021-10-31 RX ADMIN — AMPICILLIN SODIUM AND SULBACTAM SODIUM 3 G: 2; 1 INJECTION, POWDER, FOR SOLUTION INTRAMUSCULAR; INTRAVENOUS at 15:56

## 2021-10-31 RX ADMIN — PIPERACILLIN SODIUM AND TAZOBACTAM SODIUM 3.38 G: 3; .375 INJECTION, POWDER, LYOPHILIZED, FOR SOLUTION INTRAVENOUS at 08:28

## 2021-10-31 RX ADMIN — HEPARIN SODIUM 5000 UNITS: 5000 INJECTION INTRAVENOUS; SUBCUTANEOUS at 00:49

## 2021-10-31 RX ADMIN — INFLUENZA VIRUS VACCINE 0.5 ML: 15; 15; 15; 15 SUSPENSION INTRAMUSCULAR at 20:59

## 2021-11-01 LAB
ANION GAP SERPL CALCULATED.3IONS-SCNC: 11.1 MMOL/L (ref 5–15)
BASOPHILS # BLD AUTO: 0.04 10*3/MM3 (ref 0–0.2)
BASOPHILS NFR BLD AUTO: 0.8 % (ref 0–1.5)
BUN SERPL-MCNC: 7 MG/DL (ref 6–20)
BUN/CREAT SERPL: 12.5 (ref 7–25)
CALCIUM SPEC-SCNC: 8.7 MG/DL (ref 8.6–10.5)
CHLORIDE SERPL-SCNC: 104 MMOL/L (ref 98–107)
CO2 SERPL-SCNC: 23.9 MMOL/L (ref 22–29)
CREAT SERPL-MCNC: 0.56 MG/DL (ref 0.57–1)
DEPRECATED RDW RBC AUTO: 47.1 FL (ref 37–54)
EOSINOPHIL # BLD AUTO: 0.19 10*3/MM3 (ref 0–0.4)
EOSINOPHIL NFR BLD AUTO: 4 % (ref 0.3–6.2)
ERYTHROCYTE [DISTWIDTH] IN BLOOD BY AUTOMATED COUNT: 14 % (ref 12.3–15.4)
GFR SERPL CREATININE-BSD FRML MDRD: 112 ML/MIN/1.73
GLUCOSE BLDC GLUCOMTR-MCNC: 106 MG/DL (ref 70–130)
GLUCOSE BLDC GLUCOMTR-MCNC: 135 MG/DL (ref 70–130)
GLUCOSE BLDC GLUCOMTR-MCNC: 85 MG/DL (ref 70–130)
GLUCOSE SERPL-MCNC: 109 MG/DL (ref 65–99)
HCT VFR BLD AUTO: 34.7 % (ref 34–46.6)
HGB BLD-MCNC: 10.8 G/DL (ref 12–15.9)
IMM GRANULOCYTES # BLD AUTO: 0.01 10*3/MM3 (ref 0–0.05)
IMM GRANULOCYTES NFR BLD AUTO: 0.2 % (ref 0–0.5)
LYMPHOCYTES # BLD AUTO: 1.92 10*3/MM3 (ref 0.7–3.1)
LYMPHOCYTES NFR BLD AUTO: 40.2 % (ref 19.6–45.3)
MCH RBC QN AUTO: 29 PG (ref 26.6–33)
MCHC RBC AUTO-ENTMCNC: 31.1 G/DL (ref 31.5–35.7)
MCV RBC AUTO: 93.3 FL (ref 79–97)
MONOCYTES # BLD AUTO: 0.4 10*3/MM3 (ref 0.1–0.9)
MONOCYTES NFR BLD AUTO: 8.4 % (ref 5–12)
NEUTROPHILS NFR BLD AUTO: 2.22 10*3/MM3 (ref 1.7–7)
NEUTROPHILS NFR BLD AUTO: 46.4 % (ref 42.7–76)
NRBC BLD AUTO-RTO: 0 /100 WBC (ref 0–0.2)
PLATELET # BLD AUTO: 125 10*3/MM3 (ref 140–450)
PMV BLD AUTO: 11.4 FL (ref 6–12)
POTASSIUM SERPL-SCNC: 3.6 MMOL/L (ref 3.5–5.2)
RBC # BLD AUTO: 3.72 10*6/MM3 (ref 3.77–5.28)
SODIUM SERPL-SCNC: 139 MMOL/L (ref 136–145)
VANCOMYCIN TROUGH SERPL-MCNC: 10.4 MCG/ML (ref 5–20)
WBC # BLD AUTO: 4.78 10*3/MM3 (ref 3.4–10.8)

## 2021-11-01 PROCEDURE — 25010000002 ENOXAPARIN PER 10 MG: Performed by: STUDENT IN AN ORGANIZED HEALTH CARE EDUCATION/TRAINING PROGRAM

## 2021-11-01 PROCEDURE — 80048 BASIC METABOLIC PNL TOTAL CA: CPT | Performed by: STUDENT IN AN ORGANIZED HEALTH CARE EDUCATION/TRAINING PROGRAM

## 2021-11-01 PROCEDURE — 80202 ASSAY OF VANCOMYCIN: CPT | Performed by: PHYSICIAN ASSISTANT

## 2021-11-01 PROCEDURE — 99232 SBSQ HOSP IP/OBS MODERATE 35: CPT | Performed by: STUDENT IN AN ORGANIZED HEALTH CARE EDUCATION/TRAINING PROGRAM

## 2021-11-01 PROCEDURE — 85025 COMPLETE CBC W/AUTO DIFF WBC: CPT | Performed by: STUDENT IN AN ORGANIZED HEALTH CARE EDUCATION/TRAINING PROGRAM

## 2021-11-01 PROCEDURE — 82962 GLUCOSE BLOOD TEST: CPT

## 2021-11-01 PROCEDURE — 97162 PT EVAL MOD COMPLEX 30 MIN: CPT

## 2021-11-01 PROCEDURE — 25010000002 HEPARIN (PORCINE) PER 1000 UNITS: Performed by: HOSPITALIST

## 2021-11-01 PROCEDURE — 25010000002 VANCOMYCIN 5 G RECONSTITUTED SOLUTION: Performed by: HOSPITALIST

## 2021-11-01 PROCEDURE — 0 AMPICILLIN-SULBACTAM PER 1.5 G: Performed by: INTERNAL MEDICINE

## 2021-11-01 RX ORDER — LEVOTHYROXINE SODIUM 0.05 MG/1
75 TABLET ORAL DAILY
COMMUNITY
End: 2023-02-20

## 2021-11-01 RX ORDER — MECLIZINE HYDROCHLORIDE 25 MG/1
25 TABLET ORAL DAILY PRN
Status: CANCELLED | OUTPATIENT
Start: 2021-11-01

## 2021-11-01 RX ORDER — CEPHALEXIN 500 MG/1
500 CAPSULE ORAL 2 TIMES DAILY
COMMUNITY
Start: 2021-10-29 | End: 2021-11-03 | Stop reason: HOSPADM

## 2021-11-01 RX ORDER — MECLIZINE HYDROCHLORIDE 25 MG/1
25 TABLET ORAL DAILY PRN
Status: DISCONTINUED | OUTPATIENT
Start: 2021-11-01 | End: 2021-11-03 | Stop reason: HOSPADM

## 2021-11-01 RX ORDER — ASPIRIN 81 MG/1
81 TABLET ORAL DAILY
Status: CANCELLED | OUTPATIENT
Start: 2021-11-01

## 2021-11-01 RX ORDER — POTASSIUM CHLORIDE 750 MG/1
10 TABLET, FILM COATED, EXTENDED RELEASE ORAL 2 TIMES DAILY
Status: CANCELLED | OUTPATIENT
Start: 2021-11-01

## 2021-11-01 RX ORDER — LISINOPRIL 10 MG/1
20 TABLET ORAL DAILY PRN
Status: CANCELLED | OUTPATIENT
Start: 2021-11-01

## 2021-11-01 RX ORDER — MECLIZINE HYDROCHLORIDE 25 MG/1
25 TABLET ORAL DAILY PRN
Status: ON HOLD | COMMUNITY
End: 2022-04-20

## 2021-11-01 RX ORDER — POTASSIUM CHLORIDE 750 MG/1
10 TABLET, FILM COATED, EXTENDED RELEASE ORAL 2 TIMES DAILY WITH MEALS
Status: DISCONTINUED | OUTPATIENT
Start: 2021-11-02 | End: 2021-11-01

## 2021-11-01 RX ORDER — ATORVASTATIN CALCIUM 40 MG/1
40 TABLET, FILM COATED ORAL NIGHTLY
Status: DISCONTINUED | OUTPATIENT
Start: 2021-11-01 | End: 2021-11-03 | Stop reason: HOSPADM

## 2021-11-01 RX ORDER — ASPIRIN 81 MG/1
81 TABLET ORAL DAILY
Status: ON HOLD | COMMUNITY

## 2021-11-01 RX ORDER — QUETIAPINE FUMARATE 25 MG/1
50 TABLET, FILM COATED ORAL NIGHTLY
Status: CANCELLED | OUTPATIENT
Start: 2021-11-01

## 2021-11-01 RX ORDER — QUETIAPINE FUMARATE 25 MG/1
50 TABLET, FILM COATED ORAL NIGHTLY
Status: DISCONTINUED | OUTPATIENT
Start: 2021-11-01 | End: 2021-11-03 | Stop reason: HOSPADM

## 2021-11-01 RX ORDER — LISINOPRIL 20 MG/1
20 TABLET ORAL DAILY PRN
COMMUNITY
End: 2022-07-21

## 2021-11-01 RX ORDER — CETIRIZINE HYDROCHLORIDE, PSEUDOEPHEDRINE HYDROCHLORIDE 5; 120 MG/1; MG/1
1 TABLET, FILM COATED, EXTENDED RELEASE ORAL 2 TIMES DAILY PRN
Status: CANCELLED | OUTPATIENT
Start: 2021-11-01

## 2021-11-01 RX ORDER — SULFAMETHOXAZOLE AND TRIMETHOPRIM 800; 160 MG/1; MG/1
1 TABLET ORAL 2 TIMES DAILY
COMMUNITY
Start: 2021-10-29 | End: 2021-11-03 | Stop reason: HOSPADM

## 2021-11-01 RX ORDER — GABAPENTIN 400 MG/1
400 CAPSULE ORAL EVERY 12 HOURS
Status: DISCONTINUED | OUTPATIENT
Start: 2021-11-01 | End: 2021-11-03 | Stop reason: HOSPADM

## 2021-11-01 RX ORDER — LEVOTHYROXINE SODIUM 0.05 MG/1
50 TABLET ORAL
Status: DISCONTINUED | OUTPATIENT
Start: 2021-11-02 | End: 2021-11-03 | Stop reason: HOSPADM

## 2021-11-01 RX ORDER — LISINOPRIL 10 MG/1
20 TABLET ORAL DAILY PRN
Status: DISCONTINUED | OUTPATIENT
Start: 2021-11-01 | End: 2021-11-01

## 2021-11-01 RX ORDER — AMOXICILLIN 250 MG
1 CAPSULE ORAL 2 TIMES DAILY
Status: DISCONTINUED | OUTPATIENT
Start: 2021-11-01 | End: 2021-11-03 | Stop reason: HOSPADM

## 2021-11-01 RX ORDER — LEVOTHYROXINE SODIUM 0.03 MG/1
25 TABLET ORAL ONCE
Status: COMPLETED | OUTPATIENT
Start: 2021-11-01 | End: 2021-11-01

## 2021-11-01 RX ORDER — POTASSIUM CHLORIDE 750 MG/1
10 TABLET, EXTENDED RELEASE ORAL 2 TIMES DAILY
Status: ON HOLD | COMMUNITY

## 2021-11-01 RX ORDER — LISINOPRIL 10 MG/1
20 TABLET ORAL DAILY
Status: DISCONTINUED | OUTPATIENT
Start: 2021-11-01 | End: 2021-11-03 | Stop reason: HOSPADM

## 2021-11-01 RX ORDER — HYDROCODONE BITARTRATE AND ACETAMINOPHEN 5; 325 MG/1; MG/1
1 TABLET ORAL 2 TIMES DAILY
Status: DISCONTINUED | OUTPATIENT
Start: 2021-11-01 | End: 2021-11-03 | Stop reason: HOSPADM

## 2021-11-01 RX ORDER — QUETIAPINE FUMARATE 50 MG/1
50 TABLET, FILM COATED ORAL NIGHTLY
Status: ON HOLD | COMMUNITY

## 2021-11-01 RX ADMIN — LEVOTHYROXINE SODIUM 25 MCG: 25 TABLET ORAL at 12:45

## 2021-11-01 RX ADMIN — QUETIAPINE FUMARATE 50 MG: 25 TABLET, FILM COATED ORAL at 20:25

## 2021-11-01 RX ADMIN — GABAPENTIN 400 MG: 400 CAPSULE ORAL at 09:33

## 2021-11-01 RX ADMIN — DOCUSATE SODIUM 50 MG AND SENNOSIDES 8.6 MG 1 TABLET: 8.6; 5 TABLET, FILM COATED ORAL at 20:26

## 2021-11-01 RX ADMIN — LEVOTHYROXINE SODIUM 25 MCG: 25 TABLET ORAL at 09:11

## 2021-11-01 RX ADMIN — AMPICILLIN SODIUM AND SULBACTAM SODIUM 3 G: 2; 1 INJECTION, POWDER, FOR SOLUTION INTRAMUSCULAR; INTRAVENOUS at 02:31

## 2021-11-01 RX ADMIN — Medication 1 CAPSULE: at 09:12

## 2021-11-01 RX ADMIN — SERTRALINE 50 MG: 50 TABLET, FILM COATED ORAL at 09:11

## 2021-11-01 RX ADMIN — AMPICILLIN SODIUM AND SULBACTAM SODIUM 3 G: 2; 1 INJECTION, POWDER, FOR SOLUTION INTRAMUSCULAR; INTRAVENOUS at 09:12

## 2021-11-01 RX ADMIN — ASPIRIN 81 MG: 81 TABLET, CHEWABLE ORAL at 09:11

## 2021-11-01 RX ADMIN — GABAPENTIN 400 MG: 400 CAPSULE ORAL at 20:25

## 2021-11-01 RX ADMIN — VANCOMYCIN HYDROCHLORIDE 1250 MG: 5 INJECTION, POWDER, LYOPHILIZED, FOR SOLUTION INTRAVENOUS at 10:52

## 2021-11-01 RX ADMIN — SODIUM CHLORIDE, PRESERVATIVE FREE 10 ML: 5 INJECTION INTRAVENOUS at 20:27

## 2021-11-01 RX ADMIN — CETIRIZINE HYDROCHLORIDE 10 MG: 10 TABLET, FILM COATED ORAL at 09:11

## 2021-11-01 RX ADMIN — ENOXAPARIN SODIUM 40 MG: 40 INJECTION SUBCUTANEOUS at 20:27

## 2021-11-01 RX ADMIN — BISACODYL 10 MG: 5 TABLET, COATED ORAL at 10:59

## 2021-11-01 RX ADMIN — VANCOMYCIN HYDROCHLORIDE 1250 MG: 5 INJECTION, POWDER, LYOPHILIZED, FOR SOLUTION INTRAVENOUS at 23:08

## 2021-11-01 RX ADMIN — ATORVASTATIN CALCIUM 40 MG: 40 TABLET, FILM COATED ORAL at 20:25

## 2021-11-01 RX ADMIN — HEPARIN SODIUM 5000 UNITS: 5000 INJECTION INTRAVENOUS; SUBCUTANEOUS at 09:11

## 2021-11-01 RX ADMIN — LISINOPRIL 20 MG: 10 TABLET ORAL at 17:00

## 2021-11-01 RX ADMIN — POTASSIUM CHLORIDE 20 MEQ: 20 TABLET, EXTENDED RELEASE ORAL at 09:12

## 2021-11-01 RX ADMIN — GABAPENTIN 400 MG: 400 CAPSULE ORAL at 02:30

## 2021-11-01 RX ADMIN — AMPICILLIN SODIUM AND SULBACTAM SODIUM 3 G: 2; 1 INJECTION, POWDER, FOR SOLUTION INTRAMUSCULAR; INTRAVENOUS at 20:26

## 2021-11-01 RX ADMIN — METOPROLOL TARTRATE 25 MG: 25 TABLET, FILM COATED ORAL at 20:25

## 2021-11-01 RX ADMIN — METOPROLOL TARTRATE 25 MG: 25 TABLET, FILM COATED ORAL at 09:11

## 2021-11-01 RX ADMIN — HYDROCODONE BITARTRATE AND ACETAMINOPHEN 1 TABLET: 5; 325 TABLET ORAL at 12:45

## 2021-11-01 RX ADMIN — FUROSEMIDE 40 MG: 40 TABLET ORAL at 09:12

## 2021-11-01 RX ADMIN — AMPICILLIN SODIUM AND SULBACTAM SODIUM 3 G: 2; 1 INJECTION, POWDER, FOR SOLUTION INTRAMUSCULAR; INTRAVENOUS at 14:48

## 2021-11-01 RX ADMIN — HYDROCODONE BITARTRATE AND ACETAMINOPHEN 1 TABLET: 5; 325 TABLET ORAL at 23:08

## 2021-11-01 NOTE — PROGRESS NOTES
[facility] HOSPITALIST PROGRESS NOTE     Patient Identification:  Name:  Aidee Trinidad  Age:  56 y.o.  Sex:  female  :  1965  MRN:  [id number]  Visit Number:  [id number]  ROOM: [address] [place] [address]     Primary Care Provider:  Azalea Burnett APRN    Length of stay in inpatient status:  2    Subjective     Chief Compliant:    Chief Complaint   Patient presents with   • Facial Swelling   • Eye Pain       History of Presenting Illness:    Patient seen in follow-up for right follicular abscess.  States right eye is improving.  Able to open it today as before it had been still swollen shut.  Eyesight has been preserved without visual issues.  Afebrile.  Tolerating oral diet.  No adverse events noted by nursing.    Objective     Current Hospital Meds:ampicillin-sulbactam, 3 g, Intravenous, Q6H  aspirin, 81 mg, Oral, Daily  atorvastatin, 20 mg, Oral, Nightly  cetirizine, 10 mg, Oral, Daily  furosemide, 40 mg, Oral, Daily  gabapentin, 400 mg, Oral, Q12H  heparin (porcine), 5,000 Units, Subcutaneous, Q12H  HYDROcodone-acetaminophen, 1 tablet, Oral, BID  insulin aspart, 0-7 Units, Subcutaneous, TID AC  lactobacillus acidophilus, 1 capsule, Oral, Daily  [START ON 2021] levothyroxine, 50 mcg, Oral, Q AM  metFORMIN, 500 mg, Oral, BID With Meals  metoprolol tartrate, 25 mg, Oral, Q12H  [START ON 2021] potassium chloride, 10 mEq, Oral, BID With Meals  QUEtiapine, 50 mg, Oral, Nightly  sertraline, 50 mg, Oral, Daily  sodium chloride, 10 mL, Intravenous, Q12H  vancomycin, 1,250 mg, Intravenous, Q12H         Current Antimicrobial Therapy:  Anti-Infectives (From admission, onward)    Ordered     Dose/Rate Route Frequency Start Stop    21 0956  vancomycin 1250 mg/250 mL 0.9% NS IVPB ([facility])        Ordering Provider: Uriel Thomas MD    1,250 mg  over 60 Minutes Intravenous Every 12 Hours 21 1100 21 1059    10/31/21 1340  ampicillin-sulbactam (UNASYN) 3 g in sodium chloride 0.9 %  100 mL IVPB-VTB        Ordering Provider: Zheng Tavarez MD    3 g Intravenous Every 6 Hours 10/31/21 1430 11/03/21 1429    10/30/21 2128  vancomycin 2000 mg/500 mL 0.9% NS IVPB ([facility])        Ordering Provider: Shantelle Oh PA    2,000 mg  over 60 Minutes Intravenous Once 10/30/21 2200 10/30/21 2249    10/30/21 1824  piperacillin-tazobactam (ZOSYN) IVPB 3.375 g in 100 mL NS VTB        Ordering Provider: Shantelle Oh PA    3.375 g  over 30 Minutes Intravenous Once 10/30/21 1826 10/30/21 2102        Current Diuretic Therapy:  Diuretics (From admission, onward)    Ordered     Dose/Rate Route Frequency Start Stop    10/31/21 1351  furosemide (LASIX) tablet 40 mg        Ordering Provider: Zheng Tavarez MD    40 mg Oral Daily 10/31/21 1530          ----------------------------------------------------------------------------------------------------------------------  Vital Signs:  Temp:  [98.1 °F (36.7 °C)-98.3 °F (36.8 °C)] 98.3 °F (36.8 °C)  Heart Rate:  [69-94] 69  Resp:  [16-24] 16  BP: (119-168)/(57-80) 120/57  SpO2:  [94 %-97 %] 94 %  on   ;   Device (Oxygen Therapy): room air  Body mass index is 75.06 kg/m².    Wt Readings from Last 3 Encounters:   11/01/21 112 kg (247 lb)   10/14/21 109 kg (240 lb)   07/18/21 111 kg (245 lb 3.2 oz)     Intake & Output (last 3 days)       10/29 0701  10/30 0700 10/30 0701  10/31 0700 10/31 0701  11/01 0700 11/01 0701  11/02 0700    P.O.  120 1080 720    I.V. (mL/kg)   2078.6 (18.6)     IV Piggyback  100      Total Intake(mL/kg)  220 (1.9) 3158.6 (28.2) 720 (6.4)    Net  +220 +3158.6 +720            Urine Unmeasured Occurrence  2 x 8 x 2 x    Stool Unmeasured Occurrence   1 x 1 x        Diet Regular; Consistent Carbohydrate, Cardiac  ----------------------------------------------------------------------------------------------------------------------  Physical exam:  Constitutional: Middle-aged female, well-developed and well-nourished, resting comfortably in bed, no  acute distress.      HENT:  Head:  Normocephalic and atraumatic.  Mouth:  Moist mucous membranes.    Eyes:  Conjunctivae and EOM are normal. No scleral icterus.  No visual deficits.  Significant swelling of the right periorbital/upper eyelid tender to palpation  Cardiovascular:  Normal rate, regular rhythm and normal heart sounds with no murmur. No JVD.   Pulmonary/Chest:  No respiratory distress, no wheezes, no crackles, with normal breath sounds and good air movement. Unlabored. No accessory muscle use.  Abdominal:  Soft. No distension and no tenderness.  Bowel sounds present. No rebound or guarding.   Musculoskeletal:  No tenderness, and no deformity.  No red or swollen joints anywhere.    Neurological:  Alert and oriented to person, place, and time.  No cranial nerve deficit.   Nonfocal.   Skin:  Skin is warm and dry. No rash noted. No pallor.   Peripheral vascular:  No clubbing, no cyanosis, no edema. Pedal and tibial pulses 2/4 bilaterally.   ----------------------------------------------------------------------------------------------------------------------  Results from last 7 days   Lab Units 11/01/21  0839 10/31/21  0035 10/30/21  2139 10/30/21  1849   CRP mg/dL  --  4.69*  --  4.90*   LACTATE mmol/L  --   --  1.0 2.1*   WBC 10*3/mm3 4.78 7.97  --  9.79   HEMOGLOBIN g/dL 10.8* 11.1*  --  13.0   HEMATOCRIT % 34.7 35.1  --  42.5   MCV fL 93.3 91.4  --  93.2   MCHC g/dL 31.1* 31.6  --  30.6*   PLATELETS 10*3/mm3 125* 131*  --  162         Results from last 7 days   Lab Units 11/01/21  0839 10/31/21  0035 10/30/21  1849   SODIUM mmol/L 139 138 139   POTASSIUM mmol/L 3.6 3.6 3.9   CHLORIDE mmol/L 104 104 100   CO2 mmol/L 23.9 22.8 28.6   BUN mg/dL 7 7 7   CREATININE mg/dL 0.56* 0.75 0.88   EGFR IF NONAFRICN AM mL/min/1.73 112 80 66   CALCIUM mg/dL 8.7 8.2* 9.0   GLUCOSE mg/dL 109* 91 142*   ALBUMIN g/dL  --  3.14* 3.94   BILIRUBIN mg/dL  --  1.2 1.3*   ALK PHOS U/L  --  151* 177*   AST (SGOT) U/L  --  42* 47*    ALT (SGPT) U/L  --  23 27   Estimated Creatinine Clearance: 113.7 mL/min (A) (by C-G formula based on SCr of 0.56 mg/dL (L)).  No results found for: AMMONIA              Hemoglobin A1C   Date/Time Value Ref Range Status   10/30/2021 1849 5.80 (H) 4.80 - 5.60 % Final     Glucose   Date/Time Value Ref Range Status   11/01/2021 0613 85 70 - 130 mg/dL Final     Comment:     Meter: [id number] : [id number] POLO MASCORRO   10/31/2021 2022 103 70 - 130 mg/dL Final     Comment:     Meter: [id number] : [id number] TACO CLARK   10/31/2021 1647 113 70 - 130 mg/dL Final     Comment:     Meter: [id number] : [id number] naik mo   10/31/2021 1042 119 70 - 130 mg/dL Final     Comment:     Meter: [id number] : [id number] gumaro hassan   10/31/2021 0659 95 70 - 130 mg/dL Final     Comment:     Meter: [id number] : [id number] missy duff     Lab Results   Component Value Date    TSH 2.520 10/31/2021     No results found for: PREGTESTUR, PREGSERUM, HCG, HCGQUANT  Pain Management Panel     Pain Management Panel Latest Ref Rng & Units 2/28/2020    AMPHETAMINES SCREEN, URINE Negative Negative    BARBITURATES SCREEN Negative Negative    BENZODIAZEPINE SCREEN, URINE Negative Negative    BUPRENORPHINEUR Negative Negative    COCAINE SCREEN, URINE Negative Negative    METHADONE SCREEN, URINE Negative Negative        Brief Urine Lab Results  (Last result in the past 365 days)      Color   Clarity   Blood   Leuk Est   Nitrite   Protein   CREAT   Urine HCG        07/23/21 0519 Dark Yellow   Clear   Negative   Trace   Negative   Negative               Blood Culture   Date Value Ref Range Status   10/30/2021 No growth at 24 hours  Preliminary   10/30/2021 No growth at 24 hours  Preliminary     No results found for: URINECX  No results found for: WOUNDCX  No results found for: STOOLCX  No results found for: RESPCX  No results found for: AFBCX  Results from last 7 days   Lab Units 10/31/21  0035 10/30/21  2139 10/30/21  1849    LACTATE mmol/L  --  1.0 2.1*   SED RATE mm/hr  --   --  54*   CRP mg/dL 4.69*  --  4.90*       I have personally looked at the labs and they are summarized above.  ----------------------------------------------------------------------------------------------------------------------  Detailed radiology reports for the last 24 hours:  Imaging Results (Last 24 Hours)     Procedure Component Value Units Date/Time    US Carotid Bilateral [[id number]] Collected: 10/31/21 1704     Updated: 10/31/21 1706    Narrative:      US Carotid Duplex BILAT    INDICATION:   Severe atherosclerotic changes on CT imaging.    TECHNIQUE:   Grayscale, color Doppler, and spectral Doppler ultrasound of the carotid arteries was performed. Evaluation for a significant stenosis is based on the NASCET criteria.    COMPARISON:   None available. No prior carotid ultrasounds.    FINDINGS:  Extensive calcified atherosclerotic plaque within both carotid bifurcations.. There is antegrade flow in both common, internal, external carotid and vertebral arteries.     Peak systolic velocity are provided below:     Right internal carotid artery: 103 cm/s. Brisk systolic upstroke.     Left internal carotid artery: 204 cm/s. Brisk systolic upstroke.  Elevated velocities within the right external carotid artery at 486 cm/s in elevated velocities left external carotid artery at 240 cm/s. ICA to mildly increased left ICA to CCA ratio.    Impression:      Moderately elevated velocities in the left internal carotid artery suggesting the mid range of a 50-69% stenosis.    Right internal carotid velocities within normal limits suggesting no hemodynamically significant stenosis.    Markedly elevated velocities in both external carotid arteries suggesting greater than 70% stenosis based on reported criteria.    Abnormal screening study and if clinically warranted further evaluation with CTA may be of benefit.    Signer Name: MARIANNA Montgomery MD   Signed: 10/31/2021  5:04 PM   Workstation Name: [email or web address]    [facility]        Assessment & Plan      Patient is a 56-year-old female with history significant for CAD status post CABG, type 2 diabetes mellitus who presented with right eye swelling and pain.    #Right periorbital vs follicular abscess with periorbital edema  --She still has significant erythema and swelling however it has improved over the past 24 hours with IV antibiotics.  Has remained afebrile without visual changes  --CT noted ill-defined collection likely abscess 2.8 cm x 1.1 cm with overlying cellulitic changes  --no leukocytosis, baseline CRP 4.9, sed rate 54  --Case was discussed in the ER with Dr. Tate and  ophthalmology who recommended IV antibiotics without transfer  --As needed Norco for pain, bowel regimen  --continue vancomycin, unasyn  --will need outpatient follow-up with ophthalmology at discharge    #CAD s/p CABG  #Essential hypertension  #HFpEF, not in acute exacerbation  --echo July 2021: Preserved systolic function, grade 2 diastolic dysfunction, mild pulmonary hypertension  --resume home dose lisinopril  --Continue Lopressor, Lasix, aspirin/statin    #T2DM, Neurontin for neuropathy, Metformin, sliding scale AC at bedtime    #HLD, continue statin  #Hypothyroidism, c/w synthroid  #Chronic depression, continue Zoloft, Seroquel  #Bilateral carotid stenosis, recommend outpatient follow up with CTA  #Recent R hip fx s/p IM nail, 7/18    CHECKLIST:  Abx: vancomycin, unasyn  VTE: lovenox  GI ppx: ppi  Diet: diabetic/cardiac  Code: CPR, full  Dispo: Periorbital edema seems to be improving with IV antibiotics.  Anticipate greater than 2 midnight stay as she previously failed outpatient therapy.  Disposition Home when medically stable.    Kaveh Reyes DO  [facility] Hospitalist  11/01/21  14:56 EDT

## 2021-11-01 NOTE — CASE MANAGEMENT/SOCIAL WORK
"Discharge Planning Assessment   Christophe     Patient Name: Aidee Trinidad  MRN: [id number]  Today's Date: 11/1/2021    Admit Date: 10/30/2021     Discharge Plan     Row Name 11/01/21 1641       Plan    Plan SS received consult for \"d/c planning; DME; pt is s/p hip fracture in 7/21 and had home health; still receiving outpatient therapy; may need community resources.\" SS to follow up 11/2/21.              RAYNA Yu    "

## 2021-11-01 NOTE — THERAPY EVALUATION
Acute Care - Physical Therapy Initial Evaluation  [facility]     Patient Name: Aidee Trinidad  : 1965  MRN: [id number]  Today's Date: 2021   Onset of Illness/Injury or Date of Surgery: 21  Visit Dx:     ICD-10-CM ICD-9-CM   1. Abscess of periorbital region, right  H05.011 376.01     Patient Active Problem List   Diagnosis   • Closed fracture of right hip (HCC)   • CAD (coronary artery disease)   • Essential hypertension   • Fatty liver   • Abscess of periorbital region, right   • Hypothyroidism (acquired)   • Type 2 diabetes mellitus, without long-term current use of insulin (HCC)   • Severe sepsis (HCC)     Past Medical History:   Diagnosis Date   • Arthritis    • Chronic back pain    • Coronary artery disease    • Depression    • Diabetes mellitus (HCC)    • Fatty liver    • Hypertension    • Hypothyroid      Past Surgical History:   Procedure Laterality Date   • CARPAL TUNNEL RELEASE     •  SECTION     • CHOLECYSTECTOMY     • COLONOSCOPY     • CORONARY ANGIOPLASTY WITH STENT PLACEMENT     • HIP TROCHANTERIC NAILING WITH INTRAMEDULLARY HIP SCREW Right 2021    Procedure: HIP TROCHANTERIC NAILING LONG WITH INTRAMEDULLARY HIP SCREW;  Surgeon: Oracio Ponce DO;  Location: [facility];  Service: Orthopedics;  Laterality: Right;   • REPLACEMENT TOTAL KNEE Right    • TUBAL ABDOMINAL LIGATION     • UPPER GASTROINTESTINAL ENDOSCOPY       PT Assessment (last 12 hours)     PT Evaluation and Treatment     Row Name 21 1238          Physical Therapy Time and Intention    Document Type evaluation  -     Mode of Treatment physical therapy  -KH     Patient Effort adequate  -KH     Symptoms Noted During/After Treatment dizziness  -KH     Comment Pt pleasant and willing to participate with evaluation this AM. Pt describes living at home with her son who assists her with ADL's. Pt uses a rollator and W/C at baseline. She has a BSC and hospital bed, O2 using 2L at night  -KH     Row Name  11/01/21 1238          General Information    Patient Profile Reviewed yes  -     Onset of Illness/Injury or Date of Surgery 11/30/21  -     Referring Physician Eric  -     Patient Observations alert; cooperative  -     Patient/Family/Caregiver Comments/Observations She c/o of R hip weakness  -     General Observations of Patient Pt found supine in bed this date.  -     Equipment Currently Used at Home rollator  -     Pertinent History of Current Functional Problem PMHx significant for CABG, CAD, HTN, DMII, BL BKA, R IM Nailing; pt admitted d/t R eye swelling  -     Existing Precautions/Restrictions fall  -     Equipment Issued to Patient gait belt  -     Row Name 11/01/21 1238          Previous Level of Function/Home Environm    BADLs, Premorbid Functional Level needs assistance for safe performance  -     Household Ambulation, Premorbid Functional Level needs assistive device for safe performance  -     Row Name 11/01/21 1238          Living Environment    Current Living Arrangements home/apartment/condo  -     Lives With child(eloise), adult  -     Row Name 11/01/21 1238          Sensory Assessment (Somatosensory)    Sensory Assessment (Somatosensory) left LE; right LE  -     Left LE Sensory Assessment light touch awareness; impaired  L2 grossly  -     Right LE Sensory Assessment light touch awareness; impaired  L2 grossly  -     Row Name 11/01/21 1238          Cognition    Affect/Mental Status (Cognitive) WFL  -     Orientation Status (Cognition) oriented x 3; oriented to; person; place; time  -     Follows Commands (Cognition) WFL; follows one-step commands; over 90% accuracy  -     Row Name 11/01/21 1238          Range of Motion Comprehensive    Comment, General Range of Motion AROM grossly WFL  -     Row Name 11/01/21 1238          Strength Comprehensive (MMT)    Comment, General Manual Muscle Testing (MMT) Assessment Grossly 3/5 BLE  -     Row Name 11/01/21 1238           Bed Mobility    Bed Mobility supine-sit  -     Supine-Sit [place] (Bed Mobility) independent  -     Row Name 11/01/21 1238          Transfers    Transfers sit-stand transfer; stand-sit transfer  -     Sit-Stand [place] (Transfers) standby assist; set up  -     Stand-Sit [place] (Transfers) set up; standby assist  -     Row Name 11/01/21 1238          Sit-Stand Transfer    Assistive Device (Sit-Stand Transfers) walker, front-wheeled  -     Row Name 11/01/21 1238          Stand-Sit Transfer    Assistive Device (Stand-Sit Transfers) walker, front-wheeled  -     Row Name 11/01/21 1238          Gait/Stairs (Locomotion)    Gait/Stairs Locomotion gait/ambulation assistive device  -     [place] Level (Gait) contact guard; standby assist  -     Assistive Device (Gait) walker, front-wheeled  -     Distance in Feet (Gait) 15  -     Pattern (Gait) step-through  -     Deviations/Abnormal Patterns (Gait) antalgic; stride length decreased  -     Row Name 11/01/21 1238          Balance    Balance Assessment standing static balance; standing dynamic balance  -     Static Standing Balance WFL  -     Dynamic Standing Balance mild impairment  -     Row Name 11/01/21 1238          Coping    Observed Emotional State calm; cooperative; pleasant  -     Row Name 11/01/21 1238          Plan of Care Review    Outcome Summary Pt tolerated evaluation well, demonstrating independence with bed mobility, and SBA/CGA with gait and transfers. Pt would benefit from skilled intervention to increase LE strength and increase efficiency and safety with ambulation. Anticipated D/C for home with assistance, potential home health services for LE strengthening and safety during ambulation.  -     Row Name 11/01/21 1238          Physical Therapy Goals    Gait Training Goal Selection (PT) gait training, PT goal 1  -     Strength Goal Selection (PT) strength, PT goal 1  -     Row Name 11/01/21  1238          Gait Training Goal 1 (PT)    Activity/Assistive Device (Gait Training Goal 1, PT) gait (walking locomotion); assistive device use  -     [place] Level (Gait Training Goal 1, PT) modified independence  -     Distance (Gait Training Goal 1, PT) 50  -KH     Time Frame (Gait Training Goal 1, PT) by discharge  -     Row Name 11/01/21 1238          Strength Goal 1 (PT)    Strength Goal 1 (PT) Increase LE strength to 3+/5  -KH     Time Frame (Strength Goal 1, PT) by discharge  -     Row Name 11/01/21 1238          Positioning and Restraints    Pre-Treatment Position in bed  -     Post Treatment Position chair  -     In Chair sitting; call light within reach; with nsg  -     Row Name 11/01/21 1238          Therapy Assessment/Plan (PT)    Functional Level at Time of Evaluation (PT) SBA/CGA  -     PT Diagnosis (PT) LE weakness  -     Rehab Potential (PT) good, to achieve stated therapy goals  -     Criteria for Skilled Interventions Met (PT) yes; meets criteria  -     Predicted Duration of Therapy Intervention (PT) LOS  -     Problem List (PT) problems related to; balance; strength  -           User Key  (r) = Recorded By, (t) = Taken By, (c) = Cosigned By    Initials Name Provider Type    Ольга Nichole, PT Physical Therapist                PT Recommendation and Plan  Anticipated Discharge Disposition (PT): home with assist, home, home with home health  Planned Therapy Interventions (PT): balance training, gait training, patient/family education, strengthening, transfer training, bed mobility training  Therapy Frequency (PT): 3 times/wk  Outcome Summary: Pt tolerated evaluation well, demonstrating independence with bed mobility, and SBA/CGA with gait and transfers. Pt would benefit from skilled intervention to increase LE strength and increase efficiency and safety with ambulation. Anticipated D/C for home with assistance, potential home health services for LE strengthening  and safety during ambulation.       Time Calculation:    PT Charges     Row Name 11/01/21 1301             Time Calculation    PT Received On 11/01/21  -      PT Goal Re-Cert Due Date 11/15/21  -            User Key  (r) = Recorded By, (t) = Taken By, (c) = Cosigned By    Initials Name Provider Type    Ольга Nichole, PT Physical Therapist              Therapy Charges for Today     Code Description Service Date Service Provider Modifiers Qty    [id number] HC PT EVAL MOD COMPLEXITY 4 11/1/2021 Ольга Ruffin, PT GP 1               Ольга Ruffin, PT  11/1/2021

## 2021-11-02 LAB
ANION GAP SERPL CALCULATED.3IONS-SCNC: 8.2 MMOL/L (ref 5–15)
BASOPHILS # BLD AUTO: 0.05 10*3/MM3 (ref 0–0.2)
BASOPHILS NFR BLD AUTO: 1.2 % (ref 0–1.5)
BUN SERPL-MCNC: 11 MG/DL (ref 6–20)
BUN/CREAT SERPL: 17.5 (ref 7–25)
CALCIUM SPEC-SCNC: 8.7 MG/DL (ref 8.6–10.5)
CHLORIDE SERPL-SCNC: 107 MMOL/L (ref 98–107)
CO2 SERPL-SCNC: 28.8 MMOL/L (ref 22–29)
CREAT SERPL-MCNC: 0.63 MG/DL (ref 0.57–1)
DEPRECATED RDW RBC AUTO: 49 FL (ref 37–54)
EOSINOPHIL # BLD AUTO: 0.17 10*3/MM3 (ref 0–0.4)
EOSINOPHIL NFR BLD AUTO: 4.1 % (ref 0.3–6.2)
ERYTHROCYTE [DISTWIDTH] IN BLOOD BY AUTOMATED COUNT: 14.3 % (ref 12.3–15.4)
GFR SERPL CREATININE-BSD FRML MDRD: 98 ML/MIN/1.73
GLUCOSE BLDC GLUCOMTR-MCNC: 118 MG/DL (ref 70–130)
GLUCOSE BLDC GLUCOMTR-MCNC: 122 MG/DL (ref 70–130)
GLUCOSE BLDC GLUCOMTR-MCNC: 125 MG/DL (ref 70–130)
GLUCOSE BLDC GLUCOMTR-MCNC: 84 MG/DL (ref 70–130)
GLUCOSE SERPL-MCNC: 100 MG/DL (ref 65–99)
HCT VFR BLD AUTO: 32.7 % (ref 34–46.6)
HGB BLD-MCNC: 10 G/DL (ref 12–15.9)
IMM GRANULOCYTES # BLD AUTO: 0.01 10*3/MM3 (ref 0–0.05)
IMM GRANULOCYTES NFR BLD AUTO: 0.2 % (ref 0–0.5)
LYMPHOCYTES # BLD AUTO: 2.02 10*3/MM3 (ref 0.7–3.1)
LYMPHOCYTES NFR BLD AUTO: 48.2 % (ref 19.6–45.3)
MCH RBC QN AUTO: 28.8 PG (ref 26.6–33)
MCHC RBC AUTO-ENTMCNC: 30.6 G/DL (ref 31.5–35.7)
MCV RBC AUTO: 94.2 FL (ref 79–97)
MONOCYTES # BLD AUTO: 0.42 10*3/MM3 (ref 0.1–0.9)
MONOCYTES NFR BLD AUTO: 10 % (ref 5–12)
NEUTROPHILS NFR BLD AUTO: 1.52 10*3/MM3 (ref 1.7–7)
NEUTROPHILS NFR BLD AUTO: 36.3 % (ref 42.7–76)
NRBC BLD AUTO-RTO: 0 /100 WBC (ref 0–0.2)
PLATELET # BLD AUTO: 122 10*3/MM3 (ref 140–450)
PMV BLD AUTO: 11 FL (ref 6–12)
POTASSIUM SERPL-SCNC: 3.7 MMOL/L (ref 3.5–5.2)
RBC # BLD AUTO: 3.47 10*6/MM3 (ref 3.77–5.28)
SODIUM SERPL-SCNC: 144 MMOL/L (ref 136–145)
WBC # BLD AUTO: 4.19 10*3/MM3 (ref 3.4–10.8)

## 2021-11-02 PROCEDURE — 25010000002 VANCOMYCIN 5 G RECONSTITUTED SOLUTION: Performed by: HOSPITALIST

## 2021-11-02 PROCEDURE — 25010000002 ENOXAPARIN PER 10 MG: Performed by: STUDENT IN AN ORGANIZED HEALTH CARE EDUCATION/TRAINING PROGRAM

## 2021-11-02 PROCEDURE — 80048 BASIC METABOLIC PNL TOTAL CA: CPT | Performed by: STUDENT IN AN ORGANIZED HEALTH CARE EDUCATION/TRAINING PROGRAM

## 2021-11-02 PROCEDURE — 82962 GLUCOSE BLOOD TEST: CPT

## 2021-11-02 PROCEDURE — 85025 COMPLETE CBC W/AUTO DIFF WBC: CPT | Performed by: STUDENT IN AN ORGANIZED HEALTH CARE EDUCATION/TRAINING PROGRAM

## 2021-11-02 PROCEDURE — 0 AMPICILLIN-SULBACTAM PER 1.5 G: Performed by: INTERNAL MEDICINE

## 2021-11-02 PROCEDURE — 99232 SBSQ HOSP IP/OBS MODERATE 35: CPT | Performed by: STUDENT IN AN ORGANIZED HEALTH CARE EDUCATION/TRAINING PROGRAM

## 2021-11-02 RX ADMIN — HYDROCODONE BITARTRATE AND ACETAMINOPHEN 1 TABLET: 5; 325 TABLET ORAL at 20:22

## 2021-11-02 RX ADMIN — QUETIAPINE FUMARATE 50 MG: 25 TABLET, FILM COATED ORAL at 20:22

## 2021-11-02 RX ADMIN — SODIUM CHLORIDE, PRESERVATIVE FREE 10 ML: 5 INJECTION INTRAVENOUS at 08:33

## 2021-11-02 RX ADMIN — DOCUSATE SODIUM 50 MG AND SENNOSIDES 8.6 MG 1 TABLET: 8.6; 5 TABLET, FILM COATED ORAL at 20:21

## 2021-11-02 RX ADMIN — VANCOMYCIN HYDROCHLORIDE 1250 MG: 5 INJECTION, POWDER, LYOPHILIZED, FOR SOLUTION INTRAVENOUS at 22:04

## 2021-11-02 RX ADMIN — LEVOTHYROXINE SODIUM 50 MCG: 50 TABLET ORAL at 05:24

## 2021-11-02 RX ADMIN — ENOXAPARIN SODIUM 40 MG: 40 INJECTION SUBCUTANEOUS at 20:20

## 2021-11-02 RX ADMIN — HYDROCODONE BITARTRATE AND ACETAMINOPHEN 1 TABLET: 5; 325 TABLET ORAL at 08:32

## 2021-11-02 RX ADMIN — AMPICILLIN SODIUM AND SULBACTAM SODIUM 3 G: 2; 1 INJECTION, POWDER, FOR SOLUTION INTRAMUSCULAR; INTRAVENOUS at 01:33

## 2021-11-02 RX ADMIN — SODIUM CHLORIDE, PRESERVATIVE FREE 10 ML: 5 INJECTION INTRAVENOUS at 20:22

## 2021-11-02 RX ADMIN — GABAPENTIN 400 MG: 400 CAPSULE ORAL at 08:32

## 2021-11-02 RX ADMIN — ASPIRIN 81 MG: 81 TABLET, CHEWABLE ORAL at 08:32

## 2021-11-02 RX ADMIN — ATORVASTATIN CALCIUM 40 MG: 40 TABLET, FILM COATED ORAL at 20:22

## 2021-11-02 RX ADMIN — VANCOMYCIN HYDROCHLORIDE 1250 MG: 5 INJECTION, POWDER, LYOPHILIZED, FOR SOLUTION INTRAVENOUS at 10:33

## 2021-11-02 RX ADMIN — METFORMIN HYDROCHLORIDE 500 MG: 500 TABLET ORAL at 16:43

## 2021-11-02 RX ADMIN — FUROSEMIDE 40 MG: 40 TABLET ORAL at 08:32

## 2021-11-02 RX ADMIN — Medication 1 CAPSULE: at 08:31

## 2021-11-02 RX ADMIN — AMPICILLIN SODIUM AND SULBACTAM SODIUM 3 G: 2; 1 INJECTION, POWDER, FOR SOLUTION INTRAMUSCULAR; INTRAVENOUS at 20:19

## 2021-11-02 RX ADMIN — CETIRIZINE HYDROCHLORIDE 10 MG: 10 TABLET, FILM COATED ORAL at 08:32

## 2021-11-02 RX ADMIN — DOCUSATE SODIUM 50 MG AND SENNOSIDES 8.6 MG 1 TABLET: 8.6; 5 TABLET, FILM COATED ORAL at 08:32

## 2021-11-02 RX ADMIN — METFORMIN HYDROCHLORIDE 500 MG: 500 TABLET ORAL at 08:32

## 2021-11-02 RX ADMIN — AMPICILLIN SODIUM AND SULBACTAM SODIUM 3 G: 2; 1 INJECTION, POWDER, FOR SOLUTION INTRAMUSCULAR; INTRAVENOUS at 13:35

## 2021-11-02 RX ADMIN — SERTRALINE 50 MG: 50 TABLET, FILM COATED ORAL at 08:32

## 2021-11-02 RX ADMIN — GABAPENTIN 400 MG: 400 CAPSULE ORAL at 20:22

## 2021-11-02 RX ADMIN — AMPICILLIN SODIUM AND SULBACTAM SODIUM 3 G: 2; 1 INJECTION, POWDER, FOR SOLUTION INTRAMUSCULAR; INTRAVENOUS at 08:31

## 2021-11-02 NOTE — NURSING NOTE
"Transitional Care Note    Enrolled in [facility] Transitional Care Note    Enrolled in [facility] Services under theTCM Model to be followed for 6 weeks post discharge.  [facility] will assist with support and education at time of transition home from hospital.  Hospital  will follow throughout the stay at [facility].  Home  will visit within 48 hours of discharge and follow with home vitals and telephone contact for 6 weeks.      Patient admitted to [facility] via Emergency Department, complaints of swelling of right eye.  Admitted for treatment of sepsis.    Patient contacted via phone.    Explained transition to home program and Patient is agreeable to home visits.  Home  will be Faviola Quiles RN    Clinical Assessment Instrument Scores:  >Short Portable Mental Status 10, normal mental function  >Geriatric Depression Scale 3, normal  >IADL 2, Dependent with ADL's  >EAST-ADL 2, Dependent with self-care  >Subjective Health Rating \"fair\"  >General Anxiety Scale  3    "

## 2021-11-02 NOTE — DISCHARGE INSTRUCTIONS
You have been enrolled into the transition from hospital to home program.  A nurse (Faviola) will be contacting you after you discharge to home to set up a time to come out to your home for a follow-up visit.  If you have any questions or concerns you can call this number anytime and a nurse will contact you:  [facility]  [phone].

## 2021-11-02 NOTE — CASE MANAGEMENT/SOCIAL WORK
Discharge Planning Assessment   Christophe     Patient Name: Aidee Trinidad  MRN: [id number]  Today's Date: 11/2/2021    Admit Date: 10/30/2021     Discharge Needs Assessment     Row Name 11/02/21 1005       Living Environment    Lives With child(eloise), adult    Name(s) of Who Lives With Patient sonAmerico    Current Living Arrangements home/apartment/condo    Primary Care Provided by self    Provides Primary Care For no one    Family Caregiver if Needed child(eloise), adult    Family Caregiver Names Puja-dtr, Americo-son    Quality of Family Relationships helpful; involved; supportive    Able to Return to Prior Arrangements yes       Resource/Environmental Concerns    Resource/Environmental Concerns none    Transportation Concerns car, none       Transition Planning    Patient/Family Anticipates Transition to home with family    Patient/Family Anticipated Services at Transition none    Transportation Anticipated family or friend will provide       Discharge Needs Assessment    Equipment Currently Used at Home wheelchair; oxygen; glucometer; walker, standard; shower chair; hospital bed; commode    Concerns to be Addressed no discharge needs identified    Anticipated Changes Related to Illness none    Equipment Needed After Discharge none               Discharge Plan     Row Name 11/02/21 1006       Plan    Plan Pt lives at home with sonAmerico. Pt does not utilize home health services at this time. Pt has standard walker, wheelchair, shower chair, oxygen, glucometer, bedside commode, hospital bed with trapeze bar via [employer]. PCP is Azalea Burnett. Pt does not have a POA or living will on file. Pt plans to return home with son providing transportation at discharge. SS to follow and assist.    Patient/Family in Agreement with Plan yes               Demographic Summary     Row Name 11/02/21 1005       General Information    Referral Source nursing    Reason for Consult --  d/c planning; DME; pt is s/p hip fracture in  7/21 and had home health; still receiving outpatient therapy; may need community resources              RAYNA Yu

## 2021-11-02 NOTE — PROGRESS NOTES
[facility] HOSPITALIST PROGRESS NOTE     Patient Identification:  Name:  Aidee Trinidad  Age:  56 y.o.  Sex:  female  :  1965  MRN:  [id number]  Visit Number:  [id number]  ROOM: [address] [place] [address]     Primary Care Provider:  Azalea Burnett APRN    Length of stay in inpatient status:  3    Subjective     Chief Compliant:    Chief Complaint   Patient presents with   • Facial Swelling   • Eye Pain       History of Presenting Illness:    Patient seen in follow-up for right follicular abscess.  States right eye is improving.  Says it is less swollen and not as painful this morning.  No change in visual acuity.  Afebrile.  Tolerating oral diet.  No adverse events noted by nursing.    Objective     Current Hospital Meds:ampicillin-sulbactam, 3 g, Intravenous, Q6H  aspirin, 81 mg, Oral, Daily  atorvastatin, 40 mg, Oral, Nightly  cetirizine, 10 mg, Oral, Daily  enoxaparin, 40 mg, Subcutaneous, Nightly  furosemide, 40 mg, Oral, Daily  gabapentin, 400 mg, Oral, Q12H  HYDROcodone-acetaminophen, 1 tablet, Oral, BID  insulin aspart, 0-7 Units, Subcutaneous, TID AC  lactobacillus acidophilus, 1 capsule, Oral, Daily  levothyroxine, 50 mcg, Oral, Q AM  lisinopril, 20 mg, Oral, Daily  metFORMIN, 500 mg, Oral, BID With Meals  metoprolol tartrate, 25 mg, Oral, Q12H  QUEtiapine, 50 mg, Oral, Nightly  senna-docusate sodium, 1 tablet, Oral, BID  sertraline, 50 mg, Oral, Daily  sodium chloride, 10 mL, Intravenous, Q12H  vancomycin, 1,250 mg, Intravenous, Q12H         Current Antimicrobial Therapy:  Anti-Infectives (From admission, onward)    Ordered     Dose/Rate Route Frequency Start Stop    21 0956  vancomycin 1250 mg/250 mL 0.9% NS IVPB ([facility])        Ordering Provider: Uriel Thomas MD    1,250 mg  over 60 Minutes Intravenous Every 12 Hours 21 1100 21 1059    10/31/21 1340  ampicillin-sulbactam (UNASYN) 3 g in sodium chloride 0.9 % 100 mL IVPB-VTB        Ordering Provider: Daysi  MD Zehng    3 g Intravenous Every 6 Hours 10/31/21 1430 11/03/21 1429    10/30/21 2128  vancomycin 2000 mg/500 mL 0.9% NS IVPB ([facility])        Ordering Provider: Shantelle Oh PA    2,000 mg  over 60 Minutes Intravenous Once 10/30/21 2200 10/30/21 2249    10/30/21 1824  piperacillin-tazobactam (ZOSYN) IVPB 3.375 g in 100 mL NS VTB        Ordering Provider: Shantelle Oh PA    3.375 g  over 30 Minutes Intravenous Once 10/30/21 1826 10/30/21 2102        Current Diuretic Therapy:  Diuretics (From admission, onward)    Ordered     Dose/Rate Route Frequency Start Stop    10/31/21 1351  furosemide (LASIX) tablet 40 mg        Ordering Provider: Zheng Tavarez MD    40 mg Oral Daily 10/31/21 1530          ----------------------------------------------------------------------------------------------------------------------  Vital Signs:  Temp:  [97.8 °F (36.6 °C)-98.7 °F (37.1 °C)] 98.7 °F (37.1 °C)  Heart Rate:  [60-69] 68  Resp:  [16-20] 20  BP: ()/(50-60) 100/52  SpO2:  [94 %-99 %] 94 %  on   ;   Device (Oxygen Therapy): room air  Body mass index is 75.24 kg/m².    Wt Readings from Last 3 Encounters:   11/02/21 112 kg (247 lb 9.6 oz)   10/14/21 109 kg (240 lb)   07/18/21 111 kg (245 lb 3.2 oz)     Intake & Output (last 3 days)       10/30 0701  10/31 0700 10/31 0701 11/01 0700 11/01 0701 11/02 0700 11/02 0701 11/03 0700    P.O. 120 1080 960 360    I.V. (mL/kg)  2078.6 (18.6)      IV Piggyback 100       Total Intake(mL/kg) 220 (1.9) 3158.6 (28.2) 960 (8.6) 360 (3.2)    Net +220 +3158.6 +960 +360            Urine Unmeasured Occurrence 2 x 8 x 4 x     Stool Unmeasured Occurrence  1 x 1 x         Diet Regular; Consistent Carbohydrate, Cardiac  ----------------------------------------------------------------------------------------------------------------------  Physical exam:  Constitutional: Middle-aged female, well-developed and well-nourished, resting comfortably in bed, no acute distress.      HENT:   Head:  Normocephalic and atraumatic.  Mouth:  Moist mucous membranes.    Eyes:  Conjunctivae and EOM are normal. No scleral icterus.  No visual deficits.  Swelling has improved and is less erythematous and tender to the touch of the right periorbital region  Cardiovascular:  Normal rate, regular rhythm and normal heart sounds with no murmur. No JVD.   Pulmonary/Chest:  No respiratory distress, no wheezes, no crackles, with normal breath sounds and good air movement. Unlabored. No accessory muscle use.  Abdominal:  Soft. No distension and no tenderness.  Bowel sounds present. No rebound or guarding.   Musculoskeletal:  No tenderness, and no deformity.  No red or swollen joints anywhere.    Neurological:  Alert and oriented to person, place, and time.  No cranial nerve deficit.   Nonfocal.   Skin:  Skin is warm and dry. No rash noted. No pallor.   Peripheral vascular:  No clubbing, no cyanosis, no edema. Pedal and tibial pulses 2/4 bilaterally.   ----------------------------------------------------------------------------------------------------------------------  Results from last 7 days   Lab Units 11/02/21  0458 11/01/21  0839 10/31/21  0035 10/30/21  2139 10/30/21  1849 10/30/21  1849   CRP mg/dL  --   --  4.69*  --   --  4.90*   LACTATE mmol/L  --   --   --  1.0  --  2.1*   WBC 10*3/mm3 4.19 4.78 7.97  --    < > 9.79   HEMOGLOBIN g/dL 10.0* 10.8* 11.1*  --    < > 13.0   HEMATOCRIT % 32.7* 34.7 35.1  --    < > 42.5   MCV fL 94.2 93.3 91.4  --    < > 93.2   MCHC g/dL 30.6* 31.1* 31.6  --    < > 30.6*   PLATELETS 10*3/mm3 122* 125* 131*  --    < > 162    < > = values in this interval not displayed.         Results from last 7 days   Lab Units 11/02/21  0458 11/01/21  0839 10/31/21  0035 10/30/21  1849 10/30/21  1849   SODIUM mmol/L 144 139 138   < > 139   POTASSIUM mmol/L 3.7 3.6 3.6   < > 3.9   CHLORIDE mmol/L 107 104 104   < > 100   CO2 mmol/L 28.8 23.9 22.8   < > 28.6   BUN mg/dL 11 7 7   < > 7   CREATININE mg/dL  0.63 0.56* 0.75   < > 0.88   EGFR IF NONAFRICN AM mL/min/1.73 98 112 80   < > 66   CALCIUM mg/dL 8.7 8.7 8.2*   < > 9.0   GLUCOSE mg/dL 100* 109* 91   < > 142*   ALBUMIN g/dL  --   --  3.14*  --  3.94   BILIRUBIN mg/dL  --   --  1.2  --  1.3*   ALK PHOS U/L  --   --  151*  --  177*   AST (SGOT) U/L  --   --  42*  --  47*   ALT (SGPT) U/L  --   --  23  --  27    < > = values in this interval not displayed.   Estimated Creatinine Clearance: 101.1 mL/min (by C-G formula based on SCr of 0.63 mg/dL).  No results found for: AMMONIA              Hemoglobin A1C   Date/Time Value Ref Range Status   10/30/2021 1849 5.80 (H) 4.80 - 5.60 % Final     Glucose   Date/Time Value Ref Range Status   11/02/2021 1035 125 70 - 130 mg/dL Final     Comment:     Meter: [id number] : [id number] Edilberto Monsivais   11/02/2021 0706 84 70 - 130 mg/dL Final     Comment:     Meter: [id number] : [id number] mayne mary   11/01/2021 2034 135 (H) 70 - 130 mg/dL Final     Comment:     Meter: [id number] : [id number] TACO CLARK   11/01/2021 1702 106 70 - 130 mg/dL Final     Comment:     Meter: [id number] : [id number] bang goodwin   11/01/2021 0613 85 70 - 130 mg/dL Final     Comment:     Meter: [id number] : [id number] POLO MASCORRO   10/31/2021 2022 103 70 - 130 mg/dL Final     Comment:     Meter: [id number] : [id number] TACO CLARK   10/31/2021 1647 113 70 - 130 mg/dL Final     Comment:     Meter: [id number] : [id number] gumaro hassan   10/31/2021 1042 119 70 - 130 mg/dL Final     Comment:     Meter: [id number] : [id number] gumaro hassan     Lab Results   Component Value Date    TSH 2.520 10/31/2021     No results found for: PREGTESTUR, PREGSERUM, HCG, HCGQUANT  Pain Management Panel     Pain Management Panel Latest Ref Rng & Units 2/28/2020    AMPHETAMINES SCREEN, URINE Negative Negative    BARBITURATES SCREEN Negative Negative    BENZODIAZEPINE SCREEN, URINE Negative Negative    BUPRENORPHINEUR Negative Negative     COCAINE SCREEN, URINE Negative Negative    METHADONE SCREEN, URINE Negative Negative        Brief Urine Lab Results  (Last result in the past 365 days)      Color   Clarity   Blood   Leuk Est   Nitrite   Protein   CREAT   Urine HCG        07/23/21 0519 Dark Yellow   Clear   Negative   Trace   Negative   Negative               Blood Culture   Date Value Ref Range Status   10/30/2021 No growth at 24 hours  Preliminary   10/30/2021 No growth at 24 hours  Preliminary     No results found for: URINECX  No results found for: WOUNDCX  No results found for: STOOLCX  No results found for: RESPCX  No results found for: AFBCX  Results from last 7 days   Lab Units 10/31/21  0035 10/30/21  2139 10/30/21  1849   LACTATE mmol/L  --  1.0 2.1*   SED RATE mm/hr  --   --  54*   CRP mg/dL 4.69*  --  4.90*       I have personally looked at the labs and they are summarized above.  ----------------------------------------------------------------------------------------------------------------------  Detailed radiology reports for the last 24 hours:  Imaging Results (Last 24 Hours)     ** No results found for the last 24 hours. **        Assessment & Plan      Patient is a 56-year-old female with history significant for CAD status post CABG, type 2 diabetes mellitus who presented with right eye swelling and pain.    #Right periorbital vs follicular abscess with periorbital edema  --Overall right periorbital edema has significantly improved, less erythematous on today's exam without any change in visual acuity. Has remained afebrile without visual changes  --CT noted ill-defined collection likely abscess 2.8 cm x 1.1 cm with overlying cellulitic changes  --no leukocytosis, baseline CRP 4.9, sed rate 54  --Case was discussed in the ER with Dr. Tate and [facility] who recommended IV antibiotics without transfer  --As needed Norco for pain, bowel regimen  --continue vancomycin, unasyn  --Had a previously scheduled appointment with  ophthalmology in [place] tomorrow, will try to change her appointment to Thursday or Friday, anticipate 24 more hours of IV antibiotics, possible discharge tomorrow on p.o. regimen with close outpatient follow-up    #CAD s/p CABG  #Essential hypertension  #HFpEF, not in acute exacerbation  --echo July 2021: Preserved systolic function, grade 2 diastolic dysfunction, mild pulmonary hypertension  --Continue home dose lisinopril  --Continue Lopressor, Lasix, aspirin/statin    #T2DM, Neurontin for neuropathy, Metformin, sliding scale AC at bedtime    #HLD, continue statin  #Hypothyroidism, c/w synthroid  #Chronic depression, continue Zoloft, Seroquel  #Bilateral carotid stenosis, recommend outpatient follow up with [facility]  #Recent R hip fx s/p IM nail, 7/18    CHECKLIST:  Abx: vancomycin, unasyn  VTE: lovenox  GI ppx: ppi  Diet: diabetic/cardiac  Code: CPR, full  Dispo: Periorbital edema, swelling and erythema have improved significantly over the past 24 hours.  Anticipate 24 more hours of IV antibiotics, will try to get outpatient ophthalmology appointment for Thursday/Friday for close follow-up.  Disposition Home when medically stable.    Kaveh Reyes,   [facility] Hospitalist  11/02/21  13:19 EDT

## 2021-11-03 ENCOUNTER — READMISSION MANAGEMENT (OUTPATIENT)
Dept: CALL CENTER | Facility: HOSPITAL | Age: 56
End: 2021-11-03

## 2021-11-03 VITALS
BODY MASS INDEX: 57.88 KG/M2 | DIASTOLIC BLOOD PRESSURE: 73 MMHG | SYSTOLIC BLOOD PRESSURE: 133 MMHG | WEIGHT: 250.1 LBS | OXYGEN SATURATION: 98 % | TEMPERATURE: 97.4 F | RESPIRATION RATE: 20 BRPM | HEART RATE: 68 BPM | HEIGHT: 55 IN

## 2021-11-03 LAB
ANION GAP SERPL CALCULATED.3IONS-SCNC: 10 MMOL/L (ref 5–15)
BUN SERPL-MCNC: 10 MG/DL (ref 6–20)
BUN/CREAT SERPL: 20.4 (ref 7–25)
CALCIUM SPEC-SCNC: 8.8 MG/DL (ref 8.6–10.5)
CHLORIDE SERPL-SCNC: 106 MMOL/L (ref 98–107)
CO2 SERPL-SCNC: 24 MMOL/L (ref 22–29)
CREAT SERPL-MCNC: 0.49 MG/DL (ref 0.57–1)
GFR SERPL CREATININE-BSD FRML MDRD: 131 ML/MIN/1.73
GLUCOSE BLDC GLUCOMTR-MCNC: 79 MG/DL (ref 70–130)
GLUCOSE SERPL-MCNC: 143 MG/DL (ref 65–99)
POTASSIUM SERPL-SCNC: 3.8 MMOL/L (ref 3.5–5.2)
SODIUM SERPL-SCNC: 140 MMOL/L (ref 136–145)

## 2021-11-03 PROCEDURE — 82962 GLUCOSE BLOOD TEST: CPT

## 2021-11-03 PROCEDURE — 99239 HOSP IP/OBS DSCHRG MGMT >30: CPT | Performed by: STUDENT IN AN ORGANIZED HEALTH CARE EDUCATION/TRAINING PROGRAM

## 2021-11-03 PROCEDURE — 0 AMPICILLIN-SULBACTAM PER 1.5 G: Performed by: INTERNAL MEDICINE

## 2021-11-03 PROCEDURE — 80048 BASIC METABOLIC PNL TOTAL CA: CPT | Performed by: STUDENT IN AN ORGANIZED HEALTH CARE EDUCATION/TRAINING PROGRAM

## 2021-11-03 RX ORDER — SULFAMETHOXAZOLE AND TRIMETHOPRIM 800; 160 MG/1; MG/1
1 TABLET ORAL 2 TIMES DAILY
Qty: 8 TABLET | Refills: 0 | Status: SHIPPED | OUTPATIENT
Start: 2021-11-03 | End: 2021-11-07

## 2021-11-03 RX ORDER — ATORVASTATIN CALCIUM 40 MG/1
40 TABLET, FILM COATED ORAL NIGHTLY
Qty: 30 TABLET | Refills: 0 | Status: ON HOLD | OUTPATIENT
Start: 2021-11-03 | End: 2023-03-17

## 2021-11-03 RX ORDER — AMOXICILLIN 250 MG
1 CAPSULE ORAL 2 TIMES DAILY
Qty: 60 TABLET | Refills: 0 | Status: SHIPPED | OUTPATIENT
Start: 2021-11-03 | End: 2023-02-08 | Stop reason: ALTCHOICE

## 2021-11-03 RX ORDER — AMOXICILLIN AND CLAVULANATE POTASSIUM 875; 125 MG/1; MG/1
1 TABLET, FILM COATED ORAL 2 TIMES DAILY
Qty: 8 TABLET | Refills: 0 | Status: SHIPPED | OUTPATIENT
Start: 2021-11-03 | End: 2021-11-07

## 2021-11-03 RX ADMIN — CETIRIZINE HYDROCHLORIDE 10 MG: 10 TABLET, FILM COATED ORAL at 08:38

## 2021-11-03 RX ADMIN — AMPICILLIN SODIUM AND SULBACTAM SODIUM 3 G: 2; 1 INJECTION, POWDER, FOR SOLUTION INTRAMUSCULAR; INTRAVENOUS at 08:37

## 2021-11-03 RX ADMIN — ASPIRIN 81 MG: 81 TABLET, CHEWABLE ORAL at 08:37

## 2021-11-03 RX ADMIN — GABAPENTIN 400 MG: 400 CAPSULE ORAL at 10:01

## 2021-11-03 RX ADMIN — LEVOTHYROXINE SODIUM 50 MCG: 50 TABLET ORAL at 05:40

## 2021-11-03 RX ADMIN — Medication 1 CAPSULE: at 08:37

## 2021-11-03 RX ADMIN — SODIUM CHLORIDE, PRESERVATIVE FREE 10 ML: 5 INJECTION INTRAVENOUS at 08:37

## 2021-11-03 RX ADMIN — AMPICILLIN SODIUM AND SULBACTAM SODIUM 3 G: 2; 1 INJECTION, POWDER, FOR SOLUTION INTRAMUSCULAR; INTRAVENOUS at 02:35

## 2021-11-03 RX ADMIN — METOPROLOL TARTRATE 25 MG: 25 TABLET, FILM COATED ORAL at 08:38

## 2021-11-03 RX ADMIN — DOCUSATE SODIUM 50 MG AND SENNOSIDES 8.6 MG 1 TABLET: 8.6; 5 TABLET, FILM COATED ORAL at 08:39

## 2021-11-03 RX ADMIN — HYDROCODONE BITARTRATE AND ACETAMINOPHEN 1 TABLET: 5; 325 TABLET ORAL at 08:39

## 2021-11-03 RX ADMIN — FUROSEMIDE 40 MG: 40 TABLET ORAL at 08:38

## 2021-11-03 RX ADMIN — METFORMIN HYDROCHLORIDE 500 MG: 500 TABLET ORAL at 08:37

## 2021-11-03 RX ADMIN — SERTRALINE 50 MG: 50 TABLET, FILM COATED ORAL at 08:38

## 2021-11-03 NOTE — CASE MANAGEMENT/SOCIAL WORK
Discharge Planning Assessment  EZEQUIEL Saeed     Patient Name: Aidee Trinidad  MRN: [id number]  Today's Date: 11/3/2021    Admit Date: 10/30/2021       Discharge Plan     Row Name 11/03/21 0843       Plan    Final Discharge Disposition Code 01 - home or self-care    Final Note Pt is being discharged home today. No needs identified.                   Selected Continued Care - Prior Encounters Includes selections from prior encounters from 8/1/2021 to 11/3/2021    Discharged on 8/5/2021 Admission date: 7/17/2021 - Discharge disposition: Home-Health Care Svc    Durable Medical Equipment     Service Provider Selected Services Address Phone Fax Patient Preferred    [facility]  Durable Medical Equipment [id number] [place] [address] [phone] --                    RAYNA Daniels

## 2021-11-03 NOTE — DISCHARGE INSTR - APPOINTMENTS
You have a scheduled follow-up appointment at [facility] in eye care on 114/12/21 at 9:30 am.  You can reach the office at .    You have a scheduled follow-up appointment with Azalea ULLOA on 11/4/21 at 11:45 am.  You can reach the office at .

## 2021-11-03 NOTE — DISCHARGE SUMMARY
[facility] HOSPITALISTS DISCHARGE SUMMARY    Patient Identification:  Name:  Aidee Trinidad  Age:  56 y.o.  Sex:  female  :  1965  MRN:  [id number]  Visit Number:  [id number]    Date of Admission: 10/30/2021  Date of Discharge:  11/3/2021     PCP: Azalea Burnett, LAILA    DISCHARGE DIAGNOSIS  #Right periorbital cellulitis/abscess  #CAD s/p CABG  #Essential hypertension  #HFpEF, not in acute exacerbation  #T2DM  #HLD  #Hypothyroidism  #Chronic depression  #Bilateral carotid stenosis  #Recent R hip fx s/p IM nail,     CONSULTS    ophthalmology-via phone call in the ER   General surgery    PROCEDURES PERFORMED  None    HOSPITAL COURSE  Patient is a 56 y.o. female presented to [facility] complaining of right eyelid pain and swelling.  Please see the admitting history and physical for further details.  Patient has been seen in the outpatient setting and prescribed Bactrim prior to arrival.  She reported significant swelling and pain above her right eye without any loss of vision or pain on ocular movements.  [facility]mology was called from the ER and recommended inpatient evaluation and IV antibiotics.  CT of the facial bones noted 2.8 cm x 1.1 cm suspected periorbital abscess with surrounding inflammation. She received IV vancomycin and Unasyn with significant improvement in periorbital edema and cellulitic changes.  She had no issues in visual acuity throughout the hospitalization.  At the time of discharge, she was discharged with Augmentin for strep and H influenza coverage plus Bactrim for MRSA coverage to complete a 7-day treatment course.  She was instructed to follow-up with her primary care doctor and ophthalmology to ensure complete resolution of abscess/cellulitic changes as antibiotic therapy may need to be extended pending her clinical course.  Also instructed to follow-up with primary care doctor given evidence of carotid stenosis as she may require  outpatient CT angiogram-in the interim, atorvastatin was increased.      VITAL SIGNS:  Temp:  [97.4 °F (36.3 °C)-98.1 °F (36.7 °C)] 97.4 °F (36.3 °C)  Heart Rate:  [68-77] 68  Resp:  [18-20] 20  BP: (113-133)/(53-73) 133/73  SpO2:  [97 %-98 %] 98 %  on   ;   Device (Oxygen Therapy): room air    Body mass index is 76 kg/m².  Wt Readings from Last 3 Encounters:   11/03/21 113 kg (250 lb 1.6 oz)   10/14/21 109 kg (240 lb)   07/18/21 111 kg (245 lb 3.2 oz)       PHYSICAL EXAM:  Constitutional: Middle-aged female, well-developed and well-nourished, resting comfortably in bed, no acute distress.      HENT:  Head:  Normocephalic and atraumatic.  Mouth:  Moist mucous membranes.    Eyes:  Conjunctivae and EOM are normal. No scleral icterus.  No visual deficits.  Swelling and erythema in the right periorbital region has significantly improved over the past 48 hours.  Cardiovascular:  Normal rate, regular rhythm and normal heart sounds with no murmur. No JVD.   Pulmonary/Chest:  No respiratory distress, no wheezes, no crackles, with normal breath sounds and good air movement. Unlabored. No accessory muscle use.  Abdominal:  Soft. No distension and no tenderness.  Bowel sounds present. No rebound or guarding.   Musculoskeletal:  No tenderness, and no deformity.  No red or swollen joints anywhere.    Neurological:  Alert and oriented to person, place, and time.  No cranial nerve deficit.   Nonfocal.   Skin:  Skin is warm and dry. No rash noted. No pallor.   Peripheral vascular:  No clubbing, no cyanosis, no edema. Pedal and tibial pulses 2/4 bilaterally.     DISCHARGE DISPOSITION   Stable    DISCHARGE MEDICATIONS:     Discharge Medications      New Medications      Instructions Start Date   amoxicillin-clavulanate 875-125 MG per tablet  Commonly known as: Augmentin   1 tablet, Oral, 2 Times Daily      sennosides-docusate 8.6-50 MG per tablet  Commonly known as: PERICOLACE   1 tablet, Oral, 2 Times Daily         Changes to  Medications      Instructions Start Date   atorvastatin 40 MG tablet  Commonly known as: LIPITOR  What changed:   · medication strength  · how much to take   40 mg, Oral, Nightly         Continue These Medications      Instructions Start Date   aspirin 81 MG EC tablet   81 mg, Oral, Daily      cholecalciferol 1.25 MG (66864 UT) capsule  Commonly known as: VITAMIN D3   50,000 Units, Oral, Every 7 Days, Prior to [facility] Admission, Patient was on: Pt takes on Sunday      furosemide 40 MG tablet  Commonly known as: LASIX   40 mg, Oral, Daily      gabapentin 400 MG capsule  Commonly known as: NEURONTIN   400 mg, Oral, Every 12 Hours      glipizide 5 MG tablet  Commonly known as: GLUCOTROL   5 mg, Oral, Daily      HYDROcodone-acetaminophen 5-325 MG per tablet  Commonly known as: NORCO   1 tablet, Oral, 2 Times Daily      levocetirizine 5 MG tablet  Commonly known as: XYZAL   5 mg, Oral, Every Evening      levothyroxine 50 MCG tablet  Commonly known as: SYNTHROID, LEVOTHROID   50 mcg, Oral, Daily      lisinopril 20 MG tablet  Commonly known as: PRINIVIL,ZESTRIL   20 mg, Oral, Daily PRN      loratadine-pseudoephedrine  MG per 24 hr tablet  Commonly known as: CLARITIN-D 24-hour   1 tablet, Oral, Daily PRN      meclizine 25 MG tablet  Commonly known as: ANTIVERT   25 mg, Oral, Daily PRN      metFORMIN 500 MG tablet  Commonly known as: GLUCOPHAGE   500 mg, Oral, 2 Times Daily With Meals      metoprolol tartrate 25 MG tablet  Commonly known as: LOPRESSOR   25 mg, Oral, 2 Times Daily      potassium chloride 10 MEQ CR tablet  Commonly known as: K-DUR,KLOR-CON   10 mEq, Oral, 2 Times Daily      QUEtiapine 50 MG tablet  Commonly known as: SEROquel   50 mg, Oral, Nightly      sertraline 50 MG tablet  Commonly known as: ZOLOFT   50 mg, Oral, Daily      sulfamethoxazole-trimethoprim 800-160 MG per tablet  Commonly known as: Bactrim DS   1 tablet, Oral, 2 Times Daily         Stop These Medications    cephalexin 500 MG  capsule  Commonly known as: KEFLEX            Diet Instructions    Consistent carbohydrate / Cardiac diet as tolerated.         Activity Instructions    Activity as tolerated.         Additional Instructions for the Follow-ups that You Need to Schedule     Discharge Follow-up with PCP   As directed       Currently Documented PCP:    Azalea Burnett APRN    PCP Phone Number:    [phone]     Follow Up Details: [address] [place] [address] FU         Discharge Follow-up with Specialty: opthalomology; 1 Week   As directed      Specialty: opthalomology    Follow Up: 1 Week    Follow Up Details: perioribital cellulitis            Follow-up Information     Azalea Burnett APRN .    Specialty: Family Medicine  Why: [address] [place] [address] FU  Contact information:  [address]  [facility] [id number] [phone]             Azalea Burnett APRN .    Specialty: Family Medicine  Contact information:  [address] [address]  [phone]                          TEST  RESULTS PENDING AT DISCHARGE  Pending Labs     Order Current Status    Blood Culture - Blood, Arm, Left Preliminary result    Blood Culture - Blood, Arm, Right Preliminary result           CODE STATUS  Code Status and Medical Interventions:   Ordered at: 10/30/21 2130     Level Of Support Discussed With:    Patient     Code Status (Patient has no pulse and is not breathing):    CPR (Attempt to Resuscitate)     Medical Interventions (Patient has pulse or is breathing):    Full       Kaveh Reyes DO  [facility] Hospitalist  11/03/21  16:11 EDT    Please note that this discharge summary required more than 30 minutes to complete.

## 2021-11-04 LAB
BACTERIA SPEC AEROBE CULT: NORMAL
BACTERIA SPEC AEROBE CULT: NORMAL

## 2021-11-04 NOTE — OUTREACH NOTE
Prep Survey      Responses   [facility] patient discharged from? [facility]   Is LACE score < 7 ? No   Emergency Room discharge w/ pulse ox? No   Eligibility Readm Mgmt   Discharge diagnosis Right periorbital cellulitis/abscess   Does the patient have one of the following disease processes/diagnoses(primary or secondary)? Other   Does the patient have Home health ordered? No   Is there a DME ordered? No   Prep survey completed? Yes          Ely Purcell RN

## 2021-11-05 ENCOUNTER — READMISSION MANAGEMENT (OUTPATIENT)
Dept: CALL CENTER | Facility: HOSPITAL | Age: 56
End: 2021-11-05

## 2021-11-05 NOTE — OUTREACH NOTE
Medical Week 1 Survey      Responses   [facility] patient discharged from? [facility]   Does the patient have one of the following disease processes/diagnoses(primary or secondary)? Other   Week 1 attempt successful? No   Unsuccessful attempts Attempt 1          Nadya Cruz RN

## 2021-11-08 ENCOUNTER — READMISSION MANAGEMENT (OUTPATIENT)
Dept: CALL CENTER | Facility: HOSPITAL | Age: 56
End: 2021-11-08

## 2021-11-08 NOTE — OUTREACH NOTE
Medical Week 1 Survey      Responses   [facility] patient discharged from? [facility]   Does the patient have one of the following disease processes/diagnoses(primary or secondary)? Other   Week 1 attempt successful? Yes   Call start time 1235   Call end time 1237   Discharge diagnosis Right periorbital cellulitis/abscess   Is patient permission given to speak with other caregiver? Yes   List who call center can speak with [place] reviewed with patient/caregiver? Yes   Is the patient having any side effects they believe may be caused by any medication additions or changes? No   Does the patient have all medications ordered at discharge? Yes   Is the patient taking all medications as directed (includes completed medication regime)? Yes   Does the patient have a primary care provider?  Yes   Does the patient have an appointment with their PCP within 7 days of discharge? Yes   Comments regarding PCP Saw PCP on Thursday   Has the patient kept scheduled appointments due by today? Yes   Has home health visited the patient within 72 hours of discharge? N/A   Psychosocial issues? No   Did the patient receive a copy of their discharge instructions? Yes   Nursing interventions Reviewed instructions with patient   What is the patient's perception of their health status since discharge? Improving   Is the patient/caregiver able to teach back the hierarchy of who to call/visit for symptoms/problems? PCP, Specialist, Home health nurse, Urgent Care, ED, 911 Yes   If the patient is a current smoker, are they able to teach back resources for cessation? Not a smoker   Week 1 call completed? Yes   Wrap up additional comments States she is improving .          Genevieve Lewis RN

## 2021-11-08 NOTE — PAYOR COMM NOTE
"[facility]  OSIEL CACERES  [phone]  FAX  [phone]  NPI:  [id number]      Aidee Snachez (56 y.o. Female)             Date of Birth Social Security Number Address Home Phone MRN    1965  [address] [address] [phone] [id number]    [place] Marital Status             Muslim        Admission Date Admission Type Admitting Provider Attending Provider Department, Room/Bed    10/30/21 Emergency Uriel Thomas MD  [facility] [address], 3341/2S    Discharge Date Discharge Disposition Discharge Destination          11/3/2021 Home or Self Care              Attending Provider: (none)   Allergies: Nuts, Contrast Dye, Codeine, Latex    Isolation: None   Infection: None   Code Status: Prior   Advance Care Planning Activity    Ht: 122.2 cm (48.1\")   Wt: 113 kg (250 lb 1.6 oz)    Admission Cmt: None   Principal Problem: Abscess of periorbital region, right [H05.011]                 Active Insurance as of 10/30/2021     Primary Coverage     Payor Plan Insurance Group Employer/Plan Group    [facility]      Payor Plan Address Payor Plan Phone Number Payor Plan Fax Number Effective Dates    [address] [address]   2015 - None Entered    [employer] [facility] [place] [address]       Subscriber Name Subscriber Birth Date Member ID       AIDEE SANCHEZ 1965 [id number]                 Emergency Contacts      (Rel.) Home Phone Work Phone Mobile Phone    NA CACERES (Daughter) [phone] -- --               Discharge Summary      Kaveh Reyes DO at 21 1139              [facility] HOSPITALISTS DISCHARGE SUMMARY    Patient Identification:  Name:  Aidee Sanchez  Age:  56 y.o.  Sex:  female  :  1965  MRN:  [id number]  Visit Number:  [id number]    Date of Admission: 10/30/2021  Date of Discharge:  11/3/2021     PCP: Azalea Burnett APRN    DISCHARGE DIAGNOSIS  #Right " periorbital cellulitis/abscess  #CAD s/p CABG  #Essential hypertension  #HFpEF, not in acute exacerbation  #T2DM  #HLD  #Hypothyroidism  #Chronic depression  #Bilateral carotid stenosis  #Recent R hip fx s/p IM nail, 7/18    CONSULTS   [facility]-via phone call in the ER   General surgery    PROCEDURES PERFORMED  None    HOSPITAL COURSE  Patient is a 56 y.o. female presented to [facility] complaining of right eyelid pain and swelling.  Please see the admitting history and physical for further details.  Patient has been seen in the outpatient setting and prescribed Bactrim prior to arrival.  She reported significant swelling and pain above her right eye without any loss of vision or pain on ocular movements.  [facility] was called from the ER and recommended inpatient evaluation and IV antibiotics.  CT of the facial bones noted 2.8 cm x 1.1 cm suspected periorbital abscess with surrounding inflammation. She received IV vancomycin and Unasyn with significant improvement in periorbital edema and cellulitic changes.  She had no issues in visual acuity throughout the hospitalization.  At the time of discharge, she was discharged with Augmentin for strep and H influenza coverage plus Bactrim for MRSA coverage to complete a 7-day treatment course.  She was instructed to follow-up with her primary care doctor and ophthalmology to ensure complete resolution of abscess/cellulitic changes as antibiotic therapy may need to be extended pending her clinical course.  Also instructed to follow-up with primary care doctor given evidence of carotid stenosis as she may require outpatient CT angiogram-in the interim, atorvastatin was increased.      VITAL SIGNS:  Temp:  [97.4 °F (36.3 °C)-98.1 °F (36.7 °C)] 97.4 °F (36.3 °C)  Heart Rate:  [68-77] 68  Resp:  [18-20] 20  BP: (113-133)/(53-73) 133/73  SpO2:  [97 %-98 %] 98 %  on   ;   Device (Oxygen Therapy): room air    Body mass index is 76 kg/m².  Wt Readings  from Last 3 Encounters:   11/03/21 113 kg (250 lb 1.6 oz)   10/14/21 109 kg (240 lb)   07/18/21 111 kg (245 lb 3.2 oz)       PHYSICAL EXAM:  Constitutional: Middle-aged female, well-developed and well-nourished, resting comfortably in bed, no acute distress.      HENT:  Head:  Normocephalic and atraumatic.  Mouth:  Moist mucous membranes.    Eyes:  Conjunctivae and EOM are normal. No scleral icterus.  No visual deficits.  Swelling and erythema in the right periorbital region has significantly improved over the past 48 hours.  Cardiovascular:  Normal rate, regular rhythm and normal heart sounds with no murmur. No JVD.   Pulmonary/Chest:  No respiratory distress, no wheezes, no crackles, with normal breath sounds and good air movement. Unlabored. No accessory muscle use.  Abdominal:  Soft. No distension and no tenderness.  Bowel sounds present. No rebound or guarding.   Musculoskeletal:  No tenderness, and no deformity.  No red or swollen joints anywhere.    Neurological:  Alert and oriented to person, place, and time.  No cranial nerve deficit.   Nonfocal.   Skin:  Skin is warm and dry. No rash noted. No pallor.   Peripheral vascular:  No clubbing, no cyanosis, no edema. Pedal and tibial pulses 2/4 bilaterally.     DISCHARGE DISPOSITION   Stable    DISCHARGE MEDICATIONS:     Discharge Medications      New Medications      Instructions Start Date   amoxicillin-clavulanate 875-125 MG per tablet  Commonly known as: Augmentin   1 tablet, Oral, 2 Times Daily      sennosides-docusate 8.6-50 MG per tablet  Commonly known as: PERICOLACE   1 tablet, Oral, 2 Times Daily         Changes to Medications      Instructions Start Date   atorvastatin 40 MG tablet  Commonly known as: LIPITOR  What changed:   · medication strength  · how much to take   40 mg, Oral, Nightly         Continue These Medications      Instructions Start Date   aspirin 81 MG EC tablet   81 mg, Oral, Daily      cholecalciferol 1.25 MG (10451 UT)  capsule  Commonly known as: VITAMIN D3   50,000 Units, Oral, Every 7 Days, Prior to [facility] Admission, Patient was on: Pt takes on Sunday      furosemide 40 MG tablet  Commonly known as: LASIX   40 mg, Oral, Daily      gabapentin 400 MG capsule  Commonly known as: NEURONTIN   400 mg, Oral, Every 12 Hours      glipizide 5 MG tablet  Commonly known as: GLUCOTROL   5 mg, Oral, Daily      HYDROcodone-acetaminophen 5-325 MG per tablet  Commonly known as: NORCO   1 tablet, Oral, 2 Times Daily      levocetirizine 5 MG tablet  Commonly known as: XYZAL   5 mg, Oral, Every Evening      levothyroxine 50 MCG tablet  Commonly known as: SYNTHROID, LEVOTHROID   50 mcg, Oral, Daily      lisinopril 20 MG tablet  Commonly known as: PRINIVIL,ZESTRIL   20 mg, Oral, Daily PRN      loratadine-pseudoephedrine  MG per 24 hr tablet  Commonly known as: CLARITIN-D 24-hour   1 tablet, Oral, Daily PRN      meclizine 25 MG tablet  Commonly known as: ANTIVERT   25 mg, Oral, Daily PRN      metFORMIN 500 MG tablet  Commonly known as: GLUCOPHAGE   500 mg, Oral, 2 Times Daily With Meals      metoprolol tartrate 25 MG tablet  Commonly known as: LOPRESSOR   25 mg, Oral, 2 Times Daily      potassium chloride 10 MEQ CR tablet  Commonly known as: K-DUR,KLOR-CON   10 mEq, Oral, 2 Times Daily      QUEtiapine 50 MG tablet  Commonly known as: SEROquel   50 mg, Oral, Nightly      sertraline 50 MG tablet  Commonly known as: ZOLOFT   50 mg, Oral, Daily      sulfamethoxazole-trimethoprim 800-160 MG per tablet  Commonly known as: Bactrim DS   1 tablet, Oral, 2 Times Daily         Stop These Medications    cephalexin 500 MG capsule  Commonly known as: KEFLEX            Diet Instructions    Consistent carbohydrate / Cardiac diet as tolerated.         Activity Instructions    Activity as tolerated.         Additional Instructions for the Follow-ups that You Need to Schedule     Discharge Follow-up with PCP   As directed       Currently Documented PCP:     Azalea Burnett APRN    PCP Phone Number:    [phone]     [facility]         Discharge Follow-up with Specialty: opthalomology; 1 Week   As directed      Specialty: opthalomology    Follow Up: 1 Week    Follow Up Details: perioribital cellulitis            Follow-up Information     Azalea Burnett APRN .    Specialty: Family Medicine  [facility]  Contact information:  [address]  [facility] [address]  [phone]             Azalea Burnett APRN .    Specialty: Family Medicine  Contact information:  [address]  [facility] [address]  [phone]                          TEST  RESULTS PENDING AT DISCHARGE  Pending Labs     Order Current Status    Blood Culture - Blood, Arm, Left Preliminary result    Blood Culture - Blood, Arm, Right Preliminary result           CODE STATUS  Code Status and Medical Interventions:   Ordered at: 10/30/21 2130     Level Of Support Discussed With:    Patient     Code Status (Patient has no pulse and is not breathing):    CPR (Attempt to Resuscitate)     Medical Interventions (Patient has pulse or is breathing):    Full       Kaveh Reyes DO  [facility]  11/03/21  16:11 EDT    Please note that this discharge summary required more than 30 minutes to complete.      Electronically signed by Kaveh Reyes DO at 11/03/21 3495

## 2021-11-15 ENCOUNTER — READMISSION MANAGEMENT (OUTPATIENT)
Dept: CALL CENTER | Facility: HOSPITAL | Age: 56
End: 2021-11-15

## 2021-11-15 NOTE — OUTREACH NOTE
Medical Week 2 Survey      Responses   [facility] patient discharged from? [facility]   Does the patient have one of the following disease processes/diagnoses(primary or secondary)? Other   Week 2 attempt successful? Yes   Call start time 1604   Discharge diagnosis Right periorbital cellulitis/abscess   Call end time 1609   Meds reviewed with patient/caregiver? Yes   Is the patient taking all medications as directed (includes completed medication regime)? Yes   Medication comments Completed antibiotic last Friday   Does the patient have a primary care provider?  Yes   Does the patient have an appointment with their PCP within 7 days of discharge? Yes   Has the patient kept scheduled appointments due by today? Yes   Has home health visited the patient within 72 hours of discharge? N/A   What is the patient's perception of their health status since discharge? Improving  [Still has some swelling, has not completly resolved. Advised to call provider for update and further evaluation.]   Is the patient/caregiver able to teach back the hierarchy of who to call/visit for symptoms/problems? PCP, Specialist, Home health nurse, Urgent Care, ED, 911 Yes   Week 2 Call Completed? Yes          Meri Barajas RN

## 2021-11-22 ENCOUNTER — READMISSION MANAGEMENT (OUTPATIENT)
Dept: CALL CENTER | Facility: HOSPITAL | Age: 56
End: 2021-11-22

## 2021-11-22 NOTE — OUTREACH NOTE
Medical Week 3 Survey      Responses   [facility] patient discharged from? [facility]   Does the patient have one of the following disease processes/diagnoses(primary or secondary)? Other   Week 3 attempt successful? No   Unsuccessful attempts Attempt 1          Pavel Monte RN

## 2021-11-23 ENCOUNTER — READMISSION MANAGEMENT (OUTPATIENT)
Dept: CALL CENTER | Facility: HOSPITAL | Age: 56
End: 2021-11-23

## 2021-11-23 NOTE — OUTREACH NOTE
Medical Week 3 Survey      Responses   [facility] patient discharged from? [facility]   Does the patient have one of the following disease processes/diagnoses(primary or secondary)? Other   Week 3 attempt successful? Yes   Call start time 1105   Call end time 1155   Discharge diagnosis Right periorbital cellulitis/abscess   Is patient permission given to speak with other caregiver? Yes   Meds reviewed with patient/caregiver? Yes   Is the patient having any side effects they believe may be caused by any medication additions or changes? No   Does the patient have all medications ordered at discharge? Yes   Is the patient taking all medications as directed (includes completed medication regime)? Yes   Comments regarding appointments 12/02/2021 - She sees PCP then- again.    Does the patient have a primary care provider?  Yes   Does the patient have an appointment with their PCP within 7 days of discharge? Yes   Has the patient kept scheduled appointments due by today? Yes   Did the patient receive a copy of their discharge instructions? Yes   Nursing interventions Reviewed instructions with patient   What is the patient's perception of their health status since discharge? Improving   Is the patient/caregiver able to teach back signs and symptoms related to disease process for when to call PCP? Yes   Is the patient/caregiver able to teach back signs and symptoms related to disease process for when to call 911? Yes   Is the patient/caregiver able to teach back the hierarchy of who to call/visit for symptoms/problems? PCP, Specialist, Home health nurse, Urgent Care, ED, 911 Yes   If the patient is a current smoker, are they able to teach back resources for cessation? Not a smoker   Week 3 Call Completed? Yes          Litzy Garrido RN

## 2021-11-30 ENCOUNTER — READMISSION MANAGEMENT (OUTPATIENT)
Dept: CALL CENTER | Facility: HOSPITAL | Age: 56
End: 2021-11-30

## 2021-11-30 NOTE — OUTREACH NOTE
Medical Week 4 Survey      Responses   [facility] patient discharged from? [facility]   Does the patient have one of the following disease processes/diagnoses(primary or secondary)? Other   Week 4 attempt successful? No          Meri Barajas RN

## 2021-12-08 ENCOUNTER — OFFICE VISIT (OUTPATIENT)
Dept: GASTROENTEROLOGY | Facility: CLINIC | Age: 56
End: 2021-12-08

## 2021-12-08 VITALS — BODY MASS INDEX: 53.94 KG/M2 | WEIGHT: 250 LBS | HEIGHT: 57 IN

## 2021-12-08 DIAGNOSIS — K74.60 CIRRHOSIS OF LIVER WITHOUT ASCITES, UNSPECIFIED HEPATIC CIRRHOSIS TYPE (HCC): Primary | ICD-10-CM

## 2021-12-08 PROCEDURE — 82105 ALPHA-FETOPROTEIN SERUM: CPT | Performed by: INTERNAL MEDICINE

## 2021-12-08 PROCEDURE — 99213 OFFICE O/P EST LOW 20 MIN: CPT | Performed by: INTERNAL MEDICINE

## 2021-12-08 PROCEDURE — 80053 COMPREHEN METABOLIC PANEL: CPT | Performed by: INTERNAL MEDICINE

## 2021-12-08 NOTE — PROGRESS NOTES
The patient presents for evaluation of cirrhosis. This was discovered incidentally while in the hospital for a broken hip.  She is diabetic since 2011.  She does not take insulin.  She also is hypertensive and has hyperlipidemia.  She has a triple bypass in 2011 for CAD.  She has had elevated liver enzymes in the past.  She was never a heavy drinker.  She would only drink on special occasions.  Generally, she would just have one drink.  She states she gained weight with her last child 29 years ago.  She gained about 60 lbs and has progressively put on more weight. She has never had ascites, no confusion or jaundice.  No hematemesis or melena.  She did have a colonoscopy in 2012 or 2013.  This was performed in [place].  She has never been followed by gastroenterologist for cirrhosis.  As stated above, this was found incidentally this past summer.

## 2021-12-08 NOTE — PROGRESS NOTES
Chief Complaint   Patient presents with   • Cirrhosis       Aidee Trinidad is a 56 y.o. female who presents to the office today for evaluation of Cirrhosis  .    The patient presents for follow-up of cirrhosis. This was discovered incidentally while in the hospital for a broken hip.  She has been idiabetic since 2011.  She does not take insulin.  She also is hypertensive and has hyperlipidemia.  She had a triple bypass in 2011 for CAD.  She has had elevated liver enzymes in the past.  She was never a heavy drinker.  She states she gained weight with her last child 29 years ago.  She gained about 60 lbs and has progressively put on more weight over time. She has never had ascites, no confusion or jaundice.  A fairly recent abdominal ultrasound in July 2021 did not show ascites but did show a nodular small liver..  No hematemesis or melena.  She did have a colonoscopy in 2012 or 2013.  This was performed in [place].  She denies changes in mentation.  She denies bright red blood per rectum or melena.  She has had no hematemesis.  She has not had weight loss.  She states that she has had some difficulty with the holidays in respect to trying to lose weight.          Review of Systems   Constitutional: Negative.    HENT: Negative for sore throat and trouble swallowing.    Eyes: Negative.    Respiratory: Negative for chest tightness.    Cardiovascular: Negative for chest pain.   Gastrointestinal: Positive for constipation. Negative for abdominal distention, abdominal pain, anal bleeding, blood in stool, diarrhea, nausea, rectal pain and vomiting.   Endocrine: Negative.    Musculoskeletal: Positive for back pain. Negative for neck pain.   Skin: Negative.    Allergic/Immunologic: Positive for food allergies. Negative for environmental allergies.   Neurological: Negative for dizziness and headaches.   Hematological: Bruises/bleeds easily.   Psychiatric/Behavioral: Positive for agitation and sleep disturbance. The patient  is nervous/anxious.        ACTIVE PROBLEMS:   Specialty Problems        Gastroenterology Problems    Fatty liver              PAST MEDICAL HISTORY:  Past Medical History:   Diagnosis Date   • Arthritis    • Chronic back pain    • Coronary artery disease    • Depression    • Diabetes mellitus (HCC)    • Fatty liver    • Hypertension    • Hypothyroid        SURGICAL HISTORY:  Past Surgical History:   Procedure Laterality Date   • CARPAL TUNNEL RELEASE     •  SECTION     • CHOLECYSTECTOMY     • COLONOSCOPY     • CORONARY ANGIOPLASTY WITH STENT PLACEMENT     • HIP TROCHANTERIC NAILING WITH INTRAMEDULLARY HIP SCREW Right 2021    Procedure: HIP TROCHANTERIC NAILING LONG WITH INTRAMEDULLARY HIP SCREW;  Surgeon: Oracio Ponce DO;  [facility];  Service: Orthopedics;  Laterality: Right;   • REPLACEMENT TOTAL KNEE Right    • TUBAL ABDOMINAL LIGATION     • UPPER GASTROINTESTINAL ENDOSCOPY         FAMILY HISTORY:  Family History   Problem Relation Age of Onset   • COPD Mother    • Stroke Mother    • Cancer Brother    • Diabetes Brother    • Hypertension Brother    • Heart disease Brother        SOCIAL HISTORY:  Social History     Tobacco Use   • Smoking status: Never Smoker   • Smokeless tobacco: Never Used   Substance Use Topics   • Alcohol use: Yes     Comment: Once a year        CURRENT MEDICATION:    Current Outpatient Medications:   •  aspirin 81 MG EC tablet, Take 81 mg by mouth Daily., Disp: , Rfl:   •  atorvastatin (LIPITOR) 40 MG tablet, Take 1 tablet by mouth Every Night., Disp: 30 tablet, Rfl: 0  •  cholecalciferol (VITAMIN D3) 1.25 MG (77730 UT) capsule, Take 50,000 Units by mouth Every 7 (Seven) Days. Prior to [facility] Admission, Patient was on: Pt takes on , Disp: , Rfl:   •  furosemide (LASIX) 40 MG tablet, Take 40 mg by mouth Daily., Disp: , Rfl:   •  gabapentin (NEURONTIN) 400 MG capsule, Take 400 mg by mouth Every 12 (Twelve) Hours., Disp: , Rfl:   •  glipizide (GLUCOTROL) 5 MG  "tablet, Take 5 mg by mouth Daily., Disp: , Rfl:   •  HYDROcodone-acetaminophen (NORCO) 5-325 MG per tablet, Take 1 tablet by mouth 2 (Two) Times a Day., Disp: , Rfl:   •  levocetirizine (XYZAL) 5 MG tablet, Take 5 mg by mouth Every Evening., Disp: , Rfl:   •  levothyroxine (SYNTHROID, LEVOTHROID) 50 MCG tablet, Take 50 mcg by mouth Daily., Disp: , Rfl:   •  lisinopril (PRINIVIL,ZESTRIL) 20 MG tablet, Take 20 mg by mouth Daily As Needed (only takes if her bp is 145/90)., Disp: , Rfl:   •  loratadine-pseudoephedrine (CLARITIN-D 24-hour)  MG per 24 hr tablet, Take 1 tablet by mouth Daily As Needed for Allergies., Disp: , Rfl:   •  meclizine (ANTIVERT) 25 MG tablet, Take 25 mg by mouth Daily As Needed for Dizziness., Disp: , Rfl:   •  metFORMIN (GLUCOPHAGE) 500 MG tablet, Take 500 mg by mouth 2 (Two) Times a Day With Meals., Disp: , Rfl:   •  metoprolol tartrate (LOPRESSOR) 25 MG tablet, Take 25 mg by mouth 2 (Two) Times a Day., Disp: , Rfl:   •  potassium chloride (K-DUR,KLOR-CON) 10 MEQ CR tablet, Take 10 mEq by mouth 2 (Two) Times a Day., Disp: , Rfl:   •  QUEtiapine (SEROquel) 50 MG tablet, Take 50 mg by mouth Every Night., Disp: , Rfl:   •  sennosides-docusate (PERICOLACE) 8.6-50 MG per tablet, Take 1 tablet by mouth 2 (Two) Times a Day., Disp: 60 tablet, Rfl: 0  •  sertraline (ZOLOFT) 50 MG tablet, Take 50 mg by mouth Daily., Disp: , Rfl:     ALLERGIES:  Nuts, Contrast dye, Codeine, and Latex    VISIT VITALS:  Ht 144.8 cm (57\")   Wt 113 kg (250 lb)   BMI 54.10 kg/m²   Physical Exam  Constitutional:       Appearance: She is obese.   HENT:      Head: Normocephalic and atraumatic.      Nose: Nose normal. No congestion or rhinorrhea.   Eyes:      General: No scleral icterus.     Extraocular Movements: Extraocular movements intact.      Conjunctiva/sclera: Conjunctivae normal.      Pupils: Pupils are equal, round, and reactive to light.   Cardiovascular:      Rate and Rhythm: Normal rate and regular rhythm.     "  Pulses: Normal pulses.      Heart sounds: Normal heart sounds.   Pulmonary:      Effort: Pulmonary effort is normal.      Breath sounds: Normal breath sounds.   Abdominal:      General: Abdomen is flat. Bowel sounds are normal. There is no distension.      Palpations: Abdomen is soft. There is no shifting dullness, fluid wave, hepatomegaly, splenomegaly, mass or pulsatile mass.      Tenderness: There is no abdominal tenderness. There is no guarding or rebound.      Hernia: No hernia is present.   Musculoskeletal:         General: No swelling or tenderness.      Cervical back: Normal range of motion and neck supple.      Comments: The patient uses a wheelchair for stability.   Skin:     General: Skin is warm and dry.      Coloration: Skin is not jaundiced.   Neurological:      General: No focal deficit present.      Mental Status: She is alert and oriented to person, place, and time.   Psychiatric:         Mood and Affect: Mood normal.         Behavior: Behavior normal.           Assessment/Plan      Diagnosis Plan   1. Cirrhosis of liver without ascites, unspecified hepatic cirrhosis type (HCC)  Comprehensive Metabolic Panel    AFP Tumor Marker    Comprehensive Metabolic Panel       I have talked to the patient about diet and exercise.  She struggles with weight loss.  I will obtain a CMP and AFP today.       EGD/colonoscopy when she returns in 3 months.            This document has been electronically signed by Gill Ortiz MA  December 15, 2021 13:21 EST    Part of this note may be an electronic transcription/translation of spoken language to printed text using the Dragon Dictation System.

## 2021-12-09 LAB
ALBUMIN SERPL-MCNC: 3.5 G/DL (ref 3.5–5.2)
ALBUMIN/GLOB SERPL: 0.9 G/DL
ALP SERPL-CCNC: 181 U/L (ref 39–117)
ALPHA-FETOPROTEIN: 5.37 NG/ML (ref 0–8.3)
ALT SERPL W P-5'-P-CCNC: 50 U/L (ref 1–33)
ANION GAP SERPL CALCULATED.3IONS-SCNC: 7.4 MMOL/L (ref 5–15)
AST SERPL-CCNC: 82 U/L (ref 1–32)
BILIRUB SERPL-MCNC: 0.9 MG/DL (ref 0–1.2)
BUN SERPL-MCNC: 5 MG/DL (ref 6–20)
BUN/CREAT SERPL: 8.9 (ref 7–25)
CALCIUM SPEC-SCNC: 9.5 MG/DL (ref 8.6–10.5)
CHLORIDE SERPL-SCNC: 100 MMOL/L (ref 98–107)
CO2 SERPL-SCNC: 30.6 MMOL/L (ref 22–29)
CREAT SERPL-MCNC: 0.56 MG/DL (ref 0.57–1)
GFR SERPL CREATININE-BSD FRML MDRD: 112 ML/MIN/1.73
GLOBULIN UR ELPH-MCNC: 4.1 GM/DL
GLUCOSE SERPL-MCNC: 82 MG/DL (ref 65–99)
POTASSIUM SERPL-SCNC: 3.9 MMOL/L (ref 3.5–5.2)
PROT SERPL-MCNC: 7.6 G/DL (ref 6–8.5)
SODIUM SERPL-SCNC: 138 MMOL/L (ref 136–145)

## 2021-12-09 NOTE — PROGRESS NOTES
Your liver enzymes are elevated.  This is expected with fatty liver.  Please maintain a low-fat diet and avoid sugars in your diet.  Exercise is also very important.

## 2021-12-15 NOTE — PROGRESS NOTES
Subjective   Aidee Trinidad is a 56 y.o. female who presents to the office today as a follow up appointment regarding Cirrhosis      History of Present Illness:  The patient presents for follow-up of cirrhosis. This was discovered incidentally while in the hospital for a broken hip.  She has been idiabetic since 2011.  She does not take insulin.  She also is hypertensive and has hyperlipidemia.  She had a triple bypass in 2011 for CAD.  She has had elevated liver enzymes in the past.  She was never a heavy drinker.  She states she gained weight with her last child 29 years ago.  She gained about 60 lbs and has progressively put on more weight over time. She has never had ascites, no confusion or jaundice.  A fairly recent abdominal ultrasound in July 2021 did not show ascites but did show a nodular small liver..  No hematemesis or melena.  She did have a colonoscopy in 2012 or 2013.  This was performed in [place].  She denies changes in mentation.  She denies bright red blood per rectum or melena.  She has had no hematemesis.  She has not had weight loss.  She states that she has had some difficulty with the holidays in respect to trying to lose weight.       Review of Systems:  Review of Systems   Constitutional: Negative for fever.   HENT: Negative for trouble swallowing.    Eyes: Negative.    Respiratory: Negative for chest tightness.    Cardiovascular: Negative for chest pain.   Gastrointestinal: Positive for constipation. Negative for abdominal distention, abdominal pain, anal bleeding, blood in stool, diarrhea, nausea and vomiting.   Endocrine: Negative.    Musculoskeletal: Positive for back pain.   Skin: Negative.    Allergic/Immunologic: Negative for environmental allergies and food allergies.   Hematological: Bruises/bleeds easily.   Psychiatric/Behavioral: Negative.        Past Medical History:  Past Medical History:   Diagnosis Date   • Arthritis    • Chronic back pain    • Coronary artery disease    •  Depression    • Diabetes mellitus (HCC)    • Fatty liver    • Hypertension    • Hypothyroid        Past Surgical History:  Past Surgical History:   Procedure Laterality Date   • CARPAL TUNNEL RELEASE     •  SECTION     • CHOLECYSTECTOMY     • COLONOSCOPY     • CORONARY ANGIOPLASTY WITH STENT PLACEMENT     • HIP TROCHANTERIC NAILING WITH INTRAMEDULLARY HIP SCREW Right 2021    Procedure: HIP TROCHANTERIC NAILING LONG WITH INTRAMEDULLARY HIP SCREW;  Surgeon: Oracio Ponce DO;  Location: [facility];  Service: Orthopedics;  Laterality: Right;   • REPLACEMENT TOTAL KNEE Right    • TUBAL ABDOMINAL LIGATION     • UPPER GASTROINTESTINAL ENDOSCOPY         Family History:  Family History   Problem Relation Age of Onset   • COPD Mother    • Stroke Mother    • Cancer Brother    • Diabetes Brother    • Hypertension Brother    • Heart disease Brother        Social History:  Social History     Socioeconomic History   • Marital status:    Tobacco Use   • Smoking status: Never Smoker   • Smokeless tobacco: Never Used   Substance and Sexual Activity   • Alcohol use: Yes     Comment: Once a year    • Drug use: No   • Sexual activity: Defer       Current Medication List:    Current Outpatient Medications:   •  aspirin 81 MG EC tablet, Take 81 mg by mouth Daily., Disp: , Rfl:   •  atorvastatin (LIPITOR) 40 MG tablet, Take 1 tablet by mouth Every Night., Disp: 30 tablet, Rfl: 0  •  cholecalciferol (VITAMIN D3) 1.25 MG (01268 UT) capsule, Take 50,000 Units by mouth Every 7 (Seven) Days. Prior to [facility] Admission, Patient was on: Pt takes on , Disp: , Rfl:   •  furosemide (LASIX) 40 MG tablet, Take 40 mg by mouth Daily., Disp: , Rfl:   •  gabapentin (NEURONTIN) 400 MG capsule, Take 400 mg by mouth Every 12 (Twelve) Hours., Disp: , Rfl:   •  glipizide (GLUCOTROL) 5 MG tablet, Take 5 mg by mouth Daily., Disp: , Rfl:   •  HYDROcodone-acetaminophen (NORCO) 5-325 MG per tablet, Take 1 tablet by mouth 2 (Two) Times a  "Day., Disp: , Rfl:   •  levocetirizine (XYZAL) 5 MG tablet, Take 5 mg by mouth Every Evening., Disp: , Rfl:   •  levothyroxine (SYNTHROID, LEVOTHROID) 50 MCG tablet, Take 50 mcg by mouth Daily., Disp: , Rfl:   •  lisinopril (PRINIVIL,ZESTRIL) 20 MG tablet, Take 20 mg by mouth Daily As Needed (only takes if her bp is 145/90)., Disp: , Rfl:   •  loratadine-pseudoephedrine (CLARITIN-D 24-hour)  MG per 24 hr tablet, Take 1 tablet by mouth Daily As Needed for Allergies., Disp: , Rfl:   •  meclizine (ANTIVERT) 25 MG tablet, Take 25 mg by mouth Daily As Needed for Dizziness., Disp: , Rfl:   •  metFORMIN (GLUCOPHAGE) 500 MG tablet, Take 500 mg by mouth 2 (Two) Times a Day With Meals., Disp: , Rfl:   •  metoprolol tartrate (LOPRESSOR) 25 MG tablet, Take 25 mg by mouth 2 (Two) Times a Day., Disp: , Rfl:   •  potassium chloride (K-DUR,KLOR-CON) 10 MEQ CR tablet, Take 10 mEq by mouth 2 (Two) Times a Day., Disp: , Rfl:   •  QUEtiapine (SEROquel) 50 MG tablet, Take 50 mg by mouth Every Night., Disp: , Rfl:   •  sennosides-docusate (PERICOLACE) 8.6-50 MG per tablet, Take 1 tablet by mouth 2 (Two) Times a Day., Disp: 60 tablet, Rfl: 0  •  sertraline (ZOLOFT) 50 MG tablet, Take 50 mg by mouth Daily., Disp: , Rfl:     Allergies:   Nuts, Contrast dye, Codeine, and Latex    Vitals:  Ht 144.8 cm (57\")   Wt 113 kg (250 lb)   BMI 54.10 kg/m²     Physical Exam:  Physical Exam    Results Review:  Lab Results:   Office Visit on 12/08/2021   Component Date Value Ref Range Status   • ALPHA-FETOPROTEIN 12/08/2021 5.37  0 - 8.3 ng/mL Final   • Glucose 12/08/2021 82  65 - 99 mg/dL Final   • BUN 12/08/2021 5* 6 - 20 mg/dL Final   • Creatinine 12/08/2021 0.56* 0.57 - 1.00 mg/dL Final   • Sodium 12/08/2021 138  136 - 145 mmol/L Final   • Potassium 12/08/2021 3.9  3.5 - 5.2 mmol/L Final   • Chloride 12/08/2021 100  98 - 107 mmol/L Final   • CO2 12/08/2021 30.6* 22.0 - 29.0 mmol/L Final   • Calcium 12/08/2021 9.5  8.6 - 10.5 mg/dL Final   • " Total Protein 12/08/2021 7.6  6.0 - 8.5 g/dL Final   • Albumin 12/08/2021 3.50  3.50 - 5.20 g/dL Final   • ALT (SGPT) 12/08/2021 50* 1 - 33 U/L Final   • AST (SGOT) 12/08/2021 82* 1 - 32 U/L Final   • Alkaline Phosphatase 12/08/2021 181* 39 - 117 U/L Final   • Total Bilirubin 12/08/2021 0.9  0.0 - 1.2 mg/dL Final   • eGFR Non  Amer 12/08/2021 112  >60 mL/min/1.73 Final   • Globulin 12/08/2021 4.1  gm/dL Final   • A/G Ratio 12/08/2021 0.9  g/dL Final   • BUN/Creatinine Ratio 12/08/2021 8.9  7.0 - 25.0 Final   • Anion Gap 12/08/2021 7.4  5.0 - 15.0 mmol/L Final       Assessment/Plan     Visit Diagnoses:    ICD-10-CM ICD-9-CM   1. Cirrhosis of liver without ascites, unspecified hepatic cirrhosis type (HCC)  K74.60 571.5       Plan:  Orders Placed This Encounter   Procedures   • Comprehensive Metabolic Panel   • AFP Tumor Marker       * Surgery not found *      MEDS ORDERED DURING VISIT:  No orders of the defined types were placed in this encounter.      No follow-ups on file.             This document has been electronically signed by Gill Ortiz MA   December 15, 2021 13:18 EST      Part of this note may be an electronic transcription/translation of spoken language to printed text using the Dragon Dictation System.

## 2022-03-17 ENCOUNTER — OFFICE VISIT (OUTPATIENT)
Dept: GASTROENTEROLOGY | Facility: CLINIC | Age: 57
End: 2022-03-17

## 2022-03-17 VITALS
BODY MASS INDEX: 50.7 KG/M2 | OXYGEN SATURATION: 94 % | SYSTOLIC BLOOD PRESSURE: 115 MMHG | WEIGHT: 235 LBS | DIASTOLIC BLOOD PRESSURE: 69 MMHG | HEART RATE: 69 BPM | HEIGHT: 57 IN

## 2022-03-17 DIAGNOSIS — D50.9 IRON DEFICIENCY ANEMIA, UNSPECIFIED IRON DEFICIENCY ANEMIA TYPE: Primary | ICD-10-CM

## 2022-03-17 DIAGNOSIS — K74.60 CIRRHOSIS OF LIVER WITHOUT ASCITES, UNSPECIFIED HEPATIC CIRRHOSIS TYPE: ICD-10-CM

## 2022-03-17 PROCEDURE — 99214 OFFICE O/P EST MOD 30 MIN: CPT | Performed by: INTERNAL MEDICINE

## 2022-04-11 PROBLEM — K74.60 CIRRHOSIS OF LIVER WITHOUT ASCITES: Status: ACTIVE | Noted: 2022-04-11

## 2022-04-19 ENCOUNTER — LAB (OUTPATIENT)
Dept: LAB | Facility: HOSPITAL | Age: 57
End: 2022-04-19

## 2022-04-19 DIAGNOSIS — D50.9 IRON DEFICIENCY ANEMIA, UNSPECIFIED IRON DEFICIENCY ANEMIA TYPE: Primary | ICD-10-CM

## 2022-04-19 DIAGNOSIS — K74.60 CIRRHOSIS OF LIVER WITHOUT ASCITES, UNSPECIFIED HEPATIC CIRRHOSIS TYPE: ICD-10-CM

## 2022-04-19 DIAGNOSIS — D50.9 IRON DEFICIENCY ANEMIA, UNSPECIFIED IRON DEFICIENCY ANEMIA TYPE: ICD-10-CM

## 2022-04-19 LAB
FLUAV RNA RESP QL NAA+PROBE: NOT DETECTED
FLUBV RNA RESP QL NAA+PROBE: NOT DETECTED
SARS-COV-2 RNA RESP QL NAA+PROBE: NOT DETECTED

## 2022-04-19 PROCEDURE — 87636 SARSCOV2 & INF A&B AMP PRB: CPT | Performed by: INTERNAL MEDICINE

## 2022-04-19 PROCEDURE — C9803 HOPD COVID-19 SPEC COLLECT: HCPCS

## 2022-04-20 ENCOUNTER — ANESTHESIA EVENT (OUTPATIENT)
Dept: PERIOP | Facility: HOSPITAL | Age: 57
End: 2022-04-20

## 2022-04-20 ENCOUNTER — HOSPITAL ENCOUNTER (OUTPATIENT)
Facility: HOSPITAL | Age: 57
Setting detail: HOSPITAL OUTPATIENT SURGERY
Discharge: HOME | End: 2022-04-20
Attending: INTERNAL MEDICINE | Admitting: INTERNAL MEDICINE

## 2022-04-20 ENCOUNTER — ANESTHESIA (OUTPATIENT)
Dept: PERIOP | Facility: HOSPITAL | Age: 57
End: 2022-04-20

## 2022-04-20 VITALS
HEART RATE: 71 BPM | SYSTOLIC BLOOD PRESSURE: 145 MMHG | BODY MASS INDEX: 50.38 KG/M2 | TEMPERATURE: 97.6 F | OXYGEN SATURATION: 98 % | DIASTOLIC BLOOD PRESSURE: 72 MMHG | HEIGHT: 58 IN | WEIGHT: 240 LBS | RESPIRATION RATE: 20 BRPM

## 2022-04-20 DIAGNOSIS — K74.60 CIRRHOSIS OF LIVER WITHOUT ASCITES, UNSPECIFIED HEPATIC CIRRHOSIS TYPE: ICD-10-CM

## 2022-04-20 LAB — GLUCOSE BLDC GLUCOMTR-MCNC: 98 MG/DL (ref 70–130)

## 2022-04-20 PROCEDURE — 43239 EGD BIOPSY SINGLE/MULTIPLE: CPT | Performed by: INTERNAL MEDICINE

## 2022-04-20 PROCEDURE — 82962 GLUCOSE BLOOD TEST: CPT

## 2022-04-20 PROCEDURE — 25010000002 PROPOFOL 10 MG/ML EMULSION: Performed by: NURSE ANESTHETIST, CERTIFIED REGISTERED

## 2022-04-20 RX ORDER — DROPERIDOL 2.5 MG/ML
0.62 INJECTION, SOLUTION INTRAMUSCULAR; INTRAVENOUS ONCE AS NEEDED
Status: DISCONTINUED | OUTPATIENT
Start: 2022-04-20 | End: 2022-04-20 | Stop reason: HOSPADM

## 2022-04-20 RX ORDER — SODIUM CHLORIDE, SODIUM LACTATE, POTASSIUM CHLORIDE, CALCIUM CHLORIDE 600; 310; 30; 20 MG/100ML; MG/100ML; MG/100ML; MG/100ML
125 INJECTION, SOLUTION INTRAVENOUS ONCE
Status: COMPLETED | OUTPATIENT
Start: 2022-04-20 | End: 2022-04-20

## 2022-04-20 RX ORDER — KETOROLAC TROMETHAMINE 30 MG/ML
30 INJECTION, SOLUTION INTRAMUSCULAR; INTRAVENOUS EVERY 6 HOURS PRN
Status: DISCONTINUED | OUTPATIENT
Start: 2022-04-20 | End: 2022-04-20 | Stop reason: HOSPADM

## 2022-04-20 RX ORDER — MIDAZOLAM HYDROCHLORIDE 1 MG/ML
1 INJECTION INTRAMUSCULAR; INTRAVENOUS
Status: DISCONTINUED | OUTPATIENT
Start: 2022-04-20 | End: 2022-04-20 | Stop reason: HOSPADM

## 2022-04-20 RX ORDER — SODIUM CHLORIDE, SODIUM LACTATE, POTASSIUM CHLORIDE, CALCIUM CHLORIDE 600; 310; 30; 20 MG/100ML; MG/100ML; MG/100ML; MG/100ML
100 INJECTION, SOLUTION INTRAVENOUS ONCE AS NEEDED
Status: DISCONTINUED | OUTPATIENT
Start: 2022-04-20 | End: 2022-04-20 | Stop reason: HOSPADM

## 2022-04-20 RX ORDER — ONDANSETRON 2 MG/ML
4 INJECTION INTRAMUSCULAR; INTRAVENOUS AS NEEDED
Status: DISCONTINUED | OUTPATIENT
Start: 2022-04-20 | End: 2022-04-20 | Stop reason: HOSPADM

## 2022-04-20 RX ORDER — MEPERIDINE HYDROCHLORIDE 25 MG/ML
12.5 INJECTION INTRAMUSCULAR; INTRAVENOUS; SUBCUTANEOUS
Status: DISCONTINUED | OUTPATIENT
Start: 2022-04-20 | End: 2022-04-20 | Stop reason: HOSPADM

## 2022-04-20 RX ORDER — OMEPRAZOLE 40 MG/1
40 CAPSULE, DELAYED RELEASE ORAL DAILY
Qty: 30 CAPSULE | Refills: 5 | Status: SHIPPED | OUTPATIENT
Start: 2022-04-20 | End: 2022-10-19 | Stop reason: SDUPTHER

## 2022-04-20 RX ORDER — SODIUM CHLORIDE 0.9 % (FLUSH) 0.9 %
10 SYRINGE (ML) INJECTION EVERY 12 HOURS SCHEDULED
Status: DISCONTINUED | OUTPATIENT
Start: 2022-04-20 | End: 2022-04-20 | Stop reason: HOSPADM

## 2022-04-20 RX ORDER — IPRATROPIUM BROMIDE AND ALBUTEROL SULFATE 2.5; .5 MG/3ML; MG/3ML
3 SOLUTION RESPIRATORY (INHALATION) ONCE AS NEEDED
Status: DISCONTINUED | OUTPATIENT
Start: 2022-04-20 | End: 2022-04-20 | Stop reason: HOSPADM

## 2022-04-20 RX ORDER — PROPOFOL 10 MG/ML
VIAL (ML) INTRAVENOUS CONTINUOUS PRN
Status: DISCONTINUED | OUTPATIENT
Start: 2022-04-20 | End: 2022-04-20 | Stop reason: SURG

## 2022-04-20 RX ORDER — SODIUM CHLORIDE 0.9 % (FLUSH) 0.9 %
10 SYRINGE (ML) INJECTION AS NEEDED
Status: DISCONTINUED | OUTPATIENT
Start: 2022-04-20 | End: 2022-04-20 | Stop reason: HOSPADM

## 2022-04-20 RX ORDER — FENTANYL CITRATE 50 UG/ML
50 INJECTION, SOLUTION INTRAMUSCULAR; INTRAVENOUS
Status: DISCONTINUED | OUTPATIENT
Start: 2022-04-20 | End: 2022-04-20 | Stop reason: HOSPADM

## 2022-04-20 RX ADMIN — PROPOFOL 100 MCG/KG/MIN: 10 INJECTION, EMULSION INTRAVENOUS at 09:33

## 2022-04-20 RX ADMIN — SODIUM CHLORIDE, POTASSIUM CHLORIDE, SODIUM LACTATE AND CALCIUM CHLORIDE: 600; 310; 30; 20 INJECTION, SOLUTION INTRAVENOUS at 09:32

## 2022-04-20 NOTE — ANESTHESIA PREPROCEDURE EVALUATION
Anesthesia Evaluation     Patient summary reviewed and Nursing notes reviewed   no history of anesthetic complications:  NPO Solid Status: > 8 hours  NPO Liquid Status: > 8 hours           Airway   Mallampati: II  TM distance: >3 FB  Neck ROM: full  No difficulty expected  Dental    (+) poor dentition    Pulmonary - normal exam   (+) asthma,sleep apnea,   (-) not a smoker  Cardiovascular - normal exam    ECG reviewed  Patient on routine beta blocker    (+) hypertension 2 medications or greater, CAD, CABG (2011) >6 Months, cardiac stents (2010) more than 12 months ago hyperlipidemia,   (-) past MI    ROS comment: TTE 7/23/2021  ·Normal left ventricular cavity size and wall thickness noted.  ·Left ventricular ejection fraction appears to be 56 - 60%. Left ventricular systolic function is normal.  ·Left ventricular diastolic function is consistent with (grade II w/high LAP) pseudonormalization.  ·Normal cardiac chamber dimensions.  ·The aortic valve is structurally normal. Doppler interrogation was not done across the aortic valve.  ·The mitral valve is structurally normal with no regurgitation or significant stenosis present  ·Mild tricuspid valve regurgitation is present.  ·Estimated right ventricular systolic pressure from tricuspid regurgitation is mildly elevated (35-45 mmHg). Mild pulmonary hypertension is present.        Neuro/Psych  (+) psychiatric history Depression,    (-) seizures, CVA  GI/Hepatic/Renal/Endo    (+) obesity, morbid obesity,  liver disease, diabetes mellitus, thyroid problem hypothyroidism    Musculoskeletal     Abdominal  - normal exam    Bowel sounds: normal.   Substance History - negative use  (-) alcohol use     OB/GYN negative ob/gyn ROS         Other   arthritis,                        Anesthesia Plan    ASA 3     general     intravenous induction     Anesthetic plan, all risks, benefits, and alternatives have been provided, discussed and informed consent has been obtained with:  patient.

## 2022-04-20 NOTE — H&P
[facility]    Patient Identification:  Name:  Aidee Trinidad  Age:  56 y.o.  Sex:  female  :  1965  MRN:  [id number]   Visit Number:  [id number]  Primary Care Physician:  Azalea Burnett APRN       Chief complaint:     History of presenting illness:  56 y.o. female presents today for EGD to evaluate for the presence of esophageal varices.  The patient has a history of cirrhosis.  This was discovered incidentally while hospitalized for a broken hip.  She has been diabetic since  but she does not take insulin.  The patient has a history of being hypertensive and has hyperlipidemia.  She had a triple bypass in  for CAD.  Patient denies history of alcohol use that is significant.  She has put on a lot of weight in the past 30 years particularly after having children.  As far as she knows, she has never had ascites.  She denies confusion or jaundice.  She underwent ultrasound of the abdomen in 2021 which did not show ascites but did show a nodular small liver.. The patient denies hematemesis or melena.  She did have a colonoscopy in  or .  This was performed in [place].  She is not interested in having a colonoscopy anytime soon although she does realize that she is due for colonoscopy.  She denies changes in mentation.  She denies bright red blood per rectum or melena.  She has had no hematemesis.  She has not had weight loss.  The patient is wheelchair-bound primarily due to her weight.  She has appear to have lost 15 pounds since her last visit in December. She has had H. Pylori in the past states that she was treated.   ---------------------------------------------------------------------------------------------------------------------   Review of Systems I have reviewed and confirmed the accuracy of the ROS as documented by the MA/RINA/DWIGHT Harmon  MD    ---------------------------------------------------------------------------------------------------------------------   Past Medical History:   Diagnosis Date   • Arthritis    • Chronic back pain    • Coronary artery disease    • Depression    • Diabetes mellitus (HCC)    • Elevated cholesterol    • Fatty liver    • History of transfusion    • Hypertension    • Hypothyroid    • Sleep apnea      Past Surgical History:   Procedure Laterality Date   • CARPAL TUNNEL RELEASE     •  SECTION     • CHOLECYSTECTOMY     • COLONOSCOPY     • CORONARY ANGIOPLASTY WITH STENT PLACEMENT     • HIP TROCHANTERIC NAILING WITH INTRAMEDULLARY HIP SCREW Right 2021    Procedure: HIP TROCHANTERIC NAILING LONG WITH INTRAMEDULLARY HIP SCREW;  Surgeon: Oracio Ponce DO;  Location: [facility];  Service: Orthopedics;  Laterality: Right;   • REPLACEMENT TOTAL KNEE Right    • TUBAL ABDOMINAL LIGATION     • UPPER GASTROINTESTINAL ENDOSCOPY       Family History   Problem Relation Age of Onset   • COPD Mother    • Stroke Mother    • Cancer Brother    • Diabetes Brother    • Hypertension Brother    • Heart disease Brother      Social History     Socioeconomic History   • Marital status:    Tobacco Use   • Smoking status: Never Smoker   • Smokeless tobacco: Never Used   Substance and Sexual Activity   • Alcohol use: Yes     Comment: Once a year    • Drug use: No   • Sexual activity: Defer     ---------------------------------------------------------------------------------------------------------------------   Allergies:  Nuts, Contrast dye, Codeine, and Latex  ---------------------------------------------------------------------------------------------------------------------   Prior to Admission Medications     Prescriptions Last Dose Informant Patient Reported? Taking?    aspirin 81 MG EC tablet 4/15/2022 Pharmacy Yes No    Take 81 mg by mouth Daily.    atorvastatin (LIPITOR) 40 MG tablet 2022  No Yes    Take 1  tablet by mouth Every Night.    cholecalciferol (VITAMIN D3) 1.25 MG (41342 UT) capsule Past Week Pharmacy Yes Yes    Take 50,000 Units by mouth Every 7 (Seven) Days. Prior to [facility] Admission, Patient was on: Pt takes on Sunday    furosemide (LASIX) 40 MG tablet 4/19/2022 Pharmacy Yes Yes    Take 40 mg by mouth Daily.    gabapentin (NEURONTIN) 400 MG capsule 4/19/2022 Pharmacy Yes Yes    Take 400 mg by mouth Every 12 (Twelve) Hours.    glipizide (GLUCOTROL) 5 MG tablet 4/19/2022 Pharmacy Yes Yes    Take 5 mg by mouth Daily.    HYDROcodone-acetaminophen (NORCO) 5-325 MG per tablet 4/19/2022 Pharmacy Yes Yes    Take 1 tablet by mouth 2 (Two) Times a Day.    levocetirizine (XYZAL) 5 MG tablet Past Month Pharmacy Yes Yes    Take 5 mg by mouth Every Evening.    levothyroxine (SYNTHROID, LEVOTHROID) 50 MCG tablet 4/19/2022 Pharmacy Yes Yes    Take 50 mcg by mouth Daily.    lisinopril (PRINIVIL,ZESTRIL) 20 MG tablet More than a month Pharmacy Yes No    Take 20 mg by mouth Daily As Needed (only takes if her bp is 145/90).    loratadine-pseudoephedrine (CLARITIN-D 24-hour)  MG per 24 hr tablet Unknown Pharmacy Yes No    Take 1 tablet by mouth Daily As Needed for Allergies.    metFORMIN (GLUCOPHAGE) 500 MG tablet 4/19/2022 Pharmacy Yes Yes    Take 500 mg by mouth 2 (Two) Times a Day With Meals.    metoprolol tartrate (LOPRESSOR) 25 MG tablet 4/20/2022 Pharmacy Yes Yes    Take 25 mg by mouth 2 (Two) Times a Day.    potassium chloride (K-DUR,KLOR-CON) 10 MEQ CR tablet 4/19/2022 Pharmacy Yes Yes    Take 10 mEq by mouth 2 (Two) Times a Day.    QUEtiapine (SEROquel) 50 MG tablet 4/19/2022 Pharmacy Yes Yes    Take 50 mg by mouth Every Night.    sennosides-docusate (PERICOLACE) 8.6-50 MG per tablet 4/19/2022  No Yes    Take 1 tablet by mouth 2 (Two) Times a Day.    sertraline (ZOLOFT) 50 MG tablet 4/19/2022 Pharmacy Yes Yes    Take 50 mg by mouth Daily.        Hospital Scheduled Meds:    No current facility-administered  medications for this encounter.    ---------------------------------------------------------------------------------------------------------------------   Vital Signs:  Resp:  [20] 20 04/20/22  0753   Weight: 109 kg (240 lb)     Body mass index is 50.16 kg/m².  ---------------------------------------------------------------------------------------------------------------------   Physical Exam:  Constitutional:  Well-developed and well-nourished.  No respiratory distress.      HENT:  Head: Normocephalic and atraumatic.  Mouth:  Moist mucous membranes.    Eyes:  Conjunctivae and EOM are normal.  Pupils are equal, round, and reactive to light.  No scleral icterus.  Neck:  Neck supple.  No JVD present.    Cardiovascular:  Normal rate, regular rhythm and normal heart sounds with no murmur.  Pulmonary/Chest:  No respiratory distress, no wheezes, no crackles, with normal breath sounds and good air movement.  Abdominal:  Soft.  Bowel sounds are normal.  No distension and no tenderness.   Musculoskeletal:  No edema, no tenderness, and no deformity.  No red or swollen joints anywhere.    Neurological:  Alert and oriented to person, place, and time.  No cranial nerve deficit.  No tongue deviation.  No facial droop.  No slurred speech.   Skin:  Skin is warm and dry.  No rash noted.  No pallor.   Psychiatric:  Normal mood and affect.  Behavior is normal.  Judgment and thought content normal.   Peripheral vascular:  No edema and strong pulses on all 4 extremities.  Genitourinary:  ---------------------------------------------------------------------------------------------------------------------        Invalid input(s): PROTCrCl cannot be calculated (Patient's most recent lab result is older than the maximum 30 days allowed.).  No results found for: AMMONIA          Lab Results   Component Value Date    HGBA1C 5.80 (H) 10/30/2021     Lab Results   Component Value Date    TSH 2.520 10/31/2021     No results found for:  PREGTESTUR, PREGSERUM, HCG, HCGQUANT  Pain Management Panel     Pain Management Panel Latest Ref Rng & Units 2/28/2020    AMPHETAMINES SCREEN, URINE Negative Negative    BARBITURATES SCREEN Negative Negative    BENZODIAZEPINE SCREEN, URINE Negative Negative    BUPRENORPHINEUR Negative Negative    COCAINE SCREEN, URINE Negative Negative    METHADONE SCREEN, URINE Negative Negative        No results found for: BLOODCX  No results found for: URINECX  No results found for: WOUNDCX  No results found for: STOOLCX      ---------------------------------------------------------------------------------------------------------------------  Imaging Results (Last 7 Days)     ** No results found for the last 168 hours. **          I have personally reviewed the radiology images and read the final radiology report.  ---------------------------------------------------------------------------------------------------------------------  Assessment and Plan:  Proceed with EGD to evaluate for the presence of esophageal varices in the setting of cirrhosis.    Lizzeth Harmon MD  04/20/22  08:00 EDT

## 2022-04-20 NOTE — ANESTHESIA POSTPROCEDURE EVALUATION
Patient: Aidee Trinidad    Procedure Summary     Date: 04/20/22 Room / Location: [facility] OR  /  [facility] OR    Anesthesia Start: 0930 Anesthesia Stop: 0942    Procedure: ESOPHAGOGASTRODUODENOSCOPY WITH BIOPSY (N/A Esophagus) Diagnosis:       Cirrhosis of liver without ascites, unspecified hepatic cirrhosis type (HCC)      (Cirrhosis of liver without ascites, unspecified hepatic cirrhosis type (HCC) [K74.60])    Surgeons: Lizzeth Harmon MD Provider: Pro Ortiz MD    Anesthesia Type: general ASA Status: 3          Anesthesia Type: general    Vitals  Vitals Value Taken Time   /72 04/20/22 1015   Temp 97.6 °F (36.4 °C) 04/20/22 0945   Pulse 71 04/20/22 1015   Resp 20 04/20/22 1015   SpO2 98 % 04/20/22 1015           Post Anesthesia Care and Evaluation    Patient location during evaluation: bedside  Patient participation: complete - patient participated  Level of consciousness: awake and alert  Pain score: 1  Pain management: adequate  Airway patency: patent  Anesthetic complications: No anesthetic complications  PONV Status: none  Cardiovascular status: acceptable  Respiratory status: acceptable  Hydration status: acceptable

## 2022-04-25 LAB
LAB AP CASE REPORT: NORMAL
PATH REPORT.FINAL DX SPEC: NORMAL

## 2022-04-26 NOTE — PROGRESS NOTES
The time of your recent upper endoscopy, I did find grade one esophageal varices.  I also found esophagitis of the distal esophagus.  I have placed you on omeprazole 40 mg once daily.  Biopsies of the stomach were negative for H. pylori gastritis.  Please keep your follow-up appointment.

## 2022-07-21 ENCOUNTER — LAB (OUTPATIENT)
Dept: LAB | Facility: HOSPITAL | Age: 57
End: 2022-07-21

## 2022-07-21 ENCOUNTER — OFFICE VISIT (OUTPATIENT)
Dept: GASTROENTEROLOGY | Facility: CLINIC | Age: 57
End: 2022-07-21

## 2022-07-21 VITALS — OXYGEN SATURATION: 92 % | HEIGHT: 57 IN | WEIGHT: 240 LBS | BODY MASS INDEX: 51.78 KG/M2 | HEART RATE: 77 BPM

## 2022-07-21 DIAGNOSIS — K74.60 CIRRHOSIS OF LIVER WITHOUT ASCITES, UNSPECIFIED HEPATIC CIRRHOSIS TYPE: Primary | ICD-10-CM

## 2022-07-21 DIAGNOSIS — K74.60 CIRRHOSIS OF LIVER WITHOUT ASCITES, UNSPECIFIED HEPATIC CIRRHOSIS TYPE: ICD-10-CM

## 2022-07-21 DIAGNOSIS — K21.00 GASTROESOPHAGEAL REFLUX DISEASE WITH ESOPHAGITIS WITHOUT HEMORRHAGE: ICD-10-CM

## 2022-07-21 DIAGNOSIS — D50.9 IRON DEFICIENCY ANEMIA, UNSPECIFIED IRON DEFICIENCY ANEMIA TYPE: ICD-10-CM

## 2022-07-21 LAB
INR PPP: 1.14 (ref 0.9–1.1)
PROTHROMBIN TIME: 14.9 SECONDS (ref 12.1–14.7)

## 2022-07-21 PROCEDURE — 82105 ALPHA-FETOPROTEIN SERUM: CPT | Performed by: INTERNAL MEDICINE

## 2022-07-21 PROCEDURE — 83540 ASSAY OF IRON: CPT

## 2022-07-21 PROCEDURE — 99213 OFFICE O/P EST LOW 20 MIN: CPT | Performed by: INTERNAL MEDICINE

## 2022-07-21 PROCEDURE — 85025 COMPLETE CBC W/AUTO DIFF WBC: CPT | Performed by: INTERNAL MEDICINE

## 2022-07-21 PROCEDURE — 84466 ASSAY OF TRANSFERRIN: CPT

## 2022-07-21 PROCEDURE — 85610 PROTHROMBIN TIME: CPT

## 2022-07-21 PROCEDURE — 36415 COLL VENOUS BLD VENIPUNCTURE: CPT | Performed by: INTERNAL MEDICINE

## 2022-07-21 PROCEDURE — 80053 COMPREHEN METABOLIC PANEL: CPT

## 2022-07-21 NOTE — PROGRESS NOTES
Subjective   Aidee Trinidad is a 57 y.o. female who presents to the office today as a follow up appointment regarding Cirrhosis      History of Present Illness:   56 y.o. female presents today for follow-up cirrhosis with esophageal varices.  She underwent EGD in April of this year.  At that time she was found to have esophagitis with esophageal varices.  The patient was placed on omeprazole 40 mg once daily.  Since starting Omeprazole, she had felt better in respect to epigastric discomfort. She has a history of coronary artery disease and underwent triple bypass in 2011.  She also has been diabetic since 2011.  She takes medications for hyperlipidemia and hypertension.  The patient denies abdominal distention.  She denies confusion.  She has never had ascites as far she knows.  In fact, she underwent ultrasound of the abdomen in July 2021 which did not show ascites but did show a nodular small liver.. The patient denies hematemesis or melena.  Her last colonoscopy was in 2012 or 2013 and was performed in [place].  Currently, she is declining colonoscopy. She has not had weight loss.  The patient is wheelchair-bound primarily due to her weight.        Review of Systems:  Review of Systems   Constitutional: Negative for fever.   HENT: Negative for trouble swallowing.    Eyes: Negative.    Respiratory: Negative for chest tightness.    Cardiovascular: Negative for chest pain.   Gastrointestinal: Positive for abdominal distention and constipation. Negative for abdominal pain, anal bleeding, blood in stool, diarrhea, nausea, rectal pain and vomiting.   Endocrine: Negative.    Musculoskeletal: Positive for back pain.   Skin: Negative.    Allergic/Immunologic: Negative for environmental allergies and food allergies.   Hematological: Bruises/bleeds easily.   Psychiatric/Behavioral: Negative.        Past Medical History:  Past Medical History:   Diagnosis Date   • Arthritis    • Chronic back pain    • Coronary artery  disease    • Depression    • Diabetes mellitus (HCC)    • Elevated cholesterol    • Fatty liver    • History of transfusion    • Hypertension    • Hypothyroid    • Sleep apnea        Past Surgical History:  Past Surgical History:   Procedure Laterality Date   • CARPAL TUNNEL RELEASE     •  SECTION     • CHOLECYSTECTOMY     • COLONOSCOPY     • CORONARY ANGIOPLASTY WITH STENT PLACEMENT     • ENDOSCOPY N/A 2022    Procedure: ESOPHAGOGASTRODUODENOSCOPY WITH BIOPSY;  Surgeon: Lizzeth Harmon MD;  Location: [facility] OR;  Service: Gastroenterology;  Laterality: N/A;   • HIP TROCHANTERIC NAILING WITH INTRAMEDULLARY HIP SCREW Right 2021    Procedure: HIP TROCHANTERIC NAILING LONG WITH INTRAMEDULLARY HIP SCREW;  Surgeon: Oracio Ponce DO;  Location: [facility] OR;  Service: Orthopedics;  Laterality: Right;   • REPLACEMENT TOTAL KNEE Right    • TUBAL ABDOMINAL LIGATION     • UPPER GASTROINTESTINAL ENDOSCOPY         Family History:  Family History   Problem Relation Age of Onset   • COPD Mother    • Stroke Mother    • Cancer Brother    • Diabetes Brother    • Hypertension Brother    • Heart disease Brother        Social History:  Social History     Socioeconomic History   • Marital status:    Tobacco Use   • Smoking status: Never Smoker   • Smokeless tobacco: Never Used   Substance and Sexual Activity   • Alcohol use: Yes     Comment: Once a year    • Drug use: No   • Sexual activity: Defer       Current Medication List:    Current Outpatient Medications:   •  aspirin 81 MG EC tablet, Take 81 mg by mouth Daily., Disp: , Rfl:   •  atorvastatin (LIPITOR) 40 MG tablet, Take 1 tablet by mouth Every Night., Disp: 30 tablet, Rfl: 0  •  cholecalciferol (VITAMIN D3) 1.25 MG (72791 UT) capsule, Take 50,000 Units by mouth Every 7 (Seven) Days. Prior to [facility] Admission, Patient was on: Pt takes on , Disp: , Rfl:   •  furosemide (LASIX) 40 MG tablet, Take 40 mg by mouth Daily., Disp: , Rfl:   •   "gabapentin (NEURONTIN) 400 MG capsule, Take 400 mg by mouth Every 12 (Twelve) Hours., Disp: , Rfl:   •  glipizide (GLUCOTROL) 5 MG tablet, Take 5 mg by mouth Daily., Disp: , Rfl:   •  HYDROcodone-acetaminophen (NORCO) 5-325 MG per tablet, Take 1 tablet by mouth 2 (Two) Times a Day., Disp: , Rfl:   •  levocetirizine (XYZAL) 5 MG tablet, Take 5 mg by mouth Every Evening., Disp: , Rfl:   •  levothyroxine (SYNTHROID, LEVOTHROID) 50 MCG tablet, Take 50 mcg by mouth Daily., Disp: , Rfl:   •  loratadine-pseudoephedrine (CLARITIN-D 24-hour)  MG per 24 hr tablet, Take 1 tablet by mouth Daily As Needed for Allergies., Disp: , Rfl:   •  metFORMIN (GLUCOPHAGE) 500 MG tablet, Take 500 mg by mouth 2 (Two) Times a Day With Meals., Disp: , Rfl:   •  metoprolol tartrate (LOPRESSOR) 25 MG tablet, Take 25 mg by mouth 2 (Two) Times a Day., Disp: , Rfl:   •  omeprazole (priLOSEC) 40 MG capsule, Take 1 capsule by mouth Daily., Disp: 30 capsule, Rfl: 5  •  potassium chloride (K-DUR,KLOR-CON) 10 MEQ CR tablet, Take 10 mEq by mouth 2 (Two) Times a Day., Disp: , Rfl:   •  QUEtiapine (SEROquel) 50 MG tablet, Take 50 mg by mouth Every Night., Disp: , Rfl:   •  sennosides-docusate (PERICOLACE) 8.6-50 MG per tablet, Take 1 tablet by mouth 2 (Two) Times a Day., Disp: 60 tablet, Rfl: 0  •  sertraline (ZOLOFT) 50 MG tablet, Take 50 mg by mouth Daily., Disp: , Rfl:     Allergies:   Nuts, Contrast dye, Codeine, and Latex    Vitals:  Pulse 77   Ht 144.8 cm (57\")   Wt 109 kg (240 lb)   SpO2 92%   BMI 51.94 kg/m²     Physical Exam:  Physical Exam  Constitutional:       Appearance: She is obese.   HENT:      Head: Normocephalic and atraumatic.      Nose: Nose normal. No congestion or rhinorrhea.   Eyes:      General: No scleral icterus.     Extraocular Movements: Extraocular movements intact.      Conjunctiva/sclera: Conjunctivae normal.      Pupils: Pupils are equal, round, and reactive to light.   Cardiovascular:      Rate and Rhythm: Normal " rate and regular rhythm.      Pulses: Normal pulses.      Heart sounds: Normal heart sounds.   Pulmonary:      Effort: Pulmonary effort is normal.      Breath sounds: Normal breath sounds.   Abdominal:      General: Abdomen is flat. Bowel sounds are normal. There is no distension.      Palpations: Abdomen is soft. There is no shifting dullness, fluid wave, hepatomegaly, splenomegaly, mass or pulsatile mass.      Tenderness: There is no abdominal tenderness. There is no guarding or rebound.      Hernia: No hernia is present.   Musculoskeletal:         General: No swelling or tenderness.      Cervical back: Normal range of motion and neck supple.      Comments: Patient is using a walker stability   Skin:     General: Skin is warm and dry.      Coloration: Skin is not jaundiced.   Neurological:      General: No focal deficit present.      Mental Status: She is alert and oriented to person, place, and time.   Psychiatric:         Mood and Affect: Mood normal.         Behavior: Behavior normal.         Results Review:  Lab Results:   No visits with results within 1 Month(s) from this visit.   Latest known visit with results is:   Admission on 04/20/2022, Discharged on 04/20/2022   Component Date Value Ref Range Status   • Glucose 04/20/2022 98  70 - 130 mg/dL Final    Meter: [id number] : [id number] Jimy VELEZ   • Case Report 04/20/2022    Final                    Value:Surgical Pathology Report                         Case: [id number]                                  Authorizing Provider:  Lizzeth Harmon, Collected:           04/20/2022 09:37 AM                                 MD                                                                           Ordering Location:     [facility]      Received:            04/20/2022 12:05 PM                                 OPERATING ROOM DEPARTMENT                                                    Pathologist:           Shanon Lance MD                                                        Specimen:    Gastric, Antrum, biopsy                                                                   • Final Diagnosis 04/20/2022    Final                    Value:This result contains rich text formatting which cannot be displayed here.       Assessment & Plan     Visit Diagnoses:    ICD-10-CM ICD-9-CM   1. Cirrhosis of liver without ascites, unspecified hepatic cirrhosis type (HCC)  K74.60 571.5   2. Gastroesophageal reflux disease with esophagitis without hemorrhage  K21.00 530.81     530.10       Plan:  Orders Placed This Encounter   Procedures   • US Abdomen Complete   • AFP Tumor Marker   • Comprehensive Metabolic Panel   • Protime-INR       The patient is doing well overall.  She has had no sign of liver decompensation.  I did explain that she is at increased risk of developing hepatocellular carcinoma due to cirrhosis.  We will do an ultrasound for mass and labs today to evaluate liver function.  She will follow up in 6 months.    MEDS ORDERED DURING VISIT:  No orders of the defined types were placed in this encounter.      Return in about 6 months (around 1/21/2023).             This document has been electronically signed by Lizzeth Harmon MD   July 21, 2022 13:26 EDT      Part of this note may be an electronic transcription/translation of spoken language to printed text using the Dragon Dictation System.

## 2022-07-22 LAB
ALBUMIN SERPL-MCNC: 3.5 G/DL (ref 3.5–5.2)
ALBUMIN/GLOB SERPL: 0.9 G/DL
ALP SERPL-CCNC: 145 U/L (ref 39–117)
ALPHA-FETOPROTEIN: 5 NG/ML (ref 0–8.3)
ALT SERPL W P-5'-P-CCNC: 33 U/L (ref 1–33)
ANION GAP SERPL CALCULATED.3IONS-SCNC: 13.3 MMOL/L (ref 5–15)
AST SERPL-CCNC: 71 U/L (ref 1–32)
BASOPHILS # BLD AUTO: 0.08 10*3/MM3 (ref 0–0.2)
BASOPHILS NFR BLD AUTO: 1.1 % (ref 0–1.5)
BILIRUB SERPL-MCNC: 1 MG/DL (ref 0–1.2)
BUN SERPL-MCNC: 15 MG/DL (ref 6–20)
BUN/CREAT SERPL: 12.6 (ref 7–25)
CALCIUM SPEC-SCNC: 9.3 MG/DL (ref 8.6–10.5)
CHLORIDE SERPL-SCNC: 101 MMOL/L (ref 98–107)
CO2 SERPL-SCNC: 24.7 MMOL/L (ref 22–29)
CREAT SERPL-MCNC: 1.19 MG/DL (ref 0.57–1)
DEPRECATED RDW RBC AUTO: 51.3 FL (ref 37–54)
EGFRCR SERPLBLD CKD-EPI 2021: 53.4 ML/MIN/1.73
EOSINOPHIL # BLD AUTO: 0.17 10*3/MM3 (ref 0–0.4)
EOSINOPHIL NFR BLD AUTO: 2.3 % (ref 0.3–6.2)
ERYTHROCYTE [DISTWIDTH] IN BLOOD BY AUTOMATED COUNT: 15.3 % (ref 12.3–15.4)
GLOBULIN UR ELPH-MCNC: 4 GM/DL
GLUCOSE SERPL-MCNC: 69 MG/DL (ref 65–99)
HCT VFR BLD AUTO: 35.8 % (ref 34–46.6)
HGB BLD-MCNC: 11.7 G/DL (ref 12–15.9)
IMM GRANULOCYTES # BLD AUTO: 0.01 10*3/MM3 (ref 0–0.05)
IMM GRANULOCYTES NFR BLD AUTO: 0.1 % (ref 0–0.5)
IRON 24H UR-MRATE: 83 MCG/DL (ref 37–145)
IRON SATN MFR SERPL: 15 % (ref 20–50)
LYMPHOCYTES # BLD AUTO: 3.35 10*3/MM3 (ref 0.7–3.1)
LYMPHOCYTES NFR BLD AUTO: 46.2 % (ref 19.6–45.3)
MCH RBC QN AUTO: 29.6 PG (ref 26.6–33)
MCHC RBC AUTO-ENTMCNC: 32.7 G/DL (ref 31.5–35.7)
MCV RBC AUTO: 90.6 FL (ref 79–97)
MONOCYTES # BLD AUTO: 0.65 10*3/MM3 (ref 0.1–0.9)
MONOCYTES NFR BLD AUTO: 9 % (ref 5–12)
NEUTROPHILS NFR BLD AUTO: 2.99 10*3/MM3 (ref 1.7–7)
NEUTROPHILS NFR BLD AUTO: 41.3 % (ref 42.7–76)
NRBC BLD AUTO-RTO: 0 /100 WBC (ref 0–0.2)
PLATELET # BLD AUTO: 164 10*3/MM3 (ref 140–450)
PMV BLD AUTO: 12.8 FL (ref 6–12)
POTASSIUM SERPL-SCNC: 4.8 MMOL/L (ref 3.5–5.2)
PROT SERPL-MCNC: 7.5 G/DL (ref 6–8.5)
RBC # BLD AUTO: 3.95 10*6/MM3 (ref 3.77–5.28)
SODIUM SERPL-SCNC: 139 MMOL/L (ref 136–145)
TIBC SERPL-MCNC: 556 MCG/DL (ref 298–536)
TRANSFERRIN SERPL-MCNC: 373 MG/DL (ref 200–360)
WBC NRBC COR # BLD: 7.25 10*3/MM3 (ref 3.4–10.8)

## 2022-07-22 NOTE — PROGRESS NOTES
Labs obtained during your office visit yesterday revealed a mildly elevated creatinine which measures kidney function.  Please avoid all NSAIDs such as ibuprofen, Advil, Motrin, Aleve.  Your iron saturation is improved.  Please keep your follow-up appointments.

## 2022-08-12 ENCOUNTER — HOSPITAL ENCOUNTER (OUTPATIENT)
Dept: ULTRASOUND IMAGING | Facility: HOSPITAL | Age: 57
Discharge: HOME | End: 2022-08-12
Admitting: INTERNAL MEDICINE

## 2022-08-12 DIAGNOSIS — K74.60 CIRRHOSIS OF LIVER WITHOUT ASCITES, UNSPECIFIED HEPATIC CIRRHOSIS TYPE: ICD-10-CM

## 2022-08-12 PROCEDURE — 76700 US EXAM ABDOM COMPLETE: CPT | Performed by: RADIOLOGY

## 2022-08-12 PROCEDURE — 76700 US EXAM ABDOM COMPLETE: CPT

## 2022-08-16 NOTE — PROGRESS NOTES
Your recent ultrasound of the liver revealed a nodular liver consistent with cirrhosis of the liver.  There were no masses seen in the liver.  Please keep your follow-up appointment.

## 2022-10-20 RX ORDER — OMEPRAZOLE 40 MG/1
40 CAPSULE, DELAYED RELEASE ORAL DAILY
Qty: 30 CAPSULE | Refills: 5 | Status: ON HOLD | OUTPATIENT
Start: 2022-10-20

## 2022-12-13 ENCOUNTER — TRANSCRIBE ORDERS (OUTPATIENT)
Dept: ADMINISTRATIVE | Facility: HOSPITAL | Age: 57
End: 2022-12-13

## 2022-12-13 DIAGNOSIS — N17.9 ACUTE RENAL FAILURE, UNSPECIFIED ACUTE RENAL FAILURE TYPE: Primary | ICD-10-CM

## 2022-12-20 ENCOUNTER — HOSPITAL ENCOUNTER (OUTPATIENT)
Dept: ULTRASOUND IMAGING | Facility: HOSPITAL | Age: 57
Discharge: HOME | End: 2022-12-20
Admitting: INTERNAL MEDICINE

## 2022-12-20 DIAGNOSIS — N17.9 ACUTE RENAL FAILURE, UNSPECIFIED ACUTE RENAL FAILURE TYPE: ICD-10-CM

## 2022-12-20 PROCEDURE — 76775 US EXAM ABDO BACK WALL LIM: CPT | Performed by: RADIOLOGY

## 2022-12-20 PROCEDURE — 76775 US EXAM ABDO BACK WALL LIM: CPT

## 2022-12-28 ENCOUNTER — HOSPITAL ENCOUNTER (OUTPATIENT)
Dept: MAMMOGRAPHY | Facility: HOSPITAL | Age: 57
Discharge: HOME | End: 2022-12-28
Admitting: NURSE PRACTITIONER

## 2022-12-28 DIAGNOSIS — Z12.31 VISIT FOR SCREENING MAMMOGRAM: ICD-10-CM

## 2022-12-28 PROCEDURE — 77067 SCR MAMMO BI INCL CAD: CPT | Performed by: RADIOLOGY

## 2022-12-28 PROCEDURE — 77063 BREAST TOMOSYNTHESIS BI: CPT | Performed by: RADIOLOGY

## 2022-12-28 PROCEDURE — 77067 SCR MAMMO BI INCL CAD: CPT

## 2022-12-28 PROCEDURE — 77063 BREAST TOMOSYNTHESIS BI: CPT

## 2023-01-01 ENCOUNTER — APPOINTMENT (OUTPATIENT)
Dept: CT IMAGING | Facility: HOSPITAL | Age: 58
End: 2023-01-01
Payer: MEDICARE

## 2023-01-01 ENCOUNTER — APPOINTMENT (OUTPATIENT)
Dept: CT IMAGING | Facility: HOSPITAL | Age: 58
DRG: 870 | End: 2023-01-01
Payer: MEDICARE

## 2023-01-01 ENCOUNTER — APPOINTMENT (OUTPATIENT)
Dept: GENERAL RADIOLOGY | Facility: HOSPITAL | Age: 58
DRG: 870 | End: 2023-01-01
Payer: MEDICARE

## 2023-01-01 ENCOUNTER — APPOINTMENT (OUTPATIENT)
Dept: ULTRASOUND IMAGING | Facility: HOSPITAL | Age: 58
DRG: 870 | End: 2023-01-01
Payer: MEDICARE

## 2023-01-01 ENCOUNTER — APPOINTMENT (OUTPATIENT)
Dept: RESPIRATORY THERAPY | Facility: HOSPITAL | Age: 58
DRG: 870 | End: 2023-01-01
Payer: MEDICARE

## 2023-01-01 ENCOUNTER — APPOINTMENT (OUTPATIENT)
Dept: GENERAL RADIOLOGY | Facility: HOSPITAL | Age: 58
End: 2023-01-01
Payer: MEDICARE

## 2023-01-01 ENCOUNTER — HOSPITAL ENCOUNTER (EMERGENCY)
Facility: HOSPITAL | Age: 58
Discharge: HOME | End: 2023-04-02
Attending: EMERGENCY MEDICINE | Admitting: EMERGENCY MEDICINE
Payer: MEDICARE

## 2023-01-01 ENCOUNTER — HOSPITAL ENCOUNTER (EMERGENCY)
Facility: HOSPITAL | Age: 58
Discharge: LEFT BEFORE DISCHARGE | DRG: 870 | End: 2023-04-03
Admitting: STUDENT IN AN ORGANIZED HEALTH CARE EDUCATION/TRAINING PROGRAM
Payer: MEDICARE

## 2023-01-01 ENCOUNTER — HOSPITAL ENCOUNTER (INPATIENT)
Facility: HOSPITAL | Age: 58
LOS: 7 days | DRG: 870 | End: 2023-04-13
Attending: EMERGENCY MEDICINE | Admitting: INTERNAL MEDICINE
Payer: MEDICARE

## 2023-01-01 ENCOUNTER — APPOINTMENT (OUTPATIENT)
Dept: ULTRASOUND IMAGING | Facility: HOSPITAL | Age: 58
End: 2023-01-01
Payer: MEDICARE

## 2023-01-01 ENCOUNTER — APPOINTMENT (OUTPATIENT)
Dept: CARDIOLOGY | Facility: HOSPITAL | Age: 58
DRG: 870 | End: 2023-01-01
Payer: MEDICARE

## 2023-01-01 ENCOUNTER — HOSPITAL ENCOUNTER (OUTPATIENT)
Facility: HOSPITAL | Age: 58
Setting detail: OBSERVATION
LOS: 1 days | Discharge: HOME | End: 2023-03-18
Attending: STUDENT IN AN ORGANIZED HEALTH CARE EDUCATION/TRAINING PROGRAM | Admitting: INTERNAL MEDICINE
Payer: MEDICARE

## 2023-01-01 VITALS
HEIGHT: 57 IN | SYSTOLIC BLOOD PRESSURE: 140 MMHG | BODY MASS INDEX: 52.08 KG/M2 | DIASTOLIC BLOOD PRESSURE: 78 MMHG | OXYGEN SATURATION: 65 % | RESPIRATION RATE: 32 BRPM | TEMPERATURE: 97.9 F | HEART RATE: 111 BPM | WEIGHT: 241.4 LBS

## 2023-01-01 VITALS
DIASTOLIC BLOOD PRESSURE: 47 MMHG | WEIGHT: 275.4 LBS | HEART RATE: 76 BPM | SYSTOLIC BLOOD PRESSURE: 114 MMHG | HEIGHT: 57 IN | RESPIRATION RATE: 18 BRPM | OXYGEN SATURATION: 99 % | TEMPERATURE: 97.9 F | BODY MASS INDEX: 59.41 KG/M2

## 2023-01-01 VITALS
OXYGEN SATURATION: 95 % | DIASTOLIC BLOOD PRESSURE: 54 MMHG | TEMPERATURE: 98.9 F | SYSTOLIC BLOOD PRESSURE: 106 MMHG | BODY MASS INDEX: 59.33 KG/M2 | HEART RATE: 84 BPM | HEIGHT: 57 IN | WEIGHT: 275 LBS | RESPIRATION RATE: 20 BRPM

## 2023-01-01 VITALS
OXYGEN SATURATION: 97 % | BODY MASS INDEX: 59.33 KG/M2 | HEIGHT: 57 IN | DIASTOLIC BLOOD PRESSURE: 52 MMHG | SYSTOLIC BLOOD PRESSURE: 109 MMHG | TEMPERATURE: 98.5 F | HEART RATE: 80 BPM | WEIGHT: 275 LBS | RESPIRATION RATE: 20 BRPM

## 2023-01-01 DIAGNOSIS — E16.2 HYPOGLYCEMIA: Primary | ICD-10-CM

## 2023-01-01 DIAGNOSIS — J18.9 COMMUNITY ACQUIRED PNEUMONIA, UNSPECIFIED LATERALITY: ICD-10-CM

## 2023-01-01 DIAGNOSIS — K74.60 CIRRHOSIS OF LIVER WITH ASCITES, UNSPECIFIED HEPATIC CIRRHOSIS TYPE: ICD-10-CM

## 2023-01-01 DIAGNOSIS — R76.8 ELEVATED SERUM IMMUNOGLOBULIN FREE LIGHT CHAIN LEVEL: ICD-10-CM

## 2023-01-01 DIAGNOSIS — I50.9 ACUTE ON CHRONIC CONGESTIVE HEART FAILURE, UNSPECIFIED HEART FAILURE TYPE: Primary | ICD-10-CM

## 2023-01-01 DIAGNOSIS — R18.8 OTHER ASCITES: ICD-10-CM

## 2023-01-01 DIAGNOSIS — R18.8 CIRRHOSIS OF LIVER WITH ASCITES, UNSPECIFIED HEPATIC CIRRHOSIS TYPE: ICD-10-CM

## 2023-01-01 DIAGNOSIS — J90 RECURRENT RIGHT PLEURAL EFFUSION: Primary | ICD-10-CM

## 2023-01-01 DIAGNOSIS — J81.0 FLASH PULMONARY EDEMA: ICD-10-CM

## 2023-01-01 DIAGNOSIS — D50.9 IRON DEFICIENCY ANEMIA, UNSPECIFIED IRON DEFICIENCY ANEMIA TYPE: Primary | ICD-10-CM

## 2023-01-01 DIAGNOSIS — J96.20 ACUTE ON CHRONIC RESPIRATORY FAILURE, UNSPECIFIED WHETHER WITH HYPOXIA OR HYPERCAPNIA: ICD-10-CM

## 2023-01-01 LAB
1,3 BETA GLUCAN SER-MCNC: 71 PG/ML
A-A DO2: 164.9 MMHG (ref 0–300)
A-A DO2: 477.1 MMHG (ref 0–300)
A-A DO2: 48.6 MMHG (ref 0–300)
A-A DO2: 482.4 MMHG (ref 0–300)
A-A DO2: 487.7 MMHG (ref 0–300)
A-A DO2: 494.3 MMHG (ref 0–300)
A-A DO2: 517.9 MMHG (ref 0–300)
A-A DO2: 52.9 MMHG (ref 0–300)
A-A DO2: 523.8 MMHG (ref 0–300)
A-A DO2: 542 MMHG (ref 0–300)
A-A DO2: 571.5 MMHG (ref 0–300)
A-A DO2: 573.2 MMHG (ref 0–300)
A-A DO2: 579 MMHG (ref 0–300)
A-A DO2: 586.2 MMHG (ref 0–300)
ABO GROUP BLD: NORMAL
ALBUMIN FLD-MCNC: <0.2 G/DL
ALBUMIN SERPL-MCNC: 2.3 G/DL (ref 3.5–5.2)
ALBUMIN SERPL-MCNC: 2.4 G/DL (ref 3.5–5.2)
ALBUMIN SERPL-MCNC: 2.5 G/DL (ref 3.5–5.2)
ALBUMIN SERPL-MCNC: 2.5 G/DL (ref 3.5–5.2)
ALBUMIN SERPL-MCNC: 2.6 G/DL (ref 3.5–5.2)
ALBUMIN SERPL-MCNC: 2.8 G/DL (ref 3.5–5.2)
ALBUMIN SERPL-MCNC: 3 G/DL (ref 3.5–5.2)
ALBUMIN SERPL-MCNC: 3.3 G/DL (ref 3.5–5.2)
ALBUMIN SERPL-MCNC: 3.3 G/DL (ref 3.5–5.2)
ALBUMIN SERPL-MCNC: 3.5 G/DL (ref 3.5–5.2)
ALBUMIN SERPL-MCNC: 3.6 G/DL (ref 3.5–5.2)
ALBUMIN/GLOB SERPL: 0.5 G/DL
ALBUMIN/GLOB SERPL: 0.6 G/DL
ALBUMIN/GLOB SERPL: 0.9 G/DL
ALBUMIN/GLOB SERPL: 1 G/DL
ALBUMIN/GLOB SERPL: 1.1 G/DL
ALBUMIN/GLOB SERPL: 1.1 G/DL
ALP SERPL-CCNC: 102 U/L (ref 39–117)
ALP SERPL-CCNC: 125 U/L (ref 39–117)
ALP SERPL-CCNC: 150 U/L (ref 39–117)
ALP SERPL-CCNC: 153 U/L (ref 39–117)
ALP SERPL-CCNC: 161 U/L (ref 39–117)
ALP SERPL-CCNC: 168 U/L (ref 39–117)
ALP SERPL-CCNC: 188 U/L (ref 39–117)
ALP SERPL-CCNC: 201 U/L (ref 39–117)
ALP SERPL-CCNC: 202 U/L (ref 39–117)
ALP SERPL-CCNC: 77 U/L (ref 39–117)
ALP SERPL-CCNC: 81 U/L (ref 39–117)
ALT SERPL W P-5'-P-CCNC: 36 U/L (ref 1–33)
ALT SERPL W P-5'-P-CCNC: 39 U/L (ref 1–33)
ALT SERPL W P-5'-P-CCNC: 41 U/L (ref 1–33)
ALT SERPL W P-5'-P-CCNC: 43 U/L (ref 1–33)
ALT SERPL W P-5'-P-CCNC: 45 U/L (ref 1–33)
ALT SERPL W P-5'-P-CCNC: 46 U/L (ref 1–33)
ALT SERPL W P-5'-P-CCNC: 49 U/L (ref 1–33)
ALT SERPL W P-5'-P-CCNC: 49 U/L (ref 1–33)
ALT SERPL W P-5'-P-CCNC: 50 U/L (ref 1–33)
ALT SERPL W P-5'-P-CCNC: 51 U/L (ref 1–33)
ALT SERPL W P-5'-P-CCNC: 56 U/L (ref 1–33)
AMMONIA BLD-SCNC: 40 UMOL/L (ref 11–51)
AMMONIA BLD-SCNC: 62 UMOL/L (ref 11–51)
AMMONIA BLD-SCNC: 69 UMOL/L (ref 11–51)
AMPHET+METHAMPHET UR QL: NEGATIVE
AMPHETAMINES UR QL: NEGATIVE
ANION GAP SERPL CALCULATED.3IONS-SCNC: 10.3 MMOL/L (ref 5–15)
ANION GAP SERPL CALCULATED.3IONS-SCNC: 10.9 MMOL/L (ref 5–15)
ANION GAP SERPL CALCULATED.3IONS-SCNC: 11 MMOL/L (ref 5–15)
ANION GAP SERPL CALCULATED.3IONS-SCNC: 11.1 MMOL/L (ref 5–15)
ANION GAP SERPL CALCULATED.3IONS-SCNC: 11.4 MMOL/L (ref 5–15)
ANION GAP SERPL CALCULATED.3IONS-SCNC: 12.7 MMOL/L (ref 5–15)
ANION GAP SERPL CALCULATED.3IONS-SCNC: 13.2 MMOL/L (ref 5–15)
ANION GAP SERPL CALCULATED.3IONS-SCNC: 13.9 MMOL/L (ref 5–15)
ANION GAP SERPL CALCULATED.3IONS-SCNC: 4.3 MMOL/L (ref 5–15)
ANION GAP SERPL CALCULATED.3IONS-SCNC: 4.4 MMOL/L (ref 5–15)
ANION GAP SERPL CALCULATED.3IONS-SCNC: 6.3 MMOL/L (ref 5–15)
ANION GAP SERPL CALCULATED.3IONS-SCNC: 7.4 MMOL/L (ref 5–15)
ANION GAP SERPL CALCULATED.3IONS-SCNC: 8.4 MMOL/L (ref 5–15)
ANISOCYTOSIS BLD QL: ABNORMAL
ANISOCYTOSIS BLD QL: NORMAL
APPEARANCE FLD: ABNORMAL
APTT PPP: 30.6 SECONDS (ref 26.5–34.5)
APTT PPP: 33.7 SECONDS (ref 26.5–34.5)
APTT PPP: 37.6 SECONDS (ref 26.5–34.5)
ARTERIAL PATENCY WRIST A: ABNORMAL
ARTERIAL PATENCY WRIST A: POSITIVE
AST SERPL-CCNC: 102 U/L (ref 1–32)
AST SERPL-CCNC: 102 U/L (ref 1–32)
AST SERPL-CCNC: 104 U/L (ref 1–32)
AST SERPL-CCNC: 105 U/L (ref 1–32)
AST SERPL-CCNC: 67 U/L (ref 1–32)
AST SERPL-CCNC: 81 U/L (ref 1–32)
AST SERPL-CCNC: 84 U/L (ref 1–32)
AST SERPL-CCNC: 85 U/L (ref 1–32)
AST SERPL-CCNC: 88 U/L (ref 1–32)
AST SERPL-CCNC: 90 U/L (ref 1–32)
AST SERPL-CCNC: 91 U/L (ref 1–32)
ATMOSPHERIC PRESS: 725 MMHG
ATMOSPHERIC PRESS: 727 MMHG
ATMOSPHERIC PRESS: 728 MMHG
ATMOSPHERIC PRESS: 732 MMHG
ATMOSPHERIC PRESS: 733 MMHG
ATMOSPHERIC PRESS: 733 MMHG
ATMOSPHERIC PRESS: 734 MMHG
ATMOSPHERIC PRESS: 735 MMHG
B PARAPERT DNA SPEC QL NAA+PROBE: NOT DETECTED
B PERT DNA SPEC QL NAA+PROBE: NOT DETECTED
BACTERIA FLD CULT: NORMAL
BACTERIA SPEC AEROBE CULT: NORMAL
BACTERIA SPEC AEROBE CULT: NORMAL
BACTERIA SPEC RESP CULT: NORMAL
BACTERIA UR QL AUTO: ABNORMAL /HPF
BACTERIA UR QL AUTO: ABNORMAL /HPF
BACTERIA UR QL AUTO: NORMAL /HPF
BARBITURATES UR QL SCN: NEGATIVE
BASE EXCESS BLDA CALC-SCNC: -0.5 MMOL/L (ref 0–2)
BASE EXCESS BLDA CALC-SCNC: -1.3 MMOL/L (ref 0–2)
BASE EXCESS BLDA CALC-SCNC: -1.4 MMOL/L (ref 0–2)
BASE EXCESS BLDA CALC-SCNC: 11.5 MMOL/L (ref 0–2)
BASE EXCESS BLDA CALC-SCNC: 12 MMOL/L (ref 0–2)
BASE EXCESS BLDA CALC-SCNC: 3.2 MMOL/L (ref 0–2)
BASE EXCESS BLDA CALC-SCNC: 3.6 MMOL/L (ref 0–2)
BASE EXCESS BLDA CALC-SCNC: 4.1 MMOL/L (ref 0–2)
BASE EXCESS BLDA CALC-SCNC: 4.7 MMOL/L (ref 0–2)
BASE EXCESS BLDA CALC-SCNC: 8.1 MMOL/L (ref 0–2)
BASE EXCESS BLDA CALC-SCNC: 8.8 MMOL/L (ref 0–2)
BASE EXCESS BLDA CALC-SCNC: 9 MMOL/L (ref 0–2)
BASE EXCESS BLDA CALC-SCNC: 9.5 MMOL/L (ref 0–2)
BASE EXCESS BLDA CALC-SCNC: 9.9 MMOL/L (ref 0–2)
BASE EXCESS BLDV CALC-SCNC: 10.3 MMOL/L (ref 0–2)
BASOPHILS # BLD AUTO: 0 10*3/MM3 (ref 0–0.2)
BASOPHILS # BLD AUTO: 0.01 10*3/MM3 (ref 0–0.2)
BASOPHILS # BLD AUTO: 0.01 10*3/MM3 (ref 0–0.2)
BASOPHILS # BLD AUTO: 0.02 10*3/MM3 (ref 0–0.2)
BASOPHILS # BLD AUTO: 0.02 10*3/MM3 (ref 0–0.2)
BASOPHILS # BLD AUTO: 0.05 10*3/MM3 (ref 0–0.2)
BASOPHILS # BLD AUTO: 0.05 10*3/MM3 (ref 0–0.2)
BASOPHILS # BLD AUTO: 0.07 10*3/MM3 (ref 0–0.2)
BASOPHILS # BLD AUTO: 0.07 10*3/MM3 (ref 0–0.2)
BASOPHILS NFR BLD AUTO: 0 % (ref 0–1.5)
BASOPHILS NFR BLD AUTO: 0.1 % (ref 0–1.5)
BASOPHILS NFR BLD AUTO: 0.2 % (ref 0–1.5)
BASOPHILS NFR BLD AUTO: 0.8 % (ref 0–1.5)
BASOPHILS NFR BLD AUTO: 0.9 % (ref 0–1.5)
BASOPHILS NFR BLD AUTO: 0.9 % (ref 0–1.5)
BASOPHILS NFR BLD AUTO: 1 % (ref 0–1.5)
BDY SITE: ABNORMAL
BENZODIAZ UR QL SCN: NEGATIVE
BH BB BLOOD EXPIRATION DATE: NORMAL
BH BB BLOOD TYPE BARCODE: 5100
BH BB DISPENSE STATUS: NORMAL
BH BB PRODUCT CODE: NORMAL
BH BB UNIT NUMBER: NORMAL
BH CV ECHO MEAS - AO MAX PG: 8.5 MMHG
BH CV ECHO MEAS - AO MEAN PG: 5 MMHG
BH CV ECHO MEAS - AO ROOT DIAM: 2.7 CM
BH CV ECHO MEAS - AO V2 MAX: 146 CM/SEC
BH CV ECHO MEAS - AO V2 VTI: 32.5 CM
BH CV ECHO MEAS - EDV(CUBED): 77.3 ML
BH CV ECHO MEAS - EDV(MOD-SP4): 43.2 ML
BH CV ECHO MEAS - EF(MOD-SP4): 57.4 %
BH CV ECHO MEAS - ESV(CUBED): 20.1 ML
BH CV ECHO MEAS - ESV(MOD-SP4): 18.4 ML
BH CV ECHO MEAS - FS: 36.2 %
BH CV ECHO MEAS - IVS/LVPW: 0.84 CM
BH CV ECHO MEAS - IVSD: 1.23 CM
BH CV ECHO MEAS - LA DIMENSION: 4.2 CM
BH CV ECHO MEAS - LAT PEAK E' VEL: 10.2 CM/SEC
BH CV ECHO MEAS - LV DIASTOLIC VOL/BSA (35-75): 21.5 CM2
BH CV ECHO MEAS - LV MASS(C)D: 216.8 GRAMS
BH CV ECHO MEAS - LV SYSTOLIC VOL/BSA (12-30): 9.2 CM2
BH CV ECHO MEAS - LVIDD: 4.3 CM
BH CV ECHO MEAS - LVIDS: 2.7 CM
BH CV ECHO MEAS - LVOT AREA: 2.5 CM2
BH CV ECHO MEAS - LVOT DIAM: 1.8 CM
BH CV ECHO MEAS - LVPWD: 1.47 CM
BH CV ECHO MEAS - MED PEAK E' VEL: 7.1 CM/SEC
BH CV ECHO MEAS - MV A MAX VEL: 117 CM/SEC
BH CV ECHO MEAS - MV E MAX VEL: 171 CM/SEC
BH CV ECHO MEAS - MV E/A: 1.46
BH CV ECHO MEAS - PA ACC TIME: 0.14 SEC
BH CV ECHO MEAS - PA PR(ACCEL): 17.3 MMHG
BH CV ECHO MEAS - RAP SYSTOLE: 10 MMHG
BH CV ECHO MEAS - RVSP: 43.6 MMHG
BH CV ECHO MEAS - SI(MOD-SP4): 12.3 ML/M2
BH CV ECHO MEAS - SV(MOD-SP4): 24.8 ML
BH CV ECHO MEAS - TAPSE (>1.6): 2.05 CM
BH CV ECHO MEAS - TR MAX PG: 33.6 MMHG
BH CV ECHO MEAS - TR MAX VEL: 290 CM/SEC
BH CV ECHO MEASUREMENTS AVERAGE E/E' RATIO: 19.77
BILIRUB SERPL-MCNC: 1.4 MG/DL (ref 0–1.2)
BILIRUB SERPL-MCNC: 1.5 MG/DL (ref 0–1.2)
BILIRUB SERPL-MCNC: 1.7 MG/DL (ref 0–1.2)
BILIRUB SERPL-MCNC: 1.9 MG/DL (ref 0–1.2)
BILIRUB SERPL-MCNC: 1.9 MG/DL (ref 0–1.2)
BILIRUB SERPL-MCNC: 2 MG/DL (ref 0–1.2)
BILIRUB SERPL-MCNC: 2.2 MG/DL (ref 0–1.2)
BILIRUB SERPL-MCNC: 2.4 MG/DL (ref 0–1.2)
BILIRUB UR QL STRIP: NEGATIVE
BLD GP AB SCN SERPL QL: NEGATIVE
BUN SERPL-MCNC: 11 MG/DL (ref 6–20)
BUN SERPL-MCNC: 11 MG/DL (ref 6–20)
BUN SERPL-MCNC: 12 MG/DL (ref 6–20)
BUN SERPL-MCNC: 13 MG/DL (ref 6–20)
BUN SERPL-MCNC: 14 MG/DL (ref 6–20)
BUN SERPL-MCNC: 15 MG/DL (ref 6–20)
BUN SERPL-MCNC: 15 MG/DL (ref 6–20)
BUN SERPL-MCNC: 16 MG/DL (ref 6–20)
BUN SERPL-MCNC: 26 MG/DL (ref 6–20)
BUN SERPL-MCNC: 34 MG/DL (ref 6–20)
BUN SERPL-MCNC: 52 MG/DL (ref 6–20)
BUN SERPL-MCNC: 61 MG/DL (ref 6–20)
BUN SERPL-MCNC: 70 MG/DL (ref 6–20)
BUN/CREAT SERPL: 15.5 (ref 7–25)
BUN/CREAT SERPL: 16.3 (ref 7–25)
BUN/CREAT SERPL: 16.4 (ref 7–25)
BUN/CREAT SERPL: 18.5 (ref 7–25)
BUN/CREAT SERPL: 18.8 (ref 7–25)
BUN/CREAT SERPL: 19.2 (ref 7–25)
BUN/CREAT SERPL: 22.7 (ref 7–25)
BUN/CREAT SERPL: 26.7 (ref 7–25)
BUN/CREAT SERPL: 26.8 (ref 7–25)
BUN/CREAT SERPL: 28.6 (ref 7–25)
BUN/CREAT SERPL: 29.8 (ref 7–25)
BUN/CREAT SERPL: 31.2 (ref 7–25)
BUN/CREAT SERPL: 31.9 (ref 7–25)
BUPRENORPHINE SERPL-MCNC: NEGATIVE NG/ML
C PNEUM DNA NPH QL NAA+NON-PROBE: NOT DETECTED
CALCIUM SPEC-SCNC: 8.4 MG/DL (ref 8.6–10.5)
CALCIUM SPEC-SCNC: 8.6 MG/DL (ref 8.6–10.5)
CALCIUM SPEC-SCNC: 8.8 MG/DL (ref 8.6–10.5)
CALCIUM SPEC-SCNC: 8.9 MG/DL (ref 8.6–10.5)
CALCIUM SPEC-SCNC: 9.1 MG/DL (ref 8.6–10.5)
CALCIUM SPEC-SCNC: 9.2 MG/DL (ref 8.6–10.5)
CALCIUM SPEC-SCNC: 9.2 MG/DL (ref 8.6–10.5)
CALCIUM SPEC-SCNC: 9.3 MG/DL (ref 8.6–10.5)
CALCIUM SPEC-SCNC: 9.3 MG/DL (ref 8.6–10.5)
CALCIUM SPEC-SCNC: 9.4 MG/DL (ref 8.6–10.5)
CALCIUM SPEC-SCNC: 9.6 MG/DL (ref 8.6–10.5)
CALCIUM SPEC-SCNC: 9.6 MG/DL (ref 8.6–10.5)
CALCIUM SPEC-SCNC: 9.7 MG/DL (ref 8.6–10.5)
CANNABINOIDS SERPL QL: NEGATIVE
CHLORIDE SERPL-SCNC: 100 MMOL/L (ref 98–107)
CHLORIDE SERPL-SCNC: 102 MMOL/L (ref 98–107)
CHLORIDE SERPL-SCNC: 94 MMOL/L (ref 98–107)
CHLORIDE SERPL-SCNC: 95 MMOL/L (ref 98–107)
CHLORIDE SERPL-SCNC: 96 MMOL/L (ref 98–107)
CHLORIDE SERPL-SCNC: 97 MMOL/L (ref 98–107)
CHLORIDE SERPL-SCNC: 99 MMOL/L (ref 98–107)
CK SERPL-CCNC: 84 U/L (ref 20–180)
CLARITY UR: CLEAR
CO2 BLDA-SCNC: 28.5 MMOL/L (ref 22–33)
CO2 BLDA-SCNC: 28.8 MMOL/L (ref 22–33)
CO2 BLDA-SCNC: 29 MMOL/L (ref 22–33)
CO2 BLDA-SCNC: 30 MMOL/L (ref 22–33)
CO2 BLDA-SCNC: 32.3 MMOL/L (ref 22–33)
CO2 BLDA-SCNC: 32.6 MMOL/L (ref 22–33)
CO2 BLDA-SCNC: 32.7 MMOL/L (ref 22–33)
CO2 BLDA-SCNC: 33.5 MMOL/L (ref 22–33)
CO2 BLDA-SCNC: 35 MMOL/L (ref 22–33)
CO2 BLDA-SCNC: 35.2 MMOL/L (ref 22–33)
CO2 BLDA-SCNC: 35.6 MMOL/L (ref 22–33)
CO2 BLDA-SCNC: 36.6 MMOL/L (ref 22–33)
CO2 BLDA-SCNC: 37.1 MMOL/L (ref 22–33)
CO2 BLDA-SCNC: 38.5 MMOL/L (ref 22–33)
CO2 BLDA-SCNC: 38.6 MMOL/L (ref 22–33)
CO2 SERPL-SCNC: 23.1 MMOL/L (ref 22–29)
CO2 SERPL-SCNC: 26 MMOL/L (ref 22–29)
CO2 SERPL-SCNC: 26.3 MMOL/L (ref 22–29)
CO2 SERPL-SCNC: 27.1 MMOL/L (ref 22–29)
CO2 SERPL-SCNC: 27.6 MMOL/L (ref 22–29)
CO2 SERPL-SCNC: 30.6 MMOL/L (ref 22–29)
CO2 SERPL-SCNC: 30.7 MMOL/L (ref 22–29)
CO2 SERPL-SCNC: 31.6 MMOL/L (ref 22–29)
CO2 SERPL-SCNC: 31.7 MMOL/L (ref 22–29)
CO2 SERPL-SCNC: 31.8 MMOL/L (ref 22–29)
CO2 SERPL-SCNC: 31.9 MMOL/L (ref 22–29)
CO2 SERPL-SCNC: 33.6 MMOL/L (ref 22–29)
CO2 SERPL-SCNC: 34.7 MMOL/L (ref 22–29)
COCAINE UR QL: NEGATIVE
COHGB MFR BLD: 1.4 % (ref 0–5)
COHGB MFR BLD: 1.4 % (ref 0–5)
COHGB MFR BLD: 1.5 % (ref 0–5)
COHGB MFR BLD: 1.5 % (ref 0–5)
COHGB MFR BLD: 1.6 % (ref 0–5)
COHGB MFR BLD: 1.7 % (ref 0–5)
COHGB MFR BLD: 1.8 % (ref 0–5)
COHGB MFR BLD: 1.9 % (ref 0–5)
COHGB MFR BLD: 2.1 % (ref 0–5)
COLOR FLD: YELLOW
COLOR UR: ABNORMAL
COLOR UR: YELLOW
COLOR UR: YELLOW
CREAT SERPL-MCNC: 0.6 MG/DL (ref 0.57–1)
CREAT SERPL-MCNC: 0.65 MG/DL (ref 0.57–1)
CREAT SERPL-MCNC: 0.66 MG/DL (ref 0.57–1)
CREAT SERPL-MCNC: 0.67 MG/DL (ref 0.57–1)
CREAT SERPL-MCNC: 0.71 MG/DL (ref 0.57–1)
CREAT SERPL-MCNC: 0.73 MG/DL (ref 0.57–1)
CREAT SERPL-MCNC: 0.8 MG/DL (ref 0.57–1)
CREAT SERPL-MCNC: 0.8 MG/DL (ref 0.57–1)
CREAT SERPL-MCNC: 0.97 MG/DL (ref 0.57–1)
CREAT SERPL-MCNC: 1.09 MG/DL (ref 0.57–1)
CREAT SERPL-MCNC: 1.63 MG/DL (ref 0.57–1)
CREAT SERPL-MCNC: 2.05 MG/DL (ref 0.57–1)
CREAT SERPL-MCNC: 2.45 MG/DL (ref 0.57–1)
CRP SERPL-MCNC: 14.69 MG/DL (ref 0–0.5)
CRP SERPL-MCNC: 2.84 MG/DL (ref 0–0.5)
CRP SERPL-MCNC: 3.75 MG/DL (ref 0–0.5)
CRP SERPL-MCNC: 4.41 MG/DL (ref 0–0.5)
CRP SERPL-MCNC: 9.26 MG/DL (ref 0–0.5)
D-LACTATE SERPL-SCNC: 1.8 MMOL/L (ref 0.5–2)
D-LACTATE SERPL-SCNC: 1.9 MMOL/L (ref 0.5–2)
D-LACTATE SERPL-SCNC: 2 MMOL/L (ref 0.5–2)
D-LACTATE SERPL-SCNC: 2.1 MMOL/L (ref 0.5–2)
D-LACTATE SERPL-SCNC: 2.1 MMOL/L (ref 0.5–2)
D-LACTATE SERPL-SCNC: 2.3 MMOL/L (ref 0.5–2)
DACRYOCYTES BLD QL SMEAR: ABNORMAL
DACRYOCYTES BLD QL SMEAR: NORMAL
DACRYOCYTES BLD QL SMEAR: NORMAL
DEPRECATED RDW RBC AUTO: 100.3 FL (ref 37–54)
DEPRECATED RDW RBC AUTO: 103.3 FL (ref 37–54)
DEPRECATED RDW RBC AUTO: 107.1 FL (ref 37–54)
DEPRECATED RDW RBC AUTO: 93.7 FL (ref 37–54)
DEPRECATED RDW RBC AUTO: 93.7 FL (ref 37–54)
DEPRECATED RDW RBC AUTO: 95.2 FL (ref 37–54)
DEPRECATED RDW RBC AUTO: 95.9 FL (ref 37–54)
DEPRECATED RDW RBC AUTO: 96.8 FL (ref 37–54)
DEPRECATED RDW RBC AUTO: 97 FL (ref 37–54)
DEPRECATED RDW RBC AUTO: 97 FL (ref 37–54)
DEPRECATED RDW RBC AUTO: 99.1 FL (ref 37–54)
EGFRCR SERPLBLD CKD-EPI 2021: 102.1 ML/MIN/1.73
EGFRCR SERPLBLD CKD-EPI 2021: 102.5 ML/MIN/1.73
EGFRCR SERPLBLD CKD-EPI 2021: 102.8 ML/MIN/1.73
EGFRCR SERPLBLD CKD-EPI 2021: 104.8 ML/MIN/1.73
EGFRCR SERPLBLD CKD-EPI 2021: 22.5 ML/MIN/1.73
EGFRCR SERPLBLD CKD-EPI 2021: 27.8 ML/MIN/1.73
EGFRCR SERPLBLD CKD-EPI 2021: 36.6 ML/MIN/1.73
EGFRCR SERPLBLD CKD-EPI 2021: 59.4 ML/MIN/1.73
EGFRCR SERPLBLD CKD-EPI 2021: 68.3 ML/MIN/1.73
EGFRCR SERPLBLD CKD-EPI 2021: 86.1 ML/MIN/1.73
EGFRCR SERPLBLD CKD-EPI 2021: 86.1 ML/MIN/1.73
EGFRCR SERPLBLD CKD-EPI 2021: 96.1 ML/MIN/1.73
EGFRCR SERPLBLD CKD-EPI 2021: 99.3 ML/MIN/1.73
EOSINOPHIL # BLD AUTO: 0 10*3/MM3 (ref 0–0.4)
EOSINOPHIL # BLD AUTO: 0.03 10*3/MM3 (ref 0–0.4)
EOSINOPHIL # BLD AUTO: 0.11 10*3/MM3 (ref 0–0.4)
EOSINOPHIL # BLD AUTO: 0.15 10*3/MM3 (ref 0–0.4)
EOSINOPHIL # BLD AUTO: 0.23 10*3/MM3 (ref 0–0.4)
EOSINOPHIL # BLD AUTO: 0.24 10*3/MM3 (ref 0–0.4)
EOSINOPHIL NFR BLD AUTO: 0 % (ref 0.3–6.2)
EOSINOPHIL NFR BLD AUTO: 0.2 % (ref 0.3–6.2)
EOSINOPHIL NFR BLD AUTO: 1.7 % (ref 0.3–6.2)
EOSINOPHIL NFR BLD AUTO: 2.8 % (ref 0.3–6.2)
EOSINOPHIL NFR BLD AUTO: 2.9 % (ref 0.3–6.2)
EOSINOPHIL NFR BLD AUTO: 3.5 % (ref 0.3–6.2)
EPAP: 10
ERYTHROCYTE [DISTWIDTH] IN BLOOD BY AUTOMATED COUNT: 26.5 % (ref 12.3–15.4)
ERYTHROCYTE [DISTWIDTH] IN BLOOD BY AUTOMATED COUNT: 26.5 % (ref 12.3–15.4)
ERYTHROCYTE [DISTWIDTH] IN BLOOD BY AUTOMATED COUNT: 27.1 % (ref 12.3–15.4)
ERYTHROCYTE [DISTWIDTH] IN BLOOD BY AUTOMATED COUNT: 27.3 % (ref 12.3–15.4)
ERYTHROCYTE [DISTWIDTH] IN BLOOD BY AUTOMATED COUNT: 27.5 % (ref 12.3–15.4)
ERYTHROCYTE [DISTWIDTH] IN BLOOD BY AUTOMATED COUNT: 27.6 % (ref 12.3–15.4)
ERYTHROCYTE [DISTWIDTH] IN BLOOD BY AUTOMATED COUNT: 27.7 % (ref 12.3–15.4)
ERYTHROCYTE [DISTWIDTH] IN BLOOD BY AUTOMATED COUNT: 27.9 % (ref 12.3–15.4)
ERYTHROCYTE [DISTWIDTH] IN BLOOD BY AUTOMATED COUNT: 28.1 % (ref 12.3–15.4)
ERYTHROCYTE [DISTWIDTH] IN BLOOD BY AUTOMATED COUNT: 29.7 % (ref 12.3–15.4)
ERYTHROCYTE [DISTWIDTH] IN BLOOD BY AUTOMATED COUNT: 29.8 % (ref 12.3–15.4)
ERYTHROCYTE [SEDIMENTATION RATE] IN BLOOD: 59 MM/HR (ref 0–30)
ERYTHROCYTE [SEDIMENTATION RATE] IN BLOOD: 85 MM/HR (ref 0–30)
FLUAV RNA RESP QL NAA+PROBE: NOT DETECTED
FLUAV RNA RESP QL NAA+PROBE: NOT DETECTED
FLUAV SUBTYP SPEC NAA+PROBE: NOT DETECTED
FLUBV RNA ISLT QL NAA+PROBE: NOT DETECTED
FLUBV RNA ISLT QL NAA+PROBE: NOT DETECTED
FLUBV RNA RESP QL NAA+PROBE: NOT DETECTED
GAS FLOW AIRWAY: 2 LPM
GAS FLOW AIRWAY: 2 LPM
GAS FLOW AIRWAY: 3 LPM
GEN 5 2HR TROPONIN T REFLEX: 38 NG/L
GEN 5 2HR TROPONIN T REFLEX: 38 NG/L
GEN 5 2HR TROPONIN T REFLEX: 48 NG/L
GEN 5 2HR TROPONIN T REFLEX: 52 NG/L
GLOBULIN UR ELPH-MCNC: 3.1 GM/DL
GLOBULIN UR ELPH-MCNC: 3.3 GM/DL
GLOBULIN UR ELPH-MCNC: 3.6 GM/DL
GLOBULIN UR ELPH-MCNC: 3.8 GM/DL
GLOBULIN UR ELPH-MCNC: 3.9 GM/DL
GLOBULIN UR ELPH-MCNC: 4.8 GM/DL
GLOBULIN UR ELPH-MCNC: 5.1 GM/DL
GLOBULIN UR ELPH-MCNC: 5.4 GM/DL
GLUCOSE BLDC GLUCOMTR-MCNC: 116 MG/DL (ref 70–130)
GLUCOSE BLDC GLUCOMTR-MCNC: 132 MG/DL (ref 70–130)
GLUCOSE BLDC GLUCOMTR-MCNC: 136 MG/DL (ref 70–130)
GLUCOSE BLDC GLUCOMTR-MCNC: 136 MG/DL (ref 70–130)
GLUCOSE BLDC GLUCOMTR-MCNC: 139 MG/DL (ref 70–130)
GLUCOSE BLDC GLUCOMTR-MCNC: 163 MG/DL (ref 70–130)
GLUCOSE BLDC GLUCOMTR-MCNC: 165 MG/DL (ref 70–130)
GLUCOSE BLDC GLUCOMTR-MCNC: 167 MG/DL (ref 70–130)
GLUCOSE BLDC GLUCOMTR-MCNC: 181 MG/DL (ref 70–130)
GLUCOSE BLDC GLUCOMTR-MCNC: 185 MG/DL (ref 70–130)
GLUCOSE BLDC GLUCOMTR-MCNC: 193 MG/DL (ref 70–130)
GLUCOSE BLDC GLUCOMTR-MCNC: 198 MG/DL (ref 70–130)
GLUCOSE BLDC GLUCOMTR-MCNC: 223 MG/DL (ref 70–130)
GLUCOSE BLDC GLUCOMTR-MCNC: 58 MG/DL (ref 70–130)
GLUCOSE BLDC GLUCOMTR-MCNC: 62 MG/DL (ref 70–130)
GLUCOSE BLDC GLUCOMTR-MCNC: 64 MG/DL (ref 70–130)
GLUCOSE BLDC GLUCOMTR-MCNC: 76 MG/DL (ref 70–130)
GLUCOSE BLDC GLUCOMTR-MCNC: 92 MG/DL (ref 70–130)
GLUCOSE BLDC GLUCOMTR-MCNC: 97 MG/DL (ref 70–130)
GLUCOSE BLDC GLUCOMTR-MCNC: 99 MG/DL (ref 70–130)
GLUCOSE SERPL-MCNC: 104 MG/DL (ref 65–99)
GLUCOSE SERPL-MCNC: 127 MG/DL (ref 65–99)
GLUCOSE SERPL-MCNC: 138 MG/DL (ref 65–99)
GLUCOSE SERPL-MCNC: 145 MG/DL (ref 65–99)
GLUCOSE SERPL-MCNC: 148 MG/DL (ref 65–99)
GLUCOSE SERPL-MCNC: 149 MG/DL (ref 65–99)
GLUCOSE SERPL-MCNC: 150 MG/DL (ref 65–99)
GLUCOSE SERPL-MCNC: 162 MG/DL (ref 65–99)
GLUCOSE SERPL-MCNC: 166 MG/DL (ref 65–99)
GLUCOSE SERPL-MCNC: 168 MG/DL (ref 65–99)
GLUCOSE SERPL-MCNC: 181 MG/DL (ref 65–99)
GLUCOSE SERPL-MCNC: 181 MG/DL (ref 65–99)
GLUCOSE SERPL-MCNC: 89 MG/DL (ref 65–99)
GLUCOSE UR STRIP-MCNC: ABNORMAL MG/DL
GLUCOSE UR STRIP-MCNC: NEGATIVE MG/DL
GLUCOSE UR STRIP-MCNC: NEGATIVE MG/DL
GRAM STN SPEC: NORMAL
HADV DNA SPEC NAA+PROBE: NOT DETECTED
HAV IGM SERPL QL IA: ABNORMAL
HAV IGM SERPL QL IA: NORMAL
HBA1C MFR BLD: <4.2 % (ref 4.8–5.6)
HBV CORE IGM SERPL QL IA: NORMAL
HBV CORE IGM SERPL QL IA: REACTIVE
HBV SURFACE AG SERPL QL IA: ABNORMAL
HBV SURFACE AG SERPL QL IA: NORMAL
HCO3 BLDA-SCNC: 26.6 MMOL/L (ref 20–26)
HCO3 BLDA-SCNC: 26.8 MMOL/L (ref 20–26)
HCO3 BLDA-SCNC: 27.1 MMOL/L (ref 20–26)
HCO3 BLDA-SCNC: 28.7 MMOL/L (ref 20–26)
HCO3 BLDA-SCNC: 30.7 MMOL/L (ref 20–26)
HCO3 BLDA-SCNC: 30.8 MMOL/L (ref 20–26)
HCO3 BLDA-SCNC: 30.8 MMOL/L (ref 20–26)
HCO3 BLDA-SCNC: 32.2 MMOL/L (ref 20–26)
HCO3 BLDA-SCNC: 33.7 MMOL/L (ref 20–26)
HCO3 BLDA-SCNC: 33.9 MMOL/L (ref 20–26)
HCO3 BLDA-SCNC: 34.1 MMOL/L (ref 20–26)
HCO3 BLDA-SCNC: 35 MMOL/L (ref 20–26)
HCO3 BLDA-SCNC: 35.5 MMOL/L (ref 20–26)
HCO3 BLDA-SCNC: 37 MMOL/L (ref 20–26)
HCO3 BLDV-SCNC: 36.7 MMOL/L (ref 22–28)
HCOV 229E RNA SPEC QL NAA+PROBE: NOT DETECTED
HCOV HKU1 RNA SPEC QL NAA+PROBE: NOT DETECTED
HCOV NL63 RNA SPEC QL NAA+PROBE: NOT DETECTED
HCOV OC43 RNA SPEC QL NAA+PROBE: NOT DETECTED
HCT VFR BLD AUTO: 24.3 % (ref 34–46.6)
HCT VFR BLD AUTO: 28 % (ref 34–46.6)
HCT VFR BLD AUTO: 28 % (ref 34–46.6)
HCT VFR BLD AUTO: 28.7 % (ref 34–46.6)
HCT VFR BLD AUTO: 29.5 % (ref 34–46.6)
HCT VFR BLD AUTO: 29.9 % (ref 34–46.6)
HCT VFR BLD AUTO: 30.6 % (ref 34–46.6)
HCT VFR BLD AUTO: 34 % (ref 34–46.6)
HCT VFR BLD AUTO: 34.5 % (ref 34–46.6)
HCT VFR BLD AUTO: 34.9 % (ref 34–46.6)
HCT VFR BLD AUTO: 35 % (ref 34–46.6)
HCT VFR BLD CALC: 25.5 % (ref 38–51)
HCT VFR BLD CALC: 25.8 % (ref 38–51)
HCT VFR BLD CALC: 25.9 % (ref 38–51)
HCT VFR BLD CALC: 26.1 % (ref 38–51)
HCT VFR BLD CALC: 26.1 % (ref 38–51)
HCT VFR BLD CALC: 26.6 % (ref 38–51)
HCT VFR BLD CALC: 26.8 % (ref 38–51)
HCT VFR BLD CALC: 26.9 % (ref 38–51)
HCT VFR BLD CALC: 27.4 % (ref 38–51)
HCT VFR BLD CALC: 27.6 % (ref 38–51)
HCT VFR BLD CALC: 27.7 % (ref 38–51)
HCT VFR BLD CALC: 29.6 % (ref 38–51)
HCT VFR BLD CALC: 32.9 % (ref 38–51)
HCT VFR BLD CALC: 33.5 % (ref 38–51)
HCV AB SER DONR QL: ABNORMAL
HCV AB SER DONR QL: NORMAL
HGB BLD-MCNC: 10.3 G/DL (ref 12–15.9)
HGB BLD-MCNC: 10.3 G/DL (ref 12–15.9)
HGB BLD-MCNC: 10.8 G/DL (ref 12–15.9)
HGB BLD-MCNC: 10.9 G/DL (ref 12–15.9)
HGB BLD-MCNC: 7.5 G/DL (ref 12–15.9)
HGB BLD-MCNC: 8.5 G/DL (ref 12–15.9)
HGB BLD-MCNC: 8.5 G/DL (ref 12–15.9)
HGB BLD-MCNC: 8.7 G/DL (ref 12–15.9)
HGB BLD-MCNC: 9 G/DL (ref 12–15.9)
HGB BLD-MCNC: 9.5 G/DL (ref 12–15.9)
HGB BLD-MCNC: 9.5 G/DL (ref 12–15.9)
HGB BLDA-MCNC: 10.7 G/DL (ref 13.5–17.5)
HGB BLDA-MCNC: 10.9 G/DL (ref 13.5–17.5)
HGB BLDA-MCNC: 11 G/DL (ref 13.5–17.5)
HGB BLDA-MCNC: 8.3 G/DL (ref 13.5–17.5)
HGB BLDA-MCNC: 8.4 G/DL (ref 13.5–17.5)
HGB BLDA-MCNC: 8.4 G/DL (ref 13.5–17.5)
HGB BLDA-MCNC: 8.5 G/DL (ref 13.5–17.5)
HGB BLDA-MCNC: 8.5 G/DL (ref 13.5–17.5)
HGB BLDA-MCNC: 8.7 G/DL (ref 13.5–17.5)
HGB BLDA-MCNC: 8.7 G/DL (ref 13.5–17.5)
HGB BLDA-MCNC: 8.8 G/DL (ref 13.5–17.5)
HGB BLDA-MCNC: 8.9 G/DL (ref 13.5–17.5)
HGB BLDA-MCNC: 9 G/DL (ref 13.5–17.5)
HGB BLDA-MCNC: 9 G/DL (ref 13.5–17.5)
HGB BLDA-MCNC: 9.6 G/DL (ref 13.5–17.5)
HGB UR QL STRIP.AUTO: ABNORMAL
HGB UR QL STRIP.AUTO: ABNORMAL
HGB UR QL STRIP.AUTO: NEGATIVE
HMPV RNA NPH QL NAA+NON-PROBE: NOT DETECTED
HOLD SPECIMEN: NORMAL
HPIV1 RNA ISLT QL NAA+PROBE: NOT DETECTED
HPIV2 RNA SPEC QL NAA+PROBE: NOT DETECTED
HPIV3 RNA NPH QL NAA+PROBE: NOT DETECTED
HPIV4 P GENE NPH QL NAA+PROBE: NOT DETECTED
HYALINE CASTS UR QL AUTO: ABNORMAL /LPF
HYALINE CASTS UR QL AUTO: ABNORMAL /LPF
HYALINE CASTS UR QL AUTO: NORMAL /LPF
HYPOCHROMIA BLD QL: NORMAL
IMM GRANULOCYTES # BLD AUTO: 0.02 10*3/MM3 (ref 0–0.05)
IMM GRANULOCYTES # BLD AUTO: 0.03 10*3/MM3 (ref 0–0.05)
IMM GRANULOCYTES # BLD AUTO: 0.04 10*3/MM3 (ref 0–0.05)
IMM GRANULOCYTES # BLD AUTO: 0.05 10*3/MM3 (ref 0–0.05)
IMM GRANULOCYTES # BLD AUTO: 0.07 10*3/MM3 (ref 0–0.05)
IMM GRANULOCYTES # BLD AUTO: 0.08 10*3/MM3 (ref 0–0.05)
IMM GRANULOCYTES # BLD AUTO: 0.11 10*3/MM3 (ref 0–0.05)
IMM GRANULOCYTES NFR BLD AUTO: 0.4 % (ref 0–0.5)
IMM GRANULOCYTES NFR BLD AUTO: 0.4 % (ref 0–0.5)
IMM GRANULOCYTES NFR BLD AUTO: 0.5 % (ref 0–0.5)
IMM GRANULOCYTES NFR BLD AUTO: 0.5 % (ref 0–0.5)
IMM GRANULOCYTES NFR BLD AUTO: 0.6 % (ref 0–0.5)
IMM GRANULOCYTES NFR BLD AUTO: 0.6 % (ref 0–0.5)
IMM GRANULOCYTES NFR BLD AUTO: 0.7 % (ref 0–0.5)
IMM GRANULOCYTES NFR BLD AUTO: 0.9 % (ref 0–0.5)
IMM GRANULOCYTES NFR BLD AUTO: 0.9 % (ref 0–0.5)
INHALED O2 CONCENTRATION: 100 %
INHALED O2 CONCENTRATION: 28 %
INHALED O2 CONCENTRATION: 28 %
INHALED O2 CONCENTRATION: 32 %
INHALED O2 CONCENTRATION: 40 %
INHALED O2 CONCENTRATION: 90 %
INHALED O2 CONCENTRATION: 90 %
INR PPP: 1.34 (ref 0.9–1.1)
INR PPP: 1.36 (ref 0.9–1.1)
INR PPP: 1.47 (ref 0.9–1.1)
INR PPP: 1.48 (ref 0.9–1.1)
INR PPP: 1.7 (ref 0.9–1.1)
INR PPP: 1.72 (ref 0.9–1.1)
INR PPP: 1.74 (ref 0.9–1.1)
IPAP: 22
KETONES UR QL STRIP: NEGATIVE
LARGE PLATELETS: ABNORMAL
LDH FLD-CCNC: 42 U/L
LDH SERPL-CCNC: 338 U/L (ref 135–214)
LEFT ATRIUM VOLUME INDEX: 27.4 ML/M2
LEUKOCYTE ESTERASE UR QL STRIP.AUTO: ABNORMAL
LEUKOCYTE ESTERASE UR QL STRIP.AUTO: ABNORMAL
LEUKOCYTE ESTERASE UR QL STRIP.AUTO: NEGATIVE
LIPASE SERPL-CCNC: 52 U/L (ref 13–60)
LIPASE SERPL-CCNC: 60 U/L (ref 13–60)
LYMPHOCYTES # BLD AUTO: 0.31 10*3/MM3 (ref 0.7–3.1)
LYMPHOCYTES # BLD AUTO: 0.49 10*3/MM3 (ref 0.7–3.1)
LYMPHOCYTES # BLD AUTO: 0.71 10*3/MM3 (ref 0.7–3.1)
LYMPHOCYTES # BLD AUTO: 0.72 10*3/MM3 (ref 0.7–3.1)
LYMPHOCYTES # BLD AUTO: 0.73 10*3/MM3 (ref 0.7–3.1)
LYMPHOCYTES # BLD AUTO: 0.99 10*3/MM3 (ref 0.7–3.1)
LYMPHOCYTES # BLD AUTO: 1.25 10*3/MM3 (ref 0.7–3.1)
LYMPHOCYTES # BLD AUTO: 1.35 10*3/MM3 (ref 0.7–3.1)
LYMPHOCYTES # BLD AUTO: 1.38 10*3/MM3 (ref 0.7–3.1)
LYMPHOCYTES # BLD MANUAL: 0.67 10*3/MM3 (ref 0.7–3.1)
LYMPHOCYTES NFR BLD AUTO: 12.3 % (ref 19.6–45.3)
LYMPHOCYTES NFR BLD AUTO: 12.9 % (ref 19.6–45.3)
LYMPHOCYTES NFR BLD AUTO: 18.9 % (ref 19.6–45.3)
LYMPHOCYTES NFR BLD AUTO: 19.5 % (ref 19.6–45.3)
LYMPHOCYTES NFR BLD AUTO: 25.8 % (ref 19.6–45.3)
LYMPHOCYTES NFR BLD AUTO: 3.5 % (ref 19.6–45.3)
LYMPHOCYTES NFR BLD AUTO: 5.6 % (ref 19.6–45.3)
LYMPHOCYTES NFR BLD AUTO: 7.3 % (ref 19.6–45.3)
LYMPHOCYTES NFR BLD AUTO: 7.8 % (ref 19.6–45.3)
LYMPHOCYTES NFR BLD MANUAL: 1 % (ref 5–12)
LYMPHOCYTES NFR FLD MANUAL: 38 %
Lab: ABNORMAL
M PNEUMO IGG SER IA-ACNC: NOT DETECTED
MACROCYTES BLD QL SMEAR: ABNORMAL
MACROCYTES BLD QL SMEAR: NORMAL
MAGNESIUM SERPL-MCNC: 1.5 MG/DL (ref 1.6–2.6)
MAGNESIUM SERPL-MCNC: 1.9 MG/DL (ref 1.6–2.6)
MAGNESIUM SERPL-MCNC: 1.9 MG/DL (ref 1.6–2.6)
MAGNESIUM SERPL-MCNC: 2.2 MG/DL (ref 1.6–2.6)
MAGNESIUM SERPL-MCNC: 2.2 MG/DL (ref 1.6–2.6)
MAGNESIUM SERPL-MCNC: 2.8 MG/DL (ref 1.6–2.6)
MAGNESIUM SERPL-MCNC: 2.8 MG/DL (ref 1.6–2.6)
MAXIMAL PREDICTED HEART RATE: 163 BPM
MCH RBC QN AUTO: 27.1 PG (ref 26.6–33)
MCH RBC QN AUTO: 27.2 PG (ref 26.6–33)
MCH RBC QN AUTO: 29.3 PG (ref 26.6–33)
MCH RBC QN AUTO: 29.4 PG (ref 26.6–33)
MCH RBC QN AUTO: 29.9 PG (ref 26.6–33)
MCH RBC QN AUTO: 30 PG (ref 26.6–33)
MCH RBC QN AUTO: 30 PG (ref 26.6–33)
MCH RBC QN AUTO: 30.1 PG (ref 26.6–33)
MCH RBC QN AUTO: 30.4 PG (ref 26.6–33)
MCH RBC QN AUTO: 30.9 PG (ref 26.6–33)
MCH RBC QN AUTO: 31.1 PG (ref 26.6–33)
MCHC RBC AUTO-ENTMCNC: 29.6 G/DL (ref 31.5–35.7)
MCHC RBC AUTO-ENTMCNC: 29.9 G/DL (ref 31.5–35.7)
MCHC RBC AUTO-ENTMCNC: 30.1 G/DL (ref 31.5–35.7)
MCHC RBC AUTO-ENTMCNC: 30.3 G/DL (ref 31.5–35.7)
MCHC RBC AUTO-ENTMCNC: 30.4 G/DL (ref 31.5–35.7)
MCHC RBC AUTO-ENTMCNC: 30.9 G/DL (ref 31.5–35.7)
MCHC RBC AUTO-ENTMCNC: 30.9 G/DL (ref 31.5–35.7)
MCHC RBC AUTO-ENTMCNC: 31 G/DL (ref 31.5–35.7)
MCHC RBC AUTO-ENTMCNC: 31.1 G/DL (ref 31.5–35.7)
MCHC RBC AUTO-ENTMCNC: 31.1 G/DL (ref 31.5–35.7)
MCHC RBC AUTO-ENTMCNC: 32.2 G/DL (ref 31.5–35.7)
MCV RBC AUTO: 101.8 FL (ref 79–97)
MCV RBC AUTO: 105.1 FL (ref 79–97)
MCV RBC AUTO: 90.3 FL (ref 79–97)
MCV RBC AUTO: 90.8 FL (ref 79–97)
MCV RBC AUTO: 92.8 FL (ref 79–97)
MCV RBC AUTO: 94.3 FL (ref 79–97)
MCV RBC AUTO: 96.6 FL (ref 79–97)
MCV RBC AUTO: 96.6 FL (ref 79–97)
MCV RBC AUTO: 96.8 FL (ref 79–97)
MCV RBC AUTO: 96.9 FL (ref 79–97)
MCV RBC AUTO: 98.4 FL (ref 79–97)
METHADONE UR QL SCN: NEGATIVE
METHGB BLD QL: 0 % (ref 0–3)
METHGB BLD QL: 0.2 % (ref 0–3)
METHGB BLD QL: 0.2 % (ref 0–3)
METHGB BLD QL: 0.3 % (ref 0–3)
METHGB BLD QL: 0.4 % (ref 0–3)
METHGB BLD QL: 0.5 % (ref 0–3)
METHGB BLD QL: 0.5 % (ref 0–3)
METHGB BLD QL: 0.6 % (ref 0–3)
METHGB BLD QL: 0.7 % (ref 0–3)
METHGB BLD QL: <-0.1 % (ref 0–3)
MODALITY: ABNORMAL
MONOCYTES # BLD AUTO: 0.25 10*3/MM3 (ref 0.1–0.9)
MONOCYTES # BLD AUTO: 0.36 10*3/MM3 (ref 0.1–0.9)
MONOCYTES # BLD AUTO: 0.45 10*3/MM3 (ref 0.1–0.9)
MONOCYTES # BLD AUTO: 0.6 10*3/MM3 (ref 0.1–0.9)
MONOCYTES # BLD AUTO: 0.6 10*3/MM3 (ref 0.1–0.9)
MONOCYTES # BLD AUTO: 0.63 10*3/MM3 (ref 0.1–0.9)
MONOCYTES # BLD AUTO: 0.71 10*3/MM3 (ref 0.1–0.9)
MONOCYTES # BLD AUTO: 0.72 10*3/MM3 (ref 0.1–0.9)
MONOCYTES # BLD AUTO: 0.97 10*3/MM3 (ref 0.1–0.9)
MONOCYTES # BLD: 0.07 10*3/MM3 (ref 0.1–0.9)
MONOCYTES NFR BLD AUTO: 10.3 % (ref 5–12)
MONOCYTES NFR BLD AUTO: 11.8 % (ref 5–12)
MONOCYTES NFR BLD AUTO: 4 % (ref 5–12)
MONOCYTES NFR BLD AUTO: 5 % (ref 5–12)
MONOCYTES NFR BLD AUTO: 6.1 % (ref 5–12)
MONOCYTES NFR BLD AUTO: 6.4 % (ref 5–12)
MONOCYTES NFR BLD AUTO: 7.7 % (ref 5–12)
MONOCYTES NFR BLD AUTO: 9 % (ref 5–12)
MONOCYTES NFR BLD AUTO: 9.1 % (ref 5–12)
MONOS+MACROS NFR FLD: 50 %
MRSA DNA SPEC QL NAA+PROBE: NORMAL
NEUTROPHILS # BLD AUTO: 6.7 10*3/MM3 (ref 1.7–7)
NEUTROPHILS NFR BLD AUTO: 10.74 10*3/MM3 (ref 1.7–7)
NEUTROPHILS NFR BLD AUTO: 3.11 10*3/MM3 (ref 1.7–7)
NEUTROPHILS NFR BLD AUTO: 4.52 10*3/MM3 (ref 1.7–7)
NEUTROPHILS NFR BLD AUTO: 4.52 10*3/MM3 (ref 1.7–7)
NEUTROPHILS NFR BLD AUTO: 4.58 10*3/MM3 (ref 1.7–7)
NEUTROPHILS NFR BLD AUTO: 5.47 10*3/MM3 (ref 1.7–7)
NEUTROPHILS NFR BLD AUTO: 5.98 10*3/MM3 (ref 1.7–7)
NEUTROPHILS NFR BLD AUTO: 58.3 % (ref 42.7–76)
NEUTROPHILS NFR BLD AUTO: 65.3 % (ref 42.7–76)
NEUTROPHILS NFR BLD AUTO: 69 % (ref 42.7–76)
NEUTROPHILS NFR BLD AUTO: 74.3 % (ref 42.7–76)
NEUTROPHILS NFR BLD AUTO: 8.09 10*3/MM3 (ref 1.7–7)
NEUTROPHILS NFR BLD AUTO: 8.41 10*3/MM3 (ref 1.7–7)
NEUTROPHILS NFR BLD AUTO: 80 % (ref 42.7–76)
NEUTROPHILS NFR BLD AUTO: 85.4 % (ref 42.7–76)
NEUTROPHILS NFR BLD AUTO: 85.7 % (ref 42.7–76)
NEUTROPHILS NFR BLD AUTO: 87.6 % (ref 42.7–76)
NEUTROPHILS NFR BLD AUTO: 90.5 % (ref 42.7–76)
NEUTROPHILS NFR BLD MANUAL: 78 % (ref 42.7–76)
NEUTROPHILS NFR FLD MANUAL: 12 %
NEUTS BAND NFR BLD MANUAL: 12 % (ref 0–5)
NITRITE UR QL STRIP: NEGATIVE
NOTE: ABNORMAL
NOTIFIED BY: ABNORMAL
NOTIFIED WHO: ABNORMAL
NRBC BLD AUTO-RTO: 0 /100 WBC (ref 0–0.2)
NRBC BLD AUTO-RTO: 0.3 /100 WBC (ref 0–0.2)
NRBC SPEC MANUAL: 1 /100 WBC (ref 0–0.2)
NT-PROBNP SERPL-MCNC: 4648 PG/ML (ref 0–900)
NT-PROBNP SERPL-MCNC: 634.9 PG/ML (ref 0–900)
NT-PROBNP SERPL-MCNC: 830.9 PG/ML (ref 0–900)
NT-PROBNP SERPL-MCNC: 898.4 PG/ML (ref 0–900)
NUC CELL # FLD: 124 /MM3
OPIATES UR QL: NEGATIVE
OXYCODONE UR QL SCN: NEGATIVE
OXYHGB MFR BLDV: 83.2 % (ref 94–99)
OXYHGB MFR BLDV: 85 % (ref 94–99)
OXYHGB MFR BLDV: 87.3 % (ref 94–99)
OXYHGB MFR BLDV: 88.5 % (ref 94–99)
OXYHGB MFR BLDV: 90.2 % (ref 45–75)
OXYHGB MFR BLDV: 91.4 % (ref 94–99)
OXYHGB MFR BLDV: 91.9 % (ref 94–99)
OXYHGB MFR BLDV: 94.8 % (ref 94–99)
OXYHGB MFR BLDV: 95.2 % (ref 94–99)
OXYHGB MFR BLDV: 95.2 % (ref 94–99)
OXYHGB MFR BLDV: 95.3 % (ref 94–99)
OXYHGB MFR BLDV: 96.3 % (ref 94–99)
OXYHGB MFR BLDV: 96.7 % (ref 94–99)
OXYHGB MFR BLDV: 97.7 % (ref 94–99)
OXYHGB MFR BLDV: 98.2 % (ref 94–99)
PCO2 BLDA: 34.8 MM HG (ref 35–45)
PCO2 BLDA: 42.6 MM HG (ref 35–45)
PCO2 BLDA: 42.6 MM HG (ref 35–45)
PCO2 BLDA: 46.4 MM HG (ref 35–45)
PCO2 BLDA: 48 MM HG (ref 35–45)
PCO2 BLDA: 50.3 MM HG (ref 35–45)
PCO2 BLDA: 52.2 MM HG (ref 35–45)
PCO2 BLDA: 52.8 MM HG (ref 35–45)
PCO2 BLDA: 53.8 MM HG (ref 35–45)
PCO2 BLDA: 60.8 MM HG (ref 35–45)
PCO2 BLDA: 61.1 MM HG (ref 35–45)
PCO2 BLDA: 63.2 MM HG (ref 35–45)
PCO2 BLDA: 64.1 MM HG (ref 35–45)
PCO2 BLDA: 65.1 MM HG (ref 35–45)
PCO2 BLDV: 57.8 MM HG (ref 41–51)
PCO2 TEMP ADJ BLD: ABNORMAL MM[HG]
PCP UR QL SCN: NEGATIVE
PEEP RESPIRATORY: 10 CM[H2O]
PEEP RESPIRATORY: 12 CM[H2O]
PEEP RESPIRATORY: 14 CM[H2O]
PH BLDA: 7.22 PH UNITS (ref 7.35–7.45)
PH BLDA: 7.23 PH UNITS (ref 7.35–7.45)
PH BLDA: 7.26 PH UNITS (ref 7.35–7.45)
PH BLDA: 7.29 PH UNITS (ref 7.35–7.45)
PH BLDA: 7.31 PH UNITS (ref 7.35–7.45)
PH BLDA: 7.37 PH UNITS (ref 7.35–7.45)
PH BLDA: 7.42 PH UNITS (ref 7.35–7.45)
PH BLDA: 7.44 PH UNITS (ref 7.35–7.45)
PH BLDA: 7.44 PH UNITS (ref 7.35–7.45)
PH BLDA: 7.46 PH UNITS (ref 7.35–7.45)
PH BLDA: 7.47 PH UNITS (ref 7.35–7.45)
PH BLDA: 7.47 PH UNITS (ref 7.35–7.45)
PH BLDA: 7.49 PH UNITS (ref 7.35–7.45)
PH BLDA: 7.6 PH UNITS (ref 7.35–7.45)
PH BLDV: 7.41 PH UNITS (ref 7.32–7.42)
PH UR STRIP.AUTO: 6 [PH] (ref 5–8)
PH UR STRIP.AUTO: 6 [PH] (ref 5–8)
PH UR STRIP.AUTO: 7 [PH] (ref 5–8)
PH, TEMP CORRECTED: ABNORMAL
PHOSPHATE SERPL-MCNC: 2.3 MG/DL (ref 2.5–4.5)
PHOSPHATE SERPL-MCNC: 2.7 MG/DL (ref 2.5–4.5)
PHOSPHATE SERPL-MCNC: 2.9 MG/DL (ref 2.5–4.5)
PHOSPHATE SERPL-MCNC: 3.1 MG/DL (ref 2.5–4.5)
PLAT MORPH BLD: NORMAL
PLATELET # BLD AUTO: 102 10*3/MM3 (ref 140–450)
PLATELET # BLD AUTO: 114 10*3/MM3 (ref 140–450)
PLATELET # BLD AUTO: 132 10*3/MM3 (ref 140–450)
PLATELET # BLD AUTO: 133 10*3/MM3 (ref 140–450)
PLATELET # BLD AUTO: 134 10*3/MM3 (ref 140–450)
PLATELET # BLD AUTO: 138 10*3/MM3 (ref 140–450)
PLATELET # BLD AUTO: 143 10*3/MM3 (ref 140–450)
PLATELET # BLD AUTO: 174 10*3/MM3 (ref 140–450)
PLATELET # BLD AUTO: 176 10*3/MM3 (ref 140–450)
PLATELET # BLD AUTO: 180 10*3/MM3 (ref 140–450)
PLATELET # BLD AUTO: 90 10*3/MM3 (ref 140–450)
PLATELET # BLD AUTO: 98 10*3/MM3 (ref 140–450)
PMV BLD AUTO: 10.1 FL (ref 6–12)
PMV BLD AUTO: 10.4 FL (ref 6–12)
PMV BLD AUTO: 10.5 FL (ref 6–12)
PMV BLD AUTO: 11.2 FL (ref 6–12)
PMV BLD AUTO: 11.2 FL (ref 6–12)
PMV BLD AUTO: 9.4 FL (ref 6–12)
PMV BLD AUTO: 9.5 FL (ref 6–12)
PMV BLD AUTO: 9.6 FL (ref 6–12)
PO2 BLDA: 102 MM HG (ref 83–108)
PO2 BLDA: 102 MM HG (ref 83–108)
PO2 BLDA: 126 MM HG (ref 83–108)
PO2 BLDA: 158 MM HG (ref 83–108)
PO2 BLDA: 53.5 MM HG (ref 83–108)
PO2 BLDA: 54.6 MM HG (ref 83–108)
PO2 BLDA: 54.9 MM HG (ref 83–108)
PO2 BLDA: 68 MM HG (ref 83–108)
PO2 BLDA: 68.5 MM HG (ref 83–108)
PO2 BLDA: 76.2 MM HG (ref 83–108)
PO2 BLDA: 79 MM HG (ref 83–108)
PO2 BLDA: 81.6 MM HG (ref 83–108)
PO2 BLDA: 83 MM HG (ref 83–108)
PO2 BLDA: 99.1 MM HG (ref 83–108)
PO2 BLDV: 65.8 MM HG (ref 27–53)
PO2 TEMP ADJ BLD: ABNORMAL MM[HG]
POLYCHROMASIA BLD QL SMEAR: ABNORMAL
POLYCHROMASIA BLD QL SMEAR: NORMAL
POTASSIUM SERPL-SCNC: 3.4 MMOL/L (ref 3.5–5.2)
POTASSIUM SERPL-SCNC: 3.4 MMOL/L (ref 3.5–5.2)
POTASSIUM SERPL-SCNC: 3.6 MMOL/L (ref 3.5–5.2)
POTASSIUM SERPL-SCNC: 3.8 MMOL/L (ref 3.5–5.2)
POTASSIUM SERPL-SCNC: 3.9 MMOL/L (ref 3.5–5.2)
POTASSIUM SERPL-SCNC: 4.6 MMOL/L (ref 3.5–5.2)
POTASSIUM SERPL-SCNC: 4.6 MMOL/L (ref 3.5–5.2)
POTASSIUM SERPL-SCNC: 4.7 MMOL/L (ref 3.5–5.2)
POTASSIUM SERPL-SCNC: 5.1 MMOL/L (ref 3.5–5.2)
POTASSIUM SERPL-SCNC: 5.2 MMOL/L (ref 3.5–5.2)
POTASSIUM SERPL-SCNC: 5.4 MMOL/L (ref 3.5–5.2)
POTASSIUM SERPL-SCNC: 5.5 MMOL/L (ref 3.5–5.2)
POTASSIUM SERPL-SCNC: 5.7 MMOL/L (ref 3.5–5.2)
PROCALCITONIN SERPL-MCNC: 0.15 NG/ML (ref 0–0.25)
PROCALCITONIN SERPL-MCNC: 0.16 NG/ML (ref 0–0.25)
PROCALCITONIN SERPL-MCNC: 0.9 NG/ML (ref 0–0.25)
PROPOXYPH UR QL: NEGATIVE
PROT FLD-MCNC: <1 G/DL
PROT SERPL-MCNC: 6.2 G/DL (ref 6–8.5)
PROT SERPL-MCNC: 6.4 G/DL (ref 6–8.5)
PROT SERPL-MCNC: 6.8 G/DL (ref 6–8.5)
PROT SERPL-MCNC: 7.1 G/DL (ref 6–8.5)
PROT SERPL-MCNC: 7.2 G/DL (ref 6–8.5)
PROT SERPL-MCNC: 7.2 G/DL (ref 6–8.5)
PROT SERPL-MCNC: 7.3 G/DL (ref 6–8.5)
PROT SERPL-MCNC: 7.3 G/DL (ref 6–8.5)
PROT SERPL-MCNC: 7.6 G/DL (ref 6–8.5)
PROT SERPL-MCNC: 8 G/DL (ref 6–8.5)
PROT SERPL-MCNC: 8.1 G/DL (ref 6–8.5)
PROT UR QL STRIP: NEGATIVE
PROTHROMBIN TIME: 16.9 SECONDS (ref 12.1–14.7)
PROTHROMBIN TIME: 17.1 SECONDS (ref 12.1–14.7)
PROTHROMBIN TIME: 18.2 SECONDS (ref 12.1–14.7)
PROTHROMBIN TIME: 18.3 SECONDS (ref 12.1–14.7)
PROTHROMBIN TIME: 20.6 SECONDS (ref 12.1–14.7)
PROTHROMBIN TIME: 20.8 SECONDS (ref 12.1–14.7)
PROTHROMBIN TIME: 21 SECONDS (ref 12.1–14.7)
QT INTERVAL: 294 MS
QT INTERVAL: 382 MS
QT INTERVAL: 384 MS
QT INTERVAL: 396 MS
QT INTERVAL: 414 MS
QTC INTERVAL: 399 MS
QTC INTERVAL: 448 MS
QTC INTERVAL: 448 MS
QTC INTERVAL: 474 MS
QTC INTERVAL: 480 MS
RBC # BLD AUTO: 2.47 10*6/MM3 (ref 3.77–5.28)
RBC # BLD AUTO: 2.73 10*6/MM3 (ref 3.77–5.28)
RBC # BLD AUTO: 2.75 10*6/MM3 (ref 3.77–5.28)
RBC # BLD AUTO: 2.9 10*6/MM3 (ref 3.77–5.28)
RBC # BLD AUTO: 3.16 10*6/MM3 (ref 3.77–5.28)
RBC # BLD AUTO: 3.18 10*6/MM3 (ref 3.77–5.28)
RBC # BLD AUTO: 3.31 10*6/MM3 (ref 3.77–5.28)
RBC # BLD AUTO: 3.52 10*6/MM3 (ref 3.77–5.28)
RBC # BLD AUTO: 3.6 10*6/MM3 (ref 3.77–5.28)
RBC # BLD AUTO: 3.71 10*6/MM3 (ref 3.77–5.28)
RBC # BLD AUTO: 3.8 10*6/MM3 (ref 3.77–5.28)
RBC # FLD AUTO: ABNORMAL 10*3/UL
RBC # UR STRIP: ABNORMAL /HPF
RBC # UR STRIP: ABNORMAL /HPF
RBC # UR STRIP: NORMAL /HPF
REF LAB TEST METHOD: ABNORMAL
REF LAB TEST METHOD: ABNORMAL
REF LAB TEST METHOD: NORMAL
REF LAB TEST METHOD: NORMAL
RH BLD: NEGATIVE
RHINOVIRUS RNA SPEC NAA+PROBE: NOT DETECTED
RSV RNA NPH QL NAA+NON-PROBE: NOT DETECTED
S PNEUM AG SPEC QL LA: NEGATIVE
SAO2 % BLDCOA: 85.6 % (ref 94–99)
SAO2 % BLDCOA: 87.2 % (ref 94–99)
SAO2 % BLDCOA: 89.1 % (ref 94–99)
SAO2 % BLDCOA: 90.4 % (ref 94–99)
SAO2 % BLDCOA: 93.7 % (ref 94–99)
SAO2 % BLDCOA: 94 % (ref 94–99)
SAO2 % BLDCOA: 96.1 % (ref 94–99)
SAO2 % BLDCOA: 96.7 % (ref 94–99)
SAO2 % BLDCOA: 97.5 % (ref 94–99)
SAO2 % BLDCOA: 97.5 % (ref 94–99)
SAO2 % BLDCOA: 98.5 % (ref 94–99)
SAO2 % BLDCOA: 98.7 % (ref 94–99)
SAO2 % BLDCOA: 99 % (ref 94–99)
SAO2 % BLDCOA: >99.2 % (ref 94–99)
SAO2 % BLDCOV: 92.1 % (ref 45–75)
SARS-COV-2 RNA NPH QL NAA+NON-PROBE: NOT DETECTED
SARS-COV-2 RNA RESP QL NAA+PROBE: NOT DETECTED
SET MECH RESP RATE: 20
SET MECH RESP RATE: 28
SET MECH RESP RATE: 32
SMALL PLATELETS BLD QL SMEAR: ABNORMAL
SMALL PLATELETS BLD QL SMEAR: NORMAL
SMALL PLATELETS BLD QL SMEAR: NORMAL
SODIUM SERPL-SCNC: 130 MMOL/L (ref 136–145)
SODIUM SERPL-SCNC: 131 MMOL/L (ref 136–145)
SODIUM SERPL-SCNC: 134 MMOL/L (ref 136–145)
SODIUM SERPL-SCNC: 134 MMOL/L (ref 136–145)
SODIUM SERPL-SCNC: 137 MMOL/L (ref 136–145)
SODIUM SERPL-SCNC: 137 MMOL/L (ref 136–145)
SODIUM SERPL-SCNC: 138 MMOL/L (ref 136–145)
SODIUM SERPL-SCNC: 139 MMOL/L (ref 136–145)
SODIUM SERPL-SCNC: 141 MMOL/L (ref 136–145)
SODIUM UR-SCNC: <20 MMOL/L
SP GR UR STRIP: 1.01 (ref 1–1.03)
SP GR UR STRIP: 1.01 (ref 1–1.03)
SP GR UR STRIP: 1.02 (ref 1–1.03)
SQUAMOUS #/AREA URNS HPF: ABNORMAL /HPF
SQUAMOUS #/AREA URNS HPF: ABNORMAL /HPF
SQUAMOUS #/AREA URNS HPF: NORMAL /HPF
STRESS TARGET HR: 139 BPM
T&S EXPIRATION DATE: NORMAL
TARGETS BLD QL SMEAR: NORMAL
TRICYCLICS UR QL SCN: NEGATIVE
TROPONIN T DELTA: 0 NG/L
TROPONIN T DELTA: 1 NG/L
TROPONIN T DELTA: 1 NG/L
TROPONIN T DELTA: 2 NG/L
TROPONIN T SERPL HS-MCNC: 36 NG/L
TROPONIN T SERPL HS-MCNC: 37 NG/L
TROPONIN T SERPL HS-MCNC: 47 NG/L
TROPONIN T SERPL HS-MCNC: 52 NG/L
TSH SERPL DL<=0.05 MIU/L-ACNC: 1.2 UIU/ML (ref 0.27–4.2)
UNIT  ABO: NORMAL
UNIT  RH: NORMAL
UROBILINOGEN UR QL STRIP: ABNORMAL
VARIANT LYMPHS NFR BLD MANUAL: 9 % (ref 19.6–45.3)
VENTILATOR MODE: ABNORMAL
VT ON VENT VENT: 305 ML
VT ON VENT VENT: 305 ML
VT ON VENT VENT: 310 ML
VT ON VENT VENT: 375 ML
VT ON VENT VENT: 500 ML
WBC # UR STRIP: ABNORMAL /HPF
WBC # UR STRIP: ABNORMAL /HPF
WBC # UR STRIP: NORMAL /HPF
WBC NRBC COR # BLD: 12.58 10*3/MM3 (ref 3.4–10.8)
WBC NRBC COR # BLD: 5.34 10*3/MM3 (ref 3.4–10.8)
WBC NRBC COR # BLD: 5.65 10*3/MM3 (ref 3.4–10.8)
WBC NRBC COR # BLD: 6.25 10*3/MM3 (ref 3.4–10.8)
WBC NRBC COR # BLD: 6.62 10*3/MM3 (ref 3.4–10.8)
WBC NRBC COR # BLD: 6.89 10*3/MM3 (ref 3.4–10.8)
WBC NRBC COR # BLD: 6.92 10*3/MM3 (ref 3.4–10.8)
WBC NRBC COR # BLD: 7.44 10*3/MM3 (ref 3.4–10.8)
WBC NRBC COR # BLD: 8.04 10*3/MM3 (ref 3.4–10.8)
WBC NRBC COR # BLD: 8.94 10*3/MM3 (ref 3.4–10.8)
WBC NRBC COR # BLD: 9.82 10*3/MM3 (ref 3.4–10.8)
WHOLE BLOOD HOLD COAG: NORMAL
WHOLE BLOOD HOLD COAG: NORMAL
WHOLE BLOOD HOLD SPECIMEN: NORMAL
WHOLE BLOOD HOLD SPECIMEN: NORMAL

## 2023-01-01 PROCEDURE — 95819 EEG AWAKE AND ASLEEP: CPT

## 2023-01-01 PROCEDURE — 74176 CT ABD & PELVIS W/O CONTRAST: CPT

## 2023-01-01 PROCEDURE — 82820 HEMOGLOBIN-OXYGEN AFFINITY: CPT

## 2023-01-01 PROCEDURE — P9047 ALBUMIN (HUMAN), 25%, 50ML: HCPCS | Performed by: PHYSICIAN ASSISTANT

## 2023-01-01 PROCEDURE — 25010000002 METHYLPREDNISOLONE PER 125 MG: Performed by: INTERNAL MEDICINE

## 2023-01-01 PROCEDURE — 84157 ASSAY OF PROTEIN OTHER: CPT | Performed by: INTERNAL MEDICINE

## 2023-01-01 PROCEDURE — 81001 URINALYSIS AUTO W/SCOPE: CPT | Performed by: STUDENT IN AN ORGANIZED HEALTH CARE EDUCATION/TRAINING PROGRAM

## 2023-01-01 PROCEDURE — 82962 GLUCOSE BLOOD TEST: CPT

## 2023-01-01 PROCEDURE — 85610 PROTHROMBIN TIME: CPT | Performed by: INTERNAL MEDICINE

## 2023-01-01 PROCEDURE — 94799 UNLISTED PULMONARY SVC/PX: CPT

## 2023-01-01 PROCEDURE — 83050 HGB METHEMOGLOBIN QUAN: CPT

## 2023-01-01 PROCEDURE — 86140 C-REACTIVE PROTEIN: CPT | Performed by: STUDENT IN AN ORGANIZED HEALTH CARE EDUCATION/TRAINING PROGRAM

## 2023-01-01 PROCEDURE — 25010000002 METHYLPREDNISOLONE PER 125 MG: Performed by: NURSE PRACTITIONER

## 2023-01-01 PROCEDURE — 94761 N-INVAS EAR/PLS OXIMETRY MLT: CPT

## 2023-01-01 PROCEDURE — 99223 1ST HOSP IP/OBS HIGH 75: CPT | Performed by: STUDENT IN AN ORGANIZED HEALTH CARE EDUCATION/TRAINING PROGRAM

## 2023-01-01 PROCEDURE — 85025 COMPLETE CBC W/AUTO DIFF WBC: CPT | Performed by: INTERNAL MEDICINE

## 2023-01-01 PROCEDURE — 71250 CT THORAX DX C-: CPT

## 2023-01-01 PROCEDURE — 94003 VENT MGMT INPAT SUBQ DAY: CPT

## 2023-01-01 PROCEDURE — 25010000002 ALBUMIN HUMAN 25% PER 50 ML: Performed by: PHYSICIAN ASSISTANT

## 2023-01-01 PROCEDURE — 85027 COMPLETE CBC AUTOMATED: CPT | Performed by: STUDENT IN AN ORGANIZED HEALTH CARE EDUCATION/TRAINING PROGRAM

## 2023-01-01 PROCEDURE — 84443 ASSAY THYROID STIM HORMONE: CPT | Performed by: STUDENT IN AN ORGANIZED HEALTH CARE EDUCATION/TRAINING PROGRAM

## 2023-01-01 PROCEDURE — 25010000002 PROPOFOL 10 MG/ML EMULSION: Performed by: INTERNAL MEDICINE

## 2023-01-01 PROCEDURE — 03HY32Z INSERTION OF MONITORING DEVICE INTO UPPER ARTERY, PERCUTANEOUS APPROACH: ICD-10-PCS | Performed by: STUDENT IN AN ORGANIZED HEALTH CARE EDUCATION/TRAINING PROGRAM

## 2023-01-01 PROCEDURE — 99291 CRITICAL CARE FIRST HOUR: CPT | Performed by: INTERNAL MEDICINE

## 2023-01-01 PROCEDURE — 83735 ASSAY OF MAGNESIUM: CPT | Performed by: STUDENT IN AN ORGANIZED HEALTH CARE EDUCATION/TRAINING PROGRAM

## 2023-01-01 PROCEDURE — 85730 THROMBOPLASTIN TIME PARTIAL: CPT | Performed by: INTERNAL MEDICINE

## 2023-01-01 PROCEDURE — 82375 ASSAY CARBOXYHB QUANT: CPT

## 2023-01-01 PROCEDURE — 82805 BLOOD GASES W/O2 SATURATION: CPT

## 2023-01-01 PROCEDURE — 63710000001 INSULIN REGULAR HUMAN PER 5 UNITS: Performed by: STUDENT IN AN ORGANIZED HEALTH CARE EDUCATION/TRAINING PROGRAM

## 2023-01-01 PROCEDURE — P9047 ALBUMIN (HUMAN), 25%, 50ML: HCPCS | Performed by: INTERNAL MEDICINE

## 2023-01-01 PROCEDURE — 25010000002 MICAFUNGIN SODIUM 100 MG RECONSTITUTED SOLUTION 1 EACH VIAL: Performed by: PHYSICIAN ASSISTANT

## 2023-01-01 PROCEDURE — 36600 WITHDRAWAL OF ARTERIAL BLOOD: CPT

## 2023-01-01 PROCEDURE — 25010000002 DIPHENHYDRAMINE PER 50 MG: Performed by: INTERNAL MEDICINE

## 2023-01-01 PROCEDURE — 25010000002 CEFTRIAXONE PER 250 MG: Performed by: INTERNAL MEDICINE

## 2023-01-01 PROCEDURE — 83036 HEMOGLOBIN GLYCOSYLATED A1C: CPT | Performed by: STUDENT IN AN ORGANIZED HEALTH CARE EDUCATION/TRAINING PROGRAM

## 2023-01-01 PROCEDURE — 96374 THER/PROPH/DIAG INJ IV PUSH: CPT

## 2023-01-01 PROCEDURE — 83605 ASSAY OF LACTIC ACID: CPT | Performed by: INTERNAL MEDICINE

## 2023-01-01 PROCEDURE — 86850 RBC ANTIBODY SCREEN: CPT | Performed by: INTERNAL MEDICINE

## 2023-01-01 PROCEDURE — 93005 ELECTROCARDIOGRAM TRACING: CPT | Performed by: STUDENT IN AN ORGANIZED HEALTH CARE EDUCATION/TRAINING PROGRAM

## 2023-01-01 PROCEDURE — 85652 RBC SED RATE AUTOMATED: CPT | Performed by: EMERGENCY MEDICINE

## 2023-01-01 PROCEDURE — P9612 CATHETERIZE FOR URINE SPEC: HCPCS

## 2023-01-01 PROCEDURE — 84484 ASSAY OF TROPONIN QUANT: CPT | Performed by: STUDENT IN AN ORGANIZED HEALTH CARE EDUCATION/TRAINING PROGRAM

## 2023-01-01 PROCEDURE — 85007 BL SMEAR W/DIFF WBC COUNT: CPT | Performed by: INTERNAL MEDICINE

## 2023-01-01 PROCEDURE — 87305 ASPERGILLUS AG IA: CPT | Performed by: PHYSICIAN ASSISTANT

## 2023-01-01 PROCEDURE — 99281 EMR DPT VST MAYX REQ PHY/QHP: CPT

## 2023-01-01 PROCEDURE — 71045 X-RAY EXAM CHEST 1 VIEW: CPT

## 2023-01-01 PROCEDURE — 82140 ASSAY OF AMMONIA: CPT | Performed by: STUDENT IN AN ORGANIZED HEALTH CARE EDUCATION/TRAINING PROGRAM

## 2023-01-01 PROCEDURE — 83690 ASSAY OF LIPASE: CPT | Performed by: STUDENT IN AN ORGANIZED HEALTH CARE EDUCATION/TRAINING PROGRAM

## 2023-01-01 PROCEDURE — 99233 SBSQ HOSP IP/OBS HIGH 50: CPT | Performed by: INTERNAL MEDICINE

## 2023-01-01 PROCEDURE — 87040 BLOOD CULTURE FOR BACTERIA: CPT | Performed by: STUDENT IN AN ORGANIZED HEALTH CARE EDUCATION/TRAINING PROGRAM

## 2023-01-01 PROCEDURE — 93005 ELECTROCARDIOGRAM TRACING: CPT | Performed by: NURSE PRACTITIONER

## 2023-01-01 PROCEDURE — 0 PHYTONADIONE 10 MG/ML SOLUTION 1 ML AMPULE: Performed by: INTERNAL MEDICINE

## 2023-01-01 PROCEDURE — 25010000002 FUROSEMIDE PER 20 MG: Performed by: INTERNAL MEDICINE

## 2023-01-01 PROCEDURE — 86927 PLASMA FRESH FROZEN: CPT

## 2023-01-01 PROCEDURE — 99283 EMERGENCY DEPT VISIT LOW MDM: CPT

## 2023-01-01 PROCEDURE — 25010000002 FUROSEMIDE PER 20 MG: Performed by: STUDENT IN AN ORGANIZED HEALTH CARE EDUCATION/TRAINING PROGRAM

## 2023-01-01 PROCEDURE — P9047 ALBUMIN (HUMAN), 25%, 50ML: HCPCS | Performed by: STUDENT IN AN ORGANIZED HEALTH CARE EDUCATION/TRAINING PROGRAM

## 2023-01-01 PROCEDURE — 82042 OTHER SOURCE ALBUMIN QUAN EA: CPT | Performed by: INTERNAL MEDICINE

## 2023-01-01 PROCEDURE — 80048 BASIC METABOLIC PNL TOTAL CA: CPT | Performed by: STUDENT IN AN ORGANIZED HEALTH CARE EDUCATION/TRAINING PROGRAM

## 2023-01-01 PROCEDURE — 83735 ASSAY OF MAGNESIUM: CPT | Performed by: INTERNAL MEDICINE

## 2023-01-01 PROCEDURE — 85610 PROTHROMBIN TIME: CPT | Performed by: STUDENT IN AN ORGANIZED HEALTH CARE EDUCATION/TRAINING PROGRAM

## 2023-01-01 PROCEDURE — 80053 COMPREHEN METABOLIC PANEL: CPT | Performed by: STUDENT IN AN ORGANIZED HEALTH CARE EDUCATION/TRAINING PROGRAM

## 2023-01-01 PROCEDURE — 99291 CRITICAL CARE FIRST HOUR: CPT | Performed by: STUDENT IN AN ORGANIZED HEALTH CARE EDUCATION/TRAINING PROGRAM

## 2023-01-01 PROCEDURE — 94664 DEMO&/EVAL PT USE INHALER: CPT

## 2023-01-01 PROCEDURE — 93010 ELECTROCARDIOGRAM REPORT: CPT | Performed by: SPECIALIST

## 2023-01-01 PROCEDURE — 80053 COMPREHEN METABOLIC PANEL: CPT | Performed by: INTERNAL MEDICINE

## 2023-01-01 PROCEDURE — 84145 PROCALCITONIN (PCT): CPT | Performed by: NURSE PRACTITIONER

## 2023-01-01 PROCEDURE — 87070 CULTURE OTHR SPECIMN AEROBIC: CPT | Performed by: INTERNAL MEDICINE

## 2023-01-01 PROCEDURE — G0378 HOSPITAL OBSERVATION PER HR: HCPCS

## 2023-01-01 PROCEDURE — 94002 VENT MGMT INPAT INIT DAY: CPT

## 2023-01-01 PROCEDURE — 96366 THER/PROPH/DIAG IV INF ADDON: CPT

## 2023-01-01 PROCEDURE — 25010000002 FUROSEMIDE PER 20 MG: Performed by: EMERGENCY MEDICINE

## 2023-01-01 PROCEDURE — 81001 URINALYSIS AUTO W/SCOPE: CPT | Performed by: EMERGENCY MEDICINE

## 2023-01-01 PROCEDURE — 85007 BL SMEAR W/DIFF WBC COUNT: CPT | Performed by: EMERGENCY MEDICINE

## 2023-01-01 PROCEDURE — 86612 BLASTOMYCES ANTIBODY: CPT | Performed by: STUDENT IN AN ORGANIZED HEALTH CARE EDUCATION/TRAINING PROGRAM

## 2023-01-01 PROCEDURE — 85007 BL SMEAR W/DIFF WBC COUNT: CPT | Performed by: STUDENT IN AN ORGANIZED HEALTH CARE EDUCATION/TRAINING PROGRAM

## 2023-01-01 PROCEDURE — 97165 OT EVAL LOW COMPLEX 30 MIN: CPT

## 2023-01-01 PROCEDURE — 94660 CPAP INITIATION&MGMT: CPT

## 2023-01-01 PROCEDURE — 25010000002 LORAZEPAM PER 2 MG: Performed by: NURSE PRACTITIONER

## 2023-01-01 PROCEDURE — 86140 C-REACTIVE PROTEIN: CPT | Performed by: INTERNAL MEDICINE

## 2023-01-01 PROCEDURE — 85025 COMPLETE CBC W/AUTO DIFF WBC: CPT | Performed by: STUDENT IN AN ORGANIZED HEALTH CARE EDUCATION/TRAINING PROGRAM

## 2023-01-01 PROCEDURE — 25010000002 CEFEPIME PER 500 MG: Performed by: STUDENT IN AN ORGANIZED HEALTH CARE EDUCATION/TRAINING PROGRAM

## 2023-01-01 PROCEDURE — 84100 ASSAY OF PHOSPHORUS: CPT | Performed by: INTERNAL MEDICINE

## 2023-01-01 PROCEDURE — 83615 LACTATE (LD) (LDH) ENZYME: CPT | Performed by: INTERNAL MEDICINE

## 2023-01-01 PROCEDURE — 76937 US GUIDE VASCULAR ACCESS: CPT | Performed by: STUDENT IN AN ORGANIZED HEALTH CARE EDUCATION/TRAINING PROGRAM

## 2023-01-01 PROCEDURE — 86900 BLOOD TYPING SEROLOGIC ABO: CPT | Performed by: INTERNAL MEDICINE

## 2023-01-01 PROCEDURE — 0 MAGNESIUM SULFATE 4 GM/100ML SOLUTION: Performed by: INTERNAL MEDICINE

## 2023-01-01 PROCEDURE — 36620 INSERTION CATHETER ARTERY: CPT | Performed by: STUDENT IN AN ORGANIZED HEALTH CARE EDUCATION/TRAINING PROGRAM

## 2023-01-01 PROCEDURE — 84484 ASSAY OF TROPONIN QUANT: CPT | Performed by: NURSE PRACTITIONER

## 2023-01-01 PROCEDURE — 83690 ASSAY OF LIPASE: CPT | Performed by: NURSE PRACTITIONER

## 2023-01-01 PROCEDURE — 86606 ASPERGILLUS ANTIBODY: CPT | Performed by: STUDENT IN AN ORGANIZED HEALTH CARE EDUCATION/TRAINING PROGRAM

## 2023-01-01 PROCEDURE — 84145 PROCALCITONIN (PCT): CPT | Performed by: STUDENT IN AN ORGANIZED HEALTH CARE EDUCATION/TRAINING PROGRAM

## 2023-01-01 PROCEDURE — 25010000002 ALBUMIN HUMAN 25% PER 50 ML: Performed by: STUDENT IN AN ORGANIZED HEALTH CARE EDUCATION/TRAINING PROGRAM

## 2023-01-01 PROCEDURE — 0 METHYLPREDNISOLONE PER 125 MG: Performed by: INTERNAL MEDICINE

## 2023-01-01 PROCEDURE — 84300 ASSAY OF URINE SODIUM: CPT | Performed by: INTERNAL MEDICINE

## 2023-01-01 PROCEDURE — 71250 CT THORAX DX C-: CPT | Performed by: RADIOLOGY

## 2023-01-01 PROCEDURE — 25010000002 MIDAZOLAM PER 1 MG

## 2023-01-01 PROCEDURE — 25010000002 VANCOMYCIN 5 G RECONSTITUTED SOLUTION

## 2023-01-01 PROCEDURE — 84484 ASSAY OF TROPONIN QUANT: CPT | Performed by: EMERGENCY MEDICINE

## 2023-01-01 PROCEDURE — 99284 EMERGENCY DEPT VISIT MOD MDM: CPT

## 2023-01-01 PROCEDURE — 36556 INSERT NON-TUNNEL CV CATH: CPT | Performed by: STUDENT IN AN ORGANIZED HEALTH CARE EDUCATION/TRAINING PROGRAM

## 2023-01-01 PROCEDURE — 85730 THROMBOPLASTIN TIME PARTIAL: CPT | Performed by: NURSE PRACTITIONER

## 2023-01-01 PROCEDURE — 87636 SARSCOV2 & INF A&B AMP PRB: CPT | Performed by: EMERGENCY MEDICINE

## 2023-01-01 PROCEDURE — 25010000002 MORPHINE PER 10 MG

## 2023-01-01 PROCEDURE — 71045 X-RAY EXAM CHEST 1 VIEW: CPT | Performed by: RADIOLOGY

## 2023-01-01 PROCEDURE — 25010000002 HEPARIN (PORCINE) PER 1000 UNITS: Performed by: STUDENT IN AN ORGANIZED HEALTH CARE EDUCATION/TRAINING PROGRAM

## 2023-01-01 PROCEDURE — 82140 ASSAY OF AMMONIA: CPT | Performed by: INTERNAL MEDICINE

## 2023-01-01 PROCEDURE — 84145 PROCALCITONIN (PCT): CPT | Performed by: INTERNAL MEDICINE

## 2023-01-01 PROCEDURE — 84484 ASSAY OF TROPONIN QUANT: CPT | Performed by: INTERNAL MEDICINE

## 2023-01-01 PROCEDURE — 89051 BODY FLUID CELL COUNT: CPT | Performed by: INTERNAL MEDICINE

## 2023-01-01 PROCEDURE — 93010 ELECTROCARDIOGRAM REPORT: CPT | Performed by: INTERNAL MEDICINE

## 2023-01-01 PROCEDURE — 83880 ASSAY OF NATRIURETIC PEPTIDE: CPT | Performed by: EMERGENCY MEDICINE

## 2023-01-01 PROCEDURE — 94762 N-INVAS EAR/PLS OXIMTRY CONT: CPT

## 2023-01-01 PROCEDURE — 02HV33Z INSERTION OF INFUSION DEVICE INTO SUPERIOR VENA CAVA, PERCUTANEOUS APPROACH: ICD-10-PCS | Performed by: STUDENT IN AN ORGANIZED HEALTH CARE EDUCATION/TRAINING PROGRAM

## 2023-01-01 PROCEDURE — 83605 ASSAY OF LACTIC ACID: CPT | Performed by: NURSE PRACTITIONER

## 2023-01-01 PROCEDURE — 87205 SMEAR GRAM STAIN: CPT | Performed by: INTERNAL MEDICINE

## 2023-01-01 PROCEDURE — 25010000002 VANCOMYCIN 5 G RECONSTITUTED SOLUTION: Performed by: STUDENT IN AN ORGANIZED HEALTH CARE EDUCATION/TRAINING PROGRAM

## 2023-01-01 PROCEDURE — 49083 ABD PARACENTESIS W/IMAGING: CPT | Performed by: RADIOLOGY

## 2023-01-01 PROCEDURE — 94640 AIRWAY INHALATION TREATMENT: CPT

## 2023-01-01 PROCEDURE — 25010000002 DEXAMETHASONE SODIUM PHOSPHATE 20 MG/5ML SOLUTION: Performed by: STUDENT IN AN ORGANIZED HEALTH CARE EDUCATION/TRAINING PROGRAM

## 2023-01-01 PROCEDURE — 25010000002 ALBUMIN HUMAN 25% PER 50 ML: Performed by: INTERNAL MEDICINE

## 2023-01-01 PROCEDURE — 85049 AUTOMATED PLATELET COUNT: CPT | Performed by: PHYSICIAN ASSISTANT

## 2023-01-01 PROCEDURE — 80053 COMPREHEN METABOLIC PANEL: CPT | Performed by: NURSE PRACTITIONER

## 2023-01-01 PROCEDURE — 80306 DRUG TEST PRSMV INSTRMNT: CPT | Performed by: STUDENT IN AN ORGANIZED HEALTH CARE EDUCATION/TRAINING PROGRAM

## 2023-01-01 PROCEDURE — 87636 SARSCOV2 & INF A&B AMP PRB: CPT | Performed by: NURSE PRACTITIONER

## 2023-01-01 PROCEDURE — 0BH17EZ INSERTION OF ENDOTRACHEAL AIRWAY INTO TRACHEA, VIA NATURAL OR ARTIFICIAL OPENING: ICD-10-PCS | Performed by: STUDENT IN AN ORGANIZED HEALTH CARE EDUCATION/TRAINING PROGRAM

## 2023-01-01 PROCEDURE — 96365 THER/PROPH/DIAG IV INF INIT: CPT

## 2023-01-01 PROCEDURE — 99239 HOSP IP/OBS DSCHRG MGMT >30: CPT | Performed by: STUDENT IN AN ORGANIZED HEALTH CARE EDUCATION/TRAINING PROGRAM

## 2023-01-01 PROCEDURE — 93306 TTE W/DOPPLER COMPLETE: CPT

## 2023-01-01 PROCEDURE — P9059 PLASMA, FRZ BETWEEN 8-24HOUR: HCPCS

## 2023-01-01 PROCEDURE — 85610 PROTHROMBIN TIME: CPT | Performed by: NURSE PRACTITIONER

## 2023-01-01 PROCEDURE — 0 MAGNESIUM SULFATE 4 GM/100ML SOLUTION: Performed by: STUDENT IN AN ORGANIZED HEALTH CARE EDUCATION/TRAINING PROGRAM

## 2023-01-01 PROCEDURE — 36430 TRANSFUSION BLD/BLD COMPNT: CPT

## 2023-01-01 PROCEDURE — 99222 1ST HOSP IP/OBS MODERATE 55: CPT | Performed by: INTERNAL MEDICINE

## 2023-01-01 PROCEDURE — 36415 COLL VENOUS BLD VENIPUNCTURE: CPT

## 2023-01-01 PROCEDURE — 85025 COMPLETE CBC W/AUTO DIFF WBC: CPT | Performed by: EMERGENCY MEDICINE

## 2023-01-01 PROCEDURE — 80074 ACUTE HEPATITIS PANEL: CPT | Performed by: STUDENT IN AN ORGANIZED HEALTH CARE EDUCATION/TRAINING PROGRAM

## 2023-01-01 PROCEDURE — 86140 C-REACTIVE PROTEIN: CPT | Performed by: NURSE PRACTITIONER

## 2023-01-01 PROCEDURE — 99232 SBSQ HOSP IP/OBS MODERATE 35: CPT | Performed by: INTERNAL MEDICINE

## 2023-01-01 PROCEDURE — 80053 COMPREHEN METABOLIC PANEL: CPT | Performed by: EMERGENCY MEDICINE

## 2023-01-01 PROCEDURE — 99238 HOSP IP/OBS DSCHRG MGMT 30/<: CPT | Performed by: INTERNAL MEDICINE

## 2023-01-01 PROCEDURE — 87641 MR-STAPH DNA AMP PROBE: CPT | Performed by: STUDENT IN AN ORGANIZED HEALTH CARE EDUCATION/TRAINING PROGRAM

## 2023-01-01 PROCEDURE — 93005 ELECTROCARDIOGRAM TRACING: CPT | Performed by: INTERNAL MEDICINE

## 2023-01-01 PROCEDURE — 83880 ASSAY OF NATRIURETIC PEPTIDE: CPT | Performed by: STUDENT IN AN ORGANIZED HEALTH CARE EDUCATION/TRAINING PROGRAM

## 2023-01-01 PROCEDURE — 74176 CT ABD & PELVIS W/O CONTRAST: CPT | Performed by: RADIOLOGY

## 2023-01-01 PROCEDURE — 97162 PT EVAL MOD COMPLEX 30 MIN: CPT

## 2023-01-01 PROCEDURE — 25010000002 PROCHLORPERAZINE 10 MG/2ML SOLUTION: Performed by: INTERNAL MEDICINE

## 2023-01-01 PROCEDURE — 96372 THER/PROPH/DIAG INJ SC/IM: CPT

## 2023-01-01 PROCEDURE — 87040 BLOOD CULTURE FOR BACTERIA: CPT | Performed by: NURSE PRACTITIONER

## 2023-01-01 PROCEDURE — 84132 ASSAY OF SERUM POTASSIUM: CPT | Performed by: STUDENT IN AN ORGANIZED HEALTH CARE EDUCATION/TRAINING PROGRAM

## 2023-01-01 PROCEDURE — 87449 NOS EACH ORGANISM AG IA: CPT | Performed by: STUDENT IN AN ORGANIZED HEALTH CARE EDUCATION/TRAINING PROGRAM

## 2023-01-01 PROCEDURE — 83735 ASSAY OF MAGNESIUM: CPT

## 2023-01-01 PROCEDURE — 99223 1ST HOSP IP/OBS HIGH 75: CPT | Performed by: INTERNAL MEDICINE

## 2023-01-01 PROCEDURE — 25010000002 CEFTRIAXONE PER 250 MG: Performed by: NURSE PRACTITIONER

## 2023-01-01 PROCEDURE — 99222 1ST HOSP IP/OBS MODERATE 55: CPT | Performed by: PHYSICIAN ASSISTANT

## 2023-01-01 PROCEDURE — 87015 SPECIMEN INFECT AGNT CONCNTJ: CPT | Performed by: INTERNAL MEDICINE

## 2023-01-01 PROCEDURE — 31500 INSERT EMERGENCY AIRWAY: CPT | Performed by: INTERNAL MEDICINE

## 2023-01-01 PROCEDURE — 85652 RBC SED RATE AUTOMATED: CPT | Performed by: NURSE PRACTITIONER

## 2023-01-01 PROCEDURE — 25010000002 PROPOFOL 200 MG/20ML EMULSION

## 2023-01-01 PROCEDURE — 76942 ECHO GUIDE FOR BIOPSY: CPT

## 2023-01-01 PROCEDURE — C9803 HOPD COVID-19 SPEC COLLECT: HCPCS

## 2023-01-01 PROCEDURE — 87635 SARS-COV-2 COVID-19 AMP PRB: CPT | Performed by: STUDENT IN AN ORGANIZED HEALTH CARE EDUCATION/TRAINING PROGRAM

## 2023-01-01 PROCEDURE — 83880 ASSAY OF NATRIURETIC PEPTIDE: CPT | Performed by: INTERNAL MEDICINE

## 2023-01-01 PROCEDURE — 86901 BLOOD TYPING SEROLOGIC RH(D): CPT | Performed by: INTERNAL MEDICINE

## 2023-01-01 PROCEDURE — 85025 COMPLETE CBC W/AUTO DIFF WBC: CPT | Performed by: NURSE PRACTITIONER

## 2023-01-01 PROCEDURE — 84132 ASSAY OF SERUM POTASSIUM: CPT | Performed by: INTERNAL MEDICINE

## 2023-01-01 PROCEDURE — 93005 ELECTROCARDIOGRAM TRACING: CPT | Performed by: EMERGENCY MEDICINE

## 2023-01-01 PROCEDURE — 25010000002 FUROSEMIDE PER 20 MG: Performed by: NURSE PRACTITIONER

## 2023-01-01 PROCEDURE — 83880 ASSAY OF NATRIURETIC PEPTIDE: CPT | Performed by: NURSE PRACTITIONER

## 2023-01-01 PROCEDURE — 0 POTASSIUM CHLORIDE 10 MEQ/100ML SOLUTION: Performed by: INTERNAL MEDICINE

## 2023-01-01 PROCEDURE — 25010000002 SUCCINYLCHOLINE PER 20 MG

## 2023-01-01 PROCEDURE — 99285 EMERGENCY DEPT VISIT HI MDM: CPT

## 2023-01-01 PROCEDURE — 0 MIDAZOLAM 1 MG/ML 100ML NS 100 MG/100ML SOLUTION: Performed by: STUDENT IN AN ORGANIZED HEALTH CARE EDUCATION/TRAINING PROGRAM

## 2023-01-01 PROCEDURE — 5A1955Z RESPIRATORY VENTILATION, GREATER THAN 96 CONSECUTIVE HOURS: ICD-10-PCS | Performed by: STUDENT IN AN ORGANIZED HEALTH CARE EDUCATION/TRAINING PROGRAM

## 2023-01-01 PROCEDURE — 0202U NFCT DS 22 TRGT SARS-COV-2: CPT | Performed by: INTERNAL MEDICINE

## 2023-01-01 PROCEDURE — 85007 BL SMEAR W/DIFF WBC COUNT: CPT | Performed by: NURSE PRACTITIONER

## 2023-01-01 PROCEDURE — 86140 C-REACTIVE PROTEIN: CPT | Performed by: EMERGENCY MEDICINE

## 2023-01-01 PROCEDURE — 82550 ASSAY OF CK (CPK): CPT | Performed by: STUDENT IN AN ORGANIZED HEALTH CARE EDUCATION/TRAINING PROGRAM

## 2023-01-01 RX ORDER — NICOTINE POLACRILEX 4 MG
15 LOZENGE BUCCAL
Status: DISCONTINUED | OUTPATIENT
Start: 2023-01-01 | End: 2023-01-01

## 2023-01-01 RX ORDER — ATORVASTATIN CALCIUM 20 MG/1
20 TABLET, FILM COATED ORAL NIGHTLY
Status: DISCONTINUED | OUTPATIENT
Start: 2023-01-01 | End: 2023-01-01 | Stop reason: HOSPADM

## 2023-01-01 RX ORDER — PANTOPRAZOLE SODIUM 40 MG/10ML
40 INJECTION, POWDER, LYOPHILIZED, FOR SOLUTION INTRAVENOUS
Status: DISCONTINUED | OUTPATIENT
Start: 2023-01-01 | End: 2023-01-01 | Stop reason: HOSPADM

## 2023-01-01 RX ORDER — CHOLECALCIFEROL (VITAMIN D3) 125 MCG
5 CAPSULE ORAL NIGHTLY PRN
Status: DISCONTINUED | OUTPATIENT
Start: 2023-01-01 | End: 2023-01-01 | Stop reason: HOSPADM

## 2023-01-01 RX ORDER — QUETIAPINE FUMARATE 25 MG/1
50 TABLET, FILM COATED ORAL NIGHTLY
Status: DISCONTINUED | OUTPATIENT
Start: 2023-01-01 | End: 2023-01-01

## 2023-01-01 RX ORDER — ALBUMIN (HUMAN) 12.5 G/50ML
12.5 SOLUTION INTRAVENOUS
Status: DISCONTINUED | OUTPATIENT
Start: 2023-01-01 | End: 2023-01-01

## 2023-01-01 RX ORDER — SODIUM CHLORIDE 9 MG/ML
40 INJECTION, SOLUTION INTRAVENOUS AS NEEDED
Status: DISCONTINUED | OUTPATIENT
Start: 2023-01-01 | End: 2023-01-01 | Stop reason: HOSPADM

## 2023-01-01 RX ORDER — SODIUM CHLORIDE 0.9 % (FLUSH) 0.9 %
10 SYRINGE (ML) INJECTION EVERY 12 HOURS SCHEDULED
Status: DISCONTINUED | OUTPATIENT
Start: 2023-01-01 | End: 2023-01-01 | Stop reason: HOSPADM

## 2023-01-01 RX ORDER — DEXTROSE MONOHYDRATE 25 G/50ML
25 INJECTION, SOLUTION INTRAVENOUS
Status: DISCONTINUED | OUTPATIENT
Start: 2023-01-01 | End: 2023-01-01

## 2023-01-01 RX ORDER — DOXYCYCLINE 100 MG/1
100 CAPSULE ORAL EVERY 12 HOURS SCHEDULED
Status: DISCONTINUED | OUTPATIENT
Start: 2023-01-01 | End: 2023-01-01

## 2023-01-01 RX ORDER — ALBUMIN (HUMAN) 12.5 G/50ML
25 SOLUTION INTRAVENOUS
Status: COMPLETED | OUTPATIENT
Start: 2023-01-01 | End: 2023-01-01

## 2023-01-01 RX ORDER — LISINOPRIL 2.5 MG/1
5 TABLET ORAL DAILY
Status: CANCELLED | OUTPATIENT
Start: 2023-01-01

## 2023-01-01 RX ORDER — SODIUM CHLORIDE 9 MG/ML
40 INJECTION, SOLUTION INTRAVENOUS AS NEEDED
Status: DISCONTINUED | OUTPATIENT
Start: 2023-01-01 | End: 2023-01-01

## 2023-01-01 RX ORDER — METHYLPREDNISOLONE SODIUM SUCCINATE 125 MG/2ML
60 INJECTION, POWDER, LYOPHILIZED, FOR SOLUTION INTRAMUSCULAR; INTRAVENOUS EVERY 12 HOURS
Status: DISCONTINUED | OUTPATIENT
Start: 2023-01-01 | End: 2023-01-01

## 2023-01-01 RX ORDER — ATORVASTATIN CALCIUM 20 MG/1
20 TABLET, FILM COATED ORAL NIGHTLY
Status: DISCONTINUED | OUTPATIENT
Start: 2023-01-01 | End: 2023-01-01

## 2023-01-01 RX ORDER — ERGOCALCIFEROL 1.25 MG/1
50000 CAPSULE ORAL WEEKLY
Status: ON HOLD | COMMUNITY
End: 2023-01-01

## 2023-01-01 RX ORDER — HYDROCODONE BITARTRATE AND ACETAMINOPHEN 5; 325 MG/1; MG/1
1 TABLET ORAL 2 TIMES DAILY
Status: DISCONTINUED | OUTPATIENT
Start: 2023-01-01 | End: 2023-01-01 | Stop reason: HOSPADM

## 2023-01-01 RX ORDER — GLIPIZIDE 5 MG/1
5 TABLET ORAL DAILY
Status: CANCELLED | OUTPATIENT
Start: 2023-01-01

## 2023-01-01 RX ORDER — HEPARIN SODIUM 5000 [USP'U]/ML
5000 INJECTION, SOLUTION INTRAVENOUS; SUBCUTANEOUS EVERY 8 HOURS SCHEDULED
Status: DISCONTINUED | OUTPATIENT
Start: 2023-01-01 | End: 2023-01-01 | Stop reason: HOSPADM

## 2023-01-01 RX ORDER — MAGNESIUM SULFATE HEPTAHYDRATE 40 MG/ML
4 INJECTION, SOLUTION INTRAVENOUS ONCE
Status: COMPLETED | OUTPATIENT
Start: 2023-01-01 | End: 2023-01-01

## 2023-01-01 RX ORDER — GABAPENTIN 100 MG/1
200 CAPSULE ORAL 2 TIMES DAILY PRN
Status: DISCONTINUED | OUTPATIENT
Start: 2023-01-01 | End: 2023-01-01

## 2023-01-01 RX ORDER — SPIRONOLACTONE 100 MG/1
200 TABLET, FILM COATED ORAL DAILY
Status: DISCONTINUED | OUTPATIENT
Start: 2023-01-01 | End: 2023-01-01

## 2023-01-01 RX ORDER — PROMETHAZINE HYDROCHLORIDE 25 MG/1
25 TABLET ORAL EVERY 4 HOURS PRN
Status: DISCONTINUED | OUTPATIENT
Start: 2023-01-01 | End: 2023-01-01

## 2023-01-01 RX ORDER — GLIPIZIDE 5 MG/1
5 TABLET ORAL DAILY
Status: ON HOLD | COMMUNITY
End: 2023-01-01

## 2023-01-01 RX ORDER — SODIUM CHLORIDE 0.9 % (FLUSH) 0.9 %
10 SYRINGE (ML) INJECTION AS NEEDED
Status: DISCONTINUED | OUTPATIENT
Start: 2023-01-01 | End: 2023-01-01 | Stop reason: HOSPADM

## 2023-01-01 RX ORDER — PANTOPRAZOLE SODIUM 40 MG/1
40 TABLET, DELAYED RELEASE ORAL
Status: DISCONTINUED | OUTPATIENT
Start: 2023-01-01 | End: 2023-01-01 | Stop reason: HOSPADM

## 2023-01-01 RX ORDER — GLUCAGON 1 MG/ML
1 KIT INJECTION
Status: DISCONTINUED | OUTPATIENT
Start: 2023-01-01 | End: 2023-01-01 | Stop reason: HOSPADM

## 2023-01-01 RX ORDER — CETIRIZINE HYDROCHLORIDE 10 MG/1
10 TABLET ORAL DAILY
Status: DISCONTINUED | OUTPATIENT
Start: 2023-01-01 | End: 2023-01-01

## 2023-01-01 RX ORDER — HYDROCODONE BITARTRATE AND ACETAMINOPHEN 5; 325 MG/1; MG/1
1 TABLET ORAL 2 TIMES DAILY PRN
Status: DISCONTINUED | OUTPATIENT
Start: 2023-01-01 | End: 2023-01-01

## 2023-01-01 RX ORDER — FUROSEMIDE 40 MG/1
40 TABLET ORAL ONCE
Status: DISCONTINUED | OUTPATIENT
Start: 2023-01-01 | End: 2023-01-01

## 2023-01-01 RX ORDER — POTASSIUM CHLORIDE 1.5 G/1.77G
40 POWDER, FOR SOLUTION ORAL AS NEEDED
Status: DISCONTINUED | OUTPATIENT
Start: 2023-01-01 | End: 2023-01-01

## 2023-01-01 RX ORDER — DEXTROSE MONOHYDRATE 25 G/50ML
25 INJECTION, SOLUTION INTRAVENOUS
Status: DISCONTINUED | OUTPATIENT
Start: 2023-01-01 | End: 2023-01-01 | Stop reason: HOSPADM

## 2023-01-01 RX ORDER — FUROSEMIDE 40 MG/1
40 TABLET ORAL DAILY
Status: DISCONTINUED | OUTPATIENT
Start: 2023-01-01 | End: 2023-01-01

## 2023-01-01 RX ORDER — FUROSEMIDE 40 MG/1
40 TABLET ORAL
Status: DISCONTINUED | OUTPATIENT
Start: 2023-01-01 | End: 2023-01-01

## 2023-01-01 RX ORDER — SODIUM CHLORIDE 0.9 % (FLUSH) 0.9 %
10 SYRINGE (ML) INJECTION EVERY 12 HOURS SCHEDULED
Status: DISCONTINUED | OUTPATIENT
Start: 2023-01-01 | End: 2023-01-01 | Stop reason: SDUPTHER

## 2023-01-01 RX ORDER — SODIUM CHLORIDE 0.9 % (FLUSH) 0.9 %
10 SYRINGE (ML) INJECTION AS NEEDED
Status: DISCONTINUED | OUTPATIENT
Start: 2023-01-01 | End: 2023-01-01 | Stop reason: SDUPTHER

## 2023-01-01 RX ORDER — POTASSIUM CHLORIDE 7.45 MG/ML
10 INJECTION INTRAVENOUS
Status: DISCONTINUED | OUTPATIENT
Start: 2023-01-01 | End: 2023-01-01

## 2023-01-01 RX ORDER — FENTANYL CITRATE 50 UG/ML
100 INJECTION, SOLUTION INTRAMUSCULAR; INTRAVENOUS
Status: DISCONTINUED | OUTPATIENT
Start: 2023-01-01 | End: 2023-01-01 | Stop reason: HOSPADM

## 2023-01-01 RX ORDER — NOREPINEPHRINE BITARTRATE 0.03 MG/ML
.02-.3 INJECTION, SOLUTION INTRAVENOUS
Status: DISCONTINUED | OUTPATIENT
Start: 2023-01-01 | End: 2023-01-01

## 2023-01-01 RX ORDER — LORAZEPAM 2 MG/ML
0.5 INJECTION INTRAMUSCULAR
Status: DISCONTINUED | OUTPATIENT
Start: 2023-01-01 | End: 2023-01-01 | Stop reason: HOSPADM

## 2023-01-01 RX ORDER — FUROSEMIDE 10 MG/ML
40 INJECTION INTRAMUSCULAR; INTRAVENOUS ONCE
Status: COMPLETED | OUTPATIENT
Start: 2023-01-01 | End: 2023-01-01

## 2023-01-01 RX ORDER — BUMETANIDE 0.25 MG/ML
2 INJECTION INTRAMUSCULAR; INTRAVENOUS ONCE
Status: DISCONTINUED | OUTPATIENT
Start: 2023-01-01 | End: 2023-01-01

## 2023-01-01 RX ORDER — DOXYCYCLINE 100 MG/1
100 CAPSULE ORAL EVERY 12 HOURS SCHEDULED
Status: DISCONTINUED | OUTPATIENT
Start: 2023-01-01 | End: 2023-01-01 | Stop reason: HOSPADM

## 2023-01-01 RX ORDER — CETIRIZINE HYDROCHLORIDE, PSEUDOEPHEDRINE HYDROCHLORIDE 5; 120 MG/1; MG/1
1 TABLET, FILM COATED, EXTENDED RELEASE ORAL DAILY PRN
Status: DISCONTINUED | OUTPATIENT
Start: 2023-01-01 | End: 2023-01-01 | Stop reason: HOSPADM

## 2023-01-01 RX ORDER — MIDAZOLAM HYDROCHLORIDE 1 MG/ML
INJECTION INTRAMUSCULAR; INTRAVENOUS
Status: COMPLETED
Start: 2023-01-01 | End: 2023-01-01

## 2023-01-01 RX ORDER — TRIAMCINOLONE ACETONIDE 55 UG/1
1 SPRAY, METERED NASAL DAILY
Status: ON HOLD | COMMUNITY
End: 2023-01-01

## 2023-01-01 RX ORDER — ARFORMOTEROL TARTRATE 15 UG/2ML
15 SOLUTION RESPIRATORY (INHALATION)
Status: DISCONTINUED | OUTPATIENT
Start: 2023-01-01 | End: 2023-01-01

## 2023-01-01 RX ORDER — SODIUM CHLORIDE 0.9 % (FLUSH) 0.9 %
20 SYRINGE (ML) INJECTION AS NEEDED
Status: DISCONTINUED | OUTPATIENT
Start: 2023-01-01 | End: 2023-01-01

## 2023-01-01 RX ORDER — ONDANSETRON 4 MG/1
4 TABLET, ORALLY DISINTEGRATING ORAL EVERY 12 HOURS PRN
Status: DISCONTINUED | OUTPATIENT
Start: 2023-01-01 | End: 2023-01-01 | Stop reason: HOSPADM

## 2023-01-01 RX ORDER — ACETAMINOPHEN 500 MG
500 TABLET ORAL EVERY 6 HOURS PRN
Status: DISCONTINUED | OUTPATIENT
Start: 2023-01-01 | End: 2023-01-01 | Stop reason: HOSPADM

## 2023-01-01 RX ORDER — BUMETANIDE 0.25 MG/ML
2 INJECTION INTRAMUSCULAR; INTRAVENOUS ONCE
Status: COMPLETED | OUTPATIENT
Start: 2023-01-01 | End: 2023-01-01

## 2023-01-01 RX ORDER — FUROSEMIDE 40 MG/1
40 TABLET ORAL DAILY
COMMUNITY

## 2023-01-01 RX ORDER — MIDAZOLAM IN NACL,ISO-OSMOT/PF 50 MG/50ML
1-10 INFUSION BOTTLE (ML) INTRAVENOUS
Status: DISCONTINUED | OUTPATIENT
Start: 2023-01-01 | End: 2023-01-01

## 2023-01-01 RX ORDER — MORPHINE SULFATE 2 MG/ML
2 INJECTION, SOLUTION INTRAMUSCULAR; INTRAVENOUS
Status: DISCONTINUED | OUTPATIENT
Start: 2023-01-01 | End: 2023-01-01 | Stop reason: HOSPADM

## 2023-01-01 RX ORDER — SPIRONOLACTONE 100 MG/1
100 TABLET, FILM COATED ORAL 2 TIMES DAILY
Status: DISCONTINUED | OUTPATIENT
Start: 2023-01-01 | End: 2023-01-01 | Stop reason: HOSPADM

## 2023-01-01 RX ORDER — LEVOTHYROXINE SODIUM 0.07 MG/1
75 TABLET ORAL DAILY
Status: DISCONTINUED | OUTPATIENT
Start: 2023-01-01 | End: 2023-01-01 | Stop reason: HOSPADM

## 2023-01-01 RX ORDER — MECLIZINE HYDROCHLORIDE 25 MG/1
25 TABLET ORAL DAILY
Status: ON HOLD | COMMUNITY
End: 2023-01-01

## 2023-01-01 RX ORDER — MAGNESIUM SULFATE HEPTAHYDRATE 40 MG/ML
4 INJECTION, SOLUTION INTRAVENOUS AS NEEDED
Status: DISCONTINUED | OUTPATIENT
Start: 2023-01-01 | End: 2023-01-01

## 2023-01-01 RX ORDER — ALBUMIN (HUMAN) 12.5 G/50ML
25 SOLUTION INTRAVENOUS EVERY 12 HOURS
Status: DISCONTINUED | OUTPATIENT
Start: 2023-01-01 | End: 2023-01-01

## 2023-01-01 RX ORDER — MIDAZOLAM HYDROCHLORIDE 1 MG/ML
2 INJECTION INTRAMUSCULAR; INTRAVENOUS ONCE
Status: COMPLETED | OUTPATIENT
Start: 2023-01-01 | End: 2023-01-01

## 2023-01-01 RX ORDER — CHLORHEXIDINE GLUCONATE 0.12 MG/ML
15 RINSE ORAL EVERY 12 HOURS SCHEDULED
Status: DISCONTINUED | OUTPATIENT
Start: 2023-01-01 | End: 2023-01-01

## 2023-01-01 RX ORDER — POTASSIUM CHLORIDE 750 MG/1
40 CAPSULE, EXTENDED RELEASE ORAL AS NEEDED
Status: DISCONTINUED | OUTPATIENT
Start: 2023-01-01 | End: 2023-01-01

## 2023-01-01 RX ORDER — GABAPENTIN 400 MG/1
400 CAPSULE ORAL EVERY 12 HOURS SCHEDULED
Status: DISCONTINUED | OUTPATIENT
Start: 2023-01-01 | End: 2023-01-01 | Stop reason: HOSPADM

## 2023-01-01 RX ORDER — MECLIZINE HYDROCHLORIDE 25 MG/1
25 TABLET ORAL DAILY
Status: DISCONTINUED | OUTPATIENT
Start: 2023-01-01 | End: 2023-01-01 | Stop reason: HOSPADM

## 2023-01-01 RX ORDER — DAPAGLIFLOZIN 10 MG/1
10 TABLET, FILM COATED ORAL DAILY
COMMUNITY
End: 2023-01-01 | Stop reason: HOSPADM

## 2023-01-01 RX ORDER — LEVOTHYROXINE SODIUM 0.07 MG/1
75 TABLET ORAL DAILY
Status: DISCONTINUED | OUTPATIENT
Start: 2023-01-01 | End: 2023-01-01

## 2023-01-01 RX ORDER — DEXTROSE MONOHYDRATE 100 MG/ML
50 INJECTION, SOLUTION INTRAVENOUS CONTINUOUS
Status: DISCONTINUED | OUTPATIENT
Start: 2023-01-01 | End: 2023-01-01

## 2023-01-01 RX ORDER — SODIUM CHLORIDE 9 MG/ML
75 INJECTION, SOLUTION INTRAVENOUS CONTINUOUS
Status: DISCONTINUED | OUTPATIENT
Start: 2023-01-01 | End: 2023-01-01

## 2023-01-01 RX ORDER — DIPHENHYDRAMINE HYDROCHLORIDE 50 MG/ML
50 INJECTION INTRAMUSCULAR; INTRAVENOUS ONCE
Status: COMPLETED | OUTPATIENT
Start: 2023-01-01 | End: 2023-01-01

## 2023-01-01 RX ORDER — METHYLPREDNISOLONE SODIUM SUCCINATE 40 MG/ML
40 INJECTION, POWDER, LYOPHILIZED, FOR SOLUTION INTRAMUSCULAR; INTRAVENOUS DAILY
Status: DISCONTINUED | OUTPATIENT
Start: 2023-01-01 | End: 2023-01-01

## 2023-01-01 RX ORDER — POLYETHYLENE GLYCOL 3350 17 G/17G
17 POWDER, FOR SOLUTION ORAL DAILY PRN
Status: DISCONTINUED | OUTPATIENT
Start: 2023-01-01 | End: 2023-01-01 | Stop reason: HOSPADM

## 2023-01-01 RX ORDER — NICOTINE POLACRILEX 4 MG
15 LOZENGE BUCCAL
Status: DISCONTINUED | OUTPATIENT
Start: 2023-01-01 | End: 2023-01-01 | Stop reason: HOSPADM

## 2023-01-01 RX ORDER — ALBUMIN (HUMAN) 12.5 G/50ML
25 SOLUTION INTRAVENOUS ONCE
Status: COMPLETED | OUTPATIENT
Start: 2023-01-01 | End: 2023-01-01

## 2023-01-01 RX ORDER — IPRATROPIUM BROMIDE AND ALBUTEROL SULFATE 2.5; .5 MG/3ML; MG/3ML
3 SOLUTION RESPIRATORY (INHALATION) EVERY 4 HOURS PRN
Status: DISCONTINUED | OUTPATIENT
Start: 2023-01-01 | End: 2023-01-01

## 2023-01-01 RX ORDER — SODIUM CHLORIDE 0.9 % (FLUSH) 0.9 %
20 SYRINGE (ML) INJECTION AS NEEDED
Status: DISCONTINUED | OUTPATIENT
Start: 2023-01-01 | End: 2023-01-01 | Stop reason: HOSPADM

## 2023-01-01 RX ORDER — CASTOR OIL AND BALSAM, PERU 788; 87 MG/G; MG/G
1 OINTMENT TOPICAL EVERY 12 HOURS SCHEDULED
Status: DISCONTINUED | OUTPATIENT
Start: 2023-01-01 | End: 2023-01-01 | Stop reason: HOSPADM

## 2023-01-01 RX ORDER — SODIUM CHLORIDE 0.9 % (FLUSH) 0.9 %
3 SYRINGE (ML) INJECTION EVERY 12 HOURS SCHEDULED
Status: DISCONTINUED | OUTPATIENT
Start: 2023-01-01 | End: 2023-01-01 | Stop reason: HOSPADM

## 2023-01-01 RX ORDER — FUROSEMIDE 40 MG/1
40 TABLET ORAL DAILY
Status: DISCONTINUED | OUTPATIENT
Start: 2023-01-01 | End: 2023-01-01 | Stop reason: HOSPADM

## 2023-01-01 RX ORDER — MAGNESIUM SULFATE HEPTAHYDRATE 40 MG/ML
2 INJECTION, SOLUTION INTRAVENOUS AS NEEDED
Status: DISCONTINUED | OUTPATIENT
Start: 2023-01-01 | End: 2023-01-01

## 2023-01-01 RX ORDER — GLUCAGON 1 MG/ML
1 KIT INJECTION
Status: DISCONTINUED | OUTPATIENT
Start: 2023-01-01 | End: 2023-01-01

## 2023-01-01 RX ORDER — PROCHLORPERAZINE EDISYLATE 5 MG/ML
10 INJECTION INTRAMUSCULAR; INTRAVENOUS EVERY 6 HOURS PRN
Status: DISCONTINUED | OUTPATIENT
Start: 2023-01-01 | End: 2023-01-01 | Stop reason: HOSPADM

## 2023-01-01 RX ORDER — POTASSIUM CHLORIDE 20 MEQ/1
10 TABLET, EXTENDED RELEASE ORAL 2 TIMES DAILY WITH MEALS
Status: CANCELLED | OUTPATIENT
Start: 2023-01-01

## 2023-01-01 RX ORDER — BENZONATATE 100 MG/1
200 CAPSULE ORAL 3 TIMES DAILY PRN
Status: DISCONTINUED | OUTPATIENT
Start: 2023-01-01 | End: 2023-01-01

## 2023-01-01 RX ORDER — FUROSEMIDE 80 MG
80 TABLET ORAL DAILY
Status: DISCONTINUED | OUTPATIENT
Start: 2023-01-01 | End: 2023-01-01

## 2023-01-01 RX ORDER — FUROSEMIDE 10 MG/ML
100 INJECTION INTRAMUSCULAR; INTRAVENOUS ONCE
Status: COMPLETED | OUTPATIENT
Start: 2023-01-01 | End: 2023-01-01

## 2023-01-01 RX ORDER — NITROGLYCERIN 0.4 MG/1
0.4 TABLET SUBLINGUAL
Status: DISCONTINUED | OUTPATIENT
Start: 2023-01-01 | End: 2023-01-01

## 2023-01-01 RX ORDER — IPRATROPIUM BROMIDE AND ALBUTEROL SULFATE 2.5; .5 MG/3ML; MG/3ML
3 SOLUTION RESPIRATORY (INHALATION)
Status: DISCONTINUED | OUTPATIENT
Start: 2023-01-01 | End: 2023-01-01 | Stop reason: SDUPTHER

## 2023-01-01 RX ORDER — QUETIAPINE FUMARATE 25 MG/1
50 TABLET, FILM COATED ORAL NIGHTLY
Status: DISCONTINUED | OUTPATIENT
Start: 2023-01-01 | End: 2023-01-01 | Stop reason: HOSPADM

## 2023-01-01 RX ORDER — ALBUMIN (HUMAN) 12.5 G/50ML
12.5 SOLUTION INTRAVENOUS EVERY 12 HOURS
Status: DISCONTINUED | OUTPATIENT
Start: 2023-01-01 | End: 2023-01-01

## 2023-01-01 RX ORDER — METHYLPREDNISOLONE SODIUM SUCCINATE 125 MG/2ML
125 INJECTION, POWDER, LYOPHILIZED, FOR SOLUTION INTRAMUSCULAR; INTRAVENOUS ONCE
Status: COMPLETED | OUTPATIENT
Start: 2023-01-01 | End: 2023-01-01

## 2023-01-01 RX ORDER — ASPIRIN 81 MG/1
81 TABLET ORAL DAILY
Status: DISCONTINUED | OUTPATIENT
Start: 2023-01-01 | End: 2023-01-01 | Stop reason: HOSPADM

## 2023-01-01 RX ORDER — ACETAMINOPHEN 500 MG
500 TABLET ORAL ONCE
Status: COMPLETED | OUTPATIENT
Start: 2023-01-01 | End: 2023-01-01

## 2023-01-01 RX ORDER — LISINOPRIL 5 MG/1
5 TABLET ORAL DAILY
COMMUNITY
End: 2023-01-01 | Stop reason: HOSPADM

## 2023-01-01 RX ORDER — POTASSIUM CHLORIDE 1.5 G/1.77G
40 POWDER, FOR SOLUTION ORAL EVERY 4 HOURS
Status: COMPLETED | OUTPATIENT
Start: 2023-01-01 | End: 2023-01-01

## 2023-01-01 RX ORDER — GLYCOPYRROLATE 0.2 MG/ML
0.4 INJECTION INTRAMUSCULAR; INTRAVENOUS EVERY 4 HOURS PRN
Status: DISCONTINUED | OUTPATIENT
Start: 2023-01-01 | End: 2023-01-01 | Stop reason: HOSPADM

## 2023-01-01 RX ORDER — AMOXICILLIN 250 MG
2 CAPSULE ORAL NIGHTLY
Status: DISCONTINUED | OUTPATIENT
Start: 2023-01-01 | End: 2023-01-01 | Stop reason: HOSPADM

## 2023-01-01 RX ORDER — PHENYLEPHRINE HCL IN 0.9% NACL 1 MG/10 ML
SYRINGE (ML) INTRAVENOUS
Status: COMPLETED
Start: 2023-01-01 | End: 2023-01-01

## 2023-01-01 RX ORDER — HYDROXYZINE 50 MG/1
50 TABLET, FILM COATED ORAL 4 TIMES DAILY PRN
Status: DISCONTINUED | OUTPATIENT
Start: 2023-01-01 | End: 2023-01-01

## 2023-01-01 RX ORDER — LORATADINE 10 MG/1
10 TABLET ORAL EVERY OTHER DAY
COMMUNITY

## 2023-01-01 RX ORDER — FUROSEMIDE 10 MG/ML
80 INJECTION INTRAMUSCULAR; INTRAVENOUS ONCE
Status: COMPLETED | OUTPATIENT
Start: 2023-01-01 | End: 2023-01-01

## 2023-01-01 RX ORDER — PROPOFOL 10 MG/ML
INJECTION, EMULSION INTRAVENOUS
Status: DISPENSED
Start: 2023-01-01 | End: 2023-01-01

## 2023-01-01 RX ORDER — DEXTROSE MONOHYDRATE 50 MG/ML
150 INJECTION, SOLUTION INTRAVENOUS CONTINUOUS
Status: DISCONTINUED | OUTPATIENT
Start: 2023-01-01 | End: 2023-01-01

## 2023-01-01 RX ORDER — DEXAMETHASONE SODIUM PHOSPHATE 10 MG/ML
10 INJECTION, SOLUTION INTRAMUSCULAR; INTRAVENOUS DAILY
Status: DISCONTINUED | OUTPATIENT
Start: 2023-04-17 | End: 2023-01-01

## 2023-01-01 RX ORDER — FUROSEMIDE 40 MG/1
40 TABLET ORAL 2 TIMES DAILY
Qty: 28 TABLET | Refills: 0 | Status: ON HOLD | OUTPATIENT
Start: 2023-01-01 | End: 2023-01-01

## 2023-01-01 RX ORDER — SODIUM CHLORIDE 0.9 % (FLUSH) 0.9 %
3-10 SYRINGE (ML) INJECTION AS NEEDED
Status: DISCONTINUED | OUTPATIENT
Start: 2023-01-01 | End: 2023-01-01

## 2023-01-01 RX ORDER — SPIRONOLACTONE 100 MG/1
100 TABLET, FILM COATED ORAL DAILY
Status: DISCONTINUED | OUTPATIENT
Start: 2023-01-01 | End: 2023-01-01

## 2023-01-01 RX ORDER — VANCOMYCIN 2 GRAM/500 ML IN 0.9 % SODIUM CHLORIDE INTRAVENOUS
2000 ONCE
Status: COMPLETED | OUTPATIENT
Start: 2023-01-01 | End: 2023-01-01

## 2023-01-01 RX ORDER — METRONIDAZOLE 500 MG/100ML
500 INJECTION, SOLUTION INTRAVENOUS EVERY 8 HOURS
Status: DISCONTINUED | OUTPATIENT
Start: 2023-01-01 | End: 2023-01-01

## 2023-01-01 RX ORDER — PANTOPRAZOLE SODIUM 40 MG/1
40 TABLET, DELAYED RELEASE ORAL
Status: DISCONTINUED | OUTPATIENT
Start: 2023-01-01 | End: 2023-01-01

## 2023-01-01 RX ORDER — NYSTATIN 100000 [USP'U]/G
POWDER TOPICAL EVERY 12 HOURS SCHEDULED
Status: DISCONTINUED | OUTPATIENT
Start: 2023-01-01 | End: 2023-01-01 | Stop reason: HOSPADM

## 2023-01-01 RX ORDER — IPRATROPIUM BROMIDE AND ALBUTEROL SULFATE 2.5; .5 MG/3ML; MG/3ML
3 SOLUTION RESPIRATORY (INHALATION) ONCE
Status: COMPLETED | OUTPATIENT
Start: 2023-01-01 | End: 2023-01-01

## 2023-01-01 RX ORDER — IPRATROPIUM BROMIDE AND ALBUTEROL SULFATE 2.5; .5 MG/3ML; MG/3ML
3 SOLUTION RESPIRATORY (INHALATION)
Status: DISCONTINUED | OUTPATIENT
Start: 2023-01-01 | End: 2023-01-01

## 2023-01-01 RX ORDER — ASPIRIN 81 MG/1
81 TABLET ORAL DAILY
Status: DISCONTINUED | OUTPATIENT
Start: 2023-01-01 | End: 2023-01-01

## 2023-01-01 RX ORDER — POTASSIUM CHLORIDE 20 MEQ/1
10 TABLET, EXTENDED RELEASE ORAL 2 TIMES DAILY WITH MEALS
Status: DISCONTINUED | OUTPATIENT
Start: 2023-01-01 | End: 2023-01-01 | Stop reason: HOSPADM

## 2023-01-01 RX ORDER — MORPHINE SULFATE 2 MG/ML
INJECTION, SOLUTION INTRAMUSCULAR; INTRAVENOUS
Status: COMPLETED
Start: 2023-01-01 | End: 2023-01-01

## 2023-01-01 RX ORDER — BUDESONIDE 0.5 MG/2ML
0.5 INHALANT ORAL
Status: DISCONTINUED | OUTPATIENT
Start: 2023-01-01 | End: 2023-01-01

## 2023-01-01 RX ORDER — FUROSEMIDE 10 MG/ML
20 INJECTION INTRAMUSCULAR; INTRAVENOUS ONCE
Status: COMPLETED | OUTPATIENT
Start: 2023-01-01 | End: 2023-01-01

## 2023-01-01 RX ORDER — BUDESONIDE AND FORMOTEROL FUMARATE DIHYDRATE 160; 4.5 UG/1; UG/1
2 AEROSOL RESPIRATORY (INHALATION)
Status: DISCONTINUED | OUTPATIENT
Start: 2023-01-01 | End: 2023-01-01

## 2023-01-01 RX ORDER — BUDESONIDE AND FORMOTEROL FUMARATE DIHYDRATE 80; 4.5 UG/1; UG/1
2 AEROSOL RESPIRATORY (INHALATION)
Status: DISCONTINUED | OUTPATIENT
Start: 2023-01-01 | End: 2023-01-01

## 2023-01-01 RX ORDER — NITROGLYCERIN 0.4 MG/1
0.4 TABLET SUBLINGUAL
Status: DISCONTINUED | OUTPATIENT
Start: 2023-01-01 | End: 2023-01-01 | Stop reason: HOSPADM

## 2023-01-01 RX ORDER — ATORVASTATIN CALCIUM 20 MG/1
20 TABLET, FILM COATED ORAL NIGHTLY
COMMUNITY

## 2023-01-01 RX ORDER — METHYLPREDNISOLONE SODIUM SUCCINATE 125 MG/2ML
62.5 INJECTION, POWDER, LYOPHILIZED, FOR SOLUTION INTRAMUSCULAR; INTRAVENOUS DAILY
Status: DISCONTINUED | OUTPATIENT
Start: 2023-01-01 | End: 2023-01-01

## 2023-01-01 RX ORDER — GUAIFENESIN 600 MG/1
600 TABLET, EXTENDED RELEASE ORAL EVERY 12 HOURS SCHEDULED
Status: DISCONTINUED | OUTPATIENT
Start: 2023-01-01 | End: 2023-01-01

## 2023-01-01 RX ORDER — POTASSIUM CHLORIDE 7.45 MG/ML
10 INJECTION INTRAVENOUS
Status: COMPLETED | OUTPATIENT
Start: 2023-01-01 | End: 2023-01-01

## 2023-01-01 RX ADMIN — Medication 1000 MCG: at 06:00

## 2023-01-01 RX ADMIN — IPRATROPIUM BROMIDE AND ALBUTEROL SULFATE 3 ML: 2.5; .5 SOLUTION RESPIRATORY (INHALATION) at 15:58

## 2023-01-01 RX ADMIN — Medication 3 ML: at 20:41

## 2023-01-01 RX ADMIN — PROPOFOL 40 MCG/KG/MIN: 10 INJECTION, EMULSION INTRAVENOUS at 07:25

## 2023-01-01 RX ADMIN — FUROSEMIDE 40 MG: 40 TABLET ORAL at 09:06

## 2023-01-01 RX ADMIN — METHYLPREDNISOLONE SODIUM SUCCINATE 62.5 MG: 125 INJECTION, POWDER, FOR SOLUTION INTRAMUSCULAR; INTRAVENOUS at 08:18

## 2023-01-01 RX ADMIN — METHYLPREDNISOLONE SODIUM SUCCINATE 125 MG: 125 INJECTION, POWDER, FOR SOLUTION INTRAMUSCULAR; INTRAVENOUS at 15:37

## 2023-01-01 RX ADMIN — DOXYCYCLINE 100 MG: 100 INJECTION, POWDER, LYOPHILIZED, FOR SOLUTION INTRAVENOUS at 10:34

## 2023-01-01 RX ADMIN — SERTRALINE 50 MG: 50 TABLET, FILM COATED ORAL at 08:37

## 2023-01-01 RX ADMIN — CEFEPIME 2 G: 2 INJECTION, POWDER, FOR SOLUTION INTRAVENOUS at 06:21

## 2023-01-01 RX ADMIN — QUETIAPINE FUMARATE 50 MG: 25 TABLET ORAL at 20:12

## 2023-01-01 RX ADMIN — ATORVASTATIN CALCIUM 20 MG: 20 TABLET, FILM COATED ORAL at 22:50

## 2023-01-01 RX ADMIN — CEFTRIAXONE 1 G: 1 INJECTION, POWDER, FOR SOLUTION INTRAMUSCULAR; INTRAVENOUS at 17:17

## 2023-01-01 RX ADMIN — Medication 0.02 MCG/KG/MIN: at 06:00

## 2023-01-01 RX ADMIN — PANTOPRAZOLE SODIUM 40 MG: 40 INJECTION, POWDER, FOR SOLUTION INTRAVENOUS at 08:16

## 2023-01-01 RX ADMIN — OFLOXACIN 50000 UNITS: 300 TABLET, COATED ORAL at 14:02

## 2023-01-01 RX ADMIN — PROPOFOL 40 MCG/KG/MIN: 10 INJECTION, EMULSION INTRAVENOUS at 23:21

## 2023-01-01 RX ADMIN — POTASSIUM CHLORIDE 40 MEQ: 1.5 POWDER, FOR SOLUTION ORAL at 05:39

## 2023-01-01 RX ADMIN — SERTRALINE 50 MG: 50 TABLET, FILM COATED ORAL at 08:07

## 2023-01-01 RX ADMIN — GUAIFENESIN 600 MG: 600 TABLET, EXTENDED RELEASE ORAL at 20:13

## 2023-01-01 RX ADMIN — PROPOFOL 35 MCG/KG/MIN: 10 INJECTION, EMULSION INTRAVENOUS at 14:42

## 2023-01-01 RX ADMIN — ATORVASTATIN CALCIUM 20 MG: 20 TABLET, FILM COATED ORAL at 23:01

## 2023-01-01 RX ADMIN — FUROSEMIDE 100 MG: 10 INJECTION, SOLUTION INTRAMUSCULAR; INTRAVENOUS at 11:50

## 2023-01-01 RX ADMIN — PROPOFOL 40 MCG/KG/MIN: 10 INJECTION, EMULSION INTRAVENOUS at 09:06

## 2023-01-01 RX ADMIN — Medication 10 ML: at 20:41

## 2023-01-01 RX ADMIN — DOXYCYCLINE 100 MG: 100 INJECTION, POWDER, LYOPHILIZED, FOR SOLUTION INTRAVENOUS at 09:54

## 2023-01-01 RX ADMIN — MICAFUNGIN SODIUM 100 MG: 100 INJECTION, POWDER, LYOPHILIZED, FOR SOLUTION INTRAVENOUS at 14:33

## 2023-01-01 RX ADMIN — HYDROCODONE BITARTRATE AND ACETAMINOPHEN 1 TABLET: 5; 325 TABLET ORAL at 02:57

## 2023-01-01 RX ADMIN — DOXYCYCLINE 100 MG: 100 CAPSULE ORAL at 09:09

## 2023-01-01 RX ADMIN — CETIRIZINE HYDROCHLORIDE 10 MG: 10 TABLET, FILM COATED ORAL at 08:37

## 2023-01-01 RX ADMIN — FUROSEMIDE 20 MG: 10 INJECTION, SOLUTION INTRAMUSCULAR; INTRAVENOUS at 05:39

## 2023-01-01 RX ADMIN — NYSTATIN: 100000 POWDER TOPICAL at 08:43

## 2023-01-01 RX ADMIN — DOXYCYCLINE 100 MG: 100 INJECTION, POWDER, LYOPHILIZED, FOR SOLUTION INTRAVENOUS at 22:14

## 2023-01-01 RX ADMIN — IPRATROPIUM BROMIDE AND ALBUTEROL SULFATE 3 ML: .5; 2.5 SOLUTION RESPIRATORY (INHALATION) at 18:30

## 2023-01-01 RX ADMIN — CEFEPIME 2 G: 2 INJECTION, POWDER, FOR SOLUTION INTRAVENOUS at 18:28

## 2023-01-01 RX ADMIN — CEFEPIME 2 G: 2 INJECTION, POWDER, FOR SOLUTION INTRAVENOUS at 05:54

## 2023-01-01 RX ADMIN — IPRATROPIUM BROMIDE AND ALBUTEROL SULFATE 3 ML: .5; 2.5 SOLUTION RESPIRATORY (INHALATION) at 00:11

## 2023-01-01 RX ADMIN — MECLIZINE HYDROCHLORIDE 25 MG: 25 TABLET ORAL at 14:02

## 2023-01-01 RX ADMIN — METOPROLOL TARTRATE 25 MG: 25 TABLET, FILM COATED ORAL at 14:02

## 2023-01-01 RX ADMIN — PANTOPRAZOLE SODIUM 40 MG: 40 INJECTION, POWDER, FOR SOLUTION INTRAVENOUS at 08:42

## 2023-01-01 RX ADMIN — MINERAL OIL, PETROLATUM: 425; 568 OINTMENT OPHTHALMIC at 08:50

## 2023-01-01 RX ADMIN — Medication 10 ML: at 21:10

## 2023-01-01 RX ADMIN — PROPOFOL 35 MCG/KG/MIN: 10 INJECTION, EMULSION INTRAVENOUS at 01:48

## 2023-01-01 RX ADMIN — BUMETANIDE 2 MG: 0.25 INJECTION, SOLUTION INTRAMUSCULAR; INTRAVENOUS at 18:56

## 2023-01-01 RX ADMIN — NYSTATIN 500000 UNITS: 100000 SUSPENSION ORAL at 21:42

## 2023-01-01 RX ADMIN — METHYLPREDNISOLONE SODIUM SUCCINATE 62.5 MG: 125 INJECTION, POWDER, FOR SOLUTION INTRAMUSCULAR; INTRAVENOUS at 09:06

## 2023-01-01 RX ADMIN — PROPOFOL 35 MCG/KG/MIN: 10 INJECTION, EMULSION INTRAVENOUS at 06:48

## 2023-01-01 RX ADMIN — MINERAL OIL, PETROLATUM: 425; 568 OINTMENT OPHTHALMIC at 09:59

## 2023-01-01 RX ADMIN — MINERAL OIL, PETROLATUM: 425; 568 OINTMENT OPHTHALMIC at 06:21

## 2023-01-01 RX ADMIN — DOCUSATE SODIUM 50 MG AND SENNOSIDES 8.6 MG 2 TABLET: 8.6; 5 TABLET, FILM COATED ORAL at 00:00

## 2023-01-01 RX ADMIN — MINERAL OIL, PETROLATUM: 425; 568 OINTMENT OPHTHALMIC at 09:46

## 2023-01-01 RX ADMIN — Medication 10 ML: at 12:00

## 2023-01-01 RX ADMIN — IPRATROPIUM BROMIDE AND ALBUTEROL SULFATE 3 ML: .5; 2.5 SOLUTION RESPIRATORY (INHALATION) at 00:20

## 2023-01-01 RX ADMIN — SODIUM BICARBONATE 100 MEQ: 84 INJECTION INTRAVENOUS at 09:53

## 2023-01-01 RX ADMIN — CEFEPIME 2 G: 2 INJECTION, POWDER, FOR SOLUTION INTRAVENOUS at 12:01

## 2023-01-01 RX ADMIN — FUROSEMIDE 40 MG: 10 INJECTION, SOLUTION INTRAMUSCULAR; INTRAVENOUS at 17:59

## 2023-01-01 RX ADMIN — LEVOTHYROXINE SODIUM 75 MCG: 0.07 TABLET ORAL at 08:37

## 2023-01-01 RX ADMIN — CHLORHEXIDINE GLUCONATE 15 ML: 1.2 RINSE ORAL at 08:15

## 2023-01-01 RX ADMIN — ASPIRIN 81 MG: 81 TABLET, COATED ORAL at 08:17

## 2023-01-01 RX ADMIN — METOPROLOL TARTRATE 12.5 MG: 25 TABLET, FILM COATED ORAL at 08:37

## 2023-01-01 RX ADMIN — BUDESONIDE 0.5 MG: 0.5 SUSPENSION RESPIRATORY (INHALATION) at 18:30

## 2023-01-01 RX ADMIN — CETIRIZINE HYDROCHLORIDE 10 MG: 10 TABLET, FILM COATED ORAL at 09:05

## 2023-01-01 RX ADMIN — METOPROLOL TARTRATE 12.5 MG: 25 TABLET, FILM COATED ORAL at 21:28

## 2023-01-01 RX ADMIN — GABAPENTIN 400 MG: 400 CAPSULE ORAL at 09:09

## 2023-01-01 RX ADMIN — HYDROCODONE BITARTRATE AND ACETAMINOPHEN 1 TABLET: 5; 325 TABLET ORAL at 09:09

## 2023-01-01 RX ADMIN — FUROSEMIDE 80 MG: 10 INJECTION, SOLUTION INTRAMUSCULAR; INTRAVENOUS at 18:10

## 2023-01-01 RX ADMIN — HUMAN INSULIN 2 UNITS: 100 INJECTION, SOLUTION SUBCUTANEOUS at 17:57

## 2023-01-01 RX ADMIN — MECLIZINE HYDROCHLORIDE 25 MG: 25 TABLET ORAL at 09:09

## 2023-01-01 RX ADMIN — HUMAN INSULIN 2 UNITS: 100 INJECTION, SOLUTION SUBCUTANEOUS at 17:00

## 2023-01-01 RX ADMIN — ALBUMIN (HUMAN) 25 G: 0.25 INJECTION, SOLUTION INTRAVENOUS at 11:00

## 2023-01-01 RX ADMIN — QUETIAPINE FUMARATE 50 MG: 25 TABLET, FILM COATED ORAL at 02:57

## 2023-01-01 RX ADMIN — QUETIAPINE FUMARATE 50 MG: 25 TABLET ORAL at 21:28

## 2023-01-01 RX ADMIN — Medication 10 ML: at 22:18

## 2023-01-01 RX ADMIN — ALBUMIN (HUMAN) 12.5 G: 0.25 INJECTION, SOLUTION INTRAVENOUS at 08:19

## 2023-01-01 RX ADMIN — Medication 10 ML: at 00:00

## 2023-01-01 RX ADMIN — IPRATROPIUM BROMIDE AND ALBUTEROL SULFATE 3 ML: .5; 2.5 SOLUTION RESPIRATORY (INHALATION) at 06:30

## 2023-01-01 RX ADMIN — DOCUSATE SODIUM 50 MG AND SENNOSIDES 8.6 MG 2 TABLET: 8.6; 5 TABLET, FILM COATED ORAL at 21:40

## 2023-01-01 RX ADMIN — SERTRALINE 50 MG: 50 TABLET, FILM COATED ORAL at 08:17

## 2023-01-01 RX ADMIN — Medication 10 ML: at 09:53

## 2023-01-01 RX ADMIN — SERTRALINE 50 MG: 50 TABLET, FILM COATED ORAL at 08:42

## 2023-01-01 RX ADMIN — Medication 10 ML: at 21:00

## 2023-01-01 RX ADMIN — PROPOFOL 35 MCG/KG/MIN: 10 INJECTION, EMULSION INTRAVENOUS at 08:35

## 2023-01-01 RX ADMIN — CASTOR OIL AND BALSAM, PERU 1 APPLICATION: 788; 87 OINTMENT TOPICAL at 08:14

## 2023-01-01 RX ADMIN — DOXYCYCLINE 100 MG: 100 INJECTION, POWDER, LYOPHILIZED, FOR SOLUTION INTRAVENOUS at 21:50

## 2023-01-01 RX ADMIN — POTASSIUM CHLORIDE 40 MEQ: 1.5 POWDER, FOR SOLUTION ORAL at 18:28

## 2023-01-01 RX ADMIN — MINERAL OIL, PETROLATUM: 425; 568 OINTMENT OPHTHALMIC at 01:53

## 2023-01-01 RX ADMIN — FUROSEMIDE 40 MG: 40 TABLET ORAL at 08:37

## 2023-01-01 RX ADMIN — HEPARIN SODIUM 5000 UNITS: 5000 INJECTION, SOLUTION INTRAVENOUS; SUBCUTANEOUS at 05:19

## 2023-01-01 RX ADMIN — Medication 3 ML: at 20:13

## 2023-01-01 RX ADMIN — PROPOFOL 35 MCG/KG/MIN: 10 INJECTION, EMULSION INTRAVENOUS at 05:09

## 2023-01-01 RX ADMIN — CISATRACURIUM BESYLATE 1.71 MCG/KG/MIN: 200 INJECTION, SOLUTION INTRAVENOUS at 03:40

## 2023-01-01 RX ADMIN — MAGNESIUM SULFATE HEPTAHYDRATE 4 G: 40 INJECTION, SOLUTION INTRAVENOUS at 07:34

## 2023-01-01 RX ADMIN — IPRATROPIUM BROMIDE AND ALBUTEROL SULFATE 3 ML: .5; 2.5 SOLUTION RESPIRATORY (INHALATION) at 18:23

## 2023-01-01 RX ADMIN — FUROSEMIDE 40 MG: 40 TABLET ORAL at 09:09

## 2023-01-01 RX ADMIN — GABAPENTIN 200 MG: 100 CAPSULE ORAL at 21:28

## 2023-01-01 RX ADMIN — ATORVASTATIN CALCIUM 20 MG: 20 TABLET, FILM COATED ORAL at 21:00

## 2023-01-01 RX ADMIN — SERTRALINE 50 MG: 50 TABLET, FILM COATED ORAL at 09:09

## 2023-01-01 RX ADMIN — BUDESONIDE 0.5 MG: 0.5 SUSPENSION RESPIRATORY (INHALATION) at 06:30

## 2023-01-01 RX ADMIN — LEVOTHYROXINE SODIUM 75 MCG: 0.07 TABLET ORAL at 08:17

## 2023-01-01 RX ADMIN — Medication 10 ML: at 08:15

## 2023-01-01 RX ADMIN — METRONIDAZOLE 500 MG: 500 INJECTION, SOLUTION INTRAVENOUS at 18:27

## 2023-01-01 RX ADMIN — CHLORHEXIDINE GLUCONATE 15 ML: 1.2 RINSE ORAL at 21:00

## 2023-01-01 RX ADMIN — GUAIFENESIN 600 MG: 600 TABLET, EXTENDED RELEASE ORAL at 08:37

## 2023-01-01 RX ADMIN — ALBUMIN HUMAN 25 G: 0.25 SOLUTION INTRAVENOUS at 20:39

## 2023-01-01 RX ADMIN — Medication 3 ML: at 22:07

## 2023-01-01 RX ADMIN — DEXAMETHASONE SODIUM PHOSPHATE 20 MG: 4 INJECTION, SOLUTION INTRA-ARTICULAR; INTRALESIONAL; INTRAMUSCULAR; INTRAVENOUS; SOFT TISSUE at 08:16

## 2023-01-01 RX ADMIN — BUDESONIDE 0.5 MG: 0.5 SUSPENSION RESPIRATORY (INHALATION) at 06:39

## 2023-01-01 RX ADMIN — DOXYCYCLINE 100 MG: 100 INJECTION, POWDER, LYOPHILIZED, FOR SOLUTION INTRAVENOUS at 22:56

## 2023-01-01 RX ADMIN — PROPOFOL 50 MCG/KG/MIN: 10 INJECTION, EMULSION INTRAVENOUS at 04:31

## 2023-01-01 RX ADMIN — BUDESONIDE 0.5 MG: 0.5 SUSPENSION RESPIRATORY (INHALATION) at 09:13

## 2023-01-01 RX ADMIN — PROPOFOL 35 MCG/KG/MIN: 10 INJECTION, EMULSION INTRAVENOUS at 10:26

## 2023-01-01 RX ADMIN — VANCOMYCIN HYDROCHLORIDE 1250 MG: 5 INJECTION, POWDER, LYOPHILIZED, FOR SOLUTION INTRAVENOUS at 12:30

## 2023-01-01 RX ADMIN — SPIRONOLACTONE 100 MG: 100 TABLET ORAL at 08:39

## 2023-01-01 RX ADMIN — LEVOTHYROXINE SODIUM 75 MCG: 0.07 TABLET ORAL at 09:06

## 2023-01-01 RX ADMIN — POTASSIUM CHLORIDE 10 MEQ: 7.46 INJECTION, SOLUTION INTRAVENOUS at 06:37

## 2023-01-01 RX ADMIN — MINERAL OIL, PETROLATUM: 425; 568 OINTMENT OPHTHALMIC at 13:47

## 2023-01-01 RX ADMIN — SPIRONOLACTONE 100 MG: 100 TABLET ORAL at 09:06

## 2023-01-01 RX ADMIN — DIPHENHYDRAMINE HYDROCHLORIDE 50 MG: 50 INJECTION, SOLUTION INTRAMUSCULAR; INTRAVENOUS at 16:34

## 2023-01-01 RX ADMIN — POTASSIUM CHLORIDE 10 MEQ: 7.46 INJECTION, SOLUTION INTRAVENOUS at 02:12

## 2023-01-01 RX ADMIN — POTASSIUM CHLORIDE 40 MEQ: 1.5 POWDER, FOR SOLUTION ORAL at 18:30

## 2023-01-01 RX ADMIN — SPIRONOLACTONE 100 MG: 100 TABLET ORAL at 21:41

## 2023-01-01 RX ADMIN — METHYLPREDNISOLONE SODIUM SUCCINATE 62.5 MG: 125 INJECTION, POWDER, FOR SOLUTION INTRAMUSCULAR; INTRAVENOUS at 08:37

## 2023-01-01 RX ADMIN — OFLOXACIN 50000 UNITS: 300 TABLET, COATED ORAL at 08:37

## 2023-01-01 RX ADMIN — NITROGLYCERIN 1 INCH: 20 OINTMENT TOPICAL at 20:09

## 2023-01-01 RX ADMIN — DOXYCYCLINE 100 MG: 100 INJECTION, POWDER, LYOPHILIZED, FOR SOLUTION INTRAVENOUS at 22:55

## 2023-01-01 RX ADMIN — METRONIDAZOLE 500 MG: 500 INJECTION, SOLUTION INTRAVENOUS at 12:01

## 2023-01-01 RX ADMIN — POTASSIUM CHLORIDE 10 MEQ: 7.46 INJECTION, SOLUTION INTRAVENOUS at 03:19

## 2023-01-01 RX ADMIN — GABAPENTIN 400 MG: 400 CAPSULE ORAL at 02:57

## 2023-01-01 RX ADMIN — LEVOTHYROXINE SODIUM 75 MCG: 0.07 TABLET ORAL at 09:05

## 2023-01-01 RX ADMIN — CEFEPIME 2 G: 2 INJECTION, POWDER, FOR SOLUTION INTRAVENOUS at 11:18

## 2023-01-01 RX ADMIN — IPRATROPIUM BROMIDE AND ALBUTEROL SULFATE 3 ML: .5; 2.5 SOLUTION RESPIRATORY (INHALATION) at 12:50

## 2023-01-01 RX ADMIN — PROPOFOL 40 MCG/KG/MIN: 10 INJECTION, EMULSION INTRAVENOUS at 04:15

## 2023-01-01 RX ADMIN — CHLORHEXIDINE GLUCONATE 15 ML: 1.2 RINSE ORAL at 20:41

## 2023-01-01 RX ADMIN — HUMAN INSULIN 2 UNITS: 100 INJECTION, SOLUTION SUBCUTANEOUS at 11:17

## 2023-01-01 RX ADMIN — POTASSIUM CHLORIDE 10 MEQ: 20 TABLET, EXTENDED RELEASE ORAL at 17:35

## 2023-01-01 RX ADMIN — IPRATROPIUM BROMIDE AND ALBUTEROL SULFATE 3 ML: .5; 2.5 SOLUTION RESPIRATORY (INHALATION) at 12:39

## 2023-01-01 RX ADMIN — HYDROCODONE BITARTRATE AND ACETAMINOPHEN 1 TABLET: 5; 325 TABLET ORAL at 21:41

## 2023-01-01 RX ADMIN — Medication 10 ML: at 08:07

## 2023-01-01 RX ADMIN — Medication 3 ML: at 21:10

## 2023-01-01 RX ADMIN — CHLORHEXIDINE GLUCONATE 15 ML: 1.2 RINSE ORAL at 21:10

## 2023-01-01 RX ADMIN — CISATRACURIUM BESYLATE 5.63 MCG/KG/MIN: 200 INJECTION, SOLUTION INTRAVENOUS at 04:14

## 2023-01-01 RX ADMIN — PROPOFOL 30 MCG/KG/MIN: 10 INJECTION, EMULSION INTRAVENOUS at 20:40

## 2023-01-01 RX ADMIN — QUETIAPINE FUMARATE 50 MG: 25 TABLET, FILM COATED ORAL at 21:41

## 2023-01-01 RX ADMIN — MAGNESIUM SULFATE HEPTAHYDRATE 4 G: 40 INJECTION, SOLUTION INTRAVENOUS at 20:52

## 2023-01-01 RX ADMIN — IPRATROPIUM BROMIDE AND ALBUTEROL SULFATE 3 ML: .5; 2.5 SOLUTION RESPIRATORY (INHALATION) at 12:08

## 2023-01-01 RX ADMIN — BUDESONIDE AND FORMOTEROL FUMARATE DIHYDRATE 2 PUFF: 160; 4.5 AEROSOL RESPIRATORY (INHALATION) at 18:24

## 2023-01-01 RX ADMIN — METHYLPREDNISOLONE SODIUM SUCCINATE 60 MG: 125 INJECTION, POWDER, FOR SOLUTION INTRAMUSCULAR; INTRAVENOUS at 23:01

## 2023-01-01 RX ADMIN — SODIUM BICARBONATE 50 MEQ: 84 INJECTION INTRAVENOUS at 09:59

## 2023-01-01 RX ADMIN — MINERAL OIL, PETROLATUM: 425; 568 OINTMENT OPHTHALMIC at 14:30

## 2023-01-01 RX ADMIN — PROPOFOL 40 MCG/KG/MIN: 10 INJECTION, EMULSION INTRAVENOUS at 15:39

## 2023-01-01 RX ADMIN — FUROSEMIDE 40 MG: 40 TABLET ORAL at 14:02

## 2023-01-01 RX ADMIN — MIDAZOLAM HYDROCHLORIDE 2 MG: 1 INJECTION INTRAMUSCULAR; INTRAVENOUS at 13:01

## 2023-01-01 RX ADMIN — HUMAN INSULIN 2 UNITS: 100 INJECTION, SOLUTION SUBCUTANEOUS at 00:35

## 2023-01-01 RX ADMIN — CEFEPIME 2 G: 2 INJECTION, POWDER, FOR SOLUTION INTRAVENOUS at 17:00

## 2023-01-01 RX ADMIN — IPRATROPIUM BROMIDE AND ALBUTEROL SULFATE 3 ML: .5; 2.5 SOLUTION RESPIRATORY (INHALATION) at 12:15

## 2023-01-01 RX ADMIN — MINERAL OIL, PETROLATUM: 425; 568 OINTMENT OPHTHALMIC at 06:48

## 2023-01-01 RX ADMIN — CEFTRIAXONE SODIUM 2 G: 2 INJECTION, POWDER, FOR SOLUTION INTRAMUSCULAR; INTRAVENOUS at 16:29

## 2023-01-01 RX ADMIN — NYSTATIN: 100000 POWDER TOPICAL at 09:06

## 2023-01-01 RX ADMIN — GABAPENTIN 400 MG: 400 CAPSULE ORAL at 21:41

## 2023-01-01 RX ADMIN — ATORVASTATIN CALCIUM 20 MG: 20 TABLET, FILM COATED ORAL at 21:41

## 2023-01-01 RX ADMIN — ACETAMINOPHEN 500 MG: 500 TABLET ORAL at 14:06

## 2023-01-01 RX ADMIN — MINERAL OIL, PETROLATUM: 425; 568 OINTMENT OPHTHALMIC at 17:47

## 2023-01-01 RX ADMIN — ALBUMIN (HUMAN) 25 G: 0.25 INJECTION, SOLUTION INTRAVENOUS at 11:11

## 2023-01-01 RX ADMIN — IPRATROPIUM BROMIDE AND ALBUTEROL SULFATE 3 ML: .5; 2.5 SOLUTION RESPIRATORY (INHALATION) at 00:22

## 2023-01-01 RX ADMIN — Medication 3 ML: at 08:37

## 2023-01-01 RX ADMIN — PROPOFOL 35 MCG/KG/MIN: 10 INJECTION, EMULSION INTRAVENOUS at 18:26

## 2023-01-01 RX ADMIN — IPRATROPIUM BROMIDE AND ALBUTEROL SULFATE 3 ML: .5; 2.5 SOLUTION RESPIRATORY (INHALATION) at 06:41

## 2023-01-01 RX ADMIN — PROPOFOL 40 MCG/KG/MIN: 10 INJECTION, EMULSION INTRAVENOUS at 19:22

## 2023-01-01 RX ADMIN — PROPOFOL 35 MCG/KG/MIN: 10 INJECTION, EMULSION INTRAVENOUS at 14:06

## 2023-01-01 RX ADMIN — PANTOPRAZOLE SODIUM 40 MG: 40 TABLET, DELAYED RELEASE ORAL at 05:25

## 2023-01-01 RX ADMIN — BUDESONIDE 0.5 MG: 0.5 SUSPENSION RESPIRATORY (INHALATION) at 06:43

## 2023-01-01 RX ADMIN — Medication 3 ML: at 09:06

## 2023-01-01 RX ADMIN — NYSTATIN: 100000 POWDER TOPICAL at 01:54

## 2023-01-01 RX ADMIN — HUMAN INSULIN 2 UNITS: 100 INJECTION, SOLUTION SUBCUTANEOUS at 06:09

## 2023-01-01 RX ADMIN — DOXYCYCLINE 100 MG: 100 INJECTION, POWDER, LYOPHILIZED, FOR SOLUTION INTRAVENOUS at 21:57

## 2023-01-01 RX ADMIN — PROPOFOL 35 MCG/KG/MIN: 10 INJECTION, EMULSION INTRAVENOUS at 01:08

## 2023-01-01 RX ADMIN — MINERAL OIL, PETROLATUM: 425; 568 OINTMENT OPHTHALMIC at 08:45

## 2023-01-01 RX ADMIN — ATORVASTATIN CALCIUM 20 MG: 20 TABLET, FILM COATED ORAL at 20:41

## 2023-01-01 RX ADMIN — SODIUM ZIRCONIUM CYCLOSILICATE 10 G: 10 POWDER, FOR SUSPENSION ORAL at 08:15

## 2023-01-01 RX ADMIN — Medication 3 ML: at 08:15

## 2023-01-01 RX ADMIN — PROPOFOL 40 MCG/KG/MIN: 10 INJECTION, EMULSION INTRAVENOUS at 14:32

## 2023-01-01 RX ADMIN — MINERAL OIL, PETROLATUM: 425; 568 OINTMENT OPHTHALMIC at 01:09

## 2023-01-01 RX ADMIN — MICAFUNGIN SODIUM 100 MG: 100 INJECTION, POWDER, LYOPHILIZED, FOR SOLUTION INTRAVENOUS at 16:33

## 2023-01-01 RX ADMIN — CISATRACURIUM BESYLATE 3.25 MCG/KG/MIN: 200 INJECTION, SOLUTION INTRAVENOUS at 19:23

## 2023-01-01 RX ADMIN — POTASSIUM CHLORIDE 10 MEQ: 7.46 INJECTION, SOLUTION INTRAVENOUS at 04:31

## 2023-01-01 RX ADMIN — IPRATROPIUM BROMIDE AND ALBUTEROL SULFATE 3 ML: .5; 2.5 SOLUTION RESPIRATORY (INHALATION) at 00:54

## 2023-01-01 RX ADMIN — POTASSIUM CHLORIDE 40 MEQ: 1.5 POWDER, FOR SOLUTION ORAL at 08:59

## 2023-01-01 RX ADMIN — ASPIRIN 81 MG: 81 TABLET, COATED ORAL at 08:43

## 2023-01-01 RX ADMIN — METHYLPREDNISOLONE SODIUM SUCCINATE 60 MG: 125 INJECTION, POWDER, FOR SOLUTION INTRAMUSCULAR; INTRAVENOUS at 09:08

## 2023-01-01 RX ADMIN — CEFTRIAXONE 1 G: 1 INJECTION, POWDER, FOR SOLUTION INTRAMUSCULAR; INTRAVENOUS at 17:31

## 2023-01-01 RX ADMIN — SODIUM CHLORIDE 75 ML/HR: 9 INJECTION, SOLUTION INTRAVENOUS at 14:34

## 2023-01-01 RX ADMIN — SPIRONOLACTONE 100 MG: 100 TABLET ORAL at 09:09

## 2023-01-01 RX ADMIN — SODIUM CHLORIDE 75 ML/HR: 9 INJECTION, SOLUTION INTRAVENOUS at 03:40

## 2023-01-01 RX ADMIN — GUAIFENESIN 600 MG: 600 TABLET, EXTENDED RELEASE ORAL at 08:16

## 2023-01-01 RX ADMIN — Medication 3 ML: at 23:01

## 2023-01-01 RX ADMIN — NYSTATIN: 100000 POWDER TOPICAL at 08:14

## 2023-01-01 RX ADMIN — HUMAN INSULIN 2 UNITS: 100 INJECTION, SOLUTION SUBCUTANEOUS at 06:35

## 2023-01-01 RX ADMIN — IPRATROPIUM BROMIDE AND ALBUTEROL SULFATE 3 ML: .5; 2.5 SOLUTION RESPIRATORY (INHALATION) at 06:42

## 2023-01-01 RX ADMIN — IPRATROPIUM BROMIDE AND ALBUTEROL SULFATE 3 ML: .5; 2.5 SOLUTION RESPIRATORY (INHALATION) at 13:25

## 2023-01-01 RX ADMIN — PROPOFOL 40 MCG/KG/MIN: 10 INJECTION, EMULSION INTRAVENOUS at 23:02

## 2023-01-01 RX ADMIN — PHYTONADIONE 10 MG: 10 INJECTION, EMULSION INTRAMUSCULAR; INTRAVENOUS; SUBCUTANEOUS at 09:46

## 2023-01-01 RX ADMIN — ASPIRIN 81 MG: 81 TABLET, COATED ORAL at 14:02

## 2023-01-01 RX ADMIN — VANCOMYCIN HYDROCHLORIDE 2000 MG: 5 INJECTION, POWDER, LYOPHILIZED, FOR SOLUTION INTRAVENOUS at 11:23

## 2023-01-01 RX ADMIN — METRONIDAZOLE 500 MG: 500 INJECTION, SOLUTION INTRAVENOUS at 03:07

## 2023-01-01 RX ADMIN — CETIRIZINE HYDROCHLORIDE 10 MG: 10 TABLET, FILM COATED ORAL at 09:06

## 2023-01-01 RX ADMIN — POTASSIUM CHLORIDE 40 MEQ: 1.5 POWDER, FOR SOLUTION ORAL at 16:02

## 2023-01-01 RX ADMIN — GUAIFENESIN 600 MG: 600 TABLET, EXTENDED RELEASE ORAL at 09:06

## 2023-01-01 RX ADMIN — CISATRACURIUM BESYLATE 2.82 MCG/KG/MIN: 200 INJECTION, SOLUTION INTRAVENOUS at 15:50

## 2023-01-01 RX ADMIN — ALBUMIN (HUMAN) 12.5 G: 0.25 INJECTION, SOLUTION INTRAVENOUS at 08:42

## 2023-01-01 RX ADMIN — PROPOFOL 40 MCG/KG/MIN: 10 INJECTION, EMULSION INTRAVENOUS at 17:09

## 2023-01-01 RX ADMIN — HYDROCODONE BITARTRATE AND ACETAMINOPHEN 1 TABLET: 5; 325 TABLET ORAL at 08:39

## 2023-01-01 RX ADMIN — ALBUMIN (HUMAN) 12.5 G: 0.25 INJECTION, SOLUTION INTRAVENOUS at 17:44

## 2023-01-01 RX ADMIN — PROPOFOL 35 MCG/KG/MIN: 10 INJECTION, EMULSION INTRAVENOUS at 10:50

## 2023-01-01 RX ADMIN — GUAIFENESIN 600 MG: 600 TABLET, EXTENDED RELEASE ORAL at 21:28

## 2023-01-01 RX ADMIN — CEFEPIME 2 G: 2 INJECTION, POWDER, FOR SOLUTION INTRAVENOUS at 18:03

## 2023-01-01 RX ADMIN — ATORVASTATIN CALCIUM 20 MG: 20 TABLET, FILM COATED ORAL at 21:28

## 2023-01-01 RX ADMIN — MAGNESIUM SULFATE IN WATER 4 G: 40 INJECTION, SOLUTION INTRAVENOUS at 18:58

## 2023-01-01 RX ADMIN — IPRATROPIUM BROMIDE AND ALBUTEROL SULFATE 3 ML: .5; 2.5 SOLUTION RESPIRATORY (INHALATION) at 12:21

## 2023-01-01 RX ADMIN — IPRATROPIUM BROMIDE AND ALBUTEROL SULFATE 3 ML: .5; 2.5 SOLUTION RESPIRATORY (INHALATION) at 00:17

## 2023-01-01 RX ADMIN — QUETIAPINE FUMARATE 50 MG: 25 TABLET ORAL at 22:51

## 2023-01-01 RX ADMIN — GUAIFENESIN 600 MG: 600 TABLET, EXTENDED RELEASE ORAL at 22:51

## 2023-01-01 RX ADMIN — Medication 10 ML: at 23:01

## 2023-01-01 RX ADMIN — HEPARIN SODIUM 5000 UNITS: 5000 INJECTION, SOLUTION INTRAVENOUS; SUBCUTANEOUS at 14:02

## 2023-01-01 RX ADMIN — CISATRACURIUM BESYLATE 0.5 MCG/KG/MIN: 200 INJECTION, SOLUTION INTRAVENOUS at 05:14

## 2023-01-01 RX ADMIN — DOXYCYCLINE 100 MG: 100 CAPSULE ORAL at 14:02

## 2023-01-01 RX ADMIN — DOXYCYCLINE 100 MG: 100 INJECTION, POWDER, LYOPHILIZED, FOR SOLUTION INTRAVENOUS at 10:29

## 2023-01-01 RX ADMIN — NYSTATIN: 100000 POWDER TOPICAL at 23:01

## 2023-01-01 RX ADMIN — CETIRIZINE HYDROCHLORIDE 10 MG: 10 TABLET, FILM COATED ORAL at 08:15

## 2023-01-01 RX ADMIN — LORAZEPAM 0.5 MG: 2 INJECTION INTRAMUSCULAR; INTRAVENOUS at 13:08

## 2023-01-01 RX ADMIN — Medication 3 ML: at 09:53

## 2023-01-01 RX ADMIN — METHYLPREDNISOLONE SODIUM SUCCINATE 1000 MG: 1 INJECTION INTRAMUSCULAR; INTRAVENOUS at 10:20

## 2023-01-01 RX ADMIN — IPRATROPIUM BROMIDE AND ALBUTEROL SULFATE 3 ML: .5; 2.5 SOLUTION RESPIRATORY (INHALATION) at 06:39

## 2023-01-01 RX ADMIN — CHLORHEXIDINE GLUCONATE 15 ML: 1.2 RINSE ORAL at 08:18

## 2023-01-01 RX ADMIN — CETIRIZINE HYDROCHLORIDE 10 MG: 10 TABLET, FILM COATED ORAL at 08:17

## 2023-01-01 RX ADMIN — IPRATROPIUM BROMIDE AND ALBUTEROL SULFATE 3 ML: .5; 2.5 SOLUTION RESPIRATORY (INHALATION) at 18:13

## 2023-01-01 RX ADMIN — SERTRALINE 50 MG: 50 TABLET, FILM COATED ORAL at 09:06

## 2023-01-01 RX ADMIN — ASPIRIN 81 MG: 81 TABLET, COATED ORAL at 08:37

## 2023-01-01 RX ADMIN — CHLORHEXIDINE GLUCONATE 15 ML: 1.2 RINSE ORAL at 08:17

## 2023-01-01 RX ADMIN — NYSTATIN 500000 UNITS: 100000 SUSPENSION ORAL at 09:13

## 2023-01-01 RX ADMIN — PANTOPRAZOLE SODIUM 40 MG: 40 INJECTION, POWDER, FOR SOLUTION INTRAVENOUS at 08:22

## 2023-01-01 RX ADMIN — DEXTROSE MONOHYDRATE 100 ML/HR: 50 INJECTION, SOLUTION INTRAVENOUS at 16:30

## 2023-01-01 RX ADMIN — MINERAL OIL, PETROLATUM: 425; 568 OINTMENT OPHTHALMIC at 05:39

## 2023-01-01 RX ADMIN — SERTRALINE 50 MG: 50 TABLET, FILM COATED ORAL at 09:05

## 2023-01-01 RX ADMIN — GABAPENTIN 400 MG: 400 CAPSULE ORAL at 08:07

## 2023-01-01 RX ADMIN — PROPOFOL 5 MCG/KG/MIN: 10 INJECTION, EMULSION INTRAVENOUS at 06:05

## 2023-01-01 RX ADMIN — CEFEPIME 2 G: 2 INJECTION, POWDER, FOR SOLUTION INTRAVENOUS at 03:06

## 2023-01-01 RX ADMIN — METOPROLOL TARTRATE 12.5 MG: 25 TABLET, FILM COATED ORAL at 22:51

## 2023-01-01 RX ADMIN — HYDROCODONE BITARTRATE AND ACETAMINOPHEN 1 TABLET: 5; 325 TABLET ORAL at 21:28

## 2023-01-01 RX ADMIN — CHLORHEXIDINE GLUCONATE 15 ML: 1.2 RINSE ORAL at 09:07

## 2023-01-01 RX ADMIN — BUDESONIDE 0.5 MG: 0.5 SUSPENSION RESPIRATORY (INHALATION) at 18:23

## 2023-01-01 RX ADMIN — CHLORHEXIDINE GLUCONATE 15 ML: 1.2 RINSE ORAL at 09:54

## 2023-01-01 RX ADMIN — METOPROLOL TARTRATE 12.5 MG: 25 TABLET, FILM COATED ORAL at 09:06

## 2023-01-01 RX ADMIN — Medication 10 ML: at 09:13

## 2023-01-01 RX ADMIN — METHYLPREDNISOLONE SODIUM SUCCINATE 60 MG: 125 INJECTION, POWDER, FOR SOLUTION INTRAMUSCULAR; INTRAVENOUS at 08:55

## 2023-01-01 RX ADMIN — PROPOFOL 35 MCG/KG/MIN: 10 INJECTION, EMULSION INTRAVENOUS at 17:12

## 2023-01-01 RX ADMIN — ALBUMIN HUMAN 25 G: 0.25 SOLUTION INTRAVENOUS at 20:04

## 2023-01-01 RX ADMIN — ALBUMIN (HUMAN) 25 G: 0.25 INJECTION, SOLUTION INTRAVENOUS at 18:59

## 2023-01-01 RX ADMIN — LEVOTHYROXINE SODIUM 75 MCG: 0.07 TABLET ORAL at 08:43

## 2023-01-01 RX ADMIN — ATORVASTATIN CALCIUM 20 MG: 20 TABLET, FILM COATED ORAL at 21:51

## 2023-01-01 RX ADMIN — CEFTRIAXONE 1 G: 1 INJECTION, POWDER, FOR SOLUTION INTRAMUSCULAR; INTRAVENOUS at 17:20

## 2023-01-01 RX ADMIN — HEPARIN SODIUM 5000 UNITS: 5000 INJECTION, SOLUTION INTRAVENOUS; SUBCUTANEOUS at 00:00

## 2023-01-01 RX ADMIN — MINERAL OIL, PETROLATUM: 425; 568 OINTMENT OPHTHALMIC at 16:56

## 2023-01-01 RX ADMIN — CASTOR OIL AND BALSAM, PERU 1 APPLICATION: 788; 87 OINTMENT TOPICAL at 21:00

## 2023-01-01 RX ADMIN — ALBUMIN (HUMAN) 25 G: 0.25 INJECTION, SOLUTION INTRAVENOUS at 22:02

## 2023-01-01 RX ADMIN — PANTOPRAZOLE SODIUM 40 MG: 40 INJECTION, POWDER, FOR SOLUTION INTRAVENOUS at 09:05

## 2023-01-01 RX ADMIN — MORPHINE SULFATE 2 MG: 2 INJECTION, SOLUTION INTRAMUSCULAR; INTRAVENOUS at 13:04

## 2023-01-01 RX ADMIN — CISATRACURIUM BESYLATE 3.63 MCG/KG/MIN: 200 INJECTION, SOLUTION INTRAVENOUS at 21:03

## 2023-01-01 RX ADMIN — PROPOFOL 40 MCG/KG/MIN: 10 INJECTION, EMULSION INTRAVENOUS at 11:17

## 2023-01-01 RX ADMIN — ASPIRIN 81 MG: 81 TABLET, COATED ORAL at 09:09

## 2023-01-01 RX ADMIN — BENZONATATE 200 MG: 100 CAPSULE ORAL at 17:19

## 2023-01-01 RX ADMIN — DOXYCYCLINE 100 MG: 100 CAPSULE ORAL at 21:41

## 2023-01-01 RX ADMIN — IPRATROPIUM BROMIDE AND ALBUTEROL SULFATE 3 ML: .5; 2.5 SOLUTION RESPIRATORY (INHALATION) at 13:39

## 2023-01-01 RX ADMIN — NYSTATIN: 100000 POWDER TOPICAL at 21:10

## 2023-01-01 RX ADMIN — METOPROLOL TARTRATE 25 MG: 25 TABLET, FILM COATED ORAL at 21:41

## 2023-01-01 RX ADMIN — MINERAL OIL, PETROLATUM: 425; 568 OINTMENT OPHTHALMIC at 21:00

## 2023-01-01 RX ADMIN — METOPROLOL TARTRATE 25 MG: 25 TABLET, FILM COATED ORAL at 09:09

## 2023-01-01 RX ADMIN — SERTRALINE 50 MG: 50 TABLET, FILM COATED ORAL at 08:15

## 2023-01-01 RX ADMIN — METHYLPREDNISOLONE SODIUM SUCCINATE 1000 MG: 1 INJECTION, POWDER, LYOPHILIZED, FOR SOLUTION INTRAMUSCULAR; INTRAVENOUS at 12:29

## 2023-01-01 RX ADMIN — MINERAL OIL, PETROLATUM: 425; 568 OINTMENT OPHTHALMIC at 17:31

## 2023-01-01 RX ADMIN — CETIRIZINE HYDROCHLORIDE 10 MG: 10 TABLET, FILM COATED ORAL at 08:42

## 2023-01-01 RX ADMIN — DEXAMETHASONE SODIUM PHOSPHATE 20 MG: 4 INJECTION, SOLUTION INTRA-ARTICULAR; INTRALESIONAL; INTRAMUSCULAR; INTRAVENOUS; SOFT TISSUE at 17:59

## 2023-01-01 RX ADMIN — MIDAZOLAM HYDROCHLORIDE 2 MG: 1 INJECTION, SOLUTION INTRAMUSCULAR; INTRAVENOUS at 13:01

## 2023-01-01 RX ADMIN — LEVOTHYROXINE SODIUM 75 MCG: 0.07 TABLET ORAL at 14:02

## 2023-01-01 RX ADMIN — ASPIRIN 81 MG: 81 TABLET, COATED ORAL at 09:06

## 2023-01-01 RX ADMIN — NYSTATIN: 100000 POWDER TOPICAL at 08:18

## 2023-01-01 RX ADMIN — MINERAL OIL, PETROLATUM: 425; 568 OINTMENT OPHTHALMIC at 00:46

## 2023-01-01 RX ADMIN — IPRATROPIUM BROMIDE AND ALBUTEROL SULFATE 3 ML: .5; 2.5 SOLUTION RESPIRATORY (INHALATION) at 00:35

## 2023-01-01 RX ADMIN — NYSTATIN: 100000 POWDER TOPICAL at 21:58

## 2023-01-01 RX ADMIN — DOXYCYCLINE 100 MG: 100 INJECTION, POWDER, LYOPHILIZED, FOR SOLUTION INTRAVENOUS at 09:59

## 2023-01-01 RX ADMIN — Medication 3 ML: at 21:29

## 2023-01-01 RX ADMIN — PROPOFOL 35 MCG/KG/MIN: 10 INJECTION, EMULSION INTRAVENOUS at 21:58

## 2023-01-01 RX ADMIN — PANTOPRAZOLE SODIUM 40 MG: 40 TABLET, DELAYED RELEASE ORAL at 06:19

## 2023-01-01 RX ADMIN — PROPOFOL 40 MCG/KG/MIN: 10 INJECTION, EMULSION INTRAVENOUS at 11:46

## 2023-01-01 RX ADMIN — LEVOTHYROXINE SODIUM 75 MCG: 0.07 TABLET ORAL at 09:09

## 2023-01-01 RX ADMIN — MINERAL OIL, PETROLATUM: 425; 568 OINTMENT OPHTHALMIC at 13:56

## 2023-01-01 RX ADMIN — DOXYCYCLINE 100 MG: 100 INJECTION, POWDER, LYOPHILIZED, FOR SOLUTION INTRAVENOUS at 09:08

## 2023-01-01 RX ADMIN — Medication 3 ML: at 21:00

## 2023-01-01 RX ADMIN — HUMAN INSULIN 2 UNITS: 100 INJECTION, SOLUTION SUBCUTANEOUS at 00:41

## 2023-01-01 RX ADMIN — IPRATROPIUM BROMIDE AND ALBUTEROL SULFATE 3 ML: .5; 2.5 SOLUTION RESPIRATORY (INHALATION) at 18:24

## 2023-01-01 RX ADMIN — FUROSEMIDE 80 MG: 10 INJECTION, SOLUTION INTRAMUSCULAR; INTRAVENOUS at 16:29

## 2023-01-01 RX ADMIN — METRONIDAZOLE 500 MG: 500 INJECTION, SOLUTION INTRAVENOUS at 11:08

## 2023-01-01 RX ADMIN — NYSTATIN 500000 UNITS: 100000 SUSPENSION ORAL at 14:03

## 2023-01-01 RX ADMIN — DIPHENHYDRAMINE HYDROCHLORIDE 50 MG: 50 INJECTION, SOLUTION INTRAMUSCULAR; INTRAVENOUS at 17:03

## 2023-01-01 RX ADMIN — IPRATROPIUM BROMIDE AND ALBUTEROL SULFATE 3 ML: .5; 2.5 SOLUTION RESPIRATORY (INHALATION) at 18:22

## 2023-01-01 RX ADMIN — IPRATROPIUM BROMIDE AND ALBUTEROL SULFATE 3 ML: .5; 2.5 SOLUTION RESPIRATORY (INHALATION) at 07:03

## 2023-01-01 RX ADMIN — MINERAL OIL, PETROLATUM: 425; 568 OINTMENT OPHTHALMIC at 23:01

## 2023-01-01 RX ADMIN — HEPARIN SODIUM 5000 UNITS: 5000 INJECTION, SOLUTION INTRAVENOUS; SUBCUTANEOUS at 06:13

## 2023-01-01 RX ADMIN — CHLORHEXIDINE GLUCONATE 15 ML: 1.2 RINSE ORAL at 23:01

## 2023-01-01 RX ADMIN — BUMETANIDE 2 MG: 0.25 INJECTION, SOLUTION INTRAMUSCULAR; INTRAVENOUS at 06:51

## 2023-01-01 RX ADMIN — DEXTROSE MONOHYDRATE 100 ML/HR: 100 INJECTION, SOLUTION INTRAVENOUS at 19:12

## 2023-01-01 RX ADMIN — ATORVASTATIN CALCIUM 20 MG: 20 TABLET, FILM COATED ORAL at 20:12

## 2023-01-01 RX ADMIN — LEVOTHYROXINE SODIUM 75 MCG: 0.07 TABLET ORAL at 08:15

## 2023-01-01 RX ADMIN — SPIRONOLACTONE 200 MG: 100 TABLET ORAL at 08:21

## 2023-01-01 RX ADMIN — PROPOFOL 40 MCG/KG/MIN: 10 INJECTION, EMULSION INTRAVENOUS at 02:49

## 2023-01-01 RX ADMIN — POTASSIUM CHLORIDE 10 MEQ: 20 TABLET, EXTENDED RELEASE ORAL at 09:09

## 2023-01-01 RX ADMIN — PHYTONADIONE 10 MG: 10 INJECTION, EMULSION INTRAMUSCULAR; INTRAVENOUS; SUBCUTANEOUS at 13:47

## 2023-01-01 RX ADMIN — MIDAZOLAM HYDROCHLORIDE 1 MG/HR: 1 INJECTION, SOLUTION INTRAVENOUS at 04:56

## 2023-01-01 RX ADMIN — SPIRONOLACTONE 100 MG: 100 TABLET ORAL at 14:02

## 2023-01-01 RX ADMIN — FUROSEMIDE 40 MG: 10 INJECTION, SOLUTION INTRAVENOUS at 08:41

## 2023-01-01 RX ADMIN — HEPARIN SODIUM 5000 UNITS: 5000 INJECTION, SOLUTION INTRAVENOUS; SUBCUTANEOUS at 21:41

## 2023-01-01 RX ADMIN — PROCHLORPERAZINE EDISYLATE 10 MG: 5 INJECTION, SOLUTION INTRAMUSCULAR; INTRAVENOUS at 15:13

## 2023-01-01 RX ADMIN — MINERAL OIL, PETROLATUM: 425; 568 OINTMENT OPHTHALMIC at 05:57

## 2023-01-01 RX ADMIN — PANTOPRAZOLE SODIUM 40 MG: 40 TABLET, DELAYED RELEASE ORAL at 05:19

## 2023-01-01 RX ADMIN — BUDESONIDE 0.5 MG: 0.5 SUSPENSION RESPIRATORY (INHALATION) at 18:13

## 2023-01-01 RX ADMIN — MINERAL OIL, PETROLATUM: 425; 568 OINTMENT OPHTHALMIC at 21:10

## 2023-01-01 RX ADMIN — HYDROCODONE BITARTRATE AND ACETAMINOPHEN 1 TABLET: 5; 325 TABLET ORAL at 08:07

## 2023-02-07 NOTE — PROGRESS NOTES
Aidee Trinidad  [id number]  1965  2/8/2023      Referring Provider:   LAILA Velázquez    Reason for Consultation:   Normocytic anemia     Chief Complaint:  Normocytic anemia       History of Present Illness   Aidee Trinidad is a very pleasant 57 y.o.  female who presents in new consultation at the request of LAILA Velázquez for further management and evaluation of normocytic anemia.     Ms. Trinidad has been experiencing worsening anemia since Fall 2022. More recently in January 2023 her hemoglobin fell to 7.5. She was recently placed on oral iron twice daily one week prior to her initial consultation which she is tolerating without side effects. She last required oral iron after her cardiac bypass surgery and was placed on it for three years. She has never required IV iron. Mr. Trinidad states that she has received blood transfusion once for once anemia over ten years ago for unknown reasons, another during her bypass surgery, and again in 2021 after hip fracture surgery. In the last few weeks she has noticed that her feet stay cold and she has been more short of breath. She denies of any blood loss or black stools. She currently follows with Dr. Harmon for cirrhosis and underwent endoscopic evaluation last year.           The following portions of the patient's history were reviewed and updated as appropriate: allergies, current medications, past family history, past medical history, past social history, past surgical history and problem list.    Allergies   Allergen Reactions   • Nuts Anaphylaxis     peanuts   • Contrast Dye (Echo Or Unknown Ct/Mr) Unknown - Low Severity   • Codeine Itching and Nausea And Vomiting   • Latex Rash       Past Medical History:   Diagnosis Date   • Arthritis    • Chronic back pain    • Coronary artery disease    • Depression     • Diabetes mellitus (HCC)    • Elevated cholesterol    • Fatty liver - cirrhosis     • History of  transfusion    • Hypertension    • Hypothyroid     • Sleep apnea not on CPAP only on supplemental oxygen          Past Surgical History:   Procedure Laterality Date   • CARPAL TUNNEL RELEASE     •  SECTION     • CHOLECYSTECTOMY     • COLONOSCOPY     • CORONARY ANGIOPLASTY WITH STENT PLACEMENT     • ENDOSCOPY N/A 2022    Procedure: ESOPHAGOGASTRODUODENOSCOPY WITH BIOPSY;  Surgeon: Lizzeth Harmon MD;  Location: [facility] OR;  Service: Gastroenterology;  Laterality: N/A;   • HIP TROCHANTERIC NAILING WITH INTRAMEDULLARY HIP SCREW Right 2021    Procedure: HIP TROCHANTERIC NAILING LONG WITH INTRAMEDULLARY HIP SCREW;  Surgeon: Oracio Ponce DO;  Location: [facility] OR;  Service: Orthopedics;  Laterality: Right;   • REPLACEMENT TOTAL KNEE Right    • TUBAL ABDOMINAL LIGATION     • UPPER GASTROINTESTINAL ENDOSCOPY     CABG      Social History     Socioeconomic History   • Marital status:    Tobacco Use   • Smoking status: Never   • Smokeless tobacco: Never   Vaping Use   • Vaping Use: Never used   Substance and Sexual Activity   • Alcohol use: Yes     Comment: Once a year    • Drug use: No   • Sexual activity: Defer   Two children who are healthy. Lives with her son. Non smoker, no alcohol use.       Family History   Problem Relation Age of Onset   • COPD Mother    • Stroke Mother    • Cancer - AML, skin cancer, prostate cancer   Brother    • Diabetes Brother    • Hypertension Brother    • Heart disease -  from MI  Brother    • Breast cancer Neg Hx          Current Outpatient Medications:   •  aspirin 81 MG EC tablet, Take 81 mg by mouth Daily., Disp: , Rfl:   •  atorvastatin (LIPITOR) 40 MG tablet, Take 1 tablet by mouth Every Night., Disp: 30 tablet, Rfl: 0  •  cholecalciferol (VITAMIN D3) 1.25 MG (01632 UT) capsule, Take 50,000 Units by mouth Every 7 (Seven) Days. Prior to [facility] Admission, Patient was on: Pt takes on , Disp: , Rfl:   •  glipizide (GLUCOTROL) 5 MG tablet,  Take 5 mg by mouth Daily., Disp: , Rfl:   •  levothyroxine (SYNTHROID, LEVOTHROID) 50 MCG tablet, Take 75 mcg by mouth Daily., Disp: , Rfl:   •  metFORMIN (GLUCOPHAGE) 500 MG tablet, Take 500 mg by mouth 2 (Two) Times a Day With Meals., Disp: , Rfl:   •  omeprazole (priLOSEC) 40 MG capsule, Take 1 capsule by mouth Daily., Disp: 30 capsule, Rfl: 5  •  potassium chloride (K-DUR,KLOR-CON) 10 MEQ CR tablet, Take 10 mEq by mouth 2 (Two) Times a Day., Disp: , Rfl:   •  QUEtiapine (SEROquel) 50 MG tablet, Take 50 mg by mouth Every Night., Disp: , Rfl:   •  sertraline (ZOLOFT) 50 MG tablet, Take 50 mg by mouth Daily., Disp: , Rfl:   •  furosemide (LASIX) 40 MG tablet, Take 40 mg by mouth Daily., Disp: , Rfl:   •  gabapentin (NEURONTIN) 400 MG capsule, Take 400 mg by mouth Every 12 (Twelve) Hours., Disp: , Rfl:   •  HYDROcodone-acetaminophen (NORCO) 5-325 MG per tablet, Take 1 tablet by mouth 2 (Two) Times a Day., Disp: , Rfl:   •  levocetirizine (XYZAL) 5 MG tablet, Take 5 mg by mouth Every Evening., Disp: , Rfl:   •  loratadine-pseudoephedrine (CLARITIN-D 24-hour)  MG per 24 hr tablet, Take 1 tablet by mouth Daily As Needed for Allergies., Disp: , Rfl:   •  metoprolol tartrate (LOPRESSOR) 25 MG tablet, Take 25 mg by mouth 2 (Two) Times a Day., Disp: , Rfl:   •  sennosides-docusate (PERICOLACE) 8.6-50 MG per tablet, Take 1 tablet by mouth 2 (Two) Times a Day., Disp: 60 tablet, Rfl: 0        Review of Systems  Constitutional: No fever, chills, night sweats or weight loss.   HEENT:  No headaches, vision changes or hearing changes, positive chronic sinus drainage, sore throat.   Cardiovascular:  No palpitations, chest pain, syncopal episodes, positive lower extremity edema.   Pulmonary:  Positive shortness of breath, no hemoptysis, or cough.   Gastrointestinal:  No nausea or vomiting. No constipation or diarrhea. No abdominal pain. No melena or hematochezia.   Genitourinary:  No hematuria, or changes in urination.    Musculoskeletal:  Arthritc pain, no joint problems.   Skin: No rashes or pruritus.   Endocrine:  No hot flashes or chills   Hematologic:  No history of free bleeding, spontaneous bleeding or clotting problems. No lymphadenopathy.    Immunologic:  Seasonal allergies, no frequent infections.   Neurologic: Positive numbness, tingling in her feet, no weakness.   Psychiatric:  No anxiety or depression.         Physical Exam  Vital Signs: These were reviewed and listed as per patient’s electronic medical chart  Vitals:    02/08/23 0958   BP: 112/64   Pulse: 82   Resp: 18   Temp: 97.5 °F (36.4 °C)   SpO2: 90%     General: Awake, alert and oriented, in no distress, in a wheelchair, elevated BMI  HEENT: Head is atraumatic, normocephalic, extraocular movements full, no scleral icterus  Neck: supple, no jvd, lymphadenopathy or masses  Cardiovascular: regular rate and rhythm without murmurs, rubs or gallops  Pulmonary: non-labored, clear to auscultation bilaterally, no wheezing  Abdomen: soft, non-tender, non-distended, normal active bowel sounds present  Extremities: No clubbing, cyanosis, positive lower extremity trace edema  Lymph: No cervical, supraclavicular adenopathy  Neurologic: Mental status as above, alert, awake and oriented, grossly non-focal exam  Skin: warm, dry, intact        Pain Score:  Pain Score    02/08/23 0958   PainSc: 0-No pain       PHQ-Score Total:  PHQ-9 Total Score:          Labs / Studies:    No visits with results within 6 Month(s) from this visit.   Latest known visit with results is:   Lab on 07/21/2022   Component Date Value   • Glucose 07/21/2022 69    • BUN 07/21/2022 15    • Creatinine 07/21/2022 1.19 (H)    • Sodium 07/21/2022 139    • Potassium 07/21/2022 4.8    • Chloride 07/21/2022 101    • CO2 07/21/2022 24.7    • Calcium 07/21/2022 9.3    • Total Protein 07/21/2022 7.5    • Albumin 07/21/2022 3.50    • ALT (SGPT) 07/21/2022 33    • AST (SGOT) 07/21/2022 71 (H)    • Alkaline Phosphatase  07/21/2022 145 (H)    • Total Bilirubin 07/21/2022 1.0    • Globulin 07/21/2022 4.0    • A/G Ratio 07/21/2022 0.9    • BUN/Creatinine Ratio 07/21/2022 12.6    • Anion Gap 07/21/2022 13.3    • eGFR 07/21/2022 53.4 (L)    • Iron 07/21/2022 83    • Iron Saturation 07/21/2022 15 (L)    • Transferrin 07/21/2022 373 (H)    • TIBC 07/21/2022 556 (H)    • Protime 07/21/2022 14.9 (H)    • INR 07/21/2022 1.14 (H)         10/07/2022                  12/05/2022             1/02/2023              1/31/2023                        Assessment & Plan   Aidee Trinidad is a very pleasant 57 y.o.  female who presents in new consultation at the request of LAILA Velázquez for further management and evaluation of normocytic anemia.     Normocytic anemia  - Patient continues to have an ongoing normocytic anemia. Her iron level was low at her primary providers office and she was started on oral iron twice daily which she has been tolerating well without any side effects. Iron panel today shows an elevated iron level and could be skewed by replacement which she took this morning. Will continue.  - B12 and folate levels are pending. CRP is elevated indicating underlying inflammation which could also be suppressing the bone marrow. She has no significant evidence for hemolysis.   - Given borderline hemoglobin levels will have her return next week for repeat CBC to assess transfusion needs and obtain further lab work (CBC, peripheral smear iron panel, ferritin, SPEP, serum free light chains, copper, zinc, tissue transglutaminase)    ACO / RIP/Other  Quality measures  -  Aidee Trinidad  reports that she has never smoked. She has never used smokeless tobacco.  -  Aidee Trinidad received 2022 flu vaccine.  -  Aidee Trinidad reports a pain score of 0.  Given her pain assessment as noted, treatment options were discussed and the following options were decided upon as a follow-up plan to address the patient's pain:  continuation of current treatment plan for pain.  -  Current outpatient and discharge medications have been reconciled for the patient.  Reviewed by: Oumou Charles MD      I will have the patient return for labs in one week - CBC, peripheral smear iron panel, ferritin, SPEP, serum free light chains, copper, zinc, tissue transglutaminase and in follow up appointment to review test results in two weeks. She understands that should she have any questions or concerns prior to her appointment she should give us a call at any time and I would be happy to see her sooner. It was a pleasure to see this patient in clinic today, thank you for allowing me to participate in the care of this patient.        Oumou Charles MD   02/08/23   10:18 EST

## 2023-02-08 ENCOUNTER — CONSULT (OUTPATIENT)
Dept: ONCOLOGY | Facility: CLINIC | Age: 58
End: 2023-02-08
Payer: COMMERCIAL

## 2023-02-08 ENCOUNTER — LAB (OUTPATIENT)
Dept: ONCOLOGY | Facility: CLINIC | Age: 58
End: 2023-02-08
Payer: MEDICARE

## 2023-02-08 VITALS
BODY MASS INDEX: 57.21 KG/M2 | RESPIRATION RATE: 18 BRPM | HEIGHT: 57 IN | DIASTOLIC BLOOD PRESSURE: 64 MMHG | WEIGHT: 265.2 LBS | OXYGEN SATURATION: 90 % | HEART RATE: 82 BPM | TEMPERATURE: 97.5 F | SYSTOLIC BLOOD PRESSURE: 112 MMHG

## 2023-02-08 DIAGNOSIS — D64.9 NORMOCYTIC ANEMIA: Primary | ICD-10-CM

## 2023-02-08 DIAGNOSIS — H05.011: ICD-10-CM

## 2023-02-08 DIAGNOSIS — D50.9 IRON DEFICIENCY ANEMIA, UNSPECIFIED IRON DEFICIENCY ANEMIA TYPE: ICD-10-CM

## 2023-02-08 LAB
BASOPHILS # BLD AUTO: 0.06 10*3/MM3 (ref 0–0.2)
BASOPHILS NFR BLD AUTO: 1.1 % (ref 0–1.5)
CRP SERPL-MCNC: 0.68 MG/DL (ref 0–0.5)
DEPRECATED RDW RBC AUTO: 56.9 FL (ref 37–54)
EOSINOPHIL # BLD AUTO: 0.18 10*3/MM3 (ref 0–0.4)
EOSINOPHIL NFR BLD AUTO: 3.3 % (ref 0.3–6.2)
ERYTHROCYTE [DISTWIDTH] IN BLOOD BY AUTOMATED COUNT: 20.5 % (ref 12.3–15.4)
FERRITIN SERPL-MCNC: 18.57 NG/ML (ref 13–150)
FOLATE SERPL-MCNC: 11 NG/ML (ref 4.78–24.2)
HAPTOGLOB SERPL-MCNC: 81 MG/DL (ref 30–200)
HCT VFR BLD AUTO: 26.6 % (ref 34–46.6)
HGB BLD-MCNC: 7.3 G/DL (ref 12–15.9)
HYPOCHROMIA BLD QL: NORMAL
IMM GRANULOCYTES # BLD AUTO: 0.03 10*3/MM3 (ref 0–0.05)
IMM GRANULOCYTES NFR BLD AUTO: 0.5 % (ref 0–0.5)
IRON 24H UR-MRATE: 183 MCG/DL (ref 37–145)
IRON SATN MFR SERPL: 34 % (ref 20–50)
LDH SERPL-CCNC: 285 U/L (ref 135–214)
LYMPHOCYTES # BLD AUTO: 1.59 10*3/MM3 (ref 0.7–3.1)
LYMPHOCYTES NFR BLD AUTO: 28.9 % (ref 19.6–45.3)
MCH RBC QN AUTO: 22.2 PG (ref 26.6–33)
MCHC RBC AUTO-ENTMCNC: 27.4 G/DL (ref 31.5–35.7)
MCV RBC AUTO: 80.9 FL (ref 79–97)
MONOCYTES # BLD AUTO: 0.4 10*3/MM3 (ref 0.1–0.9)
MONOCYTES NFR BLD AUTO: 7.3 % (ref 5–12)
NEUTROPHILS NFR BLD AUTO: 3.25 10*3/MM3 (ref 1.7–7)
NEUTROPHILS NFR BLD AUTO: 58.9 % (ref 42.7–76)
NRBC BLD AUTO-RTO: 0 /100 WBC (ref 0–0.2)
PLAT MORPH BLD: NORMAL
PLATELET # BLD AUTO: 120 10*3/MM3 (ref 140–450)
PMV BLD AUTO: 11.4 FL (ref 6–12)
RBC # BLD AUTO: 3.29 10*6/MM3 (ref 3.77–5.28)
RETICS # AUTO: 0.11 10*6/MM3 (ref 0.02–0.13)
RETICS/RBC NFR AUTO: 3.21 % (ref 0.7–1.9)
STOMATOCYTES BLD QL SMEAR: NORMAL
TIBC SERPL-MCNC: 539 MCG/DL (ref 298–536)
TRANSFERRIN SERPL-MCNC: 362 MG/DL (ref 200–360)
VIT B12 BLD-MCNC: 1053 PG/ML (ref 211–946)
WBC NRBC COR # BLD: 5.51 10*3/MM3 (ref 3.4–10.8)

## 2023-02-08 PROCEDURE — 83615 LACTATE (LD) (LDH) ENZYME: CPT | Performed by: INTERNAL MEDICINE

## 2023-02-08 PROCEDURE — 83540 ASSAY OF IRON: CPT | Performed by: INTERNAL MEDICINE

## 2023-02-08 PROCEDURE — 82607 VITAMIN B-12: CPT | Performed by: INTERNAL MEDICINE

## 2023-02-08 PROCEDURE — 85007 BL SMEAR W/DIFF WBC COUNT: CPT | Performed by: INTERNAL MEDICINE

## 2023-02-08 PROCEDURE — 86140 C-REACTIVE PROTEIN: CPT | Performed by: INTERNAL MEDICINE

## 2023-02-08 PROCEDURE — 82728 ASSAY OF FERRITIN: CPT | Performed by: INTERNAL MEDICINE

## 2023-02-08 PROCEDURE — 84466 ASSAY OF TRANSFERRIN: CPT | Performed by: INTERNAL MEDICINE

## 2023-02-08 PROCEDURE — 85025 COMPLETE CBC W/AUTO DIFF WBC: CPT | Performed by: INTERNAL MEDICINE

## 2023-02-08 PROCEDURE — 83010 ASSAY OF HAPTOGLOBIN QUANT: CPT | Performed by: INTERNAL MEDICINE

## 2023-02-08 PROCEDURE — 82746 ASSAY OF FOLIC ACID SERUM: CPT | Performed by: INTERNAL MEDICINE

## 2023-02-08 PROCEDURE — 99204 OFFICE O/P NEW MOD 45 MIN: CPT | Performed by: INTERNAL MEDICINE

## 2023-02-08 PROCEDURE — 85045 AUTOMATED RETICULOCYTE COUNT: CPT | Performed by: INTERNAL MEDICINE

## 2023-02-08 NOTE — PROGRESS NOTES
Venipuncture Blood Specimen Collection  Venipuncture performed in left arm by Litzy Garcia MA with good hemostasis. Patient tolerated the procedure well without complications.   02/08/23   Litzy Garcia MA

## 2023-02-13 ENCOUNTER — PATIENT ROUNDING (BHMG ONLY) (OUTPATIENT)
Dept: ONCOLOGY | Facility: CLINIC | Age: 58
End: 2023-02-13
Payer: MEDICARE

## 2023-02-16 ENCOUNTER — LAB (OUTPATIENT)
Dept: ONCOLOGY | Facility: CLINIC | Age: 58
End: 2023-02-16
Payer: MEDICARE

## 2023-02-16 DIAGNOSIS — D64.9 NORMOCYTIC ANEMIA: ICD-10-CM

## 2023-02-16 DIAGNOSIS — D50.9 IRON DEFICIENCY ANEMIA, UNSPECIFIED IRON DEFICIENCY ANEMIA TYPE: ICD-10-CM

## 2023-02-16 LAB
ANISOCYTOSIS BLD QL: NORMAL
BASOPHILS # BLD AUTO: 0.09 10*3/MM3 (ref 0–0.2)
BASOPHILS NFR BLD AUTO: 1.7 % (ref 0–1.5)
DAT IGG GEL: POSITIVE
DAT POLY-SP REAG RBC QL: POSITIVE
DEPRECATED RDW RBC AUTO: 75.7 FL (ref 37–54)
EOSINOPHIL # BLD AUTO: 0.13 10*3/MM3 (ref 0–0.4)
EOSINOPHIL NFR BLD AUTO: 2.5 % (ref 0.3–6.2)
ERYTHROCYTE [DISTWIDTH] IN BLOOD BY AUTOMATED COUNT: 26.3 % (ref 12.3–15.4)
FERRITIN SERPL-MCNC: 48.01 NG/ML (ref 13–150)
HCT VFR BLD AUTO: 28.4 % (ref 34–46.6)
HGB BLD-MCNC: 8 G/DL (ref 12–15.9)
HYPOCHROMIA BLD QL: NORMAL
IMM GRANULOCYTES # BLD AUTO: 0.04 10*3/MM3 (ref 0–0.05)
IMM GRANULOCYTES NFR BLD AUTO: 0.8 % (ref 0–0.5)
IRON 24H UR-MRATE: 74 MCG/DL (ref 37–145)
IRON SATN MFR SERPL: 15 % (ref 20–50)
LARGE PLATELETS: NORMAL
LYMPHOCYTES # BLD AUTO: 1.48 10*3/MM3 (ref 0.7–3.1)
LYMPHOCYTES NFR BLD AUTO: 28.1 % (ref 19.6–45.3)
MCH RBC QN AUTO: 23.8 PG (ref 26.6–33)
MCHC RBC AUTO-ENTMCNC: 28.2 G/DL (ref 31.5–35.7)
MCV RBC AUTO: 84.5 FL (ref 79–97)
MONOCYTES # BLD AUTO: 0.49 10*3/MM3 (ref 0.1–0.9)
MONOCYTES NFR BLD AUTO: 9.3 % (ref 5–12)
NEUTROPHILS NFR BLD AUTO: 3.03 10*3/MM3 (ref 1.7–7)
NEUTROPHILS NFR BLD AUTO: 57.6 % (ref 42.7–76)
NRBC BLD AUTO-RTO: 0 /100 WBC (ref 0–0.2)
PLATELET # BLD AUTO: 142 10*3/MM3 (ref 140–450)
PMV BLD AUTO: 10.5 FL (ref 6–12)
RBC # BLD AUTO: 3.36 10*6/MM3 (ref 3.77–5.28)
TIBC SERPL-MCNC: 487 MCG/DL (ref 298–536)
TRANSFERRIN SERPL-MCNC: 327 MG/DL (ref 200–360)
WBC NRBC COR # BLD: 5.26 10*3/MM3 (ref 3.4–10.8)

## 2023-02-16 PROCEDURE — 84165 PROTEIN E-PHORESIS SERUM: CPT | Performed by: INTERNAL MEDICINE

## 2023-02-16 PROCEDURE — 84466 ASSAY OF TRANSFERRIN: CPT | Performed by: INTERNAL MEDICINE

## 2023-02-16 PROCEDURE — 82784 ASSAY IGA/IGD/IGG/IGM EACH: CPT | Performed by: INTERNAL MEDICINE

## 2023-02-16 PROCEDURE — 82728 ASSAY OF FERRITIN: CPT | Performed by: INTERNAL MEDICINE

## 2023-02-16 PROCEDURE — 83521 IG LIGHT CHAINS FREE EACH: CPT | Performed by: INTERNAL MEDICINE

## 2023-02-16 PROCEDURE — 36415 COLL VENOUS BLD VENIPUNCTURE: CPT | Performed by: INTERNAL MEDICINE

## 2023-02-16 PROCEDURE — 86364 TISS TRNSGLTMNASE EA IG CLAS: CPT | Performed by: INTERNAL MEDICINE

## 2023-02-16 PROCEDURE — 84630 ASSAY OF ZINC: CPT | Performed by: INTERNAL MEDICINE

## 2023-02-16 PROCEDURE — 86334 IMMUNOFIX E-PHORESIS SERUM: CPT | Performed by: INTERNAL MEDICINE

## 2023-02-16 PROCEDURE — 86880 COOMBS TEST DIRECT: CPT | Performed by: INTERNAL MEDICINE

## 2023-02-16 PROCEDURE — 85007 BL SMEAR W/DIFF WBC COUNT: CPT | Performed by: INTERNAL MEDICINE

## 2023-02-16 PROCEDURE — 82525 ASSAY OF COPPER: CPT | Performed by: INTERNAL MEDICINE

## 2023-02-16 PROCEDURE — 83540 ASSAY OF IRON: CPT | Performed by: INTERNAL MEDICINE

## 2023-02-16 PROCEDURE — 84155 ASSAY OF PROTEIN SERUM: CPT | Performed by: INTERNAL MEDICINE

## 2023-02-16 PROCEDURE — 85025 COMPLETE CBC W/AUTO DIFF WBC: CPT | Performed by: INTERNAL MEDICINE

## 2023-02-16 NOTE — PROGRESS NOTES
Venipuncture Blood Specimen Collection  Venipuncture performed in right arm by Taran Koch MA with good hemostasis. Patient tolerated the procedure well without complications.   02/16/23   Taran Koch MA

## 2023-02-17 LAB
ALBUMIN SERPL ELPH-MCNC: 2.9 G/DL (ref 2.9–4.4)
ALBUMIN/GLOB SERPL: 0.8 {RATIO} (ref 0.7–1.7)
ALPHA1 GLOB SERPL ELPH-MCNC: 0.3 G/DL (ref 0–0.4)
ALPHA2 GLOB SERPL ELPH-MCNC: 0.6 G/DL (ref 0.4–1)
B-GLOBULIN SERPL ELPH-MCNC: 1.1 G/DL (ref 0.7–1.3)
GAMMA GLOB SERPL ELPH-MCNC: 2.1 G/DL (ref 0.4–1.8)
GLOBULIN SER-MCNC: 4 G/DL (ref 2.2–3.9)
IGA SERPL-MCNC: 555 MG/DL (ref 87–352)
IGG SERPL-MCNC: 2055 MG/DL (ref 586–1602)
IGM SERPL-MCNC: 164 MG/DL (ref 26–217)
INTERPRETATION SERPL IEP-IMP: ABNORMAL
KAPPA LC FREE SER-MCNC: 268.6 MG/L (ref 3.3–19.4)
KAPPA LC FREE/LAMBDA FREE SER: 5.94 {RATIO} (ref 0.26–1.65)
LABORATORY COMMENT REPORT: ABNORMAL
LAMBDA LC FREE SERPL-MCNC: 45.2 MG/L (ref 5.7–26.3)
M PROTEIN SERPL ELPH-MCNC: ABNORMAL G/DL
PROT SERPL-MCNC: 6.9 G/DL (ref 6–8.5)
REF LAB TEST METHOD: NORMAL
TTG IGA SER-ACNC: <2 U/ML (ref 0–3)

## 2023-02-18 LAB — COPPER SERPL-MCNC: 115 UG/DL (ref 80–158)

## 2023-02-19 LAB — ZINC SERPL-MCNC: 47 UG/DL (ref 44–115)

## 2023-02-20 ENCOUNTER — APPOINTMENT (OUTPATIENT)
Dept: CT IMAGING | Facility: HOSPITAL | Age: 58
End: 2023-02-20
Payer: MEDICARE

## 2023-02-20 ENCOUNTER — LAB (OUTPATIENT)
Dept: LAB | Facility: HOSPITAL | Age: 58
End: 2023-02-20
Payer: MEDICARE

## 2023-02-20 ENCOUNTER — TRANSCRIBE ORDERS (OUTPATIENT)
Dept: ADMINISTRATIVE | Facility: HOSPITAL | Age: 58
End: 2023-02-20
Payer: MEDICARE

## 2023-02-20 ENCOUNTER — APPOINTMENT (OUTPATIENT)
Dept: ULTRASOUND IMAGING | Facility: HOSPITAL | Age: 58
End: 2023-02-20
Payer: MEDICARE

## 2023-02-20 ENCOUNTER — HOSPITAL ENCOUNTER (EMERGENCY)
Facility: HOSPITAL | Age: 58
Discharge: HOME | End: 2023-02-21
Attending: STUDENT IN AN ORGANIZED HEALTH CARE EDUCATION/TRAINING PROGRAM | Admitting: EMERGENCY MEDICINE
Payer: MEDICARE

## 2023-02-20 ENCOUNTER — APPOINTMENT (OUTPATIENT)
Dept: GENERAL RADIOLOGY | Facility: HOSPITAL | Age: 58
End: 2023-02-20
Payer: MEDICARE

## 2023-02-20 DIAGNOSIS — R60.1 ANASARCA: Primary | ICD-10-CM

## 2023-02-20 DIAGNOSIS — R18.8 CIRRHOSIS OF LIVER WITH ASCITES, UNSPECIFIED HEPATIC CIRRHOSIS TYPE: ICD-10-CM

## 2023-02-20 DIAGNOSIS — K74.60 CIRRHOSIS OF LIVER WITH ASCITES, UNSPECIFIED HEPATIC CIRRHOSIS TYPE: ICD-10-CM

## 2023-02-20 DIAGNOSIS — J32.0 MAXILLARY SINUSITIS, UNSPECIFIED CHRONICITY: Primary | ICD-10-CM

## 2023-02-20 DIAGNOSIS — J32.0 MAXILLARY SINUSITIS, UNSPECIFIED CHRONICITY: ICD-10-CM

## 2023-02-20 LAB
A-A DO2: 31.5 MMHG (ref 0–300)
ALBUMIN SERPL-MCNC: 3.1 G/DL (ref 3.5–5.2)
ALBUMIN/GLOB SERPL: 0.7 G/DL
ALP SERPL-CCNC: 139 U/L (ref 39–117)
ALT SERPL W P-5'-P-CCNC: 20 U/L (ref 1–33)
ANION GAP SERPL CALCULATED.3IONS-SCNC: 7 MMOL/L (ref 5–15)
ARTERIAL PATENCY WRIST A: POSITIVE
AST SERPL-CCNC: 45 U/L (ref 1–32)
ATMOSPHERIC PRESS: 720 MMHG
BASE EXCESS BLDA CALC-SCNC: 3.6 MMOL/L (ref 0–2)
BASOPHILS # BLD AUTO: 0.07 10*3/MM3 (ref 0–0.2)
BASOPHILS NFR BLD AUTO: 1 % (ref 0–1.5)
BDY SITE: ABNORMAL
BILIRUB SERPL-MCNC: 2 MG/DL (ref 0–1.2)
BODY TEMPERATURE: 0 C
BUN SERPL-MCNC: 5 MG/DL (ref 6–20)
BUN/CREAT SERPL: 6.8 (ref 7–25)
CALCIUM SPEC-SCNC: 8.8 MG/DL (ref 8.6–10.5)
CHLORIDE SERPL-SCNC: 105 MMOL/L (ref 98–107)
CO2 BLDA-SCNC: 29.5 MMOL/L (ref 22–33)
CO2 SERPL-SCNC: 27 MMOL/L (ref 22–29)
COHGB MFR BLD: 1.7 % (ref 0–5)
CREAT SERPL-MCNC: 0.74 MG/DL (ref 0.57–1)
CRP SERPL-MCNC: 0.98 MG/DL (ref 0–0.5)
D-LACTATE SERPL-SCNC: 1.9 MMOL/L (ref 0.5–2)
DACRYOCYTES BLD QL SMEAR: NORMAL
DEPRECATED RDW RBC AUTO: 83 FL (ref 37–54)
EGFRCR SERPLBLD CKD-EPI 2021: 94.5 ML/MIN/1.73
EOSINOPHIL # BLD AUTO: 0.17 10*3/MM3 (ref 0–0.4)
EOSINOPHIL NFR BLD AUTO: 2.3 % (ref 0.3–6.2)
ERYTHROCYTE [DISTWIDTH] IN BLOOD BY AUTOMATED COUNT: 27.5 % (ref 12.3–15.4)
GEN 5 2HR TROPONIN T REFLEX: 46 NG/L
GLOBULIN UR ELPH-MCNC: 4.4 GM/DL
GLUCOSE SERPL-MCNC: 65 MG/DL (ref 65–99)
HCO3 BLDA-SCNC: 28.2 MMOL/L (ref 20–26)
HCT VFR BLD AUTO: 31.8 % (ref 34–46.6)
HCT VFR BLD CALC: 27.4 % (ref 38–51)
HGB BLD-MCNC: 8.9 G/DL (ref 12–15.9)
HGB BLDA-MCNC: 8.9 G/DL (ref 13.5–17.5)
HOLD SPECIMEN: NORMAL
HOLD SPECIMEN: NORMAL
HYPOCHROMIA BLD QL: NORMAL
IMM GRANULOCYTES # BLD AUTO: 0.02 10*3/MM3 (ref 0–0.05)
IMM GRANULOCYTES NFR BLD AUTO: 0.3 % (ref 0–0.5)
INHALED O2 CONCENTRATION: 21 %
LYMPHOCYTES # BLD AUTO: 1.62 10*3/MM3 (ref 0.7–3.1)
LYMPHOCYTES NFR BLD AUTO: 22.1 % (ref 19.6–45.3)
Lab: ABNORMAL
MCH RBC QN AUTO: 24.4 PG (ref 26.6–33)
MCHC RBC AUTO-ENTMCNC: 28 G/DL (ref 31.5–35.7)
MCV RBC AUTO: 87.1 FL (ref 79–97)
METHGB BLD QL: 0.3 % (ref 0–3)
MODALITY: ABNORMAL
MONOCYTES # BLD AUTO: 0.8 10*3/MM3 (ref 0.1–0.9)
MONOCYTES NFR BLD AUTO: 10.9 % (ref 5–12)
NEUTROPHILS NFR BLD AUTO: 4.64 10*3/MM3 (ref 1.7–7)
NEUTROPHILS NFR BLD AUTO: 63.4 % (ref 42.7–76)
NOTE: ABNORMAL
NRBC BLD AUTO-RTO: 0 /100 WBC (ref 0–0.2)
NT-PROBNP SERPL-MCNC: 792.4 PG/ML (ref 0–900)
OXYHGB MFR BLDV: 91.2 % (ref 94–99)
PCO2 BLDA: 42.1 MM HG (ref 35–45)
PCO2 TEMP ADJ BLD: ABNORMAL MM[HG]
PH BLDA: 7.43 PH UNITS (ref 7.35–7.45)
PH, TEMP CORRECTED: ABNORMAL
PLAT MORPH BLD: NORMAL
PLATELET # BLD AUTO: 168 10*3/MM3 (ref 140–450)
PMV BLD AUTO: 10.3 FL (ref 6–12)
PO2 BLDA: 62.3 MM HG (ref 83–108)
PO2 TEMP ADJ BLD: ABNORMAL MM[HG]
POTASSIUM SERPL-SCNC: 4.3 MMOL/L (ref 3.5–5.2)
PROCALCITONIN SERPL-MCNC: 0.09 NG/ML (ref 0–0.25)
PROT SERPL-MCNC: 7.5 G/DL (ref 6–8.5)
QT INTERVAL: 406 MS
QTC INTERVAL: 450 MS
RBC # BLD AUTO: 3.65 10*6/MM3 (ref 3.77–5.28)
SAO2 % BLDCOA: 93.1 % (ref 94–99)
SODIUM SERPL-SCNC: 139 MMOL/L (ref 136–145)
STOMATOCYTES BLD QL SMEAR: NORMAL
TROPONIN T DELTA: 2 NG/L
TROPONIN T SERPL HS-MCNC: 44 NG/L
VENTILATOR MODE: ABNORMAL
WBC NRBC COR # BLD: 7.32 10*3/MM3 (ref 3.4–10.8)
WHOLE BLOOD HOLD COAG: NORMAL
WHOLE BLOOD HOLD SPECIMEN: NORMAL

## 2023-02-20 PROCEDURE — 85007 BL SMEAR W/DIFF WBC COUNT: CPT | Performed by: STUDENT IN AN ORGANIZED HEALTH CARE EDUCATION/TRAINING PROGRAM

## 2023-02-20 PROCEDURE — 82375 ASSAY CARBOXYHB QUANT: CPT

## 2023-02-20 PROCEDURE — 82805 BLOOD GASES W/O2 SATURATION: CPT

## 2023-02-20 PROCEDURE — 80053 COMPREHEN METABOLIC PANEL: CPT | Performed by: STUDENT IN AN ORGANIZED HEALTH CARE EDUCATION/TRAINING PROGRAM

## 2023-02-20 PROCEDURE — 25010000002 FUROSEMIDE PER 20 MG: Performed by: PHYSICIAN ASSISTANT

## 2023-02-20 PROCEDURE — 84484 ASSAY OF TROPONIN QUANT: CPT | Performed by: STUDENT IN AN ORGANIZED HEALTH CARE EDUCATION/TRAINING PROGRAM

## 2023-02-20 PROCEDURE — 84145 PROCALCITONIN (PCT): CPT | Performed by: PHYSICIAN ASSISTANT

## 2023-02-20 PROCEDURE — 93005 ELECTROCARDIOGRAM TRACING: CPT | Performed by: STUDENT IN AN ORGANIZED HEALTH CARE EDUCATION/TRAINING PROGRAM

## 2023-02-20 PROCEDURE — 71250 CT THORAX DX C-: CPT

## 2023-02-20 PROCEDURE — 36415 COLL VENOUS BLD VENIPUNCTURE: CPT

## 2023-02-20 PROCEDURE — 96374 THER/PROPH/DIAG INJ IV PUSH: CPT

## 2023-02-20 PROCEDURE — 74176 CT ABD & PELVIS W/O CONTRAST: CPT

## 2023-02-20 PROCEDURE — 86140 C-REACTIVE PROTEIN: CPT | Performed by: PHYSICIAN ASSISTANT

## 2023-02-20 PROCEDURE — 96375 TX/PRO/DX INJ NEW DRUG ADDON: CPT

## 2023-02-20 PROCEDURE — 83050 HGB METHEMOGLOBIN QUAN: CPT

## 2023-02-20 PROCEDURE — 99284 EMERGENCY DEPT VISIT MOD MDM: CPT

## 2023-02-20 PROCEDURE — 25010000002 METHYLPREDNISOLONE PER 40 MG: Performed by: PHYSICIAN ASSISTANT

## 2023-02-20 PROCEDURE — 87070 CULTURE OTHR SPECIMN AEROBIC: CPT

## 2023-02-20 PROCEDURE — 82248 BILIRUBIN DIRECT: CPT | Performed by: STUDENT IN AN ORGANIZED HEALTH CARE EDUCATION/TRAINING PROGRAM

## 2023-02-20 PROCEDURE — 83605 ASSAY OF LACTIC ACID: CPT | Performed by: PHYSICIAN ASSISTANT

## 2023-02-20 PROCEDURE — 93010 ELECTROCARDIOGRAM REPORT: CPT | Performed by: INTERNAL MEDICINE

## 2023-02-20 PROCEDURE — 71046 X-RAY EXAM CHEST 2 VIEWS: CPT | Performed by: RADIOLOGY

## 2023-02-20 PROCEDURE — 87186 SC STD MICRODIL/AGAR DIL: CPT

## 2023-02-20 PROCEDURE — 94640 AIRWAY INHALATION TREATMENT: CPT

## 2023-02-20 PROCEDURE — 85025 COMPLETE CBC W/AUTO DIFF WBC: CPT | Performed by: STUDENT IN AN ORGANIZED HEALTH CARE EDUCATION/TRAINING PROGRAM

## 2023-02-20 PROCEDURE — 71046 X-RAY EXAM CHEST 2 VIEWS: CPT

## 2023-02-20 PROCEDURE — 36600 WITHDRAWAL OF ARTERIAL BLOOD: CPT

## 2023-02-20 PROCEDURE — 83880 ASSAY OF NATRIURETIC PEPTIDE: CPT | Performed by: STUDENT IN AN ORGANIZED HEALTH CARE EDUCATION/TRAINING PROGRAM

## 2023-02-20 PROCEDURE — 93970 EXTREMITY STUDY: CPT

## 2023-02-20 PROCEDURE — 87205 SMEAR GRAM STAIN: CPT

## 2023-02-20 PROCEDURE — 87147 CULTURE TYPE IMMUNOLOGIC: CPT

## 2023-02-20 RX ORDER — IPRATROPIUM BROMIDE AND ALBUTEROL SULFATE 2.5; .5 MG/3ML; MG/3ML
3 SOLUTION RESPIRATORY (INHALATION) ONCE
Status: COMPLETED | OUTPATIENT
Start: 2023-02-20 | End: 2023-02-20

## 2023-02-20 RX ORDER — GLIPIZIDE 5 MG/1
5 TABLET ORAL DAILY
Status: CANCELLED | OUTPATIENT
Start: 2023-02-21

## 2023-02-20 RX ORDER — PANTOPRAZOLE SODIUM 40 MG/1
40 TABLET, DELAYED RELEASE ORAL
Status: CANCELLED | OUTPATIENT
Start: 2023-02-21

## 2023-02-20 RX ORDER — ATORVASTATIN CALCIUM 40 MG/1
40 TABLET, FILM COATED ORAL NIGHTLY
Status: CANCELLED | OUTPATIENT
Start: 2023-02-20

## 2023-02-20 RX ORDER — SODIUM CHLORIDE 0.9 % (FLUSH) 0.9 %
10 SYRINGE (ML) INJECTION AS NEEDED
Status: DISCONTINUED | OUTPATIENT
Start: 2023-02-20 | End: 2023-02-21 | Stop reason: HOSPADM

## 2023-02-20 RX ORDER — CETIRIZINE HYDROCHLORIDE, PSEUDOEPHEDRINE HYDROCHLORIDE 5; 120 MG/1; MG/1
1 TABLET, FILM COATED, EXTENDED RELEASE ORAL 2 TIMES DAILY
Status: CANCELLED | OUTPATIENT
Start: 2023-02-20

## 2023-02-20 RX ORDER — ASPIRIN 81 MG/1
81 TABLET ORAL DAILY
Status: CANCELLED | OUTPATIENT
Start: 2023-02-21

## 2023-02-20 RX ORDER — LEVOTHYROXINE SODIUM 0.07 MG/1
75 TABLET ORAL DAILY
Status: CANCELLED | OUTPATIENT
Start: 2023-02-21

## 2023-02-20 RX ORDER — GABAPENTIN 400 MG/1
400 CAPSULE ORAL EVERY 12 HOURS
Status: CANCELLED | OUTPATIENT
Start: 2023-02-20

## 2023-02-20 RX ORDER — LEVOTHYROXINE SODIUM 0.07 MG/1
75 TABLET ORAL DAILY
Status: ON HOLD | COMMUNITY

## 2023-02-20 RX ORDER — QUETIAPINE FUMARATE 25 MG/1
50 TABLET, FILM COATED ORAL NIGHTLY
Status: CANCELLED | OUTPATIENT
Start: 2023-02-20

## 2023-02-20 RX ORDER — FUROSEMIDE 10 MG/ML
80 INJECTION INTRAMUSCULAR; INTRAVENOUS ONCE
Status: COMPLETED | OUTPATIENT
Start: 2023-02-20 | End: 2023-02-20

## 2023-02-20 RX ORDER — METHYLPREDNISOLONE SODIUM SUCCINATE 40 MG/ML
80 INJECTION, POWDER, LYOPHILIZED, FOR SOLUTION INTRAMUSCULAR; INTRAVENOUS ONCE
Status: COMPLETED | OUTPATIENT
Start: 2023-02-20 | End: 2023-02-20

## 2023-02-20 RX ORDER — POTASSIUM CHLORIDE 20 MEQ/1
10 TABLET, EXTENDED RELEASE ORAL 2 TIMES DAILY WITH MEALS
Status: CANCELLED | OUTPATIENT
Start: 2023-02-20

## 2023-02-20 RX ORDER — HYDROCODONE BITARTRATE AND ACETAMINOPHEN 5; 325 MG/1; MG/1
1 TABLET ORAL 2 TIMES DAILY
Status: CANCELLED | OUTPATIENT
Start: 2023-02-20

## 2023-02-20 RX ADMIN — IPRATROPIUM BROMIDE AND ALBUTEROL SULFATE 3 ML: 2.5; .5 SOLUTION RESPIRATORY (INHALATION) at 17:25

## 2023-02-20 RX ADMIN — METHYLPREDNISOLONE SODIUM SUCCINATE 80 MG: 40 INJECTION, POWDER, FOR SOLUTION INTRAMUSCULAR; INTRAVENOUS at 17:27

## 2023-02-20 RX ADMIN — FUROSEMIDE 80 MG: 10 INJECTION, SOLUTION INTRAVENOUS at 17:27

## 2023-02-20 NOTE — ED NOTES
Patient states she wears 2L NC at home; patient o2 sat 88%; 2L NC placed on patient and oxygen saturation at 97% now.

## 2023-02-20 NOTE — ED NOTES
MEDICAL SCREENING:    Reason for Visit: swelling in abdomen and soa     Patient initially seen in triage.  The patient was advised further evaluation and diagnostic testing will be needed, some of the treatment and testing will be initiated in the lobby in order to begin the process.  The patient will be returned to the waiting area for the time being and possibly be re-assessed by a subsequent ED provider.  The patient will be brought back to the treatment area in as timely manner as possible.       Janie Alexis PA  02/20/23 1533

## 2023-02-20 NOTE — ED PROVIDER NOTES
Subjective   History of Present Illness  57-year-old female presents secondary to shortness of breath.  This progressively is worsened over the past 2 days.  Patient has sleep apnea.  She wears oxygen at night and as needed.  She is required increased oxygen over the past few days.  She denies any fever.  She has had wheezing.  She has had some increased lower extremity swelling especially her left leg.  She has a history of COPD.  She has a history of cirrhosis, diabetes, hypertension, sleep apnea.  She does not have a history of ascites and is never required a paracentesis.  She reports that she has had increased swelling in her abdomen which feels tight.        Review of Systems   Constitutional: Negative.  Negative for fever.   HENT: Negative.    Respiratory: Negative.    Cardiovascular: Negative.  Negative for chest pain.   Gastrointestinal: Negative.  Negative for abdominal pain.   Endocrine: Negative.    Genitourinary: Negative.  Negative for dysuria.   Skin: Negative.    Neurological: Negative.    Psychiatric/Behavioral: Negative.    All other systems reviewed and are negative.      Past Medical History:   Diagnosis Date   • Arthritis    • Chronic back pain    • Coronary artery disease    • Depression    • Diabetes mellitus (HCC)    • Elevated cholesterol    • Fatty liver    • History of transfusion    • Hypertension    • Hypothyroid    • Sleep apnea        Allergies   Allergen Reactions   • Nuts Anaphylaxis     peanuts   • Contrast Dye (Echo Or Unknown Ct/Mr) Unknown - Low Severity   • Codeine Itching and Nausea And Vomiting   • Latex Rash       Past Surgical History:   Procedure Laterality Date   • CARPAL TUNNEL RELEASE     •  SECTION     • CHOLECYSTECTOMY     • COLONOSCOPY     • CORONARY ANGIOPLASTY WITH STENT PLACEMENT     • ENDOSCOPY N/A 2022    Procedure: ESOPHAGOGASTRODUODENOSCOPY WITH BIOPSY;  Surgeon: Lizzeth Harmon MD;  Location: [facility];  Service: Gastroenterology;   Laterality: N/A;   • HIP TROCHANTERIC NAILING WITH INTRAMEDULLARY HIP SCREW Right 7/18/2021    Procedure: HIP TROCHANTERIC NAILING LONG WITH INTRAMEDULLARY HIP SCREW;  Surgeon: Oracio Ponce DO;  Location: [facility];  Service: Orthopedics;  Laterality: Right;   • REPLACEMENT TOTAL KNEE Right    • TUBAL ABDOMINAL LIGATION     • UPPER GASTROINTESTINAL ENDOSCOPY         Family History   Problem Relation Age of Onset   • COPD Mother    • Stroke Mother    • Cancer Brother    • Diabetes Brother    • Hypertension Brother    • Heart disease Brother    • Breast cancer Neg Hx        Social History     Socioeconomic History   • Marital status:    Tobacco Use   • Smoking status: Never   • Smokeless tobacco: Never   Vaping Use   • Vaping Use: Never used   Substance and Sexual Activity   • Alcohol use: Yes     Comment: Once a year    • Drug use: No   • Sexual activity: Defer           Objective   Physical Exam  Vitals and nursing note reviewed.   Constitutional:       General: She is not in acute distress.     Appearance: She is well-developed. She is not diaphoretic.   HENT:      Head: Normocephalic and atraumatic.      Right Ear: External ear normal.      Left Ear: External ear normal.      Nose: Nose normal.   Eyes:      Conjunctiva/sclera: Conjunctivae normal.      Pupils: Pupils are equal, round, and reactive to light.   Neck:      Vascular: No JVD.      Trachea: No tracheal deviation.   Cardiovascular:      Rate and Rhythm: Normal rate and regular rhythm.      Heart sounds: Normal heart sounds. No murmur heard.  Pulmonary:      Effort: Pulmonary effort is normal. No respiratory distress.      Breath sounds: Wheezing and rhonchi present.   Abdominal:      General: Bowel sounds are normal.      Palpations: Abdomen is soft.      Tenderness: There is no abdominal tenderness.   Musculoskeletal:         General: No deformity. Normal range of motion.      Cervical back: Normal range of motion and neck supple.      Right  lower leg: Edema present.      Left lower leg: Edema present.   Skin:     General: Skin is warm and dry.      Coloration: Skin is not pale.      Findings: No erythema or rash.   Neurological:      Mental Status: She is alert and oriented to person, place, and time.      Cranial Nerves: No cranial nerve deficit.   Psychiatric:         Behavior: Behavior normal.         Thought Content: Thought content normal.         Procedures  US Venous Doppler Lower Extremity Bilateral (duplex)   Final Result   No deep venous thrombus seen in either lower extremity.      Signer Name: Norbert Samuel MD    Signed: 2/20/2023 6:53 PM    Workstation Name: [facility]     Radiology [facility]      CT Abdomen Pelvis Without Contrast   Final Result   Cardiomegaly with diffuse thickening of the interlobular septa and a trace left pleural effusion highly suggestive of CHF.      Small to moderate volume ascites with generalized mesenteric and body wall edema compatible with third spacing of fluid and could also be secondary to CHF.      Signer Name: MARIANNA Montgomery MD    Signed: 2/20/2023 6:26 PM    Workstation Name: [facility]      CT Chest Without Contrast Diagnostic   Final Result   Cardiomegaly with diffuse thickening of the interlobular septa and a trace left pleural effusion highly suggestive of CHF.      Small to moderate volume ascites with generalized mesenteric and body wall edema compatible with third spacing of fluid and could also be secondary to CHF.      Signer Name: MARIANNA Montgomery MD    Signed: 2/20/2023 6:26 PM    Workstation Name: [facility]     Radiology [facility]      XR Chest 2 View   ED Interpretation   Interpretation per radiologist      Final Result     CHF/edema with trace left pleural effusion.       This report was finalized on 2/20/2023 4:35 PM by Dr. Vikram Strong MD.            Results for orders placed or performed during the  hospital encounter of 02/20/23   Comprehensive Metabolic Panel    Specimen: Blood   Result Value Ref Range    Glucose 65 65 - 99 mg/dL    BUN 5 (L) 6 - 20 mg/dL    Creatinine 0.74 0.57 - 1.00 mg/dL    Sodium 139 136 - 145 mmol/L    Potassium 4.3 3.5 - 5.2 mmol/L    Chloride 105 98 - 107 mmol/L    CO2 27.0 22.0 - 29.0 mmol/L    Calcium 8.8 8.6 - 10.5 mg/dL    Total Protein 7.5 6.0 - 8.5 g/dL    Albumin 3.1 (L) 3.5 - 5.2 g/dL    ALT (SGPT) 20 1 - 33 U/L    AST (SGOT) 45 (H) 1 - 32 U/L    Alkaline Phosphatase 139 (H) 39 - 117 U/L    Total Bilirubin 2.0 (H) 0.0 - 1.2 mg/dL    Globulin 4.4 gm/dL    A/G Ratio 0.7 g/dL    BUN/Creatinine Ratio 6.8 (L) 7.0 - 25.0    Anion Gap 7.0 5.0 - 15.0 mmol/L    eGFR 94.5 >60.0 mL/min/1.73   BNP    Specimen: Blood   Result Value Ref Range    proBNP 792.4 0.0 - 900.0 pg/mL   High Sensitivity Troponin T    Specimen: Blood   Result Value Ref Range    HS Troponin T 44 (H) <10 ng/L   CBC Auto Differential    Specimen: Blood   Result Value Ref Range    WBC 7.32 3.40 - 10.80 10*3/mm3    RBC 3.65 (L) 3.77 - 5.28 10*6/mm3    Hemoglobin 8.9 (L) 12.0 - 15.9 g/dL    Hematocrit 31.8 (L) 34.0 - 46.6 %    MCV 87.1 79.0 - 97.0 fL    MCH 24.4 (L) 26.6 - 33.0 pg    MCHC 28.0 (L) 31.5 - 35.7 g/dL    RDW 27.5 (H) 12.3 - 15.4 %    RDW-SD 83.0 (H) 37.0 - 54.0 fl    MPV 10.3 6.0 - 12.0 fL    Platelets 168 140 - 450 10*3/mm3    Neutrophil % 63.4 42.7 - 76.0 %    Lymphocyte % 22.1 19.6 - 45.3 %    Monocyte % 10.9 5.0 - 12.0 %    Eosinophil % 2.3 0.3 - 6.2 %    Basophil % 1.0 0.0 - 1.5 %    Immature Grans % 0.3 0.0 - 0.5 %    Neutrophils, Absolute 4.64 1.70 - 7.00 10*3/mm3    Lymphocytes, Absolute 1.62 0.70 - 3.10 10*3/mm3    Monocytes, Absolute 0.80 0.10 - 0.90 10*3/mm3    Eosinophils, Absolute 0.17 0.00 - 0.40 10*3/mm3    Basophils, Absolute 0.07 0.00 - 0.20 10*3/mm3    Immature Grans, Absolute 0.02 0.00 - 0.05 10*3/mm3    nRBC 0.0 0.0 - 0.2 /100 WBC   Lactic Acid, Plasma    Specimen: Blood   Result Value Ref  Range    Lactate 1.9 0.5 - 2.0 mmol/L   C-reactive Protein    Specimen: Blood   Result Value Ref Range    C-Reactive Protein 0.98 (H) 0.00 - 0.50 mg/dL   Blood Gas, Arterial With Co-Ox    Specimen: Arterial Blood   Result Value Ref Range    Site Left Brachial     Richardson's Test Positive     pH, Arterial 7.435 7.350 - 7.450 pH units    pCO2, Arterial 42.1 35.0 - 45.0 mm Hg    pO2, Arterial 62.3 (L) 83.0 - 108.0 mm Hg    HCO3, Arterial 28.2 (H) 20.0 - 26.0 mmol/L    Base Excess, Arterial 3.6 (H) 0.0 - 2.0 mmol/L    O2 Saturation, Arterial 93.1 (L) 94.0 - 99.0 %    Hemoglobin, Blood Gas 8.9 (L) 13.5 - 17.5 g/dL    Hematocrit, Blood Gas 27.4 (L) 38.0 - 51.0 %    Oxyhemoglobin 91.2 (L) 94 - 99 %    Methemoglobin 0.30 0.00 - 3.00 %    Carboxyhemoglobin 1.7 0 - 5 %    A-a DO2 31.5 0.0 - 300.0 mmHg    CO2 Content 29.5 22 - 33 mmol/L    Temperature 0.0 C    Barometric Pressure for Blood Gas 720 mmHg    Modality Room Air     FIO2 21 %    Ventilator Mode NA     Note      Collected by [id number]     pH, Temp Corrected      pCO2, Temperature Corrected      pO2, Temperature Corrected     Procalcitonin    Specimen: Arm, Left; Blood   Result Value Ref Range    Procalcitonin 0.09 0.00 - 0.25 ng/mL   High Sensitivity Troponin T 2Hr    Specimen: Arm, Left; Blood   Result Value Ref Range    HS Troponin T 46 (H) <10 ng/L    Troponin T Delta 2 >=-4 - <+4 ng/L   Scan Slide    Specimen: Blood   Result Value Ref Range    Dacrocytes Slight/1+ None Seen    Hypochromia Mod/2+ None Seen    Stomatocytes Slight/1+ None Seen    Platelet Morphology Normal Normal   Bilirubin, Direct    Specimen: Arm, Left; Blood   Result Value Ref Range    Bilirubin, Direct 0.9 (H) 0.0 - 0.3 mg/dL   ECG 12 Lead ED Triage Standing Order; SOA   Result Value Ref Range    QT Interval 406 ms    QTC Interval 450 ms   Green Top (Gel)   Result Value Ref Range    Extra Tube Hold for add-ons.    Lavender Top   Result Value Ref Range    Extra Tube hold for add-on    Gold Top - SST    Result Value Ref Range    Extra Tube Hold for add-ons.    Light Blue Top   Result Value Ref Range    Extra Tube Hold for add-ons.                 ED Course  ED Course as of 02/21/23 0059   Mon Feb 20, 2023   1531 ECG 12 Lead Chest Pain  Sinus rhythm with occasional premature ventricular complex.  Ventricular rate 74  QRS 68 QTc 450   no acute ST elevation.  Electronically signed by Maira You DO, 02/20/23, 3:31 PM EST.   [LK]   Tue Feb 21, 2023   0051 57-year-old female with known cirrhosis, follows with GI Dr. Moses, missed her last appointment in December and has not rescheduled.  Previously she had been on Lasix 40 mg a day and her edema and abdominal swelling was well controlled, however she states that her nephrologist told her to stop taking it and she has now had progressively worsening edema and abdominal distention over the past 2 weeks, reports a 25 pound weight gain during this time.  She is on home oxygen.  Patient appears to be breathing comfortably and in no apparent distress.  She is on 2 L of oxygen, her usual amount at home, pulse ox 98%.  I discussed case with hospitalist Dr. Reyes.  He came to the ED, evaluated the patient, advised discharge to home on 40 mg of Lasix daily with close follow-up with Dr. Moses.  Patient agrees with this plan, she is anxious to be discharged. [CM]      ED Course User Index  [CM] Dario Pompa MD  [LK] Maira You DO                                           [facility]    Final diagnoses:   Anasarca   Cirrhosis of liver with ascites, unspecified hepatic cirrhosis type (HCC)       ED Disposition  ED Disposition     ED Disposition   Discharge    Condition   Stable    Comment   --             Lizzeth Harmon MD  [address]  [facility] [address]  [phone]    Go to   Call tomorrow to schedule first available appointment    [facility] Emergency Department  [address] [address]  [phone]  Go to    If symptoms worsen         Medication List      New Prescriptions    furosemide 40 MG tablet  Commonly known as: LASIX  Take 1 tablet by mouth Daily.           Where to Get Your Medications      You can get these medications from any pharmacy    Bring a paper prescription for each of these medications  · furosemide 40 MG tablet       Please note that portions of this note were completed with a voice recognition program.        Dario Pompa MD  02/21/23 0059

## 2023-02-21 VITALS
TEMPERATURE: 98.2 F | SYSTOLIC BLOOD PRESSURE: 132 MMHG | HEART RATE: 82 BPM | HEIGHT: 57 IN | WEIGHT: 265 LBS | DIASTOLIC BLOOD PRESSURE: 62 MMHG | BODY MASS INDEX: 57.17 KG/M2 | RESPIRATION RATE: 18 BRPM | OXYGEN SATURATION: 98 %

## 2023-02-21 DIAGNOSIS — D64.9 NORMOCYTIC ANEMIA: Primary | ICD-10-CM

## 2023-02-21 DIAGNOSIS — D50.9 IRON DEFICIENCY ANEMIA, UNSPECIFIED IRON DEFICIENCY ANEMIA TYPE: ICD-10-CM

## 2023-02-21 LAB — BILIRUB CONJ SERPL-MCNC: 0.9 MG/DL (ref 0–0.3)

## 2023-02-21 RX ORDER — FUROSEMIDE 40 MG/1
40 TABLET ORAL DAILY
Qty: 15 TABLET | Refills: 0 | Status: SHIPPED | OUTPATIENT
Start: 2023-02-21 | End: 2023-04-02 | Stop reason: SDUPTHER

## 2023-02-21 NOTE — DISCHARGE INSTRUCTIONS
Home in care of family.  Rest.  Wear your oxygen.  Take your medications as prescribed.  See Dr. Moses at her first available appointment, call tomorrow morning to schedule this appointment.  Return to the emergency department right away if symptoms worsen/any problems.

## 2023-02-21 NOTE — PROGRESS NOTES
Aidee Trinidad  [id number]  1965  2/23/2023      Referring Provider:   LAILA Velázquez    Reason for Follow Up:   Normocytic, iron deficiency anemia     Chief Complaint:  Abdominal swelling      History of Present Illness   Aidee Trinidad is a very pleasant 57 y.o.  female who presents in follow up appointment for further management and evaluation of normocytic, iron deficiency anemia.     Ms. Trinidad has been experiencing worsening anemia since Fall 2022. More recently in January 2023 her hemoglobin fell to 7.5. She was recently placed on oral iron twice daily one week prior to her initial consultation which she is tolerating without side effects. She last required oral iron after her cardiac bypass surgery and was placed on it for three years. She has never required IV iron. Ms. Trinidad states that she has received blood transfusion once for anemia over ten years ago for unknown reasons, and another following her bypass surgery, and again in 2021 after hip fracture surgery. In the last few weeks she has noticed that her feet stay cold and she has been more short of breath. She denies of any blood loss or black stools. She currently follows with Dr. Harmon for cirrhosis and underwent endoscopic evaluation last year.     Interim History:  Since her last visit she was evaluated in the ED for increased abdominal and lower extremity swelling. She is to have follow up with her gastroenterology team for these complaints. She remains on oral iron twice daily but has difficulty tolerating due to nausea. She again denies of any bleeding.       The following portions of the patient's history were reviewed and updated as appropriate: allergies, current medications, past family history, past medical history, past social history, past surgical history and problem list.    Allergies   Allergen Reactions   • Nuts Anaphylaxis     peanuts   • Contrast Dye (Echo Or Unknown Ct/Mr) Unknown - Low  Severity   • Codeine Itching and Nausea And Vomiting   • Latex Rash       Past Medical History:   Diagnosis Date   • Arthritis    • Chronic back pain    • Coronary artery disease    • Depression     • Diabetes mellitus (HCC)    • Elevated cholesterol    • Fatty liver - cirrhosis     • History of transfusion    • Hypertension    • Hypothyroid     • Sleep apnea not on CPAP only on supplemental oxygen          Past Surgical History:   Procedure Laterality Date   • CARPAL TUNNEL RELEASE     •  SECTION     • CHOLECYSTECTOMY     • COLONOSCOPY     • CORONARY ANGIOPLASTY WITH STENT PLACEMENT     • ENDOSCOPY N/A 2022    Procedure: ESOPHAGOGASTRODUODENOSCOPY WITH BIOPSY;  Surgeon: Lizzeth Harmon MD;  Location: [facility];  Service: Gastroenterology;  Laterality: N/A;   • HIP TROCHANTERIC NAILING WITH INTRAMEDULLARY HIP SCREW Right 2021    Procedure: HIP TROCHANTERIC NAILING LONG WITH INTRAMEDULLARY HIP SCREW;  Surgeon: Oracio Ponce DO;  Location: [facility];  Service: Orthopedics;  Laterality: Right;   • REPLACEMENT TOTAL KNEE Right    • TUBAL ABDOMINAL LIGATION     • UPPER GASTROINTESTINAL ENDOSCOPY     CABG      Social History     Socioeconomic History   • Marital status:    Tobacco Use   • Smoking status: Never   • Smokeless tobacco: Never   Vaping Use   • Vaping Use: Never used   Substance and Sexual Activity   • Alcohol use: Yes     Comment: Once a year    • Drug use: No   • Sexual activity: Defer   Two children who are healthy. Lives with her son. Non smoker, no alcohol use.       Family History   Problem Relation Age of Onset   • COPD Mother    • Stroke Mother    • Cancer - AML, skin cancer, prostate cancer   Brother    • Diabetes Brother    • Hypertension Brother    • Heart disease -  from MI  Brother    • Breast cancer Neg Hx          Current Outpatient Medications:   •  aspirin 81 MG EC tablet, Take 81 mg by mouth Daily., Disp: , Rfl:   •  atorvastatin (LIPITOR) 40 MG  tablet, Take 1 tablet by mouth Every Night., Disp: 30 tablet, Rfl: 0  •  cholecalciferol (VITAMIN D3) 1.25 MG (91046 UT) capsule, Take 50,000 Units by mouth Every 7 (Seven) Days. Prior to [facility] Admission, Patient was on: Pt takes on Sunday, Disp: , Rfl:   •  doxycycline (VIBRAMYCIN) 100 MG capsule, Take 100 mg by mouth 2 (Two) Times a Day., Disp: , Rfl:   •  furosemide (LASIX) 40 MG tablet, Take 1 tablet by mouth Daily., Disp: 15 tablet, Rfl: 0  •  gabapentin (NEURONTIN) 400 MG capsule, Take 400 mg by mouth Every 12 (Twelve) Hours., Disp: , Rfl:   •  glipizide (GLUCOTROL) 5 MG tablet, Take 5 mg by mouth Daily., Disp: , Rfl:   •  HYDROcodone-acetaminophen (NORCO) 5-325 MG per tablet, Take 1 tablet by mouth 2 (Two) Times a Day., Disp: , Rfl:   •  levothyroxine (SYNTHROID, LEVOTHROID) 75 MCG tablet, Take 75 mcg by mouth Daily., Disp: , Rfl:   •  loratadine-pseudoephedrine (CLARITIN-D 24-hour)  MG per 24 hr tablet, Take 1 tablet by mouth Daily As Needed for Allergies (congestion)., Disp: , Rfl:   •  metFORMIN (GLUCOPHAGE) 500 MG tablet, Take 500 mg by mouth 2 (Two) Times a Day With Meals., Disp: , Rfl:   •  metoprolol tartrate (LOPRESSOR) 25 MG tablet, Take 25 mg by mouth 2 (Two) Times a Day., Disp: , Rfl:   •  mupirocin (BACTROBAN) 2 % ointment, Apply 1 application topically to the appropriate area as directed 3 (Three) Times a Day., Disp: , Rfl:   •  omeprazole (priLOSEC) 40 MG capsule, Take 1 capsule by mouth Daily., Disp: 30 capsule, Rfl: 5  •  potassium chloride (K-DUR,KLOR-CON) 10 MEQ CR tablet, Take 10 mEq by mouth 2 (Two) Times a Day., Disp: , Rfl:   •  QUEtiapine (SEROquel) 50 MG tablet, Take 50 mg by mouth Every Night., Disp: , Rfl:   •  sertraline (ZOLOFT) 50 MG tablet, Take 50 mg by mouth Daily., Disp: , Rfl:         Review of Systems:    Pertinent positives are listed as per history of present of illness, all other systems reviewed and are negative.        Physical Exam  Vital Signs: These were  reviewed and listed as per patient’s electronic medical chart  Vitals:    02/23/23 1046   BP: 127/77   Pulse: 83   Resp: 18   Temp: 98 °F (36.7 °C)   SpO2: 92%     General: Patient is awake, alert, and in no acute distress. Elevated BMI. In a wheelchair.  Head: Normocephalic, atraumatic  Eyes: EOMI. Conjunctivae and sclerae normal.  Ears: Ears appear intact with no abnormalities noted.   Neck: Trachea midline. No obvious JVD.  Lungs: Respirations appear to be regular, even and unlabored with no signs of respiratory distress. No audible wheezing.  Abdomen: Some abdominal distension.  MS: Muscle tone appears normal. No gross deformities.  Extremities: No clubbing, cyanosis, positive lower extremity edema noted.  Skin: No visible bleeding, bruising, or rash.  Neurologic: Alert and oriented x3. No gross focal deficits.        Pain Score:  Pain Score    02/23/23 1046   PainSc:   4   PainLoc: Abdomen       PHQ-Score Total:  PHQ-9 Total Score:          Labs / Studies:    Office Visit on 02/23/2023   Component Date Value   • Reticulocyte % 02/23/2023 2.74 (H)    • Reticulocyte Absolute 02/23/2023 0.0989    Lab on 02/23/2023   Component Date Value   • Ferritin 02/23/2023 29.19    • Iron 02/23/2023 23 (L)    • Iron Saturation 02/23/2023 5 (L)    • Transferrin 02/23/2023 328    • TIBC 02/23/2023 489    • WBC 02/23/2023 4.59    • RBC 02/23/2023 3.61 (L)    • Hemoglobin 02/23/2023 8.7 (L)    • Hematocrit 02/23/2023 31.1 (L)    • MCV 02/23/2023 86.1    • MCH 02/23/2023 24.1 (L)    • MCHC 02/23/2023 28.0 (L)    • RDW 02/23/2023 27.6 (H)    • RDW-SD 02/23/2023 82.0 (H)    • MPV 02/23/2023 10.2    • Platelets 02/23/2023 154    • Neutrophil % 02/23/2023 55.6    • Lymphocyte % 02/23/2023 31.8    • Monocyte % 02/23/2023 8.3    • Eosinophil % 02/23/2023 2.8    • Basophil % 02/23/2023 1.3    • Immature Grans % 02/23/2023 0.2    • Neutrophils, Absolute 02/23/2023 2.55    • Lymphocytes, Absolute 02/23/2023 1.46    • Monocytes, Absolute  02/23/2023 0.38    • Eosinophils, Absolute 02/23/2023 0.13    • Basophils, Absolute 02/23/2023 0.06    • Immature Grans, Absolute 02/23/2023 0.01    • nRBC 02/23/2023 0.0    Admission on 02/20/2023, Discharged on 02/21/2023   Component Date Value   • QT Interval 02/20/2023 406    • QTC Interval 02/20/2023 450    • Glucose 02/20/2023 65    • BUN 02/20/2023 5 (L)    • Creatinine 02/20/2023 0.74    • Sodium 02/20/2023 139    • Potassium 02/20/2023 4.3    • Chloride 02/20/2023 105    • CO2 02/20/2023 27.0    • Calcium 02/20/2023 8.8    • Total Protein 02/20/2023 7.5    • Albumin 02/20/2023 3.1 (L)    • ALT (SGPT) 02/20/2023 20    • AST (SGOT) 02/20/2023 45 (H)    • Alkaline Phosphatase 02/20/2023 139 (H)    • Total Bilirubin 02/20/2023 2.0 (H)    • Globulin 02/20/2023 4.4    • A/G Ratio 02/20/2023 0.7    • BUN/Creatinine Ratio 02/20/2023 6.8 (L)    • Anion Gap 02/20/2023 7.0    • eGFR 02/20/2023 94.5    • proBNP 02/20/2023 792.4    • HS Troponin T 02/20/2023 44 (H)    • Extra Tube 02/20/2023 Hold for add-ons.    • Extra Tube 02/20/2023 hold for add-on    • Extra Tube 02/20/2023 Hold for add-ons.    • Extra Tube 02/20/2023 Hold for add-ons.    • WBC 02/20/2023 7.32    • RBC 02/20/2023 3.65 (L)    • Hemoglobin 02/20/2023 8.9 (L)    • Hematocrit 02/20/2023 31.8 (L)    • MCV 02/20/2023 87.1    • MCH 02/20/2023 24.4 (L)    • MCHC 02/20/2023 28.0 (L)    • RDW 02/20/2023 27.5 (H)    • RDW-SD 02/20/2023 83.0 (H)    • MPV 02/20/2023 10.3    • Platelets 02/20/2023 168    • Neutrophil % 02/20/2023 63.4    • Lymphocyte % 02/20/2023 22.1    • Monocyte % 02/20/2023 10.9    • Eosinophil % 02/20/2023 2.3    • Basophil % 02/20/2023 1.0    • Immature Grans % 02/20/2023 0.3    • Neutrophils, Absolute 02/20/2023 4.64    • Lymphocytes, Absolute 02/20/2023 1.62    • Monocytes, Absolute 02/20/2023 0.80    • Eosinophils, Absolute 02/20/2023 0.17    • Basophils, Absolute 02/20/2023 0.07    • Immature Grans, Absolute 02/20/2023 0.02    • nRBC  02/20/2023 0.0    • Lactate 02/20/2023 1.9    • C-Reactive Protein 02/20/2023 0.98 (H)    • Site 02/20/2023 Left Brachial    • Richardson's Test 02/20/2023 Positive    • pH, Arterial 02/20/2023 7.435    • pCO2, Arterial 02/20/2023 42.1    • pO2, Arterial 02/20/2023 62.3 (L)    • HCO3, Arterial 02/20/2023 28.2 (H)    • Base Excess, Arterial 02/20/2023 3.6 (H)    • O2 Saturation, Arterial 02/20/2023 93.1 (L)    • Hemoglobin, Blood Gas 02/20/2023 8.9 (L)    • Hematocrit, Blood Gas 02/20/2023 27.4 (L)    • Oxyhemoglobin 02/20/2023 91.2 (L)    • Methemoglobin 02/20/2023 0.30    • Carboxyhemoglobin 02/20/2023 1.7    • A-a DO2 02/20/2023 31.5    • CO2 Content 02/20/2023 29.5    • Temperature 02/20/2023 0.0    • Barometric Pressure for * 02/20/2023 720    • Modality 02/20/2023 Room Air    • FIO2 02/20/2023 21    • Ventilator Mode 02/20/2023 NA    • Collected by 02/20/2023 [id number]    • Procalcitonin 02/20/2023 0.09    • HS Troponin T 02/20/2023 46 (H)    • Troponin T Delta 02/20/2023 2    • Dacrocytes 02/20/2023 Slight/1+    • Hypochromia 02/20/2023 Mod/2+    • Stomatocytes 02/20/2023 Slight/1+    • Platelet Morphology 02/20/2023 Normal    • Bilirubin, Direct 02/20/2023 0.9 (H)    Lab on 02/20/2023   Component Date Value   • Wound Culture 02/20/2023 Heavy growth (4+) Staphylococcus aureus, MRSA (A)    • Gram Stain 02/20/2023 Moderate (3+) WBCs seen    • Gram Stain 02/20/2023 Moderate (3+) Gram positive cocci in pairs and chains    Lab on 02/16/2023   Component Date Value   • Iron 02/16/2023 74    • Iron Saturation 02/16/2023 15 (L)    • Transferrin 02/16/2023 327    • TIBC 02/16/2023 487    • Ferritin 02/16/2023 48.01    • IgG 02/16/2023 2055 (H)    • IgA 02/16/2023 555 (H)    • IgM 02/16/2023 164    • Total Protein 02/16/2023 6.9    • Albumin 02/16/2023 2.9    • Alpha-1-Globulin 02/16/2023 0.3    • Alpha-2-Globulin 02/16/2023 0.6    • Beta Globulin 02/16/2023 1.1    • Gamma Globulin 02/16/2023 2.1 (H)    • M-Leobardo 02/16/2023 Not  Observed    • Globulin 2023 4.0 (H)    • A/G Ratio 2023 0.8    • Immunofixation Reflex, S* 2023 Comment    • Please note 2023 Comment    • Free Light Chain, [place] 2023 268.6 (H)    • Free Lambda Light Chains 2023 45.2 (H)    • Kappa/Lambda Ratio 2023 5.94 (H)    • Reference Lab Report 2023                      Value:[facility]  [address] [place] [address]  Phone: [phone] or [phone]  Fax: [phone]  Pro Medina M.D., Medical Director    PATIENT NAME                                 LABORATORY NO.  [id number]   SANDY SANCHEZ                            [id number]  [id number]  [facility]                          AGE                SEX     SSN            CLIENT REF #  [id number]       1965   F       [id number]    [id number]  [address] [place] [address]                               REQUESTING M.D.       ATTENDING M.D..        COPY TO..  SIDNEY KING LOUISA SWATI    DATE COLLECTED        DATE RECEIVED          DATE REPORTED  2023    DIAGNOSIS:  PERIPHERAL SMEAR  Normocytic, hypochromic anemia. No schistocytes identified.  Borderline thrombocytopenia without platelet clumping. Occasional large  platelets identified.  Normal total WBC count and differential. No                           granulocytic dysplasia or blasts  identified.      CLINICAL HISTORY:  Normocytic anemia    CLINICAL LABORATORY DATA  RBC        3.36 x10/6/ -L    RDW         26.3%  WBC        5.26 x10/3/-L     PLT         142 x10/3/-L  HGB        8.0 g/dl          LYMPHS      28.1%  HCT        28.4%             NEUTS       57.6%  MONO        93%  EOS         2.5%  BASO        1.7%    PERIPHERAL SMEAR MICROSCOPIC DESCRIPTION:  Monte stained smears are reviewed microscopically. See diagnosis for details.    Professional interpretation rendered by Kennedy Myrick M.D.,  LEYDA at [facility], [employer], [address] [place] [address].    GROSS DESCRIPTION:  RECEIVED 1 STAINED AND 1 UNSTAINED SLIDES FOR REVIEW - MG 2/16/2023    REVIEWED, DIAGNOSED AND ELECTRONICALLY  SIGNED BY:    Kennedy Myrick M.D., LEYDA  CPT CODES:        28749     • LEONEL 02/16/2023 Positive    • Copper 02/16/2023 115    • Zinc 02/16/2023 47    • Tissue Transglutaminase * 02/16/2023 <2    • WBC 02/16/2023 5.26    • RBC 02/16/2023 3.36 (L)    • Hemoglobin 02/16/2023 8.0 (L)    • Hematocrit 02/16/2023 28.4 (L)    • MCV 02/16/2023 84.5    • MCH 02/16/2023 23.8 (L)    • MCHC 02/16/2023 28.2 (L)    • RDW 02/16/2023 26.3 (H)    • RDW-SD 02/16/2023 75.7 (H)    • MPV 02/16/2023 10.5    • Platelets 02/16/2023 142    • Neutrophil % 02/16/2023 57.6    • Lymphocyte % 02/16/2023 28.1    • Monocyte % 02/16/2023 9.3    • Eosinophil % 02/16/2023 2.5    • Basophil % 02/16/2023 1.7 (H)    • Immature Grans % 02/16/2023 0.8 (H)    • Neutrophils, Absolute 02/16/2023 3.03    • Lymphocytes, Absolute 02/16/2023 1.48    • Monocytes, Absolute 02/16/2023 0.49    • Eosinophils, Absolute 02/16/2023 0.13    • Basophils, Absolute 02/16/2023 0.09    • Immature Grans, Absolute 02/16/2023 0.04    • nRBC 02/16/2023 0.0    • Anisocytosis 02/16/2023 Large/3+    • Hypochromia 02/16/2023 Large/3+    • Large Platelets 02/16/2023 Slight/1+    • LEONEL IgG 02/16/2023 Positive    Consult on 02/08/2023   Component Date Value   • Ferritin 02/08/2023 18.57    • Iron 02/08/2023 183 (H)    • Iron Saturation 02/08/2023 34    • Transferrin 02/08/2023 362 (H)    • TIBC 02/08/2023 539 (H)    • Vitamin B-12 02/08/2023 1,053 (H)    • Folate 02/08/2023 11.00    • Reticulocyte % 02/08/2023 3.21 (H)    • Reticulocyte Absolute 02/08/2023 0.1053    • LDH 02/08/2023 285 (H)    • C-Reactive Protein 02/08/2023 0.68 (H)    • Haptoglobin 02/08/2023 81    • WBC 02/08/2023 5.51    • RBC 02/08/2023 3.29 (L)    • Hemoglobin 02/08/2023 7.3 (L)    • Hematocrit 02/08/2023 26.6 (L)     • MCV 02/08/2023 80.9    • MCH 02/08/2023 22.2 (L)    • MCHC 02/08/2023 27.4 (L)    • RDW 02/08/2023 20.5 (H)    • RDW-SD 02/08/2023 56.9 (H)    • MPV 02/08/2023 11.4    • Platelets 02/08/2023 120 (L)    • Neutrophil % 02/08/2023 58.9    • Lymphocyte % 02/08/2023 28.9    • Monocyte % 02/08/2023 7.3    • Eosinophil % 02/08/2023 3.3    • Basophil % 02/08/2023 1.1    • Immature Grans % 02/08/2023 0.5    • Neutrophils, Absolute 02/08/2023 3.25    • Lymphocytes, Absolute 02/08/2023 1.59    • Monocytes, Absolute 02/08/2023 0.40    • Eosinophils, Absolute 02/08/2023 0.18    • Basophils, Absolute 02/08/2023 0.06    • Immature Grans, Absolute 02/08/2023 0.03    • nRBC 02/08/2023 0.0    • Hypochromia 02/08/2023 Slight/1+    • Stomatocytes 02/08/2023 Mod/2+    • Platelet Morphology 02/08/2023 Normal         10/07/2022                  12/05/2022             1/02/2023              1/31/2023          2/16/23            Assessment & Plan   Aidee Trinidad is a very pleasant 57 y.o.  female who presents in follow up appointment for further management and evaluation of normocytic, iron deficiency anemia.     Normocytic, iron deficiency anemia  Elevated serum free light chains  - Patient continues to have an ongoing normocytic anemia. Her iron level was low at her primary providers office and she was started on oral iron twice daily. Iron panel later showed an elevated iron level which was likely skewed by replacement which she took that morning. While her iron studies did improve today they she has an iron deficiency anemia despite taking ferrous sulfate twice daily. She likely has malabsorption and therefore after discussing potential risks and benefits of IV iron she is agreeable to proceed.  - B12 and folate levels are normal. CRP is elevated indicating underlying inflammation which could also be suppressing the bone marrow. She has no significant evidence for hemolysis, although LEONEL is positive.   - Copper, zinc,  and tissue transglutaminase levels are normal. SPEP did not reveal an M spike and a monoclonality was not detected. Her serum free light chain ratio is elevated therefore will obtain 24 hour UPEP and urine free light chains for further evaluation.  - Ongoing anemia likely secondary to ongoing iron deficiency. Will repeat levels in six weeks after IV iron to assess response.     ACO / RIP/Other  Quality measures  -  Aidee Trinidad  reports that she has never smoked. She has never used smokeless tobacco.  -  Aidee Trinidad received 2022 flu vaccine.  -  Aidee Trinidad reports a pain score of 4.  Given her pain assessment as noted, treatment options were discussed and the following options were decided upon as a follow-up plan to address the patient's pain: referral to Primary Care for assistance in pain treatment guidance.  -  Current outpatient and discharge medications have been reconciled for the patient.  Reviewed by: Oumou Charles MD      I will have the patient return for follow up in six weeks with labs (CBC, iron panel, ferritin, SPEP, serum free light chains). She understands that should she have any questions or concerns prior to her appointment she should give us a call at any time and I would be happy to see her sooner. It was a pleasure to see this patient in clinic today, thank you for allowing me to participate in the care of this patient.        Oumou Charles MD   02/23/23  11:42 EST

## 2023-02-21 NOTE — ED NOTES
Patient states that she does not wear O2 at home throughout the day, but that she wears 2L nasal cannula at night when she sleeps

## 2023-02-23 ENCOUNTER — OFFICE VISIT (OUTPATIENT)
Dept: ONCOLOGY | Facility: CLINIC | Age: 58
End: 2023-02-23
Payer: MEDICARE

## 2023-02-23 ENCOUNTER — LAB (OUTPATIENT)
Dept: ONCOLOGY | Facility: CLINIC | Age: 58
End: 2023-02-23
Payer: MEDICARE

## 2023-02-23 VITALS
OXYGEN SATURATION: 92 % | RESPIRATION RATE: 18 BRPM | DIASTOLIC BLOOD PRESSURE: 77 MMHG | TEMPERATURE: 98 F | HEIGHT: 57 IN | SYSTOLIC BLOOD PRESSURE: 127 MMHG | HEART RATE: 83 BPM | BODY MASS INDEX: 57.35 KG/M2

## 2023-02-23 DIAGNOSIS — D64.9 NORMOCYTIC ANEMIA: ICD-10-CM

## 2023-02-23 DIAGNOSIS — K90.49 MALABSORPTION DUE TO INTOLERANCE, NOT ELSEWHERE CLASSIFIED: ICD-10-CM

## 2023-02-23 DIAGNOSIS — D50.9 IRON DEFICIENCY ANEMIA, UNSPECIFIED IRON DEFICIENCY ANEMIA TYPE: ICD-10-CM

## 2023-02-23 DIAGNOSIS — D50.9 IRON DEFICIENCY ANEMIA, UNSPECIFIED IRON DEFICIENCY ANEMIA TYPE: Primary | ICD-10-CM

## 2023-02-23 DIAGNOSIS — R76.8 ELEVATED SERUM IMMUNOGLOBULIN FREE LIGHT CHAIN LEVEL: ICD-10-CM

## 2023-02-23 LAB
BACTERIA SPEC AEROBE CULT: ABNORMAL
BASOPHILS # BLD AUTO: 0.06 10*3/MM3 (ref 0–0.2)
BASOPHILS NFR BLD AUTO: 1.3 % (ref 0–1.5)
DEPRECATED RDW RBC AUTO: 82 FL (ref 37–54)
EOSINOPHIL # BLD AUTO: 0.13 10*3/MM3 (ref 0–0.4)
EOSINOPHIL NFR BLD AUTO: 2.8 % (ref 0.3–6.2)
ERYTHROCYTE [DISTWIDTH] IN BLOOD BY AUTOMATED COUNT: 27.6 % (ref 12.3–15.4)
FERRITIN SERPL-MCNC: 29.19 NG/ML (ref 13–150)
GRAM STN SPEC: ABNORMAL
GRAM STN SPEC: ABNORMAL
HCT VFR BLD AUTO: 31.1 % (ref 34–46.6)
HGB BLD-MCNC: 8.7 G/DL (ref 12–15.9)
IMM GRANULOCYTES # BLD AUTO: 0.01 10*3/MM3 (ref 0–0.05)
IMM GRANULOCYTES NFR BLD AUTO: 0.2 % (ref 0–0.5)
IRON 24H UR-MRATE: 23 MCG/DL (ref 37–145)
IRON SATN MFR SERPL: 5 % (ref 20–50)
LYMPHOCYTES # BLD AUTO: 1.46 10*3/MM3 (ref 0.7–3.1)
LYMPHOCYTES NFR BLD AUTO: 31.8 % (ref 19.6–45.3)
MCH RBC QN AUTO: 24.1 PG (ref 26.6–33)
MCHC RBC AUTO-ENTMCNC: 28 G/DL (ref 31.5–35.7)
MCV RBC AUTO: 86.1 FL (ref 79–97)
MONOCYTES # BLD AUTO: 0.38 10*3/MM3 (ref 0.1–0.9)
MONOCYTES NFR BLD AUTO: 8.3 % (ref 5–12)
NEUTROPHILS NFR BLD AUTO: 2.55 10*3/MM3 (ref 1.7–7)
NEUTROPHILS NFR BLD AUTO: 55.6 % (ref 42.7–76)
NRBC BLD AUTO-RTO: 0 /100 WBC (ref 0–0.2)
PLAT MORPH BLD: NORMAL
PLATELET # BLD AUTO: 154 10*3/MM3 (ref 140–450)
PMV BLD AUTO: 10.2 FL (ref 6–12)
RBC # BLD AUTO: 3.61 10*6/MM3 (ref 3.77–5.28)
RBC MORPH BLD: NORMAL
RETICS # AUTO: 0.1 10*6/MM3 (ref 0.02–0.13)
RETICS/RBC NFR AUTO: 2.74 % (ref 0.7–1.9)
TIBC SERPL-MCNC: 489 MCG/DL (ref 298–536)
TRANSFERRIN SERPL-MCNC: 328 MG/DL (ref 200–360)
WBC NRBC COR # BLD: 4.59 10*3/MM3 (ref 3.4–10.8)

## 2023-02-23 PROCEDURE — 84466 ASSAY OF TRANSFERRIN: CPT | Performed by: INTERNAL MEDICINE

## 2023-02-23 PROCEDURE — 85045 AUTOMATED RETICULOCYTE COUNT: CPT | Performed by: INTERNAL MEDICINE

## 2023-02-23 PROCEDURE — 82728 ASSAY OF FERRITIN: CPT | Performed by: INTERNAL MEDICINE

## 2023-02-23 PROCEDURE — 99214 OFFICE O/P EST MOD 30 MIN: CPT | Performed by: INTERNAL MEDICINE

## 2023-02-23 PROCEDURE — 85025 COMPLETE CBC W/AUTO DIFF WBC: CPT | Performed by: INTERNAL MEDICINE

## 2023-02-23 PROCEDURE — 85007 BL SMEAR W/DIFF WBC COUNT: CPT | Performed by: INTERNAL MEDICINE

## 2023-02-23 PROCEDURE — 83540 ASSAY OF IRON: CPT | Performed by: INTERNAL MEDICINE

## 2023-02-23 PROCEDURE — 36415 COLL VENOUS BLD VENIPUNCTURE: CPT | Performed by: INTERNAL MEDICINE

## 2023-02-23 RX ORDER — SODIUM CHLORIDE 9 MG/ML
250 INJECTION, SOLUTION INTRAVENOUS ONCE
Status: CANCELLED | OUTPATIENT
Start: 2023-03-06

## 2023-02-23 RX ORDER — DOXYCYCLINE HYCLATE 100 MG/1
100 CAPSULE ORAL 2 TIMES DAILY
Status: ON HOLD | COMMUNITY
End: 2023-04-06

## 2023-02-23 RX ORDER — SODIUM CHLORIDE 9 MG/ML
250 INJECTION, SOLUTION INTRAVENOUS ONCE
Status: CANCELLED | OUTPATIENT
Start: 2023-02-27

## 2023-02-23 NOTE — PROGRESS NOTES
Venipuncture Blood Specimen Collection  Venipuncture performed in right arm by Taran Koch MA with good hemostasis. Patient tolerated the procedure well without complications.   02/23/23   Taran Koch MA

## 2023-02-27 ENCOUNTER — INFUSION (OUTPATIENT)
Dept: ONCOLOGY | Facility: HOSPITAL | Age: 58
End: 2023-02-27
Payer: MEDICARE

## 2023-02-27 VITALS
DIASTOLIC BLOOD PRESSURE: 68 MMHG | HEART RATE: 84 BPM | OXYGEN SATURATION: 91 % | TEMPERATURE: 97.7 F | RESPIRATION RATE: 18 BRPM | SYSTOLIC BLOOD PRESSURE: 128 MMHG

## 2023-02-27 DIAGNOSIS — D50.9 IRON DEFICIENCY ANEMIA, UNSPECIFIED IRON DEFICIENCY ANEMIA TYPE: Primary | ICD-10-CM

## 2023-02-27 DIAGNOSIS — K90.49 MALABSORPTION DUE TO INTOLERANCE, NOT ELSEWHERE CLASSIFIED: ICD-10-CM

## 2023-02-27 PROCEDURE — 96365 THER/PROPH/DIAG IV INF INIT: CPT

## 2023-02-27 PROCEDURE — 96374 THER/PROPH/DIAG INJ IV PUSH: CPT

## 2023-02-27 PROCEDURE — 25010000002 FERUMOXYTOL 510 MG/17ML SOLUTION: Performed by: INTERNAL MEDICINE

## 2023-02-27 RX ORDER — SODIUM CHLORIDE 9 MG/ML
250 INJECTION, SOLUTION INTRAVENOUS ONCE
Status: COMPLETED | OUTPATIENT
Start: 2023-02-27 | End: 2023-02-27

## 2023-02-27 RX ADMIN — FERUMOXYTOL 510 MG: 510 INJECTION INTRAVENOUS at 13:45

## 2023-02-27 RX ADMIN — SODIUM CHLORIDE 250 ML: 9 INJECTION, SOLUTION INTRAVENOUS at 13:45

## 2023-03-02 NOTE — PROGRESS NOTES
DATE:  3/3/2023    DIAGNOSIS:    CHIEF COMPLAINT:  Chief Complaint   Patient presents with   • Cirrhosis     History of Present Illness:   56 y.o. female presents today for follow-up cirrhosis with esophageal varices.  She underwent EGD in April of this year.  At that time she was found to have esophagitis with esophageal varices.  The patient was placed on omeprazole 40 mg once daily.  Since starting Omeprazole, she had felt better in respect to epigastric discomfort. She has a history of coronary artery disease and underwent triple bypass in 2011.  She also has been diabetic since 2011.  She takes medications for hyperlipidemia and hypertension.  The patient denies abdominal distention.  She denies confusion.  She has never had ascites as far she knows.  In fact, she underwent ultrasound of the abdomen in July 2021 which did not show ascites but did show a nodular small liver.. The patient denies hematemesis or melena.  Her last colonoscopy was in 2012 or 2013 and was performed in [place].  Currently, she is declining colonoscopy. She has not had weight loss.  The patient is wheelchair-bound primarily due to her weight.      Interval History:  Patient states that she has not been doing well since she was seen in clinic last.  She had a recent ED visit in which ascites was discovered.  She has been experiencing a lot of abdominal distention as well as swelling in the lower extremities.  The ED did place her on Lasix 40 mg once daily however she has not noted significant improvement in fluid accumulation.  She is also experiencing upper abdominal discomfort due to this.  She is requesting to get paracentesis performed.  She has not had blood work performed to evaluate the cirrhosis in some time.  She has follow-up appointment with Dr. Moses in April.    The following portions of the patient's history were reviewed and updated as appropriate: allergies, current medications, past family history, past medical  history, past social history, past surgical history and problem list.  REVIEW OF SYSTEMS:   Review of Systems   Constitutional: Negative for fever.   HENT: Negative for trouble swallowing.    Eyes: Negative.    Respiratory: Positive for shortness of breath. Negative for chest tightness.    Cardiovascular: Negative for chest pain.   Gastrointestinal: Positive for abdominal distention, abdominal pain and nausea. Negative for anal bleeding, blood in stool, constipation, diarrhea, rectal pain and vomiting.   Endocrine: Negative.    Musculoskeletal: Positive for back pain.   Skin: Negative.    Allergic/Immunologic: Negative for environmental allergies and food allergies.   Hematological: Bruises/bleeds easily.   Psychiatric/Behavioral: Negative.        PAST MEDICAL HISTORY:  Past Medical History:   Diagnosis Date   • Arthritis    • Chronic back pain    • Coronary artery disease    • Depression    • Diabetes mellitus (HCC)    • Elevated cholesterol    • Fatty liver    • History of transfusion    • Hypertension    • Hypothyroid    • Sleep apnea        PAST SURGICAL HISTORY:  Past Surgical History:   Procedure Laterality Date   • CARPAL TUNNEL RELEASE     •  SECTION     • CHOLECYSTECTOMY     • COLONOSCOPY     • CORONARY ANGIOPLASTY WITH STENT PLACEMENT     • ENDOSCOPY N/A 2022    Procedure: ESOPHAGOGASTRODUODENOSCOPY WITH BIOPSY;  Surgeon: Lizzeth Harmon MD;  Location: [facility];  Service: Gastroenterology;  Laterality: N/A;   • HIP TROCHANTERIC NAILING WITH INTRAMEDULLARY HIP SCREW Right 2021    Procedure: HIP TROCHANTERIC NAILING LONG WITH INTRAMEDULLARY HIP SCREW;  Surgeon: Oracio Ponce DO;  Location: [facility];  Service: Orthopedics;  Laterality: Right;   • REPLACEMENT TOTAL KNEE Right    • TUBAL ABDOMINAL LIGATION     • UPPER GASTROINTESTINAL ENDOSCOPY         SOCIAL HISTORY:  Social History     Socioeconomic History   • Marital status:    Tobacco Use   • Smoking status: Never    • Smokeless tobacco: Never   Vaping Use   • Vaping Use: Never used   Substance and Sexual Activity   • Alcohol use: Yes     Comment: Once a year    • Drug use: No   • Sexual activity: Defer       FAMILY HISTORY:  Family History   Problem Relation Age of Onset   • COPD Mother    • Stroke Mother    • Cancer Brother    • Diabetes Brother    • Hypertension Brother    • Heart disease Brother    • Breast cancer Neg Hx        MEDICATIONS:      Current Outpatient Medications:   •  aspirin 81 MG EC tablet, Take 1 tablet by mouth Daily., Disp: , Rfl:   •  atorvastatin (LIPITOR) 40 MG tablet, Take 1 tablet by mouth Every Night., Disp: 30 tablet, Rfl: 0  •  cholecalciferol (VITAMIN D3) 1.25 MG (48529 UT) capsule, Take 1 capsule by mouth Every 7 (Seven) Days. Prior to [facility] Admission, Patient was on: Pt takes on Sunday, Disp: , Rfl:   •  doxycycline (VIBRAMYCIN) 100 MG capsule, Take 1 capsule by mouth 2 (Two) Times a Day., Disp: , Rfl:   •  furosemide (LASIX) 40 MG tablet, Take 1 tablet by mouth Daily., Disp: 15 tablet, Rfl: 0  •  gabapentin (NEURONTIN) 400 MG capsule, Take 1 capsule by mouth Every 12 (Twelve) Hours., Disp: , Rfl:   •  glipizide (GLUCOTROL) 5 MG tablet, Take 1 tablet by mouth Daily., Disp: , Rfl:   •  HYDROcodone-acetaminophen (NORCO) 5-325 MG per tablet, Take 1 tablet by mouth 2 (Two) Times a Day., Disp: , Rfl:   •  levothyroxine (SYNTHROID, LEVOTHROID) 75 MCG tablet, Take 1 tablet by mouth Daily., Disp: , Rfl:   •  loratadine-pseudoephedrine (CLARITIN-D 24-hour)  MG per 24 hr tablet, Take 1 tablet by mouth Daily As Needed for Allergies (congestion)., Disp: , Rfl:   •  metFORMIN (GLUCOPHAGE) 500 MG tablet, Take 1 tablet by mouth 2 (Two) Times a Day With Meals., Disp: , Rfl:   •  metoprolol tartrate (LOPRESSOR) 25 MG tablet, Take 1 tablet by mouth 2 (Two) Times a Day., Disp: , Rfl:   •  mupirocin (BACTROBAN) 2 % ointment, Apply 1 application topically to the appropriate area as directed 3 (Three) Times  "a Day., Disp: , Rfl:   •  omeprazole (priLOSEC) 40 MG capsule, Take 1 capsule by mouth Daily., Disp: 30 capsule, Rfl: 5  •  potassium chloride (K-DUR,KLOR-CON) 10 MEQ CR tablet, Take 1 tablet by mouth 2 (Two) Times a Day., Disp: , Rfl:   •  QUEtiapine (SEROquel) 50 MG tablet, Take 1 tablet by mouth Every Night., Disp: , Rfl:   •  sertraline (ZOLOFT) 50 MG tablet, Take 1 tablet by mouth Daily., Disp: , Rfl:   •  spironolactone (Aldactone) 100 MG tablet, Take 1 tablet by mouth 2 (Two) Times a Day., Disp: 60 tablet, Rfl: 5    ALLERGIES:    Allergies   Allergen Reactions   • Nuts Anaphylaxis     peanuts   • Contrast Dye (Echo Or Unknown Ct/Mr) Unknown - Low Severity   • Codeine Itching and Nausea And Vomiting   • Latex Rash       VIST VITALS/PHYSICAL EXAM:  Ht 144.8 cm (57\")   Wt 118 kg (260 lb)   BMI 56.26 kg/m²   Physical Exam  Constitutional:       General: She is not in acute distress.     Appearance: Normal appearance. She is well-developed. She is obese.      Comments: In wheelchair    HENT:      Head: Normocephalic and atraumatic.   Eyes:      Pupils: Pupils are equal, round, and reactive to light.   Cardiovascular:      Rate and Rhythm: Normal rate and regular rhythm.      Heart sounds: Normal heart sounds.   Pulmonary:      Effort: Pulmonary effort is normal. No respiratory distress.      Breath sounds: Normal breath sounds. No wheezing, rhonchi or rales.   Abdominal:      General: Abdomen is flat. Bowel sounds are normal. There is distension.      Palpations: Abdomen is soft. There is no mass.      Tenderness: There is no abdominal tenderness. There is no guarding or rebound.      Hernia: No hernia is present.   Musculoskeletal:         General: No swelling. Normal range of motion.      Cervical back: Normal range of motion and neck supple.      Right lower leg: No edema.      Left lower leg: No edema.   Skin:     General: Skin is warm and dry.   Neurological:      Mental Status: She is alert and oriented to " person, place, and time.   Psychiatric:         Attention and Perception: Attention normal.         Mood and Affect: Mood normal.         Speech: Speech normal.         Behavior: Behavior normal. Behavior is cooperative.         Thought Content: Thought content normal.           PATHOLOGY:  Tissue Pathology Exam (04/20/2022 09:37)        ENDOSCOPY:  UPPER GI ENDOSCOPY (04/20/2022 09:19)        IMAGING:  CT Abdomen Pelvis Without Contrast    Result Date: 2/20/2023  Cardiomegaly with diffuse thickening of the interlobular septa and a trace left pleural effusion highly suggestive of CHF. Small to moderate volume ascites with generalized mesenteric and body wall edema compatible with third spacing of fluid and could also be secondary to CHF. Signer Name: MARIANNA Mnotgomery MD  Signed: 2/20/2023 6:26 PM  Workstation Name: [facility]  [facility]     XR Chest 2 View    Result Date: 2/20/2023    CHF/edema with trace left pleural effusion.  This report was finalized on 2/20/2023 4:35 PM by Dr. Vikram Strong MD.      CT Chest Without Contrast Diagnostic    Result Date: 2/20/2023  Cardiomegaly with diffuse thickening of the interlobular septa and a trace left pleural effusion highly suggestive of CHF. Small to moderate volume ascites with generalized mesenteric and body wall edema compatible with third spacing of fluid and could also be secondary to CHF. Signer Name: MARIANNA Montgomery MD  Signed: 2/20/2023 6:26 PM  Workstation Name: [facility]  [facility]    US Renal Bilateral    Result Date: 12/20/2022  Unremarkable bilateral renal ultrasound.  This report was finalized on 12/20/2022 4:13 PM by Dr. Vikram Strong MD.      US Venous Doppler Lower Extremity Bilateral (duplex)    Result Date: 2/20/2023  No deep venous thrombus seen in either lower extremity. Signer Name: Norbert Samuel MD  Signed: 2/20/2023 6:53 PM  Workstation Name: [facility]   [place]    Mammo Screening Digital Tomosynthesis Bilateral With CAD    Result Date: 12/29/2022  BI-RADS CATEGORY I: NEGATIVE  MANAGEMENT: Routine Mammography Screening  COMMUNICATION: A lay language written letter was sent to the patient regarding negative results.  PLEASE NOTE THE FOLLOWING ACR RECOMMENDATIONS: A:  Only 80% of breast cancers can be diagnosed by mammography. B:  A negative mammogram does not exclude cancer in clinically palpable abnormalities.  Biopsy should be done based on ultrasound findings and clinical judgment. C:  A mammogram has limited value in detecting cancer in patients with adenosis or dense breasts.  [employer] RADIOLOGY GUIDELINES For breast cancer detection in asymptomatic women: Age  ACR Recommendations 35-40  Baseline Mammogram 40-49  Annual or Biannual Mammogram 50+  Annual Mammogram Annual physical and frequent self breast examination.  Patient has been entered into an automatic reminder system.    This report was finalized on 12/29/2022 11:44 AM by Dr. Vikram Strong MD.            RECENT LABS:  Lab Results   Component Value Date    WBC 4.59 02/23/2023    HGB 8.7 (L) 02/23/2023    HCT 31.1 (L) 02/23/2023    MCV 86.1 02/23/2023    RDW 27.6 (H) 02/23/2023     02/23/2023    NEUTRORELPCT 55.6 02/23/2023    LYMPHORELPCT 31.8 02/23/2023    MONORELPCT 8.3 02/23/2023    EOSRELPCT 2.8 02/23/2023    BASORELPCT 1.3 02/23/2023    NEUTROABS 2.55 02/23/2023    LYMPHSABS 1.46 02/23/2023       Lab Results   Component Value Date     02/20/2023    K 4.3 02/20/2023    CO2 27.0 02/20/2023     02/20/2023    BUN 5 (L) 02/20/2023    CREATININE 0.74 02/20/2023    EGFRIFNONA 112 12/08/2021    GLUCOSE 65 02/20/2023    CALCIUM 8.8 02/20/2023    ALKPHOS 139 (H) 02/20/2023    AST 45 (H) 02/20/2023    ALT 20 02/20/2023    BILITOT 2.0 (H) 02/20/2023    ALBUMIN 3.1 (L) 02/20/2023    PROTEINTOT 7.5 02/20/2023    MG 2.0 08/05/2021                ASSESSMENT & PLAN:  Due to the  patient ascites discovered on CT imaging-paracentesis order was placed.  Patient was instructed that radiology would be reaching out to get this procedure scheduled.  I will also be getting the patient started on Aldactone 100 mg twice daily dosing to also help with the fluid accumulation.  She will obtain CBC, CMP, AFP and GGT labs today.   Diagnosis Plan   1. Cirrhosis of liver with ascites, unspecified hepatic cirrhosis type (HCC)  Obtain Informed Consent    US Paracentesis    Platelet Count    Protime-INR    aPTT    No Paracentesis Labs Needed    albumin human 25 % IV SOLN 25 g    spironolactone (Aldactone) 100 MG tablet    AFP Tumor Marker    CBC & Differential    Comprehensive Metabolic Panel    Protime-INR    Gamma GT          Return in about 6 weeks (around 4/14/2023).        Electronically Signed by: Ana Sarmiento PA-C ,  March 3, 2023 12:43 EST       CC:   Azalea Burnett APRN

## 2023-03-03 ENCOUNTER — OFFICE VISIT (OUTPATIENT)
Dept: GASTROENTEROLOGY | Facility: CLINIC | Age: 58
End: 2023-03-03
Payer: MEDICARE

## 2023-03-03 VITALS — WEIGHT: 260 LBS | BODY MASS INDEX: 56.09 KG/M2 | HEIGHT: 57 IN

## 2023-03-03 DIAGNOSIS — R18.8 CIRRHOSIS OF LIVER WITH ASCITES, UNSPECIFIED HEPATIC CIRRHOSIS TYPE: Primary | ICD-10-CM

## 2023-03-03 DIAGNOSIS — K74.60 CIRRHOSIS OF LIVER WITH ASCITES, UNSPECIFIED HEPATIC CIRRHOSIS TYPE: Primary | ICD-10-CM

## 2023-03-03 PROCEDURE — 99214 OFFICE O/P EST MOD 30 MIN: CPT | Performed by: PHYSICIAN ASSISTANT

## 2023-03-03 RX ORDER — ALBUMIN (HUMAN) 12.5 G/50ML
25 SOLUTION INTRAVENOUS ONCE
Status: CANCELLED | OUTPATIENT
Start: 2023-03-03 | End: 2023-03-03

## 2023-03-03 RX ORDER — SPIRONOLACTONE 100 MG/1
100 TABLET, FILM COATED ORAL 2 TIMES DAILY
Qty: 60 TABLET | Refills: 5 | Status: ON HOLD | OUTPATIENT
Start: 2023-03-03 | End: 2023-04-06

## 2023-03-06 ENCOUNTER — INFUSION (OUTPATIENT)
Dept: ONCOLOGY | Facility: HOSPITAL | Age: 58
End: 2023-03-06
Payer: MEDICARE

## 2023-03-06 ENCOUNTER — APPOINTMENT (OUTPATIENT)
Dept: GENERAL RADIOLOGY | Facility: HOSPITAL | Age: 58
End: 2023-03-06
Payer: MEDICARE

## 2023-03-06 ENCOUNTER — HOSPITAL ENCOUNTER (EMERGENCY)
Facility: HOSPITAL | Age: 58
Discharge: HOME | End: 2023-03-06
Attending: STUDENT IN AN ORGANIZED HEALTH CARE EDUCATION/TRAINING PROGRAM | Admitting: STUDENT IN AN ORGANIZED HEALTH CARE EDUCATION/TRAINING PROGRAM
Payer: MEDICARE

## 2023-03-06 ENCOUNTER — APPOINTMENT (OUTPATIENT)
Dept: CT IMAGING | Facility: HOSPITAL | Age: 58
End: 2023-03-06
Payer: MEDICARE

## 2023-03-06 ENCOUNTER — APPOINTMENT (OUTPATIENT)
Dept: ONCOLOGY | Facility: HOSPITAL | Age: 58
End: 2023-03-06
Payer: MEDICARE

## 2023-03-06 VITALS
SYSTOLIC BLOOD PRESSURE: 147 MMHG | HEART RATE: 76 BPM | HEIGHT: 57 IN | TEMPERATURE: 97.7 F | BODY MASS INDEX: 56.09 KG/M2 | OXYGEN SATURATION: 97 % | DIASTOLIC BLOOD PRESSURE: 57 MMHG | WEIGHT: 260 LBS | RESPIRATION RATE: 18 BRPM

## 2023-03-06 VITALS
RESPIRATION RATE: 18 BRPM | TEMPERATURE: 97.7 F | SYSTOLIC BLOOD PRESSURE: 138 MMHG | DIASTOLIC BLOOD PRESSURE: 59 MMHG | OXYGEN SATURATION: 94 % | HEART RATE: 78 BPM

## 2023-03-06 DIAGNOSIS — E87.6 HYPOKALEMIA: ICD-10-CM

## 2023-03-06 DIAGNOSIS — B37.2 YEAST INFECTION OF THE SKIN: Primary | ICD-10-CM

## 2023-03-06 DIAGNOSIS — D50.9 IRON DEFICIENCY ANEMIA, UNSPECIFIED IRON DEFICIENCY ANEMIA TYPE: Primary | ICD-10-CM

## 2023-03-06 DIAGNOSIS — K90.49 MALABSORPTION DUE TO INTOLERANCE, NOT ELSEWHERE CLASSIFIED: ICD-10-CM

## 2023-03-06 DIAGNOSIS — N30.01 ACUTE CYSTITIS WITH HEMATURIA: ICD-10-CM

## 2023-03-06 LAB
ALBUMIN SERPL-MCNC: 2.9 G/DL (ref 3.5–5.2)
ALBUMIN SERPL-MCNC: 3 G/DL (ref 3.5–5.2)
ALBUMIN/GLOB SERPL: 0.6 G/DL
ALBUMIN/GLOB SERPL: 0.6 G/DL
ALP SERPL-CCNC: 154 U/L (ref 39–117)
ALP SERPL-CCNC: 162 U/L (ref 39–117)
ALT SERPL W P-5'-P-CCNC: 24 U/L (ref 1–33)
ALT SERPL W P-5'-P-CCNC: 26 U/L (ref 1–33)
ANION GAP SERPL CALCULATED.3IONS-SCNC: 8.9 MMOL/L (ref 5–15)
ANION GAP SERPL CALCULATED.3IONS-SCNC: 9.8 MMOL/L (ref 5–15)
ANISOCYTOSIS BLD QL: NORMAL
ANISOCYTOSIS BLD QL: NORMAL
AST SERPL-CCNC: 58 U/L (ref 1–32)
AST SERPL-CCNC: 60 U/L (ref 1–32)
BACTERIA UR QL AUTO: ABNORMAL /HPF
BASOPHILS # BLD AUTO: 0.08 10*3/MM3 (ref 0–0.2)
BASOPHILS # BLD AUTO: 0.09 10*3/MM3 (ref 0–0.2)
BASOPHILS NFR BLD AUTO: 1.1 % (ref 0–1.5)
BASOPHILS NFR BLD AUTO: 1.1 % (ref 0–1.5)
BILIRUB SERPL-MCNC: 1.8 MG/DL (ref 0–1.2)
BILIRUB SERPL-MCNC: 1.9 MG/DL (ref 0–1.2)
BILIRUB UR QL STRIP: NEGATIVE
BUN SERPL-MCNC: 10 MG/DL (ref 6–20)
BUN SERPL-MCNC: 11 MG/DL (ref 6–20)
BUN/CREAT SERPL: 12.7 (ref 7–25)
BUN/CREAT SERPL: 13.1 (ref 7–25)
CALCIUM SPEC-SCNC: 9 MG/DL (ref 8.6–10.5)
CALCIUM SPEC-SCNC: 9.2 MG/DL (ref 8.6–10.5)
CHLORIDE SERPL-SCNC: 101 MMOL/L (ref 98–107)
CHLORIDE SERPL-SCNC: 98 MMOL/L (ref 98–107)
CLARITY UR: ABNORMAL
CO2 SERPL-SCNC: 34.1 MMOL/L (ref 22–29)
CO2 SERPL-SCNC: 34.2 MMOL/L (ref 22–29)
COLOR UR: YELLOW
CREAT SERPL-MCNC: 0.79 MG/DL (ref 0.57–1)
CREAT SERPL-MCNC: 0.84 MG/DL (ref 0.57–1)
DEPRECATED RDW RBC AUTO: 87.8 FL (ref 37–54)
DEPRECATED RDW RBC AUTO: 90.3 FL (ref 37–54)
EGFRCR SERPLBLD CKD-EPI 2021: 81.2 ML/MIN/1.73
EGFRCR SERPLBLD CKD-EPI 2021: 87.4 ML/MIN/1.73
EOSINOPHIL # BLD AUTO: 0.16 10*3/MM3 (ref 0–0.4)
EOSINOPHIL # BLD AUTO: 0.19 10*3/MM3 (ref 0–0.4)
EOSINOPHIL NFR BLD AUTO: 2.3 % (ref 0.3–6.2)
EOSINOPHIL NFR BLD AUTO: 2.4 % (ref 0.3–6.2)
ERYTHROCYTE [DISTWIDTH] IN BLOOD BY AUTOMATED COUNT: 28.1 % (ref 12.3–15.4)
ERYTHROCYTE [DISTWIDTH] IN BLOOD BY AUTOMATED COUNT: 28.3 % (ref 12.3–15.4)
GEN 5 2HR TROPONIN T REFLEX: 50 NG/L
GLOBULIN UR ELPH-MCNC: 4.7 GM/DL
GLOBULIN UR ELPH-MCNC: 4.9 GM/DL
GLUCOSE SERPL-MCNC: 117 MG/DL (ref 65–99)
GLUCOSE SERPL-MCNC: 86 MG/DL (ref 65–99)
GLUCOSE UR STRIP-MCNC: ABNORMAL MG/DL
HCT VFR BLD AUTO: 33.9 % (ref 34–46.6)
HCT VFR BLD AUTO: 35.4 % (ref 34–46.6)
HGB BLD-MCNC: 10.2 G/DL (ref 12–15.9)
HGB BLD-MCNC: 9.8 G/DL (ref 12–15.9)
HGB UR QL STRIP.AUTO: ABNORMAL
HOLD SPECIMEN: NORMAL
HOLD SPECIMEN: NORMAL
HYALINE CASTS UR QL AUTO: ABNORMAL /LPF
HYPOCHROMIA BLD QL: NORMAL
HYPOCHROMIA BLD QL: NORMAL
IMM GRANULOCYTES # BLD AUTO: 0.01 10*3/MM3 (ref 0–0.05)
IMM GRANULOCYTES # BLD AUTO: 0.02 10*3/MM3 (ref 0–0.05)
IMM GRANULOCYTES NFR BLD AUTO: 0.1 % (ref 0–0.5)
IMM GRANULOCYTES NFR BLD AUTO: 0.3 % (ref 0–0.5)
KETONES UR QL STRIP: NEGATIVE
LARGE PLATELETS: NORMAL
LEUKOCYTE ESTERASE UR QL STRIP.AUTO: ABNORMAL
LIPASE SERPL-CCNC: 52 U/L (ref 13–60)
LYMPHOCYTES # BLD AUTO: 1.55 10*3/MM3 (ref 0.7–3.1)
LYMPHOCYTES # BLD AUTO: 1.69 10*3/MM3 (ref 0.7–3.1)
LYMPHOCYTES NFR BLD AUTO: 19.5 % (ref 19.6–45.3)
LYMPHOCYTES NFR BLD AUTO: 24.1 % (ref 19.6–45.3)
MCH RBC QN AUTO: 25.3 PG (ref 26.6–33)
MCH RBC QN AUTO: 25.8 PG (ref 26.6–33)
MCHC RBC AUTO-ENTMCNC: 28.8 G/DL (ref 31.5–35.7)
MCHC RBC AUTO-ENTMCNC: 28.9 G/DL (ref 31.5–35.7)
MCV RBC AUTO: 87.6 FL (ref 79–97)
MCV RBC AUTO: 89.6 FL (ref 79–97)
MONOCYTES # BLD AUTO: 0.6 10*3/MM3 (ref 0.1–0.9)
MONOCYTES # BLD AUTO: 0.67 10*3/MM3 (ref 0.1–0.9)
MONOCYTES NFR BLD AUTO: 8.4 % (ref 5–12)
MONOCYTES NFR BLD AUTO: 8.5 % (ref 5–12)
NEUTROPHILS NFR BLD AUTO: 4.48 10*3/MM3 (ref 1.7–7)
NEUTROPHILS NFR BLD AUTO: 5.43 10*3/MM3 (ref 1.7–7)
NEUTROPHILS NFR BLD AUTO: 63.9 % (ref 42.7–76)
NEUTROPHILS NFR BLD AUTO: 68.3 % (ref 42.7–76)
NITRITE UR QL STRIP: NEGATIVE
NRBC BLD AUTO-RTO: 0 /100 WBC (ref 0–0.2)
NRBC BLD AUTO-RTO: 0 /100 WBC (ref 0–0.2)
NT-PROBNP SERPL-MCNC: 1034 PG/ML (ref 0–900)
OVALOCYTES BLD QL SMEAR: NORMAL
PH UR STRIP.AUTO: 6 [PH] (ref 5–8)
PLAT MORPH BLD: NORMAL
PLATELET # BLD AUTO: 123 10*3/MM3 (ref 140–450)
PLATELET # BLD AUTO: 126 10*3/MM3 (ref 140–450)
PMV BLD AUTO: 10.6 FL (ref 6–12)
PMV BLD AUTO: 11 FL (ref 6–12)
POTASSIUM SERPL-SCNC: 3.2 MMOL/L (ref 3.5–5.2)
POTASSIUM SERPL-SCNC: 3.3 MMOL/L (ref 3.5–5.2)
PROT SERPL-MCNC: 7.7 G/DL (ref 6–8.5)
PROT SERPL-MCNC: 7.8 G/DL (ref 6–8.5)
PROT UR QL STRIP: NEGATIVE
RBC # BLD AUTO: 3.87 10*6/MM3 (ref 3.77–5.28)
RBC # BLD AUTO: 3.95 10*6/MM3 (ref 3.77–5.28)
RBC # UR STRIP: ABNORMAL /HPF
REF LAB TEST METHOD: ABNORMAL
SODIUM SERPL-SCNC: 141 MMOL/L (ref 136–145)
SODIUM SERPL-SCNC: 145 MMOL/L (ref 136–145)
SP GR UR STRIP: 1.01 (ref 1–1.03)
SQUAMOUS #/AREA URNS HPF: ABNORMAL /HPF
TROPONIN T DELTA: -2 NG/L
TROPONIN T SERPL HS-MCNC: 52 NG/L
UROBILINOGEN UR QL STRIP: ABNORMAL
WBC # UR STRIP: ABNORMAL /HPF
WBC NRBC COR # BLD: 7.02 10*3/MM3 (ref 3.4–10.8)
WBC NRBC COR # BLD: 7.95 10*3/MM3 (ref 3.4–10.8)
WHOLE BLOOD HOLD COAG: NORMAL
WHOLE BLOOD HOLD SPECIMEN: NORMAL

## 2023-03-06 PROCEDURE — 83690 ASSAY OF LIPASE: CPT | Performed by: PHYSICIAN ASSISTANT

## 2023-03-06 PROCEDURE — 85007 BL SMEAR W/DIFF WBC COUNT: CPT | Performed by: PHYSICIAN ASSISTANT

## 2023-03-06 PROCEDURE — 99284 EMERGENCY DEPT VISIT MOD MDM: CPT

## 2023-03-06 PROCEDURE — 96365 THER/PROPH/DIAG IV INF INIT: CPT

## 2023-03-06 PROCEDURE — 81001 URINALYSIS AUTO W/SCOPE: CPT | Performed by: PHYSICIAN ASSISTANT

## 2023-03-06 PROCEDURE — 82105 ALPHA-FETOPROTEIN SERUM: CPT | Performed by: PHYSICIAN ASSISTANT

## 2023-03-06 PROCEDURE — 83880 ASSAY OF NATRIURETIC PEPTIDE: CPT | Performed by: PHYSICIAN ASSISTANT

## 2023-03-06 PROCEDURE — 85025 COMPLETE CBC W/AUTO DIFF WBC: CPT | Performed by: PHYSICIAN ASSISTANT

## 2023-03-06 PROCEDURE — 74176 CT ABD & PELVIS W/O CONTRAST: CPT

## 2023-03-06 PROCEDURE — 87088 URINE BACTERIA CULTURE: CPT | Performed by: PHYSICIAN ASSISTANT

## 2023-03-06 PROCEDURE — 80053 COMPREHEN METABOLIC PANEL: CPT | Performed by: PHYSICIAN ASSISTANT

## 2023-03-06 PROCEDURE — 82977 ASSAY OF GGT: CPT | Performed by: PHYSICIAN ASSISTANT

## 2023-03-06 PROCEDURE — 25010000002 FERUMOXYTOL 510 MG/17ML SOLUTION: Performed by: INTERNAL MEDICINE

## 2023-03-06 PROCEDURE — 93010 ELECTROCARDIOGRAM REPORT: CPT | Performed by: INTERNAL MEDICINE

## 2023-03-06 PROCEDURE — 87086 URINE CULTURE/COLONY COUNT: CPT | Performed by: PHYSICIAN ASSISTANT

## 2023-03-06 PROCEDURE — 93005 ELECTROCARDIOGRAM TRACING: CPT | Performed by: PHYSICIAN ASSISTANT

## 2023-03-06 PROCEDURE — 71045 X-RAY EXAM CHEST 1 VIEW: CPT

## 2023-03-06 PROCEDURE — 71045 X-RAY EXAM CHEST 1 VIEW: CPT | Performed by: RADIOLOGY

## 2023-03-06 PROCEDURE — 87186 SC STD MICRODIL/AGAR DIL: CPT | Performed by: PHYSICIAN ASSISTANT

## 2023-03-06 PROCEDURE — 84484 ASSAY OF TROPONIN QUANT: CPT | Performed by: PHYSICIAN ASSISTANT

## 2023-03-06 PROCEDURE — 74176 CT ABD & PELVIS W/O CONTRAST: CPT | Performed by: RADIOLOGY

## 2023-03-06 PROCEDURE — 96374 THER/PROPH/DIAG INJ IV PUSH: CPT

## 2023-03-06 PROCEDURE — 36415 COLL VENOUS BLD VENIPUNCTURE: CPT

## 2023-03-06 RX ORDER — CEFDINIR 300 MG/1
300 CAPSULE ORAL 2 TIMES DAILY
Qty: 20 CAPSULE | Refills: 0 | Status: ON HOLD | OUTPATIENT
Start: 2023-03-06 | End: 2023-03-17

## 2023-03-06 RX ORDER — NYSTATIN 100000 [USP'U]/G
POWDER TOPICAL 3 TIMES DAILY
Qty: 60 G | Refills: 0 | Status: SHIPPED | OUTPATIENT
Start: 2023-03-06 | End: 2023-03-13

## 2023-03-06 RX ORDER — POTASSIUM CHLORIDE 20 MEQ/1
40 TABLET, EXTENDED RELEASE ORAL ONCE
Status: COMPLETED | OUTPATIENT
Start: 2023-03-06 | End: 2023-03-06

## 2023-03-06 RX ORDER — SODIUM CHLORIDE 9 MG/ML
250 INJECTION, SOLUTION INTRAVENOUS ONCE
Status: COMPLETED | OUTPATIENT
Start: 2023-03-06 | End: 2023-03-06

## 2023-03-06 RX ORDER — CEFDINIR 300 MG/1
300 CAPSULE ORAL ONCE
Status: COMPLETED | OUTPATIENT
Start: 2023-03-06 | End: 2023-03-06

## 2023-03-06 RX ORDER — SODIUM CHLORIDE 0.9 % (FLUSH) 0.9 %
10 SYRINGE (ML) INJECTION AS NEEDED
Status: DISCONTINUED | OUTPATIENT
Start: 2023-03-06 | End: 2023-03-06 | Stop reason: HOSPADM

## 2023-03-06 RX ADMIN — CEFDINIR 300 MG: 300 CAPSULE ORAL at 20:33

## 2023-03-06 RX ADMIN — FERUMOXYTOL 510 MG: 510 INJECTION INTRAVENOUS at 15:31

## 2023-03-06 RX ADMIN — POTASSIUM CHLORIDE 40 MEQ: 20 TABLET, EXTENDED RELEASE ORAL at 20:33

## 2023-03-06 RX ADMIN — SODIUM CHLORIDE 250 ML: 9 INJECTION, SOLUTION INTRAVENOUS at 15:31

## 2023-03-06 NOTE — ED PROVIDER NOTES
Subjective   History of Present Illness  This is a 57 year old female patient who presents to the ER with chief complaint of rectal bleeding. PMH significant for liver cirrhosis, CHF, GERD, HTN, HLD, DM not requiring insulin, iron deficiency anemia. The patient was at the infusion clinic earlier today getting her iron infusion. She went to the bathroom and when she wiped her butt, there was bright red blood. She denies abdominal pain, nausea, vomiting, diarrhea, constipation, dizziness, SOB, chest pain.         Review of Systems   Constitutional: Negative.  Negative for fever.   HENT: Negative.    Respiratory: Negative.    Cardiovascular: Negative.  Negative for chest pain.   Gastrointestinal: Positive for blood in stool. Negative for abdominal distention, abdominal pain, anal bleeding, constipation, diarrhea, nausea, rectal pain and vomiting.   Endocrine: Negative.    Genitourinary: Negative.  Negative for dysuria.   Skin: Negative.    Neurological: Negative.    Psychiatric/Behavioral: Negative.    All other systems reviewed and are negative.      Past Medical History:   Diagnosis Date   • Arthritis    • Chronic back pain    • Coronary artery disease    • Depression    • Diabetes mellitus (HCC)    • Elevated cholesterol    • Fatty liver    • History of transfusion    • Hypertension    • Hypothyroid    • Sleep apnea        Allergies   Allergen Reactions   • Nuts Anaphylaxis     peanuts   • Contrast Dye (Echo Or Unknown Ct/Mr) Unknown - Low Severity   • Codeine Itching and Nausea And Vomiting   • Latex Rash       Past Surgical History:   Procedure Laterality Date   • CARPAL TUNNEL RELEASE     •  SECTION     • CHOLECYSTECTOMY     • COLONOSCOPY     • CORONARY ANGIOPLASTY WITH STENT PLACEMENT     • ENDOSCOPY N/A 2022    Procedure: ESOPHAGOGASTRODUODENOSCOPY WITH BIOPSY;  Surgeon: Lizzeth Harmon MD;  Location: [facility];  Service: Gastroenterology;  Laterality: N/A;   • HIP TROCHANTERIC NAILING  WITH INTRAMEDULLARY HIP SCREW Right 7/18/2021    Procedure: HIP TROCHANTERIC NAILING LONG WITH INTRAMEDULLARY HIP SCREW;  Surgeon: Oracio Ponce DO;  Location: [facility];  Service: Orthopedics;  Laterality: Right;   • REPLACEMENT TOTAL KNEE Right    • TUBAL ABDOMINAL LIGATION     • UPPER GASTROINTESTINAL ENDOSCOPY         Family History   Problem Relation Age of Onset   • COPD Mother    • Stroke Mother    • Cancer Brother    • Diabetes Brother    • Hypertension Brother    • Heart disease Brother    • Breast cancer Neg Hx        Social History     Socioeconomic History   • Marital status:    Tobacco Use   • Smoking status: Never   • Smokeless tobacco: Never   Vaping Use   • Vaping Use: Never used   Substance and Sexual Activity   • Alcohol use: Yes     Comment: Once a year    • Drug use: No   • Sexual activity: Defer           Objective   Physical Exam  Vitals and nursing note reviewed.   Constitutional:       General: She is not in acute distress.     Appearance: She is well-developed. She is not diaphoretic.   HENT:      Head: Normocephalic and atraumatic.      Right Ear: External ear normal.      Left Ear: External ear normal.      Nose: Nose normal.   Eyes:      Conjunctiva/sclera: Conjunctivae normal.      Pupils: Pupils are equal, round, and reactive to light.   Neck:      Vascular: No JVD.      Trachea: No tracheal deviation.   Cardiovascular:      Rate and Rhythm: Normal rate and regular rhythm.      Heart sounds: Normal heart sounds. No murmur heard.  Pulmonary:      Effort: Pulmonary effort is normal. No respiratory distress.      Breath sounds: Normal breath sounds. No wheezing.   Abdominal:      General: Bowel sounds are normal.      Palpations: Abdomen is soft.      Tenderness: There is no abdominal tenderness. There is no guarding or rebound.   Musculoskeletal:         General: No deformity. Normal range of motion.      Cervical back: Normal range of motion and neck supple.   Skin:      General: Skin is warm and dry.      Coloration: Skin is not pale.      Findings: No erythema or rash.   Neurological:      Mental Status: She is alert and oriented to person, place, and time.      Cranial Nerves: No cranial nerve deficit.   Psychiatric:         Behavior: Behavior normal.         Thought Content: Thought content normal.         Procedures        Results for orders placed or performed during the hospital encounter of 03/06/23   Comprehensive Metabolic Panel    Specimen: Blood   Result Value Ref Range    Glucose 86 65 - 99 mg/dL    BUN 10 6 - 20 mg/dL    Creatinine 0.79 0.57 - 1.00 mg/dL    Sodium 141 136 - 145 mmol/L    Potassium 3.2 (L) 3.5 - 5.2 mmol/L    Chloride 98 98 - 107 mmol/L    CO2 34.1 (H) 22.0 - 29.0 mmol/L    Calcium 9.0 8.6 - 10.5 mg/dL    Total Protein 7.7 6.0 - 8.5 g/dL    Albumin 3.0 (L) 3.5 - 5.2 g/dL    ALT (SGPT) 26 1 - 33 U/L    AST (SGOT) 58 (H) 1 - 32 U/L    Alkaline Phosphatase 162 (H) 39 - 117 U/L    Total Bilirubin 1.9 (H) 0.0 - 1.2 mg/dL    Globulin 4.7 gm/dL    A/G Ratio 0.6 g/dL    BUN/Creatinine Ratio 12.7 7.0 - 25.0    Anion Gap 8.9 5.0 - 15.0 mmol/L    eGFR 87.4 >60.0 mL/min/1.73   Lipase    Specimen: Blood   Result Value Ref Range    Lipase 52 13 - 60 U/L   Urinalysis With Microscopic If Indicated (No Culture) - Urine, Clean Catch    Specimen: Urine, Clean Catch   Result Value Ref Range    Color, UA Yellow Yellow, Straw    Appearance, UA Cloudy (A) Clear    pH, UA 6.0 5.0 - 8.0    Specific Gravity, UA 1.010 1.005 - 1.030    Glucose,  mg/dL (1+) (A) Negative    Ketones, UA Negative Negative    Bilirubin, UA Negative Negative    Blood, UA Large (3+) (A) Negative    Protein, UA Negative Negative    Leuk Esterase, UA Large (3+) (A) Negative    Nitrite, UA Negative Negative    Urobilinogen, UA 0.2 E.U./dL 0.2 - 1.0 E.U./dL   CBC Auto Differential    Specimen: Blood   Result Value Ref Range    WBC 7.95 3.40 - 10.80 10*3/mm3    RBC 3.95 3.77 - 5.28 10*6/mm3     Hemoglobin 10.2 (L) 12.0 - 15.9 g/dL    Hematocrit 35.4 34.0 - 46.6 %    MCV 89.6 79.0 - 97.0 fL    MCH 25.8 (L) 26.6 - 33.0 pg    MCHC 28.8 (L) 31.5 - 35.7 g/dL    RDW 28.1 (H) 12.3 - 15.4 %    RDW-SD 90.3 (H) 37.0 - 54.0 fl    MPV 10.6 6.0 - 12.0 fL    Platelets 123 (L) 140 - 450 10*3/mm3    Neutrophil % 68.3 42.7 - 76.0 %    Lymphocyte % 19.5 (L) 19.6 - 45.3 %    Monocyte % 8.4 5.0 - 12.0 %    Eosinophil % 2.4 0.3 - 6.2 %    Basophil % 1.1 0.0 - 1.5 %    Immature Grans % 0.3 0.0 - 0.5 %    Neutrophils, Absolute 5.43 1.70 - 7.00 10*3/mm3    Lymphocytes, Absolute 1.55 0.70 - 3.10 10*3/mm3    Monocytes, Absolute 0.67 0.10 - 0.90 10*3/mm3    Eosinophils, Absolute 0.19 0.00 - 0.40 10*3/mm3    Basophils, Absolute 0.09 0.00 - 0.20 10*3/mm3    Immature Grans, Absolute 0.02 0.00 - 0.05 10*3/mm3    nRBC 0.0 0.0 - 0.2 /100 WBC   High Sensitivity Troponin T    Specimen: Blood   Result Value Ref Range    HS Troponin T 52 (H) <10 ng/L   BNP    Specimen: Blood   Result Value Ref Range    proBNP 1,034.0 (H) 0.0 - 900.0 pg/mL   Scan Slide    Specimen: Blood   Result Value Ref Range    Anisocytosis Large/3+ None Seen    Hypochromia Mod/2+ None Seen    Ovalocytes Slight/1+ None Seen    Platelet Morphology Normal Normal   High Sensitivity Troponin T 2Hr    Specimen: Arm, Right; Blood   Result Value Ref Range    HS Troponin T 50 (H) <10 ng/L    Troponin T Delta -2 >=-4 - <+4 ng/L   Urinalysis, Microscopic Only - Urine, Clean Catch    Specimen: Urine, Clean Catch   Result Value Ref Range    RBC, UA 6-12 (A) None Seen, 0-2 /HPF    WBC, UA 31-50 (A) None Seen, 0-2 /HPF    Bacteria, UA 2+ (A) None Seen /HPF    Squamous Epithelial Cells, UA 13-20 (A) None Seen, 0-2 /HPF    Hyaline Casts, UA None Seen None Seen /LPF    Methodology Automated Microscopy    ECG 12 Lead Dyspnea   Result Value Ref Range    QT Interval 428 ms    QTC Interval 487 ms   Green Top (Gel)   Result Value Ref Range    Extra Tube Hold for add-ons.    Lavender Top    Result Value Ref Range    Extra Tube hold for add-on    Gold Top - SST   Result Value Ref Range    Extra Tube Hold for add-ons.    Light Blue Top   Result Value Ref Range    Extra Tube Hold for add-ons.          ED Course  ED Course as of 03/06/23 2032   Mon Mar 06, 2023   1642 ECG 12 Lead Dyspnea  Normal sinus rhythm poor R wave progression in the precordial leads.  Generalized T wave flattening.  No ST elevation.  Electronically signed by Maira You DO, 03/06/23, 4:43 PM EST.   [LK]   1817 XR Chest 1 View  IMPRESSION:  Appearance suggestive of CHF     This report was finalized on 3/6/2023 5:40 PM by Dr. John Duarte MD [MM]   1910 CT Abdomen Pelvis Without Contrast  IMPRESSION:     1. Ascites     2.Mild stranding of the mesentery. Nonspecific.     3. Other findings as above                    This report was finalized on 3/6/2023 6:54 PM by Dr. John Duarte MD [MM]   1948 Rectal exam performed with DWIGHT Rivera, at bedside. Skin is erythematous and irritated.  [MM]   2029 Patient diagnosed with skin yeast infection and UTI. Will be d/c home with rx for nystatin powder and omnicef. Will f/u with PCP in 2 days or return to ER if symptoms worsen.  [MM]      ED Course User Index  [LK] Maira You DO  [MM] Shantelle Oh PA                                           Medical Decision Making    This is a 57 year old female patient who presents to the ER with chief complaint of rectal bleeding. PMH significant for liver cirrhosis, CHF, GERD, HTN, HLD, DM not requiring insulin, iron deficiency anemia. The patient was at the infusion clinic earlier today getting her iron infusion. She went to the bathroom and when she wiped her butt, there was bright red blood. She denies abdominal pain, nausea, vomiting, diarrhea, constipation, dizziness, SOB, chest pain.         Acute cystitis with hematuria: acute illness or injury  Hypokalemia: acute illness or injury  Yeast infection of the skin: acute illness or  injury  Amount and/or Complexity of Data Reviewed  Labs: ordered. Decision-making details documented in ED Course.  Radiology: ordered. Decision-making details documented in ED Course.  ECG/medicine tests: ordered. Decision-making details documented in ED Course.      Risk  Prescription drug management.          Final diagnoses:   Yeast infection of the skin   Hypokalemia   Acute cystitis with hematuria       ED Disposition  ED Disposition     ED Disposition   Discharge    Condition   Stable    Comment   --             Azalea Burnett, APRN  [address]  [place] [id number] [phone]    In 2 days           Medication List      New Prescriptions    cefdinir 300 MG capsule  Commonly known as: OMNICEF  Take 1 capsule by mouth 2 (Two) Times a Day for 10 days.     nystatin 523408 UNIT/GM powder  Commonly known as: MYCOSTATIN  Apply  topically to the appropriate area as directed 3 (Three) Times a Day for 7 days.           Where to Get Your Medications      These medications were sent to [place], [place] - [address] [facility] [address] - [phone]  - [phone]   [address] [facility] [address] [facility] [address]    Phone: [phone]   · cefdinir 300 MG capsule  · nystatin 307373 UNIT/GM powder          Shantelle Oh PA  03/06/23 2032

## 2023-03-06 NOTE — ED NOTES
MEDICAL SCREENING:    Reason for Visit: passing bright red blood in stool. SOB. Had iron infusion today.     Patient initially seen in triage.  The patient was advised further evaluation and diagnostic testing will be needed, some of the treatment and testing will be initiated in the lobby in order to begin the process.  The patient will be returned to the waiting area for the time being and possibly be re-assessed by a subsequent ED provider.  The patient will be brought back to the treatment area in as timely manner as possible.         Briana Blair PA  03/06/23 8602

## 2023-03-07 LAB
ALPHA-FETOPROTEIN: 4.47 NG/ML (ref 0–8.3)
GGT SERPL-CCNC: 35 U/L (ref 5–36)
QT INTERVAL: 428 MS
QTC INTERVAL: 487 MS

## 2023-03-07 NOTE — DISCHARGE INSTRUCTIONS
Please take your medications as prescribed and follow up with your PCP in 2 days or return to ER if symptoms worsen.

## 2023-03-09 LAB — BACTERIA SPEC AEROBE CULT: ABNORMAL

## 2023-03-10 ENCOUNTER — TRANSCRIBE ORDERS (OUTPATIENT)
Dept: ADMINISTRATIVE | Facility: HOSPITAL | Age: 58
End: 2023-03-10
Payer: MEDICARE

## 2023-03-10 DIAGNOSIS — J32.4 CHRONIC PANSINUSITIS: Primary | ICD-10-CM

## 2023-03-13 RX ORDER — ALBUMIN (HUMAN) 12.5 G/50ML
25 SOLUTION INTRAVENOUS ONCE
Start: 2023-03-17 | End: 2023-03-17

## 2023-03-16 PROBLEM — E16.2 HYPOGLYCEMIA: Status: ACTIVE | Noted: 2023-03-16

## 2023-03-16 PROBLEM — E11.649 HYPOGLYCEMIA ASSOCIATED WITH TYPE 2 DIABETES MELLITUS: Status: ACTIVE | Noted: 2023-01-01

## 2023-03-16 NOTE — Clinical Note
Level of Care: Med/Surg [1]   Diagnosis: Hypoglycemia associated with type 2 diabetes mellitus (HCC) [1087645]   Certification: I Certify That Inpatient Hospital Services Are Medically Necessary For Greater Than 2 Midnights

## 2023-03-16 NOTE — ED PROVIDER NOTES
Subjective   History of Present Illness  57-year-old female with history significant for Lewis cirrhosis?, type 2 diabetes mellitus and hypertension presented to the ER via EMS with hypoglycemia.  She said she felt fine on prior to this morning.  EMS was called for AMS but when they arrived, she was awake and able to answer some orientation questions correctly.  Point-of-care glucose was 44, received oral dextrose.  On arrival to the ER, initial point-of-care was 74.  She does not take insulin, she takes glipizide and metformin.  She denies any chest pain, shortness of breath or palpitations.  She does complain of generalized abdominal pain and says she was supposed to follow-up tomorrow for her initial paracentesis.        Review of Systems   Constitutional: Negative for chills, fatigue and fever.   HENT: Negative for ear pain, sinus pain and sore throat.    Respiratory: Negative for cough, chest tightness, shortness of breath and wheezing.    Cardiovascular: Negative for chest pain, palpitations and leg swelling.   Gastrointestinal: Positive for abdominal distention and abdominal pain. Negative for constipation, diarrhea, nausea and vomiting.   Genitourinary: Negative for dysuria, hematuria and urgency.   Musculoskeletal: Negative for arthralgias and myalgias.   Neurological: Negative for dizziness, syncope and light-headedness.   Psychiatric/Behavioral: Negative for confusion.       Past Medical History:   Diagnosis Date   • Arthritis    • Chronic back pain    • Coronary artery disease    • Depression    • Diabetes mellitus (HCC)    • Elevated cholesterol    • Fatty liver    • History of transfusion    • Hypertension    • Hypothyroid    • Sleep apnea        Allergies   Allergen Reactions   • Nuts Anaphylaxis     peanuts   • Contrast Dye (Echo Or Unknown Ct/Mr) Unknown - Low Severity   • Codeine Itching and Nausea And Vomiting   • Latex Rash       Past Surgical History:   Procedure Laterality Date   • CARPAL TUNNEL  RELEASE     •  SECTION     • CHOLECYSTECTOMY     • COLONOSCOPY     • CORONARY ANGIOPLASTY WITH STENT PLACEMENT     • ENDOSCOPY N/A 2022    Procedure: ESOPHAGOGASTRODUODENOSCOPY WITH BIOPSY;  Surgeon: Lizzeth Harmon MD;  Location: [facility];  Service: Gastroenterology;  Laterality: N/A;   • HIP TROCHANTERIC NAILING WITH INTRAMEDULLARY HIP SCREW Right 2021    Procedure: HIP TROCHANTERIC NAILING LONG WITH INTRAMEDULLARY HIP SCREW;  Surgeon: Oracio Ponce DO;  Location: [facility] OR;  Service: Orthopedics;  Laterality: Right;   • REPLACEMENT TOTAL KNEE Right    • TUBAL ABDOMINAL LIGATION     • UPPER GASTROINTESTINAL ENDOSCOPY         Family History   Problem Relation Age of Onset   • COPD Mother    • Stroke Mother    • Cancer Brother    • Diabetes Brother    • Hypertension Brother    • Heart disease Brother    • Breast cancer Neg Hx        Social History     Socioeconomic History   • Marital status:    Tobacco Use   • Smoking status: Never   • Smokeless tobacco: Never   Vaping Use   • Vaping Use: Never used   Substance and Sexual Activity   • Alcohol use: Yes     Comment: Once a year    • Drug use: No   • Sexual activity: Defer           Objective   Physical Exam  Vitals and nursing note reviewed.   Constitutional:       Appearance: She is well-developed and normal weight.   HENT:      Head: Normocephalic and atraumatic.      Mouth/Throat:      Mouth: Mucous membranes are moist.   Eyes:      Extraocular Movements: Extraocular movements intact.      Pupils: Pupils are equal, round, and reactive to light.   Cardiovascular:      Rate and Rhythm: Normal rate and regular rhythm.      Heart sounds: Normal heart sounds.   Pulmonary:      Effort: Pulmonary effort is normal.      Breath sounds: Normal breath sounds.   Abdominal:      General: Bowel sounds are normal. There is distension.      Tenderness: There is abdominal tenderness.      Comments: Palpable fluid wave   Musculoskeletal:          General: Normal range of motion.      Cervical back: Normal range of motion and neck supple.   Skin:     General: Skin is warm and dry.      Capillary Refill: Capillary refill takes less than 2 seconds.   Neurological:      Mental Status: She is alert and oriented to person, place, and time.   Psychiatric:         Mood and Affect: Mood normal.         Behavior: Behavior normal.      Comments: No asterixis         Procedures           ED Course  ED Course as of 03/16/23 1849   Thu Mar 16, 2023   1736 EKG shows sinus rhythm, rate 83.  WI interval 222, QRS duration 70, QTc 448 ms.  First-degree AV block.  Possible old septal infarct, not felt to be acute.  No evidence for STEMI.  Electronically signed by Dario Pompa MD, 03/16/23, 5:37 PM EDT.   [CM]      ED Course User Index  [CM] Dario Pompa MD                                           Medical Decision Making  Patient remained alert and oriented x3, hemodynamically stable on the monitor.  After initiating D5W, repeat point-of-care was 58.  D5W was increased to 150 cc/h, after discussing with medicine, will switch to D10 for conservative IVF.  Suspect persistent hypoglycemia due to long-acting glipizide.  From a cirrhosis standpoint, she is distended and would benefit from a therapeutic paracentesis.  There are no physical exam findings suggestive of SBP.  Acute hep B IgM positive after most recent hep panels have been negative, unclear what to make of this.  We will continue D10 at 100 cc an hour, admit to medicine.    Amount and/or Complexity of Data Reviewed  Labs: ordered.  Radiology: ordered.  ECG/medicine tests: ordered.     Details: Normal sinus rhythm, rate 83, QTc 448, no acute ST or T wave changes      Risk  Prescription drug management.          Final diagnoses:   Hypoglycemia   Cirrhosis of liver with ascites, unspecified hepatic cirrhosis type (HCC)       ED Disposition  ED Disposition     ED Disposition   Decision to Admit     Condition   --    Comment   Level of Care: Med/Surg [1]   Diagnosis: Hypoglycemia associated with type 2 diabetes mellitus (HCC) [[id number]]   Certification: I Certify That Inpatient Hospital Services Are Medically Necessary For Greater Than 2 Midnights               No follow-up provider specified.       Medication List      No changes were made to your prescriptions during this visit.         Kaveh Reyes DO  03/16/23 1909

## 2023-03-17 NOTE — PLAN OF CARE
Goal Outcome Evaluation:  Plan of Care Reviewed With: patient           Outcome Evaluation: Pt admitted to 3S this shift. Pt resting in bed on 2 L NC, sats above 90%. IVF infusing per order, see MAR. Mg replaced. VSS. No s/s of acute distress noted.

## 2023-03-17 NOTE — CASE MANAGEMENT/SOCIAL WORK
Discharge Planning Assessment   [facility]     Patient Name: Aidee Trinidad  MRN: [id number]  Today's Date: 3/17/2023    Admit Date: 3/16/2023    Plan: SS received consult per [facility] for discharge planning.  SS spoke with pt at bedside on this date.  Pt resides at home with son, Americo, at [address] [place] [address] and plans to return home at discharge.  Pt currently does not utilize home health services.  Pt currently utilizes hospital bed, wheelchair and home 02 via [facility].  Pt's PCP is Nanci Burnett.  Pt utilizes [facility].  SS will sign off and can be reconsulted if needs arise.   Discharge Needs Assessment     Row Name 03/17/23 1207       Living Environment    People in Home child(eloise), adult    Name(s) of People in Home Live with adult on Americo    Current Living Arrangements home    Primary Care Provided by self;child(eloise)    Provides Primary Care For no one    Family Caregiver if Needed child(eloise), adult    Quality of Family Relationships helpful;involved;supportive    Able to Return to Prior Arrangements yes       Resource/Environmental Concerns    Resource/Environmental Concerns none    Transportation Concerns none       Transition Planning    Patient/Family Anticipates Transition to home with family    Patient/Family Anticipated Services at Transition none    Transportation Anticipated family or friend will provide       Discharge Needs Assessment    Equipment Currently Used at Home hospital bed;wheelchair;oxygen    Concerns to be Addressed discharge planning               Discharge Plan     Row Name 03/17/23 1210       Plan    Plan SS received consult per [facility] for discharge planning.  SS spoke with pt at bedside on this date.  Pt resides at home with son, Americo, at [address] [facility] and plans to return home at discharge.  Pt currently does not utilize home health services.  Pt currently utilizes hospital bed, wheelchair and home 02 via [facility].  Pt's PCP is Nanci  Lex.  Pt utilizes [facility].  SS will sign off and can be reconsulted if needs arise.    Row Name 03/17/23 2338       Plan    Plan Comments 3/17:  To ED after EMS called for AMS & BG 44 when they arrived, On PO Metformin but started glipizide within last few months, Pt c/o abd pain & states she is to f/up r/t initial paracentesis, Paracentesis today with 4850 drained, OT eval completed-KLS              Continued Care and Services - Admitted Since 3/16/2023    Coordination has not been started for this encounter.       Expected Discharge Date and Time     Expected Discharge Date Expected Discharge Time    Mar 20, 2023          Demographic Summary     Row Name 03/17/23 1206       General Information    Admission Type inpatient    Referral Source nursing    Reason for Consult discharge planning    Preferred Language English                    Cally Goyal, MURPHYW

## 2023-03-17 NOTE — H&P
[facility] HISTORY AND PHYSICAL    Patient Identification:  Name:  Aidee Trinidad  Age:  57 y.o.  Sex:  female  :  1965  MRN:  [id number]   Visit Number:  [id number]  Admit Date: 3/16/2023   Room number:  3305/2S  Primary Care Physician:  Azalea Burnett APRN    Date of Admission: 3/16/2023     Subjective     Chief complaint:    Chief Complaint   Patient presents with   • Altered Mental Status   • Hypoglycemia     History of presenting illness:  57 y.o. female who was admitted on 3/16/2023 for confusion.    Patient has pmh of Cirrhosis (carias vs Hep B), t2dm, HTN that presented via EMS for confusion at home. She said she was of her normal state of health this am but family member noted that she seemed very confused and lethargic, EMS called and noted her FSBG to be 44, received oral dextrose which improved glucose but remained low on arrival to ED. Was started on D5 in ED with only slight improvement and was escalated to D10 to keep glucose above 80mg/dl. As glucose returned to normal, patient's mentation improved and at time of my exam she was back to premorbid baseline and answering all questions appropriately.     She does not taking metformin for years but started glipizide within last few months. Also planning for first paracentesis tomorrow but otherwise compliant on all diuretics and beta blocker for her cirrhosis. Has not required lactulose up to this point and having regular bowel movements. No reported jaundice and no seizure hx. No alcohol usage.       Patient admitted to inpatient given persistent dextrose infusion requirement for further management    Prior to admission I discussed patient's presentation and management with attending ER physician and verbal handoff received.  ---------------------------------------------------------------------------------------------------------------------   A thorough systems based relevant ROS was asked and was negative  except as noted above.  ---------------------------------------------------------------------------------------------------------------------   Past Medical History:   Diagnosis Date   • Arthritis    • Chronic back pain    • Coronary artery disease    • Depression    • Diabetes mellitus (HCC)    • Elevated cholesterol    • Fatty liver    • History of transfusion    • Hypertension    • Hypothyroid    • Sleep apnea      Past Surgical History:   Procedure Laterality Date   • CARPAL TUNNEL RELEASE     •  SECTION     • CHOLECYSTECTOMY     • COLONOSCOPY     • CORONARY ANGIOPLASTY WITH STENT PLACEMENT     • ENDOSCOPY N/A 2022    Procedure: ESOPHAGOGASTRODUODENOSCOPY WITH BIOPSY;  Surgeon: Lizzeth Harmon MD;  Location: [facility];  Service: Gastroenterology;  Laterality: N/A;   • HIP TROCHANTERIC NAILING WITH INTRAMEDULLARY HIP SCREW Right 2021    Procedure: HIP TROCHANTERIC NAILING LONG WITH INTRAMEDULLARY HIP SCREW;  Surgeon: Oracio Ponce DO;  Location: [facility];  Service: Orthopedics;  Laterality: Right;   • REPLACEMENT TOTAL KNEE Right    • TUBAL ABDOMINAL LIGATION     • UPPER GASTROINTESTINAL ENDOSCOPY       Family History   Problem Relation Age of Onset   • COPD Mother    • Stroke Mother    • Cancer Brother    • Diabetes Brother    • Hypertension Brother    • Heart disease Brother    • Breast cancer Neg Hx      Social History     Socioeconomic History   • Marital status:    Tobacco Use   • Smoking status: Never   • Smokeless tobacco: Never   Vaping Use   • Vaping Use: Never used   Substance and Sexual Activity   • Alcohol use: Yes     Comment: Once a year    • Drug use: No   • Sexual activity: Defer     ---------------------------------------------------------------------------------------------------------------------   Allergies:  Nuts, Contrast dye (echo or unknown ct/mr), Codeine, and  Latex  ---------------------------------------------------------------------------------------------------------------------   Medications below are reported home medications pulling from within the system; at this time, these medications have not been reconciled unless otherwise specified and are in the verification process for further verifcation as current home medications.      Prior to Admission Medications     Prescriptions Last Dose Informant Patient Reported? Taking?    aspirin 81 MG EC tablet  Self, Family Member Yes No    Take 1 tablet by mouth Daily.    atorvastatin (LIPITOR) 40 MG tablet  Self, Family Member No No    Take 1 tablet by mouth Every Night.    cefdinir (OMNICEF) 300 MG capsule   No No    Take 1 capsule by mouth 2 (Two) Times a Day for 10 days.    cholecalciferol (VITAMIN D3) 1.25 MG (93403 UT) capsule  Self, Family Member Yes No    Take 1 capsule by mouth Every 7 (Seven) Days. Prior to [facility] Admission, Patient was on: Pt takes on Sunday    doxycycline (VIBRAMYCIN) 100 MG capsule   Yes No    Take 1 capsule by mouth 2 (Two) Times a Day.    furosemide (LASIX) 40 MG tablet   No No    Take 1 tablet by mouth Daily.    gabapentin (NEURONTIN) 400 MG capsule  Self, Family Member Yes No    Take 1 capsule by mouth Every 12 (Twelve) Hours.    glipizide (GLUCOTROL) 5 MG tablet  Self, Family Member Yes No    Take 1 tablet by mouth Daily.    HYDROcodone-acetaminophen (NORCO) 5-325 MG per tablet  Self, Family Member Yes No    Take 1 tablet by mouth 2 (Two) Times a Day.    levothyroxine (SYNTHROID, LEVOTHROID) 75 MCG tablet  Self, Family Member Yes No    Take 1 tablet by mouth Daily.    loratadine-pseudoephedrine (CLARITIN-D 24-hour)  MG per 24 hr tablet  Self, Family Member Yes No    Take 1 tablet by mouth Daily As Needed for Allergies (congestion).    metFORMIN (GLUCOPHAGE) 500 MG tablet  Self, Family Member Yes No    Take 1 tablet by mouth 2 (Two) Times a Day With Meals.    metoprolol tartrate  (LOPRESSOR) 25 MG tablet  Self, Family Member Yes No    Take 1 tablet by mouth 2 (Two) Times a Day.    mupirocin (BACTROBAN) 2 % ointment   Yes No    Apply 1 application topically to the appropriate area as directed 3 (Three) Times a Day.    omeprazole (priLOSEC) 40 MG capsule  Self, Family Member No No    Take 1 capsule by mouth Daily.    potassium chloride (K-DUR,KLOR-CON) 10 MEQ CR tablet  Self, Family Member Yes No    Take 1 tablet by mouth 2 (Two) Times a Day.    QUEtiapine (SEROquel) 50 MG tablet  Self, Family Member Yes No    Take 1 tablet by mouth Every Night.    sertraline (ZOLOFT) 50 MG tablet  Self, Family Member Yes No    Take 1 tablet by mouth Daily.    spironolactone (Aldactone) 100 MG tablet   No No    Take 1 tablet by mouth 2 (Two) Times a Day.        Objective     Vital Signs:  Temp:  [98.1 °F (36.7 °C)] 98.1 °F (36.7 °C)  Heart Rate:  [81-95] 95  Resp:  [18] 18  BP: (124-148)/(56-94) 141/59    Mean Arterial Pressure (Non-Invasive) for the past 24 hrs (Last 3 readings):   Noninvasive MAP (mmHg)   03/16/23 2009 83   03/16/23 1800 91   03/16/23 1745 74     SpO2:  [99 %-100 %] 99 %  on  Flow (L/min):  [2] 2;   Device (Oxygen Therapy): nasal cannula  Body mass index is 124.32 kg/m².    Wt Readings from Last 3 Encounters:   03/16/23 (!) 261 kg (574 lb 8 oz)   03/06/23 118 kg (260 lb)   03/03/23 118 kg (260 lb)      ----------------------------------------------------------------------------------------------------------------------  Physical Exam  Vitals and nursing note reviewed.   Constitutional:       Appearance: She is obese.   HENT:      Head: Normocephalic and atraumatic.      Mouth/Throat:      Mouth: Mucous membranes are moist.      Pharynx: Oropharynx is clear.   Eyes:      General: No scleral icterus.     Extraocular Movements: Extraocular movements intact.   Cardiovascular:      Rate and Rhythm: Normal rate.      Pulses: Normal pulses.   Pulmonary:      Effort: Pulmonary effort is normal. No  respiratory distress.   Abdominal:      General: There is distension.      Tenderness: There is no abdominal tenderness.   Musculoskeletal:         General: Swelling (mild b/l non pitting le edema, tense abd distension) present.      Right lower leg: No edema.      Left lower leg: No edema.   Skin:     General: Skin is warm and dry.      Capillary Refill: Capillary refill takes 2 to 3 seconds.   Neurological:      General: No focal deficit present.      Mental Status: She is alert and oriented to person, place, and time.   Psychiatric:         Mood and Affect: Mood normal.         Behavior: Behavior normal.       --------------------------------------------------------------------------------------------------------------------  LABS:    CBC and coagulation:  Results from last 7 days   Lab Units 03/16/23  1502   WBC 10*3/mm3 6.62   HEMOGLOBIN g/dL 10.3*   HEMATOCRIT % 34.5   MCV fL 90.8   MCHC g/dL 29.9*   PLATELETS 10*3/mm3 134*   INR  1.47*     Acid/base balance:      Renal and electrolytes:  Results from last 7 days   Lab Units 03/16/23  1536   SODIUM mmol/L 134*   POTASSIUM mmol/L 5.1   MAGNESIUM mg/dL 1.5*   CHLORIDE mmol/L 95*   CO2 mmol/L 34.7*   BUN mg/dL 13   CREATININE mg/dL 0.80   CALCIUM mg/dL 9.6   GLUCOSE mg/dL 89     Estimated Creatinine Clearance: 161.7 mL/min (by C-G formula based on SCr of 0.8 mg/dL).    Liver and pancreatic function:  Results from last 7 days   Lab Units 03/16/23  1536   ALBUMIN g/dL 2.8*   BILIRUBIN mg/dL 1.7*   ALK PHOS U/L 201*   AST (SGOT) U/L 102*   ALT (SGPT) U/L 50*   LIPASE U/L 52     Endocrine function:  Lab Results   Component Value Date    HGBA1C <4.20 (L) 03/16/2023     Point of care bedside glucose levels:  Results from last 7 days   Lab Units 03/16/23 2022 03/16/23  1728 03/16/23  1603 03/16/23  1447   GLUCOSE mg/dL 62* 58* 64* 76     Lab Results   Component Value Date    TSH 2.520 10/31/2021     Cardiac:  Results from last 7 days   Lab Units 03/16/23  1729  03/16/23  1536   CK TOTAL U/L  --  84   HSTROP T ng/L 38* 37*   PROBNP pg/mL  --  898.4       Cultures:  Lab Results   Component Value Date    COLORU Dark Yellow (A) 03/16/2023    CLARITYU Clear 03/16/2023    PHUR 6.0 03/16/2023    GLUCOSEU 250 mg/dL (1+) (A) 03/16/2023    KETONESU Negative 03/16/2023    BLOODU Small (1+) (A) 03/16/2023    NITRITEU Negative 03/16/2023    LEUKOCYTESUR Trace (A) 03/16/2023    BILIRUBINUR Negative 03/16/2023    UROBILINOGEN 1.0 E.U./dL 03/16/2023    RBCUA 6-12 (A) 03/16/2023    WBCUA 0-2 03/16/2023    BACTERIA None Seen 03/16/2023     Microbiology Results (last 10 days)     Procedure Component Value - Date/Time    COVID PRE-OP / PRE-PROCEDURE SCREENING ORDER (NO ISOLATION) - Swab, Nasopharynx [[id number]]  (Normal) Collected: 03/16/23 1757    Lab Status: Final result Specimen: Swab from Nasopharynx Updated: 03/16/23 1833    Narrative:      The following orders were created for panel order COVID PRE-OP / PRE-PROCEDURE SCREENING ORDER (NO ISOLATION) - Swab, Nasopharynx.  Procedure                               Abnormality         Status                     ---------                               -----------         ------                     COVID-19,CEPHEID/FELIPE,CO...[[id number]]  Normal              Final result                 Please view results for these tests on the individual orders.    COVID-19,CEPHEID/FELIPE,COR/VANESSA/PAD/SHADY/MAD IN-HOUSE(OR EMERGENT/ADD-ON),NP SWAB IN TRANSPORT MEDIA 3-4 HR TAT, RT-PCR - Swab, Nasopharynx [[id number]]  (Normal) Collected: 03/16/23 1757    Lab Status: Final result Specimen: Swab from Nasopharynx Updated: 03/16/23 1833     COVID19 Not Detected    Narrative:      Fact sheet for providers: https://www.fda.gov/media/215172/download     Fact sheet for patients: https://www.fda.gov/media/493915/download  Fact sheet for providers: https://www.fda.gov/media/123309/download    Fact sheet for patients: https://www.fda.gov/media/260515/download    Test performed  by PCR.          No results found for: PREGTESTUR, PREGSERUM, HCG, HCGQUANT  Pain Management Panel     Pain Management Panel Latest Ref Rng & Units 2/28/2020    AMPHETAMINES SCREEN, URINE Negative Negative    BARBITURATES SCREEN Negative Negative    BENZODIAZEPINE SCREEN, URINE Negative Negative    BUPRENORPHINEUR Negative Negative    COCAINE SCREEN, URINE Negative Negative    METHADONE SCREEN, URINE Negative Negative          I have personally looked at the labs and they are summarized above.  ----------------------------------------------------------------------------------------------------------------------  Detailed radiology reports for the last 24 hours:    Imaging Results (Last 24 Hours)     Procedure Component Value Units Date/Time    CT Abdomen Pelvis Without Contrast [[id number]] Collected: 03/16/23 1611     Updated: 03/16/23 1615    Narrative:      EXAM:    CT Abdomen and Pelvis Without Intravenous Contrast     EXAM DATE:    3/16/2023 3:08 PM     CLINICAL HISTORY:    Abdominal pain, acute, nonlocalized     TECHNIQUE:    Axial computed tomography images of the abdomen and pelvis without  intravenous contrast.  Sagittal and coronal reformatted images were  created and reviewed.  This CT exam was performed using one or more of  the following dose reduction techniques:  automated exposure control,  adjustment of the mA and/or kV according to patient size, and/or use of  iterative reconstruction technique.     COMPARISON:    03/06/2023     FINDINGS:    Lung bases:  Left basilar airspace disease.    Pleural space:  Trace pleural effusions.    Heart:  Cardiomegaly.    Mediastinum:  Small hiatal hernia.      ABDOMEN:    Liver:  Severe liver cirrhosis again noted.    Gallbladder and bile ducts:  Cholecystectomy.  No ductal dilation.    Pancreas:  Unremarkable.  No ductal dilation.    Spleen:  Spleen is unenlarged.    Adrenals:  Unremarkable.  No mass.    Kidneys and ureters:  No renal or ureteral stones. No  obstructive  uropathy.    Stomach and bowel:  Diverticulosis without evidence of diverticulitis.   No obstruction.      PELVIS:    Appendix:  No findings to suggest acute appendicitis.    Bladder:  Unremarkable.  No stones.    Reproductive:  Unremarkable as visualized.      ABDOMEN and PELVIS:    Intraperitoneal space:  Small-moderate volume ascites.  The amount of  ascites is unchanged from previous exam.  No free air.    Bones/joints:  Stable degenerative changes lumbar spine.  No acute  fracture.  No dislocation.    Soft tissues:  Diffuse anasarca stable from previous.    Vasculature:  Unremarkable.  No abdominal aortic aneurysm.    Lymph nodes:  No retroperitoneal adenopathy.       Impression:      1.  No significant interval change. Severe liver cirrhosis with  small-moderate volume ascites and diffuse anasarca.  2.  No acute changes identified.  3.  Diverticulosis without diverticulitis.  4.  Left basilar airspace disease.  5.  Mild CHF with trace effusions.  6.  Other incidental and nonacute findings above.     This report was finalized on 3/16/2023 4:13 PM by Dr. Vikram Strong MD.       XR Chest 1 View [[id number]] Collected: 03/16/23 1543     Updated: 03/16/23 1548    Narrative:      EXAM:    XR Chest, 1 View     EXAM DATE:    3/16/2023 3:09 PM     CLINICAL HISTORY:    AMS Protocol     TECHNIQUE:    Frontal view of the chest.     COMPARISON:    03/06/2023     FINDINGS:    Lungs:  Pulmonary vascular congestion.    Pleural space:  Small left pleural effusion, stable.  No pneumothorax.    Heart:  Marked cardiomegaly.  CABG.    Mediastinum:  Unremarkable.    Bones/joints:  Unremarkable.       Impression:        Stable changes of CHF with small left effusion.     This report was finalized on 3/16/2023 3:43 PM by Dr. Vikram Strong MD.           Final impressions for the last 30 days of radiology reports:    CT Abdomen Pelvis Without Contrast    Result Date: 3/16/2023  1.  No significant interval change. Severe  liver cirrhosis with small-moderate volume ascites and diffuse anasarca. 2.  No acute changes identified. 3.  Diverticulosis without diverticulitis. 4.  Left basilar airspace disease. 5.  Mild CHF with trace effusions. 6.  Other incidental and nonacute findings above.  This report was finalized on 3/16/2023 4:13 PM by Dr. Vikram Strong MD.      CT Abdomen Pelvis Without Contrast    Result Date: 3/6/2023   1. Ascites  2.Mild stranding of the mesentery. Nonspecific.  3. Other findings as above       This report was finalized on 3/6/2023 6:54 PM by Dr. John Duarte MD.      CT Abdomen Pelvis Without Contrast    Result Date: 2/20/2023  Cardiomegaly with diffuse thickening of the interlobular septa and a trace left pleural effusion highly suggestive of CHF. Small to moderate volume ascites with generalized mesenteric and body wall edema compatible with third spacing of fluid and could also be secondary to CHF. Signer Name: MARIANNA Montgomery MD  Signed: 2/20/2023 6:26 PM  Workstation Name: [facility]  Radiology [facility]    XR Chest 2 View    Result Date: 2/20/2023    CHF/edema with trace left pleural effusion.  This report was finalized on 2/20/2023 4:35 PM by Dr. Vikram Strong MD.      CT Chest Without Contrast Diagnostic    Result Date: 2/20/2023  Cardiomegaly with diffuse thickening of the interlobular septa and a trace left pleural effusion highly suggestive of CHF. Small to moderate volume ascites with generalized mesenteric and body wall edema compatible with third spacing of fluid and could also be secondary to CHF. Signer Name: MARIANNA Montgomery MD  Signed: 2/20/2023 6:26 PM  Workstation Name: [facility]  Radiology [facility]    XR Chest 1 View    Result Date: 3/16/2023    Stable changes of CHF with small left effusion.  This report was finalized on 3/16/2023 3:43 PM by Dr. Vikram Strong MD.      XR Chest 1 View    Result Date: 3/6/2023  Appearance suggestive of CHF  This  report was finalized on 3/6/2023 5:40 PM by Dr. John Duarte MD.      US Venous Doppler Lower Extremity Bilateral (duplex)    Result Date: 2/20/2023  No deep venous thrombus seen in either lower extremity. Signer Name: Norbert Samuel MD  Signed: 2/20/2023 6:53 PM  Workstation Name: [facility]-  [facility]        Assessment & Plan       #Hypoglycemia induced encephalopathy, glipizide induced  -Improved w/correction via dextrose infusion now requiring d10 infusion with dextrose slow to return to normal range. Added ammonia to am lab but given improvement unlikely to be HE  -D10 rate reduced to 50cc/hr now that fsbg improving, will cont to trend fsbg and once >80 mg/dl on two checks and patient tolerating PO, can stop dextrose.  -Monitor sodium while D10 infusing given risk for hyponatremia, minimize IVF volume as able given cirrhosis  -Rec patient stopping glipizide at discharge and holding metformin until she can f/u with pcp  -A1c <4.2, hypoglycemia likely subacute in nature    #Decompensated GONZALEZ vs Hep B cirrhosis  #Hypoalbuminemia  #Elevated LFTs  -Likely gonzalez but Hep B core IgM positive after she appeared to clear inf in past. Following with GI locally with plans for paracentesis tomorrow  -Inpatient IR para ordered w/cell count and cx, no clinical sx of SBP currently, WBC wnl  -MELD-Na 16, cirrhosis severity unlikely to explain hypoglycemia alone  -Holding diuretic and BB pending med rec    #Normocytic anemia  #Thrombocytopenia  -Secondary to cirrhosis    #Recent bacteruria  -Asymptomatic currently, abx deferred    #Hypothyroidism  -Resume synthroid pend rec  -TSH ordered      VTE Prophylaxis:   Mechanical Order History:     None      Pharmalogical Order History:      Ordered     Dose Route Frequency Stop    03/16/23 2040  heparin (porcine) 5000 UNIT/ML injection 5,000 Units         5,000 Units SC Every 8 Hours Scheduled --              The patient is high risk due to the following  diagnoses/reasons:  Hypoglycemia requiring D10, decompensated cirrhosis    Admission Status:  I certify that this patient requires inpatient hospitalization for greater than 2 midnights in INPATIENT status.  I anticipate there to be the need for care which can only be reasonably provided in a hospital setting such as possible need for aggressive/expedited ancillary services and/or consultation services, IV medications, close physician monitoring, and/or procedures. In such, I feel patient’s risk for adverse outcomes and need for care warrant INPATIENT evaluation and predict the patient’s care encounter to likely last beyond 2 midnights.    Code Status and Medical Interventions:   Ordered at: 03/16/23 2041     Code Status (Patient has no pulse and is not breathing):    CPR (Attempt to Resuscitate)     Medical Interventions (Patient has pulse or is breathing):    Full Support        Disposition: Admit to tele    Brett Perry MD  [facility] Hospitalist  03/16/23  23:46 EDT

## 2023-03-17 NOTE — THERAPY EVALUATION
Patient Name: Aidee Trinidad  : 1965    MRN: [id number]                              Today's Date: 3/17/2023       Admit Date: 3/16/2023    Visit Dx:     ICD-10-CM ICD-9-CM   1. Hypoglycemia  E16.2 251.2   2. Cirrhosis of liver with ascites, unspecified hepatic cirrhosis type (HCC)  K74.60 571.5    R18.8      Patient Active Problem List   Diagnosis   • Closed fracture of right hip (HCC)   • CAD (coronary artery disease)   • Essential hypertension   • Fatty liver   • Abscess of periorbital region, right   • Hypothyroidism (acquired)   • Type 2 diabetes mellitus, without long-term current use of insulin (HCC)   • Severe sepsis (HCC)   • Cirrhosis of liver without ascites (HCC)   • Iron deficiency anemia   • Malabsorption due to intolerance, not elsewhere classified   • Hypoglycemia associated with type 2 diabetes mellitus (HCC)   • Hypoglycemia     Past Medical History:   Diagnosis Date   • Arthritis    • Chronic back pain    • Coronary artery disease    • Depression    • Diabetes mellitus (HCC)    • Elevated cholesterol    • Fatty liver    • History of transfusion    • Hypertension    • Hypothyroid    • Sleep apnea      Past Surgical History:   Procedure Laterality Date   • CARPAL TUNNEL RELEASE     •  SECTION     • CHOLECYSTECTOMY     • COLONOSCOPY     • CORONARY ANGIOPLASTY WITH STENT PLACEMENT     • ENDOSCOPY N/A 2022    Procedure: ESOPHAGOGASTRODUODENOSCOPY WITH BIOPSY;  Surgeon: Lizzeth Harmon MD;  Location: [facility];  Service: Gastroenterology;  Laterality: N/A;   • HIP TROCHANTERIC NAILING WITH INTRAMEDULLARY HIP SCREW Right 2021    Procedure: HIP TROCHANTERIC NAILING LONG WITH INTRAMEDULLARY HIP SCREW;  Surgeon: Oracio Ponce DO;  Location: [facility];  Service: Orthopedics;  Laterality: Right;   • REPLACEMENT TOTAL KNEE Right    • TUBAL ABDOMINAL LIGATION     • UPPER GASTROINTESTINAL ENDOSCOPY        General Information     Row Name 23 1056          OT Time and  Intention    Document Type evaluation  -LM     Mode of Treatment occupational therapy  -     Row Name 03/17/23 1056          General Information    Patient Profile Reviewed yes  -LM     Prior Level of Function independent:;min assist:;transfer;ADL's;all household mobility  reports that son, daughter, and niece does assist with BADL as needed, otherwise setup/MOD ind level  -LM     Existing Precautions/Restrictions oxygen therapy device and L/min;fall  -LM     Barriers to Rehab previous functional deficit  -LM     Row Name 03/17/23 1056          Living Environment    People in Home child(eloise), adult  -LM     Row Name 03/17/23 1056          Cognition    Orientation Status (Cognition) oriented x 4  -LM     Row Name 03/17/23 1056          Safety Issues, Functional Mobility    Impairments Affecting Function (Mobility) endurance/activity tolerance;strength  -LM           User Key  (r) = Recorded By, (t) = Taken By, (c) = Cosigned By    Initials Name Provider Type     Shea Terrell, OT Occupational Therapist                 Mobility/ADL's     Row Name 03/17/23 1102          Transfers    Transfers toilet transfer  -     Row Name 03/17/23 1102          Toilet Transfer    Type (Toilet Transfer) stand pivot/stand step  -LM     [place] Level (Toilet Transfer) contact guard;supervision  -LM     Assistive Device (Toilet Transfer) commode, 3-in-1  -     Row Name 03/17/23 1102          Activities of Daily Living    BADL Assessment/Intervention bathing;upper body dressing;lower body dressing;grooming;feeding;toileting  -     Row Name 03/17/23 1102          Bathing Assessment/Intervention    [place] Level (Bathing) contact guard assist  -     Row Name 03/17/23 1102          Upper Body Dressing Assessment/Training    [place] Level (Upper Body Dressing) set up  -     Row Name 03/17/23 1102          Lower Body Dressing Assessment/Training    [place] Level (Lower Body Dressing) contact guard  assist;minimum assist (75% patient effort)  -     Row Name 03/17/23 1102          Grooming Assessment/Training    [place] Level (Grooming) set up  -     Row Name 03/17/23 1102          Self-Feeding Assessment/Training    [place] Level (Feeding) set up  -     Row Name 03/17/23 1102          Toileting Assessment/Training    [place] Level (Toileting) contact guard assist;supervision  -           User Key  (r) = Recorded By, (t) = Taken By, (c) = Cosigned By    Initials Name Provider Type    Shea Hobbs, OT Occupational Therapist               Obj/Interventions     Row Name 03/17/23 1103          Sensory Assessment (Somatosensory)    Sensory Assessment (Somatosensory) sensation intact  -     Row Name 03/17/23 1103          Vision Assessment/Intervention    Visual Impairment/Limitations WFL  -[facility] Name 03/17/23 1103          Range of Motion Comprehensive    General Range of Motion no range of motion deficits identified  -[facility] Name 03/17/23 1103          Strength Comprehensive (MMT)    General Manual Muscle Testing (MMT) Assessment no strength deficits identified  -     Row Name 03/17/23 1103          Motor Skills    Motor Skills functional endurance;coordination  -     Coordination WFL  -LM     Functional Endurance F  -           User Key  (r) = Recorded By, (t) = Taken By, (c) = Cosigned By    Initials Name Provider Type    Shea Hobbs, OT Occupational Therapist               Goals/Plan    No documentation.                Clinical Impression     [facility] Name 03/17/23 1104          Plan of Care Review    Plan of Care Reviewed With patient  -LM     Row Name 03/17/23 1104          Therapy Assessment/Plan (OT)    Criteria for Skilled Therapeutic Interventions Met (OT) no;does not meet criteria for skilled intervention;no problems identified which require skilled intervention  nearing baseline  -     Therapy Frequency (OT) evaluation only  -[facility] Name 03/17/23 1104           Therapy Plan Review/Discharge Plan (OT)    Anticipated Discharge Disposition (OT) home with assist  -LM     Row Name 03/17/23 1104          Positioning and Restraints    Post Treatment Position bed  -LM     In Bed call light within reach;encouraged to call for assist  -LM           User Key  (r) = Recorded By, (t) = Taken By, (c) = Cosigned By    Initials Name Provider Type    LM Shea Terrell, OT Occupational Therapist               Outcome Measures    No documentation.                   OT Recommendation and Plan  Therapy Frequency (OT): evaluation only  Plan of Care Review  Plan of Care Reviewed With: patient     Time Calculation:     Therapy Charges for Today     Code Description Service Date Service Provider Modifiers Qty    [id number]  OT EVAL LOW COMPLEXITY 4 3/17/2023 Shea Terrell OT GO 1               Shea Terrell OT  3/17/2023

## 2023-03-17 NOTE — NURSING NOTE
Procedure complete and patient tolerated well. Approximately 4850ml yellow fluid drained. Skin adhesive and occlusive dressing applied.

## 2023-03-17 NOTE — THERAPY DISCHARGE NOTE
Acute Care - Physical Therapy Initial Evaluation/Discharge  EZEQUIEL Saeed     Patient Name: Aidee Trinidad  : 1965  MRN: [id number]  Today's Date: 3/17/2023   Onset of Illness/Injury or Date of Surgery: 23     Referring Physician: [place]      Admit Date: 3/16/2023    Visit Dx:    ICD-10-CM ICD-9-CM   1. Hypoglycemia  E16.2 251.2   2. Cirrhosis of liver with ascites, unspecified hepatic cirrhosis type (HCC)  K74.60 571.5    R18.8      Patient Active Problem List   Diagnosis   • Closed fracture of right hip (HCC)   • CAD (coronary artery disease)   • Essential hypertension   • Fatty liver   • Abscess of periorbital region, right   • Hypothyroidism (acquired)   • Type 2 diabetes mellitus, without long-term current use of insulin (HCC)   • Severe sepsis (HCC)   • Cirrhosis of liver without ascites (HCC)   • Iron deficiency anemia   • Malabsorption due to intolerance, not elsewhere classified   • Hypoglycemia associated with type 2 diabetes mellitus (HCC)   • Hypoglycemia     Past Medical History:   Diagnosis Date   • Arthritis    • Chronic back pain    • Coronary artery disease    • Depression    • Diabetes mellitus (HCC)    • Elevated cholesterol    • Fatty liver    • History of transfusion    • Hypertension    • Hypothyroid    • Sleep apnea      Past Surgical History:   Procedure Laterality Date   • CARPAL TUNNEL RELEASE     •  SECTION     • CHOLECYSTECTOMY     • COLONOSCOPY     • CORONARY ANGIOPLASTY WITH STENT PLACEMENT     • ENDOSCOPY N/A 2022    Procedure: ESOPHAGOGASTRODUODENOSCOPY WITH BIOPSY;  Surgeon: Lizzeth Harmon MD;  Location: [facility];  Service: Gastroenterology;  Laterality: N/A;   • HIP TROCHANTERIC NAILING WITH INTRAMEDULLARY HIP SCREW Right 2021    Procedure: HIP TROCHANTERIC NAILING LONG WITH INTRAMEDULLARY HIP SCREW;  Surgeon: Oracio Ponce DO;  Location: [facility];  Service: Orthopedics;  Laterality: Right;   • REPLACEMENT TOTAL KNEE Right    • TUBAL  ABDOMINAL LIGATION     • UPPER GASTROINTESTINAL ENDOSCOPY         PT Assessment (last 12 hours)     PT Evaluation and Treatment     Row Name 03/17/23 1313          Physical Therapy Time and Intention    Subjective Information no complaints  -CT     Document Type evaluation;discharge evaluation/summary  -CT     Mode of Treatment physical therapy  -CT     Patient Effort adequate  -CT     Symptoms Noted During/After Treatment fatigue  -CT     Comment Pt tolerated eval well. Pt reports she is independent PLOF for household mobility. Pt ambulates short distances only d/t chronic oxygen and SOB. Pt is SBA for mobility at time of eval. Anticipated d/c home.  -CT     Row Name 03/17/23 1313          General Information    Patient Profile Reviewed yes  -CT     Onset of Illness/Injury or Date of Surgery 03/16/23  -CT     Referring Physician [place]  -CT     Patient Observations alert;cooperative;agree to therapy  -CT     Prior Level of Function independent:;all household mobility  -CT     Equipment Currently Used at Home rollator  -CT     Existing Precautions/Restrictions oxygen therapy device and L/min;fall  -CT     Equipment Issued to Patient gait belt  -CT     Risks Reviewed patient:  -CT     Benefits Reviewed patient:  -CT     Barriers to Rehab previous functional deficit  -CT     Row Name 03/17/23 1313          Living Environment    Current Living Arrangements home  -CT     People in Home child(eloise), adult  -CT     Row Name 03/17/23 1313          Cognition    Affect/Mental Status (Cognition) WFL  -CT     Orientation Status (Cognition) oriented x 4  -CT     Follows Commands (Cognition) WFL  -CT     Row Name 03/17/23 1313          Range of Motion Comprehensive    Comment, General Range of Motion BLE grossly WFL  -CT     Row Name 03/17/23 1313          Strength Comprehensive (MMT)    Comment, General Manual Muscle Testing (MMT) Assessment BLE grossly 4/5  -CT     Row Name 03/17/23 1313          Bed Mobility    Bed Mobility  bed mobility (all) activities  -CT     All Activities, [place] (Bed Mobility) standby assist  -CT     Bed Mobility, Safety Issues decreased use of arms for pushing/pulling;decreased use of legs for bridging/pushing  -CT     Assistive Device (Bed Mobility) bed rails  -CT     Row Name 03/17/23 1313          Transfers    Transfers sit-stand transfer;stand-sit transfer  -CT     Row Name 03/17/23 1313          Sit-Stand Transfer    Sit-Stand [place] (Transfers) standby assist  -CT     Assistive Device (Sit-Stand Transfers) walker, front-wheeled  -CT     Row Name 03/17/23 1313          Stand-Sit Transfer    Stand-Sit [place] (Transfers) standby assist  -CT     Assistive Device (Stand-Sit Transfers) walker, front-wheeled  -CT     Row Name 03/17/23 1313          Gait/Stairs (Locomotion)    [place] Level (Gait) standby assist  -CT     Assistive Device (Gait) walker, front-wheeled  -CT     Distance in Feet (Gait) 6  -CT     Pattern (Gait) swing-through  -CT     Row Name 03/17/23 1313          Balance    Balance Assessment sitting static balance;sitting dynamic balance;standing dynamic balance  -CT     Static Sitting Balance modified independence  -CT     Dynamic Sitting Balance set-up  -CT     Position, Sitting Balance sitting edge of bed  -CT     Dynamic Standing Balance standby assist  -CT     Position/Device Used, Standing Balance walker, front-wheeled  -CT     Row Name 03/17/23 1313          Coping    Observed Emotional State calm;cooperative;pleasant  -CT     Verbalized Emotional State acceptance  -CT     Row Name 03/17/23 1313          Plan of Care Review    Plan of Care Reviewed With patient  -CT     Row Name 03/17/23 1313          Positioning and Restraints    Pre-Treatment Position in bed  -CT     Post Treatment Position bed  -CT     In Bed call light within reach;encouraged to call for assist;exit alarm on;side rails up x3  -CT     Row Name 03/17/23 1313          Therapy Assessment/Plan (PT)     Criteria for Skilled Interventions Met (PT) no;no problems identified which require skilled intervention  -CT     Therapy Frequency (PT) evaluation only  -CT     Row Name 03/17/23 1313          Therapy Plan Review/Discharge Plan (PT)    Therapy Plan Review (PT) evaluation/treatment results reviewed  -CT           User Key  (r) = Recorded By, (t) = Taken By, (c) = Cosigned By    Initials Name Provider Type    CT Julia Devi, PT Physical Therapist                      PT Recommendation and Plan  Anticipated Discharge Disposition (PT): home with assist  Therapy Frequency (PT): evaluation only  Plan of Care Reviewed With: patient         Time Calculation:    PT Charges     Row Name 03/17/23 1327             Time Calculation    PT Received On 03/17/23  -CT            User Key  (r) = Recorded By, (t) = Taken By, (c) = Cosigned By    Initials Name Provider Type    Julia Law, PT Physical Therapist              Therapy Charges for Today     Code Description Service Date Service Provider Modifiers Qty    82385207094 HC PT EVAL MOD COMPLEXITY 4 3/17/2023 Julia Devi, PT GP 1          PT G-Codes  AM-PAC 6 Clicks Score (PT): 11    PT Discharge Summary  Anticipated Discharge Disposition (PT): home with assist    Julia Devi PT  3/17/2023

## 2023-03-17 NOTE — PROGRESS NOTES
[facility] Medicine Services  PROGRESS NOTE     Patient Identification:  Name:  Aidee Trinidad  Age:  57 y.o.  Sex:  female  :  1965  MRN:  [id number]  Visit Number:  [id number]  Primary Care Provider:  Azalea Burnett APRN    Length of stay:  1    ----------------------------------------------------------------------------------------------------------------------  Subjective     Chief Complaint:  Follow up for AMS and hypoglycemia     Subjective:  Today, the patient is completely awake and oriented. Tolerated paracentesis well. < 5L acitic fluid removed. Had been on D10 drip since admission for hypoglycemia with normalizing BG.     ----------------------------------------------------------------------------------------------------------------------  Objective     Current Hospital Meds:  aspirin, 81 mg, Oral, Daily  atorvastatin, 20 mg, Oral, Nightly  cholecalciferol, 50,000 Units, Oral, Q7 Days  doxycycline, 100 mg, Oral, Q12H  furosemide, 40 mg, Oral, Daily  gabapentin, 400 mg, Oral, Q12H  heparin (porcine), 5,000 Units, Subcutaneous, Q8H  HYDROcodone-acetaminophen, 1 tablet, Oral, BID  levothyroxine, 75 mcg, Oral, Daily  meclizine, 25 mg, Oral, Daily  metoprolol tartrate, 25 mg, Oral, Q12H  nystatin, 500,000 Units, Oral, BID  [START ON 3/18/2023] pantoprazole, 40 mg, Oral, Q AM  potassium chloride, 10 mEq, Oral, BID With Meals  QUEtiapine, 50 mg, Oral, Nightly  senna-docusate sodium, 2 tablet, Oral, Nightly  sertraline, 50 mg, Oral, Daily  sodium chloride, 10 mL, Intravenous, Q12H  spironolactone, 100 mg, Oral, BID         ----------------------------------------------------------------------------------------------------------------------  Vital Signs:  Temp:  [98 °F (36.7 °C)-98.9 °F (37.2 °C)] 98.9 °F (37.2 °C)  Heart Rate:  [81-95] 81  Resp:  [18] 18  BP: (110-141)/(41-68) 127/63  Mean Arterial Pressure (Non-Invasive) for the past 24 hrs (Last  3 readings):   Noninvasive MAP (mmHg)   03/17/23 1505 82   03/17/23 1024 72   03/17/23 0700 76     SpO2 Percentage    03/17/23 0906 03/17/23 1024 03/17/23 1505   SpO2: 100% 99% 99%     SpO2:  [99 %-100 %] 99 %  on  Flow (L/min):  [2] 2;   Device (Oxygen Therapy): nasal cannula    Body mass index is 59.47 kg/m².  Wt Readings from Last 3 Encounters:   03/16/23 125 kg (274 lb 12.8 oz)   03/06/23 118 kg (260 lb)   03/03/23 118 kg (260 lb)        Intake/Output Summary (Last 24 hours) at 3/17/2023 1713  Last data filed at 3/17/2023 1505  Gross per 24 hour   Intake 520 ml   Output 600 ml   Net -80 ml     Diet: Regular/House Diet; Texture: Regular Texture (IDDSI 7); Fluid Consistency: Thin (IDDSI 0)  ----------------------------------------------------------------------------------------------------------------------  Physical exam:   GEN: obese, NAD  CV:RRR, no murmur  PULM:CTA, no wheeze, decrease bibasilar BS due to body habitus  GI: obese, soft, NT  NEURO:A+Ox4, no facial droop, normal speech  SKIN: several bruising over extremities, abdomen    ----------------------------------------------------------------------------------------------------------------------      ----------------------------------------------------------------------------------------------------------------------  Results from last 7 days   Lab Units 03/16/23  1729 03/16/23  1536   CK TOTAL U/L  --  84   HSTROP T ng/L 38* 37*     Results from last 7 days   Lab Units 03/16/23  1536   PROBNP pg/mL 898.4         Results from last 7 days   Lab Units 03/17/23  0432 03/16/23  1502   WBC 10*3/mm3 5.34 6.62   HEMOGLOBIN g/dL 9.0* 10.3*   HEMATOCRIT % 29.9* 34.5   MCV fL 90.3 90.8   MCHC g/dL 30.1* 29.9*   PLATELETS 10*3/mm3 114* 134*   INR   --  1.47*     Results from last 7 days   Lab Units 03/17/23  1043 03/17/23  0432 03/16/23  1536   SODIUM mmol/L  --  131* 134*   POTASSIUM mmol/L  --  4.6 5.1   MAGNESIUM mg/dL 2.2  --  1.5*   CHLORIDE mmol/L  --  94*  95*   CO2 mmol/L  --  30.7* 34.7*   BUN mg/dL  --  11 13   CREATININE mg/dL  --  0.67 0.80   CALCIUM mg/dL  --  8.6 9.6   GLUCOSE mg/dL  --  168* 89   ALBUMIN g/dL  --  2.3* 2.8*   BILIRUBIN mg/dL  --  1.5* 1.7*   ALK PHOS U/L  --  153* 201*   AST (SGOT) U/L  --  91* 102*   ALT (SGPT) U/L  --  39* 50*   Estimated Creatinine Clearance: 113 mL/min (by C-G formula based on SCr of 0.67 mg/dL).  Ammonia   Date Value Ref Range Status   03/17/2023 62 (H) 11 - 51 umol/L Final       Hemoglobin A1C   Date/Time Value Ref Range Status   03/16/2023 1502 <4.20 (L) 4.80 - 5.60 % Final     Glucose   Date/Time Value Ref Range Status   03/17/2023 1703 136 (H) 70 - 130 mg/dL Final     Comment:     Meter: [id number] : [id number] ASHLEY PAUL   03/17/2023 1022 223 (H) 70 - 130 mg/dL Final     Comment:     Meter: [id number] : [id number] ASHLEY PAUL   03/17/2023 0718 116 70 - 130 mg/dL Final     Comment:     Meter: [id number] : [id number] ASHLEY PAUL   03/17/2023 0311 92 70 - 130 mg/dL Final     Comment:     Meter: [id number] : [id number] ANGELA COLLETTE   03/17/2023 0011 99 70 - 130 mg/dL Final     Comment:     Meter: [id number] : [id number] KATHYA SMITH   03/16/2023 2022 62 (L) 70 - 130 mg/dL Final     Comment:     Meter: [id number] : [id number] KATHYA ECKERTINS   03/16/2023 1728 58 (L) 70 - 130 mg/dL Final     Comment:     Meter: [id number] : [id number] CHRISTOPHER GOMEZ   03/16/2023 1603 64 (L) 70 - 130 mg/dL Final     Comment:     Meter: [id number] : [id number] CHRISTOPHER JASON     Lab Results   Component Value Date    HGBA1C <4.20 (L) 03/16/2023     Lab Results   Component Value Date    TSH 1.200 03/17/2023       ----------------------------------------------------------------------------------------------------------------------  Imaging Results (Last 24 Hours)     Procedure Component Value Units Date/Time    US Paracentesis [[id number]] Collected: 03/17/23 0917    Specimen: Body Fluid Updated: 03/17/23 0924  "   Narrative:      EXAMINATION: US PARACENTESIS-      CLINICAL INDICATION:ascites; E16.2-Hypoglycemia, unspecified;  K74.60-Unspecified cirrhosis of liver; R18.8-Other ascites     COMPARISON: None.     TECHNIQUE/FINDINGS: Written and verbal consent was obtained for  ultrasound paracentesis.  \" Time out\" was observed to verify the  patient's identity and the correct procedure. The location of the  ascites confirmed ultrasound and a anterior lateral approach was chosen.  The anterior abdomen was prepped and draped in the usual sterile fashion  and 1% lidocaine with and without epinephrine was utilized for local  anesthesia. A small skin incision was made with a scalpel and a 8 Bengali  catheter with internal stylet was introduced into the ascites.  A total  of 4850 mL fluid was obtained. Specimen samples were obtained and sent  to the pathology department.     Upon completion of the procedure, manual compression was applied to the  paracentesis incision site until all appreciable bleeding subsided and a  sterile dressing was applied.  The patient tolerated the procedure well  and no immediate complications occurred.          Impression:      Ultrasound guided paracentesis.     This report was finalized on 3/17/2023 9:22 AM by Dr. Vikram Strong MD.             ----------------------------------------------------------------------------------------------------------------------  Assessment/Plan     · Hypoglycemia- likely related to home meds. meds were held. Treated with d10 and resolved. Will hold D10 w for now and monitor. Likely will stop home glipizide. At d/c  · toxic encephalopathy(POA)- due to hypoglycemia from medications. Immediately resolved agter D50 administration. Ammonia is elevated at 62, no other levels for comparison. Given she is no longer confused this is not considered to be due ot hepatic encephalopathy therefore, will not treat at time. However, at risk of HE in the future.   · GONZALEZ cirrhosis- new " onset ascites, paracentesis done this morning. No SBP. Cannot calculate SAAG or determine exudative/transudative as labs are pending. This can be followed up with PCP ot hepatologist if not ready by the time of discharge. No albumin infusion needed given < 5L was removed and not considered large volume tap.   · transaminitis- likely related to GONZALEZ. No other measures to compare.   · Hyponatremia- likely due to cirrhosis    CHRONIC MEDICAL PROBLEMS:  · HBV  · GONZALEZ- on lasix and aldactone Will need to f/u with hepatology.   · DM2 with neuropathy- hold po meds due to hypoglycemia. Can cont home gabapentin.  A1C < 4.5. therefore, likely will no longer need medications.  · HLD- on statin. canconsider holding if LFT's cont to worsen as op  · MANUEL- on home CPAP but on recall. Waiting for new machine to be delivered. Deferred using cpap here.   · HTN- Lisinorpil at home. Held due to normal BP here. Can consider stopping this. As GONZALEZ progresses BP will naturally decrease and can develop JADA with ACE.   · CAD- on ASA, statin, BB at home  · MDD- seroquel, meclazine, sertraline  · Hypothyroidism  · Pancytopenia- due to cirrhosis  · GERD- PP  · Chronic pain syndrome- on lortab  · Morbid obesity- BMI 59  · chrnoic hypoxic resp failure- on 2L NC at home at all times. stable    --------------------------------------------------  DVT Prophylaxis: SCD, hep sq     Disposition: possible d/c home tomorrow.   --------------------------------------------------      Katayoun Behbahani, MD  Hospitalist Service -- [facility]     03/17/23  17:13 EDT

## 2023-03-18 ENCOUNTER — READMISSION MANAGEMENT (OUTPATIENT)
Dept: CALL CENTER | Facility: HOSPITAL | Age: 58
End: 2023-03-18
Payer: MEDICARE

## 2023-03-18 NOTE — PLAN OF CARE
Goal Outcome Evaluation:  Plan of Care Reviewed With: patient           Outcome Evaluation: Pt is stable and to be discharged on this date. AOx4, VSS, no complaints or s/s of acute distress noted. IV access and telemetry monitoring removed with pt tolerating well.

## 2023-03-18 NOTE — PLAN OF CARE
Goal Outcome Evaluation:  Plan of Care Reviewed With: patient           Outcome Evaluation: Pt resting in bed this shift. AOx4, VSS, no complaints or s/s of acute distress noted. Pt had paracentesis today, tolerated well. IV access and telemetry monitoring patent and maintained, will continue to follow plan of care.

## 2023-03-18 NOTE — DISCHARGE SUMMARY
"    [facility] Medicine Services  DISCHARGE SUMMARY    Patient Identification:  Name:  Aidee Trinidad  Age:  57 y.o.  Sex:  female  :  1965  MRN:  [id number]  Visit Number:  [id number]    Date of Admission: 3/16/2023  Date of Discharge:  3/18/2023    PCP: Azalea Burnett APRN      Admission/Discharge Diagnoses     Discharge Diagnoses:  · hypoglycemia  · Toxic encephalopathy due to antihyperglycemic medications  · Decompensated GONZALEZ cirrhosis  · Hyponatremia  · transaminitis due to GONZALEZ    Consults/Procedures     Consults:   Consults     No orders found from 2/15/2023 to 3/17/2023.          Procedures Performed:   paracentesis 4850 cc acidic fluid removed    History of Presenting Illness     \"57 y.o. female who was admitted on 3/16/2023 for confusion.     Patient has pmh of Cirrhosis (gonzalez vs Hep B), t2dm, HTN that presented via EMS for confusion at home. She said she was of her normal state of health this am but family member noted that she seemed very confused and lethargic, EMS called and noted her FSBG to be 44, received oral dextrose which improved glucose but remained low on arrival to ED. Was started on D5 in ED with only slight improvement and was escalated to D10 to keep glucose above 80mg/dl. As glucose returned to normal, patient's mentation improved and at time of my exam she was back to premorbid baseline and answering all questions appropriately.      She does not taking metformin for years but started glipizide within last few months. Also planning for first paracentesis tomorrow but otherwise compliant on all diuretics and beta blocker for her cirrhosis. Has not required lactulose up to this point and having regular bowel movements. No reported jaundice and no seizure hx. No alcohol usage.   \"    Hospital Course     Aidee Trinidad was admitted due to hypoglycemia as result of oral medication at for h/o DM.  She was started on D10W drip with improvement of blood " glucose. Her encephalopathy completely resolved as bg normalized. Drip was stopped yesterday mid day and bg has remained in normal range. To assess for potential infectious causes paracentesis was done and SBP was ruled out. A1C < 4.5%, therefore, likely no longer needs antihyperglycemic agents can manage with diet. This is likely result of decrease gluconeogenesis as result GONZALEZ cirrhosis. Also noted to have lower Blood pressure here off home antihypertensive agents due to GONZALEZ cirrhosis. Therefore, I have stopped oral antihyperglycemic agents as well as home lisinopril. Given new onset ascites recommend to continue home lasix and aldactone (adequately doses). She has an appointment with her hepatologist on 4/20/23 and recommend to keep same appointment.  She is interested in going home. She is medically ready to do so.     Discharge Vitals/Physical Examination     Vital Signs:  Temp:  [97.9 °F (36.6 °C)-98.9 °F (37.2 °C)] 97.9 °F (36.6 °C)  Heart Rate:  [71-94] 71  Resp:  [18-20] 20  BP: (106-130)/(46-78) 106/56  Mean Arterial Pressure (Non-Invasive) for the past 24 hrs (Last 3 readings):   Noninvasive MAP (mmHg)   03/18/23 0722 74   03/18/23 0238 84   03/17/23 2301 84     SpO2 Percentage    03/17/23 2301 03/18/23 0238 03/18/23 0722   SpO2: 98% 97% 99%     SpO2:  [97 %-99 %] 99 %  on  Flow (L/min):  [2] 2;   Device (Oxygen Therapy): nasal cannula    Body mass index is 59.6 kg/m².  Wt Readings from Last 3 Encounters:   03/18/23 125 kg (275 lb 6.4 oz)   03/06/23 118 kg (260 lb)   03/03/23 118 kg (260 lb)         Physical Exam:  GEN: NAD, obese  CV:RRR, no murmur  PULM:CTA upper lung fields, no wheeze, limited exam due to body habitus  GI:obese, soft, +bs, NT  NEURO: alert and oriented x 4, no confusion    Pertinent Laboratory/Radiology Results     Pertinent Laboratory Results:  Results from last 7 days   Lab Units 03/16/23  1729 03/16/23  1536   CK TOTAL U/L  --  84   HSTROP T ng/L 38* 37*     Results from last 7  days   Lab Units 03/16/23  1536   PROBNP pg/mL 898.4         Results from last 7 days   Lab Units 03/17/23  0432 03/16/23  1502   WBC 10*3/mm3 5.34 6.62   HEMOGLOBIN g/dL 9.0* 10.3*   HEMATOCRIT % 29.9* 34.5   MCV fL 90.3 90.8   MCHC g/dL 30.1* 29.9*   PLATELETS 10*3/mm3 114* 134*   INR   --  1.47*     Results from last 7 days   Lab Units 03/18/23  0706 03/17/23  1043 03/17/23  0432 03/16/23  1536   SODIUM mmol/L 130*  --  131* 134*   POTASSIUM mmol/L 5.7*  --  4.6 5.1   MAGNESIUM mg/dL  --  2.2  --  1.5*   CHLORIDE mmol/L 95*  --  94* 95*   CO2 mmol/L 30.6*  --  30.7* 34.7*   BUN mg/dL 15  --  11 13   CREATININE mg/dL 0.80  --  0.67 0.80   CALCIUM mg/dL 9.2  --  8.6 9.6   GLUCOSE mg/dL 104*  --  168* 89   ALBUMIN g/dL 2.5*  --  2.3* 2.8*   BILIRUBIN mg/dL 1.9*  --  1.5* 1.7*   ALK PHOS U/L 202*  --  153* 201*   AST (SGOT) U/L 102*  --  91* 102*   ALT (SGPT) U/L 49*  --  39* 50*   Estimated Creatinine Clearance: 94.7 mL/min (by C-G formula based on SCr of 0.8 mg/dL).  Ammonia   Date Value Ref Range Status   03/17/2023 62 (H) 11 - 51 umol/L Final       Hemoglobin A1C   Date/Time Value Ref Range Status   03/16/2023 1502 <4.20 (L) 4.80 - 5.60 % Final     Glucose   Date/Time Value Ref Range Status   03/18/2023 0716 132 (H) 70 - 130 mg/dL Final     Comment:     Meter: [id number] : [id number] ASHLEY PAUL   03/18/2023 0448 97 70 - 130 mg/dL Final     Comment:     Meter: [id number] : [id number] sanju ch   03/17/2023 2005 139 (H) 70 - 130 mg/dL Final     Comment:     RN Notified Meter: [id number] : [id number] ETIENNE JOHNSON   03/17/2023 1703 136 (H) 70 - 130 mg/dL Final     Comment:     Meter: [id number] : [id number] ASHLEY PAUL   03/17/2023 1022 223 (H) 70 - 130 mg/dL Final     Comment:     Meter: [id number] : [id number] ASHLEY PAUL   03/17/2023 0718 116 70 - 130 mg/dL Final     Comment:     Meter: [id number] : [id number] ASHLEY PAUL   03/17/2023 0311 92 70 - 130 mg/dL Final     Comment:     Meter:  [id number] : [id number] ANGELA COLLETTE   03/17/2023 0011 99 70 - 130 mg/dL Final     Comment:     Meter: [id number] : [id number] KATHYA SMITH     Lab Results   Component Value Date    HGBA1C <4.20 (L) 03/16/2023     Lab Results   Component Value Date    TSH 1.200 03/17/2023         Microbiology Results (last 10 days)     Procedure Component Value - Date/Time    Body Fluid Culture - Body Fluid, Peritoneum [[id number]] Collected: 03/17/23 0851    Lab Status: Preliminary result Specimen: Body Fluid from Peritoneum Updated: 03/17/23 1403     Gram Stain WBCs seen      No organisms seen    COVID PRE-OP / PRE-PROCEDURE SCREENING ORDER (NO ISOLATION) - Swab, Nasopharynx [[id number]]  (Normal) Collected: 03/16/23 1757    Lab Status: Final result Specimen: Swab from Nasopharynx Updated: 03/16/23 1833    Narrative:      The following orders were created for panel order COVID PRE-OP / PRE-PROCEDURE SCREENING ORDER (NO ISOLATION) - Swab, Nasopharynx.  Procedure                               Abnormality         Status                     ---------                               -----------         ------                     COVID-19,CEPHEID/FELIPE,CO...[[id number]]  Normal              Final result                 Please view results for these tests on the individual orders.    COVID-19,CEPHEID/FELIPE,COR/VANESSA/PAD/SHADY/MAD IN-HOUSE(OR EMERGENT/ADD-ON),NP SWAB IN TRANSPORT MEDIA 3-4 HR TAT, RT-PCR - Swab, Nasopharynx [[id number]]  (Normal) Collected: 03/16/23 1757    Lab Status: Final result Specimen: Swab from Nasopharynx Updated: 03/16/23 1833     COVID19 Not Detected    Narrative:      Fact sheet for providers: https://www.fda.gov/media/746112/download     Fact sheet for patients: https://www.fda.gov/media/443781/download  Fact sheet for providers: https://www.fda.gov/media/589533/download    Fact sheet for patients: https://www.fda.gov/media/715753/download    Test performed by PCR.        Pain Management Panel     Pain  "Management Panel Latest Ref Rng & Units 3/17/2023 2/28/2020    AMPHETAMINES SCREEN, URINE Negative Negative Negative    BARBITURATES SCREEN Negative Negative Negative    BENZODIAZEPINE SCREEN, URINE Negative Negative Negative    BUPRENORPHINEUR Negative Negative Negative    COCAINE SCREEN, URINE Negative Negative Negative    METHADONE SCREEN, URINE Negative Negative Negative    METHAMPHETAMINEUR Negative Negative -          Pertinent Radiology Results:  Imaging Results (All)     Procedure Component Value Units Date/Time    US Paracentesis [[id number]] Collected: 03/17/23 0917    Specimen: Body Fluid Updated: 03/17/23 0924    Narrative:      EXAMINATION: US PARACENTESIS-      CLINICAL INDICATION:ascites; E16.2-Hypoglycemia, unspecified;  K74.60-Unspecified cirrhosis of liver; R18.8-Other ascites     COMPARISON: None.     TECHNIQUE/FINDINGS: Written and verbal consent was obtained for  ultrasound paracentesis.  \" Time out\" was observed to verify the  patient's identity and the correct procedure. The location of the  ascites confirmed ultrasound and a anterior lateral approach was chosen.  The anterior abdomen was prepped and draped in the usual sterile fashion  and 1% lidocaine with and without epinephrine was utilized for local  anesthesia. A small skin incision was made with a scalpel and a 8 [place]  catheter with internal stylet was introduced into the ascites.  A total  of 4850 mL fluid was obtained. Specimen samples were obtained and sent  to the pathology department.     Upon completion of the procedure, manual compression was applied to the  paracentesis incision site until all appreciable bleeding subsided and a  sterile dressing was applied.  The patient tolerated the procedure well  and no immediate complications occurred.          Impression:      Ultrasound guided paracentesis.     This report was finalized on 3/17/2023 9:22 AM by Dr. Vikram Strong MD.       CT Abdomen Pelvis Without Contrast [[id number]] " Collected: 03/16/23 1611     Updated: 03/16/23 1615    Narrative:      EXAM:    CT Abdomen and Pelvis Without Intravenous Contrast     EXAM DATE:    3/16/2023 3:08 PM     CLINICAL HISTORY:    Abdominal pain, acute, nonlocalized     TECHNIQUE:    Axial computed tomography images of the abdomen and pelvis without  intravenous contrast.  Sagittal and coronal reformatted images were  created and reviewed.  This CT exam was performed using one or more of  the following dose reduction techniques:  automated exposure control,  adjustment of the mA and/or kV according to patient size, and/or use of  iterative reconstruction technique.     COMPARISON:    03/06/2023     FINDINGS:    Lung bases:  Left basilar airspace disease.    Pleural space:  Trace pleural effusions.    Heart:  Cardiomegaly.    Mediastinum:  Small hiatal hernia.      ABDOMEN:    Liver:  Severe liver cirrhosis again noted.    Gallbladder and bile ducts:  Cholecystectomy.  No ductal dilation.    Pancreas:  Unremarkable.  No ductal dilation.    Spleen:  Spleen is unenlarged.    Adrenals:  Unremarkable.  No mass.    Kidneys and ureters:  No renal or ureteral stones. No obstructive  uropathy.    Stomach and bowel:  Diverticulosis without evidence of diverticulitis.   No obstruction.      PELVIS:    Appendix:  No findings to suggest acute appendicitis.    Bladder:  Unremarkable.  No stones.    Reproductive:  Unremarkable as visualized.      ABDOMEN and PELVIS:    Intraperitoneal space:  Small-moderate volume ascites.  The amount of  ascites is unchanged from previous exam.  No free air.    Bones/joints:  Stable degenerative changes lumbar spine.  No acute  fracture.  No dislocation.    Soft tissues:  Diffuse anasarca stable from previous.    Vasculature:  Unremarkable.  No abdominal aortic aneurysm.    Lymph nodes:  No retroperitoneal adenopathy.       Impression:      1.  No significant interval change. Severe liver cirrhosis with  small-moderate volume ascites  and diffuse anasarca.  2.  No acute changes identified.  3.  Diverticulosis without diverticulitis.  4.  Left basilar airspace disease.  5.  Mild CHF with trace effusions.  6.  Other incidental and nonacute findings above.     This report was finalized on 3/16/2023 4:13 PM by Dr. Vikram Strong MD.       XR Chest 1 View [[id number]] Collected: 03/16/23 1543     Updated: 03/16/23 1548    Narrative:      EXAM:    XR Chest, 1 View     EXAM DATE:    3/16/2023 3:09 PM     CLINICAL HISTORY:    AMS Protocol     TECHNIQUE:    Frontal view of the chest.     COMPARISON:    03/06/2023     FINDINGS:    Lungs:  Pulmonary vascular congestion.    Pleural space:  Small left pleural effusion, stable.  No pneumothorax.    Heart:  Marked cardiomegaly.  CABG.    Mediastinum:  Unremarkable.    Bones/joints:  Unremarkable.       Impression:        Stable changes of CHF with small left effusion.     This report was finalized on 3/16/2023 3:43 PM by Dr. Vikram Strong MD.             Test Results Pending at Discharge:  Pending Labs     Order Current Status    NON-GYN CYTOLOGY, [facility] ([facility]) In process    Body Fluid Culture - Body Fluid, Peritoneum Preliminary result          Discharge Disposition/Discharge Medications/Discharge Appointments     Discharge Disposition:   Home or Self Care    Condition at Discharge:  Stable     DME Prescribed at Discharge:  none    Discharge Diet:  Diet Instructions     Diet: Cardiac Diets; Low Sodium (2g); Texture: Regular Texture (IDDSI 7); Fluid Consistency: Thin (IDDSI 0)      Discharge Diet: Cardiac Diets    Cardiac Diet: Low Sodium (2g)    Texture: Regular Texture (IDDSI 7)    Fluid Consistency: Thin (IDDSI 0)          Discharge Activity:  Activity Instructions     Activity as Tolerated            Code Status While Inpatient:  Code Status and Medical Interventions:   Ordered at: 03/16/23 2041     Code Status (Patient has no pulse and is not breathing):    CPR (Attempt to Resuscitate)      Medical Interventions (Patient has pulse or is breathing):    Full Support       Discharge Medications:     Discharge Medications      Continue These Medications      Instructions Start Date   aspirin 81 MG EC tablet   81 mg, Oral, Daily      atorvastatin 20 MG tablet  Commonly known as: LIPITOR   20 mg, Oral, Nightly      cholecalciferol 1.25 MG (13557 UT) capsule  Commonly known as: VITAMIN D3   50,000 Units, Oral, Every 7 Days, Prior to [facility] Admission, Patient was on: Pt takes on Sunday      doxycycline 100 MG capsule  Commonly known as: VIBRAMYCIN   100 mg, Oral, 2 Times Daily, Maxillary sinusitis      furosemide 40 MG tablet  Commonly known as: LASIX   40 mg, Oral, Daily      gabapentin 400 MG capsule  Commonly known as: NEURONTIN   400 mg, Oral, 2 Times Daily PRN      HYDROcodone-acetaminophen 5-325 MG per tablet  Commonly known as: NORCO   1 tablet, Oral, 2 Times Daily PRN      levothyroxine 75 MCG tablet  Commonly known as: SYNTHROID, LEVOTHROID   75 mcg, Oral, Daily      loratadine-pseudoephedrine  MG per 24 hr tablet  Commonly known as: CLARITIN-D 24-hour   1 tablet, Oral, Daily PRN      meclizine 25 MG tablet  Commonly known as: ANTIVERT   25 mg, Oral, Daily      metoprolol tartrate 25 MG tablet  Commonly known as: LOPRESSOR   25 mg, Oral, 2 Times Daily      nystatin 100,000 unit/mL suspension  Commonly known as: MYCOSTATIN   500,000 Units, Oral, 2 Times Daily      omeprazole 40 MG capsule  Commonly known as: priLOSEC   40 mg, Oral, Daily      potassium chloride 10 MEQ CR tablet  Commonly known as: K-DUR,KLOR-CON   10 mEq, Oral, 2 Times Daily      QUEtiapine 50 MG tablet  Commonly known as: SEROquel   50 mg, Oral, Nightly      sertraline 50 MG tablet  Commonly known as: ZOLOFT   50 mg, Oral, Daily      spironolactone 100 MG tablet  Commonly known as: Aldactone   100 mg, Oral, 2 Times Daily      Triamcinolone Acetonide 55 MCG/ACT nasal inhaler  Commonly known as: NASACORT   1 spray, Nasal, Daily          Stop These Medications    Farxiga 10 MG tablet  Generic drug: dapagliflozin Propanediol     glipizide 5 MG tablet  Commonly known as: GLUCOTROL     lisinopril 5 MG tablet  Commonly known as: PRINIVIL,ZESTRIL            Discharge Appointments:  Your Scheduled Appointments    Apr 05, 2023 12:15 PM  CT cor iac wo contrast with [facility] CT 1  [facility] IMAGING  CT ([facility]) [address]  [place] [address]  [phone]   N/A     Apr 06, 2023 10:30 AM  LAB with KANIKA NURSE LAB  [facility] HEMATOLOGY & ONCOLOGY ([place]) [address]  [place] [address]  [phone]      Apr 06, 2023 11:00 AM  FOLLOW UP with Oumou Charles MD  [facility] HEMATOLOGY & ONCOLOGY ([place]) [address]  [address]  [place] [address]  [phone]      Apr 20, 2023  2:30 PM  Follow Up with Lizzeth Harmon MD  [facility] GASTROENTEROLOGY & UROLOGY ([place]) [address]  [facility] [facility] [address]  [phone]    Please bring any related outside labs/imaging on a disc. If insurance has changed, please bring updated information.            Additional Instructions for the Follow-ups that You Need to Schedule     Discharge Follow-up with Specified Provider: GI/hepatolgy- already has an appointment on 4/20   As directed      To: GI/hepatolgy- already has an appointment on 4/20    Follow Up Details: GONZALEZ cirrhosis with new ascites               .Pending Labs at Discharge:  Pending Labs     Order Current Status    NON-GYN CYTOLOGY, [facility] ([facility]) In process    Body Fluid Culture - Body Fluid, Peritoneum Preliminary result             Katayoun Behbahani, MD  Hospitalist Service -- [facility]       03/18/23  10:22 EDT    Discharge Time: <30 minutes

## 2023-03-18 NOTE — PLAN OF CARE
Goal Outcome Evaluation:              Pt had c/o of pain, PRN medications given (see MAR). IV access and telemetry monitoring maintained. No s/s of acute distress noted at this time. No complaints verbalized at this time. Plan of care ongoing.

## 2023-03-19 NOTE — OUTREACH NOTE
Prep Survey    Flowsheet Row Responses   Holiness facility patient discharged from? [place]   Is LACE score < 7 ? No   Eligibility Readm Mgmt   Discharge diagnosis hypoglycemia   Does the patient have one of the following disease processes/diagnoses(primary or secondary)? Stroke   Does the patient have Home health ordered? No   Is there a DME ordered? No   Prep survey completed? Yes          Ely BOWENS - Registered Nurse

## 2023-03-20 ENCOUNTER — TELEPHONE (OUTPATIENT)
Dept: TELEMETRY | Facility: HOSPITAL | Age: 58
End: 2023-03-20
Payer: MEDICARE

## 2023-03-22 ENCOUNTER — APPOINTMENT (OUTPATIENT)
Dept: CT IMAGING | Facility: HOSPITAL | Age: 58
End: 2023-03-22
Payer: MEDICARE

## 2023-03-22 ENCOUNTER — HOSPITAL ENCOUNTER (EMERGENCY)
Facility: HOSPITAL | Age: 58
Discharge: HOME | End: 2023-03-22
Attending: STUDENT IN AN ORGANIZED HEALTH CARE EDUCATION/TRAINING PROGRAM | Admitting: STUDENT IN AN ORGANIZED HEALTH CARE EDUCATION/TRAINING PROGRAM
Payer: MEDICARE

## 2023-03-22 ENCOUNTER — APPOINTMENT (OUTPATIENT)
Dept: GENERAL RADIOLOGY | Facility: HOSPITAL | Age: 58
End: 2023-03-22
Payer: MEDICARE

## 2023-03-22 VITALS
HEART RATE: 76 BPM | RESPIRATION RATE: 18 BRPM | TEMPERATURE: 98.2 F | BODY MASS INDEX: 59.33 KG/M2 | WEIGHT: 275 LBS | DIASTOLIC BLOOD PRESSURE: 54 MMHG | HEIGHT: 57 IN | SYSTOLIC BLOOD PRESSURE: 116 MMHG | OXYGEN SATURATION: 98 %

## 2023-03-22 DIAGNOSIS — S72.124A: Primary | ICD-10-CM

## 2023-03-22 PROCEDURE — 73502 X-RAY EXAM HIP UNI 2-3 VIEWS: CPT

## 2023-03-22 PROCEDURE — 99284 EMERGENCY DEPT VISIT MOD MDM: CPT

## 2023-03-22 PROCEDURE — 73700 CT LOWER EXTREMITY W/O DYE: CPT

## 2023-03-22 RX ORDER — HYDROCODONE BITARTRATE AND ACETAMINOPHEN 7.5; 325 MG/1; MG/1
1 TABLET ORAL ONCE
Status: COMPLETED | OUTPATIENT
Start: 2023-03-22 | End: 2023-03-22

## 2023-03-22 RX ADMIN — HYDROCODONE BITARTRATE AND ACETAMINOPHEN 1 TABLET: 7.5; 325 TABLET ORAL at 20:54

## 2023-03-22 NOTE — ED PROVIDER NOTES
Subjective   History of Present Illness  Patient is a 57-year-old female presents emerged today with complaint of right hip pain.  Patient reports that she was attempting to get into the shower and states that when she lifted her right leg she felt a pop and states that she has had hip replacement in that area so she went to get checked.  Patient reports pain is worse with movement.  Reports pain is somewhat improved with rest.  Patient denies any fever or shortness of breath.  Patient describes pain as mild to moderate.    Hip Pain      Review of Systems   Constitutional: Negative.    HENT: Negative.    Eyes: Negative.    Respiratory: Negative.    Cardiovascular: Negative.    Gastrointestinal: Negative.    Endocrine: Negative.    Genitourinary: Negative.    Musculoskeletal: Negative.    Skin: Negative.    Allergic/Immunologic: Negative.    Neurological: Negative.    Hematological: Negative.    Psychiatric/Behavioral: Negative.        Past Medical History:   Diagnosis Date   • Arthritis    • Chronic back pain    • Coronary artery disease    • Depression    • Diabetes mellitus (HCC)    • Elevated cholesterol    • Fatty liver    • History of transfusion    • Hypertension    • Hypothyroid    • Sleep apnea        Allergies   Allergen Reactions   • Nuts Anaphylaxis     peanuts   • Contrast Dye (Echo Or Unknown Ct/Mr) Unknown - Low Severity   • Codeine Itching and Nausea And Vomiting   • Latex Rash       Past Surgical History:   Procedure Laterality Date   • CARPAL TUNNEL RELEASE     •  SECTION     • CHOLECYSTECTOMY     • COLONOSCOPY     • CORONARY ANGIOPLASTY WITH STENT PLACEMENT     • ENDOSCOPY N/A 2022    Procedure: ESOPHAGOGASTRODUODENOSCOPY WITH BIOPSY;  Surgeon: Lizzeth Harmon MD;  Location: [facility];  Service: Gastroenterology;  Laterality: N/A;   • HIP TROCHANTERIC NAILING WITH INTRAMEDULLARY HIP SCREW Right 2021    Procedure: HIP TROCHANTERIC NAILING LONG WITH INTRAMEDULLARY HIP  BRUCE;  Surgeon: Oracio Ponce DO;  Location: [facility];  Service: Orthopedics;  Laterality: Right;   • REPLACEMENT TOTAL KNEE Right    • TUBAL ABDOMINAL LIGATION     • UPPER GASTROINTESTINAL ENDOSCOPY         Family History   Problem Relation Age of Onset   • COPD Mother    • Stroke Mother    • Cancer Brother    • Diabetes Brother    • Hypertension Brother    • Heart disease Brother    • Breast cancer Neg Hx        Social History     Socioeconomic History   • Marital status:    Tobacco Use   • Smoking status: Never   • Smokeless tobacco: Never   Vaping Use   • Vaping Use: Never used   Substance and Sexual Activity   • Alcohol use: Yes     Comment: Once a year    • Drug use: No   • Sexual activity: Defer           Objective   Physical Exam  Vitals and nursing note reviewed.   Constitutional:       Appearance: She is well-developed.   HENT:      Head: Normocephalic.      Right Ear: External ear normal.      Left Ear: External ear normal.   Eyes:      Conjunctiva/sclera: Conjunctivae normal.      Pupils: Pupils are equal, round, and reactive to light.   Cardiovascular:      Rate and Rhythm: Normal rate and regular rhythm.      Heart sounds: Normal heart sounds.   Pulmonary:      Effort: Pulmonary effort is normal.      Breath sounds: Normal breath sounds.   Abdominal:      General: Bowel sounds are normal.      Palpations: Abdomen is soft.   Musculoskeletal:      Cervical back: Normal range of motion and neck supple.      Right hip: Tenderness present. Decreased range of motion.   Skin:     General: Skin is warm and dry.      Capillary Refill: Capillary refill takes less than 2 seconds.   Neurological:      Mental Status: She is alert and oriented to person, place, and time.   Psychiatric:         Behavior: Behavior normal.         Thought Content: Thought content normal.         Procedures       Results for orders placed or performed during the hospital encounter of 03/16/23    COVID-19,CEPHEID/FELIPE,COR/VANESSA/PAD/SHADY/MAD IN-HOUSE(OR EMERGENT/ADD-ON),NP SWAB IN TRANSPORT MEDIA 3-4 HR TAT, RT-PCR - Swab, Nasopharynx    Specimen: Nasopharynx; Swab   Result Value Ref Range    COVID19 Not Detected Not Detected - Ref. Range   Body Fluid Culture - Body Fluid, Peritoneum    Specimen: Peritoneum; Body Fluid   Result Value Ref Range    Body Fluid Culture No growth at 5 days     Gram Stain WBCs seen     Gram Stain No organisms seen    Comprehensive Metabolic Panel    Specimen: Arm, Left; Blood   Result Value Ref Range    Glucose 89 65 - 99 mg/dL    BUN 13 6 - 20 mg/dL    Creatinine 0.80 0.57 - 1.00 mg/dL    Sodium 134 (L) 136 - 145 mmol/L    Potassium 5.1 3.5 - 5.2 mmol/L    Chloride 95 (L) 98 - 107 mmol/L    CO2 34.7 (H) 22.0 - 29.0 mmol/L    Calcium 9.6 8.6 - 10.5 mg/dL    Total Protein 7.6 6.0 - 8.5 g/dL    Albumin 2.8 (L) 3.5 - 5.2 g/dL    ALT (SGPT) 50 (H) 1 - 33 U/L    AST (SGOT) 102 (H) 1 - 32 U/L    Alkaline Phosphatase 201 (H) 39 - 117 U/L    Total Bilirubin 1.7 (H) 0.0 - 1.2 mg/dL    Globulin 4.8 gm/dL    A/G Ratio 0.6 g/dL    BUN/Creatinine Ratio 16.3 7.0 - 25.0    Anion Gap 4.3 (L) 5.0 - 15.0 mmol/L    eGFR 86.1 >60.0 mL/min/1.73   Protime-INR    Specimen: Blood   Result Value Ref Range    Protime 18.2 (H) 12.1 - 14.7 Seconds    INR 1.47 (H) 0.90 - 1.10   Urinalysis With Culture If Indicated - Urine, Catheter    Specimen: Urine, Catheter   Result Value Ref Range    Color, UA Dark Yellow (A) Yellow, Straw    Appearance, UA Clear Clear    pH, UA 6.0 5.0 - 8.0    Specific Gravity, UA 1.020 1.005 - 1.030    Glucose,  mg/dL (1+) (A) Negative    Ketones, UA Negative Negative    Bilirubin, UA Negative Negative    Blood, UA Small (1+) (A) Negative    Protein, UA Negative Negative    Leuk Esterase, UA Trace (A) Negative    Nitrite, UA Negative Negative    Urobilinogen, UA 1.0 E.U./dL 0.2 - 1.0 E.U./dL   CBC Auto Differential    Specimen: Blood   Result Value Ref Range    WBC 6.62 3.40 -  10.80 10*3/mm3    RBC 3.80 3.77 - 5.28 10*6/mm3    Hemoglobin 10.3 (L) 12.0 - 15.9 g/dL    Hematocrit 34.5 34.0 - 46.6 %    MCV 90.8 79.0 - 97.0 fL    MCH 27.1 26.6 - 33.0 pg    MCHC 29.9 (L) 31.5 - 35.7 g/dL    RDW 29.8 (H) 12.3 - 15.4 %    RDW-SD 97.0 (H) 37.0 - 54.0 fl    MPV 11.2 6.0 - 12.0 fL    Platelets 134 (L) 140 - 450 10*3/mm3    Neutrophil % 69.0 42.7 - 76.0 %    Lymphocyte % 18.9 (L) 19.6 - 45.3 %    Monocyte % 9.1 5.0 - 12.0 %    Eosinophil % 1.7 0.3 - 6.2 %    Basophil % 0.8 0.0 - 1.5 %    Immature Grans % 0.5 0.0 - 0.5 %    Neutrophils, Absolute 4.58 1.70 - 7.00 10*3/mm3    Lymphocytes, Absolute 1.25 0.70 - 3.10 10*3/mm3    Monocytes, Absolute 0.60 0.10 - 0.90 10*3/mm3    Eosinophils, Absolute 0.11 0.00 - 0.40 10*3/mm3    Basophils, Absolute 0.05 0.00 - 0.20 10*3/mm3    Immature Grans, Absolute 0.03 0.00 - 0.05 10*3/mm3    nRBC 0.0 0.0 - 0.2 /100 WBC   High Sensitivity Troponin T    Specimen: Arm, Left; Blood   Result Value Ref Range    HS Troponin T 37 (H) <10 ng/L   Lipase    Specimen: Arm, Left; Blood   Result Value Ref Range    Lipase 52 13 - 60 U/L   Magnesium    Specimen: Arm, Left; Blood   Result Value Ref Range    Magnesium 1.5 (L) 1.6 - 2.6 mg/dL   BNP    Specimen: Arm, Left; Blood   Result Value Ref Range    proBNP 898.4 0.0 - 900.0 pg/mL   Hepatitis Panel, Acute    Specimen: Blood   Result Value Ref Range    Hepatitis B Surface Ag Non-Reactive Non-Reactive    Hep A IgM Non-Reactive Non-Reactive    Hep B C IgM Reactive (A) Non-Reactive    Hepatitis C Ab Non-Reactive Non-Reactive   Scan Slide    Specimen: Blood   Result Value Ref Range    Anisocytosis Large/3+ None Seen    Dacrocytes Slight/1+ None Seen    Hypochromia Mod/2+ None Seen    Target Cells Slight/1+ None Seen    Platelet Morphology Normal Normal   High Sensitivity Troponin T 2Hr    Specimen: Arm, Left; Blood   Result Value Ref Range    HS Troponin T 38 (H) <10 ng/L    Troponin T Delta 1 >=-4 - <+4 ng/L   Urinalysis, Microscopic  Only - Urine, Catheter    Specimen: Urine, Catheter   Result Value Ref Range    RBC, UA 6-12 (A) None Seen, 0-2 /HPF    WBC, UA 0-2 None Seen, 0-2 /HPF    Bacteria, UA None Seen None Seen /HPF    Squamous Epithelial Cells, UA 3-6 (A) None Seen, 0-2 /HPF    Hyaline Casts, UA None Seen None Seen /LPF    Methodology Automated Microscopy    CK    Specimen: Arm, Left; Blood   Result Value Ref Range    Creatine Kinase 84 20 - 180 U/L   Urine Drug Screen - Urine, Clean Catch    Specimen: Urine, Clean Catch   Result Value Ref Range    THC, Screen, Urine Negative Negative    Phencyclidine (PCP), Urine Negative Negative    Cocaine Screen, Urine Negative Negative    Methamphetamine, Ur Negative Negative    Opiate Screen Negative Negative    Amphetamine Screen, Urine Negative Negative    Benzodiazepine Screen, Urine Negative Negative    Tricyclic Antidepressants Screen Negative Negative    Methadone Screen, Urine Negative Negative    Barbiturates Screen, Urine Negative Negative    Oxycodone Screen, Urine Negative Negative    Propoxyphene Screen Negative Negative    Buprenorphine, Screen, Urine Negative Negative   Hemoglobin A1c    Specimen: Blood   Result Value Ref Range    Hemoglobin A1C <4.20 (L) 4.80 - 5.60 %   CBC Auto Differential    Specimen: Blood   Result Value Ref Range    WBC 5.34 3.40 - 10.80 10*3/mm3    RBC 3.31 (L) 3.77 - 5.28 10*6/mm3    Hemoglobin 9.0 (L) 12.0 - 15.9 g/dL    Hematocrit 29.9 (L) 34.0 - 46.6 %    MCV 90.3 79.0 - 97.0 fL    MCH 27.2 26.6 - 33.0 pg    MCHC 30.1 (L) 31.5 - 35.7 g/dL    RDW 29.7 (H) 12.3 - 15.4 %    RDW-SD 97.0 (H) 37.0 - 54.0 fl    MPV 11.2 6.0 - 12.0 fL    Platelets 114 (L) 140 - 450 10*3/mm3    Neutrophil % 58.3 42.7 - 76.0 %    Lymphocyte % 25.8 19.6 - 45.3 %    Monocyte % 11.8 5.0 - 12.0 %    Eosinophil % 2.8 0.3 - 6.2 %    Basophil % 0.9 0.0 - 1.5 %    Immature Grans % 0.4 0.0 - 0.5 %    Neutrophils, Absolute 3.11 1.70 - 7.00 10*3/mm3    Lymphocytes, Absolute 1.38 0.70 - 3.10  10*3/mm3    Monocytes, Absolute 0.63 0.10 - 0.90 10*3/mm3    Eosinophils, Absolute 0.15 0.00 - 0.40 10*3/mm3    Basophils, Absolute 0.05 0.00 - 0.20 10*3/mm3    Immature Grans, Absolute 0.02 0.00 - 0.05 10*3/mm3    nRBC 0.0 0.0 - 0.2 /100 WBC   Comprehensive Metabolic Panel    Specimen: Blood   Result Value Ref Range    Glucose 168 (H) 65 - 99 mg/dL    BUN 11 6 - 20 mg/dL    Creatinine 0.67 0.57 - 1.00 mg/dL    Sodium 131 (L) 136 - 145 mmol/L    Potassium 4.6 3.5 - 5.2 mmol/L    Chloride 94 (L) 98 - 107 mmol/L    CO2 30.7 (H) 22.0 - 29.0 mmol/L    Calcium 8.6 8.6 - 10.5 mg/dL    Total Protein 6.2 6.0 - 8.5 g/dL    Albumin 2.3 (L) 3.5 - 5.2 g/dL    ALT (SGPT) 39 (H) 1 - 33 U/L    AST (SGOT) 91 (H) 1 - 32 U/L    Alkaline Phosphatase 153 (H) 39 - 117 U/L    Total Bilirubin 1.5 (H) 0.0 - 1.2 mg/dL    Globulin 3.9 gm/dL    A/G Ratio 0.6 g/dL    BUN/Creatinine Ratio 16.4 7.0 - 25.0    Anion Gap 6.3 5.0 - 15.0 mmol/L    eGFR 102.1 >60.0 mL/min/1.73   Ammonia    Specimen: Blood   Result Value Ref Range    Ammonia 62 (H) 11 - 51 umol/L   TSH    Specimen: Blood   Result Value Ref Range    TSH 1.200 0.270 - 4.200 uIU/mL   Magnesium    Specimen: Blood   Result Value Ref Range    Magnesium 2.2 1.6 - 2.6 mg/dL   Scan Slide    Specimen: Blood   Result Value Ref Range    Anisocytosis Large/3+ None Seen    Hypochromia Slight/1+ None Seen    Target Cells Slight/1+ None Seen    Platelet Morphology Normal Normal   Albumin, Fluid - Body Fluid, Peritoneum    Specimen: Peritoneum; Body Fluid   Result Value Ref Range    Albumin, Fluid <0.20 g/dL   Lactate Dehydrogenase, Body Fluid - Body Fluid, Peritoneum    Specimen: Peritoneum; Body Fluid   Result Value Ref Range    Lactate Dehydrogenase (LD), Fluid 42 U/L   Protein, Body Fluid - Body Fluid, Peritoneum    Specimen: Peritoneum; Body Fluid   Result Value Ref Range    Protein, Total, Fluid <1.0 g/dL   Body fluid cell count - Body Fluid, Peritoneum    Specimen: Peritoneum; Body Fluid    Result Value Ref Range    Color, Fluid Yellow     Appearance, Fluid Slightly Hazy (A) Clear    RBC, Fluid      Nucleated Cells, Fluid 124 /mm3   Body fluid differential - Body Fluid, Peritoneum    Specimen: Peritoneum; Body Fluid   Result Value Ref Range    Neutrophils, Fluid 12 %    Lymphocytes, Fluid 38 %    Mononuclear, Fluid 50 %   Lactate Dehydrogenase    Specimen: Blood   Result Value Ref Range     (H) 135 - 214 U/L   Comprehensive Metabolic Panel    Specimen: Blood   Result Value Ref Range    Glucose 104 (H) 65 - 99 mg/dL    BUN 15 6 - 20 mg/dL    Creatinine 0.80 0.57 - 1.00 mg/dL    Sodium 130 (L) 136 - 145 mmol/L    Potassium 5.7 (H) 3.5 - 5.2 mmol/L    Chloride 95 (L) 98 - 107 mmol/L    CO2 30.6 (H) 22.0 - 29.0 mmol/L    Calcium 9.2 8.6 - 10.5 mg/dL    Total Protein 7.3 6.0 - 8.5 g/dL    Albumin 2.5 (L) 3.5 - 5.2 g/dL    ALT (SGPT) 49 (H) 1 - 33 U/L    AST (SGOT) 102 (H) 1 - 32 U/L    Alkaline Phosphatase 202 (H) 39 - 117 U/L    Total Bilirubin 1.9 (H) 0.0 - 1.2 mg/dL    Globulin 4.8 gm/dL    A/G Ratio 0.5 g/dL    BUN/Creatinine Ratio 18.8 7.0 - 25.0    Anion Gap 4.4 (L) 5.0 - 15.0 mmol/L    eGFR 86.1 >60.0 mL/min/1.73   POC Glucose Once    Specimen: Blood   Result Value Ref Range    Glucose 76 70 - 130 mg/dL   POC Glucose Once    Specimen: Blood   Result Value Ref Range    Glucose 64 (L) 70 - 130 mg/dL   POC Glucose Once    Specimen: Blood   Result Value Ref Range    Glucose 58 (L) 70 - 130 mg/dL   POC Glucose Once    Specimen: Blood   Result Value Ref Range    Glucose 62 (L) 70 - 130 mg/dL   POC Glucose Once    Specimen: Blood   Result Value Ref Range    Glucose 99 70 - 130 mg/dL   POC Glucose Once    Specimen: Blood   Result Value Ref Range    Glucose 92 70 - 130 mg/dL   POC Glucose Once    Specimen: Blood   Result Value Ref Range    Glucose 116 70 - 130 mg/dL   POC Glucose Once    Specimen: Blood   Result Value Ref Range    Glucose 223 (H) 70 - 130 mg/dL   POC Glucose Once    Specimen: Blood    Result Value Ref Range    Glucose 136 (H) 70 - 130 mg/dL   POC Glucose Once    Specimen: Blood   Result Value Ref Range    Glucose 139 (H) 70 - 130 mg/dL   POC Glucose Once    Specimen: Blood   Result Value Ref Range    Glucose 97 70 - 130 mg/dL   POC Glucose Once    Specimen: Blood   Result Value Ref Range    Glucose 132 (H) 70 - 130 mg/dL   POC Glucose Once    Specimen: Blood   Result Value Ref Range    Glucose 136 (H) 70 - 130 mg/dL   ECG 12 Lead Altered Mental Status   Result Value Ref Range    QT Interval 382 ms    QTC Interval 448 ms   [facility]N CYTOLOGY, [facility] ([facility])    Specimen: Peritoneum; Peritoneal Fluid   Result Value Ref Range    Reference Lab Report       [facility]  [address] [place] [address]  Phone: [phone] or [phone]  Fax: [phone]  Pro Medina M.D., Medical Director    PATIENT NAME                                 LABORATORY NO.  [id number]   SANDY SANCHEZ                            [id number]  [id number]                                     AGE                SEX     SSN            CLIENT REF #  [facility]                          [id number]       1965   F       [id number]    [id number]  [address]                                 REQUESTING M.D.       ATTENDING [place] [address]                               BEHBAHANI, KATAYOUN  DATE COLLECTED        DATE RECEIVED          DATE REPORTED  2023    DIAGNOSIS:  PERITONEAL FLUID:  Negative for malignant cells.    MICROSCOPIC DESCRIPTION:  Reactive mesothelial cells and lymphocytes are present    RLL    Professional interpretation rendered by  Pro Medina M.D., F.C.A.P. at [facility] [employer], [address] [place] [address].    CLINICAL HISTORY:  Hypoglycemia associated with type 2 diabetes mellitus    SPECIMENS SUBMITTED:  PERITONEAL FLUID    GROSS SPECIMEN DESCRIPTION:  45cc of clear  light yellow fluid with scant sediment in fixative    REVIEWED, DIAGNOSED AND ELECTRONICALLY  SIGNED BY:    Pro Medina M.D., F.C.A.P.  CPT CODES:  76779     Green Top (Gel)   Result Value Ref Range    Extra Tube Hold for add-ons.    Lavender Top   Result Value Ref Range    Extra Tube hold for add-on    Gold Top - SST   Result Value Ref Range    Extra Tube Hold for add-ons.    Light Blue Top   Result Value Ref Range    Extra Tube Hold for add-ons.      CT Lower Extremity Right Without Contrast   Final Result   Acute slightly displaced and comminuted fracture involving the lesser trochanter. There is no fracture through the femoral neck or greater trochanter.      Previous internal fixation of right hip fracture      Signer Name: Deep Orellana MD    Signed: 3/22/2023 7:41 PM    Workstation Name: [email or web address]     [facility]      XR Hip With or Without Pelvis 2 - 3 View Right   Final Result      1. Remote healed fracture deformity of the intertrochanteric portion of the right hip. Interval gamma nail fracture repair. No definite acute fracture.      Signer Name: Codey Roque MD    Signed: 3/22/2023 6:45 PM    Workstation Name: [email or web address]     [facility]            ED Course  ED Course as of 03/22/23 2027   Wed Mar 22, 2023   2024 Discussed with Dr. Gibbs. Advises nothing surgical to be done that patient can be dischared to follow up as outpatient with Dr. Ponce [JANETTE]      ED Course User Index  [JANETTE] Lv Santizo, APRN                                           [facility]    Final diagnoses:   Closed nondisplaced fracture of lesser trochanter of right femur, initial encounter (HCC)       ED Disposition  ED Disposition     ED Disposition   Discharge    Condition   Stable    Comment   --             Oracio Ponce, DO  [address] [id number] [phone]    Schedule an appointment as soon as possible for a visit   For further evaluation          Medication List      No changes were made to your prescriptions during this visit.          Lv Santizo, APRN  03/22/23 2027

## 2023-03-23 ENCOUNTER — READMISSION MANAGEMENT (OUTPATIENT)
Dept: CALL CENTER | Facility: HOSPITAL | Age: 58
End: 2023-03-23
Payer: MEDICARE

## 2023-03-23 NOTE — OUTREACH NOTE
Medical Week 1 Survey    Flowsheet Row Responses   [facility] patient discharged from? [facility]   Does the patient have one of the following disease processes/diagnoses(primary or secondary)? Other   Week 1 attempt successful? Yes   Call start time 1230   Call end time 1303   Discharge diagnosis hypoglycemia    Person spoke with today (if not patient) and relationship patient    Meds reviewed with patient/caregiver? Yes   Is the patient having any side effects they believe may be caused by any medication additions or changes? No   Does the patient have all medications ordered at discharge? Yes   Is the patient taking all medications as directed (includes completed medication regime)? Yes   Comments regarding appointments Patient has a upcoming appt on 4/5/2023 for CT , oncology on 4/6/2023 and GI/Urology on 4/20   Does the patient have a primary care provider?  Yes   Does the patient have an appointment with their PCP within 7 days of discharge? No   Comments regarding PCP Azalea Burnett APRN   What is preventing the patient from scheduling follow up appointments within 7 days of discharge? Transportation   Nursing Interventions Verified appointment date/time/provider   Notified Case Management Transportation   Comments Patient reports she will need transportation to appts. She was seen in the ED on 3/16/2023 for hip pain.    Psychosocial issues? No   Did the patient receive a copy of their discharge instructions? Yes   Nursing interventions Reviewed instructions with patient   What is the patient's perception of their health status since discharge? Improving   Is the patient/caregiver able to teach back signs and symptoms related to disease process for when to call PCP? Yes   Is the patient/caregiver able to teach back signs and symptoms related to disease process for when to call 911? Yes   Is the patient/caregiver able to teach back the hierarchy of who to call/visit for symptoms/problems? PCP,  Specialist, Home health nurse, Urgent Care, ED, 911 Yes   If the patient is a current smoker, are they able to teach back resources for cessation? Not a smoker   Week 1 call completed? Yes   Is the patient interested in additional calls from an ambulatory ?  NOTE:  applies to high risk patients requiring additional follow-up. No   Wrap up additional comments Patient will need transportation to [facility]. Case managment notified.           Pavel MENJIVAR - Registered Nurse

## 2023-03-23 NOTE — DISCHARGE INSTRUCTIONS
Follow up with Dr. Ponce.     Limit weight bearing and return to the emergency room for worsening symptoms.

## 2023-03-30 ENCOUNTER — READMISSION MANAGEMENT (OUTPATIENT)
Dept: CALL CENTER | Facility: HOSPITAL | Age: 58
End: 2023-03-30
Payer: MEDICARE

## 2023-03-30 NOTE — OUTREACH NOTE
Medical Week 2 Survey    Flowsheet Row Responses   [facility] patient discharged from? [facility]   Does the patient have one of the following disease processes/diagnoses(primary or secondary)? Other   Week 2 attempt successful? Yes   Call start time 1550   Discharge diagnosis hypoglycemia    Call end time 1552   Person spoke with today (if not patient) and relationship patient    Meds reviewed with patient/caregiver? Yes   Is the patient taking all medications as directed (includes completed medication regime)? Yes   Does the patient have a primary care provider?  Yes   Comments regarding PCP Patient states she has already seen her pcp.   Has home health visited the patient within 72 hours of discharge? N/A   Psychosocial issues? No   Did the patient receive a copy of their discharge instructions? Yes   Nursing interventions Reviewed instructions with patient   What is the patient's perception of their health status since discharge? Improving   Is the patient/caregiver able to teach back signs and symptoms related to disease process for when to call PCP? Yes   Is the patient/caregiver able to teach back signs and symptoms related to disease process for when to call 911? Yes   Is the patient/caregiver able to teach back the hierarchy of who to call/visit for symptoms/problems? PCP, Specialist, Home health nurse, Urgent Care, ED, 911 Yes   Week 2 Call Completed? Yes   Is the patient interested in additional calls from an ambulatory ?  NOTE:  applies to high risk patients requiring additional follow-up. No   Wrap up additional comments Patient states she is doing well, she has seen her pcp already she is currently waiting to get into ortho no concerns or questions noted.          Caroline PATEL - Registered Nurse

## 2023-04-02 NOTE — ED PROVIDER NOTES
Subjective     History provided by:  Patient   used: No    Shortness of Breath  Severity:  Mild  Onset quality:  Gradual  Timing:  Constant  Progression:  Worsening  Chronicity:  Chronic  Context: not activity, not animal exposure, not emotional upset, not fumes, not known allergens, not occupational exposure, not pollens, not smoke exposure, not strong odors, not URI and not weather changes    Relieved by:  Nothing  Worsened by:  Nothing  Ineffective treatments:  None tried  Associated symptoms: no abdominal pain, no chest pain, no claudication, no cough, no diaphoresis, no ear pain, no fever, no headaches, no hemoptysis, no neck pain, no PND, no rash, no sore throat, no sputum production, no syncope, no swollen glands, no vomiting and no wheezing    Risk factors: obesity    Risk factors: no recent alcohol use, no family hx of DVT, no hx of cancer, no hx of PE/DVT, no oral contraceptive use, no prolonged immobilization, no recent surgery and no tobacco use        Review of Systems   Constitutional: Negative for activity change, appetite change, chills, diaphoresis, fatigue and fever.   HENT: Negative for congestion, ear pain and sore throat.    Eyes: Negative for redness.   Respiratory: Positive for shortness of breath. Negative for cough, hemoptysis, sputum production, chest tightness and wheezing.    Cardiovascular: Negative for chest pain, palpitations, claudication, leg swelling, syncope and PND.   Gastrointestinal: Negative for abdominal pain, diarrhea, nausea and vomiting.   Genitourinary: Negative for dysuria and urgency.   Musculoskeletal: Negative for arthralgias, back pain, myalgias and neck pain.   Skin: Negative for pallor, rash and wound.   Neurological: Negative for dizziness, speech difficulty, weakness and headaches.   Psychiatric/Behavioral: Negative for agitation, behavioral problems, confusion and decreased concentration.   All other systems reviewed and are  negative.      Past Medical History:   Diagnosis Date   • Arthritis    • Chronic back pain    • Coronary artery disease    • Depression    • Diabetes mellitus    • Elevated cholesterol    • Fatty liver    • History of transfusion    • Hypertension    • Hypothyroid    • Sleep apnea        Allergies   Allergen Reactions   • Nuts Anaphylaxis     peanuts   • Contrast Dye (Echo Or Unknown Ct/Mr) Unknown - Low Severity   • Codeine Itching and Nausea And Vomiting   • Latex Rash       Past Surgical History:   Procedure Laterality Date   • CARPAL TUNNEL RELEASE     •  SECTION     • CHOLECYSTECTOMY     • COLONOSCOPY     • CORONARY ANGIOPLASTY WITH STENT PLACEMENT     • ENDOSCOPY N/A 2022    Procedure: ESOPHAGOGASTRODUODENOSCOPY WITH BIOPSY;  Surgeon: Lizzeth Harmon MD;  Location: [facility];  Service: Gastroenterology;  Laterality: N/A;   • HIP TROCHANTERIC NAILING WITH INTRAMEDULLARY HIP SCREW Right 2021    Procedure: HIP TROCHANTERIC NAILING LONG WITH INTRAMEDULLARY HIP SCREW;  Surgeon: Oracio Ponce DO;  Location: [facility];  Service: Orthopedics;  Laterality: Right;   • REPLACEMENT TOTAL KNEE Right    • TUBAL ABDOMINAL LIGATION     • UPPER GASTROINTESTINAL ENDOSCOPY         Family History   Problem Relation Age of Onset   • COPD Mother    • Stroke Mother    • Cancer Brother    • Diabetes Brother    • Hypertension Brother    • Heart disease Brother    • Breast cancer Neg Hx        Social History     Socioeconomic History   • Marital status:    Tobacco Use   • Smoking status: Never   • Smokeless tobacco: Never   Vaping Use   • Vaping Use: Never used   Substance and Sexual Activity   • Alcohol use: Yes     Comment: Once a year    • Drug use: No   • Sexual activity: Defer           Objective   Physical Exam  Vitals and nursing note reviewed.   Constitutional:       General: She is not in acute distress.     Appearance: Normal appearance. She is well-developed. She is not toxic-appearing  or diaphoretic.   HENT:      Head: Normocephalic and atraumatic.      Right Ear: External ear normal.      Left Ear: External ear normal.      Nose: Nose normal.      Mouth/Throat:      Pharynx: No oropharyngeal exudate.      Tonsils: No tonsillar exudate.   Eyes:      General: Lids are normal.      Conjunctiva/sclera: Conjunctivae normal.      Pupils: Pupils are equal, round, and reactive to light.   Neck:      Thyroid: No thyromegaly.   Cardiovascular:      Rate and Rhythm: Normal rate and regular rhythm.      Pulses: Normal pulses.      Heart sounds: Normal heart sounds, S1 normal and S2 normal.   Pulmonary:      Effort: Pulmonary effort is normal. No tachypnea or respiratory distress.      Breath sounds: Examination of the right-upper field reveals decreased breath sounds. Examination of the left-upper field reveals decreased breath sounds. Examination of the right-middle field reveals decreased breath sounds. Examination of the left-middle field reveals decreased breath sounds. Examination of the right-lower field reveals decreased breath sounds. Examination of the left-lower field reveals decreased breath sounds. Decreased breath sounds present. No wheezing or rales.   Chest:      Chest wall: No tenderness.   Abdominal:      General: Bowel sounds are normal. There is no distension.      Palpations: Abdomen is soft.      Tenderness: There is no abdominal tenderness. There is no guarding or rebound.   Musculoskeletal:         General: No deformity. Normal range of motion.      Cervical back: Full passive range of motion without pain, normal range of motion and neck supple.      Right lower leg: No tenderness. Edema present.      Left lower leg: No tenderness. Edema present.   Lymphadenopathy:      Cervical: No cervical adenopathy.   Skin:     General: Skin is warm and dry.      Coloration: Skin is not pale.      Findings: No erythema or rash.   Neurological:      Mental Status: She is alert and oriented to  person, place, and time.      GCS: GCS eye subscore is 4. GCS verbal subscore is 5. GCS motor subscore is 6.      Cranial Nerves: No cranial nerve deficit.      Sensory: No sensory deficit.   Psychiatric:         Speech: Speech normal.         Behavior: Behavior normal.         Thought Content: Thought content normal.         Judgment: Judgment normal.         Procedures           ED Course  ED Course as of 04/02/23 1846   Sun Apr 02, 2023   1640 ECG 12 Lead Dyspnea  Vent. Rate :  77 BPM     Atrial Rate :  77 BPM     P-R Int : 208 ms          QRS Dur :  72 ms      QT Int : 396 ms       P-R-T Axes :   9  97  68 degrees     QTc Int : 448 ms     Normal sinus rhythm  Rightward axis  Low voltage QRS  Cannot rule out Anterior infarct (cited on or before 16-MAR-2023)  Abnormal ECG  When compared with ECG of 16-MAR-2023 15:39,  No significant change was found [ES]   1846 Discussed case with on-call hospitalist whom reviewed the patient's case and did not feel that he met inpatient criteria at this time.  Recommended changing their Lasix 40 mg daily to Lasix 40 mg twice daily for 2 weeks.  Moreover, patient is to return to the emergency department and/or follow-up with their primary care physician with any worsening symptoms.  Extensive work-up done in the emergency department and all clinical indications are that she has improved. [ES]      ED Course User Index  [ES] Agusto Hanks MD                                           Medical Decision Making    History provided by:  Patient   used: No    Shortness of Breath  Severity:  Mild  Onset quality:  Gradual  Timing:  Constant  Progression:  Worsening  Chronicity:  Chronic  Context: not activity, not animal exposure, not emotional upset, not fumes, not known allergens, not occupational exposure, not pollens, not smoke exposure, not strong odors, not URI and not weather changes    Relieved by:  Nothing  Worsened by:  Nothing  Ineffective  treatments:  None tried  Associated symptoms: no abdominal pain, no chest pain, no claudication, no cough, no diaphoresis, no ear pain, no fever, no headaches, no hemoptysis, no neck pain, no PND, no rash, no sore throat, no sputum production, no syncope, no swollen glands, no vomiting and no wheezing    Risk factors: obesity    Risk factors: no recent alcohol use, no family hx of DVT, no hx of cancer, no hx of PE/DVT, no oral contraceptive use, no prolonged immobilization, no recent surgery and no tobacco use        Acute on chronic congestive heart failure, unspecified heart failure type: complicated acute illness or injury  Amount and/or Complexity of Data Reviewed  External Data Reviewed: labs, radiology, ECG and notes.  Labs: ordered. Decision-making details documented in ED Course.  Radiology: ordered and independent interpretation performed. Decision-making details documented in ED Course.  ECG/medicine tests: ordered and independent interpretation performed. Decision-making details documented in ED Course.      Risk  Prescription drug management.          Final diagnoses:   Acute on chronic congestive heart failure, unspecified heart failure type       ED Disposition  ED Disposition     ED Disposition   Discharge    Condition   Stable    Comment   --             Azalea Burnett, APRN  [address] [id number] [phone]    Schedule an appointment as soon as possible for a visit in 1 day  EVALUATE         Medication List      Changed    furosemide 40 MG tablet  Commonly known as: LASIX  Take 1 tablet by mouth 2 (Two) Times a Day.  What changed: when to take this           Where to Get Your Medications      These medications were sent to [place], [place] - [address] - [phone] [facility] [phone]   [address] [facility] [address]    Phone: [phone]   · furosemide 40 MG tablet          Agusto Hanks MD  04/02/23 9485

## 2023-04-03 NOTE — ED NOTES
Falls bracelet in placed instructed pt to ask for assistance with ambulation she voiced understanding

## 2023-04-03 NOTE — ED NOTES
MEDICAL SCREENING:    Reason for Visit: cough, SOB     Patient initially seen in triage.  The patient was advised further evaluation and diagnostic testing will be needed, some of the treatment and testing will be initiated in the lobby in order to begin the process.  The patient will be returned to the waiting area for the time being and possibly be re-assessed by a subsequent ED provider.  The patient will be brought back to the treatment area in as timely manner as possible.       Shantelle hO PA  04/03/23 1958

## 2023-04-04 NOTE — ED NOTES
Called patient's name multiple times for labwork without success. Searched for patient in lobby, hallways, bathroom, vending machine area, outside. Appears patient left AMA without notifying Staff. Patient not available to sign AMA documentation. Notified Provider/Lead RN. No iv access established during this visit.

## 2023-04-06 PROBLEM — J90 RECURRENT RIGHT PLEURAL EFFUSION: Status: ACTIVE | Noted: 2023-01-01

## 2023-04-06 PROBLEM — H05.011: Status: RESOLVED | Noted: 2021-10-30 | Resolved: 2023-01-01

## 2023-04-06 PROBLEM — J44.1 COPD EXACERBATION: Status: ACTIVE | Noted: 2023-01-01

## 2023-04-06 PROBLEM — E11.649 HYPOGLYCEMIA ASSOCIATED WITH TYPE 2 DIABETES MELLITUS: Status: RESOLVED | Noted: 2023-03-16 | Resolved: 2023-04-06

## 2023-04-06 PROBLEM — R65.20 SEVERE SEPSIS: Status: RESOLVED | Noted: 2021-10-31 | Resolved: 2023-01-01

## 2023-04-06 PROBLEM — E16.2 HYPOGLYCEMIA: Status: RESOLVED | Noted: 2023-01-01 | Resolved: 2023-01-01

## 2023-04-06 PROBLEM — A41.9 SEVERE SEPSIS: Status: ACTIVE | Noted: 2023-01-01

## 2023-04-06 PROBLEM — A41.9 SEVERE SEPSIS: Status: RESOLVED | Noted: 2021-10-31 | Resolved: 2023-04-06

## 2023-04-06 PROBLEM — R65.20 SEVERE SEPSIS: Status: ACTIVE | Noted: 2023-04-06

## 2023-04-06 PROBLEM — J96.10 CHRONIC RESPIRATORY FAILURE: Status: ACTIVE | Noted: 2023-01-01

## 2023-04-06 NOTE — ED NOTES
Chief Complaint   Patient presents with   • Follow-up     1 month f/u      HISTORY OF PRESENT ILLNESS:  10 month old female here for follow-up of ear infection.  She is overall doing well happy playful no fevers not tugging on ears overall things seem fine from mother's perspective.    History reviewed. No pertinent past medical history.  History reviewed. No pertinent surgical history.  Social History     Socioeconomic History   • Marital status: Single     Spouse name: Not on file   • Number of children: Not on file   • Years of education: Not on file   • Highest education level: Not on file   Occupational History   • Not on file   Tobacco Use   • Smoking status: Not on file   Substance and Sexual Activity   • Alcohol use: Not on file   • Drug use: Not on file   • Sexual activity: Not on file   Other Topics Concern   • Not on file   Social History Narrative   • Not on file     Social Determinants of Health     Financial Resource Strain:    • Social Determinants: Financial Resource Strain: Not on file   Food Insecurity:    • Social Determinants: Food Insecurity: Not on file   Transportation Needs:    • Lack of Transportation (Medical): Not on file   • Lack of Transportation (Non-Medical): Not on file   Physical Activity:    • Days of Exercise per Week: Not on file   • Minutes of Exercise per Session: Not on file   Stress:    • Social Determinants: Stress: Not on file   Social Connections:    • Social Determinants: Social Connections: Not on file   Intimate Partner Violence:    • Social Determinants: Intimate Partner Violence Past Fear: Not on file   • Social Determinants: Intimate Partner Violence Current Fear: Not on file     ALLERGIES:  No Known Allergies  History reviewed. No pertinent family history.  Review of patient's family status indicates:    Mother                         Alive                       Comment: Copied from mother's family history at                birth    No current outpatient medications  Pt titrated to 2L nc at this time.    on file.     No current facility-administered medications for this visit.       REVIEW OF SYSTEMS:    See History of Present Illness, otherwise, all systems reviewed and are negative.        PHYSICAL EXAM:  Visit Vitals  Pulse 134   Resp 30   Ht 26\" (66 cm)   Wt 7.307 kg (16 lb 1.8 oz)   HC 17.5 cm (6.89\")   BMI 16.76 kg/m²     Constitutional:  Patient is alert, pleasant, in no acute distress. Well-developed, well-nourished who appears her stated age. Alert and oriented x3 with normal mood and affect.  HEENT:  Atraumatic, normocephalic. Extraocular muscles intact. Pupils equal, round, reactive to light and accommodation. No signs of conjunctivitis.  Right TM clear.  Left TM clear. Oral cavity and nasal cavity with moist mucous membranes. Neck without lymphadenopathy or thyromegaly. Trachea midline. No carotid bruits.  Respiratory:  Clear to auscultation bilaterally without any wheezes, rhonchi or rales. she is moving air very well. No accessory muscles of respiration.  Cardiovascular:  Normal rate, normal rhythm, no murmurs, no gallops, no rubs.   Gastrointestinal:  Soft, nontender and nondistended. Normal active bowel sounds. No hepatosplenomegaly. No rebound or guarding.   Genitourinary:  No costovertebral angle tenderness.   Musculoskeletal:  Patient without edema, 5+/5+ strength bilaterally in all upper and lower extremities. Patient moving all extremities equally.     Psychiatric:  Speech and behavior appropriate.    ASSESSMENT:  Non-recurrent acute serous otitis media of right ear  (primary encounter diagnosis)  Resolved.  Follow-up at 1 year

## 2023-04-06 NOTE — PLAN OF CARE
Goal Outcome Evaluation:      Patient admitted from the ER this shift. Patient is resting in bed. No s/s of acute distress noted at this time. Will continue to follow plan of care.

## 2023-04-06 NOTE — ED PROVIDER NOTES
Subjective   History of Present Illness  Patient is a 57-year-old female with significant past medical history positive for arthritis, chronic back pain, coronary artery disease, cirrhosis of the liver, hyperlipidemia, type II diabetic, obesity, hypertension, hypothyroidism, sleep apnea presenting to the ER for 3-day history of shortness of air worsening.  Patient states that she was seen here couple times this month already for the same symptoms.  Patient states that she is just feeling worsening shortness of breath and cannot catch her breath.  Patient also reports cough nonproductive.  Patient denies fever but does report chills.  Patient denies chest pain, nausea, diarrhea, any additional symptoms at this time.  Patient states that she does have a history of cirrhosis and has had 1 paracentesis but her abdominal wall does feel very full and distended.    History provided by:  Patient      Review of Systems   Constitutional: Positive for chills. Negative for fever.   HENT: Negative.    Respiratory: Positive for cough and shortness of breath.    Cardiovascular: Negative.  Negative for chest pain.   Gastrointestinal: Positive for abdominal distention. Negative for abdominal pain.   Endocrine: Negative.    Genitourinary: Negative.  Negative for dysuria.   Skin: Negative.    Neurological: Negative.    Psychiatric/Behavioral: Negative.    All other systems reviewed and are negative.      Past Medical History:   Diagnosis Date   • Arthritis    • Chronic back pain    • Coronary artery disease    • Depression    • Diabetes mellitus    • Elevated cholesterol    • Fatty liver    • History of transfusion    • Hypertension    • Hypothyroid    • Sleep apnea        Allergies   Allergen Reactions   • Nuts Anaphylaxis     peanuts   • Contrast Dye (Echo Or Unknown Ct/Mr) Unknown - Low Severity   • Codeine Itching and Nausea And Vomiting   • Latex Rash       Past Surgical History:   Procedure Laterality Date   • CARPAL TUNNEL  RELEASE     •  SECTION     • CHOLECYSTECTOMY     • COLONOSCOPY     • CORONARY ANGIOPLASTY WITH STENT PLACEMENT     • ENDOSCOPY N/A 2022    Procedure: ESOPHAGOGASTRODUODENOSCOPY WITH BIOPSY;  Surgeon: Lizzeth Harmon MD;  Location: [facility];  Service: Gastroenterology;  Laterality: N/A;   • HIP TROCHANTERIC NAILING WITH INTRAMEDULLARY HIP SCREW Right 2021    Procedure: HIP TROCHANTERIC NAILING LONG WITH INTRAMEDULLARY HIP SCREW;  Surgeon: Oracio Ponce DO;  Location: [facility] OR;  Service: Orthopedics;  Laterality: Right;   • REPLACEMENT TOTAL KNEE Right    • TUBAL ABDOMINAL LIGATION     • UPPER GASTROINTESTINAL ENDOSCOPY         Family History   Problem Relation Age of Onset   • COPD Mother    • Stroke Mother    • Cancer Brother    • Diabetes Brother    • Hypertension Brother    • Heart disease Brother    • Breast cancer Neg Hx        Social History     Socioeconomic History   • Marital status:    Tobacco Use   • Smoking status: Never   • Smokeless tobacco: Never   Vaping Use   • Vaping Use: Never used   Substance and Sexual Activity   • Alcohol use: Yes     Comment: Once a year    • Drug use: No   • Sexual activity: Defer           Objective   Physical Exam  Vitals and nursing note reviewed.   Constitutional:       General: She is not in acute distress.     Appearance: She is well-developed. She is obese. She is ill-appearing. She is not diaphoretic.   HENT:      Head: Normocephalic and atraumatic.      Right Ear: External ear normal.      Left Ear: External ear normal.      Nose: Nose normal.   Eyes:      Conjunctiva/sclera: Conjunctivae normal.      Pupils: Pupils are equal, round, and reactive to light.   Neck:      Vascular: No JVD.      Trachea: No tracheal deviation.   Cardiovascular:      Rate and Rhythm: Regular rhythm. Tachycardia present.      Heart sounds: Normal heart sounds. No murmur heard.  Pulmonary:      Effort: Tachypnea and accessory muscle usage present. No  respiratory distress.      Breath sounds: Decreased breath sounds and rhonchi present. No wheezing.   Abdominal:      General: Bowel sounds are decreased. There is distension.      Palpations: Abdomen is soft.      Tenderness: There is generalized abdominal tenderness.   Musculoskeletal:         General: No deformity. Normal range of motion.      Cervical back: Normal range of motion and neck supple.      Right lower leg: Edema present.      Left lower leg: Edema present.   Skin:     General: Skin is warm and dry.      Capillary Refill: Capillary refill takes 2 to 3 seconds.      Coloration: Skin is not pale.      Findings: No erythema or rash.   Neurological:      Mental Status: She is alert and oriented to person, place, and time.      Cranial Nerves: No cranial nerve deficit.   Psychiatric:         Behavior: Behavior normal.         Thought Content: Thought content normal.         Procedures       Results for orders placed or performed during the hospital encounter of 04/06/23   COVID-19 and FLU A/B PCR - Swab, Nasopharynx    Specimen: Nasopharynx; Swab   Result Value Ref Range    COVID19 Not Detected Not Detected - Ref. Range    Influenza A PCR Not Detected Not Detected    Influenza B PCR Not Detected Not Detected   Protime-INR    Specimen: Blood   Result Value Ref Range    Protime 17.1 (H) 12.1 - 14.7 Seconds    INR 1.36 (H) 0.90 - 1.10   aPTT    Specimen: Blood   Result Value Ref Range    PTT 37.6 (H) 26.5 - 34.5 seconds   Lipase    Specimen: Blood   Result Value Ref Range    Lipase 60 13 - 60 U/L   Comprehensive Metabolic Panel    Specimen: Blood   Result Value Ref Range    Glucose 127 (H) 65 - 99 mg/dL    BUN 11 6 - 20 mg/dL    Creatinine 0.71 0.57 - 1.00 mg/dL    Sodium 138 136 - 145 mmol/L    Potassium 4.6 3.5 - 5.2 mmol/L    Chloride 97 (L) 98 - 107 mmol/L    CO2 33.6 (H) 22.0 - 29.0 mmol/L    Calcium 9.3 8.6 - 10.5 mg/dL    Total Protein 8.0 6.0 - 8.5 g/dL    Albumin 2.6 (L) 3.5 - 5.2 g/dL    ALT (SGPT)  51 (H) 1 - 33 U/L    AST (SGOT) 81 (H) 1 - 32 U/L    Alkaline Phosphatase 188 (H) 39 - 117 U/L    Total Bilirubin 2.0 (H) 0.0 - 1.2 mg/dL    Globulin 5.4 gm/dL    A/G Ratio 0.5 g/dL    BUN/Creatinine Ratio 15.5 7.0 - 25.0    Anion Gap 7.4 5.0 - 15.0 mmol/L    eGFR 99.3 >60.0 mL/min/1.73   BNP    Specimen: Blood   Result Value Ref Range    proBNP 830.9 0.0 - 900.0 pg/mL   High Sensitivity Troponin T    Specimen: Blood   Result Value Ref Range    HS Troponin T 47 (H) <10 ng/L   Lactic Acid, Plasma    Specimen: Blood   Result Value Ref Range    Lactate 2.1 (C) 0.5 - 2.0 mmol/L   Procalcitonin    Specimen: Blood   Result Value Ref Range    Procalcitonin 0.15 0.00 - 0.25 ng/mL   Sedimentation Rate    Specimen: Blood   Result Value Ref Range    Sed Rate 85 (H) 0 - 30 mm/hr   C-reactive Protein    Specimen: Blood   Result Value Ref Range    C-Reactive Protein 4.41 (H) 0.00 - 0.50 mg/dL   CBC Auto Differential    Specimen: Blood   Result Value Ref Range    WBC 8.04 3.40 - 10.80 10*3/mm3    RBC 3.71 (L) 3.77 - 5.28 10*6/mm3    Hemoglobin 10.9 (L) 12.0 - 15.9 g/dL    Hematocrit 35.0 34.0 - 46.6 %    MCV 94.3 79.0 - 97.0 fL    MCH 29.4 26.6 - 33.0 pg    MCHC 31.1 (L) 31.5 - 35.7 g/dL    RDW 27.1 (H) 12.3 - 15.4 %    RDW-SD 93.7 (H) 37.0 - 54.0 fl    MPV 9.6 6.0 - 12.0 fL    Platelets 174 140 - 450 10*3/mm3    Neutrophil % 74.3 42.7 - 76.0 %    Lymphocyte % 12.3 (L) 19.6 - 45.3 %    Monocyte % 9.0 5.0 - 12.0 %    Eosinophil % 2.9 0.3 - 6.2 %    Basophil % 0.9 0.0 - 1.5 %    Immature Grans % 0.6 (H) 0.0 - 0.5 %    Neutrophils, Absolute 5.98 1.70 - 7.00 10*3/mm3    Lymphocytes, Absolute 0.99 0.70 - 3.10 10*3/mm3    Monocytes, Absolute 0.72 0.10 - 0.90 10*3/mm3    Eosinophils, Absolute 0.23 0.00 - 0.40 10*3/mm3    Basophils, Absolute 0.07 0.00 - 0.20 10*3/mm3    Immature Grans, Absolute 0.05 0.00 - 0.05 10*3/mm3    nRBC 0.0 0.0 - 0.2 /100 WBC   Scan Slide    Specimen: Blood   Result Value Ref Range    Anisocytosis Large/3+ None  Seen    Hypochromia Mod/2+ None Seen    Target Cells Slight/1+ None Seen    Platelet Morphology Normal Normal   Urinalysis With Culture If Indicated - Straight Cath    Specimen: Straight Cath; Urine   Result Value Ref Range    Color, UA Yellow Yellow, Straw    Appearance, UA Clear Clear    pH, UA 7.0 5.0 - 8.0    Specific Gravity, UA 1.007 1.005 - 1.030    Glucose, UA Negative Negative    Ketones, UA Negative Negative    Bilirubin, UA Negative Negative    Blood, UA Negative Negative    Protein, UA Negative Negative    Leuk Esterase, UA Trace (A) Negative    Nitrite, UA Negative Negative    Urobilinogen, UA 1.0 E.U./dL 0.2 - 1.0 E.U./dL   Blood Gas, Arterial With Co-Ox    Specimen: Arterial Blood   Result Value Ref Range    Site Left Brachial     Richardson's Test N/A     pH, Arterial 7.421 7.350 - 7.450 pH units    pCO2, Arterial 53.8 (H) 35.0 - 45.0 mm Hg    pO2, Arterial 79.0 (L) 83.0 - 108.0 mm Hg    HCO3, Arterial 35.0 (H) 20.0 - 26.0 mmol/L    Base Excess, Arterial 9.0 (H) 0.0 - 2.0 mmol/L    O2 Saturation, Arterial 96.1 94.0 - 99.0 %    Hemoglobin, Blood Gas 10.9 (L) 13.5 - 17.5 g/dL    Hematocrit, Blood Gas 33.5 (L) 38.0 - 51.0 %    Oxyhemoglobin 94.8 94 - 99 %    Methemoglobin 0.00 0.00 - 3.00 %    Carboxyhemoglobin 1.5 0 - 5 %    A-a DO2 52.9 0.0 - 300.0 mmHg    CO2 Content 36.6 (H) 22 - 33 mmol/L    Barometric Pressure for Blood Gas 733 mmHg    Modality Nasal Cannula     FIO2 28 %    Flow Rate 2.0 lpm    Ventilator Mode NA     Note      Collected by [id number]     pH, Temp Corrected      pCO2, Temperature Corrected      pO2, Temperature Corrected     High Sensitivity Troponin T 2Hr    Specimen: Arm, Right; Blood   Result Value Ref Range    HS Troponin T 48 (H) <10 ng/L    Troponin T Delta 1 >=-4 - <+4 ng/L   Urinalysis, Microscopic Only - Straight Cath    Specimen: Straight Cath; Urine   Result Value Ref Range    RBC, UA 0-2 None Seen, 0-2 /HPF    WBC, UA 0-2 None Seen, 0-2 /HPF    Bacteria, UA None Seen None Seen  /HPF    Squamous Epithelial Cells, UA 0-2 None Seen, 0-2 /HPF    Hyaline Casts, UA None Seen None Seen /LPF    Methodology Automated Microscopy    ECG 12 Lead Dyspnea   Result Value Ref Range    QT Interval 414 ms    QTC Interval 474 ms   Green Top (Gel)   Result Value Ref Range    Extra Tube Hold for add-ons.    Lavender Top   Result Value Ref Range    Extra Tube hold for add-on    Gold Top - SST   Result Value Ref Range    Extra Tube Hold for add-ons.    Light Blue Top   Result Value Ref Range    Extra Tube Hold for add-ons.      CT Abdomen Pelvis Without Contrast   Final Result   1.  Bibasilar partially consolidative airspace disease which may   represent atelectasis and pneumonia.   2.  Stable right pleural effusion and stable small to moderate volume   ascites.   3.  Stable severe liver cirrhosis with portal hypertension changes.   4.  Moderate constipation. No bowel obstruction.   5.  Other incidental/nonacute findings above.       This report was finalized on 4/6/2023 4:06 PM by Dr. Vikram Strong MD.          CT Chest Without Contrast Diagnostic   Final Result   1.  CHF/edema.   2.  Bibasilar consolidative airspace disease.   3.  Stable moderate right pleural effusion, nonloculated.   4.  Stable liver cirrhosis with portal hypertension and upper abdominal   ascites.       This report was finalized on 4/6/2023 4:11 PM by Dr. Vikram Strong MD.          XR Chest 1 View   Final Result     Essentially stable changes of CHF/edema.       This report was finalized on 4/6/2023 3:43 PM by Dr. Vikram Strong MD.              ED Course  ED Course as of 04/06/23 1806   Thu Apr 06, 2023   1341 ECG 12 Lead Dyspnea  Vent. Rate :  79 BPM     Atrial Rate :  79 BPM     P-R Int : 188 ms          QRS Dur :  74 ms      QT Int : 414 ms       P-R-T Axes :  74 102  81 degrees     QTc Int : 474 ms     Normal sinus rhythm  Rightward axis  Low voltage QRS  Septal infarct (cited on or before 16-MAR-2023)  Abnormal ECG  When compared  with ECG of 02-APR-2023 15:12,  No significant change was found [ES]   1448 Lactate(!!): 2.1 [SM]   1448 C-Reactive Protein(!): 4.41 [SM]   1448 WBC: 8.04 [SM]   1503 proBNP: 830.9 [SM]   1606 HS Troponin T(!): 48 [SM]   1606 XR Chest 1 View [SM]   1610 CT Abdomen Pelvis Without Contrast  IMPRESSION:  1.  Bibasilar partially consolidative airspace disease which may  represent atelectasis and pneumonia.  2.  Stable right pleural effusion and stable small to moderate volume  ascites.  3.  Stable severe liver cirrhosis with portal hypertension changes.  4.  Moderate constipation. No bowel obstruction.  5.  Other incidental/nonacute findings above.     This report was finalized on 4/6/2023 4:06 PM by Dr. Vikram Strong MD. [SM]   1634 CT Chest Without Contrast Diagnostic [SM]      ED Course User Index  [ES] Agusto Hanks MD  [SM] Polina Oneil, APRNAEL                                           Medical Decision Making  Patient is a 57-year-old female with significant past medical history positive for arthritis, chronic back pain, coronary artery disease, cirrhosis of the liver, hyperlipidemia, type II diabetic, obesity, hypertension, hypothyroidism, sleep apnea presenting to the ER for 3-day history of shortness of air worsening.  Patient states that she was seen here couple times this month already for the same symptoms.  Patient states that she is just feeling worsening shortness of breath and cannot catch her breath.  Patient also reports cough nonproductive.  Patient denies fever but does report chills.  Patient denies chest pain, nausea, diarrhea, any additional symptoms at this time.  Patient states that she does have a history of cirrhosis and has had 1 paracentesis but her abdominal wall does feel very full and distended.    Patient admitted to this facility for further evaluation and treatment.    Community acquired pneumonia, unspecified laterality: complicated acute illness or injury  Other  ascites: complicated acute illness or injury  Recurrent right pleural effusion: complicated acute illness or injury  Amount and/or Complexity of Data Reviewed  Labs: ordered. Decision-making details documented in ED Course.  Radiology: ordered. Decision-making details documented in ED Course.  ECG/medicine tests: ordered. Decision-making details documented in ED Course.      Risk  Prescription drug management.  Decision regarding hospitalization.          Final diagnoses:   Recurrent right pleural effusion   Community acquired pneumonia, unspecified laterality   Other ascites       ED Disposition  ED Disposition     ED Disposition   Decision to Admit    Condition   --    Comment   Level of Care: Telemetry [5]   Diagnosis: Recurrent right pleural effusion [674221]               Azalea Burnett, APRN  [address]  [facility] [address]  [phone]    Schedule an appointment as soon as possible for a visit in 2 days           Medication List      No changes were made to your prescriptions during this visit.          Polina Oneil, APRN  04/06/23 1803

## 2023-04-07 NOTE — PLAN OF CARE
Goal Outcome Evaluation:  Plan of Care Reviewed With: patient           Outcome Evaluation: Pt resting in bed this shift. AOx4, VSS, c/o of anxiety and irritating cough, some nausea, provider notified see new orders. Pt w/ several episodes of productive cough with bloody sputum, provider is aware. Pt provided w/ cool cloths to back of neck to assist w/ relief of anxiety. IV access and telemetry monitoring patent and maintained, will continue to follow plan of care.

## 2023-04-07 NOTE — CASE MANAGEMENT/SOCIAL WORK
Discharge Planning Assessment   [place]     Patient Name: Aidee Trinidad  MRN: [id number]  Today's Date: 4/7/2023    Admit Date: 4/6/2023    Plan: Pt admitted on 4/6/23. SS received nursing consult for dc planning, recent admission, debility, eval need for assistance please. SS spoke with pt at bedside. Pt states she lives with son Americo and would like to return home at discharge. Pt states her neice sits with her throughout the day. Pt does not utilize  services at this time. Pt pcp Nanci Burnett. Pt utilizes hospital bed, rollator, w/chair georgina @ 2 liters via [employer]. Pt does not have POA/living will. Pt states she may be interested in short term rehab at discharge but wants to think about it before making decision. SS to follow and assist as needed with discharge planning.      Discharge Needs Assessment     Row Name 04/07/23 1619       Living Environment    People in Home child(eloise), adult    Current Living Arrangements home    Primary Care Provided by self  son lives with pt.    Provides Primary Care For no one    Family Caregiver if Needed child(eloise), adult    Family Caregiver Names daughter lorena and son americo    Quality of Family Relationships helpful;involved;supportive    Able to Return to Prior Arrangements yes       Transition Planning    Patient/Family Anticipates Transition to home with family    Transportation Anticipated family or friend will provide       Discharge Needs Assessment    Equipment Currently Used at Home hospital bed;rollator;wheelchair;oxygen    Concerns to be Addressed --  dc planning, recent admission, debility, eval need for assistance please.               Discharge Plan     Row Name 04/07/23 1625       Plan    Plan Pt admitted on 4/6/23. SS received nursing consult for dc planning, recent admission, debility, eval need for assistance please. SS spoke with pt at bedside. Pt states she lives with son Americo and would like to return home at discharge. Pt states her neice  sits with her throughout the day. Pt does not utilize  services at this time. Pt pcp Nanci Burnett. Pt utilizes hospital bed, rollator, w/chair georgina @ 2 liters via [employer]. Pt does not have POA/living will. Pt states she may be interested in short term rehab at discharge but wants to think about it before making decision. SS to follow and assist as needed with discharge planning.              Expected Discharge Date and Time     Expected Discharge Date Expected Discharge Time    Apr 10, 2023          Demographic Summary     Row Name 04/07/23 1616       General Information    Admission Type inpatient    Referral Source nursing    Reason for Consult --  dc planning, recent admission, debility, eval need for assistance please.    Preferred Language English                [employer]

## 2023-04-07 NOTE — PLAN OF CARE
Goal Outcome Evaluation:  Plan of Care Reviewed With: patient           Outcome Evaluation: Pt is resting in bed with eyes closed. Chest is rising and falling evenly. No s/s of acute distress noted. No complaints verbalized at this time.

## 2023-04-07 NOTE — PROGRESS NOTES
[facility] HOSPITALIST PROGRESS NOTE     Patient Identification:  Name:  Aidee Trinidad  Age:  57 y.o.  Sex:  female  :  1965  MRN:  [id number]  Visit Number:  [id number]  ROOM: [address] [place] [address]     Primary Care Provider:  Azalea Burnett APRN     Date of Admission: 2023    Length of stay in inpatient status:  1    Subjective     Chief Compliant:    Chief Complaint   Patient presents with   • Shortness of Breath     History of Presenting Illness: The patient states that she is not feeling better yet, though she is not feeling worse.  She still short of air and still coughing and still producing some of the sputum that she endorsed yesterday.  She denies chest pain, nausea, vomiting, and diarrhea.    Objective     Current Hospital Meds:  aspirin, 81 mg, Oral, Daily  atorvastatin, 20 mg, Oral, Nightly  budesonide-formoterol, 2 puff, Inhalation, BID - RT  cefTRIAXone, 1 g, Intravenous, Q24H  cetirizine, 10 mg, Oral, Daily  cholecalciferol, 50,000 Units, Oral, Q7 Days  doxycycline, 100 mg, Intravenous, Q12H  furosemide, 40 mg, Oral, Daily  guaiFENesin, 600 mg, Oral, Q12H  ipratropium-albuterol, 3 mL, Nebulization, Q6H - RT  levothyroxine, 75 mcg, Oral, Daily  methylPREDNISolone sodium succinate, 62.5 mg, Intravenous, Daily  metoprolol tartrate, 12.5 mg, Oral, Q12H  pantoprazole, 40 mg, Oral, Q AM  QUEtiapine, 50 mg, Oral, Nightly  sertraline, 50 mg, Oral, Daily  sodium chloride, 3 mL, Intravenous, Q12H  spironolactone, 100 mg, Oral, Daily       Current Antimicrobial Therapy:  Anti-Infectives (From admission, onward)    Ordered     Dose/Rate Route Frequency Start Stop    23  cefTRIAXone (ROCEPHIN) 1 g in sodium chloride 0.9 % 100 mL IVPB-VTB        Ordering Provider: Jakob Koch MD    1 g  200 mL/hr over 30 Minutes Intravenous Every 24 Hours 23 1800 23 1759    23  doxycycline (VIBRAMYCIN) 100 mg in sodium chloride 0.9 % 100 mL IVPB-VTB         Ordering Provider: Jakob Koch MD    100 mg  over 60 Minutes Intravenous Every 12 Hours 04/06/23 2200 04/11/23 2159 04/06/23 1611  cefTRIAXone (ROCEPHIN) 2 g in sodium chloride 0.9 % 100 mL IVPB-VTB        Ordering Provider: Polina Oneil APRN    2 g  200 mL/hr over 30 Minutes Intravenous Once 04/06/23 1613 04/06/23 1836        Current Diuretic Therapy:  Diuretics (From admission, onward)    Ordered     Dose/Rate Route Frequency Start Stop    04/06/23 2041  furosemide (LASIX) tablet 40 mg        Ordering Provider: Jakob Koch MD    40 mg Oral Daily 04/07/23 0900      04/06/23 2129  spironolactone (ALDACTONE) tablet 100 mg        Ordering Provider: Jakob Koch MD    100 mg Oral Daily 04/07/23 0900      04/06/23 1607  furosemide (LASIX) injection 80 mg        Ordering Provider: Polina Oneil APRN    80 mg Intravenous Once 04/06/23 1609 04/06/23 1629        ----------------------------------------------------------------------------------------------------------------------  Vital Signs:  Temp:  [98.1 °F (36.7 °C)-98.9 °F (37.2 °C)] 98.9 °F (37.2 °C)  Heart Rate:  [79-88] 84  Resp:  [20-21] 20  BP: ()/(50-67) 133/67  SpO2:  [94 %-96 %] 94 %  on  Flow (L/min):  [2] 2;   Device (Oxygen Therapy): nasal cannula  Body mass index is 56.25 kg/m².    Wt Readings from Last 3 Encounters:   04/07/23 118 kg (260 lb)   04/02/23 125 kg (275 lb)   03/22/23 125 kg (275 lb)     Intake & Output (last 3 days)       04/04 0701  04/05 0700 04/05 0701  04/06 0700 04/06 0701 04/07 0700 04/07 0701  04/08 0700    P.O.    180    Total Intake(mL/kg)    180 (1.5)    Urine (mL/kg/hr)   700     Total Output   700     Net   -700 +180                Diet: Cardiac Diets, Diabetic Diets; Healthy Heart (2-3 Na+); Consistent Carbohydrate; Texture: Regular Texture (IDDSI 7); Fluid Consistency: Thin (IDDSI  0)  ----------------------------------------------------------------------------------------------------------------------  Physical Exam; her exam is essentially the same as yesterday.  Cardiovascular:      Rate and Rhythm: Normal rate and regular rhythm.      Pulses: Normal pulses.      Heart sounds: No murmur heard.  Pulmonary:      Effort: Accessory muscle usage, prolonged expiration and respiratory distress present. No retractions.      Breath sounds: Decreased air movement present. Wheezing present. No rales.   Abdominal:      General: Bowel sounds are normal. There is distension.      Palpations: Abdomen is soft. There is shifting dullness.      Tenderness: There is no abdominal tenderness.   Skin:     Capillary Refill: Capillary refill takes less than 2 seconds.      Coloration: Skin is not jaundiced or pale.      Findings: Bruising present. No rash.   Neurological:      Mental Status: She is alert and oriented to person, place, and time. Mental status is at baseline.      Cranial Nerves: No cranial nerve deficit.   ----------------------------------------------------------------------------------------------------------------------  Tele: Normal sinus rhythm with heart rate 60s to 80s.  Personally reviewed the telemetry strips.    Last echocardiogram:  Results for orders placed during the hospital encounter of 04/06/23    Adult Transthoracic Echo Complete W/ Cont if Necessary Per Protocol    Interpretation Summary  •  Left ventricular systolic function is normal. Left ventricular ejection fraction appears to be 66 - 70%.  •  Left ventricular wall thickness is consistent with mild concentric hypertrophy.  •  Left ventricular diastolic function is consistent with (grade II w/high LAP) pseudonormalization.  •  The cardiac chamber dimensions are normal.  •  No significant valvular heart disease is present.  •  Mild pulmonary hypertension is present.  •  There is no evidence of pericardial effusion.    I have  personally read the ECHO final report.  ----------------------------------------------------------------------------------------------------------------------  LABS:    CBC and coagulation:  Results from last 7 days   Lab Units 04/07/23  0502 04/07/23  0241 04/06/23  2059 04/06/23  1754 04/06/23  1350 04/06/23  1312 04/02/23  1507   PROCALCITONIN ng/mL  --   --   --   --   --  0.15  --    LACTATE mmol/L  --   --  2.0 2.3* 2.1*  --   --    SED RATE mm/hr  --   --   --   --   --  85* 59*   CRP mg/dL  --   --   --   --   --  4.41* 3.75*   WBC 10*3/mm3 5.65  --   --   --   --  8.04 6.92   HEMOGLOBIN g/dL 9.5*  --   --   --   --  10.9* 10.3*   HEMATOCRIT % 29.5*  --   --   --   --  35.0 34.0   MCV fL 92.8  --   --   --   --  94.3 96.6   MCHC g/dL 32.2  --   --   --   --  31.1* 30.3*   PLATELETS 10*3/mm3 176  --  143  --   --  174 138*   INR   --  1.34*  --   --   --  1.36*  --      Acid/base balance:  Results from last 7 days   Lab Units 04/06/23  1338 04/02/23  1844   PH, ARTERIAL pH units 7.421 7.441   PO2 ART mm Hg 79.0* 83.0   PCO2, ARTERIAL mm Hg 53.8* 52.2*   HCO3 ART mmol/L 35.0* 35.5*     Renal and electrolytes:  Results from last 7 days   Lab Units 04/07/23  0241 04/06/23  1312 04/02/23  1507   SODIUM mmol/L 134* 138 139   POTASSIUM mmol/L 3.9 4.6 4.7   MAGNESIUM mg/dL 1.9  --   --    CHLORIDE mmol/L 95* 97* 99   CO2 mmol/L 27.6 33.6* 31.6*   BUN mg/dL 12 11 14   CREATININE mg/dL 0.65 0.71 0.73   CALCIUM mg/dL 8.8 9.3 9.3   PHOSPHORUS mg/dL 2.9  --   --    GLUCOSE mg/dL 138* 127* 162*     Estimated Creatinine Clearance: 112.3 mL/min (by C-G formula based on SCr of 0.65 mg/dL).    Liver and pancreatic function:  Results from last 7 days   Lab Units 04/07/23  0502 04/07/23  0241 04/06/23  1312 04/02/23  1507   ALBUMIN g/dL  --  2.5* 2.6* 2.4*   BILIRUBIN mg/dL  --  1.5* 2.0* 1.5*   ALK PHOS U/L  --  150* 188* 168*   AST (SGOT) U/L  --  67* 81* 88*   ALT (SGPT) U/L  --  41* 51* 49*   AMMONIA umol/L 69*  --   --    --    LIPASE U/L  --   --  60  --      Endocrine function:  Lab Results   Component Value Date    HGBA1C <4.20 (L) 03/16/2023     Glucose levels from the CMP:  Results from last 7 days   Lab Units 04/07/23  0241 04/06/23  1312 04/02/23  1507   GLUCOSE mg/dL 138* 127* 162*     Lab Results   Component Value Date    TSH 1.200 03/17/2023     Cardiac:  Results from last 7 days   Lab Units 04/06/23  1515 04/06/23  1312 04/02/23  1727 04/02/23  1507   HSTROP T ng/L 48* 47* 52* 52*   PROBNP pg/mL  --  830.9  --  634.9       Cultures:  Lab Results   Component Value Date    COLORU Yellow 04/06/2023    CLARITYU Clear 04/06/2023    PHUR 7.0 04/06/2023    GLUCOSEU Negative 04/06/2023    KETONESU Negative 04/06/2023    BLOODU Negative 04/06/2023    NITRITEU Negative 04/06/2023    LEUKOCYTESUR Trace (A) 04/06/2023    BILIRUBINUR Negative 04/06/2023    UROBILINOGEN 1.0 E.U./dL 04/06/2023    RBCUA 0-2 04/06/2023    WBCUA 0-2 04/06/2023    BACTERIA None Seen 04/06/2023     Microbiology Results (last 10 days)     Procedure Component Value - Date/Time    Respiratory Culture - Sputum, Cough [[id number]] Collected: 04/07/23 0454    Lab Status: Final result Specimen: Sputum from Cough Updated: 04/07/23 0636     Respiratory Culture Rejected     Gram Stain Rare (1+) WBCs seen      Rare (1+) Epithelial cells seen      Moderate (3+) Gram positive cocci in chains and clusters      Few (2+) Gram negative bacilli    Narrative:      Specimen rejected due to oropharyngeal contamination. Please reorder and recollect specimen if clinically necessary.    Respiratory Panel PCR w/COVID-19(SARS-CoV-2) [facility] In-House, NP Swab in UTM/VTM, 3-4 HR TAT - Swab, Nasopharynx [[id number]]  (Normal) Collected: 04/07/23 0134    Lab Status: Final result Specimen: Swab from Nasopharynx Updated: 04/07/23 0233     ADENOVIRUS, PCR Not Detected     Coronavirus 229E Not Detected     Coronavirus HKU1 Not Detected     Coronavirus NL63 Not Detected      Coronavirus OC43 Not Detected     COVID19 Not Detected     Human Metapneumovirus Not Detected     Human Rhinovirus/Enterovirus Not Detected     Influenza A PCR Not Detected     Influenza B PCR Not Detected     Parainfluenza Virus 1 Not Detected     Parainfluenza Virus 2 Not Detected     Parainfluenza Virus 3 Not Detected     Parainfluenza Virus 4 Not Detected     RSV, PCR Not Detected     Bordetella pertussis pcr Not Detected     Bordetella parapertussis PCR Not Detected     Chlamydophila pneumoniae PCR Not Detected     Mycoplasma pneumo by PCR Not Detected    Narrative:      In the setting of a positive respiratory panel with a viral infection PLUS a negative procalcitonin without other underlying concern for bacterial infection, consider observing off antibiotics or discontinuation of antibiotics and continue supportive care. If the respiratory panel is positive for atypical bacterial infection (Bordetella pertussis, Chlamydophila pneumoniae, or Mycoplasma pneumoniae), consider antibiotic de-escalation to target atypical bacterial infection.    Blood Culture - Blood, Hand, Right [[id number]]  (Normal) Collected: 04/06/23 1410    Lab Status: Preliminary result Specimen: Blood from Hand, Right Updated: 04/07/23 1415     Blood Culture No growth at 24 hours    Blood Culture - Blood, Wrist, Left [[id number]]  (Normal) Collected: 04/06/23 1350    Lab Status: Preliminary result Specimen: Blood from Wrist, Left Updated: 04/07/23 1415     Blood Culture No growth at 24 hours    COVID-19 and FLU A/B PCR - Swab, Nasopharynx [[id number]]  (Normal) Collected: 04/06/23 1338    Lab Status: Final result Specimen: Swab from Nasopharynx Updated: 04/06/23 1431     COVID19 Not Detected     Influenza A PCR Not Detected     Influenza B PCR Not Detected    Narrative:      Fact sheet for providers: https://www.fda.gov/media/939059/download    Fact sheet for patients: https://www.fda.gov/media/731752/download    Test performed by PCR.     COVID-19 and FLU A/B PCR - Swab, Nasopharynx [[id number]]  (Normal) Collected: 04/02/23 1508    Lab Status: Final result Specimen: Swab from Nasopharynx Updated: 04/02/23 1535     COVID19 Not Detected     Influenza A PCR Not Detected     Influenza B PCR Not Detected    Narrative:      Fact sheet for providers: https://www.fda.gov/media/266211/download    Fact sheet for patients: https://www.fda.gov/media/567569/download    Test performed by PCR.        I have personally looked at the labs and they are summarized above.  ----------------------------------------------------------------------------------------------------------------------  Detailed radiology reports for the last 24 hours:    Imaging Results (Last 24 Hours)     Procedure Component Value Units Date/Time    CT Chest Without Contrast Diagnostic [[id number]] Collected: 04/06/23 1610     Updated: 04/06/23 1613    Narrative:      EXAM:    CT Chest Without Intravenous Contrast     EXAM DATE:    4/6/2023 3:03 PM     CLINICAL HISTORY:    SOA     TECHNIQUE:    Axial computed tomography images of the chest without intravenous  contrast.  Sagittal and coronal reformatted images were created and  reviewed.  This CT exam was performed using one or more of the following  dose reduction techniques:  automated exposure control, adjustment of  the mA and/or kV according to patient size, and/or use of iterative  reconstruction technique.     COMPARISON:    04/02/2023     FINDINGS:    Lungs:  Bibasilar consolidative airspace disease.  Interstitial  thickening compatible with edema.    Pleural space:  Right pleural effusion is stable, small to moderate in  size.  No pneumothorax.    Heart:  Marked cardiomegaly with moderate coronary artery  calcifications and prior CABG.  No significant pericardial effusion.    Bones/joints:  Unremarkable.  No acute fracture.  No dislocation.    Soft tissues:  See above.    Vasculature:  Unremarkable.  No thoracic aortic aneurysm.    Lymph  nodes:  Unremarkable.  No enlarged lymph nodes.    Liver:  Severe liver cirrhosis with upper abdominal ascites.       Impression:      1.  CHF/edema.  2.  Bibasilar consolidative airspace disease.  3.  Stable moderate right pleural effusion, nonloculated.  4.  Stable liver cirrhosis with portal hypertension and upper abdominal  ascites.     This report was finalized on 4/6/2023 4:11 PM by Dr. Vikram Strong MD.       CT Abdomen Pelvis Without Contrast [[id number]] Collected: 04/06/23 1559     Updated: 04/06/23 1608    Narrative:      EXAM:    CT Abdomen and Pelvis Without Intravenous Contrast     EXAM DATE:    4/6/2023 2:52 PM     CLINICAL HISTORY:    pain, distention, hx of liver disease     TECHNIQUE:    Axial computed tomography images of the abdomen and pelvis without  intravenous contrast.  Sagittal and coronal reformatted images were  created and reviewed.  This CT exam was performed using one or more of  the following dose reduction techniques:  automated exposure control,  adjustment of the mA and/or kV according to patient size, and/or use of  iterative reconstruction technique.     COMPARISON:    04/02/2023     FINDINGS:    Lung bases:  Right greater than left bibasilar partially consolidative  airspace disease.    Pleural space:  Stable small right pleural effusion which appears  nonloculated.    Heart:  Cardiomegaly.      ABDOMEN:    Liver:  Stable severe liver cirrhosis with portal hypertension.    Gallbladder and bile ducts:  Cholecystectomy.  No ductal dilation.    Pancreas:  Unremarkable.  No ductal dilation.    Spleen:  Unremarkable.  No splenomegaly.    Adrenals:  Unremarkable.  No mass.    Kidneys and ureters:  Unremarkable.  No obstructing stones.  No  hydronephrosis.    Stomach and bowel:  Marked constipation is noted. No bowel  obstruction.  No mucosal thickening.      PELVIS:    Appendix:  No findings to suggest acute appendicitis.    Bladder:  Unremarkable.  No stones.    Reproductive:   Unremarkable as visualized.      ABDOMEN and PELVIS:    Intraperitoneal space:  No pneumoperitoneum identified.   Small-moderate volume ascites is essentially stable.  No loculated fluid  collections.    Bones/joints:  Stable appearance of bony structures.  Stable  degenerative changes lumbar spine.  Stable appearance of the right hip  with fixation hardware.  No acute fracture.  No dislocation.    Soft tissues:  Unremarkable.    Vasculature:  Prominent portosystemic collateral vessels again noted.   No abdominal aortic aneurysm.    Lymph nodes:  Unremarkable.  No enlarged lymph nodes.       Impression:      1.  Bibasilar partially consolidative airspace disease which may  represent atelectasis and pneumonia.  2.  Stable right pleural effusion and stable small to moderate volume  ascites.  3.  Stable severe liver cirrhosis with portal hypertension changes.  4.  Moderate constipation. No bowel obstruction.  5.  Other incidental/nonacute findings above.     This report was finalized on 4/6/2023 4:06 PM by Dr. Vikram Strong MD.       XR Chest 1 View [[id number]] Collected: 04/06/23 1543     Updated: 04/06/23 1545    Narrative:      EXAM:    XR Chest, 1 View     EXAM DATE:    4/6/2023 1:49 PM     CLINICAL HISTORY:    SOA     TECHNIQUE:    Frontal view of the chest.     COMPARISON:    04/02/2023     FINDINGS:    Lungs:  Persistent changes of CHF noted with interstitial edema and  pulmonary vascular congestion.    Pleural space:  Trace effusions.  No pneumothorax.    Heart:  Cardiomegaly and CABG.    Mediastinum:  Unremarkable.    Bones/joints:  Unremarkable.       Impression:        Essentially stable changes of CHF/edema.     This report was finalized on 4/6/2023 3:43 PM by Dr. Vikram Strong MD.           I have personally looked at the radiology images and I have read the available final reports.    Assessment & Plan      • Severe sepsis that was present on admission (lactic acid 2.1-2.3, CRP 4.41) due to bibasilar  pneumonia and an acute COPD exacerbation  • History of decompensated cirrhosis with grade 1 esophageal varices in the lower one third, MELD score 12.49 on admission, with current MELD score 11.24  • History of chronic hypoxic respiratory failure, uses 2 L/min of [place] oxygen  • History of CAD status post CABG in 2011  • History of right lateral malleolus avulsion fracture  • History of right TKA in the past  • Right lower extremity greater than left lower extremity weakness with underlying neuropathy  • History of type 2 diabetes mellitus with peripheral neuropathy  • History of grade 2 diastolic dysfunction/heart failure with preserved ejection fraction, currently with an exacerbation due to the severe sepsis  • History of essential hypertension  • History of hypothyroidism  • History of depression and anxiety  • Debility secondary to recent right hip intertrochanteric femur fracture status post repair in addition to underlying morbid obesity     I will continue with the empiric Rocephin, doxycycline, Solu-Medrol, and DuoNeb breathing treatments.  So far, with the spironolactone added to her home Lasix, she appears to have a negative fluid balance, though the exact fluid balance is unknown at this time.  She does not have any worsening renal failure and thus will continue with the current treatment; if she does not feel better tomorrow then may need to give an additional dose of IV diuretics.  Please note that the patient's blood pressures are adequate and stable and thus we will continue to monitor these.  The patient remains on her home oxygen requirement.  Her glucose levels are controlled we will continue to monitor these.    VTE Prophylaxis:   Mechanical Order History:      Ordered        04/06/23 1754  Place Sequential Compression Device  Once            04/06/23 1754  Maintain Sequential Compression Device  Continuous                    Pharmalogical Order History:     None      The patient is high risk due to  the following diagnoses/reasons: Severe sepsis    Jakob Koch MD  [facility]  04/07/23  14:20 EDT

## 2023-04-07 NOTE — H&P
[facility]    Patient Identification:  Name:  Aidee Trinidad  Age:  57 y.o.  Sex:  female  :  1965  MRN:  [id number]   Visit Number:  [id number]  Admit Date: 2023   Room number:  3316/1S  Primary Care Physician:  Azalea Burnett APRN    Date of Admission: 2023     Subjective     Chief complaint:    Chief Complaint   Patient presents with   • Shortness of Breath     History of presenting illness:  57 y.o. female who was admitted on 2023 from the ED with shortness of air.  The patient has past medical history of decompensated cirrhosis due to GONZALEZ with grade 1 esophageal varices in the lower one third, coronary artery disease status post three-vessel CABG in , COPD for which she uses 2 L/min of nasal cannula oxygen, essential hypertension, type 2 diabetes mellitus, hyperlipidemia, hypothyroidism, MANUEL.  She was seen by [facility] orthopedic surgery yesterday (2023) for follow up of her right hip.  She initially had right hip cephalomedullary nailing with TFN nail on 2021 for a right intertrochanteric femur fracture.  Then, she fell in the shower and was evaluated at ED on 3/22/2023; imaging showed a right periprosthetic lesser trochanter fracture.  The patient was instructed to toe-touch only on the right lower extremity, ambulate with a walker at all times, and institute fall precautions; Dr. Ponce is to repeat X-rays in 3 weeks.  The patient was last hospitalized at our facility 3/16/2023-3/18/2023 with hypoglycemia and toxic encephalopathy due to antihyperglycemic medications.    The patient states that she became ill 4 days prior to admission; it started out as a dry cough but then it progressed to a progressive cough, with thick yellow/brown sputum being produced.  She also started having shortness of air especially with activity and wheezing.  She did not have any fevers, nausea, vomiting, nor diarrhea.  However, she did  have extreme fatigue.  She has leg and abdominal swelling at baseline due to her cirrhosis and she does not think that her legs are any more swollen but she does think that her abdomen is more swollen.  The patient denies missing any of her medications.  In the emergency department, imaging showed possible fluid overload as well as bibasilar infiltrates.  She had normal oxygen saturations on her home 2 L of nasal cannula oxygen.  Patient was given Rocephin, 80 mg of Lasix, a DuoNeb breathing treatment, and 125 mg of Solu-Medrol.  The patient is being admitted to the medical surgical floor for further evaluation and treatment.  ---------------------------------------------------------------------------------------------------------------------   Review of Systems   Constitutional: Positive for fatigue. Negative for chills, diaphoresis and fever.   HENT: Positive for rhinorrhea. Negative for congestion.    Eyes: Negative for discharge and redness.   Respiratory: Positive for cough (Dry at first and now has production of brown/green sputum.), shortness of breath and wheezing. Negative for choking.    Cardiovascular: Positive for leg swelling. Negative for chest pain.   Gastrointestinal: Positive for abdominal distention and constipation. Negative for diarrhea, nausea and vomiting.   Genitourinary: Negative for dysuria and hematuria.   Musculoskeletal: Negative for arthralgias and myalgias.   Skin: Negative for pallor, rash and wound.   Neurological: Positive for light-headedness. Negative for seizures, syncope, facial asymmetry, speech difficulty, numbness and headaches.   Psychiatric/Behavioral: Negative for agitation, behavioral problems and confusion.     ---------------------------------------------------------------------------------------------------------------------   Past Medical History:   Diagnosis Date   • Abscess of periorbital region, right 10/30/2021   • Arthritis    • Chronic back pain    • Closed  fracture of right hip 2021   • Coronary artery disease, 3 vessel CABG    • Decompensated hepatic cirrhosis, grade 1 esophageal varices in the lower 1/3    • Depression    • Essential hypertension    • H pylori ulcer    • History of transfusion    • Hyperlipidemia    • Hypothyroidism    • Iron deficiency anemia 2023   • Recurrent right pleural effusion 2023   • Sleep apnea    • Type 2 diabetes mellitus, without long-term current use of insulin 10/30/2021     Past Surgical History:   Procedure Laterality Date   • CARPAL TUNNEL RELEASE     •  SECTION     • CHOLECYSTECTOMY     • COLONOSCOPY     • CORONARY ANGIOPLASTY WITH STENT PLACEMENT     • ENDOSCOPY N/A 2022    Procedure: ESOPHAGOGASTRODUODENOSCOPY WITH BIOPSY;  Surgeon: Lizzeth Harmon MD;  Location: [facility];  Service: Gastroenterology;  Laterality: N/A;   • HIP TROCHANTERIC NAILING WITH INTRAMEDULLARY HIP SCREW Right 2021    Procedure: HIP TROCHANTERIC NAILING LONG WITH INTRAMEDULLARY HIP SCREW;  Surgeon: Oracio Ponce DO;  Location: [facility];  Service: Orthopedics;  Laterality: Right;   • REPLACEMENT TOTAL KNEE Right    • TUBAL ABDOMINAL LIGATION     • UPPER GASTROINTESTINAL ENDOSCOPY       Family History   Problem Relation Age of Onset   • COPD Mother    • Stroke Mother    • Cancer Brother    • Diabetes Brother    • Hypertension Brother    • Heart disease Brother    • Breast cancer Neg Hx      Social History     Socioeconomic History   • Marital status:    Tobacco Use   • Smoking status: Never   • Smokeless tobacco: Never   Vaping Use   • Vaping Use: Never used   Substance and Sexual Activity   • Alcohol use: Yes     Comment: Once a year    • Drug use: No   • Sexual activity: Defer     ---------------------------------------------------------------------------------------------------------------------   Allergies:  Nuts, Contrast dye (echo or unknown ct/mr), Codeine, and  Latex  ---------------------------------------------------------------------------------------------------------------------   Medications below are reported home medications pulling from within the system; at this time, these medications have not been reconciled unless otherwise specified and are in the verification process for further verification as current home medications.      Prior to Admission Medications     Prescriptions Last Dose Informant Patient Reported? Taking?    aspirin 81 MG EC tablet 4/6/2023 Pharmacy, Self Yes Yes    Take 1 tablet by mouth Daily.    atorvastatin (LIPITOR) 20 MG tablet 4/5/2023 Pharmacy, Self Yes Yes    Take 1 tablet by mouth Every Night.    cholecalciferol (VITAMIN D3) 1.25 MG (81458 UT) capsule 3/31/2023 Pharmacy, Self Yes Yes    Take 1 capsule by mouth Every 7 (Seven) Days.    furosemide (LASIX) 40 MG tablet 4/6/2023 Pharmacy, Self Yes Yes    Take 1 tablet by mouth Daily.    gabapentin (NEURONTIN) 400 MG capsule 4/6/2023 Pharmacy, Self Yes Yes    Take 1 capsule by mouth 2 (Two) Times a Day As Needed (nerve pain).    HYDROcodone-acetaminophen (NORCO) 5-325 MG per tablet 4/6/2023 Pharmacy, Self Yes Yes    Take 1 tablet by mouth 2 (Two) Times a Day As Needed for Moderate Pain.    levothyroxine (SYNTHROID, LEVOTHROID) 75 MCG tablet 4/6/2023 Pharmacy, Self Yes Yes    Take 1 tablet by mouth Daily.    loratadine (CLARITIN) 10 MG tablet 4/6/2023 Pharmacy, Self Yes Yes    Take 1 tablet by mouth Every Other Day.    loratadine-pseudoephedrine (CLARITIN-D 24-hour)  MG per 24 hr tablet 4/5/2023 Pharmacy, Self Yes Yes    Take 1 tablet by mouth Every Other Day.    metoprolol tartrate (LOPRESSOR) 25 MG tablet 4/6/2023 Pharmacy, Self Yes Yes    Take 1 tablet by mouth 2 (Two) Times a Day.    omeprazole (priLOSEC) 40 MG capsule 4/6/2023 Pharmacy, Self No Yes    Take 1 capsule by mouth Daily.    potassium chloride (K-DUR,KLOR-CON) 10 MEQ CR tablet 4/6/2023 Pharmacy, Self Yes Yes    Take 1  tablet by mouth 2 (Two) Times a Day.    QUEtiapine (SEROquel) 50 MG tablet 4/5/2023 Pharmacy, Self Yes Yes    Take 1 tablet by mouth Every Night.    sertraline (ZOLOFT) 50 MG tablet 4/6/2023 Pharmacy, Self Yes Yes    Take 1 tablet by mouth Daily.        Objective     Vital Signs:  Temp:  [98 °F (36.7 °C)-98.1 °F (36.7 °C)] 98.1 °F (36.7 °C)  Heart Rate:  [79-85] 81  Resp:  [20-21] 20  BP: ()/(49-67) 108/54    Mean Arterial Pressure (Non-Invasive) for the past 24 hrs (Last 3 readings):   Noninvasive MAP (mmHg)   04/06/23 1909 74   04/06/23 1747 65   04/06/23 1630 84     SpO2:  [94 %-96 %] 96 %  on  Flow (L/min):  [2-3] 2;   Device (Oxygen Therapy): nasal cannula  Body mass index is 56.8 kg/m².    Wt Readings from Last 3 Encounters:   04/06/23 119 kg (262 lb 8 oz)   04/02/23 125 kg (275 lb)   03/22/23 125 kg (275 lb)      ----------------------------------------------------------------------------------------------------------------------  Physical Exam  Vitals and nursing note reviewed.   Constitutional:       General: She is awake.      Appearance: She is well-developed. She is morbidly obese. She is ill-appearing. She is not toxic-appearing or diaphoretic.      Interventions: Nasal cannula in place.      Comments: 2 L/min of NC oxygen.   HENT:      Head: Normocephalic and atraumatic.      Right Ear: External ear normal.      Left Ear: External ear normal.      Nose: Nose normal.   Eyes:      General: No scleral icterus.        Right eye: No discharge.         Left eye: No discharge.      Pupils: Pupils are equal, round, and reactive to light.   Cardiovascular:      Rate and Rhythm: Normal rate and regular rhythm.      Pulses: Normal pulses.      Heart sounds: No murmur heard.  Pulmonary:      Effort: Accessory muscle usage, prolonged expiration and respiratory distress present. No retractions.      Breath sounds: Decreased air movement present. Wheezing present. No rales.   Abdominal:      General: Bowel  sounds are normal. There is distension.      Palpations: Abdomen is soft. There is shifting dullness.      Tenderness: There is no abdominal tenderness.   Musculoskeletal:         General: No swelling, deformity or signs of injury.   Skin:     Capillary Refill: Capillary refill takes less than 2 seconds.      Coloration: Skin is not jaundiced or pale.      Findings: Bruising present. No rash.   Neurological:      Mental Status: She is alert and oriented to person, place, and time. Mental status is at baseline.      Cranial Nerves: No cranial nerve deficit.   Psychiatric:         Mood and Affect: Mood normal.         Behavior: Behavior normal. Behavior is cooperative.         Thought Content: Thought content normal.         Judgment: Judgment normal.       ---------------------------------------------------------------------------------------------------------------------  EKG:  NS with heart rate 79, QTc 474 ms, no ischemic changes, poor R wave progression.  When compared to EKGs dated 4/2/2023, 3/16/2023, 3/6/2023, all 4 EKGs are similar.  Please note that I have personally looked at the EKG from this admission, the comparison EKGs in the medical records, and the above is my interpretation of this admission's EKG; I read the final cardiology report.    ECG 12 Lead Dyspnea   Final Result   Test Reason : Dyspnea   Blood Pressure :   */*   mmHG   Vent. Rate :  79 BPM     Atrial Rate :  79 BPM      P-R Int : 188 ms          QRS Dur :  74 ms       QT Int : 414 ms       P-R-T Axes :  74 102  81 degrees      QTc Int : 474 ms      Normal sinus rhythm   Rightward axis   Low voltage QRS   Septal infarct (cited on or before 16-MAR-2023)   Abnormal ECG   When compared with ECG of 02-APR-2023 15:12,   No significant change was found   Confirmed by Jason Quinones ([id number]) on 4/6/2023 5:00:17 PM      Referred By: LAKISHA           Confirmed By: Jason Quinones        Telemetry:  NS with heart rates in the 80's.  Please note that I  personally looked at the telemetry strips.      Last echocardiogram:  Results for orders placed during the hospital encounter of 07/17/21    Adult Transthoracic Echo Complete W/ Cont if Necessary Per Protocol    Interpretation Summary  · Normal left ventricular cavity size and wall thickness noted.  · Left ventricular ejection fraction appears to be 56 - 60%. Left ventricular systolic function is normal.  · Left ventricular diastolic function is consistent with (grade II w/high LAP) pseudonormalization.  · Normal cardiac chamber dimensions.  · The aortic valve is structurally normal. Doppler interrogation was not done across the aortic valve.  · The mitral valve is structurally normal with no regurgitation or significant stenosis present  · Mild tricuspid valve regurgitation is present.  · Estimated right ventricular systolic pressure from tricuspid regurgitation is mildly elevated (35-45 mmHg). Mild pulmonary hypertension is present.  · The pulmonic valve is not well visualized.  · There is a trivial pericardial effusion.    I have personally read the ECHO final report.  --------------------------------------------------------------------------------------------------------------------  LABS:    CBC and coagulation:  Results from last 7 days   Lab Units 04/06/23  1754 04/06/23  1350 04/06/23  1312 04/02/23  1507   PROCALCITONIN ng/mL  --   --  0.15  --    LACTATE mmol/L 2.3* 2.1*  --   --    SED RATE mm/hr  --   --  85* 59*   CRP mg/dL  --   --  4.41* 3.75*   WBC 10*3/mm3  --   --  8.04 6.92   HEMOGLOBIN g/dL  --   --  10.9* 10.3*   HEMATOCRIT %  --   --  35.0 34.0   MCV fL  --   --  94.3 96.6   MCHC g/dL  --   --  31.1* 30.3*   PLATELETS 10*3/mm3  --   --  174 138*   INR   --   --  1.36*  --      Acid/base balance:  Results from last 7 days   Lab Units 04/06/23  1338 04/02/23  1844   PH, ARTERIAL pH units 7.421 7.441   PO2 ART mm Hg 79.0* 83.0   PCO2, ARTERIAL mm Hg 53.8* 52.2*   HCO3 ART mmol/L 35.0* 35.5*      Renal and electrolytes:  Results from last 7 days   Lab Units 04/06/23  1312 04/02/23  1507   SODIUM mmol/L 138 139   POTASSIUM mmol/L 4.6 4.7   CHLORIDE mmol/L 97* 99   CO2 mmol/L 33.6* 31.6*   BUN mg/dL 11 14   CREATININE mg/dL 0.71 0.73   CALCIUM mg/dL 9.3 9.3   GLUCOSE mg/dL 127* 162*     Estimated Creatinine Clearance: 103.4 mL/min (by C-G formula based on SCr of 0.71 mg/dL).    Liver and pancreatic function:  Results from last 7 days   Lab Units 04/06/23  1312 04/02/23  1507   ALBUMIN g/dL 2.6* 2.4*   BILIRUBIN mg/dL 2.0* 1.5*   ALK PHOS U/L 188* 168*   AST (SGOT) U/L 81* 88*   ALT (SGPT) U/L 51* 49*   LIPASE U/L 60  --          Endocrine function:  Lab Results   Component Value Date    HGBA1C <4.20 (L) 03/16/2023     Lab Results   Component Value Date    TSH 1.200 03/17/2023     Cardiac:  Results from last 7 days   Lab Units 04/06/23  1515 04/06/23  1312 04/02/23  1727 04/02/23  1507   HSTROP T ng/L 48* 47* 52* 52*   PROBNP pg/mL  --  830.9  --  634.9       Cultures:  Lab Results   Component Value Date    COLORU Yellow 04/06/2023    CLARITYU Clear 04/06/2023    PHUR 7.0 04/06/2023    GLUCOSEU Negative 04/06/2023    KETONESU Negative 04/06/2023    BLOODU Negative 04/06/2023    NITRITEU Negative 04/06/2023    LEUKOCYTESUR Trace (A) 04/06/2023    BILIRUBINUR Negative 04/06/2023    UROBILINOGEN 1.0 E.U./dL 04/06/2023    RBCUA 0-2 04/06/2023    WBCUA 0-2 04/06/2023    BACTERIA None Seen 04/06/2023     Microbiology Results (last 10 days)     Procedure Component Value - Date/Time    COVID-19 and FLU A/B PCR - Swab, Nasopharynx [[id number]]  (Normal) Collected: 04/06/23 1338    Lab Status: Final result Specimen: Swab from Nasopharynx Updated: 04/06/23 1431     COVID19 Not Detected     Influenza A PCR Not Detected     Influenza B PCR Not Detected    Narrative:      Fact sheet for providers: https://www.fda.gov/media/414513/download    Fact sheet for patients: https://www.fda.gov/media/680875/download    Test  performed by PCR.        I have personally looked at the labs and they are summarized above.  ----------------------------------------------------------------------------------------------------------------------  Detailed radiology reports for the last 24 hours:    Imaging Results (Last 24 Hours)     Procedure Component Value Units Date/Time    CT Chest Without Contrast Diagnostic [[id number]] Collected: 04/06/23 1610     Updated: 04/06/23 1613    Narrative:      EXAM:    CT Chest Without Intravenous Contrast     EXAM DATE:    4/6/2023 3:03 PM     CLINICAL HISTORY:    SOA     TECHNIQUE:    Axial computed tomography images of the chest without intravenous  contrast.  Sagittal and coronal reformatted images were created and  reviewed.  This CT exam was performed using one or more of the following  dose reduction techniques:  automated exposure control, adjustment of  the mA and/or kV according to patient size, and/or use of iterative  reconstruction technique.     COMPARISON:    04/02/2023     FINDINGS:    Lungs:  Bibasilar consolidative airspace disease.  Interstitial  thickening compatible with edema.    Pleural space:  Right pleural effusion is stable, small to moderate in  size.  No pneumothorax.    Heart:  Marked cardiomegaly with moderate coronary artery  calcifications and prior CABG.  No significant pericardial effusion.    Bones/joints:  Unremarkable.  No acute fracture.  No dislocation.    Soft tissues:  See above.    Vasculature:  Unremarkable.  No thoracic aortic aneurysm.    Lymph nodes:  Unremarkable.  No enlarged lymph nodes.    Liver:  Severe liver cirrhosis with upper abdominal ascites.       Impression:      1.  CHF/edema.  2.  Bibasilar consolidative airspace disease.  3.  Stable moderate right pleural effusion, nonloculated.  4.  Stable liver cirrhosis with portal hypertension and upper abdominal  ascites.     This report was finalized on 4/6/2023 4:11 PM by Dr. Vikram Strong MD.       CT Abdomen  Pelvis Without Contrast [[id number]] Collected: 04/06/23 1559     Updated: 04/06/23 1608    Narrative:      EXAM:    CT Abdomen and Pelvis Without Intravenous Contrast     EXAM DATE:    4/6/2023 2:52 PM     CLINICAL HISTORY:    pain, distention, hx of liver disease     TECHNIQUE:    Axial computed tomography images of the abdomen and pelvis without  intravenous contrast.  Sagittal and coronal reformatted images were  created and reviewed.  This CT exam was performed using one or more of  the following dose reduction techniques:  automated exposure control,  adjustment of the mA and/or kV according to patient size, and/or use of  iterative reconstruction technique.     COMPARISON:    04/02/2023     FINDINGS:    Lung bases:  Right greater than left bibasilar partially consolidative  airspace disease.    Pleural space:  Stable small right pleural effusion which appears  nonloculated.    Heart:  Cardiomegaly.      ABDOMEN:    Liver:  Stable severe liver cirrhosis with portal hypertension.    Gallbladder and bile ducts:  Cholecystectomy.  No ductal dilation.    Pancreas:  Unremarkable.  No ductal dilation.    Spleen:  Unremarkable.  No splenomegaly.    Adrenals:  Unremarkable.  No mass.    Kidneys and ureters:  Unremarkable.  No obstructing stones.  No  hydronephrosis.    Stomach and bowel:  Marked constipation is noted. No bowel  obstruction.  No mucosal thickening.      PELVIS:    Appendix:  No findings to suggest acute appendicitis.    Bladder:  Unremarkable.  No stones.    Reproductive:  Unremarkable as visualized.      ABDOMEN and PELVIS:    Intraperitoneal space:  No pneumoperitoneum identified.   Small-moderate volume ascites is essentially stable.  No loculated fluid  collections.    Bones/joints:  Stable appearance of bony structures.  Stable  degenerative changes lumbar spine.  Stable appearance of the right hip  with fixation hardware.  No acute fracture.  No dislocation.    Soft tissues:  Unremarkable.     Vasculature:  Prominent portosystemic collateral vessels again noted.   No abdominal aortic aneurysm.    Lymph nodes:  Unremarkable.  No enlarged lymph nodes.       Impression:      1.  Bibasilar partially consolidative airspace disease which may  represent atelectasis and pneumonia.  2.  Stable right pleural effusion and stable small to moderate volume  ascites.  3.  Stable severe liver cirrhosis with portal hypertension changes.  4.  Moderate constipation. No bowel obstruction.  5.  Other incidental/nonacute findings above.     This report was finalized on 4/6/2023 4:06 PM by Dr. Vikram Strong MD.       XR Chest 1 View [[id number]] Collected: 04/06/23 1543     Updated: 04/06/23 1545    Narrative:      EXAM:    XR Chest, 1 View     EXAM DATE:    4/6/2023 1:49 PM     CLINICAL HISTORY:    SOA     TECHNIQUE:    Frontal view of the chest.     COMPARISON:    04/02/2023     FINDINGS:    Lungs:  Persistent changes of CHF noted with interstitial edema and  pulmonary vascular congestion.    Pleural space:  Trace effusions.  No pneumothorax.    Heart:  Cardiomegaly and CABG.    Mediastinum:  Unremarkable.    Bones/joints:  Unremarkable.       Impression:        Essentially stable changes of CHF/edema.     This report was finalized on 4/6/2023 3:43 PM by Dr. Vikram Strong MD.           Final impressions for the last 30 days of radiology reports:    CT Abdomen Pelvis Without Contrast  Result Date: 4/2/2023  1.  Cirrhosis with or sequela of portal hypertension including varices, moderate volume ascites, mesenteric edema not significantly changed compared to recent CT abdomen and pelvis 3/16/2023. Degree of body wall edema is mildly decreased compared to prior study. 2.  Right femoral screw fixation with nondisplaced periprosthetic fracture as described on recent CT right hip. Signer Name: Anil Orosco MD  Signed: 4/2/2023 5:31 PM  Workstation Name: [id number]  [facility]    CT Abdomen Pelvis Without  Contrast  Result Date: 3/16/2023  1.  No significant interval change. Severe liver cirrhosis with small-moderate volume ascites and diffuse anasarca. 2.  No acute changes identified. 3.  Diverticulosis without diverticulitis. 4.  Left basilar airspace disease. 5.  Mild CHF with trace effusions. 6.  Other incidental and nonacute findings above.  This report was finalized on 3/16/2023 4:13 PM by Dr. Vikram Strong MD.      CT Chest Without Contrast Diagnostic  Result Date: 4/2/2023  Cardiomegaly with moderate right and trace left pleural effusions and pulmonary interstitial edema suggestive of congestive heart failure. Signer Name: Anil Orosco MD  Signed: 4/2/2023 5:25 PM  Workstation Name: [email or web address] [id number]  [facility]    XR Chest 1 View  Result Date: 4/2/2023  Cardiomegaly with likely small bilateral pleural effusions and pulmonary edema compatible with congestive heart failure. Signer Name: Anil Orosco MD  Signed: 4/2/2023 4:43 PM  Workstation Name: [email or web address] [id number]  [facility]    XR Chest 1 View  Result Date: 3/16/2023    Stable changes of CHF with small left effusion.  This report was finalized on 3/16/2023 3:43 PM by Dr. Vikram Strong MD.      US Paracentesis  Result Date: 3/17/2023  Ultrasound guided paracentesis; 4850 mL fluid was obtained.  This report was finalized on 3/17/2023 9:22 AM by Dr. Vikram Strong MD.      CT Lower Extremity Right Without Contrast  Result Date: 3/22/2023  Acute slightly displaced and comminuted fracture involving the lesser trochanter. There is no fracture through the femoral neck or greater trochanter. Previous internal fixation of right hip fracture Signer Name: Deep Orellana MD  Signed: 3/22/2023 7:41 PM  Workstation [facility]: [facility]  [facility]    XR Hip With or Without Pelvis 2 - 3 View Right  Result Date: 3/22/2023  1. Remote healed fracture deformity of the intertrochanteric portion of the right hip. Interval gamma  nail fracture repair. No definite acute fracture. Signer Name: Codey Roque MD  Signed: 3/22/2023 6:45 PM  Workstation Name: [email or web address]  [facility]      I have personally looked at the radiology images and I have read the available final reports.    Assessment & Plan       • Severe sepsis that was present on admission (lactic acid 2.1-2.3, CRP 4.41) due to bibasilar pneumonia and an acute COPD exacerbation  • History of decompensated cirrhosis with grade 1 esophageal varices in the lower one third, MELD score 12.49  • History of chronic hypoxic respiratory failure, uses 2 L/min of NC oxygen  • History of CAD status post CABG in 2011  • History of right lateral malleolus avulsion fracture  • History of right TKA in the past  • Right lower extremity greater than left lower extremity weakness with underlying neuropathy  • History of type 2 diabetes mellitus with peripheral neuropathy  • History of grade 2 diastolic dysfunction, currently with an exacerbation due to the severe sepsis  • History of essential hypertension  • History of hypothyroidism  • History of depression and anxiety  • Debility secondary to recent right hip intertrochanteric femur fracture status post repair in addition to underlying morbid obesity    Admitted to the medical surgical floor with telemetry.  We will start her on empiric Rocephin and doxycycline.  I have ordered a sputum culture.  We will also start Solu-Medrol and scheduled DuoNeb breathing treatments.  I have also ordered for an expanded respiratory panel and Streptococcal pneumoniae testing.  We will monitor her oxygen saturations closely and wean any extra oxygen requirement for saturations of 92% or above.  The Rocephin will suffice for her spontaneous bacterial peritonitis prophylaxis.  The patient is already on Lasix at home for her ascites; I will add 100 mg of spironolactone and continue to monitor her electrolytes, creatinine, blood pressures, and  fluid status closely.  Please note that the patient is not in acute nor chronic kidney failure at this time.  We will repeat her blood work morning.    VTE Prophylaxis:   Mechanical Order History:      Ordered        04/06/23 1754  Place Sequential Compression Device  Once            04/06/23 1754  Maintain Sequential Compression Device  Continuous                    Pharmalogical Order History:     None        The patient is high risk due to the following diagnoses/reasons: Severe sepsis    Jakob Koch MD  [facility] Hospitalist  04/06/23  20:37 EDT    '

## 2023-04-08 NOTE — PLAN OF CARE
Goal Outcome Evaluation:  Plan of Care Reviewed With: patient           Outcome Evaluation: Pt is resting in bed with eyes closed. Chest is rising and falling evenly. No s/s of acute distress noted. Pt had one small episode of bloody sputum. I was told in report Dr. Koch was aware. No complaints verbalized at this time. Pt has rested well this shift.

## 2023-04-08 NOTE — PLAN OF CARE
Goal Outcome Evaluation:  Patient is resting in bed watching television. Patient A&Ox4. Patient is currently on 2L nasal cannula. Patient has tolerated all interventions. No complaints or concerns at this time. No signs of acute distress noted. Will continue to follow plan of care.

## 2023-04-08 NOTE — NURSING NOTE
Report called to Alvarado QUINTANILLA in CCU    1920 Patients son made aware that patient is being transferred to CCU 3.

## 2023-04-08 NOTE — PROGRESS NOTES
[facility] HOSPITALIST PROGRESS NOTE     Patient Identification:  Name:  Aidee Trinidad  Age:  57 y.o.  Sex:  female  :  1965  MRN:  [id number]  Visit Number:  [id number]  ROOM: [address] [place] [address]     Primary Care Provider:  Azalea Burnett APRN     Date of Admission: 2023    Length of stay in inpatient status:  2    Subjective     Chief Compliant:    Chief Complaint   Patient presents with   • Shortness of Breath     History of Presenting Illness: Today, patient states that she does not feel better; she is still having some severe shortness of air, though her oxygen requirement has not went up.  She denies chest pain.  She is coughing and now is producing bloody sputum.  She denies chest pain, emesis, diarrhea.  She does have nausea.  She also still feels edematous all over.  After I saw the patient, she had worsening shortness of air and now is on 6 L/min of nasal cannula oxygen.      Objective     Current Hospital Meds:  aspirin, 81 mg, Oral, Daily  atorvastatin, 20 mg, Oral, Nightly  budesonide-formoterol, 2 puff, Inhalation, BID - RT  bumetanide, 2 mg, Intravenous, Once  cefTRIAXone, 1 g, Intravenous, Q24H  cetirizine, 10 mg, Oral, Daily  cholecalciferol, 50,000 Units, Oral, Q7 Days  doxycycline, 100 mg, Intravenous, Q12H  [START ON 2023] furosemide, 80 mg, Oral, Daily  guaiFENesin, 600 mg, Oral, Q12H  ipratropium-albuterol, 3 mL, Nebulization, Q6H - RT  levothyroxine, 75 mcg, Oral, Daily  methylPREDNISolone sodium succinate, 62.5 mg, Intravenous, Daily  metoprolol tartrate, 12.5 mg, Oral, Q12H  pantoprazole, 40 mg, Oral, Q AM  QUEtiapine, 50 mg, Oral, Nightly  sertraline, 50 mg, Oral, Daily  sodium chloride, 3 mL, Intravenous, Q12H  [START ON 2023] spironolactone, 200 mg, Oral, Daily       Current Antimicrobial Therapy:  Anti-Infectives (From admission, onward)    Ordered     Dose/Rate Route Frequency Start Stop    23  cefTRIAXone (ROCEPHIN) 1 g in sodium  chloride 0.9 % 100 mL IVPB-VTB        Ordering Provider: Jakob Koch MD    1 g  200 mL/hr over 30 Minutes Intravenous Every 24 Hours 04/07/23 1800 04/12/23 1759    04/06/23 2038  doxycycline (VIBRAMYCIN) 100 mg in sodium chloride 0.9 % 100 mL IVPB-VTB        Ordering Provider: Jakob Koch MD    100 mg  over 60 Minutes Intravenous Every 12 Hours 04/06/23 2200 04/11/23 2159 04/06/23 1611  cefTRIAXone (ROCEPHIN) 2 g in sodium chloride 0.9 % 100 mL IVPB-VTB        Ordering Provider: Polina Oneil APRN    2 g  200 mL/hr over 30 Minutes Intravenous Once 04/06/23 1613 04/06/23 1836        Current Diuretic Therapy:  Diuretics (From admission, onward)    Ordered     Dose/Rate Route Frequency Start Stop    04/08/23 1810  spironolactone (ALDACTONE) tablet 200 mg        Ordering Provider: Jakob Koch MD    200 mg Oral Daily 04/09/23 0900      04/08/23 1811  furosemide (LASIX) tablet 80 mg        Ordering Provider: Jakob Koch MD    80 mg Oral Daily 04/09/23 0900      04/08/23 1824  bumetanide (BUMEX) injection 2 mg        Ordering Provider: Jakob Koch MD    2 mg Intravenous Once 04/08/23 1915      04/06/23 1607  furosemide (LASIX) injection 80 mg        Ordering Provider: Polina Oneil APRN    80 mg Intravenous Once 04/06/23 1609 04/06/23 1629        ----------------------------------------------------------------------------------------------------------------------  Vital Signs:  Temp:  [97.3 °F (36.3 °C)-97.6 °F (36.4 °C)] 97.5 °F (36.4 °C)  Heart Rate:  [71-97] 78  Resp:  [18-20] 18  BP: (109-161)/(64-83) 136/77  SpO2:  [93 %-98 %] 97 %  on  Flow (L/min):  [2] 2;   Device (Oxygen Therapy): humidified;nasal cannula  Body mass index is 55.53 kg/m².    Wt Readings from Last 3 Encounters:   04/08/23 116 kg (256 lb 11.2 oz)   04/02/23 125 kg (275 lb)   03/22/23 125 kg (275 lb)     Intake & Output (last 3 days)       04/05 0701  04/06 0700 04/06 0701 04/07 0700 04/07  0701  04/08 0700 04/08 0701  04/09 0700    P.O.   510 600    Total Intake(mL/kg)   510 (4.4) 600 (5.2)    Urine (mL/kg/hr)  700 550 (0.2) 900 (0.7)    Stool    0    Total Output  700 550 900    Net  -700 -40 -300            Stool Unmeasured Occurrence    1 x        Diet: Cardiac Diets, Diabetic Diets; Healthy Heart (2-3 Na+); Consistent Carbohydrate; Texture: Regular Texture (IDDSI 7); Fluid Consistency: Thin (IDDSI 0)  ----------------------------------------------------------------------------------------------------------------------  Physical Exam   Constitutional:       General: She is awake.      Appearance: She is well-developed. She is morbidly obese. She is ill-appearing. She is not toxic-appearing or diaphoretic.      Interventions: Nasal cannula in place.      Comments: 2 L/min of NC oxygen.   Cardiovascular:      Rate and Rhythm: Normal rate and regular rhythm.      Pulses: Normal pulses.      Heart sounds: No murmur heard.  Pulmonary:      Effort: Accessory muscle usage, prolonged expiration and respiratory distress present. Today, she has severe retractions.      Breath sounds: Decreased air movement present. Wheezing present. No rales.   Abdominal:      General: Bowel sounds are normal. There is distension.      Palpations: Abdomen is soft. There is shifting dullness.      Tenderness: There is no abdominal tenderness.   Musculoskeletal:         General: No swelling, deformity or signs of injury. 1+ pitting edema of the bilateral legs.  Skin:     Capillary Refill: Capillary refill takes less than 2 seconds.      Coloration: Skin is not jaundiced or pale.      Findings: Bruising present. No rash.   Neurological:      Mental Status: She is alert and oriented to person, place, and time. Mental status is at baseline.      Cranial Nerves: No cranial nerve deficit.   Psychiatric:         Mood and Affect: Mood normal.         Behavior: Behavior normal. Behavior is cooperative.         Thought Content: Thought  content normal.         Judgment: Judgment normal.   ----------------------------------------------------------------------------------------------------------------------  Tele: Normal sinus rhythm heart rates in the 70s.  Personally reviewed the telemetry strips.  ----------------------------------------------------------------------------------------------------------------------  LABS:    CBC and coagulation:  Results from last 7 days   Lab Units 04/08/23  1742 04/07/23  0502 04/07/23  0241 04/06/23  2059 04/06/23  1754 04/06/23  1350 04/06/23  1312 04/02/23  1507   PROCALCITONIN ng/mL 0.16  --   --   --   --   --  0.15  --    LACTATE mmol/L 1.8  --   --  2.0 2.3* 2.1*  --   --    SED RATE mm/hr  --   --   --   --   --   --  85* 59*   CRP mg/dL  --   --   --   --   --   --  4.41* 3.75*   WBC 10*3/mm3 12.58* 5.65  --   --   --   --  8.04 6.92   HEMOGLOBIN g/dL 10.8* 9.5*  --   --   --   --  10.9* 10.3*   HEMATOCRIT % 34.9 29.5*  --   --   --   --  35.0 34.0   MCV fL 96.9 92.8  --   --   --   --  94.3 96.6   MCHC g/dL 30.9* 32.2  --   --   --   --  31.1* 30.3*   PLATELETS 10*3/mm3 180 176  --  143  --   --  174 138*   INR   --   --  1.34*  --   --   --  1.36*  --      Acid/base balance:  Results from last 7 days   Lab Units 04/06/23  1338 04/02/23  1844   PH, ARTERIAL pH units 7.421 7.441   PO2 ART mm Hg 79.0* 83.0   PCO2, ARTERIAL mm Hg 53.8* 52.2*   HCO3 ART mmol/L 35.0* 35.5*       Renal and electrolytes:  Results from last 7 days   Lab Units 04/08/23  1742 04/08/23  0551 04/07/23  0241 04/06/23  1312 04/02/23  1507   SODIUM mmol/L 138  --  134* 138 139   POTASSIUM mmol/L 3.6  --  3.9 4.6 4.7   MAGNESIUM mg/dL 2.2 2.8* 1.9  --   --    CHLORIDE mmol/L 96*  --  95* 97* 99   CO2 mmol/L 31.7*  --  27.6 33.6* 31.6*   BUN mg/dL 16  --  12 11 14   CREATININE mg/dL 0.60  --  0.65 0.71 0.73   CALCIUM mg/dL 9.2  --  8.8 9.3 9.3   PHOSPHORUS mg/dL 3.1  --  2.9  --   --    GLUCOSE mg/dL 148*  --  138* 127* 162*      Estimated Creatinine Clearance: 120.4 mL/min (by C-G formula based on SCr of 0.6 mg/dL).    Liver and pancreatic function:  Results from last 7 days   Lab Units 04/08/23  1742 04/07/23  0502 04/07/23  0241 04/06/23  1312 04/02/23  1507   ALBUMIN g/dL 3.0*  --  2.5* 2.6* 2.4*   BILIRUBIN mg/dL 1.5*  --  1.5* 2.0* 1.5*   ALK PHOS U/L 161*  --  150* 188* 168*   AST (SGOT) U/L 105*  --  67* 81* 88*   ALT (SGPT) U/L 56*  --  41* 51* 49*   AMMONIA umol/L  --  69*  --   --   --    LIPASE U/L  --   --   --  60  --      Endocrine function:  Lab Results   Component Value Date    HGBA1C <4.20 (L) 03/16/2023     Glucose levels from the CMP:  Results from last 7 days   Lab Units 04/08/23  1742 04/07/23  0241 04/06/23  1312 04/02/23  1507   GLUCOSE mg/dL 148* 138* 127* 162*       Cultures:  Microbiology Results (last 10 days)     Procedure Component Value - Date/Time    Respiratory Culture - Sputum, Cough [[id number]] Collected: 04/07/23 0454    Lab Status: Final result Specimen: Sputum from Cough Updated: 04/07/23 0636     Respiratory Culture Rejected     Gram Stain Rare (1+) WBCs seen      Rare (1+) Epithelial cells seen      Moderate (3+) Gram positive cocci in chains and clusters      Few (2+) Gram negative bacilli    Narrative:      Specimen rejected due to oropharyngeal contamination. Please reorder and recollect specimen if clinically necessary.    S. Pneumo Ag Urine or CSF - Urine, Urine, Clean Catch [[id number]]  (Normal) Collected: 04/07/23 0136    Lab Status: Final result Specimen: Urine, Clean Catch Updated: 04/07/23 1439     Strep Pneumo Ag Negative    Respiratory Panel PCR w/COVID-19(SARS-CoV-2) SARA/MARKOS/VANESSA/PAD/COR/MAD/PEPE In-House, NP Swab in UTM/VTM, 3-4 HR TAT - Swab, Nasopharynx [[id number]]  (Normal) Collected: 04/07/23 0134    Lab Status: Final result Specimen: Swab from Nasopharynx Updated: 04/07/23 0233     ADENOVIRUS, PCR Not Detected     Coronavirus 229E Not Detected     Coronavirus HKU1 Not Detected      Coronavirus NL63 Not Detected     Coronavirus OC43 Not Detected     COVID19 Not Detected     Human Metapneumovirus Not Detected     Human Rhinovirus/Enterovirus Not Detected     Influenza A PCR Not Detected     Influenza B PCR Not Detected     Parainfluenza Virus 1 Not Detected     Parainfluenza Virus 2 Not Detected     Parainfluenza Virus 3 Not Detected     Parainfluenza Virus 4 Not Detected     RSV, PCR Not Detected     Bordetella pertussis pcr Not Detected     Bordetella parapertussis PCR Not Detected     Chlamydophila pneumoniae PCR Not Detected     Mycoplasma pneumo by PCR Not Detected    Narrative:      In the setting of a positive respiratory panel with a viral infection PLUS a negative procalcitonin without other underlying concern for bacterial infection, consider observing off antibiotics or discontinuation of antibiotics and continue supportive care. If the respiratory panel is positive for atypical bacterial infection (Bordetella pertussis, Chlamydophila pneumoniae, or Mycoplasma pneumoniae), consider antibiotic de-escalation to target atypical bacterial infection.    Blood Culture - Blood, Hand, Right [[id number]]  (Normal) Collected: 04/06/23 1410    Lab Status: Preliminary result Specimen: Blood from Hand, Right Updated: 04/08/23 1415     Blood Culture No growth at 2 days    Blood Culture - Blood, Wrist, Left [[id number]]  (Normal) Collected: 04/06/23 1350    Lab Status: Preliminary result Specimen: Blood from Wrist, Left Updated: 04/08/23 1415     Blood Culture No growth at 2 days    COVID-19 and FLU A/B PCR - Swab, Nasopharynx [[id number]]  (Normal) Collected: 04/06/23 1338    Lab Status: Final result Specimen: Swab from Nasopharynx Updated: 04/06/23 1431     COVID19 Not Detected     Influenza A PCR Not Detected     Influenza B PCR Not Detected    Narrative:      Fact sheet for providers: https://www.fda.gov/media/886762/download    Fact sheet for patients:  https://www.fda.gov/media/595867/download    Test performed by PCR.        I have personally looked at the labs and they are summarized above.    ----------------------------------------------------------------------------------------------------------------------    Imaging Results (Last 24 Hours)     Procedure Component Value Units Date/Time    XR Chest 1 View [[id number]] Collected: 04/08/23 1859     Updated: 04/08/23 1901    Narrative:      CHEST X-RAY, 4/8/2023      HISTORY:    57-year-old female hospital inpatient with severe sepsis, pneumonia, worsening hypoxia.      TECHNIQUE:  AP portable chest x-ray.    COMPARISON:  *  Chest x-ray, 4/6/2023.    FINDINGS:  Dense diffuse multifocal, somewhat nodular infiltrates throughout both lungs has increased significantly in severity since 4/6/2023. Bilateral pleural effusions. Low lung volumes. Stable cardiomegaly. Median sternotomy.      Impression:      Radiographic worsening of diffuse multifocal pulmonary infiltrates.    Signer Name: Pro Fleming MD   Signed: 4/8/2023 6:59 PM   Workstation Name: [id number]    [facility]      I have personally looked at the radiology images and read the available final radiology report.    Assessment & Plan      • Severe sepsis that was present on admission (lactic acid 2.1-2.3, CRP 4.41) due to bibasilar pneumonia and an acute COPD exacerbation  • History of decompensated cirrhosis with grade 1 esophageal varices in the lower one third, MELD score 12.49 on admission, with fluid overload on admission  • History of chronic hypoxic respiratory failure, uses 2 L/min of NC oxygen  • History of CAD status post CABG in 2011  • History of right lateral malleolus avulsion fracture  • History of right TKA in the past  • Right lower extremity greater than left lower extremity weakness with underlying neuropathy  • History of type 2 diabetes mellitus with peripheral neuropathy  • History of grade 2 diastolic  dysfunction, currently with an exacerbation due to the severe sepsis  • History of essential hypertension  • History of hypothyroidism  • History of depression and anxiety  • Debility secondary to recent right hip intertrochanteric femur fracture status post repair in addition to underlying morbid obesity    The patient still is on her home oxygen.  I suspect some of this is anxiety from feeling like she cannot breathe.  I also suspect that she has obesity hypoventilation syndrome and possibly needs BiPAP with sleeping.  Therefore, I will consult pulmonology for help with the acute COPD exacerbation, fluid overload, and the need for BiPAP.  Since she is still fluid overloaded, I will change the spironolactone to 200 mg for tomorrow and Lasix to 40 mg, at least a couple of days and monitor the Cr and electrolytes closely.  For now, since the oxygen saturations are dropping, will admit to the critical care unit.  I have contacted Dr. Duval and he has suggested BiPAP at 22/10.  I will also give the patient 50 g of albumin and nitroglycerin patch to try to help with the fluid overload.  If this does not improve her breathing, then we may have to intubate her as I suspect that she is went into ARDS/flash pulmonary edema.  I will obtain an ABG in the morning, labs, and a chest x-ray.  I also discussed the patient's pleural effusion on the right side that is chronic; at this time, it is the opinion of multiple hospitalist that removing this pleural effusion we do not acutely help the patient at this time.  Will give a dose of 50 grams of albumin and try a one inch nitropaste to help with the edema; I have not started a nitro gtt yet as I want to see what the blood pressures do with the albumin and nitropaste.    VTE Prophylaxis:   Mechanical Order History:      Ordered        04/06/23 1754  Place Sequential Compression Device  Once            04/06/23 1754  Maintain Sequential Compression Device  Continuous                     Pharmalogical Order History:     None      The patient is high risk due to the following diagnoses/reasons: Severe sepsis    Total critical care time is 30 minutes.    Jakob Koch MD  [facility] Hospitalist  04/08/23  18:45 EDT

## 2023-04-09 NOTE — PROGRESS NOTES
[facility] Physician - Brief Progress Note  PERMANENT  04/08/2023 21:56    [facility] - [facility] [place] - CCU - 10 - [place], [place] ([facility])    SANDY SANCHEZ.    Date of Service 04/08/2023 21:56    HPI/Events of Note [facility] Provider Assessment Note  57 yrs old  female with hx of COPD on 3L/min O2 at home, presented with increased SOB and hypoxia requiring 6L/min O2.   CT chest shows bibasilar consolidation and pulmonary edema. Lactic acid and procalcitonin is normal. Respiratory viral PCR is   negative  Pt has hx of Grade 2 Diastolic CHF but NT proBNP is normal.  Mild hemoptysis - likely from infection  Troponin is mildly elevated and not rising. EKG shows an old septal infarct - age unknown, and no acute changes. NSR. QTc 480 msecs  Pt is morbidly obese and likely has significant restrictive lung disease.  It is unclear if pt has home CPAP.  She has liver cirrhosis of unclear etiology.      Assessment and Plan:  CAP with ac on chr hypoxic resp failure- likely viral. On emperic Doxy and Rocephin, BIPAP  COPD- Nebs, steroids  Diastolic CHF- Albumin, Diuresis, Echo  Elevated trop- Trend, cardiology consult, Cardiac Echo      __Y___   Video Assessment performed  __Y___   Most recent labs reviewed  __Y___   Vital Signs reviewed  __Y___   Best Practices addressed:                 VTE prophylaxis:Y                 SUP (when indicated):NA                 Current Glucose:                      Please notify bedside physician when present or [facility] if glc > 180 X 2                 Sepsis guidelines:Y                 Lung protective strategy                 Targeted Temperature Management:    __Y___     Spoke with bedside RN  _____     Orders written      [facility] any needs if bedside physician is not present.      Interventions Major-Hypoxemia - evaluation and management  Intermediate-Infection - evaluation and management, Respiratory distress - evaluation and  management        Electronically Signed by: Mahad Ayala) on 04/08/2023 23:04    Annotated By: Mahad Ayala)    Date: 04/08/23 23:15  Patient is critically ill, requiring monitoring of Sao2, management of BIPAP and monitoring for any signs of coronary ischemia.  Total Critical Care time Spent 25 mins.

## 2023-04-09 NOTE — PROCEDURES
Insert Arterial Line    Date/Time: 4/9/2023 1:48 PM  Performed by: Kaveh Reyes DO  Authorized by: Kaveh Reyes DO     Universal Protocol:     Verbal consent obtained?: Yes      Written consent obtained?: Yes      Risks and benefits: Risks, benefits and alternatives were discussed      Consent given by:  Parent    Procedure consent matches procedure scheduled: Yes      Relevant documents present and verified: Yes      Test results available and properly labeled: Yes      Site marked: Yes      Imaging studies available: Yes      Required items: Required blood products, implants, devices and special equipment available      Patient identity confirmed:  Arm band, provided demographic data and hospital-assigned identification number    Time out: Immediately prior to the procedure a time out was called    A time out verifies correct patient, procedure, equipment, support staff and site/side marked as required:   Preparation:     Preparation: Patient was prepped and draped in usual sterile fashion    Indications:     Indications: multiple ABGs, respiratory failure and hemodynamic monitoring    Location:     Location:  Right radial  Anesthesia:     Anesthesia:  See MAR for details    Patient sedated: Yes      Sedation:  See MAR for details    Analgesia:  See MAR for details    Vital signs: Vital signs monitored during sedation    Procedure Details:     Richardson's test normal?: Yes      Needle gauge:  20    Seldinger technique: Seldinger technique used      Number of attempts:  1  Post-procedure:     Post-procedure:  Line sutured and dressing applied    Post-procedure CMS:  Normal     patient tolerated the procedure well with no immediate complications

## 2023-04-09 NOTE — PROCEDURES
"Insert Central Line At Bedside    Date/Time: 4/9/2023 1:45 PM  Performed by: Kaveh Reyes DO  Authorized by: Kaveh Reyes DO   Consent: Verbal consent obtained.  Risks and benefits: risks, benefits and alternatives were discussed  Consent given by: guardian  Procedure consent: procedure consent matches procedure scheduled  Relevant documents: relevant documents present and verified  Test results: test results available and properly labeled  Site marked: the operative site was marked  Imaging studies: imaging studies available  Required items: required blood products, implants, devices, and special equipment available  Patient identity confirmed: arm band, provided demographic data and hospital-assigned identification number  Time out: Immediately prior to procedure a \"time out\" was called to verify the correct patient, procedure, equipment, support staff and site/side marked as required.  Indications: vascular access and central pressure monitoring  Anesthesia: see MAR for details    Sedation:  Patient sedated: yes  Sedatives: see MAR for details  Analgesia: see MAR for details  Vitals: Vital signs were monitored during sedation.    Preparation: skin prepped with 2% chlorhexidine  Skin prep agent dried: skin prep agent completely dried prior to procedure  Sterile barriers: all five maximum sterile barriers used - cap, mask, sterile gown, sterile gloves, and large sterile sheet  Hand hygiene: hand hygiene performed prior to central venous catheter insertion  Location details: right internal jugular  Patient position: flat  Catheter size: 7 Fr  Pre-procedure: landmarks identified  Ultrasound guidance: yes  Sterile ultrasound techniques: sterile gel and sterile probe covers were used  Number of attempts: 1  Successful placement: yes  Post-procedure: line sutured and dressing applied  Assessment: blood return through all ports,  free fluid flow,  placement verified by x-ray and no pneumothorax " on x-ray  Patient tolerance: patient tolerated the procedure well with no immediate complications

## 2023-04-09 NOTE — PROCEDURES
"Intubation    Date/Time: 4/9/2023 6:22 AM  Performed by: Gill Sanchez DO  Authorized by: Gill Sanchez DO   Consent: Verbal consent obtained.  Consent given by: patient  Patient understanding: patient states understanding of the procedure being performed  Patient identity confirmed: verbally with patient and provided demographic data  Time out: Immediately prior to procedure a \"time out\" was called to verify the correct patient, procedure, equipment, support staff and site/side marked as required.  Indications: respiratory distress,  respiratory failure and  hypoxemia  Intubation method: video-assisted  Patient status: sedated  Preoxygenation: BVM  Sedatives: propofol  Paralytic: succinylcholine  Laryngoscope size: Beltran 3  Tube size: 7.5 mm  Tube type: cuffed  Number of attempts: 1  Cords visualized: yes  Post-procedure assessment: CO2 detector  Breath sounds: equal  Cuff inflated: yes  ETT to lip: 23 cm  Tube secured with: ETT newman  Patient tolerance: patient tolerated the procedure well with no immediate complications  Comments: Currently awaiting CXR         "

## 2023-04-09 NOTE — CONSULTS
Referring Provider: dr alonso  Reason for Consultation: respiratory failure      Chief complaint -sob       History of present illness: 57-year-old female presented to the ER with shortness of breath.  Patient is a past medical history significant for decompensated cirrhosis due to nonalcoholic steatohepatitis with grade 1 esophageal varices, coronary artery disease s/p 3 vessel CABG in 2011 COPD-end-stage uses 2 L of nasal cannula oxygen at home, essential hypertension, diabetes type 2, hyperlipidemia, hypothyroidism, MANUEL.  Patient was recently admitted with right intertrochanteric femur fracture-around 3/22/2023 after that patient was again admitted from 3/16/2023 through 3/18/2023 with hypoglycemia and toxic encephalopathy.  Most of the history is obtained from patient's chart as on my evaluation patient was already intubated and sedated.    Treatment given in the ER then on the floor and ICU reviewed.  Case was discussed with Dr. Alonso.  Advised to start her on BiPAP and steroids.  Also give diuretics to improve patient's lung compliance.  All the labs medications and the notes and vitals were reviewed by me in great detail.  Bedside rounds were done via telemetry.  Review of Systems  Review of Systems -     Review of Systems -could not be obtained as patient is intubated and sedated      History  Past Medical History:   Diagnosis Date   • Abscess of periorbital region, right 10/30/2021   • Arthritis    • Chronic back pain    • Closed fracture of right hip 07/17/2021   • COPD exacerbation 4/6/2023   • Coronary artery disease, 3 vessel CABG 2011   • Decompensated hepatic cirrhosis, grade 1 esophageal varices in the lower 1/3    • Depression    • Essential hypertension    • H pylori ulcer    • History of transfusion    • Hyperlipidemia    • Hypothyroidism    • Iron deficiency anemia 02/23/2023   • Recurrent right pleural effusion 04/06/2023   • Sleep apnea    • Type 2 diabetes mellitus, without long-term  current use of insulin 10/30/2021   ,   Past Surgical History:   Procedure Laterality Date   • CARPAL TUNNEL RELEASE     •  SECTION     • CHOLECYSTECTOMY     • COLONOSCOPY     • CORONARY ANGIOPLASTY WITH STENT PLACEMENT     • ENDOSCOPY N/A 2022    Procedure: ESOPHAGOGASTRODUODENOSCOPY WITH BIOPSY;  Surgeon: Lizzeth Harmon MD;  Location:  [facility] OR;  Service: Gastroenterology;  Laterality: N/A;   • HIP TROCHANTERIC NAILING WITH INTRAMEDULLARY HIP SCREW Right 2021    Procedure: HIP TROCHANTERIC NAILING LONG WITH INTRAMEDULLARY HIP SCREW;  Surgeon: Oracio Ponce DO;  Location:  [facility] OR;  Service: Orthopedics;  Laterality: Right;   • REPLACEMENT TOTAL KNEE Right    • TUBAL ABDOMINAL LIGATION     • UPPER GASTROINTESTINAL ENDOSCOPY     ,   Family History   Problem Relation Age of Onset   • COPD Mother    • Stroke Mother    • Cancer Brother    • Diabetes Brother    • Hypertension Brother    • Heart disease Brother    • Breast cancer Neg Hx    ,   Social History     Tobacco Use   • Smoking status: Never   • Smokeless tobacco: Never   Vaping Use   • Vaping Use: Never used   Substance Use Topics   • Alcohol use: Yes     Comment: Once a year    • Drug use: No   ,   Medications Prior to Admission   Medication Sig Dispense Refill Last Dose   • aspirin 81 MG EC tablet Take 1 tablet by mouth Daily.   2023   • atorvastatin (LIPITOR) 20 MG tablet Take 1 tablet by mouth Every Night.   2023   • cholecalciferol (VITAMIN D3) 1.25 MG (97914 UT) capsule Take 1 capsule by mouth Every 7 (Seven) Days.   3/31/2023   • furosemide (LASIX) 40 MG tablet Take 1 tablet by mouth Daily.   2023   • gabapentin (NEURONTIN) 400 MG capsule Take 1 capsule by mouth 2 (Two) Times a Day As Needed (nerve pain).   2023   • HYDROcodone-acetaminophen (NORCO) 5-325 MG per tablet Take 1 tablet by mouth 2 (Two) Times a Day As Needed for Moderate Pain.   2023   • levothyroxine (SYNTHROID, LEVOTHROID) 75 MCG tablet  Take 1 tablet by mouth Daily.   4/6/2023   • loratadine (CLARITIN) 10 MG tablet Take 1 tablet by mouth Every Other Day.   4/6/2023   • loratadine-pseudoephedrine (CLARITIN-D 24-hour)  MG per 24 hr tablet Take 1 tablet by mouth Every Other Day.   4/5/2023   • metoprolol tartrate (LOPRESSOR) 25 MG tablet Take 1 tablet by mouth 2 (Two) Times a Day.   4/6/2023   • omeprazole (priLOSEC) 40 MG capsule Take 1 capsule by mouth Daily. 30 capsule 5 4/6/2023   • potassium chloride (K-DUR,KLOR-CON) 10 MEQ CR tablet Take 1 tablet by mouth 2 (Two) Times a Day.   4/6/2023   • QUEtiapine (SEROquel) 50 MG tablet Take 1 tablet by mouth Every Night.   4/5/2023   • sertraline (ZOLOFT) 50 MG tablet Take 1 tablet by mouth Daily.   4/6/2023   , Scheduled Meds:  aspirin, 81 mg, Oral, Daily  atorvastatin, 20 mg, Oral, Nightly  budesonide-formoterol, 2 puff, Inhalation, BID - RT  cefTRIAXone, 1 g, Intravenous, Q24H  cetirizine, 10 mg, Oral, Daily  chlorhexidine, 15 mL, Mouth/Throat, Q12H  doxycycline, 100 mg, Intravenous, Q12H  guaiFENesin, 600 mg, Oral, Q12H  ipratropium-albuterol, 3 mL, Nebulization, 4x Daily - RT  levothyroxine, 75 mcg, Oral, Daily  magnesium sulfate, 4 g, Intravenous, Once  [START ON 4/10/2023] methylPREDNISolone sodium succinate, 40 mg, Intravenous, Daily  pantoprazole, 40 mg, Intravenous, Q24H  QUEtiapine, 50 mg, Oral, Nightly  sertraline, 50 mg, Oral, Daily  sodium chloride, 10 mL, Intravenous, Q12H  sodium chloride, 3 mL, Intravenous, Q12H  spironolactone, 200 mg, Oral, Daily    , Continuous Infusions:  norepinephrine, 0.02-0.3 mcg/kg/min, Last Rate: 0.04 mcg/kg/min (04/09/23 0700)  propofol, 5-50 mcg/kg/min, Last Rate: 45 mcg/kg/min (04/09/23 0635)     and Allergies:  Nuts, Contrast dye (echo or unknown ct/mr), Codeine, and Latex    Objective     Vital Signs   Temp:  [97.5 °F (36.4 °C)-98.6 °F (37 °C)] 98.6 °F (37 °C)  Heart Rate:  [] 95  Resp:  [18-36] 28  BP: ()/(26-78) 107/55  FiO2 (%):  [100 %]  100 %    Physical Exam:             GENERAL APPEARANCE: Intubated and sedated.  Appears to be resting comfortably in bed.     HEAD: normocephalic.    EYES: Atraumatic    THROAT: ET tube in place. Oral cavity and pharynx normal. No inflammation, swelling, exudate, or lesions.     NECK: Neck supple.     CARDIAC: NSR   LUNGS: Clear to auscultation and percussion without rales, rhonchi, wheezing or diminished breath sounds.    ABDOMEN: Obese  EXTREMITIES: No significant deformity or joint abnormality. No edema. Peripheral pulses intact.    NEUROLOGICAL: Unable to assess due to sedation status.     PSYCHIATRIC: Unable to assess due to sedation status                                                              Results Review:    LABS:    Lab Results   Component Value Date    GLUCOSE 145 (H) 04/09/2023    BUN 15 04/09/2023    CREATININE 0.66 04/09/2023    EGFRIFNONA 112 12/08/2021    BCR 22.7 04/09/2023    CO2 31.9 (H) 04/09/2023    CALCIUM 8.9 04/09/2023    PROTENTOTREF 6.9 02/16/2023    ALBUMIN 3.3 (L) 04/09/2023    LABIL2 0.8 02/16/2023    AST 90 (H) 04/09/2023    ALT 46 (H) 04/09/2023    WBC 8.94 04/09/2023    HGB 9.5 (L) 04/09/2023    HCT 30.6 (L) 04/09/2023    MCV 96.8 04/09/2023     (L) 04/09/2023     04/09/2023    K 3.8 04/09/2023    CL 96 (L) 04/09/2023    ANIONGAP 11.1 04/09/2023       Lab Results   Component Value Date    INR 1.48 (H) 04/09/2023    INR 1.34 (H) 04/07/2023    INR 1.36 (H) 04/06/2023    PROTIME 18.3 (H) 04/09/2023    PROTIME 16.9 (H) 04/07/2023    PROTIME 17.1 (H) 04/06/2023       Results from last 7 days   Lab Units 04/09/23  0049 04/07/23  0241 04/06/23  1312   INR  1.48* 1.34* 1.36*   APTT seconds 33.7 30.6 37.6*          I reviewed the patient's new clinical results.  I reviewed the patient's new imaging results and agree with the interpretation.      Assessment & Plan     Neuro- Sedated -drips reviewed.  Will initiate paralytics if patient's FiO2 requirement does not improve and  patient has any patient ventilator dyssynchrony.  Respiratory-in ARDS- latest ABG and chest x-ray reviewed.  Ventilator settings and graphics reviewed discussed with RT and RN.  We will keep the PEEP on the higher side and tidal volume at 6 cc/kg ideal body weight.    We will get ABG in the morning.  Diuretics when can to improve patient's lung compliance and diffusion capacity.  Continue steroids.    Vent settings   Advised to manually prone the patient.  VAP- precautions  Head end - 30 %  Mouth care   DVt prophylaxis    Chest xray-reviewed.    ABG -test reviewed    Cardio-   Continue to monitor HR- rate and rhythm, BP     Nephro- Cr BUN stable  I/O-reviewed.  Avoid nephrotoxic agents    Gi- PPI prophylaxis  TUBE feeds-we will start trophic tube feeds    Heme- CBC  Hb  platelet  WBC    ID-latest microbiology reviewed.  Continue current treatment  CULTURE  And Antibiotics    Endo- Maintain Blood sugar 140 -180  Blood sugars reviewed.    Electrolytes-   Mag phos       DVT prophylaxis-    Bedside rounds were done with RT and patients nurse. All the Lab and clinical findings were discussed with them and plan was also discussed in great detail.    Family member present-none        Echo-  Results for orders placed during the hospital encounter of 04/06/23    Adult Transthoracic Echo Complete W/ Cont if Necessary Per Protocol    Interpretation Summary  •  Left ventricular systolic function is normal. Left ventricular ejection fraction appears to be 66 - 70%.  •  Left ventricular wall thickness is consistent with mild concentric hypertrophy.  •  Left ventricular diastolic function is consistent with (grade II w/high LAP) pseudonormalization.  •  The cardiac chamber dimensions are normal.  •  No significant valvular heart disease is present.  •  Mild pulmonary hypertension is present.  •  There is no evidence of pericardial effusion.          Recurrent right pleural effusion    Severe sepsis    Chronic respiratory failure     COPD exacerbation    Patient is critically ill with acute hypoxic respiratory failure currently in ARDS.  I adjusted patient's vent settings and drips.  Overall prognosis with multiple comorbidities and critical illness with ARDS is poor.  Recommend palliative care consult    Case d/w nurse and team   This service is provided via audio video tele medicine   I am in [facility] [place] and patient is in [facility]     Jesus Duval MD  04/09/23  08:29 EDT

## 2023-04-09 NOTE — PROGRESS NOTES
[facility] HOSPITALIST PROGRESS NOTE     Patient Identification:  Name:  Aidee Trinidad  Age:  57 y.o.  Sex:  female  :  1965  MRN:  [id number]  Visit Number:  [id number]  ROOM: [address]     Primary Care Provider:  Azalea Burnett APRN     Date of Admission: 2023    Length of stay in inpatient status:  3    Subjective     Chief Compliant:    Chief Complaint   Patient presents with   • Shortness of Breath     History of Presenting Illness:  The patient was intubated earlier this am; since she is also sedated, I am unable to obtain a history from her.  No family was at bedside when I evaluated the patient.  At bedside were nurses Isabel and Wayne.  Patient is on propofol for sedation.  She received a dose of paralytic today as she was not pulling good lung volumes.  Nurse Isabel was told that the peak pressure on the vent was set too low and that is why the tidal volumes were low.    Consults:  Dr. Duval with pulmonology    Procedures:  2023:  Oral intubation and mechanical ventilation    Objective     Current Hospital Meds:  aspirin, 81 mg, Oral, Daily  atorvastatin, 20 mg, Oral, Nightly  budesonide-formoterol, 2 puff, Inhalation, BID - RT  cefTRIAXone, 1 g, Intravenous, Q24H  cetirizine, 10 mg, Oral, Daily  chlorhexidine, 15 mL, Mouth/Throat, Q12H  doxycycline, 100 mg, Intravenous, Q12H  guaiFENesin, 600 mg, Oral, Q12H  ipratropium-albuterol, 3 mL, Nebulization, 4x Daily - RT  levothyroxine, 75 mcg, Oral, Daily  magnesium sulfate, 4 g, Intravenous, Once  [START ON 4/10/2023] methylPREDNISolone sodium succinate, 40 mg, Intravenous, Daily  pantoprazole, 40 mg, Intravenous, Q24H  QUEtiapine, 50 mg, Oral, Nightly  sertraline, 50 mg, Oral, Daily  sodium chloride, 10 mL, Intravenous, Q12H  sodium chloride, 3 mL, Intravenous, Q12H  spironolactone, 200 mg, Oral, Daily    norepinephrine, 0.02-0.3 mcg/kg/min, Last Rate: 0.04 mcg/kg/min (23 0700)  propofol, 5-50 mcg/kg/min,  Last Rate: 35 mcg/kg/min (04/09/23 0835)      Current Antimicrobial Therapy:  Anti-Infectives (From admission, onward)    Ordered     Dose/Rate Route Frequency Start Stop    04/06/23 2038  cefTRIAXone (ROCEPHIN) 1 g in sodium chloride 0.9 % 100 mL IVPB-VTB        Ordering Provider: Jakob Koch MD    1 g  200 mL/hr over 30 Minutes Intravenous Every 24 Hours 04/07/23 1800 04/12/23 1759    04/06/23 2038  doxycycline (VIBRAMYCIN) 100 mg in sodium chloride 0.9 % 100 mL IVPB-VTB        Ordering Provider: Jakob Koch MD    100 mg  over 60 Minutes Intravenous Every 12 Hours 04/06/23 2200 04/11/23 2159 04/06/23 1611  cefTRIAXone (ROCEPHIN) 2 g in sodium chloride 0.9 % 100 mL IVPB-VTB        Ordering Provider: Polina Oneil APRN    2 g  200 mL/hr over 30 Minutes Intravenous Once 04/06/23 1613 04/06/23 1836        Current Diuretic Therapy:  Diuretics (From admission, onward)    Ordered     Dose/Rate Route Frequency Start Stop    04/08/23 1810  spironolactone (ALDACTONE) tablet 200 mg        Ordering Provider: Jakob Koch MD    200 mg Oral Daily 04/09/23 0900      04/09/23 0639  bumetanide (BUMEX) injection 2 mg        Ordering Provider: Gill Sanchez DO    2 mg Intravenous Once 04/09/23 0730 04/09/23 0651    04/08/23 1824  bumetanide (BUMEX) injection 2 mg        Ordering Provider: Jakob Koch MD    2 mg Intravenous Once 04/08/23 1915 04/08/23 1856    04/06/23 1607  furosemide (LASIX) injection 80 mg        Ordering Provider: Polina Oneil APRN    80 mg Intravenous Once 04/06/23 1609 04/06/23 1629        ----------------------------------------------------------------------------------------------------------------------  Vital Signs:  Temp:  [97.5 °F (36.4 °C)-98.6 °F (37 °C)] 98.6 °F (37 °C)  Heart Rate:  [] 95  Resp:  [18-36] 28  BP: ()/(26-78) 107/55  FiO2 (%):  [90 %-100 %] 90 %  SpO2:  [83 %-98 %] 97 %  on  Flow (L/min):  [2-6] 6;   Device (Oxygen  Therapy): ventilator  Body mass index is 54.86 kg/m².    Wt Readings from Last 3 Encounters:   04/08/23 115 kg (253 lb 8.5 oz)   04/02/23 125 kg (275 lb)   03/22/23 125 kg (275 lb)     Intake & Output (last 3 days)       04/06 0701  04/07 0700 04/07 0701  04/08 0700 04/08 0701  04/09 0700 04/09 0701  04/10 0700    P.O.  510 600     IV Piggyback   300     Total Intake(mL/kg)  510 (4.4) 900 (7.8)     Urine (mL/kg/hr) 700 550 (0.2) 1950 (0.7)     Stool   0     Total Output      Net -700 -40 -1050             Stool Unmeasured Occurrence   1 x         NPO Diet NPO Type: Strict NPO  ----------------------------------------------------------------------------------------------------------------------  Physical Exam  Vitals and nursing note reviewed. Exam conducted with a chaperone present.   Constitutional:       General: She is not in acute distress.     Appearance: She is well-developed. She is morbidly obese. She is ill-appearing.      Interventions: She is sedated and intubated.      Comments: Appears chronically ill but she looks less acutely ill.  No central line in place; has 3 peripheral IVs present.   HENT:      Head: Normocephalic and atraumatic.      Right Ear: External ear normal.      Left Ear: External ear normal.      Nose: Nose normal.   Eyes:      General: No scleral icterus.        Right eye: No discharge.         Left eye: No discharge.      Pupils: Pupils are equal, round, and reactive to light.   Cardiovascular:      Rate and Rhythm: Normal rate.      Pulses: Normal pulses.      Heart sounds: No murmur heard.  Pulmonary:      Effort: Respiratory distress present. She is intubated.      Breath sounds: No decreased air movement. No wheezing or rales.      Comments: Much improved from yesterday.  Abdominal:      General: Bowel sounds are normal. There is no distension.      Palpations: Abdomen is soft.   Genitourinary:     Comments: PureWick in place with clear and yellow urine in the  collection container.  Musculoskeletal:         General: No swelling, deformity or signs of injury.   Skin:     Capillary Refill: Capillary refill takes less than 2 seconds.      Coloration: Skin is not jaundiced or pale.   Neurological:      Comments: Patient is sedated and ventilated and thus I am unable to perform this portion of the exam.  The pupil reflexes are equal and brisk bilaterally.  The knee reflexes are normal bilaterally.  No hyperreflexia noted.   Psychiatric:      Comments: Patient is sedated and ventilated and thus I am unable to perform this portion of the exam.       ----------------------------------------------------------------------------------------------------------------------  Tele:  NS with heart rates in the 's.  I have personally reviewed/looked at the telemetry strips.  ----------------------------------------------------------------------------------------------------------------------  LABS:    CBC and coagulation:  Results from last 7 days   Lab Units 04/09/23  0049 04/08/23  1742 04/07/23  0502 04/07/23  0241 04/06/23  2059 04/06/23  1754 04/06/23  1350 04/06/23  1312 04/02/23  1507   PROCALCITONIN ng/mL  --  0.16  --   --   --   --   --  0.15  --    LACTATE mmol/L 1.9 1.8  --   --  2.0 2.3* 2.1*  --   --    SED RATE mm/hr  --   --   --   --   --   --   --  85* 59*   CRP mg/dL 2.84*  --   --   --   --   --   --  4.41* 3.75*   WBC 10*3/mm3 8.94 12.58* 5.65  --   --   --   --  8.04 6.92   HEMOGLOBIN g/dL 9.5* 10.8* 9.5*  --   --   --   --  10.9* 10.3*   HEMATOCRIT % 30.6* 34.9 29.5*  --   --   --   --  35.0 34.0   MCV fL 96.8 96.9 92.8  --   --   --   --  94.3 96.6   MCHC g/dL 31.0* 30.9* 32.2  --   --   --   --  31.1* 30.3*   PLATELETS 10*3/mm3 132* 180 176  --  143  --   --  174 138*   INR  1.48*  --   --  1.34*  --   --   --  1.36*  --      Acid/base balance:  Results from last 7 days   Lab Units 04/09/23  0806 04/09/23  0259 04/06/23  1338 04/02/23  1844   PH, ARTERIAL pH  units 7.598* 7.475* 7.421 7.441   PO2 ART mm Hg 81.6* 54.6* 79.0* 83.0   PCO2, ARTERIAL mm Hg 34.8* 50.3* 53.8* 52.2*   HCO3 ART mmol/L 33.9* 37.0* 35.0* 35.5*     Renal and electrolytes:  Results from last 7 days   Lab Units 04/09/23  0049 04/08/23 1742 04/08/23  0551 04/07/23  0241 04/06/23  1312 04/02/23  1507   SODIUM mmol/L 139 138  --  134* 138 139   POTASSIUM mmol/L 3.8 3.6  --  3.9 4.6 4.7   MAGNESIUM mg/dL 1.9 2.2 2.8* 1.9  --   --    CHLORIDE mmol/L 96* 96*  --  95* 97* 99   CO2 mmol/L 31.9* 31.7*  --  27.6 33.6* 31.6*   BUN mg/dL 15 16  --  12 11 14   CREATININE mg/dL 0.66 0.60  --  0.65 0.71 0.73   CALCIUM mg/dL 8.9 9.2  --  8.8 9.3 9.3   PHOSPHORUS mg/dL 2.7 3.1  --  2.9  --   --    GLUCOSE mg/dL 145* 148*  --  138* 127* 162*     Estimated Creatinine Clearance: 108.8 mL/min (by C-G formula based on SCr of 0.66 mg/dL).    Liver and pancreatic function:  Results from last 7 days   Lab Units 04/09/23  0049 04/08/23 1742 04/07/23  0502 04/07/23  0241 04/06/23  1312 04/02/23  1507   ALBUMIN g/dL 3.3* 3.0*  --  2.5* 2.6* 2.4*   BILIRUBIN mg/dL 1.4* 1.5*  --  1.5* 2.0* 1.5*   ALK PHOS U/L 125* 161*  --  150* 188* 168*   AST (SGOT) U/L 90* 105*  --  67* 81* 88*   ALT (SGPT) U/L 46* 56*  --  41* 51* 49*   AMMONIA umol/L 40  --  69*  --   --   --    LIPASE U/L  --   --   --   --  60  --      Endocrine function:  Lab Results   Component Value Date    HGBA1C <4.20 (L) 03/16/2023     Glucose levels from the CMP:  Results from last 7 days   Lab Units 04/09/23  0049 04/08/23  1742 04/07/23  0241 04/06/23  1312 04/02/23  1507   GLUCOSE mg/dL 145* 148* 138* 127* 162*     Lab Results   Component Value Date    TSH 1.200 03/17/2023     Cardiac:  Results from last 7 days   Lab Units 04/08/23  1953 04/08/23  1742 04/06/23  1515 04/06/23  1312 04/02/23  1727 04/02/23  1507   HSTROP T ng/L 38* 36* 48* 47* 52* 52*   PROBNP pg/mL  --   --   --  830.9  --  634.9       Cultures:  Microbiology Results (last 10 days)     Procedure  Component Value - Date/Time    Respiratory Culture - Sputum, Cough [[id number]] Collected: 04/07/23 0454    Lab Status: Final result Specimen: Sputum from Cough Updated: 04/07/23 0636     Respiratory Culture Rejected     Gram Stain Rare (1+) WBCs seen      Rare (1+) Epithelial cells seen      Moderate (3+) Gram positive cocci in chains and clusters      Few (2+) Gram negative bacilli    Narrative:      Specimen rejected due to oropharyngeal contamination. Please reorder and recollect specimen if clinically necessary.    S. Pneumo Ag Urine or CSF - Urine, Urine, Clean Catch [[id number]]  (Normal) Collected: 04/07/23 0136    Lab Status: Final result Specimen: Urine, Clean Catch Updated: 04/07/23 1439     Strep Pneumo Ag Negative    Respiratory Panel PCR w/COVID-19(SARS-CoV-2) SARA/MARKOS/VANESSA/[facility]/PEPE In-House, NP Swab in UTM/VTM, 3-4 HR TAT - Swab, Nasopharynx [[id number]]  (Normal) Collected: 04/07/23 0134    Lab Status: Final result Specimen: Swab from Nasopharynx Updated: 04/07/23 0233     ADENOVIRUS, PCR Not Detected     Coronavirus 229E Not Detected     Coronavirus HKU1 Not Detected     Coronavirus NL63 Not Detected     Coronavirus OC43 Not Detected     COVID19 Not Detected     Human Metapneumovirus Not Detected     Human Rhinovirus/Enterovirus Not Detected     Influenza A PCR Not Detected     Influenza B PCR Not Detected     Parainfluenza Virus 1 Not Detected     Parainfluenza Virus 2 Not Detected     Parainfluenza Virus 3 Not Detected     Parainfluenza Virus 4 Not Detected     RSV, PCR Not Detected     Bordetella pertussis pcr Not Detected     Bordetella parapertussis PCR Not Detected     Chlamydophila pneumoniae PCR Not Detected     Mycoplasma pneumo by PCR Not Detected    Narrative:      In the setting of a positive respiratory panel with a viral infection PLUS a negative procalcitonin without other underlying concern for bacterial infection, consider observing off antibiotics or discontinuation of  antibiotics and continue supportive care. If the respiratory panel is positive for atypical bacterial infection (Bordetella pertussis, Chlamydophila pneumoniae, or Mycoplasma pneumoniae), consider antibiotic de-escalation to target atypical bacterial infection.    Blood Culture - Blood, Hand, Right [[id number]]  (Normal) Collected: 04/06/23 1410    Lab Status: Preliminary result Specimen: Blood from Hand, Right Updated: 04/08/23 1415     Blood Culture No growth at 2 days    Blood Culture - Blood, Wrist, Left [[id number]]  (Normal) Collected: 04/06/23 1350    Lab Status: Preliminary result Specimen: Blood from Wrist, Left Updated: 04/08/23 1415     Blood Culture No growth at 2 days    COVID-19 and FLU A/B PCR - Swab, Nasopharynx [[id number]]  (Normal) Collected: 04/06/23 1338    Lab Status: Final result Specimen: Swab from Nasopharynx Updated: 04/06/23 1431     COVID19 Not Detected     Influenza A PCR Not Detected     Influenza B PCR Not Detected    Narrative:      Fact sheet for providers: https://www.fda.gov/media/090545/download    Fact sheet for patients: https://www.fda.gov/media/592480/download    Test performed by PCR.    COVID-19 and FLU A/B PCR - Swab, Nasopharynx [[id number]]  (Normal) Collected: 04/02/23 1508    Lab Status: Final result Specimen: Swab from Nasopharynx Updated: 04/02/23 1535     COVID19 Not Detected     Influenza A PCR Not Detected     Influenza B PCR Not Detected    Narrative:      Fact sheet for providers: https://www.fda.gov/media/481824/download    Fact sheet for patients: https://www.fda.gov/media/814038/download    Test performed by PCR.        I have personally looked at the labs and they are summarized above.  ----------------------------------------------------------------------------------------------------------------------  Detailed radiology reports for the last 24 hours:    Imaging Results (Last 24 Hours)     Procedure Component Value Units Date/Time    XR Chest 1 View [[id number]]  Collected: 04/09/23 0645     Updated: 04/09/23 0647    Narrative:      CR Chest 1 Vw    INDICATION:   Intubation     COMPARISON:    Earlier in the day  4/9/2023    FINDINGS:  Portable AP view(s) of the chest.    Interval intubation with the tip of the endotracheal tube terminating above the level the amy. Enteric tube traverses the region of the esophagus and terminates at the level the stomach bubble with side port distal to the GE junction.    Cardiomegaly and extensive airspace disease throughout the lungs are again noted. Bony structures are unchanged.       Impression:      1.  Endotracheal tube and enteric tube are in expected radiographic positions.  2.  Cardiomegaly and extensive bilateral airspace disease.    Signer Name: JEROME CHIU MD   Signed: 4/9/2023 6:45 AM   Work[facility]    Radiology [facility]    XR Chest 1 View [[id number]] Collected: 04/09/23 0329     Updated: 04/09/23 0331    Narrative:      CR Chest 1 Vw    INDICATION:   Edema     COMPARISON:    4/8/2023    FINDINGS:  Portable AP view(s) of the chest.        Obscured cardia mediastinal contours. Extensive multifocal airspace disease throughout the lungs with small left pleural effusion. Overall severe and similar to the prior study.       Impression:      Extensive multifocal airspace disease and small left pleural effusion. Similar to the prior.    Signer Name: JEROME CHIU MD   Signed: 4/9/2023 3:29 AM   Workstation Name: [facility]    Radiology [facility]    XR Chest 1 View [[id number]] Collected: 04/08/23 1859     Updated: 04/08/23 1901    Narrative:      CHEST X-RAY, 4/8/2023      HISTORY:    57-year-old female hospital inpatient with severe sepsis, pneumonia, worsening hypoxia.      TECHNIQUE:  AP portable chest x-ray.    COMPARISON:  *  Chest x-ray, 4/6/2023.    FINDINGS:  Dense diffuse multifocal, somewhat nodular infiltrates throughout both lungs has increased significantly in severity  since 4/6/2023. Bilateral pleural effusions. Low lung volumes. Stable cardiomegaly. Median sternotomy.      Impression:      Radiographic worsening of diffuse multifocal pulmonary infiltrates.    Signer Name: Pro Fleming MD   Signed: 4/8/2023 6:59 PM   Workstation Name: [email or web address]    [facility]        I have personally looked at the radiology images and I have read the available final reports.    Assessment & Plan      • Severe sepsis that was present on admission (lactic acid 2.1-2.3, CRP 4.41) due to bibasilar pneumonia and an acute COPD exacerbation  • Liver enzyme elevation that was present on admission, in a patient with history of decompensated cirrhosis with grade 1 esophageal varices in the lower one third, MELD score 12.49 on admission  • Fluid overload on admission from heart failure with preserved ejection fraction and decompensated cirrhosis in the setting of severe sepsis  • Now with acute hypoxic respiratory failure requiring intubation on 4/8/2023  • Borderline hypomagnesemia and borderline hypokalemia  • History of chronic hypoxic respiratory failure, uses 2 L/min of NC oxygen  • History of CAD status post CABG in 2011  • History of right lateral malleolus avulsion fracture  • Right lower extremity greater than left lower extremity weakness with underlying neuropathy  • History of type 2 diabetes mellitus with peripheral neuropathy  • History of essential hypertension  • History of hypothyroidism  • History of depression and anxiety  • Morbid obesity, BMI 54.86 kg/m2, which impacts all aspects of care  • Debility secondary to recent right hip intertrochanteric femur fracture status post repair in addition to underlying morbid obesity     Discussed with Dr. Duval as he performed tele-rounds.  He has decreased the respiratory rate as the patient now has respiratory alkalosis; ABG to be re-drawn in about one hour.  Lungs sound much better; will continue with the  Rocephin and doxycycline as well as Solumedrol and scheduled Duonebs.  Will hold the diuretics for the ascites for now.  Will monitor the blood pressures closely and add Levophed prn for MAP less than 65 mmHg.  Will also schedule albumin for a bit to help with blood pressures in light of her cirrhosis.  Will continue with strict input and output measurements and daily weights.  Diurectics to be given as needed and based on respiratory status and kidney status.  Will also continue to trend the liver enzymes and INR.  MELD score today is 12.09, which is about the same; will continue to monitor.  Will wean sedation, with RASS score of 0 to -1.   So far, the 3 peripheral IVs are adequate and the PureWick seems to be working for now as no urine is leaking at this time.  Magnesium and potassium replacement has been ordered.  We will avoid QT prolonging agents and continue to monitor the electrolytes closely. In order to lessen the risk of worsening QTc, the goal potassium level is 4-4.5 and goal magnesium level is 2-2.2.  Will repeat labs and ABG in the am.  Will consult nutrition for tube feed management.    VTE Prophylaxis:   Mechanical Order History:      Ordered        04/06/23 1754  Place Sequential Compression Device  Once            04/06/23 1754  Maintain Sequential Compression Device  Continuous                    Pharmalogical Order History:     None      The patient is high risk due to the following diagnoses/reasons: Severe sepsis     Total critical care time is 30 minutes.    Jakob Koch MD  [facility] Hospitalist  04/09/23  08:52 EDT

## 2023-04-09 NOTE — NURSING NOTE
Pt has increased WOB, RR in 40s, O2 sat dropping into 70s. Dr. Sanchez notified, decided to intubate patient. Daughter notified in change of status.     23 mL of Propofol in at 0554  8.7 mL of Succ in at 0556  Intubated at 0559  7.5 ETT @ 22 at the lip  STAT chest xray placed.     BP dropped to 60s/40s.   Dr. Sanchez at bedside.   1mL of marquita pushed at 0600  1mL of marquita pushed at 0602  1mL of marquita pushed at 0604  Pt started on levo drip.    OG placed at 55cm.     ETT adjusted to 23 @ the lip.

## 2023-04-09 NOTE — PLAN OF CARE
Problem: Adult Inpatient Plan of Care  Goal: Plan of Care Review  Outcome: Ongoing, Progressing  Goal: Patient-Specific Goal (Individualized)  Outcome: Ongoing, Progressing  Goal: Absence of Hospital-Acquired Illness or Injury  Outcome: Ongoing, Progressing  Goal: Optimal Comfort and Wellbeing  Outcome: Ongoing, Progressing  Goal: Readiness for Transition of Care  Outcome: Ongoing, Progressing     Problem: Fall Injury Risk  Goal: Absence of Fall and Fall-Related Injury  Outcome: Ongoing, Progressing     Problem: Diabetes Comorbidity  Goal: Blood Glucose Level Within Targeted Range  Outcome: Ongoing, Progressing     Problem: Hypertension Comorbidity  Goal: Blood Pressure in Desired Range  Outcome: Ongoing, Progressing     Problem: Skin Injury Risk Increased  Goal: Skin Health and Integrity  Outcome: Ongoing, Progressing   Goal Outcome Evaluation:

## 2023-04-10 NOTE — PROGRESS NOTES
Referring Provider: dr alonso  Reason for Consultation: respiratory failure      Chief complaint -sob       Subjective-overnight events reviewed.  Patient is currently proned- manually.  ABG reviewed.  Latest chest x-ray reviewed.  Ventilator settings.  Drips reviewed.  Review of Systems  Review of Systems -     Review of Systems -could not be obtained as patient is intubated and sedated      History  Past Medical History:   Diagnosis Date   • Abscess of periorbital region, right 10/30/2021   • Arthritis    • Chronic back pain    • Closed fracture of right hip 2021   • COPD exacerbation 2023   • Coronary artery disease, 3 vessel CABG    • Decompensated hepatic cirrhosis, grade 1 esophageal varices in the lower 1/3    • Depression    • Essential hypertension    • H pylori ulcer    • History of transfusion    • Hyperlipidemia    • Hypothyroidism    • Iron deficiency anemia 2023   • Recurrent right pleural effusion 2023   • Sleep apnea    • Type 2 diabetes mellitus, without long-term current use of insulin 10/30/2021   ,   Past Surgical History:   Procedure Laterality Date   • CARPAL TUNNEL RELEASE     •  SECTION     • CHOLECYSTECTOMY     • COLONOSCOPY     • CORONARY ANGIOPLASTY WITH STENT PLACEMENT     • ENDOSCOPY N/A 2022    Procedure: ESOPHAGOGASTRODUODENOSCOPY WITH BIOPSY;  Surgeon: iLzzeth Harmon MD;  Location: [facility] OR;  Service: Gastroenterology;  Laterality: N/A;   • HIP TROCHANTERIC NAILING WITH INTRAMEDULLARY HIP SCREW Right 2021    Procedure: HIP TROCHANTERIC NAILING LONG WITH INTRAMEDULLARY HIP SCREW;  Surgeon: Oracio Ponce DO;  Location: [facility] OR;  Service: Orthopedics;  Laterality: Right;   • REPLACEMENT TOTAL KNEE Right    • TUBAL ABDOMINAL LIGATION     • UPPER GASTROINTESTINAL ENDOSCOPY     ,   Family History   Problem Relation Age of Onset   • COPD Mother    • Stroke Mother    • Cancer Brother    • Diabetes Brother    • Hypertension  Brother    • Heart disease Brother    • Breast cancer Neg Hx    ,   Social History     Tobacco Use   • Smoking status: Never   • Smokeless tobacco: Never   Vaping Use   • Vaping Use: Never used   Substance Use Topics   • Alcohol use: Yes     Comment: Once a year    • Drug use: No   ,   Medications Prior to Admission   Medication Sig Dispense Refill Last Dose   • aspirin 81 MG EC tablet Take 1 tablet by mouth Daily.   4/6/2023   • atorvastatin (LIPITOR) 20 MG tablet Take 1 tablet by mouth Every Night.   4/5/2023   • cholecalciferol (VITAMIN D3) 1.25 MG (67445 UT) capsule Take 1 capsule by mouth Every 7 (Seven) Days.   3/31/2023   • furosemide (LASIX) 40 MG tablet Take 1 tablet by mouth Daily.   4/6/2023   • gabapentin (NEURONTIN) 400 MG capsule Take 1 capsule by mouth 2 (Two) Times a Day As Needed (nerve pain).   4/6/2023   • HYDROcodone-acetaminophen (NORCO) 5-325 MG per tablet Take 1 tablet by mouth 2 (Two) Times a Day As Needed for Moderate Pain.   4/6/2023   • levothyroxine (SYNTHROID, LEVOTHROID) 75 MCG tablet Take 1 tablet by mouth Daily.   4/6/2023   • loratadine (CLARITIN) 10 MG tablet Take 1 tablet by mouth Every Other Day.   4/6/2023   • loratadine-pseudoephedrine (CLARITIN-D 24-hour)  MG per 24 hr tablet Take 1 tablet by mouth Every Other Day.   4/5/2023   • metoprolol tartrate (LOPRESSOR) 25 MG tablet Take 1 tablet by mouth 2 (Two) Times a Day.   4/6/2023   • omeprazole (priLOSEC) 40 MG capsule Take 1 capsule by mouth Daily. 30 capsule 5 4/6/2023   • potassium chloride (K-DUR,KLOR-CON) 10 MEQ CR tablet Take 1 tablet by mouth 2 (Two) Times a Day.   4/6/2023   • QUEtiapine (SEROquel) 50 MG tablet Take 1 tablet by mouth Every Night.   4/5/2023   • sertraline (ZOLOFT) 50 MG tablet Take 1 tablet by mouth Daily.   4/6/2023   , Scheduled Meds:  albumin human, 25 g, Intravenous, Q12H  artificial tears, , Both Eyes, Q4H  aspirin, 81 mg, Oral, Daily  atorvastatin, 20 mg, Oral, Nightly  budesonide, 0.5 mg,  Nebulization, BID - RT  cefepime, 2 g, Intravenous, Q8H  cetirizine, 10 mg, Oral, Daily  chlorhexidine, 15 mL, Mouth/Throat, Q12H  ipratropium-albuterol, 3 mL, Nebulization, 4x Daily - RT  levothyroxine, 75 mcg, Oral, Daily  methylPREDNISolone sodium succinate, 60 mg, Intravenous, Q12H  metroNIDAZOLE, 500 mg, Intravenous, Q8H  nystatin, , Topical, Q12H  pantoprazole, 40 mg, Intravenous, Q24H  potassium chloride, 40 mEq, Oral, Q4H  sertraline, 50 mg, Oral, Daily  sodium chloride, 10 mL, Intravenous, Q12H  sodium chloride, 3 mL, Intravenous, Q12H  [START ON 4/11/2023] vancomycin, 1,500 mg, Intravenous, Q24H    , Continuous Infusions:  cisatracurium (NIMBEX) 200 mg in sodium chloride 0.9 % 100 mL, 0.5-10 mcg/kg/min, Last Rate: 3.25 mcg/kg/min (04/10/23 1444)  midazolam, 1-10 mg/hr, Last Rate: 2 mg/hr (04/10/23 1445)  norepinephrine, 0.02-0.3 mcg/kg/min, Last Rate: Stopped (04/10/23 0545)  Pharmacy to dose vancomycin,   propofol, 5-50 mcg/kg/min, Last Rate: 40 mcg/kg/min (04/10/23 1539)     and Allergies:  Nuts, Contrast dye (echo or unknown ct/mr), Codeine, and Latex    Objective     Vital Signs   Temp:  [99.1 °F (37.3 °C)-99.5 °F (37.5 °C)] 99.1 °F (37.3 °C)  Heart Rate:  [78-99] 78  Resp:  [20-32] 28  BP: ()/(41-75) 107/64  FiO2 (%):  [75 %-100 %] 80 %    Physical Exam:  Proned-otherwise no major changes in examination           GENERAL APPEARANCE: Intubated and sedated.  Appears to be resting comfortably in bed.     HEAD: normocephalic.    EYES: Atraumatic    THROAT: ET tube in place. Oral cavity and pharynx normal. No inflammation, swelling, exudate, or lesions.     NECK: Neck supple.     CARDIAC: NSR   LUNGS: Clear to auscultation and percussion without rales, rhonchi, wheezing or diminished breath sounds.    ABDOMEN: Obese  EXTREMITIES: No significant deformity or joint abnormality. No edema. Peripheral pulses intact.    NEUROLOGICAL: Unable to assess due to sedation status.     PSYCHIATRIC: Unable to  assess due to sedation status                                                              Results Review:    LABS:    Lab Results   Component Value Date    GLUCOSE 149 (H) 04/10/2023    BUN 26 (H) 04/10/2023    CREATININE 0.97 04/10/2023    EGFRIFNONA 112 12/08/2021    BCR 26.8 (H) 04/10/2023    CO2 31.8 (H) 04/10/2023    CALCIUM 9.4 04/10/2023    PROTENTOTREF 6.9 02/16/2023    ALBUMIN 3.6 04/10/2023    LABIL2 0.8 02/16/2023     (H) 04/10/2023    ALT 43 (H) 04/10/2023    WBC 9.82 04/10/2023    HGB 8.7 (L) 04/10/2023    HCT 28.0 (L) 04/10/2023    MCV 96.6 04/10/2023     (L) 04/10/2023     04/10/2023    K 3.6 04/10/2023    CL 96 (L) 04/10/2023    ANIONGAP 13.2 04/10/2023       Lab Results   Component Value Date    INR 1.74 (H) 04/10/2023    INR 1.48 (H) 04/09/2023    INR 1.34 (H) 04/07/2023    PROTIME 21.0 (H) 04/10/2023    PROTIME 18.3 (H) 04/09/2023    PROTIME 16.9 (H) 04/07/2023       Results from last 7 days   Lab Units 04/10/23  0030 04/09/23  0049 04/07/23  0241 04/06/23  1312   INR  1.74* 1.48* 1.34* 1.36*   APTT seconds  --  33.7 30.6 37.6*          I reviewed the patient's new clinical results.  I reviewed the patient's new imaging results and agree with the interpretation.      Assessment & Plan     Neuro- Sedated -drips reviewed.  Currently proned and paralytics are started.    Respiratory-in ARDS- Case was discussed with Dr. Puckett will change the antibiotics.  Will broaden the antibiotic coverage.  We will get a MRSA nasal swab.  If negative will consider discontinuing vancomycin.    Latest ABG reviewed and vent settings were adjusted.  Latest chest x-ray reviewed.  Continue to manually prone the patient.  Will prone for 16 hours and then supine for 8 hours.  Plan discussed with patient's nurse.  PEEP and tidal volumes adjusted.    Latest microbiology reviewed and discussed with team  We will get ABG in the morning    Continue steroids.  Will give albumin to increase oncotic pressure  to see if patient's oxygenation can improve that way.  Vent settings-reviewed  Vent Settings          Resp Rate (Set): 28     FiO2 (%): 80 %  PEEP/CPAP (cm H2O): 12 cm H20    Minute Ventilation (L/min) (Obs): 10.7 L/min  Resp Rate (Observed) Vent: 28  I:E Ratio (Set): 1:1.69  I:E Ratio (Obs): 1:2.2    PIP Observed (cm H2O): 39 cm H2O          VAP- precautions  Head end - 30 %  Mouth care   DVt prophylaxis    Chest xray-latest reviewed.    ABG -latest reviewed    Cardio-   Continue to monitor HR- rate and rhythm, BP     Nephro- Cr BUN stable  I/O-reviewed.  Avoid nephrotoxic agents    Gi- PPI prophylaxis  TUBE feeds-discussed with team and started on trophic tube feeds.  Nonalcoholic steatohepatitis- patient's INR is high.  Will give vitamin K.    Heme- CBC  Hb-transfuse for hemoglobin less than 7  platelet  WBC    ID-latest microbiology reviewed.  Continue current treatment  CULTURE  And Antibiotics adjusted.    Endo- Maintain Blood sugar 140 -180  Blood sugars reviewed.    Electrolytes-   Mag phos       DVT prophylaxis-    Bedside rounds were done with RT and patients nurse. All the Lab and clinical findings were discussed with them and plan was also discussed in great detail.    Family member present-none        Echo-  Results for orders placed during the hospital encounter of 04/06/23    Adult Transthoracic Echo Complete W/ Cont if Necessary Per Protocol    Interpretation Summary  •  Left ventricular systolic function is normal. Left ventricular ejection fraction appears to be 66 - 70%.  •  Left ventricular wall thickness is consistent with mild concentric hypertrophy.  •  Left ventricular diastolic function is consistent with (grade II w/high LAP) pseudonormalization.  •  The cardiac chamber dimensions are normal.  •  No significant valvular heart disease is present.  •  Mild pulmonary hypertension is present.  •  There is no evidence of pericardial effusion.          Recurrent right pleural effusion    Severe  sepsis    Chronic respiratory failure    COPD exacerbation    Patient is critically ill with acute hypoxic respiratory failure currently in ARDS.  I adjusted patient's vent settings and drips.  Overall prognosis with multiple comorbidities and critical illness with ARDS is poor.  Recommend palliative care consult  Cc 31 mins  Jesus Duval MD  04/10/23  16:33 EDT

## 2023-04-10 NOTE — CASE MANAGEMENT/SOCIAL WORK
Discharge Planning Assessment   Christophe     Patient Name: Aidee Trinidad  MRN: [id number]  Today's Date: 4/10/2023    Admit Date: 4/6/2023     Discharge Plan     Row Name 04/10/23 1714       Plan    Plan Pt was admitted on 04/06/23. Pt transferred from [address] [place] [address] to CCU over weekend. Pt lives with son Americo and niece provides care during the day. Pt does not utilize  services at this time. Pt utilizes hospital bed, rollator, w/chair oxgyen @ 2 liters via [employer]. Pt previously notified SS she may be interested in short term NHP. SS to speak with Pt about SNF once Pt is closer to being medically stable. SS to follow.              MURPHY SheikhW

## 2023-04-10 NOTE — PROGRESS NOTES
Nutrition Services    Patient Name:  Aidee Trinidad  YOB: 1965  MRN: [id number]  Admit Date:  4/6/2023    Consult received for TF.  Labs and meds reviewed.  Per Dr. Duval gave verbal order to initiate trophic feeds.  TF ordered Peptamen AF @ 10 ml/hr with 10 ml water flush.       Electronically signed by:  Manju Martinez RD  04/10/23 11:43 EDT

## 2023-04-10 NOTE — PROGRESS NOTES
[facility] HOSPITALIST PROGRESS NOTE     Patient Identification:  Name:  Aidee Trinidad  Age:  57 y.o.  Sex:  female  :  1965  MRN:  [id number]  Visit Number:  [id number]  ROOM: [address]     Primary Care Provider:  Azalea Burnett APRN     Date of Admission: 2023    Length of stay in inpatient status:  4    Subjective     Chief Compliant:    Chief Complaint   Patient presents with   • Shortness of Breath       Patient had decline overnight with development of worsening ARDS w/difficult oxygenation. Pf ratio <100 and decision was made to paralyze/sedated and prone with improvement in pf ratio afterwards down to 60% fiO2 this am. Repeated cxr without effusions, improved from admission CT chest but noted progression of b/l patchy infiltrates. Low dose levophed initiated when initially proned but off this am and hemodynamics remain stable.    Discussed update with patient's daughter Puja this afternoon and all questions answered. Family did have question regarding utility of transfer to higher level of care but given current condition risk likely outweigh any potential benefit and daughter whom is NOK agreed to continue current treatment plan here while revisiting transfer should condition change or need for subspeciality care not offered at our facility arise.        Objective     Current Hospital Meds:  albumin human, 25 g, Intravenous, Q12H  artificial tears, , Both Eyes, Q4H  aspirin, 81 mg, Oral, Daily  atorvastatin, 20 mg, Oral, Nightly  budesonide, 0.5 mg, Nebulization, BID - RT  cefepime, 2 g, Intravenous, Q8H  cetirizine, 10 mg, Oral, Daily  chlorhexidine, 15 mL, Mouth/Throat, Q12H  ipratropium-albuterol, 3 mL, Nebulization, 4x Daily - RT  levothyroxine, 75 mcg, Oral, Daily  methylPREDNISolone sodium succinate, 60 mg, Intravenous, Q12H  metroNIDAZOLE, 500 mg, Intravenous, Q8H  nystatin, , Topical, Q12H  pantoprazole, 40 mg, Intravenous, Q24H  potassium chloride, 40 mEq,  Oral, Q4H  sertraline, 50 mg, Oral, Daily  sodium chloride, 10 mL, Intravenous, Q12H  sodium chloride, 3 mL, Intravenous, Q12H  [START ON 4/11/2023] vancomycin, 1,500 mg, Intravenous, Q24H    cisatracurium (NIMBEX) 200 mg in sodium chloride 0.9 % 100 mL, 0.5-10 mcg/kg/min, Last Rate: 3.25 mcg/kg/min (04/10/23 1444)  midazolam, 1-10 mg/hr, Last Rate: 2 mg/hr (04/10/23 1445)  norepinephrine, 0.02-0.3 mcg/kg/min, Last Rate: Stopped (04/10/23 0545)  Pharmacy to dose vancomycin,   propofol, 5-50 mcg/kg/min, Last Rate: 40 mcg/kg/min (04/10/23 1539)      Current Antimicrobial Therapy:  Anti-Infectives (From admission, onward)    Ordered     Dose/Rate Route Frequency Start Stop    04/10/23 1011  vancomycin 1500 mg/500 mL 0.9% NS IVPB ([facility])        Ordering Provider: Brett Perry MD    1,500 mg  over 90 Minutes Intravenous Every 24 Hours 04/11/23 1100 04/17/23 1059    04/10/23 0946  cefepime 2 gm IVPB in 100 ml NS (V[facility])        Ordering Provider: Brett Perry MD    2 g  over 4 Hours Intravenous Every 8 Hours 04/10/23 1845 04/17/23 1844    04/10/23 0946  metroNIDAZOLE (FLAGYL) IVPB 500 mg        Ordering Provider: Brett Perry MD    500 mg  over 60 Minutes Intravenous Every 8 Hours 04/10/23 1100 04/17/23 1059    04/10/23 1011  vancomycin IVPB 2000 mg in 0.9% Sodium Chloride (premix) 500 mL        Ordering Provider: Brett Perry MD    2,000 mg  over 120 Minutes Intravenous Once 04/10/23 1100 04/10/23 1323    04/10/23 0946  cefepime 2 gm IVPB in 100 ml NS (VTB)        Ordering Provider: Brett Perry MD    2 g  over 30 Minutes Intravenous Once 04/10/23 1045 04/10/23 1148    04/10/23 0946  Pharmacy to dose vancomycin        Ordering Provider: Brett Perry MD     Does not apply Continuous PRN 04/10/23 0945 04/17/23 0944    04/06/23 1611  cefTRIAXone (ROCEPHIN) 2 g in sodium chloride 0.9 % 100 mL IVPB-VTB        Ordering Provider: Polina Oneil APRN    2 g  200 mL/hr over 30 Minutes Intravenous  Once 04/06/23 1613 04/06/23 1836        Current Diuretic Therapy:  Diuretics (From admission, onward)    Ordered     Dose/Rate Route Frequency Start Stop    04/09/23 0639  bumetanide (BUMEX) injection 2 mg        Ordering Provider: Gill Sanchez DO    2 mg Intravenous Once 04/09/23 0730 04/09/23 0651    04/08/23 1824  bumetanide (BUMEX) injection 2 mg        Ordering Provider: Jakob Koch MD    2 mg Intravenous Once 04/08/23 1915 04/08/23 1856    04/06/23 1607  furosemide (LASIX) injection 80 mg        Ordering Provider: Polina Oneil APRN    80 mg Intravenous Once 04/06/23 1609 04/06/23 1629        ----------------------------------------------------------------------------------------------------------------------  Vital Signs:  Temp:  [99.1 °F (37.3 °C)-99.5 °F (37.5 °C)] 99.1 °F (37.3 °C)  Heart Rate:  [75-97] 75  Resp:  [20-32] 28  BP: ()/(41-75) 110/63  FiO2 (%):  [75 %-100 %] 80 %  SpO2:  [90 %-100 %] 96 %  on  Flow (L/min):  [6] 6;   Device (Oxygen Therapy): ventilator  Body mass index is 50.13 kg/m².    Wt Readings from Last 3 Encounters:   04/10/23 105 kg (231 lb 11.3 oz)   04/02/23 125 kg (275 lb)   03/22/23 125 kg (275 lb)     Intake & Output (last 3 days)       04/07 0701 04/08 0700 04/08 0701 04/09 0700 04/09 0701  04/10 0700 04/10 0701 04/11 0700    P.O. 510 600      I.V. (mL/kg)   1019.8 (9.7)     IV Piggyback  300 900     Total Intake(mL/kg) 510 (4.4) 900 (7.8) 1919.8 (18.3)     Urine (mL/kg/hr) 550 (0.2) 1950 (0.7) 400 (0.2)     Stool  0 0     Total Output 550 1950 400     Net -40 -1050 +1519.8             Stool Unmeasured Occurrence  1 x          NPO Diet NPO Type: Strict NPO  ----------------------------------------------------------------------------------------------------------------------  Physical Exam  Vitals and nursing note reviewed.   Constitutional:       Appearance: She is obese. She is ill-appearing.      Comments: Intubated/proned, currently paralyzed  and deeply sedated   HENT:      Head: Normocephalic and atraumatic.      Mouth/Throat:      Comments: ETT in place  Cardiovascular:      Rate and Rhythm: Normal rate and regular rhythm.      Pulses: Normal pulses.   Pulmonary:      Comments: B/l mechanical breath sounds to lower posterior lung fields  Musculoskeletal:      Right lower leg: No edema.      Left lower leg: No edema.   Skin:     General: Skin is warm and dry.      Capillary Refill: Capillary refill takes less than 2 seconds.   Neurological:      Comments: Paralyzed w/deep sedation       ----------------------------------------------------------------------------------------------------------------------    ----------------------------------------------------------------------------------------------------------------------  LABS:    CBC and coagulation:  Results from last 7 days   Lab Units 04/10/23  0030 04/09/23  0049 04/08/23  1742 04/07/23  0502 04/07/23  0241 04/06/23  2059 04/06/23  1754 04/06/23  1350 04/06/23  1312   PROCALCITONIN ng/mL  --   --  0.16  --   --   --   --   --  0.15   LACTATE mmol/L 2.1* 1.9 1.8  --   --  2.0 2.3* 2.1*  --    SED RATE mm/hr  --   --   --   --   --   --   --   --  85*   CRP mg/dL  --  2.84*  --   --   --   --   --   --  4.41*   WBC 10*3/mm3 9.82 8.94 12.58* 5.65  --   --   --   --  8.04   HEMOGLOBIN g/dL 8.7* 9.5* 10.8* 9.5*  --   --   --   --  10.9*   HEMATOCRIT % 28.0* 30.6* 34.9 29.5*  --   --   --   --  35.0   MCV fL 96.6 96.8 96.9 92.8  --   --   --   --  94.3   MCHC g/dL 31.1* 31.0* 30.9* 32.2  --   --   --   --  31.1*   PLATELETS 10*3/mm3 133* 132* 180 176  --  143  --   --  174   INR  1.74* 1.48*  --   --  1.34*  --   --   --  1.36*     Acid/base balance:  Results from last 7 days   Lab Units 04/10/23  0626 04/10/23  0426 04/09/23  1430 04/09/23  0806 04/09/23  0259 04/06/23  1338   PH, ARTERIAL pH units 7.487* 7.455* 7.475* 7.598* 7.475* 7.421   PO2 ART mm Hg 158.0* 53.5* 68.0* 81.6* 54.6* 79.0*   PCO2,  ARTERIAL mm Hg 42.6 48.0* 46.4* 34.8* 50.3* 53.8*   HCO3 ART mmol/L 32.2* 33.7* 34.1* 33.9* 37.0* 35.0*     Renal and electrolytes:  Results from last 7 days   Lab Units 04/10/23  1124 04/10/23  0030 04/09/23  0049 04/08/23  1742 04/08/23  0551 04/07/23  0241 04/06/23  1312   SODIUM mmol/L  --  141 139 138  --  134* 138   POTASSIUM mmol/L 3.6 3.4* 3.8 3.6  --  3.9 4.6   MAGNESIUM mg/dL  --  2.8* 1.9 2.2 2.8* 1.9  --    CHLORIDE mmol/L  --  96* 96* 96*  --  95* 97*   CO2 mmol/L  --  31.8* 31.9* 31.7*  --  27.6 33.6*   BUN mg/dL  --  26* 15 16  --  12 11   CREATININE mg/dL  --  0.97 0.66 0.60  --  0.65 0.71   CALCIUM mg/dL  --  9.4 8.9 9.2  --  8.8 9.3   PHOSPHORUS mg/dL  --  2.3* 2.7 3.1  --  2.9  --    GLUCOSE mg/dL  --  149* 145* 148*  --  138* 127*     Estimated Creatinine Clearance: 70 mL/min (by C-G formula based on SCr of 0.97 mg/dL).    Liver and pancreatic function:  Results from last 7 days   Lab Units 04/10/23  0030 04/09/23  0049 04/08/23 1742 04/07/23  0502 04/07/23  0241 04/06/23  1312   ALBUMIN g/dL 3.6 3.3* 3.0*  --  2.5* 2.6*   BILIRUBIN mg/dL 1.9* 1.4* 1.5*  --  1.5* 2.0*   ALK PHOS U/L 102 125* 161*  --  150* 188*   AST (SGOT) U/L 104* 90* 105*  --  67* 81*   ALT (SGPT) U/L 43* 46* 56*  --  41* 51*   AMMONIA umol/L  --  40  --  69*  --   --    LIPASE U/L  --   --   --   --   --  60     Endocrine function:  Lab Results   Component Value Date    HGBA1C <4.20 (L) 03/16/2023     Point of care bedside glucose levels:      Glucose levels from the [facility]:  Results from last 7 days   Lab Units 04/10/23  0030 04/09/23  0049 04/08/23 1742 04/07/23  0241 04/06/23  1312   GLUCOSE mg/dL 149* 145* 148* 138* 127*     Lab Results   Component Value Date    TSH 1.200 03/17/2023     Cardiac:  Results from last 7 days   Lab Units 04/10/23  0030 04/08/23  1953 04/08/23  1742 04/06/23  1515 04/06/23  1312   HSTROP T ng/L  --  38* 36* 48* 47*   PROBNP pg/mL 4,648.0*  --   --   --  830.9       Cultures:  Lab Results    Component Value Date    COLORU Yellow 04/06/2023    CLARITYU Clear 04/06/2023    PHUR 7.0 04/06/2023    GLUCOSEU Negative 04/06/2023    KETONESU Negative 04/06/2023    BLOODU Negative 04/06/2023    NITRITEU Negative 04/06/2023    LEUKOCYTESUR Trace (A) 04/06/2023    BILIRUBINUR Negative 04/06/2023    UROBILINOGEN 1.0 E.U./dL 04/06/2023    RBCUA 0-2 04/06/2023    WBCUA 0-2 04/06/2023    BACTERIA None Seen 04/06/2023     Microbiology Results (last 10 days)     Procedure Component Value - Date/Time    Respiratory Culture - Sputum, Cough [[id number]] Collected: 04/07/23 0454    Lab Status: Final result Specimen: Sputum from Cough Updated: 04/07/23 0636     Respiratory Culture Rejected     Gram Stain Rare (1+) WBCs seen      Rare (1+) Epithelial cells seen      Moderate (3+) Gram positive cocci in chains and clusters      Few (2+) Gram negative bacilli    Narrative:      Specimen rejected due to oropharyngeal contamination. Please reorder and recollect specimen if clinically necessary.    S. Pneumo Ag Urine or CSF - Urine, Urine, Clean Catch [[id number]]  (Normal) Collected: 04/07/23 0136    Lab Status: Final result Specimen: Urine, Clean Catch Updated: 04/07/23 1439     Strep Pneumo Ag Negative    Respiratory Panel PCR w/COVID-19(SARS-CoV-2) SARA/MARKOS/VANESSA/PAD/COR/MAD/PEPE In-House, NP Swab in UTM/VTM, 3-4 HR TAT - Swab, Nasopharynx [[id number]]  (Normal) Collected: 04/07/23 0134    Lab Status: Final result Specimen: Swab from Nasopharynx Updated: 04/07/23 0233     ADENOVIRUS, PCR Not Detected     Coronavirus 229E Not Detected     Coronavirus HKU1 Not Detected     Coronavirus NL63 Not Detected     Coronavirus OC43 Not Detected     COVID19 Not Detected     Human Metapneumovirus Not Detected     Human Rhinovirus/Enterovirus Not Detected     Influenza A PCR Not Detected     Influenza B PCR Not Detected     Parainfluenza Virus 1 Not Detected     Parainfluenza Virus 2 Not Detected     Parainfluenza Virus 3 Not Detected      Parainfluenza Virus 4 Not Detected     RSV, PCR Not Detected     Bordetella pertussis pcr Not Detected     Bordetella parapertussis PCR Not Detected     Chlamydophila pneumoniae PCR Not Detected     Mycoplasma pneumo by PCR Not Detected    Narrative:      In the setting of a positive respiratory panel with a viral infection PLUS a negative procalcitonin without other underlying concern for bacterial infection, consider observing off antibiotics or discontinuation of antibiotics and continue supportive care. If the respiratory panel is positive for atypical bacterial infection (Bordetella pertussis, Chlamydophila pneumoniae, or Mycoplasma pneumoniae), consider antibiotic de-escalation to target atypical bacterial infection.    Blood Culture - Blood, Hand, Right [[id number]]  (Normal) Collected: 04/06/23 1410    Lab Status: Preliminary result Specimen: Blood from Hand, Right Updated: 04/10/23 1415     Blood Culture No growth at 4 days    Blood Culture - Blood, Wrist, Left [[id number]]  (Normal) Collected: 04/06/23 1350    Lab Status: Preliminary result Specimen: Blood from Wrist, Left Updated: 04/10/23 1415     Blood Culture No growth at 4 days    COVID-19 and FLU A/B PCR - Swab, Nasopharynx [[id number]]  (Normal) Collected: 04/06/23 1338    Lab Status: Final result Specimen: Swab from Nasopharynx Updated: 04/06/23 1431     COVID19 Not Detected     Influenza A PCR Not Detected     Influenza B PCR Not Detected    Narrative:      Fact sheet for providers: https://www.fda.gov/media/972320/download    Fact sheet for patients: https://www.fda.gov/media/202662/download    Test performed by PCR.    COVID-19 and FLU A/B PCR - Swab, Nasopharynx [[id number]]  (Normal) Collected: 04/02/23 1508    Lab Status: Final result Specimen: Swab from Nasopharynx Updated: 04/02/23 1535     COVID19 Not Detected     Influenza A PCR Not Detected     Influenza B PCR Not Detected    Narrative:      Fact sheet for providers:  https://www.fda.gov/media/529539/download    Fact sheet for patients: https://www.fda.gov/media/819347/download    Test performed by PCR.          No results found for: PREGTESTUR, PREGSERUM, HCG, HCGQUANT  Pain Management Panel     Pain Management Panel Latest Ref Rng & Units 3/17/2023 2/28/2020    AMPHETAMINES SCREEN, URINE Negative Negative Negative    BARBITURATES SCREEN Negative Negative Negative    BENZODIAZEPINE SCREEN, URINE Negative Negative Negative    BUPRENORPHINEUR Negative Negative Negative    COCAINE SCREEN, URINE Negative Negative Negative    METHADONE SCREEN, URINE Negative Negative Negative    METHAMPHETAMINEUR Negative Negative -          I have personally looked at the labs and they are summarized above.  ----------------------------------------------------------------------------------------------------------------------  Detailed radiology reports for the last 24 hours:    Imaging Results (Last 24 Hours)     ** No results found for the last 24 hours. **        4/9 cxr w/progressive b/l infiltrates, right effusion improved    Assessment & Plan      #Acute on crhonic hypoxic respiratory failure (2L baseline)  #Severe ARDS, suspected secondary to PNA  #Suspected bacterial PNA  #Right pleural effusion, resolved  -Patient initially presenting with sx consistent with PNA and likely parapneumonic effusion on imaging that improved with initial abx coverage and mild diuresis however respiratory status decline acutely shortly after admission with significant b/l infiltrates progressing to intubation and paralysis/proning overnight given O2 requirement.  -Etiology: Patient's cardiac hx makes flash pulm edema possible but effusion improved and not overtly volume overloaded so less likely. Viral resp panel negative but given rising inr, drop in hemoglobin and blood noted in ET tube peritintubation, cannot exclude DAH as cause and cannot r/o coexisting vasculitic process but no skin findings  currently.  -Will cont paralysis/proning 16/8hr schedule with repeat abg in am as well as repeat cxr  -ABX coverage expanded to Vanc/cefepime/flagyl w/MRSA nares performed. Would benefit from diagnostic bronchoscopy with BAL but given current fiO2 and peep requirement would not likely tolerate, will readdress as condition improves  -R effusion resolved, deferred thora  -Holding DVT prophylaxis given poss. DAH. Agree with Vit K ordered by intensivist, repeat INR and CBC in am  -ARDSnet protocol peep tables, cont solumedrol 60 q12hr. Diuresis as hemodynamics will tolerate    #Hypokalemia  #Hypophosphatemia  -Replace prn    #Hx of decompensated GONZALEZ cirrhosis w/Grade 1 EV  #Coagulopathy  -Secondary to liver disease, correcting with Vit K    #Elevated BNP  #Hx of CAD s/p CABG 2011  #Hx of HFpEF, not in exacerbation  -Echo 4/7 w/EF preserved  -BNP elevation likely secondary to respiratory failure    #Hepatocellular liver injury  -Secondary to GONZALEZ w/congestive hepatopathy from acute right heart strain given severe resp failure  -Added acute hepatitis panel    #Normocytic anemia  -Secondary to sepsis      - - Chronic - -    #Hypothyroidism: Cont synthroid    F: NPO, Trophic tfs via NG, hold while proned  A: Fentanyl prn  S: Propofol, versed  T: holding  H: HOB elevated  U: PPI  G: PRN  S: When appropriate, paO2 improving  B: PRN  I: CVC, Radial a-line  D: Vanc/cefepime/flagyl    I have spent 35 minutes of critical care time with > 50% of time spent in direct patient care, evaluating the patient at bedside, reviewing all labs and images, reviewing ABG and adjusting vent settings, reviewing pain and sedation gtt's, communicating plan of care w/ nursing staff and consultants, and utilizing high complexity medical decision making to assess and treat vital organ system failure in an individual who has impairment of one or more vital organ systems such that there is a high probability of imminent or life threatening  deterioration in the patient’s condition. Failure to initiate the above interventions on an urgent basis would likely result in sudden, clinically significant or life threatening deterioration in the patient's condition.        VTE Prophylaxis:   Mechanical Order History:      Ordered        04/06/23 1754  Place Sequential Compression Device  Once            04/06/23 1754  Maintain Sequential Compression Device  Continuous                    Pharmalogical Order History:     None          Code Status and Medical Interventions:   Ordered at: 04/06/23 1707     Level Of Support Discussed With:    Patient     Code Status (Patient has no pulse and is not breathing):    CPR (Attempt to Resuscitate)     Medical Interventions (Patient has pulse or is breathing):    Full Support         Disposition: Remains significantly ill. GOC discussed with daughter and relayed severity of illness. Low threshold to involve palliative care should condition further worsen    I have reviewed any copied/forwarded text or data, verified its accuracy, and updated as necessary above.    Brett Perry MD  [facility]  04/10/23  17:27 EDT

## 2023-04-10 NOTE — PROGRESS NOTES
Kinetics :    Vancomycin     Day 1    The patient has been evaluated for vancomycin therapy for pneumonia.    We will load the patient with vancomycin 2000mg x 1 and follow with 1500mg q 24 hrs to maximize the auc / trough level projections and monitor with you.

## 2023-04-10 NOTE — PLAN OF CARE
Goal Outcome Evaluation:  Plan of Care Reviewed With: patient        Progress: declining  Outcome Evaluation: pt VSS, pt still intubated and sedated, pt withdraws from pain, pt diprivan incresed to max dose and levo began at lowest dose, pt has had adequate UOP above 30 mL per hr and had potassium replaced. pt lactic increased and provider notified pt afebrile with low grade tempt. pt currently resting.

## 2023-04-10 NOTE — SIGNIFICANT NOTE
I have reviewed patient's chart, reviewed imaging and labs.  Paged by nursing regarding worsening PaO2 and elevated peak pressures in the low 60s.  SPO2 87% on bedside monitor.  ABG 7.45/48/53 on AC/VC plus 20/500/100/10.  PF ratio 53 indicative of severe ARDS.  Initial minute ventilation 10K.  will adjust ventilation settings to reflect lower PEEP arm per the ARDSnet protocol.  Change vent settings to AC/VC 32/375/100/14-after these adjustments, peak pressures dropped to the low 40s.  Minute ventilation increased to 12 K.  After adjustments, bedside SPO2 94 to 96%. given severe ARDS, will initiate proning protocol, Nimbex bolus and drip, additional Versed ordered to ensure adequate sedation, repeat gas in 1 hour-allow for permissive hypercapnia (target pH> 7.2), goal PaO2> 55 or spO2 >88%, low TV/high peep, target peak <30 or <BMI    Physical exam:  Constitutional: Intubated, sedated, RASS -2  Cardiac: Regular rate, regular rhythm  Respiratory: Occasional crackles in the bases, no wheezing, occasional dyssynchrony with the ventilator, initial elevated peak pressures on the ventilator  Extremities: +1 edema bilaterally    #Acute hypoxemic hypercarbic respiratory failure  #Severe ARDS  #Shock, levo 0.02  #Febrile illness, send blood cultures, sputum culture    CC 36    Electronically signed by Kaveh Reyes DO, 04/10/23, 4:58 AM EDT.    ABG reviewed 7.48/42/158 on AC/VC 32/375/100/14- PF ratio improved from 53 to 158- adjust vent settings to 28/375/100/12, defer to pulmonology on rounds    Electronically signed by Kaveh Reyes DO, 04/10/23, 6:50 AM EDT.

## 2023-04-10 NOTE — CONSULTS
COPD Education    Patient is currently intubated and being ventilated with a mechanical ventilator. If her condition changes and she is able to participate in COPD education, please place another consult. Thanks    LUCIE Hopper, RRT  COPD Navigator

## 2023-04-11 NOTE — PAYOR COMM NOTE
"[facility]  NPI:[id number]    Utilization Review  Contact: Camryn Washington RN  Phone: [phone]  Fax:[phone]       Request for  reconsideration of denial REF # [id number]    Please note she is in  CCU since 4/8 and now  on vent    Aidee Sanchez (57 y.o. Female)     Date of Birth   1965    Social Security Number       Address   [address] [place], [place] [address]    Home Phone   [phone]    MRN   [id number]       [facility]    Marital Status                               Admission Date   4/6/23    Admission Type   Emergency    Admitting Provider   Jakob Koch MD    Attending Provider   Brett Perry MD    Department, Room/Bed   [facility] CRITICAL CARE, 03/       Discharge Date       Discharge Disposition       Discharge Destination                               Attending Provider: Brett Perry MD    Allergies: Nuts, Contrast Dye (Echo Or Unknown Ct/mr), Codeine, Latex    Isolation: None   Infection: None   Code Status: CPR    Ht: 144.8 cm (57.01\")   Wt: 111 kg (244 lb)    Admission Cmt: None   Principal Problem: Recurrent right pleural effusion [J90]                 Active Insurance as of 4/6/2023     Primary Coverage     Payor Plan Insurance Group Employer/Plan Group    [employer] MEDICARE REPLACEMENT [facility] DUAL COMPLETE MEDICARE REPLACEMENT [id number]     Payor Plan Address Payor Plan Phone Number Payor Plan Fax Number Effective Dates    [address] [phone]  1/1/2023 - None Entered    [facility] [id number]       Subscriber Name Subscriber Birth Date Member ID       AIDEE SANCHEZ 1965 [id number]                 Emergency Contacts      (Rel.) Home Phone Work Phone Mobile Phone    NA CACERES (Daughter) [phone] -- --    SANCHEZDOUGLAS NICHOLS (Son) [phone] -- [phone]        Jakob Koch MD   Physician  Hospitalist  Progress Notes     Signed  Date of Service:  " 23 1223  Creation Time:  23     Signed               [facility] HOSPITALIST PROGRESS NOTE     Patient Identification:  Name:  Aidee Trinidad  Age:  57 y.o.  Sex:  female  :  1965  MRN:  [id number]  Visit Number:  [id number]  ROOM: [address] [place] [address]      Primary Care Provider:  Azalea Burnett APRN     Date of Admission: 2023     Length of stay in inpatient status:  2     Subjective      Chief Compliant:        Chief Complaint   Patient presents with   • Shortness of Breath      History of Presenting Illness: Today, patient states that she does not feel better; she is still having some severe shortness of air, though her oxygen requirement has not went up.  She denies chest pain.  She is coughing and now is producing bloody sputum.  She denies chest pain, emesis, diarrhea.  She does have nausea.  She also still feels edematous all over.  After I saw the patient, she had worsening shortness of air and now is on 6 L/min of nasal cannula oxygen.       Objective      Current Hospital Meds:  aspirin, 81 mg, Oral, Daily  atorvastatin, 20 mg, Oral, Nightly  budesonide-formoterol, 2 puff, Inhalation, BID - RT  bumetanide, 2 mg, Intravenous, Once  cefTRIAXone, 1 g, Intravenous, Q24H  cetirizine, 10 mg, Oral, Daily  cholecalciferol, 50,000 Units, Oral, Q7 Days  doxycycline, 100 mg, Intravenous, Q12H  [START ON 2023] furosemide, 80 mg, Oral, Daily  guaiFENesin, 600 mg, Oral, Q12H  ipratropium-albuterol, 3 mL, Nebulization, Q6H - RT  levothyroxine, 75 mcg, Oral, Daily  methylPREDNISolone sodium succinate, 62.5 mg, Intravenous, Daily  metoprolol tartrate, 12.5 mg, Oral, Q12H  pantoprazole, 40 mg, Oral, Q AM  QUEtiapine, 50 mg, Oral, Nightly  sertraline, 50 mg, Oral, Daily  sodium chloride, 3 mL, Intravenous, Q12H  [START ON 2023] spironolactone, 200 mg, Oral, Daily     Current Antimicrobial Therapy:             Anti-Infectives (From admission, onward)     Ordered     Dose/Rate  Route Frequency Start Stop     04/06/23 2038   cefTRIAXone (ROCEPHIN) 1 g in sodium chloride 0.9 % 100 mL IVPB-VTB        Ordering Provider: Jakob Koch MD    1 g  200 mL/hr over 30 Minutes Intravenous Every 24 Hours 04/07/23 1800 04/12/23 1759     04/06/23 2038   doxycycline (VIBRAMYCIN) 100 mg in sodium chloride 0.9 % 100 mL IVPB-VTB        Ordering Provider: Jakob Koch MD    100 mg  over 60 Minutes Intravenous Every 12 Hours 04/06/23 2200 04/11/23 2159 04/06/23 1611   cefTRIAXone (ROCEPHIN) 2 g in sodium chloride 0.9 % 100 mL IVPB-VTB        Ordering Provider: Polina Oneil APRN    2 g  200 mL/hr over 30 Minutes Intravenous Once 04/06/23 1613 04/06/23 1836          Current Diuretic Therapy:             Diuretics (From admission, onward)     Ordered     Dose/Rate Route Frequency Start Stop     04/08/23 1810   spironolactone (ALDACTONE) tablet 200 mg        Ordering Provider: Jakob Koch MD    200 mg Oral Daily 04/09/23 0900       04/08/23 1811   furosemide (LASIX) tablet 80 mg        Ordering Provider: Jakob Koch MD    80 mg Oral Daily 04/09/23 0900       04/08/23 1824   bumetanide (BUMEX) injection 2 mg        Ordering Provider: Jakob Koch MD    2 mg Intravenous Once 04/08/23 1915       04/06/23 1607   furosemide (LASIX) injection 80 mg        Ordering Provider: Polina Oneil APRN    80 mg Intravenous Once 04/06/23 1609 04/06/23 1629          ----------------------------------------------------------------------------------------------------------------------  Vital Signs:  Temp:  [97.3 °F (36.3 °C)-97.6 °F (36.4 °C)] 97.5 °F (36.4 °C)  Heart Rate:  [71-97] 78  Resp:  [18-20] 18  BP: (109-161)/(64-83) 136/77  SpO2:  [93 %-98 %] 97 %  on  Flow (L/min):  [2] 2;   Device (Oxygen Therapy): humidified;nasal cannula  Body mass index is 55.53 kg/m².         Wt Readings from Last 3 Encounters:   04/08/23 116 kg (256 lb 11.2 oz)   04/02/23 125 kg (275 lb)    03/22/23 125 kg (275 lb)              Intake & Output (last 3 days)        04/05 0701  04/06 0700 04/06 0701 04/07 0700 04/07 0701 04/08 0700 04/08 0701 04/09 0700     P.O.     510 600     Total Intake(mL/kg)     510 (4.4) 600 (5.2)     Urine (mL/kg/hr)   700 550 (0.2) 900 (0.7)     Stool       0     Total Output   700 550 900     Net   -700 -40 -300                   Stool Unmeasured Occurrence       1 x          Diet: Cardiac Diets, Diabetic Diets; Healthy Heart (2-3 Na+); Consistent Carbohydrate; Texture: Regular Texture (IDDSI 7); Fluid Consistency: Thin (IDDSI 0)  ----------------------------------------------------------------------------------------------------------------------  Physical Exam   Constitutional:       General: She is awake.      Appearance: She is well-developed. She is morbidly obese. She is ill-appearing. She is not toxic-appearing or diaphoretic.      Interventions: Nasal cannula in place.      Comments: 2 L/min of NC oxygen.   Cardiovascular:      Rate and Rhythm: Normal rate and regular rhythm.      Pulses: Normal pulses.      Heart sounds: No murmur heard.  Pulmonary:      Effort: Accessory muscle usage, prolonged expiration and respiratory distress present. Today, she has severe retractions.      Breath sounds: Decreased air movement present. Wheezing present. No rales.   Abdominal:      General: Bowel sounds are normal. There is distension.      Palpations: Abdomen is soft. There is shifting dullness.      Tenderness: There is no abdominal tenderness.   Musculoskeletal:         General: No swelling, deformity or signs of injury. 1+ pitting edema of the bilateral legs.  Skin:     Capillary Refill: Capillary refill takes less than 2 seconds.      Coloration: Skin is not jaundiced or pale.      Findings: Bruising present. No rash.   Neurological:      Mental Status: She is alert and oriented to person, place, and time. Mental status is at baseline.      Cranial Nerves: No cranial  nerve deficit.   Psychiatric:         Mood and Affect: Mood normal.         Behavior: Behavior normal. Behavior is cooperative.         Thought Content: Thought content normal.         Judgment: Judgment normal.   ----------------------------------------------------------------------------------------------------------------------  Tele: Normal sinus rhythm heart rates in the 70s.  Personally reviewed the telemetry strips.  ----------------------------------------------------------------------------------------------------------------------  LABS:     CBC and coagulation:              Results from last 7 days   Lab Units 04/08/23  1742 04/07/23  0502 04/07/23  0241 04/06/23  2059 04/06/23  1754 04/06/23  1350 04/06/23  1312 04/02/23  1507   PROCALCITONIN ng/mL 0.16  --   --   --   --   --  0.15  --    LACTATE mmol/L 1.8  --   --  2.0 2.3* 2.1*  --   --    SED RATE mm/hr  --   --   --   --   --   --  85* 59*   CRP mg/dL  --   --   --   --   --   --  4.41* 3.75*   WBC 10*3/mm3 12.58* 5.65  --   --   --   --  8.04 6.92   HEMOGLOBIN g/dL 10.8* 9.5*  --   --   --   --  10.9* 10.3*   HEMATOCRIT % 34.9 29.5*  --   --   --   --  35.0 34.0   MCV fL 96.9 92.8  --   --   --   --  94.3 96.6   MCHC g/dL 30.9* 32.2  --   --   --   --  31.1* 30.3*   PLATELETS 10*3/mm3 180 176  --  143  --   --  174 138*   INR    --   --  1.34*  --   --   --  1.36*  --       Acid/base balance:        Results from last 7 days   Lab Units 04/06/23  1338 04/02/23  1844   PH, ARTERIAL pH units 7.421 7.441   PO2 ART mm Hg 79.0* 83.0   PCO2, ARTERIAL mm Hg 53.8* 52.2*   HCO3 ART mmol/L 35.0* 35.5*        Renal and electrolytes:           Results from last 7 days   Lab Units 04/08/23  1742 04/08/23  0551 04/07/23  0241 04/06/23  1312 04/02/23  1507   SODIUM mmol/L 138  --  134* 138 139   POTASSIUM mmol/L 3.6  --  3.9 4.6 4.7   MAGNESIUM mg/dL 2.2 2.8* 1.9  --   --    CHLORIDE mmol/L 96*  --  95* 97* 99   CO2 mmol/L 31.7*  --  27.6 33.6* 31.6*   BUN mg/dL  16  --  12 11 14   CREATININE mg/dL 0.60  --  0.65 0.71 0.73   CALCIUM mg/dL 9.2  --  8.8 9.3 9.3   PHOSPHORUS mg/dL 3.1  --  2.9  --   --    GLUCOSE mg/dL 148*  --  138* 127* 162*      Estimated Creatinine Clearance: 120.4 mL/min (by C-G formula based on SCr of 0.6 mg/dL).     Liver and pancreatic function:           Results from last 7 days   Lab Units 04/08/23  1742 04/07/23  0502 04/07/23  0241 04/06/23  1312 04/02/23  1507   ALBUMIN g/dL 3.0*  --  2.5* 2.6* 2.4*   BILIRUBIN mg/dL 1.5*  --  1.5* 2.0* 1.5*   ALK PHOS U/L 161*  --  150* 188* 168*   AST (SGOT) U/L 105*  --  67* 81* 88*   ALT (SGPT) U/L 56*  --  41* 51* 49*   AMMONIA umol/L  --  69*  --   --   --    LIPASE U/L  --   --   --  60  --       Endocrine function:        Lab Results   Component Value Date     HGBA1C <4.20 (L) 03/16/2023      Glucose levels from the CMP:          Results from last 7 days   Lab Units 04/08/23 1742 04/07/23  0241 04/06/23  1312 04/02/23  1507   GLUCOSE mg/dL 148* 138* 127* 162*       Cultures:          Microbiology Results (last 10 days)      Procedure Component Value - Date/Time     Respiratory Culture - Sputum, Cough [[id number]] Collected: 04/07/23 0454     Lab Status: Final result Specimen: Sputum from Cough Updated: 04/07/23 0636       Respiratory Culture Rejected       Gram Stain Rare (1+) WBCs seen         Rare (1+) Epithelial cells seen         Moderate (3+) Gram positive cocci in chains and clusters         Few (2+) Gram negative bacilli     Narrative:       Specimen rejected due to oropharyngeal contamination. Please reorder and recollect specimen if clinically necessary.     S. Pneumo Ag Urine or CSF - Urine, Urine, Clean Catch [[id number]]  (Normal) Collected: 04/07/23 0136     Lab Status: Final result Specimen: Urine, Clean Catch Updated: 04/07/23 1439       Strep Pneumo Ag Negative     Respiratory Panel PCR w/COVID-19(SARS-CoV-2) SARA/MARKOS/VANESSA/PAD/COR/MAD/PEPE In-House, NP Swab in UTM/VTM, 3-4 HR TAT - Swab,  Nasopharynx [[id number]]  (Normal) Collected: 04/07/23 0134     Lab Status: Final result Specimen: Swab from Nasopharynx Updated: 04/07/23 0233       ADENOVIRUS, PCR Not Detected       Coronavirus 229E Not Detected       Coronavirus HKU1 Not Detected       Coronavirus NL63 Not Detected       Coronavirus OC43 Not Detected       COVID19 Not Detected       Human Metapneumovirus Not Detected       Human Rhinovirus/Enterovirus Not Detected       Influenza A PCR Not Detected       Influenza B PCR Not Detected       Parainfluenza Virus 1 Not Detected       Parainfluenza Virus 2 Not Detected       Parainfluenza Virus 3 Not Detected       Parainfluenza Virus 4 Not Detected       RSV, PCR Not Detected       Bordetella pertussis pcr Not Detected       Bordetella parapertussis PCR Not Detected       Chlamydophila pneumoniae PCR Not Detected       Mycoplasma pneumo by PCR Not Detected     Narrative:       In the setting of a positive respiratory panel with a viral infection PLUS a negative procalcitonin without other underlying concern for bacterial infection, consider observing off antibiotics or discontinuation of antibiotics and continue supportive care. If the respiratory panel is positive for atypical bacterial infection (Bordetella pertussis, Chlamydophila pneumoniae, or Mycoplasma pneumoniae), consider antibiotic de-escalation to target atypical bacterial infection.     Blood Culture - Blood, Hand, Right [[id number]]  (Normal) Collected: 04/06/23 1410     Lab Status: Preliminary result Specimen: Blood from Hand, Right Updated: 04/08/23 1415       Blood Culture No growth at 2 days     Blood Culture - Blood, Wrist, Left [[id number]]  (Normal) Collected: 04/06/23 1350     Lab Status: Preliminary result Specimen: Blood from Wrist, Left Updated: 04/08/23 1415       Blood Culture No growth at 2 days     COVID-19 and FLU A/B PCR - Swab, Nasopharynx [[id number]]  (Normal) Collected: 04/06/23 1338     Lab Status: Final result  Specimen: Swab from Nasopharynx Updated: 04/06/23 1431       COVID19 Not Detected       Influenza A PCR Not Detected       Influenza B PCR Not Detected     Narrative:       Fact sheet for providers: https://www.fda.gov/media/970149/download     Fact sheet for patients: https://www.fda.gov/media/234944/download     Test performed by PCR.          I have personally looked at the labs and they are summarized above.     ----------------------------------------------------------------------------------------------------------------------             Imaging Results (Last 24 Hours)      Procedure Component Value Units Date/Time     XR Chest 1 View [[id number]] Collected: 04/08/23 1859       Updated: 04/08/23 1901     Narrative:       CHEST X-RAY, 4/8/2023       HISTORY:    57-year-old female hospital inpatient with severe sepsis, pneumonia, worsening hypoxia.       TECHNIQUE:  AP portable chest x-ray.     COMPARISON:  *  Chest x-ray, 4/6/2023.     FINDINGS:  Dense diffuse multifocal, somewhat nodular infiltrates throughout both lungs has increased significantly in severity since 4/6/2023. Bilateral pleural effusions. Low lung volumes. Stable cardiomegaly. Median sternotomy.        Impression:       Radiographic worsening of diffuse multifocal pulmonary infiltrates.     Signer Name: Pro Fleming MD   Signed: 4/8/2023 6:59 PM   Workstation Name: [id number]    [facility]       I have personally looked at the radiology images and read the available final radiology report.     Assessment & Plan       • Severe sepsis that was present on admission (lactic acid 2.1-2.3, CRP 4.41) due to bibasilar pneumonia and an acute COPD exacerbation  • History of decompensated cirrhosis with grade 1 esophageal varices in the lower one third, MELD score 12.49 on admission, with fluid overload on admission  • History of chronic hypoxic respiratory failure, uses 2 L/min of NC oxygen  • History of CAD status post  CABG in 2011  • History of right lateral malleolus avulsion fracture  • History of right TKA in the past  • Right lower extremity greater than left lower extremity weakness with underlying neuropathy  • History of type 2 diabetes mellitus with peripheral neuropathy  • History of grade 2 diastolic dysfunction, currently with an exacerbation due to the severe sepsis  • History of essential hypertension  • History of hypothyroidism  • History of depression and anxiety  • Debility secondary to recent right hip intertrochanteric femur fracture status post repair in addition to underlying morbid obesity     The patient still is on her home oxygen.  I suspect some of this is anxiety from feeling like she cannot breathe.  I also suspect that she has obesity hypoventilation syndrome and possibly needs BiPAP with sleeping.  Therefore, I will consult pulmonology for help with the acute COPD exacerbation, fluid overload, and the need for BiPAP.  Since she is still fluid overloaded, I will change the spironolactone to 200 mg for tomorrow and Lasix to 40 mg, at least a couple of days and monitor the Cr and electrolytes closely.  For now, since the oxygen saturations are dropping, will admit to the critical care unit.  I have contacted Dr. Duval and he has suggested BiPAP at 22/10.  I will also give the patient 50 g of albumin and nitroglycerin patch to try to help with the fluid overload.  If this does not improve her breathing, then we may have to intubate her as I suspect that she is went into ARDS/flash pulmonary edema.  I will obtain an ABG in the morning, labs, and a chest x-ray.  I also discussed the patient's pleural effusion on the right side that is chronic; at this time, it is the opinion of multiple hospitalist that removing this pleural effusion we do not acutely help the patient at this time.  Will give a dose of 50 grams of albumin and try a one inch nitropaste to help with the edema; I have not started a nitro gtt  yet as I want to see what the blood pressures do with the albumin and nitropaste.     VTE Prophylaxis:   The patient is high risk due to the following diagnoses/reasons: Severe sepsis     Total critical care time is 30 minutes.     Jakob Koch MD  Psychiatric Hospitalist  23  18:45 EDT                       Revision History                            Jakob Koch MD   Physician  Hospitalist  Progress Notes     Addendum  Date of Service:  23  Creation Time:  23          Revision History                                              Brett Perry MD   Physician  Hospitalist  Progress Notes     Signed  Date of Service:  04/10/23 1726  Creation Time:  04/10/23 1726     Signed        Expand All Collapse All         [facility] HOSPITALIST PROGRESS NOTE     Patient Identification:  Name:  Aidee Trinidad  Age:  57 y.o.  Sex:  female  :  1965  MRN:  [id number]  Visit Number:  [id number]  ROOM: [address]      Primary Care Provider:  Azalea Burnett APRN     Date of Admission: 2023     Length of stay in inpatient status:  4     Subjective      Chief Compliant:        Chief Complaint   Patient presents with   • Shortness of Breath         Patient had decline overnight with development of worsening ARDS w/difficult oxygenation. Pf ratio <100 and decision was made to paralyze/sedated and prone with improvement in pf ratio afterwards down to 60% fiO2 this am. Repeated cxr without effusions, improved from admission CT chest but noted progression of b/l patchy infiltrates. Low dose levophed initiated when initially proned but off this am and hemodynamics remain stable.     Discussed update with patient's daughter Puja this afternoon and all questions answered. Family did have question regarding utility of transfer to higher level of care but given current condition risk likely outweigh any potential benefit and daughter whom is NOK agreed to  continue current treatment plan here while revisiting transfer should condition change or need for subspeciality care not offered at our facility arise.           Objective      Current Hospital Meds:  albumin human, 25 g, Intravenous, Q12H  artificial tears, , Both Eyes, Q4H  aspirin, 81 mg, Oral, Daily  atorvastatin, 20 mg, Oral, Nightly  budesonide, 0.5 mg, Nebulization, BID - RT  cefepime, 2 g, Intravenous, Q8H  cetirizine, 10 mg, Oral, Daily  chlorhexidine, 15 mL, Mouth/Throat, Q12H  ipratropium-albuterol, 3 mL, Nebulization, 4x Daily - RT  levothyroxine, 75 mcg, Oral, Daily  methylPREDNISolone sodium succinate, 60 mg, Intravenous, Q12H  metroNIDAZOLE, 500 mg, Intravenous, Q8H  nystatin, , Topical, Q12H  pantoprazole, 40 mg, Intravenous, Q24H  potassium chloride, 40 mEq, Oral, Q4H  sertraline, 50 mg, Oral, Daily  sodium chloride, 10 mL, Intravenous, Q12H  sodium chloride, 3 mL, Intravenous, Q12H  [START ON 4/11/2023] vancomycin, 1,500 mg, Intravenous, Q24H     cisatracurium (NIMBEX) 200 mg in sodium chloride 0.9 % 100 mL, 0.5-10 mcg/kg/min, Last Rate: 3.25 mcg/kg/min (04/10/23 1444)  midazolam, 1-10 mg/hr, Last Rate: 2 mg/hr (04/10/23 1445)  norepinephrine, 0.02-0.3 mcg/kg/min, Last Rate: Stopped (04/10/23 0545)  Pharmacy to dose vancomycin,   propofol, 5-50 mcg/kg/min, Last Rate: 40 mcg/kg/min (04/10/23 1539)        Current Antimicrobial Therapy:             Anti-Infectives (From admission, onward)     Ordered     Dose/Rate Route Frequency Start Stop     04/10/23 1011   vancomycin 1500 mg/500 mL 0.9% NS IVPB ([facility])        Ordering Provider: Brett Perry MD    1,500 mg  over 90 Minutes Intravenous Every 24 Hours 04/11/23 1100 04/17/23 1059     04/10/23 0946   cefepime 2 gm IVPB in 100 ml NS (VTB)        Ordering Provider: Brett Perry MD    2 g  over 4 Hours Intravenous Every 8 Hours 04/10/23 1845 04/17/23 1844     04/10/23 0946   metroNIDAZOLE (FLAGYL) IVPB 500 mg        Ordering Provider: [place]  Brett RUBALCAVA MD    500 mg  over 60 Minutes Intravenous Every 8 Hours 04/10/23 1100 04/17/23 1059     04/10/23 1011   vancomycin IVPB 2000 mg in 0.9% Sodium Chloride (premix) 500 mL        Ordering Provider: Brett Perry MD    2,000 mg  over 120 Minutes Intravenous Once 04/10/23 1100 04/10/23 1323     04/10/23 0946   cefepime 2 gm IVPB in 100 ml NS (VTB)        Ordering Provider: Brett Perry MD    2 g  over 30 Minutes Intravenous Once 04/10/23 1045 04/10/23 1148     04/10/23 0946   Pharmacy to dose vancomycin        Ordering Provider: Brett Perry MD      Does not apply Continuous PRN 04/10/23 0945 04/17/23 0944     04/06/23 1611   cefTRIAXone (ROCEPHIN) 2 g in sodium chloride 0.9 % 100 mL IVPB-VTB        Ordering Provider: Polina Oneil APRN    2 g  200 mL/hr over 30 Minutes Intravenous Once 04/06/23 1613 04/06/23 1836          Current Diuretic Therapy:             Diuretics (From admission, onward)     Ordered     Dose/Rate Route Frequency Start Stop     04/09/23 0639   bumetanide (BUMEX) injection 2 mg        Ordering Provider: Gill Sanchez DO    2 mg Intravenous Once 04/09/23 0730 04/09/23 0651     04/08/23 1824   bumetanide (BUMEX) injection 2 mg        Ordering Provider: Jakob Koch MD    2 mg Intravenous Once 04/08/23 1915 04/08/23 1856     04/06/23 1607   furosemide (LASIX) injection 80 mg        Ordering Provider: Polina Oneil APRN    80 mg Intravenous Once 04/06/23 1609 04/06/23 1629          ----------------------------------------------------------------------------------------------------------------------  Vital Signs:  Temp:  [99.1 °F (37.3 °C)-99.5 °F (37.5 °C)] 99.1 °F (37.3 °C)  Heart Rate:  [75-97] 75  Resp:  [20-32] 28  BP: ()/(41-75) 110/63  FiO2 (%):  [75 %-100 %] 80 %  SpO2:  [90 %-100 %] 96 %  on  Flow (L/min):  [6] 6;   Device (Oxygen Therapy): ventilator  Body mass index is 50.13 kg/m².         Wt Readings from Last 3 Encounters:   04/10/23  105 kg (231 lb 11.3 oz)   04/02/23 125 kg (275 lb)   03/22/23 125 kg (275 lb)              Intake & Output (last 3 days)        04/07 0701  04/08 0700 04/08 0701 04/09 0700 04/09 0701  04/10 0700 04/10 0701 04/11 0700     P.O. 510 600         I.V. (mL/kg)     1019.8 (9.7)       IV Piggyback   300 900       Total Intake(mL/kg) 510 (4.4) 900 (7.8) 1919.8 (18.3)       Urine (mL/kg/hr) 550 (0.2) 1950 (0.7) 400 (0.2)       Stool   0 0       Total Output 550 1950 400       Net -40 -1050 +1519.8                     Stool Unmeasured Occurrence   1 x              NPO Diet NPO Type: Strict NPO  ----------------------------------------------------------------------------------------------------------------------  Physical Exam  Vitals and nursing note reviewed.   Constitutional:       Appearance: She is obese. She is ill-appearing.      Comments: Intubated/proned, currently paralyzed and deeply sedated   HENT:      Head: Normocephalic and atraumatic.      Mouth/Throat:      Comments: ETT in place  Cardiovascular:      Rate and Rhythm: Normal rate and regular rhythm.      Pulses: Normal pulses.   Pulmonary:      Comments: B/l mechanical breath sounds to lower posterior lung fields  Musculoskeletal:      Right lower leg: No edema.      Left lower leg: No edema.   Skin:     General: Skin is warm and dry.      Capillary Refill: Capillary refill takes less than 2 seconds.   Neurological:      Comments: Paralyzed w/deep sedation       ----------------------------------------------------------------------------------------------------------------------     ----------------------------------------------------------------------------------------------------------------------  LABS:     CBC and coagulation:               Results from last 7 days   Lab Units 04/10/23  0030 04/09/23  0049 04/08/23  1742 04/07/23  0502 04/07/23  0241 04/06/23  2059 04/06/23  1754 04/06/23  1350 04/06/23  1312   PROCALCITONIN ng/mL  --   --  0.16  --   --    --   --   --  0.15   LACTATE mmol/L 2.1* 1.9 1.8  --   --  2.0 2.3* 2.1*  --    SED RATE mm/hr  --   --   --   --   --   --   --   --  85*   CRP mg/dL  --  2.84*  --   --   --   --   --   --  4.41*   WBC 10*3/mm3 9.82 8.94 12.58* 5.65  --   --   --   --  8.04   HEMOGLOBIN g/dL 8.7* 9.5* 10.8* 9.5*  --   --   --   --  10.9*   HEMATOCRIT % 28.0* 30.6* 34.9 29.5*  --   --   --   --  35.0   MCV fL 96.6 96.8 96.9 92.8  --   --   --   --  94.3   MCHC g/dL 31.1* 31.0* 30.9* 32.2  --   --   --   --  31.1*   PLATELETS 10*3/mm3 133* 132* 180 176  --  143  --   --  174   INR   1.74* 1.48*  --   --  1.34*  --   --   --  1.36*      Acid/base balance:            Results from last 7 days   Lab Units 04/10/23  0626 04/10/23  0426 04/09/23  1430 04/09/23  0806 04/09/23  0259 04/06/23  1338   PH, ARTERIAL pH units 7.487* 7.455* 7.475* 7.598* 7.475* 7.421   PO2 ART mm Hg 158.0* 53.5* 68.0* 81.6* 54.6* 79.0*   PCO2, ARTERIAL mm Hg 42.6 48.0* 46.4* 34.8* 50.3* 53.8*   HCO3 ART mmol/L 32.2* 33.7* 34.1* 33.9* 37.0* 35.0*      Renal and electrolytes:             Results from last 7 days   Lab Units 04/10/23  1124 04/10/23  0030 04/09/23  0049 04/08/23  1742 04/08/23  0551 04/07/23  0241 04/06/23  1312   SODIUM mmol/L  --  141 139 138  --  134* 138   POTASSIUM mmol/L 3.6 3.4* 3.8 3.6  --  3.9 4.6   MAGNESIUM mg/dL  --  2.8* 1.9 2.2 2.8* 1.9  --    CHLORIDE mmol/L  --  96* 96* 96*  --  95* 97*   CO2 mmol/L  --  31.8* 31.9* 31.7*  --  27.6 33.6*   BUN mg/dL  --  26* 15 16  --  12 11   CREATININE mg/dL  --  0.97 0.66 0.60  --  0.65 0.71   CALCIUM mg/dL  --  9.4 8.9 9.2  --  8.8 9.3   PHOSPHORUS mg/dL  --  2.3* 2.7 3.1  --  2.9  --    GLUCOSE mg/dL  --  149* 145* 148*  --  138* 127*      Estimated Creatinine Clearance: 70 mL/min (by C-G formula based on SCr of 0.97 mg/dL).     Liver and pancreatic function:            Results from last 7 days   Lab Units 04/10/23  0030 04/09/23  0049 04/08/23  1742 04/07/23  0502 04/07/23  0241 04/06/23  1312    ALBUMIN g/dL 3.6 3.3* 3.0*  --  2.5* 2.6*   BILIRUBIN mg/dL 1.9* 1.4* 1.5*  --  1.5* 2.0*   ALK PHOS U/L 102 125* 161*  --  150* 188*   AST (SGOT) U/L 104* 90* 105*  --  67* 81*   ALT (SGPT) U/L 43* 46* 56*  --  41* 51*   AMMONIA umol/L  --  40  --  69*  --   --    LIPASE U/L  --   --   --   --   --  60      Endocrine function:        Lab Results   Component Value Date     HGBA1C <4.20 (L) 03/16/2023      Point of care bedside glucose levels:      Glucose levels from the [facility]:           Results from last 7 days   Lab Units 04/10/23  0030 04/09/23  0049 04/08/23  1742 04/07/23  0241 04/06/23  1312   GLUCOSE mg/dL 149* 145* 148* 138* 127*            Lab Results   Component Value Date     TSH 1.200 03/17/2023      Cardiac:           Results from last 7 days   Lab Units 04/10/23  0030 04/08/23  1953 04/08/23  1742 04/06/23  1515 04/06/23  1312   HSTROP T ng/L  --  38* 36* 48* 47*   PROBNP pg/mL 4,648.0*  --   --   --  830.9       Cultures:        Lab Results   Component Value Date     COLORU Yellow 04/06/2023     CLARITYU Clear 04/06/2023     PHUR 7.0 04/06/2023     GLUCOSEU Negative 04/06/2023     KETONESU Negative 04/06/2023     BLOODU Negative 04/06/2023     NITRITEU Negative 04/06/2023     LEUKOCYTESUR Trace (A) 04/06/2023     BILIRUBINUR Negative 04/06/2023     UROBILINOGEN 1.0 E.U./dL 04/06/2023     RBCUA 0-2 04/06/2023     WBCUA 0-2 04/06/2023     BACTERIA None Seen 04/06/2023              Microbiology Results (last 10 days)      Procedure Component Value - Date/Time     Respiratory Culture - Sputum, Cough [[id number]] Collected: 04/07/23 0454     Lab Status: Final result Specimen: Sputum from Cough Updated: 04/07/23 0636       Respiratory Culture Rejected       Gram Stain Rare (1+) WBCs seen         Rare (1+) Epithelial cells seen         Moderate (3+) Gram positive cocci in chains and clusters         Few (2+) Gram negative bacilli     Narrative:       Specimen rejected due to oropharyngeal contamination.  Please reorder and recollect specimen if clinically necessary.     S. Pneumo Ag Urine or CSF - Urine, Urine, Clean Catch [[id number]]  (Normal) Collected: 04/07/23 0136     Lab Status: Final result Specimen: Urine, Clean Catch Updated: 04/07/23 1439       Strep Pneumo Ag Negative     Respiratory Panel PCR w/COVID-19(SARS-CoV-2) SARA/MARKOS/VANESSA/[facility]/PEPE In-House, NP Swab in UTM/VTM, 3-4 HR TAT - Swab, Nasopharynx [[id number]]  (Normal) Collected: 04/07/23 0134     Lab Status: Final result Specimen: Swab from Nasopharynx Updated: 04/07/23 0233       ADENOVIRUS, PCR Not Detected       Coronavirus 229E Not Detected       Coronavirus HKU1 Not Detected       Coronavirus NL63 Not Detected       Coronavirus OC43 Not Detected       COVID19 Not Detected       Human Metapneumovirus Not Detected       Human Rhinovirus/Enterovirus Not Detected       Influenza A PCR Not Detected       Influenza B PCR Not Detected       Parainfluenza Virus 1 Not Detected       Parainfluenza Virus 2 Not Detected       Parainfluenza Virus 3 Not Detected       Parainfluenza Virus 4 Not Detected       RSV, PCR Not Detected       Bordetella pertussis pcr Not Detected       Bordetella parapertussis PCR Not Detected       Chlamydophila pneumoniae PCR Not Detected       Mycoplasma pneumo by PCR Not Detected     Narrative:       In the setting of a positive respiratory panel with a viral infection PLUS a negative procalcitonin without other underlying concern for bacterial infection, consider observing off antibiotics or discontinuation of antibiotics and continue supportive care. If the respiratory panel is positive for atypical bacterial infection (Bordetella pertussis, Chlamydophila pneumoniae, or Mycoplasma pneumoniae), consider antibiotic de-escalation to target atypical bacterial infection.     Blood Culture - Blood, Hand, Right [[id number]]  (Normal) Collected: 04/06/23 1410     Lab Status: Preliminary result Specimen: Blood from Hand, Right  Updated: 04/10/23 1415       Blood Culture No growth at 4 days     Blood Culture - Blood, Wrist, Left [[id number]]  (Normal) Collected: 04/06/23 1350     Lab Status: Preliminary result Specimen: Blood from Wrist, Left Updated: 04/10/23 1415       Blood Culture No growth at 4 days     COVID-19 and FLU A/B PCR - Swab, Nasopharynx [[id number]]  (Normal) Collected: 04/06/23 1338     Lab Status: Final result Specimen: Swab from Nasopharynx Updated: 04/06/23 1431       COVID19 Not Detected       Influenza A PCR Not Detected       Influenza B PCR Not Detected     Narrative:       Fact sheet for providers: https://www.fda.gov/media/346489/download     Fact sheet for patients: https://www.fda.gov/media/781532/download     Test performed by PCR.     COVID-19 and FLU A/B PCR - Swab, Nasopharynx [[id number]]  (Normal) Collected: 04/02/23 1508     Lab Status: Final result Specimen: Swab from Nasopharynx Updated: 04/02/23 1535       COVID19 Not Detected       Influenza A PCR Not Detected       Influenza B PCR Not Detected     Narrative:       Fact sheet for providers: https://www.fda.gov/media/652157/download     Fact sheet for patients: https://www.fda.gov/media/712950/download     Test performed by PCR.             No results found for: PREGTESTUR, PREGSERUM, HCG, HCGQUANT         Pain Management Panel         Pain Management Panel Latest Ref Rng & Units 3/17/2023 2/28/2020     AMPHETAMINES SCREEN, URINE Negative Negative Negative     BARBITURATES SCREEN Negative Negative Negative     BENZODIAZEPINE SCREEN, URINE Negative Negative Negative     BUPRENORPHINEUR Negative Negative Negative     COCAINE SCREEN, URINE Negative Negative Negative     METHADONE SCREEN, URINE Negative Negative Negative     METHAMPHETAMINEUR Negative Negative -                I have personally looked at the labs and they are summarized  above.  ----------------------------------------------------------------------------------------------------------------------  Detailed radiology reports for the last 24 hours:         Imaging Results (Last 24 Hours)      ** No results found for the last 24 hours. **          4/9 cxr w/progressive b/l infiltrates, right effusion improved     Assessment & Plan       #Acute on crhonic hypoxic respiratory failure (2L baseline)  #Severe ARDS, suspected secondary to PNA  #Suspected bacterial PNA  #Right pleural effusion, resolved  -Patient initially presenting with sx consistent with PNA and likely parapneumonic effusion on imaging that improved with initial abx coverage and mild diuresis however respiratory status decline acutely shortly after admission with significant b/l infiltrates progressing to intubation and paralysis/proning overnight given O2 requirement.  -Etiology: Patient's cardiac hx makes flash pulm edema possible but effusion improved and not overtly volume overloaded so less likely. Viral resp panel negative but given rising inr, drop in hemoglobin and blood noted in ET tube peritintubation, cannot exclude DAH as cause and cannot r/o coexisting vasculitic process but no skin findings currently.  -Will cont paralysis/proning 16/8hr schedule with repeat abg in am as well as repeat cxr  -ABX coverage expanded to Vanc/cefepime/flagyl w/MRSA nares performed. Would benefit from diagnostic bronchoscopy with BAL but given current fiO2 and peep requirement would not likely tolerate, will readdress as condition improves  -R effusion resolved, deferred thora  -Holding DVT prophylaxis given poss. DAH. Agree with Vit K ordered by intensivist, repeat INR and CBC in am  -ARDSnet protocol peep tables, cont solumedrol 60 q12hr. Diuresis as hemodynamics will tolerate     #Hypokalemia  #Hypophosphatemia  -Replace prn     #Hx of decompensated GONZALEZ cirrhosis w/Grade 1 EV  #Coagulopathy  -Secondary to liver disease,  correcting with Vit K     #Elevated BNP  #Hx of CAD s/p CABG 2011  #Hx of HFpEF, not in exacerbation  -Echo 4/7 w/EF preserved  -BNP elevation likely secondary to respiratory failure     #Hepatocellular liver injury  -Secondary to GONZALEZ w/congestive hepatopathy from acute right heart strain given severe resp failure  -Added acute hepatitis panel     #Normocytic anemia  -Secondary to sepsis        - - Chronic - -     #Hypothyroidism: Cont synthroid     F: NPO, Trophic tfs via NG, hold while proned  A: Fentanyl prn  S: Propofol, versed  T: holding  H: HOB elevated  U: PPI  G: PRN  S: When appropriate, paO2 improving  B: PRN  I: CVC, Radial a-line  D: Vanc/cefepime/flagyl     I have spent 35 minutes of critical care time with > 50% of time spent in direct patient care, evaluating the patient at bedside, reviewing all labs and images, reviewing ABG and adjusting vent settings, reviewing pain and sedation gtt's, communicating plan of care w/ nursing staff and consultants, and utilizing high complexity medical decision making to assess and treat vital organ system failure in an individual who has impairment of one or more vital organ systems such that there is a high probability of imminent or life threatening deterioration in the patient’s condition. Failure to initiate the above interventions on an urgent basis would likely result in sudden, clinically significant or life threatening deterioration in the patient's condition.           VTE Prophylaxis:       Mechanical Order History:        Ordered         04/06/23 1754   Place Sequential Compression Device  Once             04/06/23 1754   Maintain Sequential Compression Device  Continuous                              Pharmalogical Order History:      None                 Code Status and Medical Interventions:   Ordered at: 04/06/23 1707     Level Of Support Discussed With:     Patient     Code Status (Patient has no pulse and is not breathing):     CPR (Attempt to  Resuscitate)     Medical Interventions (Patient has pulse or is breathing):     Full Support            Disposition: Remains significantly ill. GOC discussed with daughter and relayed severity of illness. Low threshold to involve palliative care should condition further worsen     I have reviewed any copied/forwarded text or data, verified its accuracy, and updated as necessary above.     Brett Perry MD  [facility]  04/10/23  17:27 EDT

## 2023-04-11 NOTE — CONSULTS
Palliative Care Initial Consult     Attending Physician: Brett Perry MD  Referring Provider: Brett Perry MD./Mukund Lee MD Pulmonary    Palliative care reason for consult: GOC/ACP  Code Status:   Code Status and Medical Interventions:   Ordered at: 04/11/23 1109     Code Status (Patient has no pulse and is not breathing):    No CPR (Do Not Attempt to Resuscitate)     Medical Interventions (Patient has pulse or is breathing):    Full Support     Release to patient:    Routine Release      Advanced Directives: Advance Directive Status: Patient does not have advance directive   Healthcare surrogate: NOK daughter Puja Montgomery, son Americo Trinidad  Goals of Care: After discussion with Hospitalist patients family decided to make Aidee a do not resuscitate should her heart stop.    HPI:  Aidee Trinidad is a 57 y.o. female admitted on 4/6/2023 with shortness of breath, with productive cough, thick yellow/brown sputum.Aidee has a medical history of decompensated cirrhosis due to GONZALEZ with grade 1 esophageal varices in the lower one third, coronary artery disease status post three-vessel CABG in 2011, COPD for which she uses 2 L/min of nasal cannula oxygen, essential hypertension, type 2 diabetes mellitus, hyperlipidemia, hypothyroidism, MANUEL.  Pt also had rt hip cephalomedullary mailing with TFN nailing on 7/18/2021 due to a  right intertrochanteric femur fracture. She again fell in her shower and presented to ED on 3/22/2023 with imaging showing  a right periprosthetic lesser trochanter fracture. Pt was recently admitted here at [facility] 3/16-3/18 for hypoglycemia and toxic encephalopathy. On this admission Aidee is complaining of severe fatigue, she states that her baseline abdominal swelling is worse, however feels her leg swelling is at baseline.  CT of abdomen and pelvis  Showed Bibasilar partially consolidative airspace disease which may  represent atelectasis and pneumonia.  Today 4/11/2023 Pt is sedated and  intubated on ventilator @ 100% FiO2 with Peep of 12        ROS: Negative except as above in HPI.     Past Medical History:   Diagnosis Date   • Abscess of periorbital region, right 10/30/2021   • Arthritis    • Chronic back pain    • Closed fracture of right hip 2021   • COPD exacerbation 2023   • Coronary artery disease, 3 vessel CABG    • Decompensated hepatic cirrhosis, grade 1 esophageal varices in the lower 1/3    • Depression    • Essential hypertension    • H pylori ulcer    • History of transfusion    • Hyperlipidemia    • Hypothyroidism    • Iron deficiency anemia 2023   • Recurrent right pleural effusion 2023   • Sleep apnea    • Type 2 diabetes mellitus, without long-term current use of insulin 10/30/2021     Past Surgical History:   Procedure Laterality Date   • CARPAL TUNNEL RELEASE     •  SECTION     • CHOLECYSTECTOMY     • COLONOSCOPY     • CORONARY ANGIOPLASTY WITH STENT PLACEMENT     • ENDOSCOPY N/A 2022    Procedure: ESOPHAGOGASTRODUODENOSCOPY WITH BIOPSY;  Surgeon: Lizzeth Harmon MD;  Location: [facility];  Service: Gastroenterology;  Laterality: N/A;   • HIP TROCHANTERIC NAILING WITH INTRAMEDULLARY HIP SCREW Right 2021    Procedure: HIP TROCHANTERIC NAILING LONG WITH INTRAMEDULLARY HIP SCREW;  Surgeon: Oracio Ponce DO;  Location: [facility];  Service: Orthopedics;  Laterality: Right;   • REPLACEMENT TOTAL KNEE Right    • TUBAL ABDOMINAL LIGATION     • UPPER GASTROINTESTINAL ENDOSCOPY       Social History     Socioeconomic History   • Marital status:    Tobacco Use   • Smoking status: Never   • Smokeless tobacco: Never   Vaping Use   • Vaping Use: Never used   Substance and Sexual Activity   • Alcohol use: Yes     Comment: Once a year    • Drug use: No   • Sexual activity: Defer     Family History   Problem Relation Age of Onset   • COPD Mother    • Stroke Mother    • Cancer Brother    • Diabetes Brother    • Hypertension Brother     • Heart disease Brother    • Breast cancer Neg Hx        Allergies   Allergen Reactions   • Nuts Anaphylaxis     peanuts   • Contrast Dye (Echo Or Unknown Ct/Mr) Unknown - Low Severity   • Codeine Itching and Nausea And Vomiting   • Latex Rash       Current Facility-Administered Medications   Medication Dose Route Frequency Provider Last Rate Last Admin   • albumin human 25 % IV SOLN 12.5 g  12.5 g Intravenous BID Brett Perry MD   12.5 g at 04/11/23 0819   • artificial tears ophthalmic ointment   Both Eyes Q4H Kaveh Reyes DO   Given at 04/11/23 0845   • aspirin EC tablet 81 mg  81 mg Oral Daily Jakob Koch MD   81 mg at 04/11/23 0817   • atorvastatin (LIPITOR) tablet 20 mg  20 mg Oral Nightly Jakob Koch MD   20 mg at 04/10/23 2301   • benzonatate (TESSALON) capsule 200 mg  200 mg Oral TID PRN Jakob Koch MD   200 mg at 04/07/23 1719   • budesonide (PULMICORT) nebulizer solution 0.5 mg  0.5 mg Nebulization BID - RT Brett Perry MD   0.5 mg at 04/11/23 0639   • cefepime 2 gm IVPB in 100 ml NS (VTB)  2 g Intravenous Q8H Brett Perry MD   2 g at 04/11/23 1201   • cetirizine (zyrTEC) tablet 10 mg  10 mg Oral Daily Jakob Koch MD   10 mg at 04/11/23 0817   • chlorhexidine (PERIDEX) 0.12 % solution 15 mL  15 mL Mouth/Throat Q12H Gill Sanchez DO   15 mL at 04/11/23 0817   • cisatracurium (NIMBEX) 200 mg in 100 mL NS infusion  0.5-10 mcg/kg/min Intravenous Titrated Kaveh Reyes DO 5.13 mL/hr at 04/10/23 2108 1.63 mcg/kg/min at 04/10/23 2108   • dextrose (D50W) (25 g/50 mL) IV injection 25 g  25 g Intravenous Q15 Min PRN Jakob Koch MD       • dextrose (GLUTOSE) oral gel 15 g  15 g Oral Q15 Min PRN Jakob Koch MD       • fentaNYL citrate (PF) (SUBLIMAZE) injection 100 mcg  100 mcg Intravenous Q2H PRN Brett Perry MD       • furosemide (LASIX) injection 40 mg  40 mg Intravenous Once Brett Perry MD       • glucagon HCl  (Diagnostic) injection 1 mg  1 mg Subcutaneous Q15 Min PRN Jakob Koch MD       • hydrOXYzine (ATARAX) tablet 50 mg  50 mg Oral 4x Daily PRN Gill Sanchez DO       • ipratropium-albuterol (DUO-NEB) nebulizer solution 3 mL  3 mL Nebulization Q4H PRN Jakob Koch MD       • ipratropium-albuterol (DUO-NEB) nebulizer solution 3 mL  3 mL Nebulization 4x Daily - RT Gill Sanchez DO   3 mL at 04/11/23 1221   • levothyroxine (SYNTHROID, LEVOTHROID) tablet 75 mcg  75 mcg Oral Daily Jakob Koch MD   75 mcg at 04/11/23 0817   • Magnesium Sulfate - Total Dose 10 grams - Magnesium 1 or Less  2 g Intravenous PRN Jakob Koch MD        Or   • Magnesium Sulfate - Total Dose 6 grams - Magnesium 1.1 - 1.5  2 g Intravenous PRN Jakob Koch MD        Or   • Magnesium Sulfate - Total Dose 4 grams - Magnesium 1.6 - 1.9  4 g Intravenous PRN Jakob Koch MD       • methylPREDNISolone sodium succinate (SOLU-Medrol) 1,000 mg in sodium chloride 0.9 % 100 mL IVPB  1,000 mg Intravenous Once Jesus Duval  mL/hr at 04/11/23 1229 1,000 mg at 04/11/23 1229   • metroNIDAZOLE (FLAGYL) IVPB 500 mg  500 mg Intravenous Q8H Brett Perry MD   500 mg at 04/11/23 1201   • midazolam (VERSED) 100 mg in 100mL NS infusion  1-10 mg/hr Intravenous Titrated Kaveh Reyes DO   Stopped at 04/10/23 2225   • nitroglycerin (NITROSTAT) SL tablet 0.4 mg  0.4 mg Sublingual Q5 Min PRN Jakob Koch MD       • nystatin (MYCOSTATIN) powder   Topical Q12H Kaveh Reyes DO   Given at 04/11/23 0818   • pantoprazole (PROTONIX) injection 40 mg  40 mg Intravenous Q24H Gill Sanchez DO   40 mg at 04/11/23 0822   • Pharmacy to dose vancomycin   Does not apply Continuous PRN Brett Perry MD       • phytonadione (AQUA-MEPHYTON, VITAMIN K) 10 mg in sodium chloride 0.9 % 50 mL IVPB  10 mg Intravenous Once Jesus Duval MD       • potassium chloride (MICRO-K) CR capsule 40  mEq  40 mEq Oral PRN Jakob Koch MD        Or   • potassium chloride (KLOR-CON) packet 40 mEq  40 mEq Oral PRN Jakob Koch MD   40 mEq at 04/10/23 1830    Or   • potassium chloride 10 mEq in 100 mL IVPB  10 mEq Intravenous Q1H PRN Jakob Koch MD       • prochlorperazine (COMPAZINE) injection 10 mg  10 mg Intravenous Q6H PRN Jakob Koch MD   10 mg at 04/07/23 1513   • promethazine (PHENERGAN) tablet 25 mg  25 mg Oral Q4H PRN Jakob Koch MD       • propofol (DIPRIVAN) infusion 10 mg/mL 100 mL  5-50 mcg/kg/min Intravenous Titrated Jakob Koch MD 24.2 mL/hr at 04/11/23 1050 35 mcg/kg/min at 04/11/23 1050   • sertraline (ZOLOFT) tablet 50 mg  50 mg Oral Daily Jakob Koch MD   50 mg at 04/11/23 0817   • sodium chloride 0.9 % flush 10 mL  10 mL Intravenous PRN Jakob Koch MD       • sodium chloride 0.9 % flush 10 mL  10 mL Intravenous Q12H Jakob Koch MD   10 mL at 04/10/23 2301   • sodium chloride 0.9 % flush 20 mL  20 mL Intravenous PRN Jakob Koch MD       • sodium chloride 0.9 % flush 20 mL  20 mL Intravenous PRN Jakob Koch MD       • sodium chloride 0.9 % flush 3 mL  3 mL Intravenous Q12H Jakob Koch MD   3 mL at 04/10/23 2301   • sodium chloride 0.9 % flush 3-10 mL  3-10 mL Intravenous PRN Jakob Koch MD       • sodium chloride 0.9 % infusion 40 mL  40 mL Intravenous PRN Jakob Koch MD       • sodium chloride 0.9 % infusion 40 mL  40 mL Intravenous PRN Jakob Koch MD       • sodium chloride 0.9 % infusion 40 mL  40 mL Intravenous PRN Jakob Koch MD       • vancomycin 1250 mg/250 mL 0.9% NS IVPB ([facility])  1,250 mg Intravenous Q24H Miroslava Patton RPH   1,250 mg at 04/11/23 1230     cisatracurium (NIMBEX) 200 mg in sodium chloride 0.9 % 100 mL, 0.5-10 mcg/kg/min, Last Rate: 1.63 mcg/kg/min (04/10/23 2108)  midazolam, 1-10 mg/hr, Last Rate: Stopped (04/10/23 2225)  Pharmacy to dose vancomycin,   propofol, 5-50  "mcg/kg/min, Last Rate: 35 mcg/kg/min (04/11/23 1050)      •  benzonatate  •  dextrose  •  dextrose  •  fentaNYL citrate (PF)  •  glucagon (human recombinant)  •  hydrOXYzine  •  ipratropium-albuterol  •  magnesium sulfate **OR** magnesium sulfate **OR** magnesium sulfate  •  nitroglycerin  •  Pharmacy to dose vancomycin  •  potassium chloride **OR** potassium chloride **OR** potassium chloride  •  prochlorperazine  •  promethazine  •  [COMPLETED] Insert Peripheral IV **AND** sodium chloride  •  sodium chloride  •  sodium chloride  •  sodium chloride  •  sodium chloride  •  sodium chloride  •  sodium chloride    Current medication reviewed for route, type, dose and frequency and are current per MAR.    Palliative Performance Scale Score:     /57   Pulse 87   Temp 98.5 °F (36.9 °C) (Axillary)   Resp (!) 31   Ht 144.8 cm (57.01\")   Wt 111 kg (244 lb)   SpO2 98%   BMI 52.79 kg/m²     Intake/Output Summary (Last 24 hours) at 4/11/2023 1251  Last data filed at 4/11/2023 0444  Gross per 24 hour   Intake 1805.27 ml   Output 400 ml   Net 1405.27 ml       PE:  General Appearance:    Chronically ill appearing, obese, intubated and paralyzed   HEENT:    NC/AT, without obvious abnormality, anicteric, ET tube in place   Neck:   supple, trachea midline, no JVD   Lungs:     Intubated/sedated on vent, diminished breath sounds    Heart:    RRR, normal S1 and S2, no M/R/G   Abdomen:     Soft,  Large NT, ND, NABS    Extremities:   On paralytic   Pulses:    Weak Pulses palpable bilaterally   Skin:   Warm, dry, pale   Neurologic:   Sedated on vent   Psych:   Unable to assess       Labs:   Results from last 7 days   Lab Units 04/11/23  0006   WBC 10*3/mm3 6.25   HEMOGLOBIN g/dL 7.5*   HEMATOCRIT % 24.3*   PLATELETS 10*3/mm3 90*     Results from last 7 days   Lab Units 04/11/23  0006   SODIUM mmol/L 139   POTASSIUM mmol/L 3.4*   CHLORIDE mmol/L 102   CO2 mmol/L 26.0   BUN mg/dL 34*   CREATININE mg/dL 1.09*   GLUCOSE mg/dL 150* "   CALCIUM mg/dL 8.4*     Results from last 7 days   Lab Units 04/11/23  0006   SODIUM mmol/L 139   POTASSIUM mmol/L 3.4*   CHLORIDE mmol/L 102   CO2 mmol/L 26.0   BUN mg/dL 34*   CREATININE mg/dL 1.09*   CALCIUM mg/dL 8.4*   BILIRUBIN mg/dL 2.4*   ALK PHOS U/L 81   ALT (SGPT) U/L 36*   AST (SGOT) U/L 85*   GLUCOSE mg/dL 150*     Imaging Results (Last 72 Hours)     Procedure Component Value Units Date/Time    XR Chest 1 View [[id number]] Collected: 04/11/23 0144     Updated: 04/11/23 0146    Narrative:      CR Chest 1 Vw    INDICATION:   Increased shortness of air     COMPARISON:    April 9, 2023    FINDINGS:  Portable PA view(s) of the chest with patient in prone positioning.    Support lines and tubes are unchanged.    Cardiomediastinal contour is obscured. Diffuse bilateral airspace disease.      Impression:      Diffuse bilateral airspace disease is not appreciably different.    Signer Name: JEROME CHIU MD   Signed: 4/11/2023 1:44 AM   Workstation [facility]: [facility]    [facility]    XR Chest 1 View [[id number]] Collected: 04/09/23 1358     Updated: 04/09/23 1400    Narrative:        INDICATION:   Central line placement    TECHNIQUE: 1 views of the chest    COMPARISON:   Chest radiograph 4/9/2023    FINDINGS:    Lungs/Pleura: Diffuse heterogeneous bilateral lung opacities. Blunting right costophrenic angle.    Mediastinum: Stable. Partially obscured by lung opacities.    Other: Right IJ catheter tip is near the cavoatrial junction. Endotracheal tube tip is in the distal trachea, 1.6 cm above the amy. NG tube seen. Median sternotomy.      Impression:        1. Interval placement of right IJ catheter with catheter tip near the cavoatrial junction. No pneumothorax.  2. Diffuse heterogeneous airspace opacities, slightly improved in the right lung however progressive in the left lung. Pulmonary edema, acute respiratory distress syndrome, atypical pneumonia and hemorrhage are in the  differential.  3. Small right pleural effusion, appears increased    Signer Name: Martin Silveira MD   Signed: 4/9/2023 1:58 PM   Workstation Name: [id number]    [facility]    XR Chest 1 View [[id number]] Collected: 04/09/23 0645     Updated: 04/09/23 0647    Narrative:      CR Chest 1 Vw    INDICATION:   Intubation     COMPARISON:    Earlier in the day  4/9/2023    FINDINGS:  Portable AP view(s) of the chest.    Interval intubation with the tip of the endotracheal tube terminating above the level the amy. Enteric tube traverses the region of the esophagus and terminates at the level the stomach bubble with side port distal to the GE junction.    Cardiomegaly and extensive airspace disease throughout the lungs are again noted. Bony structures are unchanged.       Impression:      1.  Endotracheal tube and enteric tube are in expected radiographic positions.  2.  Cardiomegaly and extensive bilateral airspace disease.    Signer Name: JEROME CHIU MD   Signed: 4/9/2023 6:45 AM   Workstation Name: [facility]    [facility]    XR Chest 1 View [[id number]] Collected: 04/09/23 0329     Updated: 04/09/23 0331    Narrative:      CR Chest 1 Vw    INDICATION:   Edema     COMPARISON:    4/8/2023    FINDINGS:  Portable AP view(s) of the chest.        Obscured cardia mediastinal contours. Extensive multifocal airspace disease throughout the lungs with small left pleural effusion. Overall severe and similar to the prior study.       Impression:      Extensive multifocal airspace disease and small left pleural effusion. Similar to the prior.    Signer Name: JEROME CHIU MD   Signed: 4/9/2023 3:29 AM   Workstation Name: [facility]    [facility]    XR Chest 1 View [[id number]] Collected: 04/08/23 1859     Updated: 04/08/23 1901    Narrative:      CHEST X-RAY, 4/8/2023      HISTORY:    57-year-old female hospital inpatient with severe sepsis, pneumonia,  worsening hypoxia.      TECHNIQUE:  AP portable chest x-ray.    COMPARISON:  *  Chest x-ray, 4/6/2023.    FINDINGS:  Dense diffuse multifocal, somewhat nodular infiltrates throughout both lungs has increased significantly in severity since 4/6/2023. Bilateral pleural effusions. Low lung volumes. Stable cardiomegaly. Median sternotomy.      Impression:      Radiographic worsening of diffuse multifocal pulmonary infiltrates.    Signer Name: Pro Fleming MD   Signed: 4/8/2023 6:59 PM   Workstation Name: [id number]    [facility]          Diagnostics: Reviewed    A: Aidee Trinidad is a 57 y.o. female  admitted on 4/6/2023 with shortness of breath, with productive cough, thick yellow/brown sputum.Aidee has a medical history of decompensated cirrhosis due to GONZALEZ with grade 1 esophageal varices in the lower one third, coronary artery disease status post three-vessel CABG in 2011, COPD for which she uses 2 L/min of nasal cannula oxygen, essential hypertension, type 2 diabetes mellitus, hyperlipidemia, hypothyroidism, MANUEL.  Pt also had rt hip cephalomedullary mailing with TFN nailing on 7/18/2021 due to a  right intertrochanteric femur fracture. She again fell in her shower and presented to ED on 3/22/2023 with imaging showing  a right periprosthetic lesser trochanter fracture. Pt was recently admitted here at [facility] 3/16-3/18 for hypoglycemia and toxic encephalopathy. On this admission Aidee is complaining of severe fatigue, she states that her baseline abdominal swelling is worse, however feels her leg swelling is at baseline.  CT of abdomen and pelvis  Showed Bibasilar partially consolidative airspace disease which may  represent atelectasis and pneumonia.    Today 4/11/2023 Pt is sedated and intubated on ventilator @ 100% FiO2 with Peep of 12             P:   Palliative care was consulted to discuss GOC/ACP and support for pts family. I was able to speak with pts cyndyjayant Carli at bedside  who states Daughter Puja and son Americo along with family members are on their way. Dr Perry was able to have discussion with family and they have decided to make Hoskins a do not resuscitate at this time. I was able to speak with Patients daughter Puja to provide support and answer any questions she may have. Puja has asked if I come up to CCU to talk with her and her siblings. I will continue to provide support to family. Plan is to continue to monitor and treat at this time.    We appreciate the consult and the opportunity to participate in Aidee Trinidad's care. We will continue to follow along. Please do not hesitate to contact us regarding further symptom management or goals of care needs, including after hours or on weekends via our on call provider at [phone].     Time: 55 minutes spent reviewing medical and medication records, assessing and examining patient, discussing with family, answering questions, providing some guidance about a plan and documentation of care, and coordinating care with other healthcare members, with > 50% time spent face to face.     Roma Joyner, APRN    4/11/2023

## 2023-04-11 NOTE — ACP (ADVANCE CARE PLANNING)
#Advanced Care Planning  - Due to patient's critical illness requiring admission to the hospital I have approached conversations regarding ACP.  - Pertinent diagnoses to this discussion include ARDS w/bacterial PNA suspected  - At the time of our discussion the patient, multiple family members including daughter whom is NOK were present  - I have consented the family regarding their willingness to discuss ACP services and they have agreed.  - We discussed the various ACP documents that could be potentially addressed and filled out at this time including Living Will, Instruction Directives, designation of a Healthcare Proxy, designation of a Healthcare Power of   - At this time family are unsure about proceeding to fill out these documents but are interested in continuing these conversations;  Will consult Palliative Care  - I have spent 30 minutes of time in face-to-face conversation on the above described ACP services    -Prognosis and current condition discussed with patient's daughter and multiple other family members present. After update on current condition and all questions answered, daughter made decision supported by all family members to change code status to DNR while continuing current treatments. Order placed in chart.

## 2023-04-11 NOTE — PLAN OF CARE
Goal Outcome Evaluation:         Progress: declining  Outcome Evaluation: Pt remains prone due to O2 requirements, VSS on dipervan and nimbex. Warming blanket initated.

## 2023-04-11 NOTE — PROGRESS NOTES
Pharmacokinetics Service Note:    Ms. Trinidad is currently on day 2 of vancomycin 1500 mg q 24 hours for pneumonia. Based on her worsening kidney function will decrease dose to 1250 mg q 24 hours. To target AUC range of 500-600 mg/L.hr. Pharmacy will continue to follow and adjust dose as appropriate based on clearance and vancomycin levels.     Antimicrobial Length of Therapy:    Vancomycin day 2 of 7 of therapy.   Flagyl day 2 of 7 of therapy.   Cefepime day 2 of 7 of therapy.     Thank you,  Miroslava Patton, Pharm.D.  04/11/23  08:37 EDT

## 2023-04-11 NOTE — PROGRESS NOTES
THC Physician - Brief Progress Note  PERMANENT  04/10/2023 22:17    [facility] - [facility] - [facility] - CCU - [facility] - [place], [place] ([facility])    SANDY SANCHEZ    Date of Service 04/10/2023 22:17    HPI/Events of Note [facility] Provider Intervention Note    elert due to hypoxia, O2 saturation mid 80's  nothing being suctioned  no change in lung exam  has been proned all day and about to be supinated  peak pressures when supine were reportedly 50, currently peak 30  Fio2 had been 100% prior to proning but had since been weaned to 80% during the day and now back up to 100%, PEEP stable @ 12  Of note, had been off nimbex due to 0/4 twitches earlier and nimbex was recently resumed  ABG 7.37/52/4  Decision made to maintain prone for now with paralytics and 100% fiO2 and monitor    On follow up, saturaton up to 98%  Keep prone for next 2 hours and reassess ABG and consider supinating depending on clinical status at that time        [facility] for any needs if bedside physician is not present.      Interventions Major-Respiratory failure - evaluation and management  Intermediate-Communication with other healthcare providers and/or family        Electronically Signed by: Princess Kaba) on 04/10/2023 22:24    Annotated By: Princess Kaba)    Date: 04/11/23 01:57  Update:  ABG and O2 saturations improved  However, on attempted supinaton, pt had desaturation to 70% prompting request to maintain prone position overnight  Current saturation 100%  Wean Fio2 as tolerated

## 2023-04-11 NOTE — CONSULTS
INFECTIOUS DISEASE CONSULTATION REPORT        Patient Identification:  Name:  Aidee Trinidad  Age:  57 y.o.  Sex:  female  :  1965  MRN:  [id number]   Visit Number:  [id number]  Primary Care Physician:  Azalea Burnett APRN  Referring Provider:  Brett Perry MD  Reason for consult: Progressive pneumonia with ARDS       LOS: 5 days        Subjective       Subjective     History of present illness:      Thank you Dr. Perry for allowing us to participate in the care of your patient.  As you well know, Ms. Aidee Trinidad is a 57 y.o. female with past medical history significant for decompensated cirrhosis due to GONZALEZ with grade 1 esophageal varices in the lower one third, CAD status post three-vessel CABG, COPD on 2 L nasal cannula at home, essential hypertension, type 2 diabetes mellitus, hyperlipidemia, hypothyroidism, MANUEL, and right hip fracture repair, who presented to [facility] Emergency Department on 2023 for shortness of breath.    Today, the patient is on the ventilator at 100% FiO2, which is worsened since yesterday.  Requiring warming blanket at times with a temperature as low as 94.1 °F overnight.  Currently in prone position.  Nurse reports groin and lower abdominal rash with some blistering, but unable to examine at this time.  Not requiring pressors at this time.  Lungs are clear to auscultation bilaterally.  Abdominal exam unable to be performed due to prone positioning.  Velazquez catheter in place.  2+ edema of bilateral lower extremities.  Right IJ CVC triple-lumen without evidence of infection at this time.  CRP is significantly worsened from 2.84 up to 14.69.  WBC remains normal at 6.25.  Procalcitonin is elevated at 0.90.  Lactic acid on admission was elevated at 2.1.  Urinalysis on 2023 was negative.  Chest x-ray on 2023 showed diffuse bilateral airspace disease.  CT abdomen pelvis on 2023 showed bibasilar partially consolidative airspace  disease which may represent atelectasis and pneumonia.  Stable right pleural effusion and stable small to moderate volume ascites.  Stable severe liver cirrhosis with portal hypertension changes.  Moderate constipation.  No bowel obstruction.  CT chest on 2023 showed CHF/edema.  Bibasilar consolidative airspace disease.  Stable moderate right pleural effusion, nonloculated.  Stable liver cirrhosis with portal hypertension and upper abdominal ascites.  MRSA screen is negative.  COVID-19 and flu A/B PCR was negative.  Respiratory panel PCR was negative.  Strep pneumo antigen was negative.  Blood cultures on 2023 and 4/10/2023 are so far showing no growth. Fungal antibodies and Fungitell B-D glucan are in process.    Infectious Disease consultation was requested for antimicrobial management.    ---------------------------------------------------------------------------------------------------------------------     Review Of Systems:    Unable to obtain.  Intubated and sedated.    ---------------------------------------------------------------------------------------------------------------------     Past Medical History    Past Medical History:   Diagnosis Date    Abscess of periorbital region, right 10/30/2021    Arthritis     Chronic back pain     Closed fracture of right hip 2021    COPD exacerbation 2023    Coronary artery disease, 3 vessel CABG     Decompensated hepatic cirrhosis, grade 1 esophageal varices in the lower 1/3     Depression     Essential hypertension     H pylori ulcer     History of transfusion     Hyperlipidemia     Hypothyroidism     Iron deficiency anemia 2023    Recurrent right pleural effusion 2023    Sleep apnea     Type 2 diabetes mellitus, without long-term current use of insulin 10/30/2021       Past Surgical History    Past Surgical History:   Procedure Laterality Date    CARPAL TUNNEL RELEASE       SECTION      CHOLECYSTECTOMY      COLONOSCOPY       CORONARY ANGIOPLASTY WITH STENT PLACEMENT      ENDOSCOPY N/A 4/20/2022    Procedure: ESOPHAGOGASTRODUODENOSCOPY WITH BIOPSY;  Surgeon: Lizzeth Harmon MD;  Location: [facility] OR;  Service: Gastroenterology;  Laterality: N/A;    HIP TROCHANTERIC NAILING WITH INTRAMEDULLARY HIP SCREW Right 7/18/2021    Procedure: HIP TROCHANTERIC NAILING LONG WITH INTRAMEDULLARY HIP SCREW;  Surgeon: Oracio Ponce DO;  Location:  [facility] OR;  Service: Orthopedics;  Laterality: Right;    REPLACEMENT TOTAL KNEE Right     TUBAL ABDOMINAL LIGATION      UPPER GASTROINTESTINAL ENDOSCOPY         Family History    Family History   Problem Relation Age of Onset    COPD Mother     Stroke Mother     Cancer Brother     Diabetes Brother     Hypertension Brother     Heart disease Brother     Breast cancer Neg Hx        Social History    Social History     Tobacco Use    Smoking status: Never    Smokeless tobacco: Never   Vaping Use    Vaping Use: Never used   Substance Use Topics    Alcohol use: Yes     Comment: Once a year     Drug use: No       Allergies    Nuts, Contrast dye (echo or unknown ct/mr), Codeine, and Latex  ---------------------------------------------------------------------------------------------------------------------     Home Medications:    Prior to Admission Medications       Prescriptions Last Dose Informant Patient Reported? Taking?    aspirin 81 MG EC tablet 4/6/2023 Pharmacy, Self Yes Yes    Take 1 tablet by mouth Daily.    atorvastatin (LIPITOR) 20 MG tablet 4/5/2023 Pharmacy, Self Yes Yes    Take 1 tablet by mouth Every Night.    cholecalciferol (VITAMIN D3) 1.25 MG (55769 UT) capsule 3/31/2023 Pharmacy, Self Yes Yes    Take 1 capsule by mouth Every 7 (Seven) Days.    furosemide (LASIX) 40 MG tablet 4/6/2023 Pharmacy, Self Yes Yes    Take 1 tablet by mouth Daily.    gabapentin (NEURONTIN) 400 MG capsule 4/6/2023 Pharmacy, Self Yes Yes    Take 1 capsule by mouth 2 (Two) Times a Day As Needed (nerve pain).     HYDROcodone-acetaminophen (NORCO) 5-325 MG per tablet 4/6/2023 Pharmacy, Self Yes Yes    Take 1 tablet by mouth 2 (Two) Times a Day As Needed for Moderate Pain.    levothyroxine (SYNTHROID, LEVOTHROID) 75 MCG tablet 4/6/2023 Pharmacy, Self Yes Yes    Take 1 tablet by mouth Daily.    loratadine (CLARITIN) 10 MG tablet 4/6/2023 Pharmacy, Self Yes Yes    Take 1 tablet by mouth Every Other Day.    loratadine-pseudoephedrine (CLARITIN-D 24-hour)  MG per 24 hr tablet 4/5/2023 Pharmacy, Self Yes Yes    Take 1 tablet by mouth Every Other Day.    metoprolol tartrate (LOPRESSOR) 25 MG tablet 4/6/2023 Pharmacy, Self Yes Yes    Take 1 tablet by mouth 2 (Two) Times a Day.    omeprazole (priLOSEC) 40 MG capsule 4/6/2023 Pharmacy, Self No Yes    Take 1 capsule by mouth Daily.    potassium chloride (K-DUR,KLOR-CON) 10 MEQ CR tablet 4/6/2023 Pharmacy, Self Yes Yes    Take 1 tablet by mouth 2 (Two) Times a Day.    QUEtiapine (SEROquel) 50 MG tablet 4/5/2023 Pharmacy, Self Yes Yes    Take 1 tablet by mouth Every Night.    sertraline (ZOLOFT) 50 MG tablet 4/6/2023 Pharmacy, Self Yes Yes    Take 1 tablet by mouth Daily.          ---------------------------------------------------------------------------------------------------------------------    Objective       Objective     Hospital Scheduled Meds:  albumin human, 12.5 g, Intravenous, BID  artificial tears, , Both Eyes, Q4H  aspirin, 81 mg, Oral, Daily  atorvastatin, 20 mg, Oral, Nightly  budesonide, 0.5 mg, Nebulization, BID - RT  cefepime, 2 g, Intravenous, Q8H  cetirizine, 10 mg, Oral, Daily  chlorhexidine, 15 mL, Mouth/Throat, Q12H  furosemide, 40 mg, Intravenous, Once  ipratropium-albuterol, 3 mL, Nebulization, 4x Daily - RT  levothyroxine, 75 mcg, Oral, Daily  methylPREDNISolone sodium succinate, 60 mg, Intravenous, Q12H  metroNIDAZOLE, 500 mg, Intravenous, Q8H  nystatin, , Topical, Q12H  pantoprazole, 40 mg, Intravenous, Q24H  sertraline, 50 mg, Oral, Daily  sodium  chloride, 10 mL, Intravenous, Q12H  sodium chloride, 3 mL, Intravenous, Q12H  vancomycin, 1,250 mg, Intravenous, Q24H      cisatracurium (NIMBEX) 200 mg in sodium chloride 0.9 % 100 mL, 0.5-10 mcg/kg/min, Last Rate: 1.63 mcg/kg/min (04/10/23 2108)  midazolam, 1-10 mg/hr, Last Rate: Stopped (04/10/23 2225)  norepinephrine, 0.02-0.3 mcg/kg/min, Last Rate: Stopped (04/10/23 0545)  Pharmacy to dose vancomycin,   propofol, 5-50 mcg/kg/min, Last Rate: 35 mcg/kg/min (04/11/23 0648)      ---------------------------------------------------------------------------------------------------------------------   Vital Signs:  Temp:  [94.1 °F (34.5 °C)-98.7 °F (37.1 °C)] 95.1 °F (35.1 °C)  Heart Rate:  [] 94  Resp:  [28-30] 29  BP: (101-147)/(50-74) 112/54  FiO2 (%):  [80 %-100 %] 100 %  Mean Arterial Pressure (Non-Invasive) for the past 24 hrs (Last 3 readings):   Noninvasive MAP (mmHg)   04/11/23 0900 71   04/11/23 0845 76   04/11/23 0830 72     SpO2 Percentage    04/11/23 0830 04/11/23 0845 04/11/23 0900   SpO2: 97% 97% 98%     SpO2:  [77 %-100 %] 98 %  on  Flow (L/min):  [6-15] 15;   Device (Oxygen Therapy): ventilator    Body mass index is 52.79 kg/m².  Wt Readings from Last 3 Encounters:   04/11/23 111 kg (244 lb)   04/02/23 125 kg (275 lb)   03/22/23 125 kg (275 lb)     ---------------------------------------------------------------------------------------------------------------------     Physical Exam:    Constitutional:  female is in prone position on the ventilator at 100% FiO2.   HENT:  Head: Normocephalic and atraumatic.  Mouth:  Moist mucous membranes.    Eyes:  Conjunctivae and EOM are normal.  No scleral icterus.  Neck:  Neck supple.  No JVD present.    Cardiovascular:  Normal rate, regular rhythm and normal heart sounds with no murmur. No edema.  Pulmonary/Chest:  No respiratory distress, no wheezes, no crackles, with normal breath sounds and good air movement.  Abdominal: Unable to examine due to  prone positioning.  Velazquez catheter in place.  Musculoskeletal:  No tenderness and no deformity.  No swelling or redness of joints.  2+ edema of bilateral lower extremities  Neurological: Sedated.  Skin:  Skin is warm and dry.  No rash noted.  No pallor.  Nurse reported groin and lower abdominal rash with some blistering, but unable to examine at this time due to prone positioning.    ---------------------------------------------------------------------------------------------------------------------    Results from last 7 days   Lab Units 04/08/23 1953 04/08/23 1742 04/06/23  1515   HSTROP T ng/L 38* 36* 48*     Results from last 7 days   Lab Units 04/10/23  0030 04/06/23  1312   PROBNP pg/mL 4,648.0* 830.9       Results from last 7 days   Lab Units 04/11/23  0449   PH, ARTERIAL pH units 7.290*   PO2 ART mm Hg 76.2*   PCO2, ARTERIAL mm Hg 64.1*   HCO3 ART mmol/L 30.8*     Results from last 7 days   Lab Units 04/11/23  0006 04/10/23  0030 04/09/23  0049 04/08/23  1742 04/07/23  0502 04/07/23  0241 04/06/23  1350 04/06/23  1312   CRP mg/dL 14.69*  --  2.84*  --   --   --   --  4.41*   LACTATE mmol/L  --  2.1* 1.9 1.8  --   --    < >  --    WBC 10*3/mm3 6.25 9.82 8.94 12.58*   < >  --   --  8.04   HEMOGLOBIN g/dL 7.5* 8.7* 9.5* 10.8*   < >  --   --  10.9*   HEMATOCRIT % 24.3* 28.0* 30.6* 34.9   < >  --   --  35.0   MCV fL 98.4* 96.6 96.8 96.9   < >  --   --  94.3   MCHC g/dL 30.9* 31.1* 31.0* 30.9*   < >  --   --  31.1*   PLATELETS 10*3/mm3 90* 133* 132* 180   < >  --    < > 174   INR  1.72* 1.74* 1.48*  --   --  1.34*  --  1.36*    < > = values in this interval not displayed.     Results from last 7 days   Lab Units 04/11/23  0006 04/10/23  2248 04/10/23  1124 04/10/23  0030 04/09/23  0049 04/08/23  1742   SODIUM mmol/L 139  --   --  141 139 138   POTASSIUM mmol/L 3.4* 3.6 3.6 3.4* 3.8 3.6   MAGNESIUM mg/dL  --   --   --  2.8* 1.9 2.2   CHLORIDE mmol/L 102  --   --  96* 96* 96*   CO2 mmol/L 26.0  --   --  31.8*  31.9* 31.7*   BUN mg/dL 34*  --   --  26* 15 16   CREATININE mg/dL 1.09*  --   --  0.97 0.66 0.60   CALCIUM mg/dL 8.4*  --   --  9.4 8.9 9.2   GLUCOSE mg/dL 150*  --   --  149* 145* 148*   ALBUMIN g/dL 3.3*  --   --  3.6 3.3* 3.0*   BILIRUBIN mg/dL 2.4*  --   --  1.9* 1.4* 1.5*   ALK PHOS U/L 81  --   --  102 125* 161*   AST (SGOT) U/L 85*  --   --  104* 90* 105*   ALT (SGPT) U/L 36*  --   --  43* 46* 56*   Estimated Creatinine Clearance: 64.5 mL/min (A) (by C-G formula based on SCr of 1.09 mg/dL (H)).  Ammonia   Date Value Ref Range Status   04/09/2023 40 11 - 51 umol/L Final       No results found for: HGBA1C, POCGLU  Lab Results   Component Value Date    HGBA1C <4.20 (L) 03/16/2023     Lab Results   Component Value Date    TSH 1.200 03/17/2023       Blood Culture   Date Value Ref Range Status   04/10/2023 No growth at 24 hours  Preliminary   04/06/2023 No growth at 4 days  Preliminary   04/06/2023 No growth at 4 days  Preliminary     No results found for: URINECX  No results found for: WOUNDCX  No results found for: STOOLCX  Respiratory Culture   Date Value Ref Range Status   04/07/2023 Rejected  Final     Pain Management Panel           Latest Ref Rng & Units 3/17/2023 2/28/2020   Pain Management Panel   Amphetamine, Urine Qual Negative Negative   Negative     Barbiturates Screen, Urine Negative Negative   Negative     Benzodiazepine Screen, Urine Negative Negative   Negative     Buprenorphine, Screen, Urine Negative Negative   Negative     Cocaine Screen, Urine Negative Negative   Negative     Methadone Screen , Urine Negative Negative   Negative     Methamphetamine, Ur Negative Negative            Multiple values from one day are sorted in reverse-chronological order           I have personally reviewed the above laboratory results.   ---------------------------------------------------------------------------------------------------------------------  Imaging Results (Last 7 Days)       Procedure Component  Value Units Date/Time    XR Chest 1 View [[id number]] Collected: 04/11/23 0144     Updated: 04/11/23 0146    Narrative:      CR Chest 1 Vw    INDICATION:   Increased shortness of air     COMPARISON:    April 9, 2023    FINDINGS:  Portable PA view(s) of the chest with patient in prone positioning.    Support lines and tubes are unchanged.    Cardiomediastinal contour is obscured. Diffuse bilateral airspace disease.      Impression:      Diffuse bilateral airspace disease is not appreciably different.    Signer Name: JEROME CHIU MD   Signed: 4/11/2023 1:44 AM   Workstation Name: [facility]    [facility]    XR Chest 1 View [[id number]] Collected: 04/09/23 1358     Updated: 04/09/23 1400    Narrative:        INDICATION:   Central line placement    TECHNIQUE: 1 views of the chest    COMPARISON:   Chest radiograph 4/9/2023    FINDINGS:    Lungs/Pleura: Diffuse heterogeneous bilateral lung opacities. Blunting right costophrenic angle.    Mediastinum: Stable. Partially obscured by lung opacities.    Other: Right IJ catheter tip is near the cavoatrial junction. Endotracheal tube tip is in the distal trachea, 1.6 cm above the amy. NG tube seen. Median sternotomy.      Impression:        1. Interval placement of right IJ catheter with catheter tip near the cavoatrial junction. No pneumothorax.  2. Diffuse heterogeneous airspace opacities, slightly improved in the right lung however progressive in the left lung. Pulmonary edema, acute respiratory distress syndrome, atypical pneumonia and hemorrhage are in the differential.  3. Small right pleural effusion, appears increased    Signer Name: Martin Silveira MD   Signed: 4/9/2023 1:58 PM   Workstation Name: [id number]    [facility]    XR Chest 1 View [[id number]] Collected: 04/09/23 0645     Updated: 04/09/23 0647    Narrative:      CR Chest 1 Vw    INDICATION:   Intubation     COMPARISON:    Earlier in the day   4/9/2023    FINDINGS:  Portable AP view(s) of the chest.    Interval intubation with the tip of the endotracheal tube terminating above the level the amy. Enteric tube traverses the region of the esophagus and terminates at the level the stomach bubble with side port distal to the GE junction.    Cardiomegaly and extensive airspace disease throughout the lungs are again noted. Bony structures are unchanged.       Impression:      1.  Endotracheal tube and enteric tube are in expected radiographic positions.  2.  Cardiomegaly and extensive bilateral airspace disease.    Signer Name: JEROME CHIU MD   Signed: 4/9/2023 6:45 AM   Workstation [facility]: [facility]    [facility]    XR Chest 1 View [[id number]] Collected: 04/09/23 0329     Updated: 04/09/23 0331    Narrative:      CR Chest 1 Vw    INDICATION:   Edema     COMPARISON:    4/8/2023    FINDINGS:  Portable AP view(s) of the chest.        Obscured cardia mediastinal contours. Extensive multifocal airspace disease throughout the lungs with small left pleural effusion. Overall severe and similar to the prior study.       Impression:      Extensive multifocal airspace disease and small left pleural effusion. Similar to the prior.    Signer Name: JEROME CHIU MD   Signed: 4/9/2023 3:29 AM   Workstation Name: [facility]    [facility]    XR Chest 1 View [[id number]] Collected: 04/08/23 1859     Updated: 04/08/23 1901    Narrative:      CHEST X-RAY, 4/8/2023      HISTORY:    57-year-old female hospital inpatient with severe sepsis, pneumonia, worsening hypoxia.      TECHNIQUE:  AP portable chest x-ray.    COMPARISON:  *  Chest x-ray, 4/6/2023.    FINDINGS:  Dense diffuse multifocal, somewhat nodular infiltrates throughout both lungs has increased significantly in severity since 4/6/2023. Bilateral pleural effusions. Low lung volumes. Stable cardiomegaly. Median sternotomy.      Impression:      Radiographic worsening of  diffuse multifocal pulmonary infiltrates.    Signer Name: Pro Fleming MD   Signed: 4/8/2023 6:59 PM   [facility]    [facility]    CT Chest Without Contrast Diagnostic [[id number]] Collected: 04/06/23 1610     Updated: 04/06/23 1613    Narrative:      EXAM:    CT Chest Without Intravenous Contrast     EXAM DATE:    4/6/2023 3:03 PM     CLINICAL HISTORY:    SOA     TECHNIQUE:    Axial computed tomography images of the chest without intravenous  contrast.  Sagittal and coronal reformatted images were created and  reviewed.  This CT exam was performed using one or more of the following  dose reduction techniques:  automated exposure control, adjustment of  the mA and/or kV according to patient size, and/or use of iterative  reconstruction technique.     COMPARISON:    04/02/2023     FINDINGS:    Lungs:  Bibasilar consolidative airspace disease.  Interstitial  thickening compatible with edema.    Pleural space:  Right pleural effusion is stable, small to moderate in  size.  No pneumothorax.    Heart:  Marked cardiomegaly with moderate coronary artery  calcifications and prior CABG.  No significant pericardial effusion.    Bones/joints:  Unremarkable.  No acute fracture.  No dislocation.    Soft tissues:  See above.    Vasculature:  Unremarkable.  No thoracic aortic aneurysm.    Lymph nodes:  Unremarkable.  No enlarged lymph nodes.    Liver:  Severe liver cirrhosis with upper abdominal ascites.       Impression:      1.  CHF/edema.  2.  Bibasilar consolidative airspace disease.  3.  Stable moderate right pleural effusion, nonloculated.  4.  Stable liver cirrhosis with portal hypertension and upper abdominal  ascites.     This report was finalized on 4/6/2023 4:11 PM by Dr. Vikram Strong MD.       CT Abdomen Pelvis Without Contrast [[id number]] Collected: 04/06/23 1559     Updated: 04/06/23 1608    Narrative:      EXAM:    CT Abdomen and Pelvis Without Intravenous  Contrast     EXAM DATE:    4/6/2023 2:52 PM     CLINICAL HISTORY:    pain, distention, hx of liver disease     TECHNIQUE:    Axial computed tomography images of the abdomen and pelvis without  intravenous contrast.  Sagittal and coronal reformatted images were  created and reviewed.  This CT exam was performed using one or more of  the following dose reduction techniques:  automated exposure control,  adjustment of the mA and/or kV according to patient size, and/or use of  iterative reconstruction technique.     COMPARISON:    04/02/2023     FINDINGS:    Lung bases:  Right greater than left bibasilar partially consolidative  airspace disease.    Pleural space:  Stable small right pleural effusion which appears  nonloculated.    Heart:  Cardiomegaly.      ABDOMEN:    Liver:  Stable severe liver cirrhosis with portal hypertension.    Gallbladder and bile ducts:  Cholecystectomy.  No ductal dilation.    Pancreas:  Unremarkable.  No ductal dilation.    Spleen:  Unremarkable.  No splenomegaly.    Adrenals:  Unremarkable.  No mass.    Kidneys and ureters:  Unremarkable.  No obstructing stones.  No  hydronephrosis.    Stomach and bowel:  Marked constipation is noted. No bowel  obstruction.  No mucosal thickening.      PELVIS:    Appendix:  No findings to suggest acute appendicitis.    Bladder:  Unremarkable.  No stones.    Reproductive:  Unremarkable as visualized.      ABDOMEN and PELVIS:    Intraperitoneal space:  No pneumoperitoneum identified.   Small-moderate volume ascites is essentially stable.  No loculated fluid  collections.    Bones/joints:  Stable appearance of bony structures.  Stable  degenerative changes lumbar spine.  Stable appearance of the right hip  with fixation hardware.  No acute fracture.  No dislocation.    Soft tissues:  Unremarkable.    Vasculature:  Prominent portosystemic collateral vessels again noted.   No abdominal aortic aneurysm.    Lymph nodes:  Unremarkable.  No enlarged lymph nodes.        Impression:      1.  Bibasilar partially consolidative airspace disease which may  represent atelectasis and pneumonia.  2.  Stable right pleural effusion and stable small to moderate volume  ascites.  3.  Stable severe liver cirrhosis with portal hypertension changes.  4.  Moderate constipation. No bowel obstruction.  5.  Other incidental/nonacute findings above.     This report was finalized on 4/6/2023 4:06 PM by Dr. Vikram Strong MD.       XR Chest 1 View [[id number]] Collected: 04/06/23 1543     Updated: 04/06/23 1545    Narrative:      EXAM:    XR Chest, 1 View     EXAM DATE:    4/6/2023 1:49 PM     CLINICAL HISTORY:    SOA     TECHNIQUE:    Frontal view of the chest.     COMPARISON:    04/02/2023     FINDINGS:    Lungs:  Persistent changes of CHF noted with interstitial edema and  pulmonary vascular congestion.    Pleural space:  Trace effusions.  No pneumothorax.    Heart:  Cardiomegaly and CABG.    Mediastinum:  Unremarkable.    Bones/joints:  Unremarkable.       Impression:        Essentially stable changes of CHF/edema.     This report was finalized on 4/6/2023 3:43 PM by Dr. Vikram Strong MD.             I have personally reviewed the above radiology results.   ---------------------------------------------------------------------------------------------------------------------      Pertinent Infectious Disease Results    Lactic acid on admission was elevated at 2.1.  Urinalysis on 4/6/2023 was negative.  Chest x-ray on 4/11/2023 showed diffuse bilateral airspace disease.  CT abdomen pelvis on 4/6/2023 showed bibasilar partially consolidative airspace disease which may represent atelectasis and pneumonia.  Stable right pleural effusion and stable small to moderate volume ascites.  Stable severe liver cirrhosis with portal hypertension changes.  Moderate constipation.  No bowel obstruction.  CT chest on 4/6/2023 showed CHF/edema.  Bibasilar consolidative airspace disease.  Stable moderate right pleural  effusion, nonloculated.  Stable liver cirrhosis with portal hypertension and upper abdominal ascites.  MRSA screen is negative.  COVID-19 and flu A/B PCR was negative.  Respiratory panel PCR was negative.  Strep pneumo antigen was negative.  Blood cultures on 4/6/2023 and 4/10/2023 are so far showing no growth.  Fungal antibodies and Fungitell B-D glucan are in process.      Assessment & Plan      Assessment      Severe sepsis with lactic acid greater than 2 on admission  Acute on chronic hypoxic respiratory failure  Severe ARDS  Pneumonia    Plan      I examined the patient this morning and she is on the ventilator at 100% FiO2, which is worsened since yesterday.  Requiring warming blanket at times with a temperature as low as 94.1 °F overnight.  Currently in prone position.  Nurse reports groin and lower abdominal rash with some blistering, but unable to examine at this time.  Not requiring pressors at this time.  Lungs are clear to auscultation bilaterally.  Abdominal exam unable to be performed due to prone positioning.  Velazquez catheter in place.  2+ edema of bilateral lower extremities.  Right IJ CVC triple-lumen without evidence of infection at this time.  CRP is significantly worsened from 2.84 up to 14.69.  WBC remains normal at 6.25.  Procalcitonin is elevated at 0.90.  Lactic acid on admission was elevated at 2.1.  Urinalysis on 4/6/2023 was negative.  Chest x-ray on 4/11/2023 showed diffuse bilateral airspace disease.  CT abdomen pelvis on 4/6/2023 showed bibasilar partially consolidative airspace disease which may represent atelectasis and pneumonia.  Stable right pleural effusion and stable small to moderate volume ascites.  Stable severe liver cirrhosis with portal hypertension changes.  Moderate constipation.  No bowel obstruction.  CT chest on 4/6/2023 showed CHF/edema.  Bibasilar consolidative airspace disease.  Stable moderate right pleural effusion, nonloculated.  Stable liver cirrhosis with portal  hypertension and upper abdominal ascites.  MRSA screen is negative.  COVID-19 and flu A/B PCR was negative.  Respiratory panel PCR was negative.  Strep pneumo antigen was negative.  Blood cultures on 4/6/2023 and 4/10/2023 are so far showing no growth.    Recommend bronchoscopy with BAL when feasible.  Aspergillus galactomannan and respiratory culture ordered today.  Recommend to continue on vancomycin and cefepime but Flagyl was discontinued, as cefepime should have appropriate anaerobic coverage.  Micafungin was initiated empirically while awaiting bronchoscopy, fungal antibodies, Aspergillus galactomannan, and Fungitell B-D glucan.    ANTIMICROBIAL THERAPY    cefepime 2 gm IVPB in 100 ml NS (VTB)  metroNIDAZOLE - 500-0.79 MG/100ML-%  Pharmacy to dose vancomycin  vancomycin       Again, thank you Dr. Perry for allowing us to participate in the care of your patient and please feel free to call for any questions you may have.    Code Status:     Code Status and Medical Interventions:   Ordered at: 04/06/23 1707     Level Of Support Discussed With:    Patient     Code Status (Patient has no pulse and is not breathing):    CPR (Attempt to Resuscitate)     Medical Interventions (Patient has pulse or is breathing):    Full Support     Kelley Lindo PA-C  04/11/23  09:08 EDT

## 2023-04-11 NOTE — NURSING NOTE
Positioned pt supine as ordered, pt oxygen levels decreased rapidly 70-74. After repositioning pt oxygen levels would not improved. Pt was positioned prone and oxygen levels improved to 82-84%, provider has been made aware.

## 2023-04-11 NOTE — NURSING NOTE
Wound consult for fluid filled blisters to the abdomen. Unable to assess at this time due to PT being prone and unable to tolerate being supine. Staff will notify when Assessment is possible. Upon assessment of PT's feet and generalized edema, the blisters are more than likely not pressure related.

## 2023-04-11 NOTE — PROGRESS NOTES
Referring Provider: dr alonso  Reason for Consultation: respiratory failure      Chief complaint -sob       Subjective-  Overnight events reviewed.  All the labs medications ins and outs reviewed.  Patient remains critically ill not able to tolerate supination.  Desaturates fast.  Requiring 100% oxygen and high PEEP-12  Chest x-ray from today shows bilateral infiltrates-with almost no normal lung parenchyma seen.  Hemoglobin went down.  INR remains high    Review of Systems  Could not be obtained as patient is intubated sedated and paralyzed      History  Past Medical History:   Diagnosis Date   • Abscess of periorbital region, right 10/30/2021   • Arthritis    • Chronic back pain    • Closed fracture of right hip 2021   • COPD exacerbation 2023   • Coronary artery disease, 3 vessel CABG    • Decompensated hepatic cirrhosis, grade 1 esophageal varices in the lower 1/3    • Depression    • Essential hypertension    • H pylori ulcer    • History of transfusion    • Hyperlipidemia    • Hypothyroidism    • Iron deficiency anemia 2023   • Recurrent right pleural effusion 2023   • Sleep apnea    • Type 2 diabetes mellitus, without long-term current use of insulin 10/30/2021   ,   Past Surgical History:   Procedure Laterality Date   • CARPAL TUNNEL RELEASE     •  SECTION     • CHOLECYSTECTOMY     • COLONOSCOPY     • CORONARY ANGIOPLASTY WITH STENT PLACEMENT     • ENDOSCOPY N/A 2022    Procedure: ESOPHAGOGASTRODUODENOSCOPY WITH BIOPSY;  Surgeon: Lizzeth Harmon MD;  Location: [facility];  Service: Gastroenterology;  Laterality: N/A;   • HIP TROCHANTERIC NAILING WITH INTRAMEDULLARY HIP SCREW Right 2021    Procedure: HIP TROCHANTERIC NAILING LONG WITH INTRAMEDULLARY HIP SCREW;  Surgeon: Oracio Ponce DO;  Location: [facility];  Service: Orthopedics;  Laterality: Right;   • REPLACEMENT TOTAL KNEE Right    • TUBAL ABDOMINAL LIGATION     • UPPER GASTROINTESTINAL  ENDOSCOPY     ,   Family History   Problem Relation Age of Onset   • COPD Mother    • Stroke Mother    • Cancer Brother    • Diabetes Brother    • Hypertension Brother    • Heart disease Brother    • Breast cancer Neg Hx    ,   Social History     Tobacco Use   • Smoking status: Never   • Smokeless tobacco: Never   Vaping Use   • Vaping Use: Never used   Substance Use Topics   • Alcohol use: Yes     Comment: Once a year    • Drug use: No   ,   Medications Prior to Admission   Medication Sig Dispense Refill Last Dose   • aspirin 81 MG EC tablet Take 1 tablet by mouth Daily.   4/6/2023   • atorvastatin (LIPITOR) 20 MG tablet Take 1 tablet by mouth Every Night.   4/5/2023   • cholecalciferol (VITAMIN D3) 1.25 MG (59818 UT) capsule Take 1 capsule by mouth Every 7 (Seven) Days.   3/31/2023   • furosemide (LASIX) 40 MG tablet Take 1 tablet by mouth Daily.   4/6/2023   • gabapentin (NEURONTIN) 400 MG capsule Take 1 capsule by mouth 2 (Two) Times a Day As Needed (nerve pain).   4/6/2023   • HYDROcodone-acetaminophen (NORCO) 5-325 MG per tablet Take 1 tablet by mouth 2 (Two) Times a Day As Needed for Moderate Pain.   4/6/2023   • levothyroxine (SYNTHROID, LEVOTHROID) 75 MCG tablet Take 1 tablet by mouth Daily.   4/6/2023   • loratadine (CLARITIN) 10 MG tablet Take 1 tablet by mouth Every Other Day.   4/6/2023   • loratadine-pseudoephedrine (CLARITIN-D 24-hour)  MG per 24 hr tablet Take 1 tablet by mouth Every Other Day.   4/5/2023   • metoprolol tartrate (LOPRESSOR) 25 MG tablet Take 1 tablet by mouth 2 (Two) Times a Day.   4/6/2023   • omeprazole (priLOSEC) 40 MG capsule Take 1 capsule by mouth Daily. 30 capsule 5 4/6/2023   • potassium chloride (K-DUR,KLOR-CON) 10 MEQ CR tablet Take 1 tablet by mouth 2 (Two) Times a Day.   4/6/2023   • QUEtiapine (SEROquel) 50 MG tablet Take 1 tablet by mouth Every Night.   4/5/2023   • sertraline (ZOLOFT) 50 MG tablet Take 1 tablet by mouth Daily.   4/6/2023   , Scheduled Meds:   albumin human, 12.5 g, Intravenous, BID  artificial tears, , Both Eyes, Q4H  aspirin, 81 mg, Oral, Daily  atorvastatin, 20 mg, Oral, Nightly  budesonide, 0.5 mg, Nebulization, BID - RT  cefepime, 2 g, Intravenous, Q8H  cetirizine, 10 mg, Oral, Daily  chlorhexidine, 15 mL, Mouth/Throat, Q12H  furosemide, 40 mg, Intravenous, Once  ipratropium-albuterol, 3 mL, Nebulization, 4x Daily - RT  levothyroxine, 75 mcg, Oral, Daily  methylPREDNISolone sodium succinate, 1,000 mg, Intravenous, Once  metroNIDAZOLE, 500 mg, Intravenous, Q8H  nystatin, , Topical, Q12H  pantoprazole, 40 mg, Intravenous, Q24H  phytonadione (VITAMIN K) IVPB, 10 mg, Intravenous, Once  sertraline, 50 mg, Oral, Daily  sodium chloride, 10 mL, Intravenous, Q12H  sodium chloride, 3 mL, Intravenous, Q12H  vancomycin, 1,250 mg, Intravenous, Q24H    , Continuous Infusions:  cisatracurium (NIMBEX) 200 mg in sodium chloride 0.9 % 100 mL, 0.5-10 mcg/kg/min, Last Rate: 1.63 mcg/kg/min (04/10/23 2108)  midazolam, 1-10 mg/hr, Last Rate: Stopped (04/10/23 2225)  norepinephrine, 0.02-0.3 mcg/kg/min, Last Rate: Stopped (04/10/23 0545)  Pharmacy to dose vancomycin,   propofol, 5-50 mcg/kg/min, Last Rate: 35 mcg/kg/min (04/11/23 1050)     and Allergies:  Nuts, Contrast dye (echo or unknown ct/mr), Codeine, and Latex    Objective     Vital Signs   Temp:  [94.1 °F (34.5 °C)-98.7 °F (37.1 °C)] 95.1 °F (35.1 °C)  Heart Rate:  [] 89  Resp:  [28-32] 32  BP: (101-147)/(50-74) 113/54  FiO2 (%):  [80 %-100 %] 100 %    Physical Exam: No changes  Proned-otherwise no major changes in examination           GENERAL APPEARANCE: Intubated, paralyzed and sedated.  Proned.    HEAD: normocephalic.    EYES: Atraumatic    THROAT: ET tube in place. Oral cavity and pharynx normal.     NECK: Neck supple.     CARDIAC: NSR   LUNGS: Clear to auscultation and percussion without rales, rhonchi, wheezing or diminished breath sounds.    ABDOMEN: Obese  EXTREMITIES: No significant deformity or  joint abnormality. No edema. Peripheral pulses intact.    NEUROLOGICAL: Unable to assess due to sedation status.     PSYCHIATRIC: Unable to assess due to sedation status                                                              Results Review:    LABS:    Lab Results   Component Value Date    GLUCOSE 150 (H) 04/11/2023    BUN 34 (H) 04/11/2023    CREATININE 1.09 (H) 04/11/2023    EGFRIFNONA 112 12/08/2021    BCR 31.2 (H) 04/11/2023    CO2 26.0 04/11/2023    CALCIUM 8.4 (L) 04/11/2023    PROTENTOTREF 6.9 02/16/2023    ALBUMIN 3.3 (L) 04/11/2023    LABIL2 0.8 02/16/2023    AST 85 (H) 04/11/2023    ALT 36 (H) 04/11/2023    WBC 6.25 04/11/2023    HGB 7.5 (L) 04/11/2023    HCT 24.3 (L) 04/11/2023    MCV 98.4 (H) 04/11/2023    PLT 90 (L) 04/11/2023     04/11/2023    K 3.4 (L) 04/11/2023     04/11/2023    ANIONGAP 11.0 04/11/2023       Lab Results   Component Value Date    INR 1.72 (H) 04/11/2023    INR 1.74 (H) 04/10/2023    INR 1.48 (H) 04/09/2023    PROTIME 20.8 (H) 04/11/2023    PROTIME 21.0 (H) 04/10/2023    PROTIME 18.3 (H) 04/09/2023       Results from last 7 days   Lab Units 04/11/23  0006 04/10/23  0030 04/09/23  0049 04/07/23  0241 04/06/23  1312   INR  1.72* 1.74* 1.48* 1.34* 1.36*   APTT seconds  --   --  33.7 30.6 37.6*          I reviewed the patient's new clinical results.  I reviewed the patient's new imaging results and agree with the interpretation.      Assessment & Plan     Neuro- Sedated -drips reviewed and adjusted.  Continue paralytics to decrease patient ventilator dyssynchrony and help in proning.    Respiratory-in ARDS- Case discussed with Dr. Puckett.  As patient's hemoglobin went down INR is still high.  Patient could have gone into diffuse alveolar hemorrhage-less likely as do not suction any blood.  We will give 1 g of Solu-Medrol-pulse dose steroids just if patient has vasculitis induced diffuse alveolar hemorrhage  Will give 1 unit of FFP and repeat the dose of vitamin  K.    Latest ABG reviewed latest chest x-ray reviewed.  Continue proning.    Latest microbiology reviewed.  Continue current antibiotics.    We will get ABG and chest x-ray in the morning.  We will consult palliative care for goals of care discussion.  Recommend changing CODE STATUS to DNR.  Primary team giving albumin and Lasix.  With this I agree.  Tidal volume adjusted by me for 6 cc/kg ideal body weight.      Vent settings-reviewed  Vent Settings          Resp Rate (Set): 32     FiO2 (%): 100 %  PEEP/CPAP (cm H2O): 12 cm H20    Minute Ventilation (L/min) (Obs): 10.6 L/min  Resp Rate (Observed) Vent: 32  I:E Ratio (Set): 1:1.69  I:E Ratio (Obs): 1:2.3    PIP Observed (cm H2O): 44 cm H2O          VAP- precautions  Head end - 30 %  Mouth care   DVt prophylaxis    Chest xray-latest reviewed.    ABG -latest reviewed    Cardio-   Continue to monitor HR- rate and rhythm, BP   Not requiring any vasopressors  Nephro- Cr BUN stable  I/O-reviewed.  Avoid nephrotoxic agents.  Not making much urine likely going into ATN.    Gi- PPI prophylaxis  TUBE feeds-started trophic tube feeds.  Nonalcoholic steatohepatitis- we will give FFP.    Heme- CBC  Hb-transfuse for hemoglobin less than 7  platelet  WBC    ID-latest microbiology reviewed.  Continue current treatment  CULTURE  And Antibiotics adjusted.    Endo- Maintain Blood sugar 140 -180  Blood sugars reviewed.    Electrolytes-   Mag phos       DVT prophylaxis-    Bedside rounds were done with RT and patients nurse. All the Lab and clinical findings were discussed with them and plan was also discussed in great detail.    Family member present-none        Echo-  Results for orders placed during the hospital encounter of 04/06/23    Adult Transthoracic Echo Complete W/ Cont if Necessary Per Protocol    Interpretation Summary  •  Left ventricular systolic function is normal. Left ventricular ejection fraction appears to be 66 - 70%.  •  Left ventricular wall thickness is  consistent with mild concentric hypertrophy.  •  Left ventricular diastolic function is consistent with (grade II w/high LAP) pseudonormalization.  •  The cardiac chamber dimensions are normal.  •  No significant valvular heart disease is present.  •  Mild pulmonary hypertension is present.  •  There is no evidence of pericardial effusion.          Recurrent right pleural effusion    Severe sepsis    Chronic respiratory failure    COPD exacerbation    Patient is critically ill with acute hypoxic respiratory failure currently in ARDS.  I adjusted patient's vent settings and drips.  Overall prognosis with multiple comorbidities and critical illness with ARDS is poor.  Recommend palliative care consult  Cc 33 mins  Jesus Duval MD  04/11/23  11:21 EDT

## 2023-04-11 NOTE — PROGRESS NOTES
[facility] HOSPITALIST PROGRESS NOTE     Patient Identification:  Name:  Aidee Trinidad  Age:  57 y.o.  Sex:  female  :  1965  MRN:  [id number]  Visit Number:  [id number]  ROOM: [address]     Primary Care Provider:  Azalea Burnett APRN     Date of Admission: 2023    Length of stay in inpatient status:  5    Subjective     Chief Compliant:    Chief Complaint   Patient presents with   • Shortness of Breath       Overnight attempted to move from prone to supine but had significant desat and hypotension even initiating transition from supine. Required increase in fiO2 back to 100% and continues on high peep lung protective vent settings. Abg overnight with paO2 60.8 minimally improved to 64.1 this am. UOP declining yesterday afternoon and remained poor overnight.     Multiple family members at bedside engaged in GOC discussion and decided to changed to DNR while continuing current care but also voice that patient would not want prolonged mechanical life support so will readdress GOC daily w/palliative care assistance.    Objective     Current Hospital Meds:  albumin human, 12.5 g, Intravenous, BID  artificial tears, , Both Eyes, Q4H  aspirin, 81 mg, Oral, Daily  atorvastatin, 20 mg, Oral, Nightly  budesonide, 0.5 mg, Nebulization, BID - RT  cefepime, 2 g, Intravenous, Q8H  cetirizine, 10 mg, Oral, Daily  chlorhexidine, 15 mL, Mouth/Throat, Q12H  furosemide, 40 mg, Intravenous, Once  ipratropium-albuterol, 3 mL, Nebulization, 4x Daily - RT  levothyroxine, 75 mcg, Oral, Daily  micafungin (MYCAMINE) IV, 100 mg, Intravenous, Q24H  nystatin, , Topical, Q12H  pantoprazole, 40 mg, Intravenous, Q24H  phytonadione (VITAMIN K) IVPB, 10 mg, Intravenous, Once  sertraline, 50 mg, Oral, Daily  sodium chloride, 10 mL, Intravenous, Q12H  sodium chloride, 3 mL, Intravenous, Q12H  vancomycin, 1,250 mg, Intravenous, Q24H    cisatracurium (NIMBEX) 200 mg in sodium chloride 0.9 % 100 mL, 0.5-10  mcg/kg/min, Last Rate: 1.63 mcg/kg/min (04/10/23 2108)  midazolam, 1-10 mg/hr, Last Rate: Stopped (04/10/23 2225)  Pharmacy to dose vancomycin,   propofol, 5-50 mcg/kg/min, Last Rate: 35 mcg/kg/min (04/11/23 1050)      Current Antimicrobial Therapy:  Anti-Infectives (From admission, onward)    Ordered     Dose/Rate Route Frequency Start Stop    04/11/23 1334  micafungin sodium (MYCAMINE) 100 mg in sodium chloride 0.9 % 100 mL IVPB        Ordering Provider: Kelley Lindo PA-C    100 mg  over 60 Minutes Intravenous Every 24 Hours 04/11/23 1500 04/18/23 1459    04/11/23 0836  vancomycin 1250 mg/250 mL 0.9% NS IVPB ([facility])        Ordering Provider: Miroslava Patton RPH    1,250 mg  over 90 Minutes Intravenous Every 24 Hours 04/11/23 1100 04/17/23 1059    04/10/23 0946  cefepime 2 gm IVPB in 100 ml NS (VTB)        Ordering Provider: Brett Perry MD    2 g  over 4 Hours Intravenous Every 8 Hours 04/10/23 1845 04/17/23 1844    04/10/23 1011  vancomycin IVPB 2000 mg in 0.9% Sodium Chloride (premix) 500 mL        Ordering Provider: Brett Perry MD    2,000 mg  over 120 Minutes Intravenous Once 04/10/23 1100 04/10/23 1323    04/10/23 0946  cefepime 2 gm IVPB in 100 ml NS (VTB)        Ordering Provider: Brett Perry MD    2 g  over 30 Minutes Intravenous Once 04/10/23 1045 04/10/23 1148    04/10/23 0946  Pharmacy to dose vancomycin        Ordering Provider: Brett Perry MD     Does not apply Continuous PRN 04/10/23 0945 04/17/23 0944    04/06/23 1611  cefTRIAXone (ROCEPHIN) 2 g in sodium chloride 0.9 % 100 mL IVPB-VTB        Ordering Provider: Polina Oneil APRN    2 g  200 mL/hr over 30 Minutes Intravenous Once 04/06/23 1613 04/06/23 1836        Current Diuretic Therapy:  Diuretics (From admission, onward)    Ordered     Dose/Rate Route Frequency Start Stop    04/11/23 0754  furosemide (LASIX) injection 40 mg        Ordering Provider: Brett Perry MD    40 mg Intravenous Once 04/11/23 1800       04/11/23 0824  furosemide (LASIX) injection 40 mg        Ordering Provider: Brett Perry MD    40 mg Intravenous Once 04/11/23 0915 04/11/23 0841    04/11/23 0518  furosemide (LASIX) injection 20 mg        Ordering Provider: Princess Kaba MD    20 mg Intravenous Once 04/11/23 0615 04/11/23 0539    04/09/23 0639  bumetanide (BUMEX) injection 2 mg        Ordering Provider: Gill Sanchez DO    2 mg Intravenous Once 04/09/23 0730 04/09/23 0651    04/08/23 1824  bumetanide (BUMEX) injection 2 mg        Ordering Provider: Jakob Koch MD    2 mg Intravenous Once 04/08/23 1915 04/08/23 1856    04/06/23 1607  furosemide (LASIX) injection 80 mg        Ordering Provider: Polina Oneil APRN    80 mg Intravenous Once 04/06/23 1609 04/06/23 1629        ----------------------------------------------------------------------------------------------------------------------  Vital Signs:  Temp:  [94.1 °F (34.5 °C)-98.5 °F (36.9 °C)] 97.6 °F (36.4 °C)  Heart Rate:  [] 79  Resp:  [28-32] 32  BP: (101-147)/(50-74) 115/60  Arterial Line BP: ()/(65-77) 101/77  FiO2 (%):  [80 %-100 %] 100 %  SpO2:  [77 %-100 %] 98 %  on  Flow (L/min):  [15] 15;   Device (Oxygen Therapy): ventilator  Body mass index is 52.79 kg/m².    Wt Readings from Last 3 Encounters:   04/11/23 111 kg (244 lb)   04/02/23 125 kg (275 lb)   03/22/23 125 kg (275 lb)     Intake & Output (last 3 days)       04/08 0701 04/09 0700 04/09 0701  04/10 0700 04/10 0701 04/11 0700 04/11 0701 04/12 0700    P.O. 600       I.V. (mL/kg)  1019.8 (9.7) 825 (7.4)     IV Piggyback 300 900 980.3     Total Intake(mL/kg) 900 (7.8) 1919.8 (18.3) 1805.3 (16.3)     Urine (mL/kg/hr) 1950 (0.7) 400 (0.2) 400 (0.2)     Stool 0 0 0     Total Output 1950 400 400     Net -1050 +1519.8 +1405.3             Stool Unmeasured Occurrence 1 x           NPO Diet NPO Type: Strict  NPO  ----------------------------------------------------------------------------------------------------------------------  Physical Exam  Vitals and nursing note reviewed.   Constitutional:       Appearance: She is obese. She is ill-appearing.      Comments: Intubated/proned, currently paralyzed and deeply sedated   HENT:      Head: Normocephalic and atraumatic.      Mouth/Throat:      Comments: ETT in place  Cardiovascular:      Rate and Rhythm: Normal rate and regular rhythm.      Pulses: Normal pulses.   Pulmonary:      Comments: B/l mechanical breath sounds to lower posterior lung fields  Musculoskeletal:         General: Swelling (Diffuse anasarca w/pitting edema) present.      Right lower leg: Edema present.      Left lower leg: Edema present.   Skin:     General: Skin is warm and dry.      Capillary Refill: Capillary refill takes less than 2 seconds.   Neurological:      Comments: Paralyzed w/deep sedation       ----------------------------------------------------------------------------------------------------------------------    ----------------------------------------------------------------------------------------------------------------------  LABS:    CBC and coagulation:  Results from last 7 days   Lab Units 04/11/23  0006 04/10/23  0030 04/09/23  0049 04/08/23  1742 04/07/23  0502 04/07/23  0241 04/06/23  2059 04/06/23  1754 04/06/23  1350 04/06/23  1312   PROCALCITONIN ng/mL 0.90*  --   --  0.16  --   --   --   --   --  0.15   LACTATE mmol/L  --  2.1* 1.9 1.8  --   --  2.0 2.3* 2.1*  --    SED RATE mm/hr  --   --   --   --   --   --   --   --   --  85*   CRP mg/dL 14.69*  --  2.84*  --   --   --   --   --   --  4.41*   WBC 10*3/mm3 6.25 9.82 8.94 12.58* 5.65  --   --   --   --  8.04   HEMOGLOBIN g/dL 7.5* 8.7* 9.5* 10.8* 9.5*  --   --   --   --  10.9*   HEMATOCRIT % 24.3* 28.0* 30.6* 34.9 29.5*  --   --   --   --  35.0   MCV fL 98.4* 96.6 96.8 96.9 92.8  --   --   --   --  94.3   MCHC g/dL 30.9*  31.1* 31.0* 30.9* 32.2  --   --   --   --  31.1*   PLATELETS 10*3/mm3 90* 133* 132* 180 176  --  143  --   --  174   INR  1.72* 1.74* 1.48*  --   --  1.34*  --   --   --  1.36*     Acid/base balance:  Results from last 7 days   Lab Units 04/11/23  0449 04/11/23  0023 04/10/23  2124 04/10/23  0626 04/10/23  0426 04/09/23  1430 04/09/23  0806   PH, ARTERIAL pH units 7.290* 7.312* 7.373 7.487* 7.455* 7.475* 7.598*   PO2 ART mm Hg 76.2* 102.0 54.9* 158.0* 53.5* 68.0* 81.6*   PCO2, ARTERIAL mm Hg 64.1* 60.8* 52.8* 42.6 48.0* 46.4* 34.8*   HCO3 ART mmol/L 30.8* 30.8* 30.7* 32.2* 33.7* 34.1* 33.9*     Renal and electrolytes:  Results from last 7 days   Lab Units 04/11/23  0006 04/10/23  2248 04/10/23  1124 04/10/23  0030 04/09/23  0049 04/08/23  1742 04/08/23  0551 04/07/23  0241 04/06/23  1312   SODIUM mmol/L 139  --   --  141 139 138  --  134* 138   POTASSIUM mmol/L 3.4* 3.6 3.6 3.4* 3.8 3.6  --  3.9 4.6   MAGNESIUM mg/dL  --   --   --  2.8* 1.9 2.2 2.8* 1.9  --    CHLORIDE mmol/L 102  --   --  96* 96* 96*  --  95* 97*   CO2 mmol/L 26.0  --   --  31.8* 31.9* 31.7*  --  27.6 33.6*   BUN mg/dL 34*  --   --  26* 15 16  --  12 11   CREATININE mg/dL 1.09*  --   --  0.97 0.66 0.60  --  0.65 0.71   CALCIUM mg/dL 8.4*  --   --  9.4 8.9 9.2  --  8.8 9.3   PHOSPHORUS mg/dL  --   --   --  2.3* 2.7 3.1  --  2.9  --    GLUCOSE mg/dL 150*  --   --  149* 145* 148*  --  138* 127*     Estimated Creatinine Clearance: 64.5 mL/min (A) (by C-G formula based on SCr of 1.09 mg/dL (H)).    Liver and pancreatic function:  Results from last 7 days   Lab Units 04/11/23  0006 04/10/23  0030 04/09/23  0049 04/08/23  1742 04/07/23  0502 04/07/23  0241 04/06/23  1312   ALBUMIN g/dL 3.3* 3.6 3.3* 3.0*  --  2.5* 2.6*   BILIRUBIN mg/dL 2.4* 1.9* 1.4* 1.5*  --  1.5* 2.0*   ALK PHOS U/L 81 102 125* 161*  --  150* 188*   AST (SGOT) U/L 85* 104* 90* 105*  --  67* 81*   ALT (SGPT) U/L 36* 43* 46* 56*  --  41* 51*   AMMONIA umol/L  --   --  40  --  69*  --    --    LIPASE U/L  --   --   --   --   --   --  60     Endocrine function:  Lab Results   Component Value Date    HGBA1C <4.20 (L) 03/16/2023     Point of care bedside glucose levels:      Glucose levels from the [facility]:  Results from last 7 days   Lab Units 04/11/23  0006 04/10/23  0030 04/09/23  0049 04/08/23  1742 04/07/23  0241 04/06/23  1312   GLUCOSE mg/dL 150* 149* 145* 148* 138* 127*     Lab Results   Component Value Date    TSH 1.200 03/17/2023     Cardiac:  Results from last 7 days   Lab Units 04/10/23  0030 04/08/23  1953 04/08/23  1742 04/06/23  1515 04/06/23  1312   HSTROP T ng/L  --  38* 36* 48* 47*   PROBNP pg/mL 4,648.0*  --   --   --  830.9       Cultures:  Lab Results   Component Value Date    COLORU Yellow 04/06/2023    CLARITYU Clear 04/06/2023    PHUR 7.0 04/06/2023    GLUCOSEU Negative 04/06/2023    KETONESU Negative 04/06/2023    BLOODU Negative 04/06/2023    NITRITEU Negative 04/06/2023    LEUKOCYTESUR Trace (A) 04/06/2023    BILIRUBINUR Negative 04/06/2023    UROBILINOGEN 1.0 E.U./dL 04/06/2023    RBCUA 0-2 04/06/2023    WBCUA 0-2 04/06/2023    BACTERIA None Seen 04/06/2023     Microbiology Results (last 10 days)     Procedure Component Value - Date/Time    MRSA Screen, PCR (Inpatient) - Swab, Nares [[id number]]  (Normal) Collected: 04/10/23 1826    Lab Status: Final result Specimen: Swab from Nares Updated: 04/10/23 2037     MRSA PCR No MRSA Detected    Narrative:      The negative predictive value of this diagnostic test is high and should only be used to consider de-escalating anti-MRSA therapy. A positive result may indicate colonization with MRSA and must be correlated clinically.    Blood Culture - Blood, Arm, Left [[id number]]  (Normal) Collected: 04/10/23 0636    Lab Status: Preliminary result Specimen: Blood from Arm, Left Updated: 04/11/23 0715     Blood Culture No growth at 24 hours    Respiratory Culture - Sputum, Cough [[id number]] Collected: 04/07/23 0454    Lab Status: Final result  Specimen: Sputum from Cough Updated: 04/07/23 0636     Respiratory Culture Rejected     Gram Stain Rare (1+) WBCs seen      Rare (1+) Epithelial cells seen      Moderate (3+) Gram positive cocci in chains and clusters      Few (2+) Gram negative bacilli    Narrative:      Specimen rejected due to oropharyngeal contamination. Please reorder and recollect specimen if clinically necessary.    S. Pneumo Ag Urine or CSF - Urine, Urine, Clean Catch [[id number]]  (Normal) Collected: 04/07/23 0136    Lab Status: Final result Specimen: Urine, Clean Catch Updated: 04/07/23 1439     Strep Pneumo Ag Negative    Respiratory Panel PCR w/COVID-19(SARS-CoV-2) SARA/MARKOS/VANESSA/PAD/COR/MAD/PEPE In-House, NP Swab in UTM/VTM, 3-4 HR TAT - Swab, Nasopharynx [[id number]]  (Normal) Collected: 04/07/23 0134    Lab Status: Final result Specimen: Swab from Nasopharynx Updated: 04/07/23 0233     ADENOVIRUS, PCR Not Detected     Coronavirus 229E Not Detected     Coronavirus HKU1 Not Detected     Coronavirus NL63 Not Detected     Coronavirus OC43 Not Detected     COVID19 Not Detected     Human Metapneumovirus Not Detected     Human Rhinovirus/Enterovirus Not Detected     Influenza A PCR Not Detected     Influenza B PCR Not Detected     Parainfluenza Virus 1 Not Detected     Parainfluenza Virus 2 Not Detected     Parainfluenza Virus 3 Not Detected     Parainfluenza Virus 4 Not Detected     RSV, PCR Not Detected     Bordetella pertussis pcr Not Detected     Bordetella parapertussis PCR Not Detected     Chlamydophila pneumoniae PCR Not Detected     Mycoplasma pneumo by PCR Not Detected    Narrative:      In the setting of a positive respiratory panel with a viral infection PLUS a negative procalcitonin without other underlying concern for bacterial infection, consider observing off antibiotics or discontinuation of antibiotics and continue supportive care. If the respiratory panel is positive for atypical bacterial infection (Bordetella pertussis,  Chlamydophila pneumoniae, or Mycoplasma pneumoniae), consider antibiotic de-escalation to target atypical bacterial infection.    Blood Culture - Blood, Hand, Right [[id number]]  (Normal) Collected: 04/06/23 1410    Lab Status: Preliminary result Specimen: Blood from Hand, Right Updated: 04/10/23 1415     Blood Culture No growth at 4 days    Blood Culture - Blood, Wrist, Left [[id number]]  (Normal) Collected: 04/06/23 1350    Lab Status: Preliminary result Specimen: Blood from Wrist, Left Updated: 04/10/23 1415     Blood Culture No growth at 4 days    COVID-19 and FLU A/B PCR - Swab, Nasopharynx [[id number]]  (Normal) Collected: 04/06/23 1338    Lab Status: Final result Specimen: Swab from Nasopharynx Updated: 04/06/23 1431     COVID19 Not Detected     Influenza A PCR Not Detected     Influenza B PCR Not Detected    Narrative:      Fact sheet for providers: https://www.fda.gov/media/908277/download    Fact sheet for patients: https://www.fda.gov/media/806194/download    Test performed by PCR.    COVID-19 and FLU A/B PCR - Swab, Nasopharynx [[id number]]  (Normal) Collected: 04/02/23 1508    Lab Status: Final result Specimen: Swab from Nasopharynx Updated: 04/02/23 1535     COVID19 Not Detected     Influenza A PCR Not Detected     Influenza B PCR Not Detected    Narrative:      Fact sheet for providers: https://www.fda.gov/media/038623/download    Fact sheet for patients: https://www.fda.gov/media/277526/download    Test performed by PCR.          No results found for: PREGTESTUR, PREGSERUM, HCG, HCGQUANT  Pain Management Panel         Latest Ref Rng & Units 3/17/2023 2/28/2020   Pain Management Panel   Amphetamine, Urine Qual Negative Negative   Negative     Barbiturates Screen, Urine Negative Negative   Negative     Benzodiazepine Screen, Urine Negative Negative   Negative     Buprenorphine, Screen, Urine Negative Negative   Negative     Cocaine Screen, Urine Negative Negative   Negative     Methadone Screen , Urine  Negative Negative   Negative     Methamphetamine, Ur Negative Negative            Multiple values from one day are sorted in reverse-chronological order             I have personally looked at the labs and they are summarized above.  ----------------------------------------------------------------------------------------------------------------------  Detailed radiology reports for the last 24 hours:    Imaging Results (Last 24 Hours)     Procedure Component Value Units Date/Time    XR Chest 1 View [[id number]] Collected: 04/11/23 0144     Updated: 04/11/23 0146    Narrative:      CR Chest 1 Vw    INDICATION:   Increased shortness of air     COMPARISON:    April 9, 2023    FINDINGS:  Portable PA view(s) of the chest with patient in prone positioning.    Support lines and tubes are unchanged.    Cardiomediastinal contour is obscured. Diffuse bilateral airspace disease.      Impression:      Diffuse bilateral airspace disease is not appreciably different.    Signer Name: JEROME CHIU MD   Signed: 4/11/2023 1:44 AM   Workstation Name: [id number]    [facility]        4/11 CXR without new effusions, unchanged from prior and remains significantly opacified b/l    Assessment & Plan      #Acute on crhonic hypoxic respiratory failure (2L baseline)  #Severe ARDS, suspected secondary to PNA  #Suspected bacterial PNA  #Right pleural effusion, resolved  -Patient initially presenting with sx consistent with PNA and likely parapneumonic effusion on imaging that improved with initial abx coverage and mild diuresis however respiratory status decline acutely shortly after admission with significant b/l infiltrates progressing to intubation and paralysis/proning overnight given O2 requirement.  -Etiology: Patient's cardiac hx makes flash pulm edema possible but effusion improved and not overtly volume overloaded so less likely. Likely ARDS secondary to bacterial PNA. CRP significantly increased on repeat 4/11  despite appropriate abx coverage  -R effusion resolved, deferred thora  -ARDSnet protocol peep tables w/fiO2 weaning to goal paO2 55-80mmHg. Continues requiring significant peep making BAL too high risk currently, would not tolerate.  -ID consulted, added fungal serum w/u and micafungin to cover any fungal etiology. Cont Vanc/cefepime but flagyl stopped. MRSA nares negative but given acuity would favor continuing for now.  -BID albumin w/40mg lasix BID in addition to 20mg given overnight for lung compliance/edema. UOP currently poor  -Pulm following, added pulse dose steroid today. q6hr fsgb and SSI added  -Palliative care consulted today for family support/GOC    #Hypokalemia  #Hypophosphatemia  -Replace prn    #Oliguria  -UOP declining to <800cc/24hrs goal rate, likely progressing to ATN    #Thrombocytopenia  #Coagulopathy  -Secondary to liver disease and consumptive process in setting of infection  -1u platelets ordered    #Hx of decompensated GONZALEZ cirrhosis w/Grade 1 EV  #Coagulopathy  -Secondary to liver disease, correcting with Vit K. Remains elevated    #Elevated BNP  #Hx of CAD s/p CABG 2011  #Hx of HFpEF, not in exacerbation  -Echo 4/7 w/EF preserved  -BNP elevation likely secondary to respiratory failure  -Diuresing as above    #Hepatocellular liver injury  -Secondary to GONZALEZ w/congestive hepatopathy from acute right heart strain given severe resp failure  -Acute hepatitis panel negative but had HepBc positive earlier this year    #Normocytic anemia  -Secondary to sepsis      - - Chronic - -    #Hypothyroidism: Cont synthroid    F: NPO, Trophic tfs via NG, hold while proned  A: Fentanyl prn  S: Propofol, versed  T: holding  H: HOB elevated  U: PPI  G: PRN  S: When appropriate  B: PRN  I: CVC, Radial a-line  D: Vanc/cefepime/Micafungin    I have spent 30 minutes of critical care time with > 50% of time spent in direct patient care, evaluating the patient at bedside, reviewing all labs and images, reviewing  ABG and adjusting vent settings, reviewing pain and sedation gtt's, communicating plan of care w/ nursing staff and consultants, and utilizing high complexity medical decision making to assess and treat vital organ system failure in an individual who has impairment of one or more vital organ systems such that there is a high probability of imminent or life threatening deterioration in the patient’s condition. Failure to initiate the above interventions on an urgent basis would likely result in sudden, clinically significant or life threatening deterioration in the patient's condition.        VTE Prophylaxis:   Mechanical Order History:      Ordered        04/06/23 1754  Place Sequential Compression Device  Once            04/06/23 1754  Maintain Sequential Compression Device  Continuous                    Pharmalogical Order History:     None          Code Status and Medical Interventions:   Ordered at: 04/11/23 1109     Code Status (Patient has no pulse and is not breathing):    No CPR (Do Not Attempt to Resuscitate)     Medical Interventions (Patient has pulse or is breathing):    Full Support     Release to patient:    Routine Release         Disposition: Remains significantly ill, prognosis very poor. Family conversations ongoing    I have reviewed any copied/forwarded text or data, verified its accuracy, and updated as necessary above.    Brett Perry MD  [facility]  04/11/23  14:11 EDT

## 2023-04-11 NOTE — PROGRESS NOTES
[facility] Physician - Brief Progress Note  PERMANENT  04/11/2023 05:23    [facility], [facility] - [address] [place], [place] ([facility])    SANDY SANCHEZ    Date of Service 04/11/2023 05:23    HPI/Events of Note [facility] Provider Intervention Note    RN reporting pt with 200 cc UOP for 12 hours, pt not on any MIVF. BUN 34 Creat 1.09 Na 139 this AM    ARDS  Proned  Off pressors, 's  Cr uptrending  weight increased  home meds include lasix    lasix 20 mg IV x 1 now  monitor Cr and U.O.      __x___   Video Assessment performed and spoke with bedside nurse            Contact [facility] for any needs if bedside physician is not present.      Interventions Intermediate-Communication with other healthcare providers and/or family, Diagnostic test evaluation, Oliguria - evaluation and management        Electronically Signed by: Princess Kaba) on 04/11/2023 05:24

## 2023-04-11 NOTE — CASE MANAGEMENT/SOCIAL WORK
Discharge Planning [facility]     Patient Name: Aidee Trinidad  MRN: [id number]  Today's Date: 4/11/2023    Admit Date: 4/6/2023    Plan: Pt was admitted on 04/06/23. Pt lives with son Juany provides care during the day. Pt does not utilize HH services at this time. Pt utilizes hospital bed, rollator, w/chair oxgyen @ 2 liters via [employer]. Pt previously notified SS she may be interested in short term NHP. SS to speak with Pt about SNF once Pt is closer to being medically stable. Palliative Care following pt for GOC. SS to follow.     Discharge Plan     Row Name 04/11/23 1626       Plan    Plan Pt was admitted on 04/06/23. Pt lives with son Juany provides care during the day. Pt does not utilize HH services at this time. Pt utilizes hospital bed, rollator, w/chair oxgyen @ 2 liters via [employer]. Pt previously notified SS she may be interested in short term NHP. SS to speak with Pt about SNF once Pt is closer to being medically stable. Palliative Care following pt for GOC. SS to follow.              PRINCE Painting

## 2023-04-12 NOTE — PROGRESS NOTES
Pharmacokinetics Service Note:    Ms. Trinidad is currently on day 3 of vancomycin for pneumonia. Based on her worsening kidney function will decrease dose to 750 mg q 24 hours. To target AUC range of 400-600 mg/L.hr. Pharmacy will continue to follow and adjust dose as appropriate based on clearance and vancomycin levels.     Thank you,  Miroslava Patton, Pharm.D.  04/12/23  07:47 EDT

## 2023-04-12 NOTE — PROGRESS NOTES
Nutrition Services    Patient Name:  Aidee Trinidad  YOB: 1965  MRN: [id number]  Admit Date:  4/6/2023    F/u:  Per Epic secure chat Dr. Duval agreed to increase TF.  Tf order Peptamen Af @ 20 ml/hr with 10 ml/hr water flush and added Prosource 30 ml bid.  Will continue to follow.     Electronically signed by:  Manju Martinez RD  04/12/23 10:35 EDT

## 2023-04-12 NOTE — PROGRESS NOTES
THC Physician - Brief Progress Note  PERMANENT  04/11/2023 23:44    [facility] - [place] - [place] - CCU - [address] - [place], [place] ([facility])    SANDY SANCHEZ    Date of Service 04/11/2023 23:44    HPI/Events of Note [facility] Provider Intervention Note    Pt has been prone >40 hours after a failed attempt at supining yesterday evening.  O2 sats have been more stable tonight.  OK with nursing attempting to supine patient now.  Currently on 85% FiO2.  I asked them to increase to 100%.    ___n__   Video Assessment performed            [facility] any needs if bedside physician is not present.      Interventions Major-Respiratory failure - evaluation and management        Electronically Signed by: Ambika Olivares) on 04/11/2023 23:50

## 2023-04-12 NOTE — CONSULTS
Nephrology Consult Note    Referring Provider: Dr Perry  Reason for Consultation: JADA    Subjective       History of present illness:  Aidee Trinidad is a 57 y.o. female who presented to [facility] emergency department with chief complaint of shortness of breath. Pt is known to have decompensated cirrhosis due to GONZALEZ, coronary artery disease status post three-vessel CABG in , COPD with chronic hypoxic respiratory failure on home O2, essential hypertension, type 2 diabetes mellitus, hyperlipidemia, hypothyroidism, MANUEL. She was admitted with pneumonia and now have developed ARDS. Pt was give high dose IV diuretics yesterday and today and was reported not having adequate urine output.   Pt is currently on MV and is being planned for proning  She does not have history of CKD and baseline Cr appears to be around 0.7.   Unable to obtain history from patient.     History  Past Medical History:   Diagnosis Date   • Abscess of periorbital region, right 10/30/2021   • Arthritis    • Chronic back pain    • Closed fracture of right hip 2021   • COPD exacerbation 2023   • Coronary artery disease, 3 vessel CABG    • Decompensated hepatic cirrhosis, grade 1 esophageal varices in the lower /3    • Depression    • Essential hypertension    • H pylori ulcer    • History of transfusion    • Hyperlipidemia    • Hypothyroidism    • Iron deficiency anemia 2023   • Recurrent right pleural effusion 2023   • Sleep apnea    • Type 2 diabetes mellitus, without long-term current use of insulin 10/30/2021   ,   Past Surgical History:   Procedure Laterality Date   • CARPAL TUNNEL RELEASE     •  SECTION     • CHOLECYSTECTOMY     • COLONOSCOPY     • CORONARY ANGIOPLASTY WITH STENT PLACEMENT     • ENDOSCOPY N/A 2022    Procedure: ESOPHAGOGASTRODUODENOSCOPY WITH BIOPSY;  Surgeon: Lizzeth Harmon MD;  Location: [facility];  Service: Gastroenterology;  Laterality: N/A;   • HIP  TROCHANTERIC NAILING WITH INTRAMEDULLARY HIP SCREW Right 7/18/2021    Procedure: HIP TROCHANTERIC NAILING LONG WITH INTRAMEDULLARY HIP SCREW;  Surgeon: Oracio Ponce DO;  Location[facility];  Service: Orthopedics;  Laterality: Right;   • REPLACEMENT TOTAL KNEE Right    • TUBAL ABDOMINAL LIGATION     • UPPER GASTROINTESTINAL ENDOSCOPY     ,   Family History   Problem Relation Age of Onset   • COPD Mother    • Stroke Mother    • Cancer Brother    • Diabetes Brother    • Hypertension Brother    • Heart disease Brother    • Breast cancer Neg Hx    ,   Social History     Tobacco Use   • Smoking status: Never   • Smokeless tobacco: Never   Vaping Use   • Vaping Use: Never used   Substance Use Topics   • Alcohol use: Yes     Comment: Once a year    • Drug use: No   ,   Medications Prior to Admission   Medication Sig Dispense Refill Last Dose   • aspirin 81 MG EC tablet Take 1 tablet by mouth Daily.   4/6/2023   • atorvastatin (LIPITOR) 20 MG tablet Take 1 tablet by mouth Every Night.   4/5/2023   • cholecalciferol (VITAMIN D3) 1.25 MG (78730 UT) capsule Take 1 capsule by mouth Every 7 (Seven) Days.   3/31/2023   • furosemide (LASIX) 40 MG tablet Take 1 tablet by mouth Daily.   4/6/2023   • gabapentin (NEURONTIN) 400 MG capsule Take 1 capsule by mouth 2 (Two) Times a Day As Needed (nerve pain).   4/6/2023   • HYDROcodone-acetaminophen (NORCO) 5-325 MG per tablet Take 1 tablet by mouth 2 (Two) Times a Day As Needed for Moderate Pain.   4/6/2023   • levothyroxine (SYNTHROID, LEVOTHROID) 75 MCG tablet Take 1 tablet by mouth Daily.   4/6/2023   • loratadine (CLARITIN) 10 MG tablet Take 1 tablet by mouth Every Other Day.   4/6/2023   • loratadine-pseudoephedrine (CLARITIN-D 24-hour)  MG per 24 hr tablet Take 1 tablet by mouth Every Other Day.   4/5/2023   • metoprolol tartrate (LOPRESSOR) 25 MG tablet Take 1 tablet by mouth 2 (Two) Times a Day.   4/6/2023   • omeprazole (priLOSEC) 40 MG capsule Take 1 capsule by mouth  Daily. 30 capsule 5 4/6/2023   • potassium chloride (K-DUR,KLOR-CON) 10 MEQ CR tablet Take 1 tablet by mouth 2 (Two) Times a Day.   4/6/2023   • QUEtiapine (SEROquel) 50 MG tablet Take 1 tablet by mouth Every Night.   4/5/2023   • sertraline (ZOLOFT) 50 MG tablet Take 1 tablet by mouth Daily.   4/6/2023   , Scheduled Meds:  albumin human, 12.5 g, Intravenous, BID  artificial tears, , Both Eyes, Q4H  atorvastatin, 20 mg, Oral, Nightly  budesonide, 0.5 mg, Nebulization, BID - RT  castor oil-balsam peru, 1 application, Topical, Q12H  cefepime, 2 g, Intravenous, Q12H  cetirizine, 10 mg, Oral, Daily  chlorhexidine, 15 mL, Mouth/Throat, Q12H  dexamethasone, 20 mg, Intravenous, Daily   Followed by  [START ON 4/17/2023] dexamethasone, 10 mg, Intravenous, Daily  doxycycline, 100 mg, Intravenous, Q12H  insulin regular, 2-7 Units, Subcutaneous, Q6H  ipratropium-albuterol, 3 mL, Nebulization, 4x Daily - RT  levothyroxine, 75 mcg, Oral, Daily  micafungin (MYCAMINE) IV, 100 mg, Intravenous, Q24H  nystatin, , Topical, Q12H  pantoprazole, 40 mg, Intravenous, Q24H  sertraline, 50 mg, Oral, Daily  sodium chloride, 10 mL, Intravenous, Q12H  sodium chloride, 3 mL, Intravenous, Q12H    , Continuous Infusions:  cisatracurium (NIMBEX) 200 mg in sodium chloride 0.9 % 100 mL, 0.5-10 mcg/kg/min, Last Rate: 2.82 mcg/kg/min (04/12/23 1550)  propofol, 5-50 mcg/kg/min, Last Rate: 40 mcg/kg/min (04/12/23 1709)  sodium chloride, 75 mL/hr, Last Rate: 75 mL/hr (04/12/23 1434)    , PRN Meds:  •  benzonatate  •  dextrose  •  dextrose  •  dextrose  •  dextrose  •  fentaNYL citrate (PF)  •  glucagon (human recombinant)  •  glucagon (human recombinant)  •  hydrOXYzine  •  ipratropium-albuterol  •  magnesium sulfate **OR** magnesium sulfate **OR** magnesium sulfate  •  nitroglycerin  •  potassium chloride **OR** potassium chloride **OR** potassium chloride  •  prochlorperazine  •  promethazine  •  [COMPLETED] Insert Peripheral IV **AND** sodium  chloride  •  sodium chloride  •  sodium chloride  •  sodium chloride  •  sodium chloride  •  sodium chloride  •  sodium chloride and Allergies:  Nuts, Contrast dye (echo or unknown ct/mr), Codeine, and Latex    Review of Systems  Unable to obtainb    Objective     Vital Signs  Temp:  [97.3 °F (36.3 °C)-98.1 °F (36.7 °C)] 97.4 °F (36.3 °C)  Heart Rate:  [81-97] (P) 85  Resp:  [32-40] 32  BP: ()/(40-78) (P) 100/45  FiO2 (%):  [85 %-100 %] 100 %    Intake/Output                             04/10/23 0701 - 04/11/23 0700 04/11/23 0701 - 04/12/23 0700 04/12/23 0701 - 04/13/23 0700     [id number] 3431-3115 Total 9104-8211 1571-2012 Total 5661-2563 5357-1100 Total                    Intake    I.V.  467.7  357.3 825  401.8  727.7 1129.5  847.3  -- 847.3    Blood  --  -- --  300  -- 300  --  -- --    Volume (Transfuse Fresh Frozen Plasma) -- -- -- 300 -- 300 -- -- --    Other  --  -- --  162  111 273  40  -- 40    Intake (mL) (Urethral Catheter Non-latex 16 Fr.) -- -- -- -- 20 20 -- -- --    Flush/ Irrigation Intake (mL) (NG/OG Tube Orogastric 16 Fr Center mouth) -- -- -- 162 91 253 40 -- 40    NG/GT  --  -- --  200  96 296  269  -- 269    IV Piggyback  780.3  200 980.3  650  100 750  250  -- 250    Total Intake 1248 557.3 1805.3 1713.8 1034.7 2748.5 1406.3 -- 1406.3       Output    Urine  200  200 400  150  170 320  60  -- 60    Stool  0  -- 0  --  -- --  --  -- --    Total Output 200 200 400 150 170 320 60 -- 60           Physical Examination:  General Appearance: intubated on MV  No JVD  Lungs: B/L coarse crackles and decreased intensity of breath sounds.   Heart: Regular rhythm & normal rate, normal S1, S2, no murmur, no gallop, no rub   Abdomen: Normal bowel sounds, no masses and soft non-tender  Extremities: No edema  Neurologic: sedated.     Laboratory Data :      WBC WBC   Date Value Ref Range Status   04/12/2023 6.89 3.40 - 10.80 10*3/mm3 Final   04/11/2023 6.25 3.40 - 10.80 10*3/mm3 Final   04/10/2023 9.82 3.40  - 10.80 10*3/mm3 Final      HGB Hemoglobin   Date Value Ref Range Status   04/12/2023 8.5 (L) 12.0 - 15.9 g/dL Final   04/11/2023 7.5 (L) 12.0 - 15.9 g/dL Final   04/10/2023 8.7 (L) 12.0 - 15.9 g/dL Final      HCT Hematocrit   Date Value Ref Range Status   04/12/2023 28.0 (L) 34.0 - 46.6 % Final   04/11/2023 24.3 (L) 34.0 - 46.6 % Final   04/10/2023 28.0 (L) 34.0 - 46.6 % Final      Platlets No results found for: LABPLAT   MCV MCV   Date Value Ref Range Status   04/12/2023 101.8 (H) 79.0 - 97.0 fL Final   04/11/2023 98.4 (H) 79.0 - 97.0 fL Final   04/10/2023 96.6 79.0 - 97.0 fL Final          Sodium Sodium   Date Value Ref Range Status   04/12/2023 138 136 - 145 mmol/L Final   04/12/2023 137 136 - 145 mmol/L Final   04/11/2023 139 136 - 145 mmol/L Final   04/10/2023 141 136 - 145 mmol/L Final      Potassium Potassium   Date Value Ref Range Status   04/12/2023 5.4 (H) 3.5 - 5.2 mmol/L Final     Comment:     Slight hemolysis detected by analyzer. Results may be affected.   04/12/2023 5.2 3.5 - 5.2 mmol/L Final     Comment:     Slight hemolysis detected by analyzer. Results may be affected.   04/11/2023 3.4 (L) 3.5 - 5.2 mmol/L Final   04/10/2023 3.6 3.5 - 5.2 mmol/L Final   04/10/2023 3.6 3.5 - 5.2 mmol/L Final   04/10/2023 3.4 (L) 3.5 - 5.2 mmol/L Final     Comment:     Slight hemolysis detected by analyzer. Results may be affected.      Chloride Chloride   Date Value Ref Range Status   04/12/2023 99 98 - 107 mmol/L Final   04/12/2023 99 98 - 107 mmol/L Final   04/11/2023 102 98 - 107 mmol/L Final   04/10/2023 96 (L) 98 - 107 mmol/L Final      CO2 CO2   Date Value Ref Range Status   04/12/2023 26.3 22.0 - 29.0 mmol/L Final   04/12/2023 27.1 22.0 - 29.0 mmol/L Final   04/11/2023 26.0 22.0 - 29.0 mmol/L Final   04/10/2023 31.8 (H) 22.0 - 29.0 mmol/L Final      BUN BUN   Date Value Ref Range Status   04/12/2023 61 (H) 6 - 20 mg/dL Final   04/12/2023 52 (H) 6 - 20 mg/dL Final   04/11/2023 34 (H) 6 - 20 mg/dL Final    04/10/2023 26 (H) 6 - 20 mg/dL Final      Creatinine Creatinine   Date Value Ref Range Status   04/12/2023 2.05 (H) 0.57 - 1.00 mg/dL Final   04/12/2023 1.63 (H) 0.57 - 1.00 mg/dL Final   04/11/2023 1.09 (H) 0.57 - 1.00 mg/dL Final   04/10/2023 0.97 0.57 - 1.00 mg/dL Final      Calcium Calcium   Date Value Ref Range Status   04/12/2023 9.7 8.6 - 10.5 mg/dL Final   04/12/2023 9.6 8.6 - 10.5 mg/dL Final   04/11/2023 8.4 (L) 8.6 - 10.5 mg/dL Final   04/10/2023 9.4 8.6 - 10.5 mg/dL Final      PO4 No results found for: CAPO4   Albumin Albumin   Date Value Ref Range Status   04/11/2023 3.3 (L) 3.5 - 5.2 g/dL Final   04/10/2023 3.6 3.5 - 5.2 g/dL Final      Magnesium Magnesium   Date Value Ref Range Status   04/10/2023 2.8 (H) 1.6 - 2.6 mg/dL Final      Uric Acid No results found for: URICACID     Radiology results :     Imaging Results (Last 72 Hours)     Procedure Component Value Units Date/Time    XR Chest 1 View [[id number]] Collected: 04/12/23 0831     Updated: 04/12/23 0835    Narrative:      EXAM:    XR Chest, 1 View     EXAM DATE:    4/12/2023 7:40 AM     CLINICAL HISTORY:    ards; F03-[id number] effusion, not elsewhere classified; J18.9-Pneumonia,  unspecified organism; R18.8-Other ascites; K74.60-Unspecified cirrhosis  of liver; R18.8-Other ascites; J81.0-Acute pulmonary edema; J96.20-Acute  and chronic respiratory failure, unspecified whether with hypoxia or  hypercapnia     TECHNIQUE:    Frontal view of the chest.     COMPARISON:    04/11/2023     FINDINGS:    LUNGS:  Slightly improved aeration of bilateral airspace disease.    PLEURAL SPACE:  Unremarkable.  No pneumothorax.    HEART:  Cardiomegaly again noted.  Coronary artery bypass graft  (CABG).    MEDIASTINUM:  Unremarkable.    BONES/JOINTS:  Median sternotomy.    TUBES, LINES AND DEVICES:  Right internal jugular central venous  catheter tip in the superior vena cava.  The endotracheal tube (ETT) is  in satisfactory position.  Nasogastric tube tip in the  stomach.       Impression:      1.  Slightly improved aeration of bilateral airspace disease.  2.  Cardiomegaly again noted.     This report was finalized on 4/12/2023 8:33 AM by Dr. Christiano Aceves MD.       XR Chest 1 View [[id number]] Collected: 04/11/23 0144     Updated: 04/11/23 0146    Narrative:      CR Chest 1 Vw    INDICATION:   Increased shortness of air     COMPARISON:    April 9, 2023    FINDINGS:  Portable PA view(s) of the chest with patient in prone positioning.    Support lines and tubes are unchanged.    Cardiomediastinal contour is obscured. Diffuse bilateral airspace disease.      Impression:      Diffuse bilateral airspace disease is not appreciably different.    Signer Name: JEROME CHIU MD   Signed: 4/11/2023 1:44 AM   [facility]station [email or web address]    [facility]            Medications:      albumin human, 12.5 g, Intravenous, BID  artificial tears, , Both Eyes, Q4H  atorvastatin, 20 mg, Oral, Nightly  budesonide, 0.5 mg, Nebulization, BID - RT  castor oil-balsam peru, 1 application, Topical, Q12H  cefepime, 2 g, Intravenous, Q12H  cetirizine, 10 mg, Oral, Daily  chlorhexidine, 15 mL, Mouth/Throat, Q12H  dexamethasone, 20 mg, Intravenous, Daily   Followed by  [START ON 4/17/2023] dexamethasone, 10 mg, Intravenous, Daily  doxycycline, 100 mg, Intravenous, Q12H  insulin regular, 2-7 Units, Subcutaneous, Q6H  ipratropium-albuterol, 3 mL, Nebulization, 4x Daily - RT  levothyroxine, 75 mcg, Oral, Daily  micafungin (MYCAMINE) IV, 100 mg, Intravenous, Q24H  nystatin, , Topical, Q12H  pantoprazole, 40 mg, Intravenous, Q24H  sertraline, 50 mg, Oral, Daily  sodium chloride, 10 mL, Intravenous, Q12H  sodium chloride, 3 mL, Intravenous, Q12H      cisatracurium (NIMBEX) 200 mg in sodium chloride 0.9 % 100 mL, 0.5-10 mcg/kg/min, Last Rate: 2.82 mcg/kg/min (04/12/23 1550)  propofol, 5-50 mcg/kg/min, Last Rate: 40 mcg/kg/min (04/12/23 5658)  sodium chloride, 75 mL/hr, Last Rate: 75 mL/hr  (04/12/23 7584)        Assessment & Plan       Recurrent right pleural effusion    Severe sepsis    Chronic respiratory failure    COPD exacerbation      1. JADA, oliguric  2. Acute Hyperkalemia  3. Acute bacterial pneumonia likely atypical  4. Acute on chronic hypoxic respiratory failure  5. ARDS  6. Decompensated liver cirrhosis    JADA most likely due to ATN in setting of ARDS, other differential include HRS in setting of decompensated liver cirrhosis and obstructive uropathy.    Baseline Cr around 0.6-0.7  -There is no pulmonary edema, clinically and radiologically and in setting of ATN/Impending ATN strongly recommend against diuretics. -Given the concern of HRS with history decompensated liver cirrhosis in setting of likely reduced effective intravascular volume contraction, will start on normal saline judiciously  -UA with microscopy  -U sodium  -bladder scan to r/o urinary retension  -no emergent indication of dialysis but remains high risk with rapidly worsening renal failure.   -renal USG to r/o obstructive uropathy  -starict I/O  -avoid any nephrotoxic agents, hypotensoin and adjust medications according to eGFR    Thanks Dr Peryr for the consult. Nephrology will follow the patient.   I discussed the patient's findings and my recommendations with patient and nursing staff    Venecia Hardy MD  04/12/23  17:53 EDT  More than 55min of CC time spent

## 2023-04-12 NOTE — PROGRESS NOTES
PROGRESS NOTE         Patient Identification:  Name:  Aidee Trinidad  Age:  57 y.o.  Sex:  female  :  1965  MRN:  [id number]  Visit Number:  [id number]  Primary Care Provider:  Azalea Burnett APRN         LOS: 6 days       ----------------------------------------------------------------------------------------------------------------------  Subjective       Chief Complaints:    Shortness of Breath        Interval History:      The patient is in supine position this morning.  Intubated and sedated at 90% FiO2, which is slightly improved.  Nurse reports that patient is tolerating supine positioning well.  Some abdominal blisters popped at this time.  Significant rhonchi and wheezing bilaterally.  Abdomen is soft, nontender, with normal bowel sounds.  Blistering around the umbilicus with a yeast infection beneath her pannus.  Anasarca. Afebrile, no diarrhea.  WBC remains normal at 6.89.  Fungal antibodies, Aspergillus galactomannan, and Fungitell B-D glucan are pending.  Blood cultures on 4/10/2023 are so far showing no growth.    Review of Systems:    Unable to obtain.  Intubated and sedated.    ----------------------------------------------------------------------------------------------------------------------      Objective       [facility] Meds:  albumin human, 12.5 g, Intravenous, BID  artificial tears, , Both Eyes, Q4H  atorvastatin, 20 mg, Oral, Nightly  budesonide, 0.5 mg, Nebulization, BID - RT  cefepime, 2 g, Intravenous, Q12H  cetirizine, 10 mg, Oral, Daily  chlorhexidine, 15 mL, Mouth/Throat, Q12H  doxycycline, 100 mg, Intravenous, Q12H  insulin regular, 2-7 Units, Subcutaneous, Q6H  ipratropium-albuterol, 3 mL, Nebulization, 4x Daily - RT  levothyroxine, 75 mcg, Oral, Daily  micafungin (MYCAMINE) IV, 100 mg, Intravenous, Q24H  nystatin, , Topical, Q12H  pantoprazole, 40 mg, Intravenous, Q24H  sertraline, 50 mg, Oral, Daily  sodium chloride, 10 mL, Intravenous,  Q12H  sodium chloride, 3 mL, Intravenous, Q12H      cisatracurium (NIMBEX) 200 mg in sodium chloride 0.9 % 100 mL, 0.5-10 mcg/kg/min, Last Rate: 2.82 mcg/kg/min (04/12/23 1000)  propofol, 5-50 mcg/kg/min, Last Rate: 40 mcg/kg/min (04/12/23 1117)      ----------------------------------------------------------------------------------------------------------------------    Vital Signs:  Temp:  [97.3 °F (36.3 °C)-98.5 °F (36.9 °C)] 97.3 °F (36.3 °C)  Heart Rate:  [74-97] 91  Resp:  [31-40] 32  BP: ()/(40-78) 101/40  Arterial Line BP: ()/(65-77) 101/77  FiO2 (%):  [85 %-100 %] 100 %  Mean Arterial Pressure (Non-Invasive) for the past 24 hrs (Last 3 readings):   Noninvasive MAP (mmHg)   04/12/23 1100 68   04/12/23 1000 80   04/12/23 0920 73     SpO2 Percentage    04/12/23 1000 04/12/23 1034 04/12/23 1100   SpO2: 93% 92% 92%     SpO2:  [89 %-100 %] 92 %  on   ;   Device (Oxygen Therapy): ventilator    Body mass index is 52.22 kg/m².  Wt Readings from Last 3 Encounters:   04/12/23 110 kg (241 lb 6.5 oz)   04/02/23 125 kg (275 lb)   03/22/23 125 kg (275 lb)        Intake/Output Summary (Last 24 hours) at 4/12/2023 1231  Last data filed at 4/12/2023 0954  Gross per 24 hour   Intake 2848.52 ml   Output 320 ml   Net 2528.52 ml     NPO Diet NPO Type: Strict NPO  ----------------------------------------------------------------------------------------------------------------------      Physical Exam:    Constitutional:  female is in supine position this morning and on the ventilator at 90% FiO2.  HENT:  Head: Normocephalic and atraumatic.  Mouth:  Moist mucous membranes.    Eyes:  Conjunctivae and EOM are normal.  No scleral icterus.  Neck:  Neck supple.  No JVD present.    Cardiovascular:  Normal rate, regular rhythm and normal heart sounds with no murmur. No edema.  Pulmonary/Chest:  No respiratory distress.  Currently on the ventilator at 90% FiO2.  Rhonchi and wheezes bilaterally.  Abdominal:  Soft.  Bowel  sounds are normal.  No distension and no tenderness.  Some blistering of her abdomen and yeast infection beneath pannus.  Musculoskeletal:  No tenderness and no deformity.  No swelling or redness of joints.  Anasarca.  Neurological:  Alert and oriented to person, place, and time.  No facial droop.  No slurred speech.   Skin:  Skin is warm and dry.  No rash noted.  No pallor. Some blistering of her abdomen and yeast infection beneath pannus.  Psychiatric:  Normal mood and affect.  Behavior is normal.        ----------------------------------------------------------------------------------------------------------------------  Results from last 7 days   Lab Units 04/08/23 1953 04/08/23 1742 04/06/23  1515   HSTROP T ng/L 38* 36* 48*     Results from last 7 days   Lab Units 04/10/23  0030 04/06/23  1312   PROBNP pg/mL 4,648.0* 830.9       Results from last 7 days   Lab Units 04/12/23  0818   PH, ARTERIAL pH units 7.224*   PO2 ART mm Hg 68.5*   PCO2, ARTERIAL mm Hg 65.1*   HCO3 ART mmol/L 26.8*     Results from last 7 days   Lab Units 04/12/23  0020 04/11/23  0006 04/10/23  0030 04/09/23  0049 04/08/23  1742 04/07/23  0502 04/07/23  0241 04/06/23  1350 04/06/23  1312   CRP mg/dL  --  14.69*  --  2.84*  --   --   --   --  4.41*   LACTATE mmol/L  --   --  2.1* 1.9 1.8  --   --    < >  --    WBC 10*3/mm3 6.89 6.25 9.82 8.94 12.58*   < >  --   --  8.04   HEMOGLOBIN g/dL 8.5* 7.5* 8.7* 9.5* 10.8*   < >  --   --  10.9*   HEMATOCRIT % 28.0* 24.3* 28.0* 30.6* 34.9   < >  --   --  35.0   MCV fL 101.8* 98.4* 96.6 96.8 96.9   < >  --   --  94.3   MCHC g/dL 30.4* 30.9* 31.1* 31.0* 30.9*   < >  --   --  31.1*   PLATELETS 10*3/mm3 102* 90* 133* 132* 180   < >  --    < > 174   INR   --  1.72* 1.74* 1.48*  --   --  1.34*  --  1.36*    < > = values in this interval not displayed.     Results from last 7 days   Lab Units 04/12/23  0020 04/11/23  0006 04/10/23  2248 04/10/23  1124 04/10/23  0030 04/09/23  0049 04/08/23  1742   SODIUM  mmol/L 137 139  --   --  141 139 138   POTASSIUM mmol/L 5.2 3.4* 3.6   < > 3.4* 3.8 3.6   MAGNESIUM mg/dL  --   --   --   --  2.8* 1.9 2.2   CHLORIDE mmol/L 99 102  --   --  96* 96* 96*   CO2 mmol/L 27.1 26.0  --   --  31.8* 31.9* 31.7*   BUN mg/dL 52* 34*  --   --  26* 15 16   CREATININE mg/dL 1.63* 1.09*  --   --  0.97 0.66 0.60   CALCIUM mg/dL 9.6 8.4*  --   --  9.4 8.9 9.2   GLUCOSE mg/dL 166* 150*  --   --  149* 145* 148*   ALBUMIN g/dL  --  3.3*  --   --  3.6 3.3* 3.0*   BILIRUBIN mg/dL  --  2.4*  --   --  1.9* 1.4* 1.5*   ALK PHOS U/L  --  81  --   --  102 125* 161*   AST (SGOT) U/L  --  85*  --   --  104* 90* 105*   ALT (SGPT) U/L  --  36*  --   --  43* 46* 56*    < > = values in this interval not displayed.   Estimated Creatinine Clearance: 42.9 mL/min (A) (by C-G formula based on SCr of 1.63 mg/dL (H)).  No results found for: AMMONIA    Glucose   Date/Time Value Ref Range Status   04/12/2023 1110 167 (H) 70 - 130 mg/dL Final     Comment:     Meter: [id number] : [id number] PEG ABREU   04/12/2023 0629 165 (H) 70 - 130 mg/dL Final     Comment:     Meter: [id number] : [id number] NEO OLIVIER   04/12/2023 0037 163 (H) 70 - 130 mg/dL Final     Comment:     Meter: [id number] : [id number] NEO ANDERS   04/11/2023 1742 185 (H) 70 - 130 mg/dL Final     Comment:     Meter: [id number] : [id number] TONY [facility]     Lab Results   Component Value Date    HGBA1C <4.20 (L) 03/16/2023     Lab Results   Component Value Date    TSH 1.200 03/17/2023       Blood Culture   Date Value Ref Range Status   04/10/2023 No growth at 24 hours  Preliminary   04/10/2023 No growth at 2 days  Preliminary   04/06/2023 No growth at 5 days  Final   04/06/2023 No growth at 5 days  Final     No results found for: URINECX  No results found for: WOUNDCX  No results found for: STOOLCX  Respiratory Culture   Date Value Ref Range Status   04/07/2023 Rejected  Final     Pain Management Panel           Latest Ref Rng & Units  3/17/2023 2/28/2020   Pain Management Panel   Amphetamine, Urine Qual Negative Negative   Negative     Barbiturates Screen, Urine Negative Negative   Negative     Benzodiazepine Screen, Urine Negative Negative   Negative     Buprenorphine, Screen, Urine Negative Negative   Negative     Cocaine Screen, Urine Negative Negative   Negative     Methadone Screen , Urine Negative Negative   Negative     Methamphetamine, Ur Negative Negative            Multiple values from one day are sorted in reverse-chronological order               ----------------------------------------------------------------------------------------------------------------------  Imaging Results (Last 24 Hours)       Procedure Component Value Units Date/Time    XR Chest 1 View [[id number]] Collected: 04/12/23 0831     Updated: 04/12/23 0835    Narrative:      EXAM:    XR Chest, 1 View     EXAM DATE:    4/12/2023 7:40 AM     CLINICAL HISTORY:    ards; R01-Zwlldzs effusion, not elsewhere classified; J18.9-Pneumonia,  unspecified organism; R18.8-Other ascites; K74.60-Unspecified cirrhosis  of liver; R18.8-Other ascites; J81.0-Acute pulmonary edema; J96.20-Acute  and chronic respiratory failure, unspecified whether with hypoxia or  hypercapnia     TECHNIQUE:    Frontal view of the chest.     COMPARISON:    04/11/2023     FINDINGS:    LUNGS:  Slightly improved aeration of bilateral airspace disease.    PLEURAL SPACE:  Unremarkable.  No pneumothorax.    HEART:  Cardiomegaly again noted.  Coronary artery bypass graft  (CABG).    MEDIASTINUM:  Unremarkable.    BONES/JOINTS:  Median sternotomy.    TUBES, LINES AND DEVICES:  Right internal jugular central venous  catheter tip in the superior vena cava.  The endotracheal tube (ETT) is  in satisfactory position.  Nasogastric tube tip in the stomach.       Impression:      1.  Slightly improved aeration of bilateral airspace disease.  2.  Cardiomegaly again noted.     This report was finalized on 4/12/2023 8:33 AM  by Dr. Christiano Aceves MD.               ----------------------------------------------------------------------------------------------------------------------    Pertinent Infectious Disease Results    Lactic acid on admission was elevated at 2.1.  Urinalysis on 4/6/2023 was negative.  Chest x-ray on 4/11/2023 showed diffuse bilateral airspace disease.  CT abdomen pelvis on 4/6/2023 showed bibasilar partially consolidative airspace disease which may represent atelectasis and pneumonia.  Stable right pleural effusion and stable small to moderate volume ascites.  Stable severe liver cirrhosis with portal hypertension changes.  Moderate constipation.  No bowel obstruction.  CT chest on 4/6/2023 showed CHF/edema.  Bibasilar consolidative airspace disease.  Stable moderate right pleural effusion, nonloculated.  Stable liver cirrhosis with portal hypertension and upper abdominal ascites.  MRSA screen is negative.  COVID-19 and flu A/B PCR was negative.  Respiratory panel PCR was negative.  Strep pneumo antigen was negative.       Assessment/Plan       Assessment     Severe sepsis with lactic acid greater than 2 on admission  Acute on chronic hypoxic respiratory failure  Severe ARDS  Pneumonia      Plan      I have seen and examined the patient myself this morning and discussed the plan of care with Kelley Lindo PA-C and discussed overnight changes with primary RN here are my findings:    This morning, the patient remains intubated and sedated in the supine position with an FiO2 of 90%, which has slightly improved. The nurse reports that the patient is tolerating the supine position well. There are abdominal blisters that have popped, and the patient has significant rhonchi and wheezing bilaterally. The abdomen is soft, nontender, and has normal bowel sounds. There is blistering around the umbilicus with a yeast infection beneath the pannus, as well as anasarca. The patient is afebrile with no diarrhea. The WBC remains  normal at 6.89. Fungal antibodies, Aspergillus galactomannan, and Fungitell B-D glucan are pending. Blood cultures taken on 4/10/2023 show no growth so far.    It is recommended to continue with cefepime and micafungin, but vancomycin was discontinued since MRSA screening was negative. Doxycycline has been added to the treatment plan, especially with the underlying COPD. The decision to discontinue micafungin will depend on the results of the fungal antibodies. The patient received Lasix yesterday, which seems to have improved her respiratory status, and this was discussed with Dr. Perry. I had the pleasure to discuss this case and plan of care with pulmonary team due to guarded prognosis.      ANTIMICROBIAL THERAPY    cefepime 2 gm IVPB in 100 ml NS (VTB)  doxycycline (VIBRAMYCIN) 100MG IVPB in 100ml NS VTB  micafungin (MYCAMINE) IVPB     Code Status:   Code Status and Medical Interventions:   Ordered at: 04/11/23 1109     Code Status (Patient has no pulse and is not breathing):    No CPR (Do Not Attempt to Resuscitate)     Medical Interventions (Patient has pulse or is breathing):    Full Support     Release to patient:    Routine Release       Kelley Lindo PA-C  04/12/23  12:31 EDT    Electronically signed by Kelley Lindo PA-C, 04/12/23, 12:37 PM EDT.    Electronically signed by Flaca Richard MD, 04/12/23, 12:54 PM EDT.

## 2023-04-12 NOTE — PROGRESS NOTES
Referring Provider: dr alonso  Reason for Consultation: respiratory failure      Chief complaint -sob       Subjective-  All the labs medications ins and outs vitals reviewed.  Patient remains critically ill.  In the morning was able to tolerate supination.  Increase the tube feeds rate.  Repeated dose of Solu-Medrol  Case discussed with Dr. Puckett and will repeat albumin and Lasix.  Made very less urine.  Grossly edematous.  Later in the morning patient desaturated and oxygen-FiO2 levels were increased from 90% to 100%.  For metabolic acidosis post 2 A of bicarb.    All the labs medications ins and outs vitals ventilator settings and graphics were discussed with patient's daughter at bedside and explained to her in great detail.  Goals of care were discussed and patient remains DNR.  Case discussed with patient's nurse, ID, RT and Dr. Puckett      Review of Systems  Could not be obtained as patient is intubated sedated and paralyzed      History  Past Medical History:   Diagnosis Date   • Abscess of periorbital region, right 10/30/2021   • Arthritis    • Chronic back pain    • Closed fracture of right hip 2021   • COPD exacerbation 2023   • Coronary artery disease, 3 vessel CABG    • Decompensated hepatic cirrhosis, grade 1 esophageal varices in the lower 1/3    • Depression    • Essential hypertension    • H pylori ulcer    • History of transfusion    • Hyperlipidemia    • Hypothyroidism    • Iron deficiency anemia 2023   • Recurrent right pleural effusion 2023   • Sleep apnea    • Type 2 diabetes mellitus, without long-term current use of insulin 10/30/2021   ,   Past Surgical History:   Procedure Laterality Date   • CARPAL TUNNEL RELEASE     •  SECTION     • CHOLECYSTECTOMY     • COLONOSCOPY     • CORONARY ANGIOPLASTY WITH STENT PLACEMENT     • ENDOSCOPY N/A 2022    Procedure: ESOPHAGOGASTRODUODENOSCOPY WITH BIOPSY;  Surgeon: Lizzeth Harmon MD;  Location:   [facility];  Service: Gastroenterology;  Laterality: N/A;   • HIP TROCHANTERIC NAILING WITH INTRAMEDULLARY HIP SCREW Right 7/18/2021    Procedure: HIP TROCHANTERIC NAILING LONG WITH INTRAMEDULLARY HIP SCREW;  Surgeon: Oracio Ponce DO;  Location:  [facility];  Service: Orthopedics;  Laterality: Right;   • REPLACEMENT TOTAL KNEE Right    • TUBAL ABDOMINAL LIGATION     • UPPER GASTROINTESTINAL ENDOSCOPY     ,   Family History   Problem Relation Age of Onset   • COPD Mother    • Stroke Mother    • Cancer Brother    • Diabetes Brother    • Hypertension Brother    • Heart disease Brother    • Breast cancer Neg Hx    ,   Social History     Tobacco Use   • Smoking status: Never   • Smokeless tobacco: Never   Vaping Use   • Vaping Use: Never used   Substance Use Topics   • Alcohol use: Yes     Comment: Once a year    • Drug use: No   ,   Medications Prior to Admission   Medication Sig Dispense Refill Last Dose   • aspirin 81 MG EC tablet Take 1 tablet by mouth Daily.   4/6/2023   • atorvastatin (LIPITOR) 20 MG tablet Take 1 tablet by mouth Every Night.   4/5/2023   • cholecalciferol (VITAMIN D3) 1.25 MG (47319 UT) capsule Take 1 capsule by mouth Every 7 (Seven) Days.   3/31/2023   • furosemide (LASIX) 40 MG tablet Take 1 tablet by mouth Daily.   4/6/2023   • gabapentin (NEURONTIN) 400 MG capsule Take 1 capsule by mouth 2 (Two) Times a Day As Needed (nerve pain).   4/6/2023   • HYDROcodone-acetaminophen (NORCO) 5-325 MG per tablet Take 1 tablet by mouth 2 (Two) Times a Day As Needed for Moderate Pain.   4/6/2023   • levothyroxine (SYNTHROID, LEVOTHROID) 75 MCG tablet Take 1 tablet by mouth Daily.   4/6/2023   • loratadine (CLARITIN) 10 MG tablet Take 1 tablet by mouth Every Other Day.   4/6/2023   • loratadine-pseudoephedrine (CLARITIN-D 24-hour)  MG per 24 hr tablet Take 1 tablet by mouth Every Other Day.   4/5/2023   • metoprolol tartrate (LOPRESSOR) 25 MG tablet Take 1 tablet by mouth 2 (Two) Times a Day.   4/6/2023    • omeprazole (priLOSEC) 40 MG capsule Take 1 capsule by mouth Daily. 30 capsule 5 4/6/2023   • potassium chloride (K-DUR,KLOR-CON) 10 MEQ CR tablet Take 1 tablet by mouth 2 (Two) Times a Day.   4/6/2023   • QUEtiapine (SEROquel) 50 MG tablet Take 1 tablet by mouth Every Night.   4/5/2023   • sertraline (ZOLOFT) 50 MG tablet Take 1 tablet by mouth Daily.   4/6/2023   , Scheduled Meds:  albumin human, 12.5 g, Intravenous, BID  artificial tears, , Both Eyes, Q4H  atorvastatin, 20 mg, Oral, Nightly  budesonide, 0.5 mg, Nebulization, BID - RT  cefepime, 2 g, Intravenous, Q12H  cetirizine, 10 mg, Oral, Daily  chlorhexidine, 15 mL, Mouth/Throat, Q12H  doxycycline, 100 mg, Intravenous, Q12H  furosemide, 100 mg, Intravenous, Once  insulin regular, 2-7 Units, Subcutaneous, Q6H  ipratropium-albuterol, 3 mL, Nebulization, 4x Daily - RT  levothyroxine, 75 mcg, Oral, Daily  micafungin (MYCAMINE) IV, 100 mg, Intravenous, Q24H  nystatin, , Topical, Q12H  pantoprazole, 40 mg, Intravenous, Q24H  sertraline, 50 mg, Oral, Daily  sodium chloride, 10 mL, Intravenous, Q12H  sodium chloride, 3 mL, Intravenous, Q12H    , Continuous Infusions:  cisatracurium (NIMBEX) 200 mg in sodium chloride 0.9 % 100 mL, 0.5-10 mcg/kg/min, Last Rate: 2.82 mcg/kg/min (04/12/23 1000)  propofol, 5-50 mcg/kg/min, Last Rate: 40 mcg/kg/min (04/12/23 1117)     and Allergies:  Nuts, Contrast dye (echo or unknown ct/mr), Codeine, and Latex    Objective     Vital Signs   Temp:  [97.3 °F (36.3 °C)-98.5 °F (36.9 °C)] 97.3 °F (36.3 °C)  Heart Rate:  [74-97] (P) 91  Resp:  [31-40] 32  BP: ()/(41-78) (P) 101/40  Arterial Line BP: ()/(65-77) 101/77  FiO2 (%):  [85 %-100 %] 100 %    Physical Exam:   Supine, otherwise no major changes          GENERAL APPEARANCE: Intubated paralyzed and sedated  HEAD: normocephalic.    EYES: Atraumatic    THROAT: ET tube in place.     NECK: Supple    CARDIAC: NSR, S1-S2 normal  LUNGS: Clear to auscultation and percussion  without rales, rhonchi, wheezing or diminished breath sounds, intubated on a ventilator    ABDOMEN: Obese  EXTREMITIES: No significant deformity or joint abnormality. No edema. Peripheral pulses intact.    NEUROLOGICAL: Unable to assess due to sedation status.     PSYCHIATRIC: Unable to assess due to sedation status                                                              Results Review:    LABS:    Lab Results   Component Value Date    GLUCOSE 166 (H) 04/12/2023    BUN 52 (H) 04/12/2023    CREATININE 1.63 (H) 04/12/2023    EGFRIFNONA 112 12/08/2021    BCR 31.9 (H) 04/12/2023    CO2 27.1 04/12/2023    CALCIUM 9.6 04/12/2023    PROTENTOTREF 6.9 02/16/2023    ALBUMIN 3.3 (L) 04/11/2023    LABIL2 0.8 02/16/2023    AST 85 (H) 04/11/2023    ALT 36 (H) 04/11/2023    WBC 6.89 04/12/2023    HGB 8.5 (L) 04/12/2023    HCT 28.0 (L) 04/12/2023    .8 (H) 04/12/2023     (L) 04/12/2023     04/12/2023    K 5.2 04/12/2023    CL 99 04/12/2023    ANIONGAP 10.9 04/12/2023       Lab Results   Component Value Date    INR 1.72 (H) 04/11/2023    INR 1.74 (H) 04/10/2023    INR 1.48 (H) 04/09/2023    PROTIME 20.8 (H) 04/11/2023    PROTIME 21.0 (H) 04/10/2023    PROTIME 18.3 (H) 04/09/2023       Results from last 7 days   Lab Units 04/11/23  0006 04/10/23  0030 04/09/23  0049 04/07/23  0241 04/06/23  1312   INR  1.72* 1.74* 1.48* 1.34* 1.36*   APTT seconds  --   --  33.7 30.6 37.6*          I reviewed the patient's new clinical results.  I reviewed the patient's new imaging results and agree with the interpretation.      Assessment & Plan     Neuro- Sedated -drips reviewed and adjusted to maintain patient ventilator synchrony.  Continue paralytics.  If FiO2 requirement goes down will consider paralytic holiday.    Respiratory-in ARDS- Case again discussed with Dr. Puckett and patient's daughter at bedside.   We will repeat the pulse dose steroid today.  Chest x-ray looks slightly better.  We will repeat albumin and  Lasix.  Patient did not make much urine.  Increase the tube feed rate and decrease the free water flush to 0.  Hemoglobin stable.  We will check PT/INR in the morning and if more than 1.5 we will consider giving another FFP.    Goals of care were discussed with patient's daughter at bedside for a long time and patient remains DNR.  Latest ABG reviewed shows respiratory acidosis with incomplete metabolic compensation.  Patient is already on a respiratory rate of 32-pH is on the lower side but hemodynamically stable.  With ARDS-patient can tolerate a pH as low as 7.15.  Will push 2 A of bicarb and continue the current ventilator settings.  Peak and plateau pressures reviewed.  We will repeat vBG around 4:00-for a follow-up on the pH.    Till 2:00 will increase the tube feed rate and around 4 will prone her again and decrease the tube feed rate to 10 cc every hour.  Plan discussed with patient's nurse.    Latest microbiology reviewed and discussed with patient's nurse and team.  Case discussed briefly with infectious disease.    We will get ABG and chest x-ray in the morning.  Case also discussed with palliative care.    Vent settings adjusted.      Vent settings-reviewed  Vent Settings          Resp Rate (Set): 32     FiO2 (%): 100 %  PEEP/CPAP (cm H2O): 12 cm H20    Minute Ventilation (L/min) (Obs): 9.95 L/min  Resp Rate (Observed) Vent: (P) 32  I:E Ratio (Set): 1:1.69  I:E Ratio (Obs): 1:2.3    PIP Observed (cm H2O): 39 cm H2O          VAP- precautions  Head end - 30 %  Mouth care   DVt prophylaxis        Cardio-   Continue to monitor HR- rate and rhythm, BP   Not requiring any vasopressors  Nephro- Cr BUN stable  I/O-reviewed.  Avoid nephrotoxic agents.  Not making much urine likely going into ATN.    Gi- PPI prophylaxis  TUBE feeds-increase the rate.  Nonalcoholic steatohepatitis- continue to monitor    Heme- CBC  Hb- transfuse for hemoglobin less than 7  platelet  WBC    ID-Cultures and micro reviewed.  Continue  current treatment.  Antibiotics reviewed    Endo- Maintain Blood sugar 140 -180  Blood sugars reviewed.    Electrolytes-   Mag phos       DVT prophylaxis-    Bedside rounds were done with RT and patients nurse. All the Lab and clinical findings were discussed with them and plan was also discussed in great detail.    Family member present-none        Echo-  Results for orders placed during the hospital encounter of 04/06/23    Adult Transthoracic Echo Complete W/ Cont if Necessary Per Protocol    Interpretation Summary  •  Left ventricular systolic function is normal. Left ventricular ejection fraction appears to be 66 - 70%.  •  Left ventricular wall thickness is consistent with mild concentric hypertrophy.  •  Left ventricular diastolic function is consistent with (grade II w/high LAP) pseudonormalization.  •  The cardiac chamber dimensions are normal.  •  No significant valvular heart disease is present.  •  Mild pulmonary hypertension is present.  •  There is no evidence of pericardial effusion.          Recurrent right pleural effusion    Severe sepsis    Chronic respiratory failure    COPD exacerbation    Patient is critically ill with acute hypoxic respiratory failure currently in ARDS.  I adjusted patient's vent settings and drips.  Overall prognosis with multiple comorbidities and critical illness with ARDS is poor.  Recommend palliative care consult  Cc 43 mins  Jesus Duval MD  04/12/23  11:32 EDT

## 2023-04-12 NOTE — PROGRESS NOTES
"Palliative Care Daily Progress Note     S: Medical record reviewed, followed up with Primary RN Briana Sanchez and Dr Perry regarding patient's condition. When palliative entered the room Hoskins was supine this am sedated on ventilator at 100% and 12 of peep. Pt was paralyzed on Nimbex and was also on fentanyl and propofol gtt's. Pt was desaturating at the time we were in room respiratory and RN's were working with pt, suctioning and working with ventilator.      O:   Palliative Performance Scale Score:     /50   Pulse 85   Temp 97.4 °F (36.3 °C) (Oral)   Resp (!) 32   Ht 144.8 cm (57.01\")   Wt 110 kg (241 lb 6.5 oz)   SpO2 97%   BMI 52.22 kg/m²     Intake/Output Summary (Last 24 hours) at 4/12/2023 1640  Last data filed at 4/12/2023 0954  Gross per 24 hour   Intake 2548.52 ml   Output 320 ml   Net 2228.52 ml       PE:  General Appearance:    Chronically ill appearing, obese, intubated and paralyzed   HEENT:    NC/AT, without obvious abnormality, anicteric, ET tube in place   Neck:   supple, trachea midline, no JVD   Lungs:     Intubated/sedated and paralyzed on vent, diminished breath sounds    Heart:    RRR, normal S1 and S2, no M/R/G   Abdomen:     Soft,  Large NT, ND, NABS    Extremities:   On paralytic   Pulses:    Weak Pulses palpable bilaterally   Skin:   Warm, dry, pale   Neurologic:   Sedated on vent   Psych:   Unable to assess            Meds: Reviewed and changes noted    Labs:   Results from last 7 days   Lab Units 04/12/23  0020   WBC 10*3/mm3 6.89   HEMOGLOBIN g/dL 8.5*   HEMATOCRIT % 28.0*   PLATELETS 10*3/mm3 102*     Results from last 7 days   Lab Units 04/12/23  1353   SODIUM mmol/L 138   POTASSIUM mmol/L 5.4*   CHLORIDE mmol/L 99   CO2 mmol/L 26.3   BUN mg/dL 61*   CREATININE mg/dL 2.05*   GLUCOSE mg/dL 181*   CALCIUM mg/dL 9.7     Results from last 7 days   Lab Units 04/12/23  1353 04/12/23  0020 04/11/23  0006   SODIUM mmol/L 138   < > 139   POTASSIUM mmol/L 5.4*   < > 3.4*   CHLORIDE " mmol/L 99   < > 102   CO2 mmol/L 26.3   < > 26.0   BUN mg/dL 61*   < > 34*   CREATININE mg/dL 2.05*   < > 1.09*   CALCIUM mg/dL 9.7   < > 8.4*   BILIRUBIN mg/dL  --   --  2.4*   ALK PHOS U/L  --   --  81   ALT (SGPT) U/L  --   --  36*   AST (SGOT) U/L  --   --  85*   GLUCOSE mg/dL 181*   < > 150*    < > = values in this interval not displayed.     Imaging Results (Last 72 Hours)     Procedure Component Value Units Date/Time    XR Chest 1 View [[id number]] Collected: 04/12/23 0831     Updated: 04/12/23 0835    Narrative:      EXAM:    XR Chest, 1 View     EXAM DATE:    4/12/2023 7:40 AM     CLINICAL HISTORY:    ards; W77-Bkwewvk effusion, not elsewhere classified; J18.9-Pneumonia,  unspecified organism; R18.8-Other ascites; K74.60-Unspecified cirrhosis  of liver; R18.8-Other ascites; J81.0-Acute pulmonary edema; J96.20-Acute  and chronic respiratory failure, unspecified whether with hypoxia or  hypercapnia     TECHNIQUE:    Frontal view of the chest.     COMPARISON:    04/11/2023     FINDINGS:    LUNGS:  Slightly improved aeration of bilateral airspace disease.    PLEURAL SPACE:  Unremarkable.  No pneumothorax.    HEART:  Cardiomegaly again noted.  Coronary artery bypass graft  (CABG).    MEDIASTINUM:  Unremarkable.    BONES/JOINTS:  Median sternotomy.    TUBES, LINES AND DEVICES:  Right internal jugular central venous  catheter tip in the superior vena cava.  The endotracheal tube (ETT) is  in satisfactory position.  Nasogastric tube tip in the stomach.       Impression:      1.  Slightly improved aeration of bilateral airspace disease.  2.  Cardiomegaly again noted.     This report was finalized on 4/12/2023 8:33 AM by Dr. Christiano Aceves MD.       XR Chest 1 View [[id number]] Collected: 04/11/23 0144     Updated: 04/11/23 0146    Narrative:      CR Chest 1 Vw    INDICATION:   Increased shortness of air     COMPARISON:    April 9, 2023    FINDINGS:  Portable PA view(s) of the chest with patient in prone  positioning.    Support lines and tubes are unchanged.    Cardiomediastinal contour is obscured. Diffuse bilateral airspace disease.      Impression:      Diffuse bilateral airspace disease is not appreciably different.    Signer Name: JEROME CHIU MD   Signed: 4/11/2023 1:44 AM   Workstation Name: LIN    [facility]            Diagnostics: Reviewed    A: Aidee Trinidad is a 57 y.o.  female  admitted on 4/6/2023 with shortness of breath, with productive cough, thick yellow/brown sputum.Aidee has a medical history of decompensated cirrhosis due to GONZALEZ with grade 1 esophageal varices in the lower one third, coronary artery disease status post three-vessel CABG in 2011, COPD for which she uses 2 L/min of nasal cannula oxygen, essential hypertension, type 2 diabetes mellitus, hyperlipidemia, hypothyroidism, MANUEL.  Pt also had rt hip cephalomedullary mailing with TFN nailing on 7/18/2021 due to a  right intertrochanteric femur fracture. She again fell in her shower and presented to ED on 3/22/2023 with imaging showing  a right periprosthetic lesser trochanter fracture. Pt was recently admitted here at [facility] 3/16-3/18 for hypoglycemia and toxic encephalopathy. On this admission Aidee is complaining of severe fatigue, she states that her baseline abdominal swelling is worse, however feels her leg swelling is at baseline.  CT of abdomen and pelvis  Showed Bibasilar partially consolidative airspace disease which may  represent atelectasis and pneumonia.      P:  I was able to meet pts daughter Puja on CCU today to update and provide support. Puja was tearful, she states she understands how sick her mother is however is hopeful she will make some headway to recovery. I let her know that we would be here for support for her and her family and supported their decisions. I let her know the physicians would do everything they could to help her mother, at the same time understanding how critically  ill she is. Puja stated she appreciated our support and stated that she would need it in the oncoming days.    We will continue to follow along. Please do not hesitate to contact us regarding further sx mgmt or GOC needs, including after hours or on weekends via our on call provider at [phone].     Roma Joyner, APRN    4/12/2023

## 2023-04-12 NOTE — CASE MANAGEMENT/SOCIAL WORK
Discharge Planning Assessment   Christophe     Patient Name: Aidee Trinidad  MRN: [id number]  Today's Date: 4/12/2023    Admit Date: 4/6/2023     Discharge Plan     Row Name 04/12/23 1422       Plan    Plan Pt discussed in rounds on this date. Pt remains intubated. Pt lives with son Americo and niece provides care during the day. Pt does not utilize home health services at [facility] time. Pt utilizes a hospital bed, rollator, wheelchair, oxygen at 2 liters via [employer]. Ptpreviously notified SS that she may be interested in short term SNF. SS to speak with Pt about SNF once Pt is extubated and closer to being medically stable. SS to follow.                PRINCE Sheikh

## 2023-04-12 NOTE — NURSING NOTE
Patient with DTPI to right anterior foot.  Wound 0.2x1cm.  Nonblanchable.  Will treat with venelex and cover with silicone border dressing BID.    Patient with intact, fluid-filled blisters to her lower abdomen.  Likely related to edema.  Will leave open to air.    Patient with yeasty dermatitis to bilat groin, and abd fold.  Nystatin order in place.    PI prevention orders initiated.       04/12/23 1649   Wound 04/12/23 0000 Right anterior ankle Pressure Injury   Placement Date/Time: 04/12/23 0000   Present on Hospital Admission: No  Side: Right  Orientation: anterior  Location: ankle  Primary Wound Type: Pressure Injury   Pressure Injury Stage DTPI   Dressing Appearance open to air   Closure None   Base maroon/purple;non-blanchable   Periwound intact;pink   Periwound Temperature warm   Periwound Skin Turgor soft   Edges rolled/closed   Wound Length (cm) 0.2 cm   Wound Width (cm) 1 cm   Wound Surface Area (cm^2) 0.2 cm^2   Drainage Amount none   Wound 04/12/23 1649 lower abdomen Blisters   Placement Date/Time: 04/12/23 1649   Present on Hospital Admission: No  Orientation: lower  Location: abdomen  Primary Wound Type: Blisters   Pressure Injury Stage O  (not a pressure related wound)   Dressing Appearance open to air   Closure None   Base other (see comments)  (intact, fluid-filled blisters)   Periwound intact   Periwound Temperature warm   Periwound Skin Turgor firm   Drainage Amount none

## 2023-04-12 NOTE — PROGRESS NOTES
[facility] HOSPITALIST PROGRESS NOTE     Patient Identification:  Name:  Aidee Trinidad  Age:  57 y.o.  Sex:  female  :  1965  MRN:  [id number]  Visit Number:  [id number]  ROOM: [address]     Primary Care Provider:  Azalea Burnett APRN     Date of Admission: 2023    Length of stay in inpatient status:  6    Subjective     Chief Compliant:    Chief Complaint   Patient presents with   • Shortness of Breath       Patient successfully moved back to supine overnight, required acute increase in fiO2 to 90% but otherwise hemodynamics tolerated transition well. This am remains intubated/sedated/paralyzed with poor UOP noted, anuric over last 24hrs despite albumin and diuretics. Remains on VC mode with low tidal volume lung protective ventilation with high peep, targeting paO2 55-80 and allowing permissive hypercapnia, regardless RR currently set 28-30 to prevent worsening acidosis. Family at bedside this morning, update provided. ETT tube suctioning overnight nonbloody but noted thick copious secretions    Later in afternoon called to bedside regarding pupil asymmetry and rapid dilation/constiction which is new finding for her. No other hemodynamic changes. Remains paralyzed and thus exam unchanged.    Objective     Current Hospital Meds:  albumin human, 12.5 g, Intravenous, BID  artificial tears, , Both Eyes, Q4H  atorvastatin, 20 mg, Oral, Nightly  budesonide, 0.5 mg, Nebulization, BID - RT  cefepime, 2 g, Intravenous, Q12H  cetirizine, 10 mg, Oral, Daily  chlorhexidine, 15 mL, Mouth/Throat, Q12H  doxycycline, 100 mg, Intravenous, Q12H  insulin regular, 2-7 Units, Subcutaneous, Q6H  ipratropium-albuterol, 3 mL, Nebulization, 4x Daily - RT  levothyroxine, 75 mcg, Oral, Daily  micafungin (MYCAMINE) IV, 100 mg, Intravenous, Q24H  nystatin, , Topical, Q12H  pantoprazole, 40 mg, Intravenous, Q24H  sertraline, 50 mg, Oral, Daily  sodium chloride, 10 mL, Intravenous, Q12H  sodium chloride,  3 mL, Intravenous, Q12H    cisatracurium (NIMBEX) 200 mg in sodium chloride 0.9 % 100 mL, 0.5-10 mcg/kg/min, Last Rate: 2.82 mcg/kg/min (04/12/23 1000)  propofol, 5-50 mcg/kg/min, Last Rate: 40 mcg/kg/min (04/12/23 1432)  sodium chloride, 75 mL/hr, Last Rate: 75 mL/hr (04/12/23 1434)      Current Antimicrobial Therapy:  Anti-Infectives (From admission, onward)    Ordered     Dose/Rate Route Frequency Start Stop    04/12/23 0929  doxycycline (VIBRAMYCIN) 100 mg in sodium chloride 0.9 % 100 mL IVPB-VTB        Ordering Provider: Flaca Richard MD    100 mg  over 60 Minutes Intravenous Every 12 Hours 04/12/23 1000 04/19/23 0959    04/12/23 0214  cefepime 2 gm IVPB in 100 ml NS (VTB)        Ordering Provider: Brett Perry MD    2 g  over 4 Hours Intravenous Every 12 Hours 04/12/23 0600 04/15/23 1759    04/11/23 1334  micafungin sodium (MYCAMINE) 100 mg in sodium chloride 0.9 % 100 mL IVPB        Ordering Provider: Kelley Lindo PA-C    100 mg  over 60 Minutes Intravenous Every 24 Hours 04/11/23 1500 04/18/23 1459    04/10/23 1011  vancomycin IVPB 2000 mg in 0.9% Sodium Chloride (premix) 500 mL        Ordering Provider: Brett Perry MD    2,000 mg  over 120 Minutes Intravenous Once 04/10/23 1100 04/10/23 1323    04/10/23 0946  cefepime 2 gm IVPB in 100 ml NS (VTB)        Ordering Provider: Brett Perry MD    2 g  over 30 Minutes Intravenous Once 04/10/23 1045 04/10/23 1148    04/06/23 1611  cefTRIAXone (ROCEPHIN) 2 g in sodium chloride 0.9 % 100 mL IVPB-VTB        Ordering Provider: Polina Oneil APRN    2 g  200 mL/hr over 30 Minutes Intravenous Once 04/06/23 1613 04/06/23 1836        Current Diuretic Therapy:  Diuretics (From admission, onward)    Ordered     Dose/Rate Route Frequency Start Stop    04/12/23 1047  furosemide (LASIX) injection 100 mg        Ordering Provider: Brett Perry MD    100 mg Intravenous Once 04/12/23 1200 04/12/23 1150    04/11/23 0754  furosemide (LASIX)  injection 40 mg        Ordering Provider: Brett Perry MD    40 mg Intravenous Once 04/11/23 1800 04/11/23 1759    04/11/23 0824  furosemide (LASIX) injection 40 mg        Ordering Provider: Brett Perry MD    40 mg Intravenous Once 04/11/23 0915 04/11/23 0841    04/11/23 0518  furosemide (LASIX) injection 20 mg        Ordering Provider: Princess Kaba MD    20 mg Intravenous Once 04/11/23 0615 04/11/23 0539    04/09/23 0639  bumetanide (BUMEX) injection 2 mg        Ordering Provider: Gill Sanchez DO    2 mg Intravenous Once 04/09/23 0730 04/09/23 0651    04/08/23 1824  bumetanide (BUMEX) injection 2 mg        Ordering Provider: Jakob Koch MD    2 mg Intravenous Once 04/08/23 1915 04/08/23 1856    04/06/23 1607  furosemide (LASIX) injection 80 mg        Ordering Provider: Polina Oneil APRN    80 mg Intravenous Once 04/06/23 1609 04/06/23 1629        ----------------------------------------------------------------------------------------------------------------------  Vital Signs:  Temp:  [97.3 °F (36.3 °C)-98.1 °F (36.7 °C)] 97.4 °F (36.3 °C)  Heart Rate:  [74-97] 87  Resp:  [32-40] 32  BP: ()/(40-78) 100/50  FiO2 (%):  [85 %-100 %] 100 %  SpO2:  [87 %-100 %] 87 %  on   ;   Device (Oxygen Therapy): ventilator  Body mass index is 52.22 kg/m².    Wt Readings from Last 3 Encounters:   04/12/23 110 kg (241 lb 6.5 oz)   04/02/23 125 kg (275 lb)   03/22/23 125 kg (275 lb)     Intake & Output (last 3 days)       04/09 0701  04/10 0700 04/10 0701 04/11 0700 04/11 0701 04/12 0700 04/12 0701 04/13 0700    P.O.        I.V. (mL/kg) 1019.8 (9.7) 825 (7.4) 1129.5 (10.4)     Blood   300     Other   273     NG/GT   296     IV Piggyback 900 980.3 750 100    Total Intake(mL/kg) 1919.8 (18.3) 1805.3 (16.3) 2748.5 (25.2) 100 (0.9)    Urine (mL/kg/hr) 400 (0.2) 400 (0.2) 320 (0.1)     Stool 0 0      Total Output 400 400 320     Net +1519.8 +1405.3 +2428.5 +100                NPO Diet NPO Type:  Strict NPO  ----------------------------------------------------------------------------------------------------------------------  Physical Exam  Vitals and nursing note reviewed.   Constitutional:       Appearance: She is obese. She is ill-appearing.      Comments: Intubated, supine, currently paralyzed and deeply sedated   HENT:      Head: Normocephalic and atraumatic.      Mouth/Throat:      Comments: ETT in place  Eyes:      Comments: Pupils reactive and symmetric b/l but will rapidly dilate/constrict with or without light stimulation   Cardiovascular:      Rate and Rhythm: Normal rate and regular rhythm.      Pulses: Normal pulses.   Pulmonary:      Comments: B/l mechanical breath sounds to lower lung fields  Musculoskeletal:         General: Swelling (Diffuse anasarca w/pitting edema) present.      Right lower leg: Edema present.      Left lower leg: Edema present.   Skin:     General: Skin is warm and dry.      Capillary Refill: Capillary refill takes less than 2 seconds.   Neurological:      Comments: Paralyzed w/deep sedation       ----------------------------------------------------------------------------------------------------------------------    ----------------------------------------------------------------------------------------------------------------------  LABS:    CBC and coagulation:  Results from last 7 days   Lab Units 04/12/23  0020 04/11/23  0006 04/10/23  0030 04/09/23  0049 04/08/23  1742 04/07/23  0502 04/07/23  0241 04/06/23  2059 04/06/23  1754 04/06/23  1350 04/06/23  1312   PROCALCITONIN ng/mL  --  0.90*  --   --  0.16  --   --   --   --   --  0.15   LACTATE mmol/L  --   --  2.1* 1.9 1.8  --   --  2.0 2.3* 2.1*  --    SED RATE mm/hr  --   --   --   --   --   --   --   --   --   --  85*   CRP mg/dL  --  14.69*  --  2.84*  --   --   --   --   --   --  4.41*   WBC 10*3/mm3 6.89 6.25 9.82 8.94 12.58* 5.65  --   --   --   --  8.04   HEMOGLOBIN g/dL 8.5* 7.5* 8.7* 9.5* 10.8* 9.5*  --    --   --   --  10.9*   HEMATOCRIT % 28.0* 24.3* 28.0* 30.6* 34.9 29.5*  --   --   --   --  35.0   MCV fL 101.8* 98.4* 96.6 96.8 96.9 92.8  --   --   --   --  94.3   MCHC g/dL 30.4* 30.9* 31.1* 31.0* 30.9* 32.2  --   --   --   --  31.1*   PLATELETS 10*3/mm3 102* 90* 133* 132* 180 176  --  143  --   --  174   INR   --  1.72* 1.74* 1.48*  --   --  1.34*  --   --   --  1.36*     Acid/base balance:  Results from last 7 days   Lab Units 04/12/23  0818 04/11/23  1449 04/11/23  0449 04/11/23  0023 04/10/23  2124 04/10/23  0626 04/10/23  0426   PH, ARTERIAL pH units 7.224* 7.437 7.290* 7.312* 7.373 7.487* 7.455*   PO2 ART mm Hg 68.5* 102.0 76.2* 102.0 54.9* 158.0* 53.5*   PCO2, ARTERIAL mm Hg 65.1* 42.6 64.1* 60.8* 52.8* 42.6 48.0*   HCO3 ART mmol/L 26.8* 28.7* 30.8* 30.8* 30.7* 32.2* 33.7*     Renal and electrolytes:  Results from last 7 days   Lab Units 04/12/23  1353 04/12/23  0020 04/11/23  0006 04/10/23  2248 04/10/23  1124 04/10/23  0030 04/09/23  0049 04/08/23  1742 04/08/23  0551 04/07/23  0241   SODIUM mmol/L 138 137 139  --   --  141 139 138  --  134*   POTASSIUM mmol/L 5.4* 5.2 3.4* 3.6 3.6 3.4* 3.8 3.6  --  3.9   MAGNESIUM mg/dL  --   --   --   --   --  2.8* 1.9 2.2 2.8* 1.9   CHLORIDE mmol/L 99 99 102  --   --  96* 96* 96*  --  95*   CO2 mmol/L 26.3 27.1 26.0  --   --  31.8* 31.9* 31.7*  --  27.6   BUN mg/dL 61* 52* 34*  --   --  26* 15 16  --  12   CREATININE mg/dL 2.05* 1.63* 1.09*  --   --  0.97 0.66 0.60  --  0.65   CALCIUM mg/dL 9.7 9.6 8.4*  --   --  9.4 8.9 9.2  --  8.8   PHOSPHORUS mg/dL  --   --   --   --   --  2.3* 2.7 3.1  --  2.9   GLUCOSE mg/dL 181* 166* 150*  --   --  149* 145* 148*  --  138*     Estimated Creatinine Clearance: 34.1 mL/min (A) (by C-G formula based on SCr of 2.05 mg/dL (H)).    Liver and pancreatic function:  Results from last 7 days   Lab Units 04/11/23  0006 04/10/23  0030 04/09/23  0049 04/08/23  1742 04/07/23  0502 04/07/23  0241 04/06/23  1312   ALBUMIN g/dL 3.3* 3.6 3.3* 3.0*   --  2.5* 2.6*   BILIRUBIN mg/dL 2.4* 1.9* 1.4* 1.5*  --  1.5* 2.0*   ALK PHOS U/L 81 102 125* 161*  --  150* 188*   AST (SGOT) U/L 85* 104* 90* 105*  --  67* 81*   ALT (SGPT) U/L 36* 43* 46* 56*  --  41* 51*   AMMONIA umol/L  --   --  40  --  69*  --   --    LIPASE U/L  --   --   --   --   --   --  60     Endocrine function:  Lab Results   Component Value Date    HGBA1C <4.20 (L) 03/16/2023     Point of care bedside glucose levels:  Results from last 7 days   Lab Units 04/12/23  1110 04/12/23  0629 04/12/23  0037 04/11/23  1742   GLUCOSE mg/dL 167* 165* 163* 185*     Glucose levels from the [facility]:  Results from last 7 days   Lab Units 04/12/23  1353 04/12/23  0020 04/11/23  0006 04/10/23  0030 04/09/23  0049 04/08/23  1742 04/07/23  0241 04/06/23  1312   GLUCOSE mg/dL 181* 166* 150* 149* 145* 148* 138* 127*     Lab Results   Component Value Date    TSH 1.200 03/17/2023     Cardiac:  Results from last 7 days   Lab Units 04/10/23  0030 04/08/23  1953 04/08/23  1742 04/06/23  1515 04/06/23  1312   HSTROP T ng/L  --  38* 36* 48* 47*   PROBNP pg/mL 4,648.0*  --   --   --  830.9       Cultures:  Lab Results   Component Value Date    COLORU Yellow 04/06/2023    CLARITYU Clear 04/06/2023    PHUR 7.0 04/06/2023    GLUCOSEU Negative 04/06/2023    KETONESU Negative 04/06/2023    BLOODU Negative 04/06/2023    NITRITEU Negative 04/06/2023    LEUKOCYTESUR Trace (A) 04/06/2023    BILIRUBINUR Negative 04/06/2023    UROBILINOGEN 1.0 E.U./dL 04/06/2023    RBCUA 0-2 04/06/2023    WBCUA 0-2 04/06/2023    BACTERIA None Seen 04/06/2023     Microbiology Results (last 10 days)     Procedure Component Value - Date/Time    MRSA Screen, PCR (Inpatient) - Swab, Nares [[id number]]  (Normal) Collected: 04/10/23 1826    Lab Status: Final result Specimen: Swab from Nares Updated: 04/10/23 2037     MRSA PCR No MRSA Detected    Narrative:      The negative predictive value of this diagnostic test is high and should only be used to consider  de-escalating anti-MRSA therapy. A positive result may indicate colonization with MRSA and must be correlated clinically.    Blood Culture - Blood, Arm, Left [[id number]]  (Normal) Collected: 04/10/23 1419    Lab Status: Preliminary result Specimen: Blood from Arm, Left Updated: 04/12/23 1445     Blood Culture No growth at 2 days    Blood Culture - Blood, Arm, Left [[id number]]  (Normal) Collected: 04/10/23 0636    Lab Status: Preliminary result Specimen: Blood from Arm, Left Updated: 04/12/23 0715     Blood Culture No growth at 2 days    Respiratory Culture - Sputum, Cough [[id number]] Collected: 04/07/23 0454    Lab Status: Final result Specimen: Sputum from Cough Updated: 04/07/23 0636     Respiratory Culture Rejected     Gram Stain Rare (1+) WBCs seen      Rare (1+) Epithelial cells seen      Moderate (3+) Gram positive cocci in chains and clusters      Few (2+) Gram negative bacilli    Narrative:      Specimen rejected due to oropharyngeal contamination. Please reorder and recollect specimen if clinically necessary.    S. Pneumo Ag Urine or CSF - Urine, Urine, Clean Catch [[id number]]  (Normal) Collected: 04/07/23 0136    Lab Status: Final result Specimen: Urine, Clean Catch Updated: 04/07/23 1439     Strep Pneumo Ag Negative    Respiratory Panel PCR w/COVID-19(SARS-CoV-2) SARA/MARKOS/VANESSA/PAD/[facility]/MAD/PEPE In-House, NP Swab in UTM/VTM, 3-4 HR TAT - Swab, Nasopharynx [[id number]]  (Normal) Collected: 04/07/23 0134    Lab Status: Final result Specimen: Swab from Nasopharynx Updated: 04/07/23 0233     ADENOVIRUS, PCR Not Detected     Coronavirus 229E Not Detected     Coronavirus HKU1 Not Detected     Coronavirus NL63 Not Detected     Coronavirus OC43 Not Detected     COVID19 Not Detected     Human Metapneumovirus Not Detected     Human Rhinovirus/Enterovirus Not Detected     Influenza A PCR Not Detected     Influenza B PCR Not Detected     Parainfluenza Virus 1 Not Detected     Parainfluenza Virus 2 Not Detected      Parainfluenza Virus 3 Not Detected     Parainfluenza Virus 4 Not Detected     RSV, PCR Not Detected     Bordetella pertussis pcr Not Detected     Bordetella parapertussis PCR Not Detected     Chlamydophila pneumoniae PCR Not Detected     Mycoplasma pneumo by PCR Not Detected    Narrative:      In the setting of a positive respiratory panel with a viral infection PLUS a negative procalcitonin without other underlying concern for bacterial infection, consider observing off antibiotics or discontinuation of antibiotics and continue supportive care. If the respiratory panel is positive for atypical bacterial infection (Bordetella pertussis, Chlamydophila pneumoniae, or Mycoplasma pneumoniae), consider antibiotic de-escalation to target atypical bacterial infection.    Blood Culture - Blood, Hand, Right [[id number]]  (Normal) Collected: 04/06/23 1410    Lab Status: Final result Specimen: Blood from Hand, Right Updated: 04/11/23 1415     Blood Culture No growth at 5 days    Blood Culture - Blood, Wrist, Left [[id number]]  (Normal) Collected: 04/06/23 1350    Lab Status: Final result Specimen: Blood from Wrist, Left Updated: 04/11/23 1415     Blood Culture No growth at 5 days    COVID-19 and FLU A/B PCR - Swab, Nasopharynx [[id number]]  (Normal) Collected: 04/06/23 1338    Lab Status: Final result Specimen: Swab from Nasopharynx Updated: 04/06/23 1431     COVID19 Not Detected     Influenza A PCR Not Detected     Influenza B PCR Not Detected    Narrative:      Fact sheet for providers: https://www.fda.gov/media/946569/download    Fact sheet for patients: https://www.fda.gov/media/677970/download    Test performed by PCR.          No results found for: PREGTESTUR, PREGSERUM, HCG, HCGQUANT  Pain Management Panel         Latest Ref Rng & Units 3/17/2023 2/28/2020   Pain Management Panel   Amphetamine, Urine Qual Negative Negative   Negative     Barbiturates Screen, Urine Negative Negative   Negative     Benzodiazepine Screen,  Urine Negative Negative   Negative     Buprenorphine, Screen, Urine Negative Negative   Negative     Cocaine Screen, Urine Negative Negative   Negative     Methadone Screen , Urine Negative Negative   Negative     Methamphetamine, Ur Negative Negative            Multiple values from one day are sorted in reverse-chronological order             I have personally looked at the labs and they are summarized above.  ----------------------------------------------------------------------------------------------------------------------  Detailed radiology reports for the last 24 hours:    Imaging Results (Last 24 Hours)     Procedure Component Value Units Date/Time    XR Chest 1 View [[id number]] Collected: 04/12/23 0831     Updated: 04/12/23 0835    Narrative:      EXAM:    XR Chest, 1 View     EXAM DATE:    4/12/2023 7:40 AM     CLINICAL HISTORY:    ards; S00-Zhslvyl effusion, not elsewhere classified; J18.9-Pneumonia,  unspecified organism; R18.8-Other ascites; K74.60-Unspecified cirrhosis  of liver; R18.8-Other ascites; J81.0-Acute pulmonary edema; J96.20-Acute  and chronic respiratory failure, unspecified whether with hypoxia or  hypercapnia     TECHNIQUE:    Frontal view of the chest.     COMPARISON:    04/11/2023     FINDINGS:    LUNGS:  Slightly improved aeration of bilateral airspace disease.    PLEURAL SPACE:  Unremarkable.  No pneumothorax.    HEART:  Cardiomegaly again noted.  Coronary artery bypass graft  (CABG).    MEDIASTINUM:  Unremarkable.    BONES/JOINTS:  Median sternotomy.    TUBES, LINES AND DEVICES:  Right internal jugular central venous  catheter tip in the superior vena cava.  The endotracheal tube (ETT) is  in satisfactory position.  Nasogastric tube tip in the stomach.       Impression:      1.  Slightly improved aeration of bilateral airspace disease.  2.  Cardiomegaly again noted.     This report was finalized on 4/12/2023 8:33 AM by Dr. Christiano Aceves MD.           4/11 CXR without new effusions,  unchanged from prior and remains significantly opacified b/l    Assessment & Plan      #Acute on crhonic hypoxic respiratory failure (2L baseline)  #Severe ARDS, suspected secondary to PNA  #Suspected bacterial PNA  #Right pleural effusion, resolved  -Patient initially presenting with sx consistent with PNA and likely parapneumonic effusion on imaging that improved with initial abx coverage and mild diuresis however respiratory status decline acutely shortly after admission with significant b/l infiltrates progressing to intubation and paralysis/proning overnight given O2 requirement.  -Etiology: Patient's cardiac hx makes flash pulm edema possible but effusion improved and not overtly volume overloaded so less likely. Likely ARDS secondary to bacterial PNA. CRP significantly increased on repeat 4/11 despite appropriate abx coverage  -R effusion resolved, deferred thora  -ARDSnet protocol peep tables w/fiO2 weaning to goal paO2 55-80mmHg. Continues requiring significant peep making BAL too high risk currently, would not tolerate.  -ID consulted, cont vanc/cefepime with doxycycline added 4/12, micafungin added 4/11 while awaiting fungal serologies and beta glucan  -Pulm following, added pulse dose steroid 4/11. DexaARDS recommended dexamethasone schedule ordered. 20mg x5 days followed by 10mg x5 days  -Palliative care consulted for family support/GOC  -Plan to prone this afternoon  -Albumin BID to mobilize fluid, no UOP during first half of day and thus 100mg lasix IV given this afternoon. Nephrology consulted for possible dialysis for volume removal    #Pupil asymmetry with rhythmic dilation/constriction  -Likely side effect of sedation but given paralysis cannot exclude possibility of seizure activity. No hemodynamic changes and no fixed pupil dilation, low concern for stroke or ICH  -Stat eeg ordered and pend      #Anuric JADA secondary to above  #Hyperkalemia  #Hypophosphatemia  #Likely ATN  -Patient anuric  despite diuretics and albumin combo, Cr rapidly rising and likely progressing to over renal failure  -Consult nephrology for consideration of dialysis if needed. Currnet hemodynamics would likely tolerate. Verify with family GOC prior to initiation        #Thrombocytopenia  #Coagulopathy  -Secondary to liver disease and consumptive process in setting of infection  -s/p 1u platelets 4/11 with improvement    #Hx of decompensated GONZALEZ cirrhosis w/Grade 1 EV  #Coagulopathy  -Secondary to liver disease, attempted correction with Vit K. Remains elevated    #Elevated BNP  #Hx of CAD s/p CABG 2011  #Hx of HFpEF, not in exacerbation  -Echo 4/7 w/EF preserved  -BNP elevation likely secondary to respiratory failure  -Diuresing as above    #Hepatocellular liver injury  -Secondary to GONZALEZ w/congestive hepatopathy from acute right heart strain given severe resp failure  -Acute hepatitis panel negative but had HepBc positive earlier this year    #Normocytic anemia  -Secondary to sepsis      - - Chronic - -    #Hypothyroidism: Cont synthroid    F: NPO, Trophic tfs via NG, hold while proned  A: Fentanyl prn  S: Propofol  T: holding  H: HOB elevated  U: PPI  G: PRN  S: When appropriate  B: PRN  I: CVC, Radial a-line  D: Vanc/cefepime/Micafungin/doxycycline    I have spent 35 minutes of critical care time with > 50% of time spent in direct patient care, evaluating the patient at bedside, reviewing all labs and images, reviewing ABG and adjusting vent settings, reviewing pain and sedation gtt's, communicating plan of care w/ nursing staff and consultants, and utilizing high complexity medical decision making to assess and treat vital organ system failure in an individual who has impairment of one or more vital organ systems such that there is a high probability of imminent or life threatening deterioration in the patient’s condition. Failure to initiate the above interventions on an urgent basis would likely result in sudden,  clinically significant or life threatening deterioration in the patient's condition.        VTE Prophylaxis:   Mechanical Order History:      Ordered        04/06/23 1754  Place Sequential Compression Device  Once            04/06/23 1754  Maintain Sequential Compression Device  Continuous                    Pharmalogical Order History:     None          Code Status and Medical Interventions:   Ordered at: 04/11/23 1109     Code Status (Patient has no pulse and is not breathing):    No CPR (Do Not Attempt to Resuscitate)     Medical Interventions (Patient has pulse or is breathing):    Full Support     Release to patient:    Routine Release         Disposition: Remains significantly ill, prognosis very poor. Family conversations ongoing    I have reviewed any copied/forwarded text or data, verified its accuracy, and updated as necessary above.    Brett Perry MD  [facility] Hospitalist  04/12/23  15:38 EDT

## 2023-04-13 NOTE — PROGRESS NOTES
PROGRESS NOTE         Patient Identification:  Name:  Aidee Trinidad  Age:  57 y.o.  Sex:  female  :  1965  MRN:  [id number]  Visit Number:  [id number]  Primary Care Provider:  Azalea Burnett APRN         LOS: 7 days       ----------------------------------------------------------------------------------------------------------------------  Subjective       Chief Complaints:    Shortness of Breath        Interval History:      Patient remains intubated and sedated on 100% FiO2 this morning.  Currently in prone position.  Lungs clear to auscultation bilaterally.  Primary RN at bedside reports 50 mils of urinary output overnight.  Afebrile, no reported diarrhea.  Lungs clear to auscultation bilaterally.  3+ bilateral lower extremity edema.  WBC 7.44.  CRP improved at 9.26.  Chest x-ray from 2023 reports slightly worsening bilateral airspace disease.    Review of Systems:    Unable to obtain.  Intubated and sedated.    ----------------------------------------------------------------------------------------------------------------------      Objective       Current Hospital Meds:  artificial tears, , Both Eyes, Q4H  castor oil-balsam peru, 1 application, Topical, Q12H  nystatin, , Topical, Q12H  pantoprazole, 40 mg, Intravenous, Q24H  sodium chloride, 10 mL, Intravenous, Q12H  sodium chloride, 3 mL, Intravenous, Q12H         ----------------------------------------------------------------------------------------------------------------------    Vital Signs:  Temp:  [97.8 °F (36.6 °C)-97.9 °F (36.6 °C)] 97.9 °F (36.6 °C)  Heart Rate:  [] 111  Resp:  [32-33] 32  BP: ()/(39-66) 137/66  FiO2 (%):  [100 %] 100 %  Mean Arterial Pressure (Non-Invasive) for the past 24 hrs (Last 3 readings):   Noninvasive MAP (mmHg)   23 1100 89   23 0700 78   23 0645 77     SpO2 Percentage    23 1100 23 1215 23 1309   SpO2: 94% 93% (!) 65%     SpO2:  [65 %-100  %] 65 %  on  Flow (L/min):  [2] 2;   Device (Oxygen Therapy): nasal cannula    Body mass index is 52.22 kg/m².  Wt Readings from Last 3 Encounters:   04/12/23 110 kg (241 lb 6.5 oz)   04/02/23 125 kg (275 lb)   03/22/23 125 kg (275 lb)        Intake/Output Summary (Last 24 hours) at 4/13/2023 1323  Last data filed at 4/13/2023 0916  Gross per 24 hour   Intake 3722.92 ml   Output 110 ml   Net 3612.92 ml     No diet orders on file  ----------------------------------------------------------------------------------------------------------------------      Physical Exam:    Constitutional:  female is in prone position this morning and on the ventilator at 100% FiO2.  HENT:  Head: Normocephalic and atraumatic.  Mouth:  Moist mucous membranes.    Eyes:  Conjunctivae and EOM are normal.  No scleral icterus.  Neck:  Neck supple.  No JVD present.    Cardiovascular:  Normal rate, regular rhythm and normal heart sounds with no murmur. No edema.  Pulmonary/Chest:  No respiratory distress.  Currently on the ventilator at 100% FiO2.  Lungs clear to auscultation bilaterally.   Abdominal:  Soft.  Bowel sounds are normal.  No distension and no tenderness.  Some blistering of her abdomen and yeast infection beneath pannus.  Musculoskeletal:  No tenderness and no deformity.  No swelling or redness of joints.  Anasarca.  Neurological: Intubated and sedated.  Skin:  Skin is warm and dry.  No rash noted.  No pallor. Some blistering of her abdomen and yeast infection beneath pannus.  Psychiatric: Intubated and sedated.        ----------------------------------------------------------------------------------------------------------------------  Results from last 7 days   Lab Units 04/08/23  1953 04/08/23 1742 04/06/23  1515   HSTROP T ng/L 38* 36* 48*     Results from last 7 days   Lab Units 04/10/23  0030   PROBNP pg/mL 4,648.0*       Results from last 7 days   Lab Units 04/13/23  0438   PH, ARTERIAL pH units 7.232*   PO2 ART mm  Hg 99.1   PCO2, ARTERIAL mm Hg 63.2*   HCO3 ART mmol/L 26.6*     Results from last 7 days   Lab Units 04/13/23  0011 04/12/23  0020 04/11/23  0006 04/10/23  0030 04/09/23  0049 04/08/23  1742 04/07/23  0502 04/07/23  0241   CRP mg/dL 9.26*  --  14.69*  --  2.84*  --   --   --    LACTATE mmol/L  --   --   --  2.1* 1.9 1.8  --   --    WBC 10*3/mm3 7.44 6.89 6.25 9.82 8.94 12.58*   < >  --    HEMOGLOBIN g/dL 8.5* 8.5* 7.5* 8.7* 9.5* 10.8*   < >  --    HEMATOCRIT % 28.7* 28.0* 24.3* 28.0* 30.6* 34.9   < >  --    MCV fL 105.1* 101.8* 98.4* 96.6 96.8 96.9   < >  --    MCHC g/dL 29.6* 30.4* 30.9* 31.1* 31.0* 30.9*   < >  --    PLATELETS 10*3/mm3 98* 102* 90* 133* 132* 180   < >  --    INR  1.70*  --  1.72* 1.74* 1.48*  --   --  1.34*    < > = values in this interval not displayed.     Results from last 7 days   Lab Units 04/13/23  0011 04/12/23  1353 04/12/23  0020 04/11/23  0006 04/10/23  1124 04/10/23  0030 04/09/23  0049 04/08/23  1742   SODIUM mmol/L 137 138 137 139  --  141 139 138   POTASSIUM mmol/L 5.5* 5.4* 5.2 3.4*   < > 3.4* 3.8 3.6   MAGNESIUM mg/dL  --   --   --   --   --  2.8* 1.9 2.2   CHLORIDE mmol/L 100 99 99 102  --  96* 96* 96*   CO2 mmol/L 23.1 26.3 27.1 26.0  --  31.8* 31.9* 31.7*   BUN mg/dL 70* 61* 52* 34*  --  26* 15 16   CREATININE mg/dL 2.45* 2.05* 1.63* 1.09*  --  0.97 0.66 0.60   CALCIUM mg/dL 9.1 9.7 9.6 8.4*  --  9.4 8.9 9.2   GLUCOSE mg/dL 181* 181* 166* 150*  --  149* 145* 148*   ALBUMIN g/dL 3.5  --   --  3.3*  --  3.6 3.3* 3.0*   BILIRUBIN mg/dL 2.2*  --   --  2.4*  --  1.9* 1.4* 1.5*   ALK PHOS U/L 77  --   --  81  --  102 125* 161*   AST (SGOT) U/L 84*  --   --  85*  --  104* 90* 105*   ALT (SGPT) U/L 45*  --   --  36*  --  43* 46* 56*    < > = values in this interval not displayed.   Estimated Creatinine Clearance: 28.5 mL/min (A) (by C-G formula based on SCr of 2.45 mg/dL (H)).  No results found for: AMMONIA    Glucose   Date/Time Value Ref Range Status   04/13/2023 0558 193 (H) 70 -  130 mg/dL Final     Comment:     Meter: [id number] : [id number] LYRIC RAMIREZBOE   04/13/2023 0012 181 (H) 70 - 130 mg/dL Final     Comment:     Meter: [id number] : [id number] LYRIC RAMIREZBOE   04/12/2023 1653 198 (H) 70 - 130 mg/dL Final     Comment:     Meter: [id number] : [id number] PEG ABREU   04/12/2023 1110 167 (H) 70 - 130 mg/dL Final     Comment:     Meter: [id number] : [id number] PEG ABREU   04/12/2023 0629 165 (H) 70 - 130 mg/dL Final     Comment:     Meter: [id number] : [id number] NEO OLIVIER   04/12/2023 0037 163 (H) 70 - 130 mg/dL Final     Comment:     Meter: [id number] : [id number] NEO OLIVIER   04/11/2023 1742 185 (H) 70 - 130 mg/dL Final     Comment:     M[facility]: [id number] : [id number] [facility]     Lab Results   Component Value Date    HGBA1C <4.20 (L) 03/16/2023     Lab Results   Component Value Date    TSH 1.200 03/17/2023       Blood Culture   Date Value Ref Range Status   04/10/2023 No growth at 24 hours  Preliminary   04/10/2023 No growth at 2 days  Preliminary   04/06/2023 No growth at 5 days  Final   04/06/2023 No growth at 5 days  Final     No results found for: URINECX  No results found for: WOUNDCX  No results found for: STOOLCX  Respiratory Culture   Date Value Ref Range Status   04/07/2023 Rejected  Final     Pain Management Panel           Latest Ref Rng & Units 3/17/2023 2/28/2020   Pain Management Panel   Amphetamine, Urine Qual Negative Negative   Negative     Barbiturates Screen, Urine Negative Negative   Negative     Benzodiazepine Screen, Urine Negative Negative   Negative     Buprenorphine, Screen, Urine Negative Negative   Negative     Cocaine Screen, Urine Negative Negative   Negative     Methadone Screen , Urine Negative Negative   Negative     Methamphetamine, Ur Negative Negative            Multiple values from one day are sorted in reverse-chronological order                ----------------------------------------------------------------------------------------------------------------------  Imaging Results (Last 24 Hours)       Procedure Component Value Units Date/Time    XR Chest 1 View [[id number]] Collected: 04/13/23 0825     Updated: 04/13/23 0828    Narrative:      EXAM:    XR Chest, 1 View     EXAM DATE:    4/13/2023 6:03 AM     CLINICAL HISTORY:    hypoxia; C92-Riqivwi effusion, not elsewhere classified;  J18.9-Pneumonia, unspecified organism; R18.8-Other ascites;  K74.60-Unspecified cirrhosis of liver; R18.8-Other ascites; J81.0-Acute  pulmonary edema; J96.20-Acute and chronic respiratory failure,  unspecified whether with hypoxia or hypercapnia     TECHNIQUE:    Frontal view of the chest.     COMPARISON:    04/12/2023     FINDINGS:    LUNGS:  Slightly worsened bilateral airspace disease.    PLEURAL SPACE:  Unremarkable.  No pneumothorax.    HEART:  Coronary artery bypass graft (CABG).  Heart size is probably  stable.    MEDIASTINUM:  Unremarkable.    BONES/JOINTS:  Median sternotomy.    TUBES, LINES AND DEVICES:  Right internal jugular central venous  catheter tip in the superior vena cava.  The endotracheal tube (ETT) is  in satisfactory position.  Nasogastric tube tip in the stomach.       Impression:        Slightly worsened bilateral airspace disease.     This report was finalized on 4/13/2023 8:26 AM by Dr. Christiano Aceves MD.               ----------------------------------------------------------------------------------------------------------------------    Pertinent Infectious Disease Results        Assessment/Plan       Assessment     Severe sepsis with lactic acid greater than 2 on admission  Acute on chronic hypoxic respiratory failure  Severe ARDS  Pneumonia      Plan      I saw the patient this morning with LAILA Glze and got report from primary RN and here are my findings:    The patient was admitted with elevated lactic acid levels and diffuse  bilateral airspace disease, which was confirmed through chest X-ray and CT imaging. The CT abdomen pelvis revealed partially consolidative airspace disease, stable pleural effusion, and small to moderate volume ascites. The patient has a history of severe liver cirrhosis with portal hypertension changes and moderate constipation, but no bowel obstruction.    Negative MRSA screen, COVID-19, flu A/B PCR, respiratory panel PCR, and Strep pneumo antigen tests, fungal antibodies, Aspergillus galactomannan, and Fungitell B-D glucan are still pending. The patient remains intubated and sedated on 100% FiO2 in a prone position, with clear lungs to auscultation bilaterally. They have reported 50 mils of urinary output overnight, no fever, or diarrhea, but 3+ bilateral lower extremity edema. Recent lab results show WBC count of 7.44 and improved CRP levels at 9.26, but chest X-ray from 4/13/2023 reports slightly worsening bilateral airspace disease.    The patient is currently being treated with cefepime and doxycycline, and I discontinued micafungin today. Due to the patient's poor prognosis, the case was discussed with Dr. Perry, who recommends transitioning to comfort measures. The ID team will sign off for now, but are available for further assistance if needed.      ANTIMICROBIAL THERAPY    This patient does not have an active medication from one of the medication groupers.     Code Status:   Code Status and Medical Interventions:   Ordered at: 04/13/23 1302     Level Of Support Discussed With:    Next of Kin (If No Surrogate)     Code Status (Patient has no pulse and is not breathing):    No CPR (Do Not Attempt to Resuscitate)     Medical Interventions (Patient has pulse or is breathing):    Comfort Measures     Release to patient:    Routine Release       LAILA Glez  04/13/23  13:23 EDT    Electronically signed by LAILA Glez, 04/13/23, 1:27 PM EDT.    Electronically signed by Flaca Richard MD,  04/13/23, 1:40 PM EDT.

## 2023-04-13 NOTE — PROGRESS NOTES
"Enter Query Response Below      Query Response: JADA only             If applicable, please update the problem list.   Patient: Aidee Trinidad        : 1965  Account: [id number]           Admit Date:         How to Respond to this query:       a. Click New Note     b. Answer query within the yellow box.                c. Update the Problem List, if applicable.      If you have any questions about this query contact me at: [email or web address]     Dr. Lee,     57 yr male noted with documentation of \"Not making much urine likely going into ATN\" on progress notes  and . Creatinine on admit- 0.71 deceasing to 0.60 on . Creatinine increased to 1.69 on . IV lasix given and avoid nephrotoxic agents.  Patient noted to be critically ill with acute hypoxic respiratory failure currently in ARDS.    After study, can the patients condition be further specified as:    ATN confirmed  ATN ruled out  JADA only  JADA with ATN  Other__  Unable to determine     By submitting this query, we are merely seeking further clarification of documentation to accurately reflect all conditions that you are monitoring, evaluating, treating or that extend the hospitalization or utilize additional resources of care. Please utilize your independent clinical judgment when addressing the question(s) above.     This query and your response, once completed, will be entered into the legal medical record.    Sincerely,  Gaye BREWER Rn  Clinical Documentation Integrity Program   "

## 2023-04-13 NOTE — PLAN OF CARE
Problem: Adult Inpatient Plan of Care  Goal: Plan of Care Review  Outcome: Ongoing, Not Progressing  Flowsheets  Taken 4/13/2023 0309 by Karri Cm RN  Progress: no change  Outcome Evaluation: Pt. continues to lye prone throughout the shift, pt. VSS, nimbex/dipravan/NS continues to infuse. TO4 monitored and orders followed. Skin care provided.  Taken 4/10/2023 0436 by Martin Yang RN  Plan of Care Reviewed With: patient  Goal: Patient-Specific Goal (Individualized)  Outcome: Ongoing, Not Progressing  Goal: Absence of Hospital-Acquired Illness or Injury  Outcome: Ongoing, Not Progressing  Intervention: Identify and Manage Fall Risk  Recent Flowsheet Documentation  Taken 4/13/2023 0300 by Karri Cm RN  Safety Promotion/Fall Prevention:   activity supervised   fall prevention program maintained   safety round/check completed  Taken 4/13/2023 0200 by Karri Cm RN  Safety Promotion/Fall Prevention:   activity supervised   fall prevention program maintained   safety round/check completed  Taken 4/13/2023 0100 by Karri Cm RN  Safety Promotion/Fall Prevention:   activity supervised   fall prevention program maintained   safety round/check completed  Taken 4/13/2023 0000 by Karri Cm RN  Safety Promotion/Fall Prevention:   activity supervised   fall prevention program maintained   safety round/check completed  Taken 4/12/2023 2300 by Karri Cm RN  Safety Promotion/Fall Prevention:   activity supervised   fall prevention program maintained   safety round/check completed  Taken 4/12/2023 2200 by Karri Cm RN  Safety Promotion/Fall Prevention:   activity supervised   fall prevention program maintained   safety round/check completed  Taken 4/12/2023 2100 by Karri Cm RN  Safety Promotion/Fall Prevention:   activity supervised   fall prevention program maintained   safety round/check completed  Taken 4/12/2023 2000 by Karri Cm RN  Safety Promotion/Fall Prevention:    activity supervised   fall prevention program maintained   safety round/check completed  Taken 4/12/2023 1900 by Karri Cm RN  Safety Promotion/Fall Prevention:   activity supervised   fall prevention program maintained   safety round/check completed  Intervention: Prevent Skin Injury  Recent Flowsheet Documentation  Taken 4/13/2023 0200 by Karri Cm RN  Body Position: prone  Skin Protection:   adhesive use limited   incontinence pads utilized   skin-to-device areas padded   transparent dressing maintained   tubing/devices free from skin contact  Taken 4/13/2023 0000 by Karri Cm RN  Body Position: prone  Taken 4/12/2023 2341 by Karri Cm RN  Body Position:   prone   head facing, left  Taken 4/12/2023 2200 by Karri Cm RN  Body Position: prone  Taken 4/12/2023 2000 by Karri Cm RN  Body Position: prone  Skin Protection:   adhesive use limited   incontinence pads utilized   skin-to-device areas padded   transparent dressing maintained   tubing/devices free from skin contact  Intervention: Prevent and Manage VTE (Venous Thromboembolism) Risk  Recent Flowsheet Documentation  Taken 4/13/2023 0200 by Karri Cm RN  Activity Management: bedrest  VTE Prevention/Management:   sequential compression devices off   bilateral  Range of Motion: ROM (range of motion) performed  Taken 4/12/2023 2000 by Karri Cm RN  Activity Management: bedrest  VTE Prevention/Management:   sequential compression devices off   bilateral  Range of Motion: ROM (range of motion) performed  Intervention: Prevent Infection  Recent Flowsheet Documentation  Taken 4/13/2023 0200 by Karri Cm RN  Infection Prevention:   rest/sleep promoted   single patient room provided  Taken 4/12/2023 2000 by Karri Cm RN  Infection Prevention:   single patient room provided   rest/sleep promoted  Taken 4/12/2023 1900 by Karri Cm RN  Infection Prevention:   single patient room provided   rest/sleep  promoted  Goal: Optimal Comfort and Wellbeing  Outcome: Ongoing, Not Progressing  Intervention: Provide Person-Centered Care  Recent Flowsheet Documentation  Taken 4/13/2023 0200 by Karri Cm RN  Trust Relationship/Rapport:   care explained   reassurance provided  Taken 4/12/2023 2000 by Karri Cm RN  Trust Relationship/Rapport:   care explained   reassurance provided  Goal: Readiness for Transition of Care  Outcome: Ongoing, Not Progressing     Problem: Fall Injury Risk  Goal: Absence of Fall and Fall-Related Injury  Outcome: Ongoing, Not Progressing  Intervention: Identify and Manage Contributors  Recent Flowsheet Documentation  Taken 4/13/2023 0300 by Karri Cm RN  Medication Review/Management: medications reviewed  Taken 4/13/2023 0200 by Karri Cm RN  Medication Review/Management: medications reviewed  Taken 4/13/2023 0100 by Karri Cm RN  Medication Review/Management: medications reviewed  Taken 4/13/2023 0000 by Karri Cm RN  Medication Review/Management: medications reviewed  Taken 4/12/2023 2300 by Karri Cm RN  Medication Review/Management: medications reviewed  Taken 4/12/2023 2200 by Karri Cm RN  Medication Review/Management: medications reviewed  Taken 4/12/2023 2100 by Karri Cm RN  Medication Review/Management: medications reviewed  Taken 4/12/2023 2000 by Karri Cm RN  Medication Review/Management: medications reviewed  Taken 4/12/2023 1900 by Karri Cm RN  Medication Review/Management: medications reviewed  Intervention: Promote Injury-Free Environment  Recent Flowsheet Documentation  Taken 4/13/2023 0300 by Karri Cm RN  Safety Promotion/Fall Prevention:   activity supervised   fall prevention program maintained   safety round/check completed  Taken 4/13/2023 0200 by Karri Cm RN  Safety Promotion/Fall Prevention:   activity supervised   fall prevention program maintained   safety round/check completed  Taken 4/13/2023  0100 by Karri Cm RN  Safety Promotion/Fall Prevention:   activity supervised   fall prevention program maintained   safety round/check completed  Taken 4/13/2023 0000 by Karri Cm RN  Safety Promotion/Fall Prevention:   activity supervised   fall prevention program maintained   safety round/check completed  Taken 4/12/2023 2300 by Karri Cm RN  Safety Promotion/Fall Prevention:   activity supervised   fall prevention program maintained   safety round/check completed  Taken 4/12/2023 2200 by Karri Cm RN  Safety Promotion/Fall Prevention:   activity supervised   fall prevention program maintained   safety round/check completed  Taken 4/12/2023 2100 by Karri Cm RN  Safety Promotion/Fall Prevention:   activity supervised   fall prevention program maintained   safety round/check completed  Taken 4/12/2023 2000 by Karri Cm RN  Safety Promotion/Fall Prevention:   activity supervised   fall prevention program maintained   safety round/check completed  Taken 4/12/2023 1900 by Karri Cm RN  Safety Promotion/Fall Prevention:   activity supervised   fall prevention program maintained   safety round/check completed     Problem: Diabetes Comorbidity  Goal: Blood Glucose Level Within Targeted Range  Outcome: Ongoing, Not Progressing  Intervention: Monitor and Manage Glycemia  Recent Flowsheet Documentation  Taken 4/13/2023 0200 by Karri Cm RN  Glycemic Management: blood glucose monitored  Taken 4/12/2023 2000 by Karri Cm RN  Glycemic Management: blood glucose monitored     Problem: Hypertension Comorbidity  Goal: Blood Pressure in Desired Range  Outcome: Ongoing, Not Progressing  Intervention: Maintain Blood Pressure Management  Recent Flowsheet Documentation  Taken 4/13/2023 0300 by Karri Cm RN  Medication Review/Management: medications reviewed  Taken 4/13/2023 0200 by Karri Cm RN  Medication Review/Management: medications reviewed  Taken 4/13/2023 0100 by  Karri Cm RN  Medication Review/Management: medications reviewed  Taken 4/13/2023 0000 by Karri Cm RN  Medication Review/Management: medications reviewed  Taken 4/12/2023 2300 by Karri Cm RN  Medication Review/Management: medications reviewed  Taken 4/12/2023 2200 by Karri Cm RN  Medication Review/Management: medications reviewed  Taken 4/12/2023 2100 by Karri Cm RN  Medication Review/Management: medications reviewed  Taken 4/12/2023 2000 by Karri Cm RN  Medication Review/Management: medications reviewed  Taken 4/12/2023 1900 by Karri Cm RN  Medication Review/Management: medications reviewed     Problem: Skin Injury Risk Increased  Goal: Skin Health and Integrity  Outcome: Ongoing, Not Progressing  Intervention: Optimize Skin Protection  Recent Flowsheet Documentation  Taken 4/13/2023 0200 by Karri Cm RN  Pressure Reduction Techniques:   weight shift assistance provided   heels elevated off bed  Head of Bed (HOB) Positioning: HOB elevated  Pressure Reduction Devices:   specialty bed utilized   positioning supports utilized   heel offloading device utilized   foam padding utilized  Skin Protection:   adhesive use limited   incontinence pads utilized   skin-to-device areas padded   transparent dressing maintained   tubing/devices free from skin contact  Taken 4/13/2023 0000 by Karri Cm RN  Head of Bed (HOB) Positioning: HOB at 30-45 degrees  Taken 4/12/2023 2341 by Karri Cm RN  Head of Bed ([facility]) Positioning: HOB at 30-45 degrees  Taken 4/12/2023 2200 by Karri Cm RN  Head of Bed ([facility]) Positioning: HOB at 30-45 degrees  Taken 4/12/2023 2000 by Karri Cm RN  Pressure Reduction Techniques:   weight shift assistance provided   heels elevated off bed  Head of Bed (HOB) Positioning: HOB at 30-45 degrees  Pressure Reduction Devices:   specialty bed utilized   heel offloading device utilized  Skin Protection:   adhesive use limited   incontinence  pads utilized   skin-to-device areas padded   transparent dressing maintained   tubing/devices free from skin contact   Goal Outcome Evaluation:           Progress: no change  Outcome Evaluation: Pt. continues to lye prone throughout the shift, pt. VSS, nimbex/dipravan/NS continues to infuse. TO4 monitored and orders followed. Skin care provided.

## 2023-04-13 NOTE — PROGRESS NOTES
"Palliative Care Daily Progress Note     S: Medical record reviewed, followed up with Primary RN Briana Sanchez and Dr Perry regarding patient's condition. When palliative care entered the room Aidee was proned and sedated/paralyzed on vent at 100%. She was on propofol and Nimbex. Family at bedside wanting to discuss comfort measures.      O:   Palliative Performance Scale Score:     /66   Pulse 111   Temp 97.9 °F (36.6 °C) (Axillary)   Resp (!) 32   Ht 144.8 cm (57.01\")   Wt 110 kg (241 lb 6.5 oz)   SpO2 (!) 65%   BMI 52.22 kg/m²     Intake/Output Summary (Last 24 hours) at 4/13/2023 1337  Last data filed at 4/13/2023 0916  Gross per 24 hour   Intake 3722.92 ml   Output 110 ml   Net 3612.92 ml       PE:  General Appearance:    Chronically ill appearing, obese, intubated and paralyzed   HEENT:    NC/AT, without obvious abnormality, anicteric, ET tube in place   Neck:   supple, trachea midline, no JVD   Lungs:     Intubated/sedated and paralyzed on vent, diminished breath sounds    Heart:    RRR, normal S1 and S2, no M/R/G   Abdomen:     Soft,  Large NT, ND, NABS    Extremities:   On paralytic   Pulses:    Weak Pulses palpable bilaterally   Skin:   Warm, dry, pale   Neurologic:   Sedated on vent   Psych:   Unable to assess          Meds: Reviewed and changes noted.    Labs:   Results from last 7 days   Lab Units 04/13/23  0011   WBC 10*3/mm3 7.44   HEMOGLOBIN g/dL 8.5*   HEMATOCRIT % 28.7*   PLATELETS 10*3/mm3 98*     Results from last 7 days   Lab Units 04/13/23  0011   SODIUM mmol/L 137   POTASSIUM mmol/L 5.5*   CHLORIDE mmol/L 100   CO2 mmol/L 23.1   BUN mg/dL 70*   CREATININE mg/dL 2.45*   GLUCOSE mg/dL 181*   CALCIUM mg/dL 9.1     Results from last 7 days   Lab Units 04/13/23  0011   SODIUM mmol/L 137   POTASSIUM mmol/L 5.5*   CHLORIDE mmol/L 100   CO2 mmol/L 23.1   BUN mg/dL 70*   CREATININE mg/dL 2.45*   CALCIUM mg/dL 9.1   BILIRUBIN mg/dL 2.2*   ALK PHOS U/L 77   ALT (SGPT) U/L 45*   AST (SGOT) U/L " 84*   GLUCOSE mg/dL 181*     Imaging Results (Last 72 Hours)     Procedure Component Value Units Date/Time    XR Chest 1 View [[id number]] Collected: 04/13/23 0825     Updated: 04/13/23 0828    Narrative:      EXAM:    XR Chest, 1 View     EXAM DATE:    4/13/2023 6:03 AM     CLINICAL HISTORY:    hypoxia; K24-Iddawmo effusion, not elsewhere classified;  J18.9-Pneumonia, unspecified organism; R18.8-Other ascites;  K74.60-Unspecified cirrhosis of liver; R18.8-Other ascites; J81.0-Acute  pulmonary edema; J96.20-Acute and chronic respiratory failure,  unspecified whether with hypoxia or hypercapnia     TECHNIQUE:    Frontal view of the chest.     COMPARISON:    04/12/2023     FINDINGS:    LUNGS:  Slightly worsened bilateral airspace disease.    PLEURAL SPACE:  Unremarkable.  No pneumothorax.    HEART:  Coronary artery bypass graft (CABG).  Heart size is probably  stable.    MEDIASTINUM:  Unremarkable.    BONES/JOINTS:  Median sternotomy.    TUBES, LINES AND DEVICES:  Right internal jugular central venous  catheter tip in the superior vena cava.  The endotracheal tube (ETT) is  in satisfactory position.  Nasogastric tube tip in the stomach.       Impression:        Slightly worsened bilateral airspace disease.     This report was finalized on 4/13/2023 8:26 AM by Dr. Christiano Aceves MD.       XR Chest 1 View [[id number]] Collected: 04/12/23 0831     Updated: 04/12/23 0835    Narrative:      EXAM:    XR Chest, 1 View     EXAM DATE:    4/12/2023 7:40 AM     CLINICAL HISTORY:    ards; B36-Rlnjjqm effusion, not elsewhere classified; J18.9-Pneumonia,  unspecified organism; R18.8-Other ascites; K74.60-Unspecified cirrhosis  of liver; R18.8-Other ascites; J81.0-Acute pulmonary edema; J96.20-Acute  and chronic respiratory failure, unspecified whether with hypoxia or  hypercapnia     TECHNIQUE:    Frontal view of the chest.     COMPARISON:    04/11/2023     FINDINGS:    LUNGS:  Slightly improved aeration of bilateral airspace  disease.    PLEURAL SPACE:  Unremarkable.  No pneumothorax.    HEART:  Cardiomegaly again noted.  Coronary artery bypass graft  (CABG).    MEDIASTINUM:  Unremarkable.    BONES/JOINTS:  Median sternotomy.    TUBES, LINES AND DEVICES:  Right internal jugular central venous  catheter tip in the superior vena cava.  The endotracheal tube (ETT) is  in satisfactory position.  Nasogastric tube tip in the stomach.       Impression:      1.  Slightly improved aeration of bilateral airspace disease.  2.  Cardiomegaly again noted.     This report was finalized on 4/12/2023 8:33 AM by Dr. Christiano Aceves MD.       XR Chest 1 View [[id number]] Collected: 04/11/23 0144     Updated: 04/11/23 0146    Narrative:      CR Chest 1 Vw    INDICATION:   Increased shortness of air     COMPARISON:    April 9, 2023    FINDINGS:  Portable PA view(s) of the chest with patient in prone positioning.    Support lines and tubes are unchanged.    Cardiomediastinal contour is obscured. Diffuse bilateral airspace disease.      Impression:      Diffuse bilateral airspace disease is not appreciably different.    Signer Name: JEROME CHIU MD   Signed: 4/11/2023 1:44 AM   Workstation Name: [id number]    [facility]            Diagnostics: Reviewed    A: Aidee Trinidad is a 57 y.o. female  admitted on 4/6/2023 with shortness of breath, with productive cough, thick yellow/brown sputum.Aidee has a medical history of decompensated cirrhosis due to GONZALEZ with grade 1 esophageal varices in the lower one third, coronary artery disease status post three-vessel CABG in 2011, COPD for which she uses 2 L/min of nasal cannula oxygen, essential hypertension, type 2 diabetes mellitus, hyperlipidemia, hypothyroidism, MANUEL.  Pt also had rt hip cephalomedullary mailing with TFN nailing on 7/18/2021 due to a  right intertrochanteric femur fracture. She again fell in her shower and presented to ED on 3/22/2023 with imaging showing  a right  periprosthetic lesser trochanter fracture. Pt was recently admitted here at [facility] 3/16-3/18 for hypoglycemia and toxic encephalopathy. On this admission Aidee is complaining of severe fatigue, she states that her baseline abdominal swelling is worse, however feels her leg swelling is at baseline.  CT of abdomen and pelvis  Showed Bibasilar partially consolidative airspace disease which may  represent atelectasis and pneumonia.            P:  Pts family has requested a family meeting to discuss comfort measures. I was able to speak with [place] and multiple other family members to discuss Aidee's condition as well as what her goals would be for herself. All of her children agree that Aidee would not want to live like this and they do not want to put her through any further suffering. I explained what the process of comfort measurers looked like, From taking her off ventilator, putting her on supplemental O2 and having symptom management medications readily available in the event that she has shortness of air , pain, anxiety, excess secretions or any other symptom. Nimbex was turned off at 1220 Propofol was turn off at 1244, Nish was called and we were given approval to go ahead with extubation.   Family approached Nursing station at 1307 and stated that they wanted Aidee to be extubated now as they were ready.   Pt was extubated at 1309 to 2L NC, was given prn symptom management medication. Pts family were brought back to the room around her bedside. Aidee was resting comfortably and not in distress. Pt passed peacefully at 1325.      We will continue to follow along. Please do not hesitate to contact us regarding further sx mgmt or GOC needs, including after hours or on weekends via our on call provider at [phone].     Roma Joyner, APRN    4/13/2023

## 2023-04-13 NOTE — CASE MANAGEMENT/SOCIAL WORK
Discharge Planning Assessment   [facility]     Patient Name: Aidee Trinidad  MRN: [id number]  Today's Date: 4/13/2023    Admit Date: 4/6/2023       Discharge Plan     Row Name 04/13/23 1333       Plan    Plan SS discussed Pt with Palliative care team. Pt has been made comfort measures, extubating on this date. SS to follow.                     PRINCE Sheikh

## 2023-04-13 NOTE — PROGRESS NOTES
Referring Provider: dr alonso  Reason for Consultation: respiratory failure      Chief complaint -sob       Subjective-  All the labs medications in's and outs and vitals were reviewed by me in great detail.  Case discussed with Dr. Puckett, nephrologist, nurse and RT  None of the family members present at the bedside  We will repeat 1 more dose of Solu-Medrol  Patient remains critically ill currently proned.  Sedated and paralyzed.  Remains on 100% oxygen PEEP remains around 12.  We will push 2 more amps of bicarb as pH remains low.  Continue trophic feeds.  Overnight events reviewed.        Review of Systems  Could not be obtained as patient is intubated and sedated.      History  Past Medical History:   Diagnosis Date   • Abscess of periorbital region, right 10/30/2021   • Arthritis    • Chronic back pain    • Closed fracture of right hip 2021   • COPD exacerbation 2023   • Coronary artery disease, 3 vessel CABG    • Decompensated hepatic cirrhosis, grade 1 esophageal varices in the lower 1/3    • Depression    • Essential hypertension    • H pylori ulcer    • History of transfusion    • Hyperlipidemia    • Hypothyroidism    • Iron deficiency anemia 2023   • Recurrent right pleural effusion 2023   • Sleep apnea    • Type 2 diabetes mellitus, without long-term current use of insulin 10/30/2021   ,   Past Surgical History:   Procedure Laterality Date   • CARPAL TUNNEL RELEASE     •  SECTION     • CHOLECYSTECTOMY     • COLONOSCOPY     • CORONARY ANGIOPLASTY WITH STENT PLACEMENT     • ENDOSCOPY N/A 2022    Procedure: ESOPHAGOGASTRODUODENOSCOPY WITH BIOPSY;  Surgeon: Lizzeth Harmon MD;  Location: [facility] OR;  Service: Gastroenterology;  Laterality: N/A;   • HIP TROCHANTERIC NAILING WITH INTRAMEDULLARY HIP SCREW Right 2021    Procedure: HIP TROCHANTERIC NAILING LONG WITH INTRAMEDULLARY HIP SCREW;  Surgeon: Oracio Ponce DO;  Location:  [facility] OR;  Service:  Orthopedics;  Laterality: Right;   • REPLACEMENT TOTAL KNEE Right    • TUBAL ABDOMINAL LIGATION     • UPPER GASTROINTESTINAL ENDOSCOPY     ,   Family History   Problem Relation Age of Onset   • COPD Mother    • Stroke Mother    • Cancer Brother    • Diabetes Brother    • Hypertension Brother    • Heart disease Brother    • Breast cancer Neg Hx    ,   Social History     Tobacco Use   • Smoking status: Never   • Smokeless tobacco: Never   Vaping Use   • Vaping Use: Never used   Substance Use Topics   • Alcohol use: Yes     Comment: Once a year    • Drug use: No   ,   Medications Prior to Admission   Medication Sig Dispense Refill Last Dose   • aspirin 81 MG EC tablet Take 1 tablet by mouth Daily.   4/6/2023   • atorvastatin (LIPITOR) 20 MG tablet Take 1 tablet by mouth Every Night.   4/5/2023   • cholecalciferol (VITAMIN D3) 1.25 MG (06086 UT) capsule Take 1 capsule by mouth Every 7 (Seven) Days.   3/31/2023   • furosemide (LASIX) 40 MG tablet Take 1 tablet by mouth Daily.   4/6/2023   • gabapentin (NEURONTIN) 400 MG capsule Take 1 capsule by mouth 2 (Two) Times a Day As Needed (nerve pain).   4/6/2023   • HYDROcodone-acetaminophen (NORCO) 5-325 MG per tablet Take 1 tablet by mouth 2 (Two) Times a Day As Needed for Moderate Pain.   4/6/2023   • levothyroxine (SYNTHROID, LEVOTHROID) 75 MCG tablet Take 1 tablet by mouth Daily.   4/6/2023   • loratadine (CLARITIN) 10 MG tablet Take 1 tablet by mouth Every Other Day.   4/6/2023   • loratadine-pseudoephedrine (CLARITIN-D 24-hour)  MG per 24 hr tablet Take 1 tablet by mouth Every Other Day.   4/5/2023   • metoprolol tartrate (LOPRESSOR) 25 MG tablet Take 1 tablet by mouth 2 (Two) Times a Day.   4/6/2023   • omeprazole (priLOSEC) 40 MG capsule Take 1 capsule by mouth Daily. 30 capsule 5 4/6/2023   • potassium chloride (K-DUR,KLOR-CON) 10 MEQ CR tablet Take 1 tablet by mouth 2 (Two) Times a Day.   4/6/2023   • QUEtiapine (SEROquel) 50 MG tablet Take 1 tablet by mouth  Every Night.   4/5/2023   • sertraline (ZOLOFT) 50 MG tablet Take 1 tablet by mouth Daily.   4/6/2023   , Scheduled Meds:  albumin human, 12.5 g, Intravenous, BID  artificial tears, , Both Eyes, Q4H  atorvastatin, 20 mg, Oral, Nightly  budesonide, 0.5 mg, Nebulization, BID - RT  castor oil-balsam peru, 1 application, Topical, Q12H  cefepime, 2 g, Intravenous, Q12H  cetirizine, 10 mg, Oral, Daily  chlorhexidine, 15 mL, Mouth/Throat, Q12H  dexamethasone, 20 mg, Intravenous, Daily   Followed by  [START ON 4/17/2023] dexamethasone, 10 mg, Intravenous, Daily  doxycycline, 100 mg, Intravenous, Q12H  insulin regular, 2-7 Units, Subcutaneous, Q6H  ipratropium-albuterol, 3 mL, Nebulization, 4x Daily - RT  levothyroxine, 75 mcg, Oral, Daily  micafungin (MYCAMINE) IV, 100 mg, Intravenous, Q24H  nystatin, , Topical, Q12H  pantoprazole, 40 mg, Intravenous, Q24H  sertraline, 50 mg, Oral, Daily  sodium chloride, 10 mL, Intravenous, Q12H  sodium chloride, 3 mL, Intravenous, Q12H    , Continuous Infusions:  cisatracurium (NIMBEX) 200 mg in sodium chloride 0.9 % 100 mL, 0.5-10 mcg/kg/min, Last Rate: 1.85 mcg/kg/min (04/13/23 0915)  norepinephrine, 0.02-0.3 mcg/kg/min  propofol, 5-50 mcg/kg/min, Last Rate: 35 mcg/kg/min (04/13/23 0509)  sodium chloride, 75 mL/hr, Last Rate: Stopped (04/13/23 0916)     and Allergies:  Nuts, Contrast dye (echo or unknown ct/mr), Codeine, and Latex    Objective     Vital Signs   Temp:  [97.4 °F (36.3 °C)-97.9 °F (36.6 °C)] 97.9 °F (36.6 °C)  Heart Rate:  [] 92  Resp:  [32-33] 32  BP: ()/(39-64) 121/57  FiO2 (%):  [100 %] 100 %    Physical Exam:   Proned, otherwise no major changes          GENERAL APPEARANCE: Intubated paralyzed and sedated  HEAD: normocephalic.    EYES: Atraumatic    THROAT: ET tube in place.     NECK: Supple    CARDIAC: NSR, S1-S2 normal  LUNGS: Clear to auscultation and percussion without rales, rhonchi, wheezing or diminished breath sounds, intubated on a  ventilator    ABDOMEN: Obese  EXTREMITIES: No significant deformity or joint abnormality. No edema. Peripheral pulses intact.    NEUROLOGICAL: Unable to assess due to sedation status.     PSYCHIATRIC: Unable to assess due to sedation status                                                              Results Review:    LABS:    Lab Results   Component Value Date    GLUCOSE 181 (H) 04/13/2023    BUN 70 (H) 04/13/2023    CREATININE 2.45 (H) 04/13/2023    EGFRIFNONA 112 12/08/2021    BCR 28.6 (H) 04/13/2023    CO2 23.1 04/13/2023    CALCIUM 9.1 04/13/2023    PROTENTOTREF 6.9 02/16/2023    ALBUMIN 3.5 04/13/2023    LABIL2 0.8 02/16/2023    AST 84 (H) 04/13/2023    ALT 45 (H) 04/13/2023    WBC 7.44 04/13/2023    HGB 8.5 (L) 04/13/2023    HCT 28.7 (L) 04/13/2023    .1 (H) 04/13/2023    PLT 98 (L) 04/13/2023     04/13/2023    K 5.5 (H) 04/13/2023     04/13/2023    ANIONGAP 13.9 04/13/2023       Lab Results   Component Value Date    INR 1.70 (H) 04/13/2023    INR 1.72 (H) 04/11/2023    INR 1.74 (H) 04/10/2023    PROTIME 20.6 (H) 04/13/2023    PROTIME 20.8 (H) 04/11/2023    PROTIME 21.0 (H) 04/10/2023       Results from last 7 days   Lab Units 04/13/23  0011 04/11/23  0006 04/10/23  0030 04/09/23  0049 04/07/23  0241 04/06/23  1312   INR  1.70* 1.72* 1.74* 1.48* 1.34* 1.36*   APTT seconds  --   --   --  33.7 30.6 37.6*          I reviewed the patient's new clinical results.  I reviewed the patient's new imaging results and agree with the interpretation.    Microbiology Results (last 10 days)     Procedure Component Value - Date/Time    MRSA Screen, PCR (Inpatient) - Swab, Nares [[id number]]  (Normal) Collected: 04/10/23 1826    Lab Status: Final result Specimen: Swab from Nares Updated: 04/10/23 2037     MRSA PCR No MRSA Detected    Narrative:      The negative predictive value of this diagnostic test is high and should only be used to consider de-escalating anti-MRSA therapy. A positive result may indicate  colonization with MRSA and must be correlated clinically.    Blood Culture - Blood, Arm, Left [[id number]]  (Normal) Collected: 04/10/23 1419    Lab Status: Preliminary result Specimen: Blood from Arm, Left Updated: 04/12/23 1445     Blood Culture No growth at 2 days    Blood Culture - Blood, Arm, Left [[id number]]  (Normal) Collected: 04/10/23 0636    Lab Status: Preliminary result Specimen: Blood from Arm, Left Updated: 04/13/23 0715     Blood Culture No growth at 3 days    Respiratory Culture - Sputum, Cough [[id number]] Collected: 04/07/23 0454    Lab Status: Final result Specimen: Sputum from Cough Updated: 04/07/23 0636     Respiratory Culture Rejected     Gram Stain Rare (1+) WBCs seen      Rare (1+) Epithelial cells seen      Moderate (3+) Gram positive cocci in chains and clusters      Few (2+) Gram negative bacilli    Narrative:      Specimen rejected due to oropharyngeal contamination. Please reorder and recollect specimen if clinically necessary.    S. Pneumo Ag Urine or CSF - Urine, Urine, Clean Catch [[id number]]  (Normal) Collected: 04/07/23 0136    Lab Status: Final result Specimen: Urine, Clean Catch Updated: 04/07/23 1439     Strep Pneumo Ag Negative    Respiratory Panel PCR w/COVID-19(SARS-CoV-2) [facility] In-House, NP Swab in UTM/VTM, 3-4 HR TAT - Swab, Nasopharynx [[id number]]  (Normal) Collected: 04/07/23 0134    Lab Status: Final result Specimen: Swab from Nasopharynx Updated: 04/07/23 0233     ADENOVIRUS, PCR Not Detected     Coronavirus 229E Not Detected     Coronavirus HKU1 Not Detected     Coronavirus NL63 Not Detected     Coronavirus OC43 Not Detected     COVID19 Not Detected     Human Metapneumovirus Not Detected     Human Rhinovirus/Enterovirus Not Detected     Influenza A PCR Not Detected     Influenza B PCR Not Detected     Parainfluenza Virus 1 Not Detected     Parainfluenza Virus 2 Not Detected     Parainfluenza Virus 3 Not Detected     Parainfluenza Virus 4 Not  Detected     RSV, PCR Not Detected     Bordetella pertussis pcr Not Detected     Bordetella parapertussis PCR Not Detected     Chlamydophila pneumoniae PCR Not Detected     Mycoplasma pneumo by PCR Not Detected    Narrative:      In the setting of a positive respiratory panel with a viral infection PLUS a negative procalcitonin without other underlying concern for bacterial infection, consider observing off antibiotics or discontinuation of antibiotics and continue supportive care. If the respiratory panel is positive for atypical bacterial infection (Bordetella pertussis, Chlamydophila pneumoniae, or Mycoplasma pneumoniae), consider antibiotic de-escalation to target atypical bacterial infection.    Blood Culture - Blood, Hand, Right [[id number]]  (Normal) Collected: 04/06/23 1410    Lab Status: Final result Specimen: Blood from Hand, Right Updated: 04/11/23 1415     Blood Culture No growth at 5 days    Blood Culture - Blood, Wrist, Left [[id number]]  (Normal) Collected: 04/06/23 1350    Lab Status: Final result Specimen: Blood from Wrist, Left Updated: 04/11/23 1415     Blood Culture No growth at 5 days    COVID-19 and FLU A/B PCR - Swab, Nasopharynx [[id number]]  (Normal) Collected: 04/06/23 1338    Lab Status: Final result Specimen: Swab from Nasopharynx Updated: 04/06/23 1431     COVID19 Not Detected     Influenza A PCR Not Detected     Influenza B PCR Not Detected    Narrative:      Fact sheet for providers: https://www.fda.gov/media/080662/download    Fact sheet for patients: https://www.fda.gov/media/374694/download    Test performed by PCR.        Assessment & Plan     Neuro- Sedated -drips reviewed-remains sedated and paralyzed.  Continue paralytics and sedation to achieve better patient ventilator synchrony.    Respiratory-in ARDS- ABG reviewed.  Ventilator settings and graphics reviewed.  We will repeat pulse dose steroid.  Patient has made almost no urine last 24 hours.  Net positive.  Chest x-ray reviewed  and shows bilateral pulmonary infiltrates-likely due to ARDS and also volume overload.    Lasix and albumin were given yesterday and patient did not respond to it.  Case discussed with nephrologist and agreed for dialysis if family agrees.    We have a goals of care meeting discussion with the family in the evening.  If patient's family does not want dialysis can consider comfort measures.    Can consider supinating the patient if family agrees for hemodialysis around 10 AM and then insert the hemodialysis catheter.  Latest microbiology reviewed.  Latest ABG and chest x-ray reviewed.          Vent settings-reviewed  Vent Settings          Resp Rate (Set): 32     FiO2 (%): 100 %  PEEP/CPAP (cm H2O): 12 cm H20    Minute Ventilation (L/min) (Obs): 10.4 L/min  Resp Rate (Observed) Vent: 32  I:E Ratio (Set): 1:1.69  I:E Ratio (Obs): 1:2.3    PIP Observed (cm H2O): 35 cm H2O          VAP- precautions  Head end - 30 %  Mouth care   DVt prophylaxis        Cardio-   Continue to monitor HR- rate and rhythm, BP   Continue to maintain a map of 65 and above.    Nephro- Cr BUN stable  I/O-reviewed.  Avoid nephrotoxic agents.  Did not make much urine after albumin and Lasix.    Gi- PPI prophylaxis  TUBE feeds-rate can be increased after patient is supinated.  Nonalcoholic steatohepatitis- continue to monitor.    Heme- CBC  Hb- transfuse for hemoglobin less than 7  platelet  WBC    ID-Cultures and micro reviewed.  Continue current treatment.  Antibiotics reviewed    Endo- Maintain Blood sugar 140 -180  Blood sugars reviewed.    Electrolytes-   Mag phos       DVT prophylaxis-    Bedside rounds were done with RT and patients nurse. All the Lab and clinical findings were discussed with them and plan was also discussed in great detail.    Family member present-none        Echo-  Results for orders placed during the hospital encounter of 04/06/23    Adult Transthoracic Echo Complete W/ Cont if Necessary Per Protocol    Interpretation  Summary  •  Left ventricular systolic function is normal. Left ventricular ejection fraction appears to be 66 - 70%.  •  Left ventricular wall thickness is consistent with mild concentric hypertrophy.  •  Left ventricular diastolic function is consistent with (grade II w/high LAP) pseudonormalization.  •  The cardiac chamber dimensions are normal.  •  No significant valvular heart disease is present.  •  Mild pulmonary hypertension is present.  •  There is no evidence of pericardial effusion.          Recurrent right pleural effusion    Severe sepsis    Chronic respiratory failure    COPD exacerbation  Palliative care team on the case  Patient is critically ill with acute hypoxic respiratory failure currently in ARDS.  I adjusted patient's vent settings and drips.  Overall prognosis with multiple comorbidities and critical illness with ARDS is very poor.  Cc 43 mins  Jesus Duval MD  04/13/23  09:36 EDT

## 2023-04-13 NOTE — PROGRESS NOTES
[facility] Physician - Brief Progress Note  PERMANENT  04/12/2023 21:58    [facility] - [facility] - [place] - CCU - [address] - [place], [place] ([facility])    SANDY SANCHEZ    Date of Service 04/12/2023 21:58    HPI/Events of Note ABG noted and is acceptable in patients current condition - titrate fio2 to keep sats 88-92%      Interventions Intermediate-Diagnostic test evaluation        Electronically Signed by: Jmimy Hurtado) on 04/12/2023 21:58

## 2023-04-13 NOTE — PROGRESS NOTES
Nephrology Progress Note      Subjective     Patient in proned, on MV    Objective       Vital signs :     Temp:  [97.3 °F (36.3 °C)-97.9 °F (36.6 °C)] 97.9 °F (36.6 °C)  Heart Rate:  [] 92  Resp:  [32-33] 32  BP: ()/(39-64) 118/56  FiO2 (%):  [90 %-100 %] 100 %    Intake/Output                       04/11/23 0701 - 04/12/23 0700 04/12/23 0701 - 04/13/23 0700     [id number] 6283-0655 Total 3329-5475 5430-6124 Total                 Intake    I.V.  401.8  727.7 1129.5  847.3  1900.8 2748.1    Blood  300  -- 300  --  -- --    Volume (Transfuse Fresh Frozen Plasma) 300 -- 300 -- -- --    Other  162  111 273  40  -- 40    Intake (mL) (Urethral Catheter Non-latex 16 Fr.) -- 20 20 -- -- --    Flush/ Irrigation Intake (mL) (NG/OG Tube Orogastric 16 Fr Center mouth) 162 91 253 40 -- 40    NG/GT  200  96 296  269  146 415    IV Piggyback  650  100 750  250  200 450    Total Intake 1713.8 1034.7 2748.5 1406.3 2246.8 3653.1       Output    Urine  150  170 320  60  -- 60    Total Output 150 170 320 60 -- 60           Physical Exam:    General Appearance : intubated on MV  Lungs : clear to auscultation, respirations regular  Heart :  regular rhythm & normal rate, normal S1, S2 and no murmur, no rub  Abdomen : soft, non distended  Extremities : 2+ edema  Neurologic :   orientated to person, place, time and situation, Grossly no focal deficits        Laboratory Data :     Albumin Albumin   Date Value Ref Range Status   04/13/2023 3.5 3.5 - 5.2 g/dL Final   04/11/2023 3.3 (L) 3.5 - 5.2 g/dL Final      Magnesium No results found for: MG       PTH               No results found for: PTH    CBC and coagulation:  Results from last 7 days   Lab Units 04/13/23  0011 04/12/23  0020 04/11/23  0006 04/10/23  0030 04/09/23  0049 04/08/23  1742 04/06/23  1350 04/06/23  1312   PROCALCITONIN ng/mL  --   --  0.90*  --   --  0.16  --  0.15   LACTATE mmol/L  --   --   --  2.1* 1.9 1.8   < >  --    SED RATE mm/hr  --   --   --   --   --    --   --  85*   CRP mg/dL 9.26*  --  14.69*  --  2.84*  --   --  4.41*   WBC 10*3/mm3 7.44 6.89 6.25 9.82 8.94 12.58*   < > 8.04   HEMOGLOBIN g/dL 8.5* 8.5* 7.5* 8.7* 9.5* 10.8*   < > 10.9*   HEMATOCRIT % 28.7* 28.0* 24.3* 28.0* 30.6* 34.9   < > 35.0   MCV fL 105.1* 101.8* 98.4* 96.6 96.8 96.9   < > 94.3   MCHC g/dL 29.6* 30.4* 30.9* 31.1* 31.0* 30.9*   < > 31.1*   PLATELETS 10*3/mm3 98* 102* 90* 133* 132* 180   < > 174   INR  1.70*  --  1.72* 1.74* 1.48*  --    < > 1.36*    < > = values in this interval not displayed.     Acid/base balance:  Results from last 7 days   Lab Units 04/13/23  0438 04/12/23  2142 04/12/23  0818   PH, ARTERIAL pH units 7.232* 7.256* 7.224*   PO2 ART mm Hg 99.1 126.0* 68.5*   PCO2, ARTERIAL mm Hg 63.2* 61.1* 65.1*   HCO3 ART mmol/L 26.6* 27.1* 26.8*     Renal and electrolytes:    Results from last 7 days   Lab Units 04/13/23  0011 04/12/23  1353 04/12/23  0020 04/11/23  0006 04/10/23  1124 04/10/23  0030 04/09/23  0049 04/08/23  1742   SODIUM mmol/L 137 138 137 139  --  141 139 138   POTASSIUM mmol/L 5.5* 5.4* 5.2 3.4*   < > 3.4* 3.8 3.6   MAGNESIUM mg/dL  --   --   --   --   --  2.8* 1.9 2.2   CHLORIDE mmol/L 100 99 99 102  --  96* 96* 96*   CO2 mmol/L 23.1 26.3 27.1 26.0  --  31.8* 31.9* 31.7*   BUN mg/dL 70* 61* 52* 34*  --  26* 15 16   CREATININE mg/dL 2.45* 2.05* 1.63* 1.09*  --  0.97 0.66 0.60   CALCIUM mg/dL 9.1 9.7 9.6 8.4*  --  9.4 8.9 9.2   PHOSPHORUS mg/dL  --   --   --   --   --  2.3* 2.7 3.1    < > = values in this interval not displayed.     Estimated Creatinine Clearance: 28.5 mL/min (A) (by C-G formula based on SCr of 2.45 mg/dL (H)).  @GFRCG:3@   Liver and pancreatic function:  Results from last 7 days   Lab Units 04/13/23  0011 04/11/23  0006 04/10/23  0030 04/09/23  0049 04/08/23  1742 04/07/23  0502 04/07/23  0241 04/06/23  1312   ALBUMIN g/dL 3.5 3.3* 3.6 3.3*   < >  --    < > 2.6*   BILIRUBIN mg/dL 2.2* 2.4* 1.9* 1.4*   < >  --    < > 2.0*   ALK PHOS U/L 77 81 102 125*    < >  --    < > 188*   AST (SGOT) U/L 84* 85* 104* 90*   < >  --    < > 81*   ALT (SGPT) U/L 45* 36* 43* 46*   < >  --    < > 51*   AMMONIA umol/L  --   --   --  40  --  69*  --   --    LIPASE U/L  --   --   --   --   --   --   --  60    < > = values in this interval not displayed.         Cardiac:  Results from last 7 days   Lab Units 04/10/23  0030 04/06/23  1312   PROBNP pg/mL 4,648.0* 830.9     Liver and pancreatic function:  Results from last 7 days   Lab Units 04/13/23  0011 04/11/23  0006 04/10/23  0030 04/09/23  0049 04/08/23  1742 04/07/23  0502 04/07/23  0241 04/06/23  1312   ALBUMIN g/dL 3.5 3.3* 3.6 3.3*   < >  --    < > 2.6*   BILIRUBIN mg/dL 2.2* 2.4* 1.9* 1.4*   < >  --    < > 2.0*   ALK PHOS U/L 77 81 102 125*   < >  --    < > 188*   AST (SGOT) U/L 84* 85* 104* 90*   < >  --    < > 81*   ALT (SGPT) U/L 45* 36* 43* 46*   < >  --    < > 51*   AMMONIA umol/L  --   --   --  40  --  69*  --   --    LIPASE U/L  --   --   --   --   --   --   --  60    < > = values in this interval not displayed.       Medications :     albumin human, 12.5 g, Intravenous, BID  artificial tears, , Both Eyes, Q4H  atorvastatin, 20 mg, Oral, Nightly  budesonide, 0.5 mg, Nebulization, BID - RT  castor oil-balsam peru, 1 application, Topical, Q12H  cefepime, 2 g, Intravenous, Q12H  cetirizine, 10 mg, Oral, Daily  chlorhexidine, 15 mL, Mouth/Throat, Q12H  dexamethasone, 20 mg, Intravenous, Daily   Followed by  [START ON 4/17/2023] dexamethasone, 10 mg, Intravenous, Daily  doxycycline, 100 mg, Intravenous, Q12H  insulin regular, 2-7 Units, Subcutaneous, Q6H  ipratropium-albuterol, 3 mL, Nebulization, 4x Daily - RT  levothyroxine, 75 mcg, Oral, Daily  micafungin (MYCAMINE) IV, 100 mg, Intravenous, Q24H  nystatin, , Topical, Q12H  pantoprazole, 40 mg, Intravenous, Q24H  sertraline, 50 mg, Oral, Daily  sodium chloride, 10 mL, Intravenous, Q12H  sodium chloride, 3 mL, Intravenous, Q12H      cisatracurium (NIMBEX) 200 mg in sodium  chloride 0.9 % 100 mL, 0.5-10 mcg/kg/min, Last Rate: 3.705 mcg/kg/min (04/13/23 0345)  propofol, 5-50 mcg/kg/min, Last Rate: 35 mcg/kg/min (04/13/23 0509)  sodium chloride, 75 mL/hr, Last Rate: 75 mL/hr (04/13/23 0340)      Assessment & Plan     1. JADA, oliguric  2. Acute Hyperkalemia  3. Acute bacterial pneumonia likely atypical  4. Acute on chronic hypoxic respiratory failure  5. ARDS  6. Decompensated liver cirrhosis     Worsening oliguric JADA, with persistent respiratory acidosis and mild hyperkalemia.   There is only minimal concern of pulm edema and more likely paranchymal disease causing respiratory failure. Given anuria and ongoing infusion, there is likelihood of worsening fluid overload. Overall patient's condition is critical, she is current DNR, recommend discussing goals of care with family and if acceptable, will proceed to dialysis for UF to see if that can improve ventilation.     Will start on pressors, levo to keep MAP >70mmHg for presumed HRS.     Have ordered Lokelma for hyperkalemia    Will DC IVF as there is no clinical improvement and risk of fluid overload, avoiding diuretics that carries worst prognosis in setting of ATN.     JADA most likely due to ATN in setting of ARDS, other differential include HRS in setting of decompensated liver cirrhosis and obstructive uropathy.    Baseline Cr around 0.6-0.7  -Follow up renal USG, UA with micro  -starict I/O  -avoid any nephrotoxic agents, hypotensoin and adjust medications according to eGFR      Venecia Hardy MD  04/13/23  07:20 EDT  > 35 min CC time rendered

## 2023-04-14 LAB
A FLAVUS AB SER QL ID: NEGATIVE
A FUMIGATUS AB SER QL ID: NEGATIVE
A NIGER AB SER QL ID: NEGATIVE
B DERMAT AB TITR SER: NEGATIVE {TITER}
GALACTOMANNAN AG SPEC IA-ACNC: 0.08 INDEX (ref 0–0.49)

## 2023-04-15 LAB
BACTERIA SPEC AEROBE CULT: NORMAL
BACTERIA SPEC AEROBE CULT: NORMAL

## 2023-04-17 NOTE — DISCHARGE SUMMARY
[facility] DISCHARGE SUMMARY    Patient Identification:  Name:  Aidee Trinidad  Age:  57 y.o.  Sex:  female  :  1965  MRN:  [id number]  Visit Number:  [id number]    Date of Admission: 2023  Date of Discharge: 2023    PCP: Azalea Burnett, APRN    DISCHARGE DIAGNOSES  #Acute on chronic hypoxic respiratory failure (2L baseline)  #Severe ARDS, suspected secondary to bacterial PNA  #Suspected bacterial PNA  #COPD  #Right pleural effusion  #Anuric JADA secondary to above  #Hyperkalemia  #Hypophosphatemia  #Likely ATN  #Thrombocytopenia  #Coagulopathy  #Hx of decompensated GONZALEZ cirrhosis w/Grade 1 EV w/acute hepatocellular liver injury  #Elevated BNP  #Hx of CAD s/p CABG   #Hx of HFpEF, not in exacerbation  #Normocytic anemia  #Hypothyroidism    CONSULTS   Consults     Date and Time Order Name Status Description    2023 10:47 AM Inpatient Nephrology Consult Completed     2023  9:34 AM Inpatient Palliative Care MD Consult Completed     2023  8:25 AM Inpatient Infectious Diseases Consult Completed     2023  7:51 AM Inpatient Palliative Care MD Consult Completed     2023  5:29 PM Inpatient Pulmonology Consult Completed           PROCEDURES PERFORMED   Intubation   CVC & arterial line placement    HOSPITAL COURSE  Ms. Trinidad was a 57 yoF w/PMH as noted above that presented initially with progressive cough shortly after a recent Orthopedic surgery at [facility]. She had hypoxic respiratory failure on arrival placed on 2L NC initially with labs consistent with severe sepsis and imaging revealing large unilateral pleural effusion and pulmonary edema w/consolidative airspace disease. She was given steroids, IV diuretic and abx coverage with CTX and doxycycline. He effusion actually improved rapidly and O2 requirement remained stable with improvement in subjective appearance initially however in intial days of  hospitalization she developed rapid respiratory failure and was intubated with perintubation imaging revealing rapidly progressing b/l multifocal infiltrates consistent with ARDS. A repeat echo was performed given reported HF hx and and EF found to be >55% without evidence of significant right heart strain or LA dilation. Abx coverage was escalated to Vanc/cefepime/doxycyline and micafungin after consulting ID and pulmonologist. Unfortunately she remained difficult to oxygenate and was eventually paralyzed, deeply sedated, and proned within 24hrs of intubation with PF ratio <100 indicating severe ARDS.    She failed to show significant improvement and continued to have poor PF ratios and struggled tolerating high peep table lung protective ventilation with sustained acidosis that remained difficult to keep within permissive hypercapnic range recommended by ARDSnet protocol. Patient remained intubated for multiple days and after diuresis did not improve lung dynamics or oxygenation and renal function began worsening, family was approached concerning dialysis or tracheostomy if indicated. With assistance of palliative care team guiding discussions, family ultimately shared that the patient was clear about not wanting tracheostomy or to ever be housed in LTAC or SNF for any period of time and as such, they made the difficult decision to terminally extubate and transition to comfort measures only to pass peacefully with family at bedside. Before extubation paralytic was stopped along with sedation, after extubation she declined rapidly and  without evidence of distress.                 VITAL SIGNS:        on   ;        Body mass index is 52.22 kg/m².  Wt Readings from Last 3 Encounters:   23 110 kg (241 lb 6.5 oz)   23 125 kg (275 lb)   23 125 kg (275 lb)       PHYSICAL EXAM:  Heart and lung sounds are absent on auscultation for 2 minutes.   No spontaneous cardiac or respiratory  activity  Patient not responding/not reactive to painful or verbal stimuli/sternal rub or nail bed pressure  Corneal reflexes absent, pupils fixed and dilated  Time of death 1325      DISCHARGE DISPOSITION          Discharge Medications      Continue These Medications      Instructions Start Date   aspirin 81 MG EC tablet   81 mg, Oral, Daily      atorvastatin 20 MG tablet  Commonly known as: LIPITOR   20 mg, Oral, Nightly      cholecalciferol 1.25 MG (79269 UT) capsule  Commonly known as: VITAMIN D3   50,000 Units, Oral, Every 7 Days      gabapentin 400 MG capsule  Commonly known as: NEURONTIN   400 mg, Oral, 2 Times Daily PRN      HYDROcodone-acetaminophen 5-325 MG per tablet  Commonly known as: NORCO   1 tablet, Oral, 2 Times Daily PRN      levothyroxine 75 MCG tablet  Commonly known as: SYNTHROID, LEVOTHROID   75 mcg, Oral, Daily      loratadine-pseudoephedrine  MG per 24 hr tablet  Commonly known as: CLARITIN-D 24-hour   1 tablet, Oral, Every Other Day      metoprolol tartrate 25 MG tablet  Commonly known as: LOPRESSOR   25 mg, Oral, 2 Times Daily      omeprazole 40 MG capsule  Commonly known as: priLOSEC   40 mg, Oral, Daily      potassium chloride 10 MEQ CR tablet  Commonly known as: K-DUR,KLOR-CON   10 mEq, Oral, 2 Times Daily      QUEtiapine 50 MG tablet  Commonly known as: SEROquel   50 mg, Oral, Nightly      sertraline 50 MG tablet  Commonly known as: ZOLOFT   50 mg, Oral, Daily         ASK your doctor about these medications      Instructions Start Date   furosemide 40 MG tablet  Commonly known as: LASIX  Ask about: Which instructions should I use?   40 mg, Oral, Daily      loratadine 10 MG tablet  Commonly known as: CLARITIN   10 mg, Oral, Every Other Day                Follow-up Information     Azalea Burnett, LAILA. Schedule an appointment as soon as possible for a visit in 2 days.    Specialty: Family Medicine  Contact information:  [address]  [facility]  [address]  [phone]             Azalea Burnett, LAILA .    Specialty: Family Medicine  Contact information:  [address]  [facility] [address]  [phone]                          TEST  RESULTS PENDING AT DISCHARGE         CODE STATUS  Code Status and Medical Interventions:   Ordered at: 23 1302     Level Of Support Discussed With:    Next of Kin (If No Surrogate)     Code Status (Patient has no pulse and is not breathing):    No CPR (Do Not Attempt to Resuscitate)     Medical Interventions (Patient has pulse or is breathing):    Comfort Measures     Release to patient:    Routine Release       The ASCVD Risk score (Luca JUNG, et al., 2019) failed to calculate for the following reasons:    Invalid patient status, patients with a status of  cannot receive a score       Brett Perry MD  [facility] ERist  23  13:41 EDT    Please note that this discharge summary required more than 30 minutes to complete.

## 2024-03-15 NOTE — PROGRESS NOTES
Physical Therapy Initial Evaluation and Plan of Care      Patient: Aidee Trinidad   : 1965  Diagnosis/ICD-10 Code:  Calcific tendonitis of right foot [M65.271]  Referring practitioner: Rico Knight MD  Date of Initial Visit: 2021  Today's Date: 2021  Patient seen for 1 sessions           Subjective Questionnaire: LEFS: 10/89=87.5% impaired      Subjective Evaluation    History of Present Illness  Date of onset: 2021  Mechanism of injury: Patient notes that she injured her right ankle when she fell on 2021.  She notes that her leg gave out, causing her to fall to the right.  Patient reports instant pain and swelling in the right ankle.  She went to the ER, where x-rays were performed; she notes that she had a break in the right ankle.  Patient was instructed to follow-up with an orthopedic MD.  She notes that she had repeat x-rays at the orthopedics office; she wore a cast for 1 week and then was instructed to wear an ankle brace.  Patient continues to wear the ankle brace for 6-8 weeks.  Patient notes that orthopedic also diagnosed her with arthritis and tendonitis.  Patient reports that she continues to have decreased right ankle mobility and balance.  She notes continued swelling in the right ankle, noting that she does use ice at home.  Patient notes that she does have increased pain, primarily when standing for extended lengths of time.      Patient Occupation: Disabled Pain  Current pain ratin  At best pain ratin  At worst pain ratin  Location: R) ankle  Quality: dull ache and discomfort  Relieving factors: ice and rest  Aggravating factors: standing, ambulation, movement, repetitive movement, squatting, sleeping and prolonged positioning    Diagnostic Tests  X-ray: abnormal (avulsion fracture)    Patient Goals  Patient goals for therapy: decreased pain, increased motion, improved balance, increased strength and decreased edema             Objective           Palpation     Right Tenderness of the lateral gastrocnemius, medial gastrocnemius and soleus.     Tenderness     Right Ankle/Foot   Tenderness in the Achilles insertion, lateral malleolus, medial malleolus, metatarsal head, plantar fascia and tarsals.     Active Range of Motion   Left Ankle/Foot   Dorsiflexion (ke): 3 degrees   Plantar flexion: 55 degrees   Inversion: 26 degrees   Eversion: 14 degrees     Right Ankle/Foot   Plantar flexion: 48 degrees   Inversion: 20 degrees   Eversion: 10 degrees     Additional Active Range of Motion Details  Dorsiflexion-lacking 8 degrees from neutral position      Strength/Myotome Testing     Left Hip   Planes of Motion   Flexion: 4-    Right Hip   Planes of Motion   Flexion: 3+    Left Knee   Flexion: 4-  Extension: 4-    Right Knee   Flexion: 4-  Extension: 4-    Left Ankle/Foot   Dorsiflexion: 4+  Plantar flexion: 4+  Inversion: 4+  Eversion: 4    Additional Strength Details  R) ankle MMT not performed secondary to diagnosis and ROM deficits    Swelling   Left Ankle/Foot   Figure 8: 55 cm    Right Ankle/Foot   Figure 8: 55 cm    Ambulation     Observational Gait   Gait: antalgic   Decreased walking speed, stride length, right stance time, right swing time and right step length.           Assessment & Plan     Assessment  Impairments: abnormal gait, abnormal muscle tone, abnormal or restricted ROM, activity intolerance, impaired balance, impaired physical strength, lacks appropriate home exercise program, pain with function, safety issue and weight-bearing intolerance  Assessment details: Patient is a 55 year old female who comes to physical therapy for right foot tendonitis and right ankle avulsion fracture. The patient presents with increased pain, decreased right ankle ROM, and decreased LE strength. Patient ambulates with antalgic gait pattern and increased trunk/hip flexion.  Patient reports a 87.5% functional mobility impairment, based on the patient's response to  the LEFS.  Patient will benefit from skilled PT, so that patient can achieve maximum level of function.     Prognosis: good  Functional Limitations: sleeping, walking, pulling, pushing, uncomfortable because of pain, moving in bed, standing, stooping and unable to perform repetitive tasks  Goals  Plan Goals: SHORT TERM GOALS:     4 weeks  1. Patient will be independent/compliant with HEP.   2.right ankle ROM will improve by at least 5 degrees to allow for greater ease with ADLs.  3. Patient will demonstrate ability to ambulate in the clinic with equal weight shift.  4. Patient will report right ankle pain no greater than 3/10 when performing self-care activities.    LONG TERM GOALS:   8 weeks  1. Patient will achieve 5 degrees right ankle dorsiflexion for improved toe clearance during swing phase.  2. Patient to demonstrate ability to ambulate 10 minutes with normal gait pattern with pain no greater than 3/10 for improved community involvement.  3. Patient will report at least 20/80 on LEFS to show improved functional mobility.  4. Right ankle strength will improve to at least 4/5 to prevent reinjury.      Plan  Therapy options: will be seen for skilled physical therapy services  Planned modality interventions: cryotherapy, electrical stimulation/Russian stimulation, TENS, ultrasound, thermotherapy (hydrocollator packs), dry needling and iontophoresis  Planned therapy interventions: manual therapy, ADL retraining, balance/weight-bearing training, neuromuscular re-education, body mechanics training, postural training, soft tissue mobilization, flexibility, functional ROM exercises, strengthening, gait training, stretching, home exercise program, therapeutic activities, IADL retraining, transfer training and joint mobilization  Duration in visits: 20  Duration in weeks: 8  Treatment plan discussed with: patient  Plan details: Moderate Evaluation  31100  Re-evaluation   06967    Therapeutic exercise  36232  Therapeutic  activity    31623  Neuromuscular re-education   89210  Manual therapy   43880  Gait training  32234    Unattended e-stim (Medicaid/Medicare)     Moist heat/cryotherapy 42294   Ultrasound   15747  Iontophoresis   26873            Visit Diagnoses:    ICD-10-CM ICD-9-CM   1. Calcific tendonitis of right foot  M65.271 727.82   2. Closed avulsion fracture of right ankle with routine healing, subsequent encounter  S82.891D V54.19       Timed:  Manual Therapy:         mins  56283;  Therapeutic Exercise:     16    mins  17823;     Neuromuscular Daisy:        mins  41245;    Therapeutic Activity:          mins  42202;     Gait Training:           mins  04772;     Ultrasound:          mins  06528;    Electrical Stimulation:         mins  31144 ( );    Untimed:  Electrical Stimulation:         mins  97330 ( );  Mechanical Traction:         mins  78312;     Timed Treatment:  16    mins   Total Treatment:     50   mins    PT SIGNATURE: Kelley Matias, SUKH         Initial Certification  Certification Period: 5/17/2021 thru 8/15/2021  I certify that the therapy services are furnished while this patient is under my care.  The services outlined above are required by this patient, and will be reviewed every 90 days.     PHYSICIAN: Rico Knight MD      DATE:     Please sign and return via fax to [phone].. Thank you, [facility].       No

## 2025-06-01 NOTE — ANESTHESIA PREPROCEDURE EVALUATION
Anesthesia Evaluation     no history of anesthetic complications:  NPO Solid Status: > 8 hours  NPO Liquid Status: > 8 hours           Airway   Mallampati: II  TM distance: >3 FB  Neck ROM: full  No difficulty expected  Dental - normal exam   (+) poor dentition    Pulmonary - normal exam   Cardiovascular - normal exam    (+) hypertension, CAD, cardiac stents more than 12 months ago       Neuro/Psych  GI/Hepatic/Renal/Endo    (+)   liver disease, diabetes mellitus,     Musculoskeletal     Abdominal  - normal exam    Bowel sounds: normal.   Substance History      OB/GYN          Other                      Anesthesia Plan    ASA 3     general     intravenous induction     Anesthetic plan, all risks, benefits, and alternatives have been provided, discussed and informed consent has been obtained with: patient.      
Time-based billing (NON-critical care)

## (undated) DEVICE — SYR LUERLOK 30CC

## (undated) DEVICE — APPL CHLORAPREP HI/LITE 26ML ORNG

## (undated) DEVICE — FRCP BX RADJAW4 NDL 2.8 240CM LG OG BX40

## (undated) DEVICE — 3.2MM X 343MM BRAD POINT TIP                                    THREADED GUIDE PIN: Brand: TRIGEN

## (undated) DEVICE — SUT MNCRYL 2/0 RB1 27IN UD Y266H

## (undated) DEVICE — GLV SURG PREMIERPRO MIC LTX PF SZ8 BRN

## (undated) DEVICE — ANTIBACTERIAL UNDYED BRAIDED (POLYGLACTIN 910), SYNTHETIC ABSORBABLE SUTURE: Brand: COATED VICRYL

## (undated) DEVICE — SINGLE PORT MANIFOLD: Brand: NEPTUNE 2

## (undated) DEVICE — UNDERGLV SURG BIOGEL INDICAT PF 8 GRN

## (undated) DEVICE — PK FX TABL 70

## (undated) DEVICE — PROXIMATE RH ROTATING HEAD SKIN STAPLERS (35 WIDE) CONTAINS 35 STAINLESS STEEL STAPLES: Brand: PROXIMATE

## (undated) DEVICE — GOWN,REINF,POLY,ECL,PP SLV,XL: Brand: MEDLINE

## (undated) DEVICE — SPNG GZ WOVN 4X4IN 12PLY 10/BX STRL

## (undated) DEVICE — Device

## (undated) DEVICE — DRSNG WND STRIP OPTIFOAM AG A/MIC LF 3.5X6IN STRL

## (undated) DEVICE — 4-PORT MANIFOLD: Brand: NEPTUNE 2

## (undated) DEVICE — INTENDED FOR TISSUE SEPARATION, AND OTHER PROCEDURES THAT REQUIRE A SHARP SURGICAL BLADE TO PUNCTURE OR CUT.: Brand: BARD-PARKER ® STAINLESS STEEL BLADES

## (undated) DEVICE — PATIENT RETURN ELECTRODE, SINGLE-USE, CONTACT QUALITY MONITORING, ADULT, WITH 9FT CORD, FOR PATIENTS WEIGING OVER 33LBS. (15KG): Brand: MEGADYNE

## (undated) DEVICE — HOLDER: Brand: DEROYAL

## (undated) DEVICE — 4.0MM LONG AO PILOT DRILL: Brand: TRIGEN

## (undated) DEVICE — BNDG ELAS CO-FLEX SLF ADHR 4IN5YD LF STRL

## (undated) DEVICE — DRSNG WND GZ CURAD OIL EMULSION 3X16IN LF STRL

## (undated) DEVICE — 6619 2 PTNT ISO SYS INCISE AREA&LT;(&GT;&&LT;)&GT;P: Brand: STERI-DRAPE™ IOBAN™ 2

## (undated) DEVICE — THE BITE BLOCK MAXI, LATEX FREE STRAP IS USED TO PROTECT THE ENDOSCOPE INSERTION TUBE FROM BEING BITTEN BY THE PATIENT.